# Patient Record
Sex: MALE | Race: WHITE | NOT HISPANIC OR LATINO
[De-identification: names, ages, dates, MRNs, and addresses within clinical notes are randomized per-mention and may not be internally consistent; named-entity substitution may affect disease eponyms.]

---

## 2017-11-09 ENCOUNTER — TRANSCRIPTION ENCOUNTER (OUTPATIENT)
Age: 52
End: 2017-11-09

## 2017-11-13 ENCOUNTER — EMERGENCY (EMERGENCY)
Facility: HOSPITAL | Age: 52
LOS: 1 days | Discharge: LEFT BEFORE DISCHARGE | End: 2017-11-13

## 2017-11-13 DIAGNOSIS — Z98.89 OTHER SPECIFIED POSTPROCEDURAL STATES: Chronic | ICD-10-CM

## 2017-12-12 ENCOUNTER — EMERGENCY (EMERGENCY)
Facility: HOSPITAL | Age: 52
LOS: 1 days | Discharge: HOME | End: 2017-12-12
Attending: EMERGENCY MEDICINE | Admitting: EMERGENCY MEDICINE
Payer: MEDICARE

## 2017-12-12 VITALS
TEMPERATURE: 98 F | OXYGEN SATURATION: 98 % | SYSTOLIC BLOOD PRESSURE: 114 MMHG | HEART RATE: 68 BPM | RESPIRATION RATE: 16 BRPM | DIASTOLIC BLOOD PRESSURE: 62 MMHG

## 2017-12-12 VITALS
HEIGHT: 65 IN | WEIGHT: 130.07 LBS | OXYGEN SATURATION: 95 % | DIASTOLIC BLOOD PRESSURE: 77 MMHG | RESPIRATION RATE: 16 BRPM | HEART RATE: 63 BPM | TEMPERATURE: 98 F | SYSTOLIC BLOOD PRESSURE: 107 MMHG

## 2017-12-12 DIAGNOSIS — Z98.89 OTHER SPECIFIED POSTPROCEDURAL STATES: Chronic | ICD-10-CM

## 2017-12-12 DIAGNOSIS — Z98.1 ARTHRODESIS STATUS: Chronic | ICD-10-CM

## 2017-12-12 PROCEDURE — 72100 X-RAY EXAM L-S SPINE 2/3 VWS: CPT | Mod: 26

## 2017-12-12 PROCEDURE — 72100 X-RAY EXAM L-S SPINE 2/3 VWS: CPT

## 2017-12-12 PROCEDURE — 96372 THER/PROPH/DIAG INJ SC/IM: CPT

## 2017-12-12 PROCEDURE — 99284 EMERGENCY DEPT VISIT MOD MDM: CPT | Mod: 25

## 2017-12-12 PROCEDURE — 99284 EMERGENCY DEPT VISIT MOD MDM: CPT

## 2017-12-12 RX ORDER — KETOROLAC TROMETHAMINE 30 MG/ML
60 SYRINGE (ML) INJECTION ONCE
Qty: 0 | Refills: 0 | Status: DISCONTINUED | OUTPATIENT
Start: 2017-12-12 | End: 2017-12-12

## 2017-12-12 RX ORDER — LIDOCAINE 4 G/100G
1 CREAM TOPICAL ONCE
Qty: 0 | Refills: 0 | Status: COMPLETED | OUTPATIENT
Start: 2017-12-12 | End: 2017-12-12

## 2017-12-12 RX ORDER — SODIUM CHLORIDE 9 MG/ML
3 INJECTION INTRAMUSCULAR; INTRAVENOUS; SUBCUTANEOUS ONCE
Qty: 0 | Refills: 0 | Status: DISCONTINUED | OUTPATIENT
Start: 2017-12-12 | End: 2017-12-12

## 2017-12-12 RX ORDER — IBUPROFEN 200 MG
1 TABLET ORAL
Qty: 20 | Refills: 0
Start: 2017-12-12 | End: 2017-12-16

## 2017-12-12 RX ORDER — CYCLOBENZAPRINE HYDROCHLORIDE 10 MG/1
1 TABLET, FILM COATED ORAL
Qty: 6 | Refills: 0
Start: 2017-12-12 | End: 2017-12-13

## 2017-12-12 RX ADMIN — Medication 60 MILLIGRAM(S): at 14:50

## 2017-12-12 RX ADMIN — LIDOCAINE 1 PATCH: 4 CREAM TOPICAL at 14:50

## 2017-12-12 RX ADMIN — Medication 60 MILLIGRAM(S): at 15:05

## 2017-12-12 NOTE — ED ADULT NURSE NOTE - CHPI ED SYMPTOMS NEG
no motor function loss/no numbness/no anorexia/no tingling/no constipation/no bladder dysfunction/no difficulty bearing weight/no fatigue/no bowel dysfunction/no neck tenderness

## 2017-12-12 NOTE — ED ADULT NURSE NOTE - ATTEMPT TO OOB
09/21/2017                 [facility]  [address]  [facility] [address]  Phone: [phone]   09/21/2017    Patient: Rossy Laguerre   YOB: 2009   Date of Visit: 9/21/2017       To Whom it May Concern:    Rossy Laguerre was seen in my clinic on 9/21/2017. She may return to school on 09/22/2017.    If you have any questions or concerns, please don't hesitate to call.    Sincerely,         Sonia Marroquin MA      no

## 2017-12-12 NOTE — ED PROVIDER NOTE - OBJECTIVE STATEMENT
53 yo male presents with lower back pain after lifting a box at home yesterday, no change in bowel and bladder habits.  states has hx of spinal fusion June 2017  by Dr Peters,  has a pain management Dr, cannot remember name.  took motrin 800 twice yesterday, did not help much. did not take any today.  + smoker, denies etoh.  PMD Dr Burdick

## 2017-12-12 NOTE — ED PROVIDER NOTE - CHPI ED SYMPTOMS NEG
no motor function loss/no bladder dysfunction/no numbness/no constipation/no tingling/no difficulty bearing weight

## 2017-12-12 NOTE — ED ADULT NURSE NOTE - OBJECTIVE STATEMENT
Patient twisted wrong yesterday and now has pain to low back. Patient with history of spinal fusion 6/2017 and has a pain management doctor that he was unable to get an appointment with.

## 2017-12-12 NOTE — ED PROVIDER NOTE - MEDICAL DECISION MAKING DETAILS
Evaluate the etiology of back pain corollate with history and physical imaging study treat accordingly.

## 2017-12-12 NOTE — ED PROVIDER NOTE - ATTENDING CONTRIBUTION TO CARE
I, Dr Cano, have personally performed a face to face diagnostic evaluation on this patient with the PA/NP. I have reviewed the PA/NP's note and agree with the history, Physical exam and plan of care, except for additional note as noted below.    History as above PE ambulatory with lower back discomfort some tenderness on percussion no focal deficit.

## 2017-12-15 ENCOUNTER — EMERGENCY (EMERGENCY)
Facility: HOSPITAL | Age: 52
LOS: 1 days | Discharge: HOME | End: 2017-12-15
Attending: EMERGENCY MEDICINE | Admitting: EMERGENCY MEDICINE
Payer: MEDICARE

## 2017-12-15 VITALS
WEIGHT: 130.07 LBS | OXYGEN SATURATION: 97 % | DIASTOLIC BLOOD PRESSURE: 85 MMHG | SYSTOLIC BLOOD PRESSURE: 139 MMHG | RESPIRATION RATE: 16 BRPM | TEMPERATURE: 98 F | HEART RATE: 69 BPM | HEIGHT: 65 IN

## 2017-12-15 DIAGNOSIS — Z98.89 OTHER SPECIFIED POSTPROCEDURAL STATES: Chronic | ICD-10-CM

## 2017-12-15 DIAGNOSIS — Z98.1 ARTHRODESIS STATUS: Chronic | ICD-10-CM

## 2017-12-15 PROCEDURE — 99283 EMERGENCY DEPT VISIT LOW MDM: CPT

## 2017-12-15 RX ORDER — PENICILLIN V POTASSIUM 250 MG
1 TABLET ORAL
Qty: 28 | Refills: 0
Start: 2017-12-15 | End: 2017-12-21

## 2017-12-15 NOTE — ED PROVIDER NOTE - ENMT, MLM
Airway patent, Mouth with normal mucosa. Right upper gums with minimal erythema and minimal swelling. Non-tender. Overall poor dentition. No other acute findings. Airway patent, Mouth with normal mucosa. Right upper gums with minimal erythema and minimal swelling. Non-tender. Overall poor dentition. No other acute findings. MM Moist. No pharyngeal edema / erythema. Pos dentures

## 2017-12-15 NOTE — ED PROVIDER NOTE - OBJECTIVE STATEMENT
51 y/o M pt w/ PMHx hiatal hernia, HLD, liver failure, Tardive dyskinesia and PSHx lumbar spinal fusion presents to the ED c/o mouth pain/discomfort. Pt states he does not have many teeth left, he has a screw in his gum for his dentures and states he has discomfort in his mouth, believes the screw needs to be taken out. Pt states he is waiting to be able to schedule a dental appointment for insurance reasons, but the pain and discomfort is bothering him and he is looking for relief. Pt denies fever or any other complaints at this time. NKDA. 51 y/o M pt w/ PMHx hiatal hernia, HLD, liver failure, Tardive dyskinesia and PSHx lumbar spinal fusion presents to the ED c/o R upper mouth pain/discomfort. Pt states he does not have many teeth left, he has a screw in his gum for his dentures and states he has discomfort in his mouth, believes the screw needs to be taken out. Pt states he is waiting to be able to schedule a dental appointment for insurance reasons, but the pain and discomfort is bothering him and he is looking for relief. Pt denies fever or any other complaints at this time. No throat pain, no dysphagia. NO agg/allev factors. NO other co.

## 2017-12-15 NOTE — ED ADULT NURSE NOTE - OBJECTIVE STATEMENT
52 yr old male c/o pain in upper right side of mouth. Pt states he has a screw that is sticking out of his gum from a plate that was put in many years ago. Pt states he is awaiting dentures to be made and has an appointment with oral surgeon Monday regarding this issue.

## 2017-12-20 ENCOUNTER — TRANSCRIPTION ENCOUNTER (OUTPATIENT)
Age: 52
End: 2017-12-20

## 2018-09-07 ENCOUNTER — EMERGENCY (EMERGENCY)
Facility: HOSPITAL | Age: 53
LOS: 1 days | Discharge: HOME | End: 2018-09-07
Attending: EMERGENCY MEDICINE
Payer: MEDICARE

## 2018-09-07 VITALS — WEIGHT: 128.09 LBS | HEIGHT: 65 IN

## 2018-09-07 VITALS
SYSTOLIC BLOOD PRESSURE: 137 MMHG | RESPIRATION RATE: 18 BRPM | HEART RATE: 69 BPM | OXYGEN SATURATION: 99 % | TEMPERATURE: 98 F | DIASTOLIC BLOOD PRESSURE: 84 MMHG

## 2018-09-07 DIAGNOSIS — Z98.89 OTHER SPECIFIED POSTPROCEDURAL STATES: Chronic | ICD-10-CM

## 2018-09-07 DIAGNOSIS — Z98.1 ARTHRODESIS STATUS: Chronic | ICD-10-CM

## 2018-09-07 PROCEDURE — 73610 X-RAY EXAM OF ANKLE: CPT

## 2018-09-07 PROCEDURE — 99283 EMERGENCY DEPT VISIT LOW MDM: CPT

## 2018-09-07 PROCEDURE — 73610 X-RAY EXAM OF ANKLE: CPT | Mod: 26,LT

## 2018-09-07 RX ORDER — IBUPROFEN 200 MG
1 TABLET ORAL
Qty: 30 | Refills: 0
Start: 2018-09-07

## 2018-09-07 NOTE — ED STATDOCS - PROGRESS NOTE DETAILS
PT evaluated by intake physician. HPI/PE/ROS as noted above. Will follow up plan per intake physician patient reports that he will follow-up with his therapist next week for refill on cogentin.  states that he has enough seroquel.

## 2018-09-07 NOTE — ED STATDOCS - MUSCULOSKELETAL, MLM
range of motion is not limited. No 5th MT tenderness. Tender to posterior aspect and front of L ankle/foot. No proximal or fibula tenderness. range of motion is not limited. No obvious deformity of foot or ankle. No 5th MT tenderness. Tender to posterior aspect and front of L ankle/foot. No proximal or fibula tenderness.

## 2018-09-07 NOTE — ED STATDOCS - PLAN OF CARE
Apply ice to affected area for 15-20 minutes every few hours for the next few days.  Use as much or as frequently as desired. Tylenol extra strength 2 tablets every 4 hours or Ibuprofen 600mg (3 tablets) every 6 hours as needed for aches, pains. Ace wrap for comfort.

## 2018-09-07 NOTE — ED STATDOCS - NEUROLOGICAL, MLM
sensation is normal and strength is normal. sensation of foot grossly intact.  L4-S1 motor 5/5/ and equal markus

## 2018-09-07 NOTE — ED STATDOCS - CARE PLAN
Principal Discharge DX:	Ankle pain Principal Discharge DX:	Ankle pain  Assessment and plan of treatment:	Apply ice to affected area for 15-20 minutes every few hours for the next few days.  Use as much or as frequently as desired. Tylenol extra strength 2 tablets every 4 hours or Ibuprofen 600mg (3 tablets) every 6 hours as needed for aches, pains. Ace wrap for comfort.

## 2018-09-07 NOTE — ED STATDOCS - OBJECTIVE STATEMENT
53 y/o M pt with medical hx of hiatal hernia, chronic back pain, HLD, and liver failure presents to ED c/o L ankle pain since yesterday and back pain for today. He reports that he was coming downstairs, when he tripped and landed on his ankle. Pt does suffer from chronic back pain so when he fell he did notice his back pain start to come back. Pt applied ice on his ankle, for the swelling to go down. Pt took Motrin yesterday for his pain and hasn't taken anything else since. Denies knee pain. No further complaints at this time. 53 y/o M pt with medical hx of hiatal hernia, chronic back pain, HLD, and liver failure presents to ED c/o L ankle pain since yesterday and back pain for today. He reports that he was coming downstairs, when he tripped and twisted his ankle. Pt has h/o chronic back pain and reports increased back pain since incident yesterday. Pt applied ice on his ankle, for the swelling to go down. Pt took Motrin yesterday for his pain and hasn't taken anything else since. Denies knee pain. No further complaints at this time.

## 2018-09-07 NOTE — ED STATDOCS - NS_ ATTENDINGSCRIBEDETAILS _ED_A_ED_FT
I, Sánchez Blanco, performed the initial face to face bedside interview with this patient regarding history of present illness, review of symptoms and relevant past medical, social and family history.  I completed an independent physical examination.  I was the provider who initially evaluated this patient.  The history, relevant review of systems, past medical and surgical history, medical decision making, and physical examination was documented by the scribe in my presence and I attest to the accuracy of the documentation. Follow-up on ordered tests (ie labs, radiologic studies) and re-evaluation of the patient's status has been communicated to the ACP.  Disposition of the patient will be based on test outcome and response to ED interventions.

## 2018-10-05 ENCOUNTER — EMERGENCY (EMERGENCY)
Facility: HOSPITAL | Age: 53
LOS: 1 days | Discharge: HOME | End: 2018-10-05
Attending: EMERGENCY MEDICINE
Payer: MEDICARE

## 2018-10-05 VITALS
RESPIRATION RATE: 20 BRPM | WEIGHT: 130.07 LBS | SYSTOLIC BLOOD PRESSURE: 135 MMHG | HEIGHT: 65 IN | OXYGEN SATURATION: 93 % | DIASTOLIC BLOOD PRESSURE: 69 MMHG | HEART RATE: 61 BPM | TEMPERATURE: 98 F

## 2018-10-05 DIAGNOSIS — Z98.1 ARTHRODESIS STATUS: Chronic | ICD-10-CM

## 2018-10-05 DIAGNOSIS — Z98.89 OTHER SPECIFIED POSTPROCEDURAL STATES: Chronic | ICD-10-CM

## 2018-10-05 PROCEDURE — 73630 X-RAY EXAM OF FOOT: CPT

## 2018-10-05 PROCEDURE — 99283 EMERGENCY DEPT VISIT LOW MDM: CPT

## 2018-10-05 PROCEDURE — 73630 X-RAY EXAM OF FOOT: CPT | Mod: 26,LT

## 2018-10-05 RX ORDER — ACETAMINOPHEN 500 MG
650 TABLET ORAL ONCE
Qty: 0 | Refills: 0 | Status: COMPLETED | OUTPATIENT
Start: 2018-10-05 | End: 2018-10-05

## 2018-10-05 RX ADMIN — Medication 650 MILLIGRAM(S): at 14:23
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
## 2018-10-05 NOTE — ED PROVIDER NOTE - PHYSICAL EXAMINATION
Left foot: no swelling, no ecchymosis, no deformities, DP/PT pulses intact, no lower extremity edema, no calf tenderness, + minimal tenderness to palpation over mid foot
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
## 2019-02-07 ENCOUNTER — APPOINTMENT (OUTPATIENT)
Dept: FAMILY MEDICINE | Facility: CLINIC | Age: 54
End: 2019-02-07

## 2019-02-08 ENCOUNTER — APPOINTMENT (OUTPATIENT)
Dept: FAMILY MEDICINE | Facility: CLINIC | Age: 54
End: 2019-02-08

## 2019-02-16 ENCOUNTER — EMERGENCY (EMERGENCY)
Facility: HOSPITAL | Age: 54
LOS: 1 days | Discharge: HOME | End: 2019-02-16
Attending: EMERGENCY MEDICINE
Payer: MEDICARE

## 2019-02-16 VITALS
DIASTOLIC BLOOD PRESSURE: 85 MMHG | TEMPERATURE: 98 F | RESPIRATION RATE: 24 BRPM | SYSTOLIC BLOOD PRESSURE: 143 MMHG | OXYGEN SATURATION: 85 % | HEART RATE: 130 BPM | WEIGHT: 160.06 LBS | HEIGHT: 69 IN

## 2019-02-16 VITALS
SYSTOLIC BLOOD PRESSURE: 125 MMHG | OXYGEN SATURATION: 99 % | HEART RATE: 69 BPM | DIASTOLIC BLOOD PRESSURE: 83 MMHG | TEMPERATURE: 98 F | RESPIRATION RATE: 22 BRPM

## 2019-02-16 DIAGNOSIS — Z98.89 OTHER SPECIFIED POSTPROCEDURAL STATES: Chronic | ICD-10-CM

## 2019-02-16 DIAGNOSIS — Z98.1 ARTHRODESIS STATUS: Chronic | ICD-10-CM

## 2019-02-16 LAB
ALBUMIN SERPL ELPH-MCNC: 4.1 G/DL — SIGNIFICANT CHANGE UP (ref 3.3–5.2)
ALP SERPL-CCNC: 79 U/L — SIGNIFICANT CHANGE UP (ref 40–120)
ALT FLD-CCNC: 15 U/L — SIGNIFICANT CHANGE UP
ANION GAP SERPL CALC-SCNC: 12 MMOL/L — SIGNIFICANT CHANGE UP (ref 5–17)
AST SERPL-CCNC: 18 U/L — SIGNIFICANT CHANGE UP
BASOPHILS # BLD AUTO: 0 K/UL — SIGNIFICANT CHANGE UP (ref 0–0.2)
BASOPHILS NFR BLD AUTO: 0.1 % — SIGNIFICANT CHANGE UP (ref 0–2)
BILIRUB SERPL-MCNC: 0.2 MG/DL — LOW (ref 0.4–2)
BUN SERPL-MCNC: 19 MG/DL — SIGNIFICANT CHANGE UP (ref 8–20)
CALCIUM SERPL-MCNC: 8.9 MG/DL — SIGNIFICANT CHANGE UP (ref 8.6–10.2)
CHLORIDE SERPL-SCNC: 100 MMOL/L — SIGNIFICANT CHANGE UP (ref 98–107)
CO2 SERPL-SCNC: 27 MMOL/L — SIGNIFICANT CHANGE UP (ref 22–29)
CREAT SERPL-MCNC: 0.87 MG/DL — SIGNIFICANT CHANGE UP (ref 0.5–1.3)
EOSINOPHIL # BLD AUTO: 0.4 K/UL — SIGNIFICANT CHANGE UP (ref 0–0.5)
EOSINOPHIL NFR BLD AUTO: 4.5 % — SIGNIFICANT CHANGE UP (ref 0–5)
GLUCOSE SERPL-MCNC: 93 MG/DL — SIGNIFICANT CHANGE UP (ref 70–115)
HCT VFR BLD CALC: 47.5 % — SIGNIFICANT CHANGE UP (ref 42–52)
HGB BLD-MCNC: 15.5 G/DL — SIGNIFICANT CHANGE UP (ref 14–18)
LYMPHOCYTES # BLD AUTO: 1.6 K/UL — SIGNIFICANT CHANGE UP (ref 1–4.8)
LYMPHOCYTES # BLD AUTO: 20.9 % — SIGNIFICANT CHANGE UP (ref 20–55)
MCHC RBC-ENTMCNC: 31.3 PG — HIGH (ref 27–31)
MCHC RBC-ENTMCNC: 32.6 G/DL — SIGNIFICANT CHANGE UP (ref 32–36)
MCV RBC AUTO: 96 FL — HIGH (ref 80–94)
MONOCYTES # BLD AUTO: 0.4 K/UL — SIGNIFICANT CHANGE UP (ref 0–0.8)
MONOCYTES NFR BLD AUTO: 5.4 % — SIGNIFICANT CHANGE UP (ref 3–10)
NEUTROPHILS # BLD AUTO: 5.3 K/UL — SIGNIFICANT CHANGE UP (ref 1.8–8)
NEUTROPHILS NFR BLD AUTO: 68.7 % — SIGNIFICANT CHANGE UP (ref 37–73)
PLATELET # BLD AUTO: 254 K/UL — SIGNIFICANT CHANGE UP (ref 150–400)
POTASSIUM SERPL-MCNC: 4.4 MMOL/L — SIGNIFICANT CHANGE UP (ref 3.5–5.3)
POTASSIUM SERPL-SCNC: 4.4 MMOL/L — SIGNIFICANT CHANGE UP (ref 3.5–5.3)
PROT SERPL-MCNC: 7 G/DL — SIGNIFICANT CHANGE UP (ref 6.6–8.7)
RBC # BLD: 4.95 M/UL — SIGNIFICANT CHANGE UP (ref 4.6–6.2)
RBC # FLD: 14.4 % — SIGNIFICANT CHANGE UP (ref 11–15.6)
SODIUM SERPL-SCNC: 139 MMOL/L — SIGNIFICANT CHANGE UP (ref 135–145)
WBC # BLD: 7.7 K/UL — SIGNIFICANT CHANGE UP (ref 4.8–10.8)
WBC # FLD AUTO: 7.7 K/UL — SIGNIFICANT CHANGE UP (ref 4.8–10.8)

## 2019-02-16 PROCEDURE — 99283 EMERGENCY DEPT VISIT LOW MDM: CPT

## 2019-02-16 PROCEDURE — 85027 COMPLETE CBC AUTOMATED: CPT

## 2019-02-16 PROCEDURE — 99284 EMERGENCY DEPT VISIT MOD MDM: CPT

## 2019-02-16 PROCEDURE — 80053 COMPREHEN METABOLIC PANEL: CPT

## 2019-02-16 PROCEDURE — 36415 COLL VENOUS BLD VENIPUNCTURE: CPT

## 2019-02-16 RX ORDER — DIPHENHYDRAMINE HCL 50 MG
1 CAPSULE ORAL
Qty: 18 | Refills: 0
Start: 2019-02-16 | End: 2019-02-21

## 2019-02-16 RX ORDER — SODIUM CHLORIDE 9 MG/ML
1000 INJECTION INTRAMUSCULAR; INTRAVENOUS; SUBCUTANEOUS ONCE
Qty: 0 | Refills: 0 | Status: COMPLETED | OUTPATIENT
Start: 2019-02-16 | End: 2019-02-16

## 2019-02-16 RX ADMIN — SODIUM CHLORIDE 1000 MILLILITER(S): 9 INJECTION INTRAMUSCULAR; INTRAVENOUS; SUBCUTANEOUS at 09:25

## 2019-02-16 NOTE — ED PROVIDER NOTE - CLINICAL SUMMARY MEDICAL DECISION MAKING FREE TEXT BOX
well appearing male here for checkup after bought of nausea/ vomiting/diarrhea now resolved.  + excoriations to skin but pending home fumigation. Pt abdomen nontender; VSS; benadryl sent to pt/s pharmacy; will check basic labs, hydrate and  likely d/c home if reults wnl.

## 2019-02-16 NOTE — ED PROVIDER NOTE - GASTROINTESTINAL, MLM
Abdomen soft, non-tender, no guarding. Abdomen soft, non-tender, no guarding. Normal BS. No distension

## 2019-02-16 NOTE — ED PROVIDER NOTE - SKIN COLOR
excoriations to back of neck b/l forearms; and waistline;  no lesions to intertriginous folds. normal for race/excoriations to back of neck b/l forearms; and waistline;  no lesions to intertriginous folds.

## 2019-02-16 NOTE — ED ADULT NURSE REASSESSMENT NOTE - GENERAL PATIENT STATE
smiling/interactive/comfortable appearance/cooperative
comfortable appearance/cooperative/improvement verbalized/smiling/interactive

## 2019-02-16 NOTE — ED ADULT TRIAGE NOTE - CHIEF COMPLAINT QUOTE
Reports abdominal pain and "explosive diarrhea" for several days and cough. Pt set to critical care room. Current smoker.

## 2019-02-16 NOTE — ED ADULT NURSE REASSESSMENT NOTE - NS ED NURSE REASSESS COMMENT FT1
patient with cold hands, repeated vitals and pulse oximetry with forehead, vitals as noted,  at bedside as his repeat vitals did not warrant a sepsis activation
Patient received awake and alert x4 in cart.  Patient denies complaints at this time.  Patient on cardiac monitor reads 62 NSR, patient denies chest pain, O2 sat reads 98% on RA.  Patient denies SOB at this time.
Patient to go home at this time. Patient is in no acute distress, patient has no labored breathing, patient denies chest pain or SOB.  Patient on cardiac monitor reads 62 NSR, O2 sat reads 99% on RA.

## 2019-02-16 NOTE — ED ADULT NURSE NOTE - OBJECTIVE STATEMENT
Pt reports diarrhea and vomiting x2 days, resolved last night. Ate breakfast this morning with no incident. Pt reports itching and bedbugs at his current residence and wanted to get it checked out.

## 2019-02-16 NOTE — ED PROVIDER NOTE - OBJECTIVE STATEMENT
53yoM with h/o HLD, schizotypal personality, p/w nausea/ vomiting/diarrhea on set 2 days ago and resolving. Pt reports resolution of vomiting and diarrhea and reports only residual abdominal soreness from the vomiting. REports some exacerbation of LL back 2/2 vomiting as well.  Ate last night and this morning without vomiting. Last BM this morning, normal in consistency. Reports resolution of chills.  PT also incidentally notes some itchiness and states that there were bedbugs and cockroaches in his house. Pt reportws smoking and occasional marijuana use.    Pt states he was previously taking seroquel and cogentin but ran out several days ago. PT denies hallucinations/ SI/ HI.     PMD:  Heavenly  Psychiatrist:  César Cervantes; 53yoM with h/o HLD, schizotypal personality, p/w nausea/ vomiting/diarrhea on set 2 days ago and resolving. Pt reports resolution of vomiting and diarrhea and reports only residual abdominal soreness from the vomiting but denies pain. REports some exacerbation of LL back 2/2 vomiting as well.  Ate last night and this morning without vomiting. Last BM this morning, normal in consistency. Reports resolution of chills.  PT also incidentally notes some itchiness and states that there were bedbugs and cockroaches in his house. Pt reportws smoking and occasional marijuana use. Pt states he is feeling much better but wanted to get checked out.    Pt states he was previously taking seroquel and cogentin but ran out several days ago. PT denies hallucinations/ SI/ HI.     PMD:  Heavenly  Psychiatrist:  César Cervantes; 53yoM with h/o HLD, schizotypal personality, p/w nausea/ vomiting/diarrhea on set 2 days ago and resolving. Pt reports resolution of vomiting and diarrhea and reports only residual abdominal soreness from the vomiting but denies pain. REports some exacerbation of LL back 2/2 vomiting as well. NO numbness/tingling/weakness of extremities. No bowel/ bladder incontinence. Ate last night and this morning without vomiting. Last BM this morning, normal in consistency. Reports resolution of chills.  PT also incidentally notes some itchiness and states that there were bedbugs and cockroaches in his house. Pt reports smoking and occasional marijuana use. Pt states he is feeling much better but wanted to get checked out.    Pt states he was previously taking seroquel and cogentin but ran out several days ago. PT denies hallucinations/ SI/ HI.     PMD:  Heavenly  Psychiatrist:  César Cervantes;

## 2019-08-12 PROBLEM — Z00.00 ENCOUNTER FOR PREVENTIVE HEALTH EXAMINATION: Status: ACTIVE | Noted: 2019-08-12

## 2019-10-20 ENCOUNTER — EMERGENCY (EMERGENCY)
Facility: HOSPITAL | Age: 54
LOS: 1 days | Discharge: HOME | End: 2019-10-20
Attending: STUDENT IN AN ORGANIZED HEALTH CARE EDUCATION/TRAINING PROGRAM
Payer: COMMERCIAL

## 2019-10-20 VITALS
OXYGEN SATURATION: 97 % | SYSTOLIC BLOOD PRESSURE: 114 MMHG | WEIGHT: 134.92 LBS | HEIGHT: 66 IN | HEART RATE: 77 BPM | TEMPERATURE: 98 F | RESPIRATION RATE: 22 BRPM | DIASTOLIC BLOOD PRESSURE: 73 MMHG

## 2019-10-20 DIAGNOSIS — Z98.1 ARTHRODESIS STATUS: Chronic | ICD-10-CM

## 2019-10-20 DIAGNOSIS — Z98.89 OTHER SPECIFIED POSTPROCEDURAL STATES: Chronic | ICD-10-CM

## 2019-10-20 PROCEDURE — 99283 EMERGENCY DEPT VISIT LOW MDM: CPT

## 2019-10-20 PROCEDURE — 99283 EMERGENCY DEPT VISIT LOW MDM: CPT | Mod: 25

## 2019-10-20 PROCEDURE — 96372 THER/PROPH/DIAG INJ SC/IM: CPT

## 2019-10-20 RX ORDER — METHOCARBAMOL 500 MG/1
1500 TABLET, FILM COATED ORAL ONCE
Refills: 0 | Status: COMPLETED | OUTPATIENT
Start: 2019-10-20 | End: 2019-10-20

## 2019-10-20 RX ORDER — KETOROLAC TROMETHAMINE 30 MG/ML
60 SYRINGE (ML) INJECTION ONCE
Refills: 0 | Status: DISCONTINUED | OUTPATIENT
Start: 2019-10-20 | End: 2019-10-20

## 2019-10-20 RX ORDER — METHOCARBAMOL 500 MG/1
2 TABLET, FILM COATED ORAL
Qty: 12 | Refills: 0
Start: 2019-10-20 | End: 2019-10-21

## 2019-10-20 RX ADMIN — METHOCARBAMOL 1500 MILLIGRAM(S): 500 TABLET, FILM COATED ORAL at 14:08

## 2019-10-20 RX ADMIN — Medication 60 MILLIGRAM(S): at 14:08

## 2019-10-20 NOTE — ED STATDOCS - PATIENT PORTAL LINK FT
You can access the FollowMyHealth Patient Portal offered by  by registering at the following website: [email or web address]. By joining [employer]’s FollowMyHealth portal, you will also be able to view your health information using other applications (apps) compatible with our system.

## 2019-10-20 NOTE — ED ADULT TRIAGE NOTE - CHIEF COMPLAINT QUOTE
Patient A&Ox4 complaining of severe pain to upper back x "a few days." Denies any recent trauma. Stated has been taking ibuprofen.

## 2019-10-20 NOTE — ED STATDOCS - OBJECTIVE STATEMENT
53 y/o M c/o pain at the bottom of the back of his neck x 5 days.  Patient states that he has history of herniated discs in the neck, which he was diagnosed with years ago.  He states that he's been doing a lot of physical work lately - he "works with horses." Patient denies trauma, fever, headache, chest pain, shortness of breath, numbness/tingling or focal weaknesses.  Patient has been taking ibuprofen - last dose was yesterday.  When offered muscle relaxers, patient states "honestly, that's the only reason I came in".

## 2020-02-23 ENCOUNTER — EMERGENCY (EMERGENCY)
Facility: HOSPITAL | Age: 55
LOS: 1 days | Discharge: HOME | End: 2020-02-23
Attending: EMERGENCY MEDICINE
Payer: COMMERCIAL

## 2020-02-23 VITALS
OXYGEN SATURATION: 95 % | HEART RATE: 72 BPM | SYSTOLIC BLOOD PRESSURE: 103 MMHG | RESPIRATION RATE: 18 BRPM | WEIGHT: 138.01 LBS | TEMPERATURE: 98 F | DIASTOLIC BLOOD PRESSURE: 68 MMHG | HEIGHT: 65 IN

## 2020-02-23 DIAGNOSIS — Z98.89 OTHER SPECIFIED POSTPROCEDURAL STATES: Chronic | ICD-10-CM

## 2020-02-23 DIAGNOSIS — Z98.1 ARTHRODESIS STATUS: Chronic | ICD-10-CM

## 2020-02-23 PROCEDURE — 99283 EMERGENCY DEPT VISIT LOW MDM: CPT | Mod: 25

## 2020-02-23 PROCEDURE — 99284 EMERGENCY DEPT VISIT MOD MDM: CPT

## 2020-02-23 PROCEDURE — 96372 THER/PROPH/DIAG INJ SC/IM: CPT

## 2020-02-23 RX ORDER — LIDOCAINE 4 G/100G
1 CREAM TOPICAL ONCE
Refills: 0 | Status: COMPLETED | OUTPATIENT
Start: 2020-02-23 | End: 2020-02-23

## 2020-02-23 RX ORDER — ACETAMINOPHEN 500 MG
1 TABLET ORAL
Qty: 30 | Refills: 0
Start: 2020-02-23 | End: 2020-03-03

## 2020-02-23 RX ORDER — KETOROLAC TROMETHAMINE 30 MG/ML
30 SYRINGE (ML) INJECTION ONCE
Refills: 0 | Status: DISCONTINUED | OUTPATIENT
Start: 2020-02-23 | End: 2020-02-23

## 2020-02-23 RX ADMIN — LIDOCAINE 1 PATCH: 4 CREAM TOPICAL at 15:51

## 2020-02-23 RX ADMIN — Medication 30 MILLIGRAM(S): at 15:52

## 2020-02-23 NOTE — ED PROVIDER NOTE - PATIENT PORTAL LINK FT
You can access the FollowMyHealth Patient Portal offered by  by registering at the following website: [email or web address]. By joining [email or web address]’s FollowMyHealth portal, you will also be able to view your health information using other applications (apps) compatible with our system.

## 2020-02-23 NOTE — ED PROVIDER NOTE - NSFOLLOWUPINSTRUCTIONS_ED_ALL_ED_FT
Follow up with your doctor. Take tylenol for pain. Follow up with your doctor. Take tylenol for pain.    Back Pain    Back pain is very common in adults. The cause of back pain is rarely dangerous and the pain often gets better over time. The cause of your back pain may not be known and may include strain of muscles or ligaments, degeneration of the spinal disks, or arthritis. Occasionally the pain may radiate down your leg(s). Over-the-counter medicines to reduce pain and inflammation are often the most helpful. Stretching and remaining active frequently helps the healing process.     SEEK IMMEDIATE MEDICAL CARE IF YOU HAVE ANY OF THE FOLLOWING SYMPTOMS: bowel or bladder control problems, unusual weakness or numbness in your arms or legs, nausea or vomiting, abdominal pain, fever, dizziness/lightheadedness.
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
## 2020-03-08 ENCOUNTER — EMERGENCY (EMERGENCY)
Facility: HOSPITAL | Age: 55
LOS: 1 days | Discharge: HOME | End: 2020-03-08
Attending: EMERGENCY MEDICINE
Payer: COMMERCIAL

## 2020-03-08 VITALS
SYSTOLIC BLOOD PRESSURE: 148 MMHG | WEIGHT: 138.01 LBS | OXYGEN SATURATION: 99 % | RESPIRATION RATE: 18 BRPM | DIASTOLIC BLOOD PRESSURE: 79 MMHG | TEMPERATURE: 98 F | HEART RATE: 55 BPM | HEIGHT: 65 IN

## 2020-03-08 DIAGNOSIS — Z98.1 ARTHRODESIS STATUS: Chronic | ICD-10-CM

## 2020-03-08 DIAGNOSIS — Z98.89 OTHER SPECIFIED POSTPROCEDURAL STATES: Chronic | ICD-10-CM

## 2020-03-08 PROCEDURE — 99283 EMERGENCY DEPT VISIT LOW MDM: CPT

## 2020-03-08 RX ORDER — OXYCODONE AND ACETAMINOPHEN 5; 325 MG/1; MG/1
1 TABLET ORAL ONCE
Refills: 0 | Status: DISCONTINUED | OUTPATIENT
Start: 2020-03-08 | End: 2020-03-08

## 2020-03-08 RX ORDER — LIDOCAINE 4 G/100G
5 CREAM TOPICAL
Qty: 40 | Refills: 0
Start: 2020-03-08 | End: 2020-03-09

## 2020-03-08 RX ORDER — LIDOCAINE 4 G/100G
10 CREAM TOPICAL ONCE
Refills: 0 | Status: COMPLETED | OUTPATIENT
Start: 2020-03-08 | End: 2020-03-08

## 2020-03-08 RX ORDER — IBUPROFEN 200 MG
1 TABLET ORAL
Qty: 16 | Refills: 0
Start: 2020-03-08 | End: 2020-03-11

## 2020-03-08 RX ADMIN — OXYCODONE AND ACETAMINOPHEN 1 TABLET(S): 5; 325 TABLET ORAL at 12:00

## 2020-03-08 RX ADMIN — LIDOCAINE 10 MILLILITER(S): 4 CREAM TOPICAL at 12:00

## 2020-03-08 NOTE — ED PROVIDER NOTE - ATTENDING CONTRIBUTION TO CARE
I, Randy Mustafa, performed a face to face bedside interview with this patient regarding history of present illness, review of symptoms and relevant past medical, social and family history.  I completed an independent physical examination. I have communicated the patient’s plan of care and disposition with the ACP.      54 year old male with psych  HLD presents to the ED for mouth sores. Pt reports on 2/15 he had new dentures placed. Since then complains that the dentures have been irritating his gums   pe heent mouth  3 ulcer in gum; no active bleeding; neck supple chest clear  abd soft ; dx oral ulcer;

## 2020-03-08 NOTE — ED PROVIDER NOTE - PATIENT PORTAL LINK FT
You can access the FollowMyHealth Patient Portal offered by [facility] by registering at the following website: [email or web address]. By joining [employer]’s FollowMyHealth portal, you will also be able to view your health information using other applications (apps) compatible with our system.

## 2020-03-08 NOTE — ED PROVIDER NOTE - OBJECTIVE STATEMENT
54 year old male with schizotypical personality disorder, HLD presents to the ED for mouth sores. Pt reports on 2/15 he had new dentures placed. Since then complains that the dentures have been irritating his gums and caused an ulcer. Pt has been taking Motrin and Tylenol with no relief. Also tried mouth wash. Denies fever, chills, neck pain, jaw pain. Pt has an appointment with his Dentist on Thursday for sx.

## 2020-03-08 NOTE — ED PROVIDER NOTE - ENMT, MLM
Airway patent, lower mucosa with 3 small ulceration, no surrounding erythema or lacerations, no erythema/ lacs or ulcerations noted to top mucosa

## 2020-03-13 ENCOUNTER — EMERGENCY (EMERGENCY)
Facility: HOSPITAL | Age: 55
LOS: 1 days | Discharge: HOME | End: 2020-03-13
Attending: EMERGENCY MEDICINE
Payer: COMMERCIAL

## 2020-03-13 VITALS
HEART RATE: 63 BPM | WEIGHT: 138.01 LBS | RESPIRATION RATE: 16 BRPM | HEIGHT: 65 IN | DIASTOLIC BLOOD PRESSURE: 90 MMHG | TEMPERATURE: 98 F | SYSTOLIC BLOOD PRESSURE: 138 MMHG | OXYGEN SATURATION: 96 %

## 2020-03-13 DIAGNOSIS — Z98.89 OTHER SPECIFIED POSTPROCEDURAL STATES: Chronic | ICD-10-CM

## 2020-03-13 DIAGNOSIS — F25.9 SCHIZOAFFECTIVE DISORDER, UNSPECIFIED: ICD-10-CM

## 2020-03-13 DIAGNOSIS — Z98.1 ARTHRODESIS STATUS: Chronic | ICD-10-CM

## 2020-03-13 DIAGNOSIS — R69 ILLNESS, UNSPECIFIED: ICD-10-CM

## 2020-03-13 LAB
ALBUMIN SERPL ELPH-MCNC: 4.4 G/DL — SIGNIFICANT CHANGE UP (ref 3.3–5.2)
ALP SERPL-CCNC: 74 U/L — SIGNIFICANT CHANGE UP (ref 40–120)
ALT FLD-CCNC: 10 U/L — SIGNIFICANT CHANGE UP
AMPHET UR-MCNC: NEGATIVE — SIGNIFICANT CHANGE UP
ANION GAP SERPL CALC-SCNC: 11 MMOL/L — SIGNIFICANT CHANGE UP (ref 5–17)
ANISOCYTOSIS BLD QL: SLIGHT — SIGNIFICANT CHANGE UP
APAP SERPL-MCNC: <7.5 UG/ML — LOW (ref 10–26)
APPEARANCE UR: CLEAR — SIGNIFICANT CHANGE UP
AST SERPL-CCNC: 18 U/L — SIGNIFICANT CHANGE UP
BARBITURATES UR SCN-MCNC: NEGATIVE — SIGNIFICANT CHANGE UP
BASOPHILS # BLD AUTO: 0 K/UL — SIGNIFICANT CHANGE UP (ref 0–0.2)
BASOPHILS NFR BLD AUTO: 0 % — SIGNIFICANT CHANGE UP (ref 0–2)
BENZODIAZ UR-MCNC: POSITIVE
BILIRUB SERPL-MCNC: 0.2 MG/DL — LOW (ref 0.4–2)
BILIRUB UR-MCNC: NEGATIVE — SIGNIFICANT CHANGE UP
BUN SERPL-MCNC: 14 MG/DL — SIGNIFICANT CHANGE UP (ref 8–20)
CALCIUM SERPL-MCNC: 9 MG/DL — SIGNIFICANT CHANGE UP (ref 8.6–10.2)
CHLORIDE SERPL-SCNC: 102 MMOL/L — SIGNIFICANT CHANGE UP (ref 98–107)
CO2 SERPL-SCNC: 27 MMOL/L — SIGNIFICANT CHANGE UP (ref 22–29)
COCAINE METAB.OTHER UR-MCNC: POSITIVE
COLOR SPEC: YELLOW — SIGNIFICANT CHANGE UP
CREAT SERPL-MCNC: 1.27 MG/DL — SIGNIFICANT CHANGE UP (ref 0.5–1.3)
DIFF PNL FLD: NEGATIVE — SIGNIFICANT CHANGE UP
ELLIPTOCYTES BLD QL SMEAR: SLIGHT — SIGNIFICANT CHANGE UP
EOSINOPHIL # BLD AUTO: 1.24 K/UL — HIGH (ref 0–0.5)
EOSINOPHIL NFR BLD AUTO: 11.2 % — HIGH (ref 0–6)
EPI CELLS # UR: SIGNIFICANT CHANGE UP
ETHANOL SERPL-MCNC: <10 MG/DL — SIGNIFICANT CHANGE UP
GIANT PLATELETS BLD QL SMEAR: PRESENT — SIGNIFICANT CHANGE UP
GLUCOSE SERPL-MCNC: 83 MG/DL — SIGNIFICANT CHANGE UP (ref 70–99)
GLUCOSE UR QL: NEGATIVE MG/DL — SIGNIFICANT CHANGE UP
HCT VFR BLD CALC: 46.6 % — SIGNIFICANT CHANGE UP (ref 39–50)
HGB BLD-MCNC: 14.8 G/DL — SIGNIFICANT CHANGE UP (ref 13–17)
KETONES UR-MCNC: NEGATIVE — SIGNIFICANT CHANGE UP
LEUKOCYTE ESTERASE UR-ACNC: ABNORMAL
LYMPHOCYTES # BLD AUTO: 4.76 K/UL — HIGH (ref 1–3.3)
LYMPHOCYTES # BLD AUTO: 43.1 % — SIGNIFICANT CHANGE UP (ref 13–44)
MACROCYTES BLD QL: SLIGHT — SIGNIFICANT CHANGE UP
MANUAL SMEAR VERIFICATION: SIGNIFICANT CHANGE UP
MCHC RBC-ENTMCNC: 30.9 PG — SIGNIFICANT CHANGE UP (ref 27–34)
MCHC RBC-ENTMCNC: 31.8 GM/DL — LOW (ref 32–36)
MCV RBC AUTO: 97.3 FL — SIGNIFICANT CHANGE UP (ref 80–100)
METHADONE UR-MCNC: NEGATIVE — SIGNIFICANT CHANGE UP
MONOCYTES # BLD AUTO: 0.95 K/UL — HIGH (ref 0–0.9)
MONOCYTES NFR BLD AUTO: 8.6 % — SIGNIFICANT CHANGE UP (ref 2–14)
NEUTROPHILS # BLD AUTO: 3.91 K/UL — SIGNIFICANT CHANGE UP (ref 1.8–7.4)
NEUTROPHILS NFR BLD AUTO: 34.5 % — LOW (ref 43–77)
NEUTS BAND # BLD: 0.9 % — SIGNIFICANT CHANGE UP (ref 0–8)
NITRITE UR-MCNC: NEGATIVE — SIGNIFICANT CHANGE UP
OPIATES UR-MCNC: NEGATIVE — SIGNIFICANT CHANGE UP
PCP SPEC-MCNC: SIGNIFICANT CHANGE UP
PCP UR-MCNC: NEGATIVE — SIGNIFICANT CHANGE UP
PH UR: 7 — SIGNIFICANT CHANGE UP (ref 5–8)
PLAT MORPH BLD: NORMAL — SIGNIFICANT CHANGE UP
PLATELET # BLD AUTO: 321 K/UL — SIGNIFICANT CHANGE UP (ref 150–400)
POIKILOCYTOSIS BLD QL AUTO: SLIGHT — SIGNIFICANT CHANGE UP
POTASSIUM SERPL-MCNC: 5.6 MMOL/L — HIGH (ref 3.5–5.3)
POTASSIUM SERPL-SCNC: 5.6 MMOL/L — HIGH (ref 3.5–5.3)
PROT SERPL-MCNC: 6.8 G/DL — SIGNIFICANT CHANGE UP (ref 6.6–8.7)
PROT UR-MCNC: 15 MG/DL
RBC # BLD: 4.79 M/UL — SIGNIFICANT CHANGE UP (ref 4.2–5.8)
RBC # FLD: 14.8 % — HIGH (ref 10.3–14.5)
RBC BLD AUTO: SIGNIFICANT CHANGE UP
RBC CASTS # UR COMP ASSIST: NEGATIVE /HPF — SIGNIFICANT CHANGE UP (ref 0–4)
SALICYLATES SERPL-MCNC: <0.6 MG/DL — LOW (ref 10–20)
SODIUM SERPL-SCNC: 140 MMOL/L — SIGNIFICANT CHANGE UP (ref 135–145)
SP GR SPEC: 1.01 — SIGNIFICANT CHANGE UP (ref 1.01–1.02)
TARGETS BLD QL SMEAR: SLIGHT — SIGNIFICANT CHANGE UP
THC UR QL: POSITIVE
UROBILINOGEN FLD QL: NEGATIVE MG/DL — SIGNIFICANT CHANGE UP
VARIANT LYMPHS # BLD: 1.7 % — SIGNIFICANT CHANGE UP (ref 0–6)
WBC # BLD: 11.04 K/UL — HIGH (ref 3.8–10.5)
WBC # FLD AUTO: 11.04 K/UL — HIGH (ref 3.8–10.5)
WBC UR QL: SIGNIFICANT CHANGE UP

## 2020-03-13 PROCEDURE — 93010 ELECTROCARDIOGRAM REPORT: CPT

## 2020-03-13 PROCEDURE — 99285 EMERGENCY DEPT VISIT HI MDM: CPT

## 2020-03-13 PROCEDURE — 90792 PSYCH DIAG EVAL W/MED SRVCS: CPT

## 2020-03-13 RX ORDER — QUETIAPINE FUMARATE 200 MG/1
50 TABLET, FILM COATED ORAL AT BEDTIME
Refills: 0 | Status: DISCONTINUED | OUTPATIENT
Start: 2020-03-13 | End: 2020-03-18

## 2020-03-13 RX ORDER — SERTRALINE 25 MG/1
100 TABLET, FILM COATED ORAL DAILY
Refills: 0 | Status: DISCONTINUED | OUTPATIENT
Start: 2020-03-13 | End: 2020-03-18

## 2020-03-13 RX ORDER — BENZTROPINE MESYLATE 1 MG
2 TABLET ORAL
Refills: 0 | Status: DISCONTINUED | OUTPATIENT
Start: 2020-03-13 | End: 2020-03-18

## 2020-03-13 RX ORDER — CLONAZEPAM 1 MG
0.5 TABLET ORAL
Refills: 0 | Status: DISCONTINUED | OUTPATIENT
Start: 2020-03-13 | End: 2020-03-13

## 2020-03-13 RX ORDER — QUETIAPINE FUMARATE 200 MG/1
400 TABLET, FILM COATED ORAL AT BEDTIME
Refills: 0 | Status: DISCONTINUED | OUTPATIENT
Start: 2020-03-13 | End: 2020-03-18

## 2020-03-13 RX ADMIN — QUETIAPINE FUMARATE 400 MILLIGRAM(S): 200 TABLET, FILM COATED ORAL at 21:05

## 2020-03-13 RX ADMIN — Medication 2 MILLIGRAM(S): at 20:42

## 2020-03-13 RX ADMIN — SERTRALINE 100 MILLIGRAM(S): 25 TABLET, FILM COATED ORAL at 21:05

## 2020-03-13 RX ADMIN — QUETIAPINE FUMARATE 50 MILLIGRAM(S): 200 TABLET, FILM COATED ORAL at 21:05

## 2020-03-13 RX ADMIN — Medication 2 MILLIGRAM(S): at 21:05

## 2020-03-13 NOTE — ED BEHAVIORAL HEALTH ASSESSMENT NOTE - RISK ASSESSMENT
RF h/o aggression, h/o suicide attempt, substance abuse, maladaptive coping, male, chronic pain, housing issues (27 people sharing a house) Low Acute Suicide Risk

## 2020-03-13 NOTE — ED BEHAVIORAL HEALTH ASSESSMENT NOTE - ADDITIONAL DETAILS ALL
Pt. reports last hospitalization at [facility] was five years ago for suicide attempt by overdosing on his medications.

## 2020-03-13 NOTE — ED STATDOCS - NS ED ROS FT
Const: Denies fever, chills  HEENT: Denies blurry vision, sore throat  Neck: Denies neck pain/stiffness  Resp: Denies coughing, SOB  Cardiovascular: Denies CP, palpitations, LE edema  GI: Denies abdominal pain, diarrhea, constipation, blood in stool  : Denies urinary frequency/urgency/dysuria, hematuria  Neuro: Denies HA, dizziness, numbness, weakness  Skin: Denies rashes.   Psych: +SI, +HI, no hallucinations

## 2020-03-13 NOTE — ED STATDOCS - ATTENDING CONTRIBUTION TO CARE
I, Beulah Harris, performed the initial face to face bedside interview with this patient regarding history of present illness, review of symptoms and relevant past medical, social and family history.  I completed an independent physical examination.  I was the initial provider who evaluated this patient. I have signed out the follow up of any pending tests (i.e. labs, radiological studies) to the ACP.  I have communicated the patient’s plan of care and disposition with the ACP.

## 2020-03-13 NOTE — ED BEHAVIORAL HEALTH NOTE - BEHAVIORAL HEALTH NOTE
SW Note: Per psych team, pt is in need of inpt psych trx on a 9.13 status. SW met with pt at bedside to complete voluntary legals. SW will make referrals for inpatient trx and continue to follow

## 2020-03-13 NOTE — ED BEHAVIORAL HEALTH ASSESSMENT NOTE - DETAILS
Pt. reports last hospitalization at [facility] was five years ago for suicide attempt by overdosing on his medications. Pt reports he "Used to kill " when in [employer] and has ability to be aggressive. Reports he was in the [employer] for two years, honorable discharge but he has no VA benefits. bullied in past chronic back pain to follow na
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
## 2020-03-13 NOTE — ED BEHAVIORAL HEALTH ASSESSMENT NOTE - CURRENT MEDICATION
Seroquel 450mg at  bedtime;  clonazepam 0.5 qid prn, Austedo 6 mg bid,  (or Cogentin 2 mg bid), Hydroxyzine 25 mg bid, Zoloft 100 qd, Ventolin HFA INH w/dos 200 puff 1 puff q4h prn

## 2020-03-13 NOTE — ED BEHAVIORAL HEALTH ASSESSMENT NOTE - DESCRIPTION
labile, hostile irritable, then apologetic kidney failure per old record, back pain spine fusion Resides at [facility] with two roommates

## 2020-03-13 NOTE — ED STATDOCS - CLINICAL SUMMARY MEDICAL DECISION MAKING FREE TEXT BOX
54 year old male pt presenting with suicidal and homicidal thoughts, took 4 Clonazepam in "suicide attempt" earlier today, does not feel safe in living situation and would like to be admitted. Will medically clear and send to .

## 2020-03-13 NOTE — ED BEHAVIORAL HEALTH ASSESSMENT NOTE - DESCRIPTION (FIRST USE, LAST USE, QUANTITY, FREQUENCY, DURATION)
1 PPD Smokes daily, last used yesterday denies current substance abuse.  Tox pos cocaine, benzo and THC

## 2020-03-13 NOTE — ED BEHAVIORAL HEALTH ASSESSMENT NOTE - VIOLENCE RISK FACTORS:
Violent ideation/threat/speech/Irritability/Antisocial behavior/cognition (past or present)/Impulsivity/Affective dysregulation/History of being victimized/traumatized/Feeling of being under threat and being unable to control threat

## 2020-03-13 NOTE — ED STATDOCS - PHYSICAL EXAMINATION
Const: Awake, alert and oriented. In no acute distress. Well appearing  HEENT: NC/AT. Moist mucous membranes.  Eyes: No scleral icterus. EOMI.  Neck:. Soft and supple. Full ROM without pain.  Cardiac: Regular rate and regular rhythm. +S1/S2. No murmurs. Peripheral pulses 2+ and symmetric. No LE edema.  Resp: Speaking in full sentences. (+) diffusely diminished breath sounds. No evidence of respiratory distress. No wheezes, rales or rhonchi.  Abd: Soft, non-tender, non-distended. Normal bowel sounds in all 4 quadrants. No guarding or rebound.  Skin: No rashes, abrasions or lacerations.  Neuro: Awake, alert & oriented x 3. Moves all extremities symmetrically.

## 2020-03-13 NOTE — ED BEHAVIORAL HEALTH ASSESSMENT NOTE - OTHER PAST PSYCHIATRIC HISTORY (INCLUDE DETAILS REGARDING ONSET, COURSE OF ILLNESS, INPATIENT/OUTPATIENT TREATMENT)
Pt. reports long history of Schizoaffective Disorder since age 17.  Reports he was hospitalized in the past at [facility] and was at [facility] for 9 months.  Reports treatment with multiple medications.     Currently in tx with César Coker for manu years

## 2020-03-13 NOTE — ED ADULT NURSE NOTE - NSIMPLEMENTINTERV_GEN_ALL_ED
Implemented All Universal Safety Interventions:  [place] to call system. Call bell, personal items and telephone within reach. Instruct patient to call for assistance. Room bathroom lighting operational. Non-slip footwear when patient is off stretcher. Physically safe environment: no spills, clutter or unnecessary equipment. Stretcher in lowest position, wheels locked, appropriate side rails in place.

## 2020-03-13 NOTE — ED BEHAVIORAL HEALTH ASSESSMENT NOTE - SUICIDE RISK FACTORS
Insomnia/Mood Disorder current/past/Agitation/Severe Anxiety/Panic/Current mood episode/Recent onset of current/past psychiatric diagnosis/Unable to engage in safety planning

## 2020-03-14 VITALS
OXYGEN SATURATION: 98 % | DIASTOLIC BLOOD PRESSURE: 80 MMHG | HEART RATE: 68 BPM | TEMPERATURE: 99 F | SYSTOLIC BLOOD PRESSURE: 120 MMHG | RESPIRATION RATE: 20 BRPM

## 2020-03-14 PROCEDURE — 80307 DRUG TEST PRSMV CHEM ANLYZR: CPT

## 2020-03-14 PROCEDURE — 81001 URINALYSIS AUTO W/SCOPE: CPT

## 2020-03-14 PROCEDURE — 80053 COMPREHEN METABOLIC PANEL: CPT

## 2020-03-14 PROCEDURE — 99284 EMERGENCY DEPT VISIT MOD MDM: CPT | Mod: 25

## 2020-03-14 PROCEDURE — 93005 ELECTROCARDIOGRAM TRACING: CPT

## 2020-03-14 PROCEDURE — 36415 COLL VENOUS BLD VENIPUNCTURE: CPT

## 2020-03-14 PROCEDURE — 85027 COMPLETE CBC AUTOMATED: CPT

## 2020-03-14 RX ADMIN — SERTRALINE 100 MILLIGRAM(S): 25 TABLET, FILM COATED ORAL at 13:00

## 2020-03-14 RX ADMIN — Medication 2 MILLIGRAM(S): at 05:38

## 2020-03-14 NOTE — ED ADULT NURSE REASSESSMENT NOTE - NS ED NURSE REASSESS COMMENT FT1
pt awake and alert. pt offered breakfast and consumed it 100%. pt offered am sundries for ADL's but states "I want to rest a little more" pt returned to room and offers no complaints.
potassium noted dr morales made aware no new  orders received

## 2020-03-14 NOTE — CHART NOTE - NSCHARTNOTEFT_GEN_A_CORE
MARVIN spoke with admissions employee, Nancy, whom reported patient has been accepted to [facility]. Accepting MD is Dr. Granados, sending MD is Dr. Lutz. Patient completed 9.13 legals, Dr. Lutz completed Certificate of Transfer. SW completed Transfer packet with  assessment, 9.13 legals, Certificate of Transfer form, EKG, and facesheet. MARVIN then placed call to [facility] and spoke with employee, Tammi, to arrange patient's ambulance to [facility]. CONY Nathan provided clinical on behalf of patient. Patient's ambulance arranged and patient in agreement to attend [facility] upon discharge. MARVIN completed NEAF and uploaded to [employer].

## 2020-03-14 NOTE — CHART NOTE - NSCHARTNOTEFT_GEN_A_CORE
As per MELISSA Cho, patient is to attend inpatient psychiatry upon discharge. Patient completed 9.13 voluntary legals. SW placed call to [facility] and spoke with employee, Nancy, whom confirmed MD will review patients information and let SW know if accepting. SW continues to follow.

## 2020-03-16 NOTE — CHART NOTE - NSCHARTNOTEFT_GEN_A_CORE
SW Note: Pt was transferred to [facility] on 3/14 for inpt psychiatric care. Called ins listed on face sheet, [phone]. Spoke with ins CM sierra Parra approved for 9 days 3/14-3/23. Auth# [id number]. Info forwarded to [facility] UR dept.

## 2020-06-28 ENCOUNTER — EMERGENCY (EMERGENCY)
Facility: HOSPITAL | Age: 55
LOS: 1 days | Discharge: HOME | End: 2020-06-28
Attending: EMERGENCY MEDICINE
Payer: COMMERCIAL

## 2020-06-28 VITALS
SYSTOLIC BLOOD PRESSURE: 122 MMHG | WEIGHT: 130.07 LBS | TEMPERATURE: 98 F | RESPIRATION RATE: 16 BRPM | HEIGHT: 65 IN | HEART RATE: 87 BPM | OXYGEN SATURATION: 99 % | DIASTOLIC BLOOD PRESSURE: 87 MMHG

## 2020-06-28 DIAGNOSIS — Z98.1 ARTHRODESIS STATUS: Chronic | ICD-10-CM

## 2020-06-28 DIAGNOSIS — Z98.89 OTHER SPECIFIED POSTPROCEDURAL STATES: Chronic | ICD-10-CM

## 2020-06-28 PROCEDURE — 99283 EMERGENCY DEPT VISIT LOW MDM: CPT

## 2020-06-28 PROCEDURE — 73030 X-RAY EXAM OF SHOULDER: CPT

## 2020-06-28 PROCEDURE — 73030 X-RAY EXAM OF SHOULDER: CPT | Mod: 26,RT

## 2020-06-28 PROCEDURE — 99284 EMERGENCY DEPT VISIT MOD MDM: CPT

## 2020-06-28 RX ORDER — ACETAMINOPHEN 500 MG
650 TABLET ORAL ONCE
Refills: 0 | Status: COMPLETED | OUTPATIENT
Start: 2020-06-28 | End: 2020-06-28

## 2020-06-28 RX ADMIN — Medication 650 MILLIGRAM(S): at 10:14

## 2020-06-28 NOTE — ED STATDOCS - PATIENT PORTAL LINK FT
You can access the FollowMyHealth Patient Portal offered by [facility] by registering at the following website: [email or web address]. By joining [employer]’s FollowMyHealth portal, you will also be able to view your health information using other applications (apps) compatible with our system.
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
## 2020-06-28 NOTE — ED ADULT NURSE NOTE - NSIMPLEMENTINTERV_GEN_ALL_ED
Implemented All Universal Safety Interventions:  [place] to call system. Call bell, personal items and telephone within reach. Instruct patient to call for assistance. Room bathroom lighting operational. Non-slip footwear when patient is off stretcher. Physically safe environment: no spills, clutter or unnecessary equipment. Stretcher in lowest position, wheels locked, appropriate side rails in place.
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
## 2020-07-13 ENCOUNTER — INPATIENT (INPATIENT)
Facility: HOSPITAL | Age: 55
LOS: 1 days | Discharge: HOME | DRG: 192 | End: 2020-07-15
Attending: FAMILY MEDICINE | Admitting: EMERGENCY MEDICINE
Payer: COMMERCIAL

## 2020-07-13 VITALS
DIASTOLIC BLOOD PRESSURE: 90 MMHG | OXYGEN SATURATION: 94 % | RESPIRATION RATE: 20 BRPM | HEIGHT: 65 IN | WEIGHT: 125 LBS | SYSTOLIC BLOOD PRESSURE: 133 MMHG | HEART RATE: 73 BPM | TEMPERATURE: 98 F

## 2020-07-13 DIAGNOSIS — Z98.1 ARTHRODESIS STATUS: Chronic | ICD-10-CM

## 2020-07-13 DIAGNOSIS — Z98.89 OTHER SPECIFIED POSTPROCEDURAL STATES: Chronic | ICD-10-CM

## 2020-07-13 DIAGNOSIS — J44.1 CHRONIC OBSTRUCTIVE PULMONARY DISEASE WITH (ACUTE) EXACERBATION: ICD-10-CM

## 2020-07-13 LAB
ANION GAP SERPL CALC-SCNC: 11 MMOL/L — SIGNIFICANT CHANGE UP (ref 5–17)
BASOPHILS # BLD AUTO: 0.08 K/UL — SIGNIFICANT CHANGE UP (ref 0–0.2)
BASOPHILS NFR BLD AUTO: 0.9 % — SIGNIFICANT CHANGE UP (ref 0–2)
BUN SERPL-MCNC: 21 MG/DL — HIGH (ref 8–20)
CALCIUM SERPL-MCNC: 8.9 MG/DL — SIGNIFICANT CHANGE UP (ref 8.6–10.2)
CHLORIDE SERPL-SCNC: 102 MMOL/L — SIGNIFICANT CHANGE UP (ref 98–107)
CO2 SERPL-SCNC: 27 MMOL/L — SIGNIFICANT CHANGE UP (ref 22–29)
CREAT SERPL-MCNC: 1 MG/DL — SIGNIFICANT CHANGE UP (ref 0.5–1.3)
D DIMER BLD IA.RAPID-MCNC: 248 NG/ML DDU — HIGH
EOSINOPHIL # BLD AUTO: 1.04 K/UL — HIGH (ref 0–0.5)
EOSINOPHIL NFR BLD AUTO: 11.9 % — HIGH (ref 0–6)
GLUCOSE SERPL-MCNC: 96 MG/DL — SIGNIFICANT CHANGE UP (ref 70–99)
HCT VFR BLD CALC: 42.2 % — SIGNIFICANT CHANGE UP (ref 39–50)
HGB BLD-MCNC: 13.7 G/DL — SIGNIFICANT CHANGE UP (ref 13–17)
IMM GRANULOCYTES NFR BLD AUTO: 0.3 % — SIGNIFICANT CHANGE UP (ref 0–1.5)
LYMPHOCYTES # BLD AUTO: 3 K/UL — SIGNIFICANT CHANGE UP (ref 1–3.3)
LYMPHOCYTES # BLD AUTO: 34.4 % — SIGNIFICANT CHANGE UP (ref 13–44)
MCHC RBC-ENTMCNC: 31.3 PG — SIGNIFICANT CHANGE UP (ref 27–34)
MCHC RBC-ENTMCNC: 32.5 GM/DL — SIGNIFICANT CHANGE UP (ref 32–36)
MCV RBC AUTO: 96.3 FL — SIGNIFICANT CHANGE UP (ref 80–100)
MONOCYTES # BLD AUTO: 0.9 K/UL — SIGNIFICANT CHANGE UP (ref 0–0.9)
MONOCYTES NFR BLD AUTO: 10.3 % — SIGNIFICANT CHANGE UP (ref 2–14)
NEUTROPHILS # BLD AUTO: 3.66 K/UL — SIGNIFICANT CHANGE UP (ref 1.8–7.4)
NEUTROPHILS NFR BLD AUTO: 42.2 % — LOW (ref 43–77)
NT-PROBNP SERPL-SCNC: 69 PG/ML — SIGNIFICANT CHANGE UP (ref 0–300)
PLATELET # BLD AUTO: 238 K/UL — SIGNIFICANT CHANGE UP (ref 150–400)
POTASSIUM SERPL-MCNC: 4.7 MMOL/L — SIGNIFICANT CHANGE UP (ref 3.5–5.3)
POTASSIUM SERPL-SCNC: 4.7 MMOL/L — SIGNIFICANT CHANGE UP (ref 3.5–5.3)
RBC # BLD: 4.38 M/UL — SIGNIFICANT CHANGE UP (ref 4.2–5.8)
RBC # FLD: 14.7 % — HIGH (ref 10.3–14.5)
SODIUM SERPL-SCNC: 140 MMOL/L — SIGNIFICANT CHANGE UP (ref 135–145)
TROPONIN T SERPL-MCNC: <0.01 NG/ML — SIGNIFICANT CHANGE UP (ref 0–0.06)
WBC # BLD: 8.71 K/UL — SIGNIFICANT CHANGE UP (ref 3.8–10.5)
WBC # FLD AUTO: 8.71 K/UL — SIGNIFICANT CHANGE UP (ref 3.8–10.5)

## 2020-07-13 PROCEDURE — 99285 EMERGENCY DEPT VISIT HI MDM: CPT

## 2020-07-13 PROCEDURE — 71045 X-RAY EXAM CHEST 1 VIEW: CPT | Mod: 26

## 2020-07-13 PROCEDURE — 93010 ELECTROCARDIOGRAM REPORT: CPT

## 2020-07-13 PROCEDURE — 99223 1ST HOSP IP/OBS HIGH 75: CPT

## 2020-07-13 RX ORDER — ALBUTEROL 90 UG/1
2.5 AEROSOL, METERED ORAL ONCE
Refills: 0 | Status: COMPLETED | OUTPATIENT
Start: 2020-07-13 | End: 2020-07-13

## 2020-07-13 RX ORDER — AZITHROMYCIN 500 MG/1
1 TABLET, FILM COATED ORAL
Qty: 1 | Refills: 0
Start: 2020-07-13

## 2020-07-13 RX ORDER — ALBUTEROL 90 UG/1
2 AEROSOL, METERED ORAL ONCE
Refills: 0 | Status: DISCONTINUED | OUTPATIENT
Start: 2020-07-13 | End: 2020-07-13

## 2020-07-13 RX ORDER — IPRATROPIUM BROMIDE 0.2 MG/ML
1 SOLUTION, NON-ORAL INHALATION ONCE
Refills: 0 | Status: DISCONTINUED | OUTPATIENT
Start: 2020-07-13 | End: 2020-07-13

## 2020-07-13 RX ORDER — MAGNESIUM SULFATE 500 MG/ML
2 VIAL (ML) INJECTION ONCE
Refills: 0 | Status: COMPLETED | OUTPATIENT
Start: 2020-07-13 | End: 2020-07-13

## 2020-07-13 RX ORDER — ALBUTEROL 90 UG/1
2 AEROSOL, METERED ORAL
Qty: 1 | Refills: 0
Start: 2020-07-13

## 2020-07-13 RX ORDER — IPRATROPIUM/ALBUTEROL SULFATE 18-103MCG
3 AEROSOL WITH ADAPTER (GRAM) INHALATION ONCE
Refills: 0 | Status: COMPLETED | OUTPATIENT
Start: 2020-07-13 | End: 2020-07-13

## 2020-07-13 RX ORDER — AZITHROMYCIN 500 MG/1
500 TABLET, FILM COATED ORAL ONCE
Refills: 0 | Status: COMPLETED | OUTPATIENT
Start: 2020-07-13 | End: 2020-07-13

## 2020-07-13 RX ADMIN — Medication 3 MILLILITER(S): at 20:35

## 2020-07-13 RX ADMIN — ALBUTEROL 2.5 MILLIGRAM(S): 90 AEROSOL, METERED ORAL at 20:34

## 2020-07-13 RX ADMIN — Medication 50 GRAM(S): at 20:32

## 2020-07-13 RX ADMIN — Medication 125 MILLIGRAM(S): at 20:34

## 2020-07-13 RX ADMIN — AZITHROMYCIN 255 MILLIGRAM(S): 500 TABLET, FILM COATED ORAL at 23:02

## 2020-07-13 RX ADMIN — ALBUTEROL 2.5 MILLIGRAM(S): 90 AEROSOL, METERED ORAL at 20:35

## 2020-07-13 NOTE — ED PROVIDER NOTE - OBJECTIVE STATEMENT
Pt is a 53 y/o M w/PMHx depression presents c/o shortness of breath.  Pt states that for the past few days he has felt increasingly short of breath and has lingering shoulder pain from a previous fall.  He went to his PMD for evaluation and was sent to the ED.  He States that he is a current everyday smoker, but has not had trouble breathing in the past.  He denies chest pain, fever, headache, nausea, vomiting, constipation, diarrhea.

## 2020-07-13 NOTE — ED PROVIDER NOTE - NS ED ROS FT
CONSTITUTIONAL: No fevers, no chills  Eyes: No vision changes  Cardiovascular: No Chest pain  Respiratory: +SOB  Gastrointestinal: No n/v/c/d, no abd pain  Genitourinary: no dysuria, no hematuria  SKIN: no rashes.  MSK: no weakness, no myalgias, +arthralgias  NEURO: no headache, no weakness, no numbness  PSYCHIATRIC: no known mental health issues.
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
## 2020-07-13 NOTE — ED PROVIDER NOTE - ATTENDING CONTRIBUTION TO CARE
I personally saw the patient with the resident, and completed the key components of the history and physical exam. I then discussed the management plan with the resident.   gen in nad resp diffuse wheezing all lobes trachea midline cardiac no murmur abd soft neuro intact   agree with resdient plan of care

## 2020-07-13 NOTE — ED PROVIDER NOTE - CLINICAL SUMMARY MEDICAL DECISION MAKING FREE TEXT BOX
Pt is a 53 y/o M presents c/o shortness of breath, will get labs, cxr, ekg, trop, bnp, covid, duonebs, reassess

## 2020-07-13 NOTE — ED PROVIDER NOTE - PHYSICAL EXAMINATION
General: well appearing, interactive, well nourished, NAD  HEENT: pupils equal and reactive, normal external ears bilaterally   Cardiac: RRR, no MRG appreciated, no LE edema  Resp: tacyhpneic, diffuse wheezing, symmetric chest wall rise  Abd: soft, nontender, nondistended,   : no CVA tenderness  Neuro: Moving all extremities  Skin:  normal color for race General:  interactive, well nourished, NAD  HEENT: pupils equal and reactive, normal external ears bilaterally   Cardiac: RRR, no MRG appreciated, no LE edema  Resp: tacyhpneic, diffuse wheezing, symmetric chest wall rise  Abd: soft, nontender, nondistended,   : no CVA tenderness  Neuro: Moving all extremities  Skin:  normal color for race
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
## 2020-07-13 NOTE — ED PROVIDER NOTE - PROGRESS NOTE DETAILS
Pt with likely with COPD exacerbation age adjusted d-dimer <500, still with exertional hypoxemia on RA to 84%.  Will call for admission for COPD exacerbation.

## 2020-07-14 DIAGNOSIS — J44.1 CHRONIC OBSTRUCTIVE PULMONARY DISEASE WITH (ACUTE) EXACERBATION: ICD-10-CM

## 2020-07-14 DIAGNOSIS — F20.9 SCHIZOPHRENIA, UNSPECIFIED: ICD-10-CM

## 2020-07-14 LAB
ANION GAP SERPL CALC-SCNC: 13 MMOL/L — SIGNIFICANT CHANGE UP (ref 5–17)
BUN SERPL-MCNC: 22 MG/DL — HIGH (ref 8–20)
CALCIUM SERPL-MCNC: 9.8 MG/DL — SIGNIFICANT CHANGE UP (ref 8.6–10.2)
CHLORIDE SERPL-SCNC: 98 MMOL/L — SIGNIFICANT CHANGE UP (ref 98–107)
CO2 SERPL-SCNC: 27 MMOL/L — SIGNIFICANT CHANGE UP (ref 22–29)
CREAT SERPL-MCNC: 0.7 MG/DL — SIGNIFICANT CHANGE UP (ref 0.5–1.3)
GLUCOSE SERPL-MCNC: 130 MG/DL — HIGH (ref 70–99)
MAGNESIUM SERPL-MCNC: 2.3 MG/DL — SIGNIFICANT CHANGE UP (ref 1.6–2.6)
PHOSPHATE SERPL-MCNC: 2.2 MG/DL — LOW (ref 2.4–4.7)
POTASSIUM SERPL-MCNC: 4.8 MMOL/L — SIGNIFICANT CHANGE UP (ref 3.5–5.3)
POTASSIUM SERPL-SCNC: 4.8 MMOL/L — SIGNIFICANT CHANGE UP (ref 3.5–5.3)
SARS-COV-2 RNA SPEC QL NAA+PROBE: SIGNIFICANT CHANGE UP
SODIUM SERPL-SCNC: 137 MMOL/L — SIGNIFICANT CHANGE UP (ref 135–145)

## 2020-07-14 PROCEDURE — 99233 SBSQ HOSP IP/OBS HIGH 50: CPT

## 2020-07-14 RX ORDER — CLONAZEPAM 1 MG
0 TABLET ORAL
Qty: 0 | Refills: 0 | DISCHARGE

## 2020-07-14 RX ORDER — SERTRALINE 25 MG/1
1 TABLET, FILM COATED ORAL
Qty: 0 | Refills: 0 | DISCHARGE

## 2020-07-14 RX ORDER — ESOMEPRAZOLE MAGNESIUM 40 MG/1
1 CAPSULE, DELAYED RELEASE ORAL
Qty: 0 | Refills: 0 | DISCHARGE

## 2020-07-14 NOTE — H&P ADULT - PROBLEM SELECTOR PLAN 1
Admit to  bed, cont. pulse steroids, nebs, supplemental 02, atypical coverage, smoking cessation counseling, incentive spirometer, probiotics, Ambulation, DVT-P.

## 2020-07-14 NOTE — H&P ADULT - PROBLEM SELECTOR PLAN 2
Patient unclear what medications he is on, will need to attempt to locate meds at group home in AM. Will cont. Seroquel and prn ativan based on patient recollection. Denies any SI/HI

## 2020-07-14 NOTE — PROGRESS NOTE ADULT - ASSESSMENT
55 y/o male with likely undiagnosed COPD w/ exacerbation, hypoxia, hx of Schizophrenia     # AE of undiagnosed/ suspected COPD  Not requiring O2  Maintain on IV steroids today  If remains stable then switch to Po steroids in AM and discharge home    # Schizophrenia  Maintain on home meds  verified from his pharmacy    Dispo- home (emergency housing as before)  Plan- possible d/c tomorrow
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
## 2020-07-14 NOTE — PROGRESS NOTE ADULT - SUBJECTIVE AND OBJECTIVE BOX
HEALTH ISSUES - PROBLEM Dx:    smoker, suspected COPD with exacerbation      INTERVAL HPI/ OVERNIGHT EVENTS:    off O2 now  doing well  explained plan for d/c tomorrow    REVIEW OF SYSTEMS:    as above    Vital Signs Last 24 Hrs  T(C): 36.8 (14 Jul 2020 11:29), Max: 36.8 (14 Jul 2020 11:29)  T(F): 98.2 (14 Jul 2020 11:29), Max: 98.2 (14 Jul 2020 11:29)  HR: 70 (14 Jul 2020 11:29) (51 - 98)  BP: 150/91 (14 Jul 2020 11:29) (124/75 - 150/91)  BP(mean): 94 (14 Jul 2020 01:36) (94 - 95)  RR: 19 (14 Jul 2020 11:29) (19 - 22)  SpO2: 97% (14 Jul 2020 11:29) (93% - 100%)    PHYSICAL EXAM-  GENERAL: comfortable on room air  HEAD:  Atraumatic, Normocephalic  EYES: EOMI, conjunctiva and sclera clear  ENT: Moist mucous membranes, No lesions  NECK: Supple, No JVD, Normal thyroid  NERVOUS SYSTEM:  Alert & Oriented X 3, Moving all extremities well  CHEST/LUNG: Clear to percussion bilaterally; No rales, rhonchi, wheezing, or rubs  HEART: Regular rate and rhythm; No murmurs, rubs, or gallops  ABDOMEN: Soft, Nontender, Nondistended; Bowel sounds present  EXTREMITIES:  2+ Peripheral Pulses, No clubbing, cyanosis, or edema      MEDICATIONS  (STANDING):  ALBUTerol    90 MICROgram(s) HFA Inhaler 1 Puff(s) Inhalation every 4 hours  albuterol/ipratropium for Nebulization 3 milliLiter(s) Nebulizer every 6 hours  albuterol/ipratropium for Nebulization. 3 milliLiter(s) Nebulizer once  azithromycin  IVPB 500 milliGRAM(s) IV Intermittent every 24 hours  enoxaparin Injectable 40 milliGRAM(s) SubCutaneous daily  methylPREDNISolone sodium succinate Injectable 40 milliGRAM(s) IV Push every 6 hours  QUEtiapine 50 milliGRAM(s) Oral at bedtime  saccharomyces boulardii 250 milliGRAM(s) Oral two times a day  sodium chloride 0.9% lock flush 3 milliLiter(s) IV Push every 8 hours  tiotropium 18 MICROgram(s) Capsule 1 Capsule(s) Inhalation daily    MEDICATIONS  (PRN):  LORazepam     Tablet 0.5 milliGRAM(s) Oral three times a day PRN Anxiety  ondansetron Injectable 4 milliGRAM(s) IV Push every 6 hours PRN Nausea      LABS:                        13.7   8.71  )-----------( 238      ( 13 Jul 2020 20:26 )             42.2     07-13    140  |  102  |  21.0<H>  ----------------------------<  96  4.7   |  27.0  |  1.00    Ca    8.9      13 Jul 2020 20:26

## 2020-07-14 NOTE — ED ADULT NURSE REASSESSMENT NOTE - NS ED NURSE REASSESS COMMENT FT1
Pt. made aware he is to be admitted. Pt. on O2 now and tolerating well. Pt. mental status unchanged. Pt. respirations even and unlabored.  Pt. resting comfortably in stretcher.

## 2020-07-15 ENCOUNTER — TRANSCRIPTION ENCOUNTER (OUTPATIENT)
Age: 55
End: 2020-07-15

## 2020-07-15 VITALS
TEMPERATURE: 98 F | SYSTOLIC BLOOD PRESSURE: 142 MMHG | HEART RATE: 63 BPM | RESPIRATION RATE: 18 BRPM | OXYGEN SATURATION: 100 % | DIASTOLIC BLOOD PRESSURE: 75 MMHG

## 2020-07-15 LAB
SARS-COV-2 IGG SERPL QL IA: NEGATIVE — SIGNIFICANT CHANGE UP
SARS-COV-2 IGM SERPL IA-ACNC: 6.98 AU/ML — SIGNIFICANT CHANGE UP

## 2020-07-15 PROCEDURE — 94640 AIRWAY INHALATION TREATMENT: CPT

## 2020-07-15 PROCEDURE — 86769 SARS-COV-2 COVID-19 ANTIBODY: CPT

## 2020-07-15 PROCEDURE — 96374 THER/PROPH/DIAG INJ IV PUSH: CPT

## 2020-07-15 PROCEDURE — 83880 ASSAY OF NATRIURETIC PEPTIDE: CPT

## 2020-07-15 PROCEDURE — 80048 BASIC METABOLIC PNL TOTAL CA: CPT

## 2020-07-15 PROCEDURE — U0003: CPT

## 2020-07-15 PROCEDURE — 84100 ASSAY OF PHOSPHORUS: CPT

## 2020-07-15 PROCEDURE — 99239 HOSP IP/OBS DSCHRG MGMT >30: CPT

## 2020-07-15 PROCEDURE — 71045 X-RAY EXAM CHEST 1 VIEW: CPT

## 2020-07-15 PROCEDURE — 85379 FIBRIN DEGRADATION QUANT: CPT

## 2020-07-15 PROCEDURE — 85027 COMPLETE CBC AUTOMATED: CPT

## 2020-07-15 PROCEDURE — 93005 ELECTROCARDIOGRAM TRACING: CPT

## 2020-07-15 PROCEDURE — 84484 ASSAY OF TROPONIN QUANT: CPT

## 2020-07-15 PROCEDURE — 99285 EMERGENCY DEPT VISIT HI MDM: CPT | Mod: 25

## 2020-07-15 PROCEDURE — 83735 ASSAY OF MAGNESIUM: CPT

## 2020-07-15 PROCEDURE — 96375 TX/PRO/DX INJ NEW DRUG ADDON: CPT

## 2020-07-15 PROCEDURE — 36415 COLL VENOUS BLD VENIPUNCTURE: CPT

## 2020-07-15 RX ORDER — ALBUTEROL 90 UG/1
2 AEROSOL, METERED ORAL
Qty: 1 | Refills: 0
Start: 2020-07-15 | End: 2020-07-24

## 2020-07-15 NOTE — DISCHARGE NOTE NURSING/CASE MANAGEMENT/SOCIAL WORK - PATIENT PORTAL LINK FT
You can access the FollowMyHealth Patient Portal offered by [facility] by registering at the following website: [email or web address]. By joining [employer]’s FollowMyHealth portal, you will also be able to view your health information using other applications (apps) compatible with our system.

## 2020-07-15 NOTE — DISCHARGE NOTE PROVIDER - NSDCMRMEDTOKEN_GEN_ALL_CORE_FT
Austedo 6 mg oral tablet: 1 tab(s) orally 2 times a day  cloZAPine 25 mg oral tablet: 1 tab(s) orally 3 times a day    in the Am, evening and 2 tabs at HS  LORazepam 0.5 mg oral tablet: 1 tab(s) orally 3 times a day  mirtazapine 15 mg oral tablet: 1 tab(s) orally once a day (at bedtime)  predniSONE 50 mg oral tablet: 1 tab(s) orally once a day   ProAir HFA 90 mcg/inh inhalation aerosol: 2 puff(s) inhaled every 6 hours   SEROquel 50 mg oral tablet: 1 tab(s) orally once a day (at bedtime)  traZODone 100 mg oral tablet: 1 tab(s) orally once a day (at bedtime)  Zoloft 100 mg oral tablet: 1 tab(s) orally once a day
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
## 2020-08-21 NOTE — ED PROVIDER NOTE - NS ED MD EM SELECTION
FEEDING:  Milk - 1%                       Diet - good variety of foods  SLEEPIN hours at night  URINATION:  no enuresis or daytime incontinence    STOOLS:  normal  SCHOOL HISTORY:       School - [employer]       Grade - 5       Academic performance - normal       Extracurricular Activities - soccer    CONCERNS:  none  Denies known Latex allergy or symptoms of Latex sensitivity.  Currently is not taking any medications.  Health Maintenance Due   Topic Date Due   • Annual Physical (ages 3-18)  2020   • HPV Vaccine (1 - Male 2-dose series) 2021       Patient is up to date, no discussion needed.           [id number] Comprehensive

## 2021-08-31 NOTE — ED PROVIDER NOTE - PRO INTERPRETER NEED 2
Cancer Center Progress Note    Patient Name: Meng Gonsalves   YOB: 1958   Medical Record Number: [id number]   CSN: [id number]   Attending Physician: Kemar Lopez M.D.    Referring Physician: Tim Maza MD      Date of Visit: 8/31/2021 the HPI    Vital Signs:  /77 (BP Location: Left arm, Patient Position: Sitting, Cuff Size: large)   Pulse 72   Temp 98.6 °F (37 °C) (Tympanic)   Resp 18   Wt 117.5 kg (259 lb)   SpO2 99%   BMI 37.16 kg/m²       Physical Examination:  General: Patient this. He already had this previously excided before but came back. RTC 1 year.        Kenia Quintanilla MD  [facility] [place]

## 2022-03-04 RX ORDER — MIRTAZAPINE 45 MG/1
1 TABLET, ORALLY DISINTEGRATING ORAL
Qty: 0 | Refills: 0 | DISCHARGE

## 2022-03-04 RX ORDER — TRAZODONE HCL 50 MG
1 TABLET ORAL
Qty: 0 | Refills: 0 | DISCHARGE

## 2022-03-04 RX ORDER — SERTRALINE 25 MG/1
1 TABLET, FILM COATED ORAL
Qty: 0 | Refills: 0 | DISCHARGE

## 2022-03-04 RX ORDER — QUETIAPINE FUMARATE 200 MG/1
1 TABLET, FILM COATED ORAL
Qty: 0 | Refills: 0 | DISCHARGE

## 2022-03-24 ENCOUNTER — EMERGENCY (EMERGENCY)
Facility: HOSPITAL | Age: 57
LOS: 1 days | Discharge: HOME | End: 2022-03-24
Attending: EMERGENCY MEDICINE
Payer: MEDICARE

## 2022-03-24 VITALS
HEART RATE: 100 BPM | HEIGHT: 65 IN | RESPIRATION RATE: 18 BRPM | DIASTOLIC BLOOD PRESSURE: 75 MMHG | OXYGEN SATURATION: 94 % | WEIGHT: 164.91 LBS | SYSTOLIC BLOOD PRESSURE: 144 MMHG | TEMPERATURE: 103 F

## 2022-03-24 VITALS
RESPIRATION RATE: 16 BRPM | DIASTOLIC BLOOD PRESSURE: 63 MMHG | SYSTOLIC BLOOD PRESSURE: 125 MMHG | OXYGEN SATURATION: 97 % | TEMPERATURE: 99 F | HEART RATE: 67 BPM

## 2022-03-24 DIAGNOSIS — Z98.89 OTHER SPECIFIED POSTPROCEDURAL STATES: Chronic | ICD-10-CM

## 2022-03-24 DIAGNOSIS — Z98.1 ARTHRODESIS STATUS: Chronic | ICD-10-CM

## 2022-03-24 PROCEDURE — 99284 EMERGENCY DEPT VISIT MOD MDM: CPT | Mod: 25

## 2022-03-24 PROCEDURE — 10060 I&D ABSCESS SIMPLE/SINGLE: CPT

## 2022-03-24 RX ORDER — ACETAMINOPHEN 500 MG
975 TABLET ORAL ONCE
Refills: 0 | Status: COMPLETED | OUTPATIENT
Start: 2022-03-24 | End: 2022-03-24

## 2022-03-24 RX ADMIN — Medication 975 MILLIGRAM(S): at 01:09

## 2022-03-24 RX ADMIN — Medication 975 MILLIGRAM(S): at 02:39

## 2022-03-24 RX ADMIN — Medication 100 MILLIGRAM(S): at 01:10

## 2022-03-24 NOTE — ED PROVIDER NOTE - CONSTITUTIONAL, MLM
Intoxicated, awake, alert, oriented to person, place, time/situation and in no apparent distress. normal...

## 2022-03-24 NOTE — ED PROCEDURE NOTE - PROCEDURE ADDITIONAL DETAILS
Plastic left inside the abscess to keep track open. Patient told to come back to the ED in 7 days for removal. I&D performed using Loop technique. Plastic left inside the abscess to keep track open. Patient told to come back to the ED in 7 days for removal.

## 2022-03-24 NOTE — ED PROVIDER NOTE - ATTENDING CONTRIBUTION TO CARE
Edgar: I performed a face to face bedside interview with patient regarding history of present illness, review of symptoms and past medical history. I completed an independent physical exam.  I have discussed patient's plan of care with resident.   I agree with note as stated above including HISTORY OF PRESENT ILLNESS, HIV, PAST MEDICAL/SURGICAL/FAMILY/SOCIAL HISTORY, ALLERGIES AND HOME MEDICATIONS, REVIEW OF SYSTEMS, PHYSICAL EXAM, MEDICAL DECISION MAKING and any PROGRESS NOTES during the time I functioned as the attending physician for this patient unless otherwise noted. My brief assessment is as follows: +fever in ED. Abscess under left armpit. I&D at bed side. Patient feeling well. Given Tylenol and abx here. Abx at pharmacy.    I&D performed with loop technique. Patient encouraged to rotate the loop multiple times a day and return to ED for removal. Return precautions given.  Patient aware he must come back to ED to have it removed.

## 2022-03-24 NOTE — ED PROVIDER NOTE - OBJECTIVE STATEMENT
55 yo male with hx of anxiety presents to ED for bump under left armpit. Patient states he discovered a bump under left armpit yesterday that was causing him pain. Today he squeezed his and popped it, expressing blood and pus. Denies fever, chills, N/V.

## 2022-03-24 NOTE — ED PROVIDER NOTE - NSICDXPASTMEDICALHX_GEN_ALL_CORE_FT
PAST MEDICAL HISTORY:  Hiatal hernia     High cholesterol     Liver failure     Tardive dyskinesia

## 2022-03-24 NOTE — ED ADULT NURSE NOTE - OBJECTIVE STATEMENT
Patient alert, oriented x3 with c/o left upper arm pain since yesterday after popping a cyst on the site. No drainage, no bleeding on the site. Patient able to move left arm with no difficulty. Breathing non labored, denies chest pain.

## 2022-03-24 NOTE — ED PROVIDER NOTE - CLINICAL SUMMARY MEDICAL DECISION MAKING FREE TEXT BOX
+fever in ED. Abscess under left armpit. I&D at bed side. Patient feeling well. Given Tylenol and abx here. Abx at pharmacy.    Plastic left under skin to keep track open. Patient aware he must come back to ED to have it removed. +fever in ED. Abscess under left armpit. I&D at bed side. Patient feeling well. Given Tylenol and abx here. Abx at pharmacy.    I&D performed with loop technique. Patient encouraged to rotate the loop multiple times a day and return to ED for removal. Return precautions given.  Patient aware he must come back to ED to have it removed.

## 2022-03-24 NOTE — ED PROVIDER NOTE - NSFOLLOWUPINSTRUCTIONS_ED_ALL_ED_FT
Abscess    An abscess is an infected area that contains a collection of pus and debris. It can occur in almost any part of the body and occurs when the tissue gets infection. Symptoms include a painful mass that is red, warm, tender that might break open and HAVE drainage. If your health care provider gave you antibiotics make sure to take the full course and do not stop even if feeling better.     SEEK IMMEDIATE MEDICAL CARE IF YOU HAVE ANY OF THE FOLLOWING SYMPTOMS: chills, fever, muscle aches, or red streaking from the area.    Please come back to the emergency room in 7 days to have the plastic removed from under your skin.

## 2022-03-24 NOTE — ED ADULT TRIAGE NOTE - CHIEF COMPLAINT QUOTE
Patient BIBEMS c/o left armpit pain after popping a cyst that he noticed yesterday. Gauze placed on cyst by EMS. Reports vomiting. Febrile on arrival.

## 2022-03-24 NOTE — ED ADULT NURSE REASSESSMENT NOTE - NS ED NURSE REASSESS COMMENT FT1
Patient alert, oriented 4,ambulatory with steady gait discharge home. Discharge instruction given by Dr. Reji Bal.

## 2022-03-24 NOTE — ED PROVIDER NOTE - PATIENT PORTAL LINK FT
You can access the FollowMyHealth Patient Portal offered by [facility] by registering at the following website: [email or web address]. By joining [employer]’s FollowMyHealth portal, you will also be able to view your health information using other applications (apps) compatible with our system.

## 2022-04-07 ENCOUNTER — EMERGENCY (EMERGENCY)
Facility: HOSPITAL | Age: 57
LOS: 1 days | Discharge: HOME | End: 2022-04-07
Attending: EMERGENCY MEDICINE
Payer: MEDICARE

## 2022-04-07 VITALS
DIASTOLIC BLOOD PRESSURE: 94 MMHG | SYSTOLIC BLOOD PRESSURE: 159 MMHG | RESPIRATION RATE: 16 BRPM | HEIGHT: 65 IN | TEMPERATURE: 98 F | HEART RATE: 60 BPM | OXYGEN SATURATION: 96 % | WEIGHT: 139.99 LBS

## 2022-04-07 DIAGNOSIS — Z98.1 ARTHRODESIS STATUS: Chronic | ICD-10-CM

## 2022-04-07 DIAGNOSIS — Z98.89 OTHER SPECIFIED POSTPROCEDURAL STATES: Chronic | ICD-10-CM

## 2022-04-07 PROCEDURE — G0463: CPT

## 2022-04-07 PROCEDURE — 99282 EMERGENCY DEPT VISIT SF MDM: CPT

## 2022-04-07 NOTE — ED PROVIDER NOTE - OBJECTIVE STATEMENT
56M presents to the ED for drain removal s/p left axillary abscess I&D performed in the ED on 3/24/22. Pt states that he is feeling much better, denies fever/chill, drainage. 56 M presents to the ED for drain removal s/p left axillary abscess I&D performed in the ED on 3/24/22. Pt states that he is feeling much better, denies fever/chill, drainage.

## 2022-04-07 NOTE — ED PROVIDER NOTE - PATIENT PORTAL LINK FT
You can access the FollowMyHealth Patient Portal offered by [facility] by registering at the following website: [email or web address]. By joining [employer]’s FollowMyHealth portal, you will also be able to view your health information using other applications (apps) compatible with our system.

## 2022-04-07 NOTE — ED PROVIDER NOTE - NS ED ATTENDING STATEMENT MOD
This was a shared visit with the MYKEL. I reviewed and verified the documentation and independently performed the documented:

## 2022-04-07 NOTE — ED ADULT TRIAGE NOTE - CHIEF COMPLAINT QUOTE
pt states needs drain removed from left armpit placed in abscess 2 weeks ago. denies fever, chills, drainage

## 2022-06-16 ENCOUNTER — EMERGENCY (EMERGENCY)
Facility: HOSPITAL | Age: 57
LOS: 1 days | Discharge: HOME | End: 2022-06-16
Attending: STUDENT IN AN ORGANIZED HEALTH CARE EDUCATION/TRAINING PROGRAM
Payer: MEDICARE

## 2022-06-16 VITALS
TEMPERATURE: 98 F | SYSTOLIC BLOOD PRESSURE: 148 MMHG | OXYGEN SATURATION: 99 % | HEART RATE: 60 BPM | DIASTOLIC BLOOD PRESSURE: 76 MMHG | RESPIRATION RATE: 19 BRPM

## 2022-06-16 VITALS
SYSTOLIC BLOOD PRESSURE: 119 MMHG | HEART RATE: 68 BPM | DIASTOLIC BLOOD PRESSURE: 83 MMHG | OXYGEN SATURATION: 94 % | TEMPERATURE: 100 F | WEIGHT: 119.93 LBS | HEIGHT: 65 IN | RESPIRATION RATE: 18 BRPM

## 2022-06-16 DIAGNOSIS — Z98.1 ARTHRODESIS STATUS: Chronic | ICD-10-CM

## 2022-06-16 DIAGNOSIS — Z98.89 OTHER SPECIFIED POSTPROCEDURAL STATES: Chronic | ICD-10-CM

## 2022-06-16 PROCEDURE — 72125 CT NECK SPINE W/O DYE: CPT | Mod: MA

## 2022-06-16 PROCEDURE — 99285 EMERGENCY DEPT VISIT HI MDM: CPT

## 2022-06-16 PROCEDURE — 99284 EMERGENCY DEPT VISIT MOD MDM: CPT | Mod: 25

## 2022-06-16 PROCEDURE — 90471 IMMUNIZATION ADMIN: CPT

## 2022-06-16 PROCEDURE — 72125 CT NECK SPINE W/O DYE: CPT | Mod: 26,MA

## 2022-06-16 PROCEDURE — 70486 CT MAXILLOFACIAL W/O DYE: CPT | Mod: 26,MA

## 2022-06-16 PROCEDURE — 90715 TDAP VACCINE 7 YRS/> IM: CPT

## 2022-06-16 PROCEDURE — 70450 CT HEAD/BRAIN W/O DYE: CPT | Mod: MA

## 2022-06-16 PROCEDURE — 70486 CT MAXILLOFACIAL W/O DYE: CPT | Mod: MA

## 2022-06-16 PROCEDURE — 70450 CT HEAD/BRAIN W/O DYE: CPT | Mod: 26,MA

## 2022-06-16 RX ORDER — CEPHALEXIN 500 MG
1 CAPSULE ORAL
Qty: 10 | Refills: 0
Start: 2022-06-16 | End: 2022-06-20

## 2022-06-16 RX ORDER — OXYCODONE HYDROCHLORIDE 5 MG/1
1 TABLET ORAL
Qty: 6 | Refills: 0
Start: 2022-06-16 | End: 2022-06-17

## 2022-06-16 RX ORDER — ACETAMINOPHEN 500 MG
975 TABLET ORAL ONCE
Refills: 0 | Status: COMPLETED | OUTPATIENT
Start: 2022-06-16 | End: 2022-06-16

## 2022-06-16 RX ORDER — CEPHALEXIN 500 MG
500 CAPSULE ORAL ONCE
Refills: 0 | Status: COMPLETED | OUTPATIENT
Start: 2022-06-16 | End: 2022-06-16

## 2022-06-16 RX ORDER — TETANUS TOXOID, REDUCED DIPHTHERIA TOXOID AND ACELLULAR PERTUSSIS VACCINE, ADSORBED 5; 2.5; 8; 8; 2.5 [IU]/.5ML; [IU]/.5ML; UG/.5ML; UG/.5ML; UG/.5ML
0.5 SUSPENSION INTRAMUSCULAR ONCE
Refills: 0 | Status: COMPLETED | OUTPATIENT
Start: 2022-06-16 | End: 2022-06-16

## 2022-06-16 RX ORDER — OXYCODONE HYDROCHLORIDE 5 MG/1
5 TABLET ORAL ONCE
Refills: 0 | Status: DISCONTINUED | OUTPATIENT
Start: 2022-06-16 | End: 2022-06-16

## 2022-06-16 RX ADMIN — Medication 500 MILLIGRAM(S): at 20:41

## 2022-06-16 RX ADMIN — TETANUS TOXOID, REDUCED DIPHTHERIA TOXOID AND ACELLULAR PERTUSSIS VACCINE, ADSORBED 0.5 MILLILITER(S): 5; 2.5; 8; 8; 2.5 SUSPENSION INTRAMUSCULAR at 20:42

## 2022-06-16 RX ADMIN — OXYCODONE HYDROCHLORIDE 5 MILLIGRAM(S): 5 TABLET ORAL at 20:41

## 2022-06-16 NOTE — ED PROVIDER NOTE - NS ED ROS FT
General: no fever  Head: no headache  Eyes: no vision change, +left eye pain  ENT: no nasal discharge/congestion  CV: no chest pain  Resp: no SOB  GI: no N/V, no abdominal pain  MSK: no joint pain, no muscle aches, no neck pain or back pain  Skin: no new rash  Neuro: no focal weakness, no change in sensation
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
## 2022-06-16 NOTE — ED PROVIDER NOTE - NSFOLLOWUPINSTRUCTIONS_ED_ALL_ED_FT
You were seen and evaluated in the emergency room for left eye injury.    Please follow up with a plastic surgeon and ophthalmologist in the next few days.  Call the number attached to make an appointment.     Continue all home medications as prescribed.    Take 650mg tylenol every 6 hours as needed for pain.  Take 400mg ibuprofen every 6 hours as needed for pain, make sure to take with food.    If you continue to have pain despite taking the tylenol and ibuprofen, you can take 1 tab of oxycodone up to every 6 hours as needed for severe pain. Do not drive to operate heavy machinery while on the medication.     Seek immediate medical attention for any worsening headache, change in vision, difficulty moving eye or double vision, or any new or concerning symptoms.    Please read all attached.
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
## 2022-06-16 NOTE — ED PROVIDER NOTE - PHYSICAL EXAMINATION
General: well-appearing man in no acute distress  Head: normocephalic, atraumatic  Eyes: PERRL, EOMI, vision intact in left eye, left periorbital swelling and eccymosis with subconjunctival hematoma  Mouth: moist mucous membranes, no chipped teeth or injuries  Neck: supple neck, no midline spinal tenderness to palpation, full ROM  CV: normal rate and rhythm, no LE edema, peripheral pulses 2+ bilateral UE and LE  Respiratory: clear to auscultation bilaterally  Abdomen: soft, nondistended, nontender  : no suprapubic tenderness, no CVAT  MSK: no joint deformities, no pelvis instability, full ROM of all extremity joints  Neuro: alert and oriented x3, speech clear, moving all extremities spontaneously without difficulty  Skin: no lacerations General: well-appearing man in no acute distress  Head: normocephalic, atraumatic  Eyes: PERRL, EOMI, vision intact in left eye, left periorbital swelling and eccymosis with subconjunctival hematoma  Mouth: moist mucous membranes, no chipped teeth or injuries  Neck: supple neck, no midline spinal tenderness to palpation, full ROM  CV: normal rate and rhythm, no LE edema, peripheral pulses 2+ bilateral UE and LE  Respiratory: clear to auscultation bilaterally  Abdomen: soft, nondistended, nontender  : no suprapubic tenderness, no CVAT  MSK: no joint deformities, no pelvis instability, full ROM of all extremity joints  Neuro: alert and oriented x3, speech clear, moving all extremities spontaneously without difficulty  Skin: no lacerations  Psych: denies SI/HI, calm and cooperative on exam
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
## 2022-06-16 NOTE — ED PROVIDER NOTE - CARE PROVIDER_API CALL
Yani Woods)  Plastic Surgery  [address] [place] [address]  Phone: [phone]  Fax: [phone]  Follow Up Time:     Russ Slater)  Ophthalmology  [address], [address]  [place] [address]  Phone: [phone]  Fax: [phone]  Follow Up Time:

## 2022-06-16 NOTE — ED PROVIDER NOTE - PATIENT PORTAL LINK FT
You can access the FollowMyHealth Patient Portal offered by [facility] by registering at the following website: [email or web address]. By joining [employer]’s FollowMyHealth portal, you will also be able to view your health information using other applications (apps) compatible with our system.
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
## 2022-08-15 NOTE — ED ADULT TRIAGE NOTE - ARRIVAL FROM
[FreeTextEntry1] : Patient is doing extremely well. PFTs were reviewed reveal mild obstruction with an FEV1 of 77% of predicted. Patient is advised to continue dupixent  and Advair. He is to follow up with ENT. He is to call me after he sees his ENT surgeon in 3 weeks
Home
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
## 2022-11-04 ENCOUNTER — OFFICE (OUTPATIENT)
Dept: URBAN - METROPOLITAN AREA CLINIC 116 | Facility: CLINIC | Age: 57
Setting detail: OPHTHALMOLOGY
End: 2022-11-04
Payer: MEDICARE

## 2022-11-04 DIAGNOSIS — S05.02XA: ICD-10-CM

## 2022-11-04 PROCEDURE — 92014 COMPRE OPH EXAM EST PT 1/>: CPT | Performed by: OPTOMETRIST

## 2022-11-04 ASSESSMENT — CORNEAL TRAUMA - ABRASION: OS_ABRASION: PRESENT

## 2022-11-04 ASSESSMENT — CONFRONTATIONAL VISUAL FIELD TEST (CVF)
OD_FINDINGS: FULL
OS_FINDINGS: FULL

## 2022-11-04 ASSESSMENT — TONOMETRY: OD_IOP_MMHG: 18

## 2022-11-09 ENCOUNTER — EMERGENCY (EMERGENCY)
Facility: HOSPITAL | Age: 57
LOS: 1 days | Discharge: HOME | End: 2022-11-09
Attending: STUDENT IN AN ORGANIZED HEALTH CARE EDUCATION/TRAINING PROGRAM
Payer: MEDICARE

## 2022-11-09 VITALS
OXYGEN SATURATION: 100 % | WEIGHT: 145.06 LBS | HEIGHT: 64 IN | HEART RATE: 86 BPM | TEMPERATURE: 98 F | SYSTOLIC BLOOD PRESSURE: 134 MMHG | RESPIRATION RATE: 18 BRPM | DIASTOLIC BLOOD PRESSURE: 76 MMHG

## 2022-11-09 VITALS
OXYGEN SATURATION: 97 % | TEMPERATURE: 99 F | SYSTOLIC BLOOD PRESSURE: 164 MMHG | HEART RATE: 80 BPM | DIASTOLIC BLOOD PRESSURE: 93 MMHG | RESPIRATION RATE: 20 BRPM

## 2022-11-09 DIAGNOSIS — Z98.89 OTHER SPECIFIED POSTPROCEDURAL STATES: Chronic | ICD-10-CM

## 2022-11-09 DIAGNOSIS — Z98.1 ARTHRODESIS STATUS: Chronic | ICD-10-CM

## 2022-11-09 PROBLEM — S05.02XA CORNEAL ABRASION; INITIAL ENCOUNTER  LEFT EYE: Status: ACTIVE | Noted: 2022-11-04

## 2022-11-09 LAB
ALBUMIN SERPL ELPH-MCNC: 3.9 G/DL — SIGNIFICANT CHANGE UP (ref 3.3–5.2)
ALP SERPL-CCNC: 65 U/L — SIGNIFICANT CHANGE UP (ref 40–120)
ALT FLD-CCNC: 14 U/L — SIGNIFICANT CHANGE UP
ANION GAP SERPL CALC-SCNC: 14 MMOL/L — SIGNIFICANT CHANGE UP (ref 5–17)
AST SERPL-CCNC: 14 U/L — SIGNIFICANT CHANGE UP
BASOPHILS # BLD AUTO: 0.07 K/UL — SIGNIFICANT CHANGE UP (ref 0–0.2)
BASOPHILS NFR BLD AUTO: 0.7 % — SIGNIFICANT CHANGE UP (ref 0–2)
BILIRUB SERPL-MCNC: <0.2 MG/DL — LOW (ref 0.4–2)
BUN SERPL-MCNC: 17.3 MG/DL — SIGNIFICANT CHANGE UP (ref 8–20)
CALCIUM SERPL-MCNC: 8.2 MG/DL — LOW (ref 8.4–10.5)
CHLORIDE SERPL-SCNC: 104 MMOL/L — SIGNIFICANT CHANGE UP (ref 96–108)
CO2 SERPL-SCNC: 25 MMOL/L — SIGNIFICANT CHANGE UP (ref 22–29)
CREAT SERPL-MCNC: 0.73 MG/DL — SIGNIFICANT CHANGE UP (ref 0.5–1.3)
EGFR: 106 ML/MIN/1.73M2 — SIGNIFICANT CHANGE UP
EOSINOPHIL # BLD AUTO: 0.22 K/UL — SIGNIFICANT CHANGE UP (ref 0–0.5)
EOSINOPHIL NFR BLD AUTO: 2.1 % — SIGNIFICANT CHANGE UP (ref 0–6)
GLUCOSE SERPL-MCNC: 83 MG/DL — SIGNIFICANT CHANGE UP (ref 70–99)
HCT VFR BLD CALC: 43.9 % — SIGNIFICANT CHANGE UP (ref 39–50)
HGB BLD-MCNC: 14.3 G/DL — SIGNIFICANT CHANGE UP (ref 13–17)
IMM GRANULOCYTES NFR BLD AUTO: 0.7 % — SIGNIFICANT CHANGE UP (ref 0–0.9)
LYMPHOCYTES # BLD AUTO: 3.39 K/UL — HIGH (ref 1–3.3)
LYMPHOCYTES # BLD AUTO: 32 % — SIGNIFICANT CHANGE UP (ref 13–44)
MCHC RBC-ENTMCNC: 31.1 PG — SIGNIFICANT CHANGE UP (ref 27–34)
MCHC RBC-ENTMCNC: 32.6 GM/DL — SIGNIFICANT CHANGE UP (ref 32–36)
MCV RBC AUTO: 95.4 FL — SIGNIFICANT CHANGE UP (ref 80–100)
MONOCYTES # BLD AUTO: 0.86 K/UL — SIGNIFICANT CHANGE UP (ref 0–0.9)
MONOCYTES NFR BLD AUTO: 8.1 % — SIGNIFICANT CHANGE UP (ref 2–14)
NEUTROPHILS # BLD AUTO: 6 K/UL — SIGNIFICANT CHANGE UP (ref 1.8–7.4)
NEUTROPHILS NFR BLD AUTO: 56.4 % — SIGNIFICANT CHANGE UP (ref 43–77)
PLATELET # BLD AUTO: 329 K/UL — SIGNIFICANT CHANGE UP (ref 150–400)
POTASSIUM SERPL-MCNC: 4.4 MMOL/L — SIGNIFICANT CHANGE UP (ref 3.5–5.3)
POTASSIUM SERPL-SCNC: 4.4 MMOL/L — SIGNIFICANT CHANGE UP (ref 3.5–5.3)
PROT SERPL-MCNC: 6.1 G/DL — LOW (ref 6.6–8.7)
RAPID RVP RESULT: DETECTED
RBC # BLD: 4.6 M/UL — SIGNIFICANT CHANGE UP (ref 4.2–5.8)
RBC # FLD: 14.4 % — SIGNIFICANT CHANGE UP (ref 10.3–14.5)
RV+EV RNA SPEC QL NAA+PROBE: DETECTED
SARS-COV-2 RNA SPEC QL NAA+PROBE: SIGNIFICANT CHANGE UP
SODIUM SERPL-SCNC: 143 MMOL/L — SIGNIFICANT CHANGE UP (ref 135–145)
WBC # BLD: 10.61 K/UL — HIGH (ref 3.8–10.5)
WBC # FLD AUTO: 10.61 K/UL — HIGH (ref 3.8–10.5)

## 2022-11-09 PROCEDURE — 71045 X-RAY EXAM CHEST 1 VIEW: CPT | Mod: 26

## 2022-11-09 PROCEDURE — 94640 AIRWAY INHALATION TREATMENT: CPT

## 2022-11-09 PROCEDURE — 80053 COMPREHEN METABOLIC PANEL: CPT

## 2022-11-09 PROCEDURE — 71045 X-RAY EXAM CHEST 1 VIEW: CPT

## 2022-11-09 PROCEDURE — 93010 ELECTROCARDIOGRAM REPORT: CPT

## 2022-11-09 PROCEDURE — 99285 EMERGENCY DEPT VISIT HI MDM: CPT

## 2022-11-09 PROCEDURE — 93005 ELECTROCARDIOGRAM TRACING: CPT

## 2022-11-09 PROCEDURE — 0225U NFCT DS DNA&RNA 21 SARSCOV2: CPT

## 2022-11-09 PROCEDURE — 85025 COMPLETE CBC W/AUTO DIFF WBC: CPT

## 2022-11-09 PROCEDURE — 99285 EMERGENCY DEPT VISIT HI MDM: CPT | Mod: 25

## 2022-11-09 RX ORDER — ALBUTEROL 90 UG/1
2.5 AEROSOL, METERED ORAL ONCE
Refills: 0 | Status: COMPLETED | OUTPATIENT
Start: 2022-11-09 | End: 2022-11-09

## 2022-11-09 RX ADMIN — ALBUTEROL 2.5 MILLIGRAM(S): 90 AEROSOL, METERED ORAL at 14:54

## 2022-11-09 NOTE — ED PROVIDER NOTE - OBJECTIVE STATEMENT
BASIL BRAN is a 56yo M with PMH seizures on Depakote, liver disease, hld who presents c/o cough and SOB x 5 days. Pt states cough productive of clear sputum. SOB at rest and w/ exertion. States 2 days of diarrhea. He denies any other associated symptoms specifically denying fevers/chills, cp, abdominal pain, n/v, blood in stool, urinary symptoms, numbness, tingling, weakness.

## 2022-11-09 NOTE — ED PROVIDER NOTE - CLINICAL SUMMARY MEDICAL DECISION MAKING FREE TEXT BOX
ASSESSMENT:   NOHEMY BRAN is a 58yo M who presented with cough, sob x 5 days. Diarrhea x2. Physical w/ trace wheezing. No fevers. EKG w/o acute changes. Pt w/ extensive smoking history.     Concerning for viral uri vs pna vs COPD    PLAN: Check cbc, cmp. CXR. Albuterol.

## 2022-11-09 NOTE — ED ADULT NURSE NOTE - OBJECTIVE STATEMENT
patient states cough, needs to speak with  because he doesn't get along with people he lives with and denies fevers sweats chills dneies N/V/D

## 2022-11-09 NOTE — ED PROVIDER NOTE - NS ED ROS FT
Review of Systems  CONSTITUTIONAL: afebrile w/no diaphoresis or weight changes  SKIN: warm, dry w/ no rash or bleeding  EYES: no changes to vision  ENT: no changes in hearing, no sore throat  RESPIRATORY: +SOB +COUGH  CARDIAC: no chest pain & no palpitations   GI: no abd pain, nausea, vomiting, constipation, or blood in stool/fay blood +DIARRHEA.   GENITO-URINARY: no discharge, dysuria or hematuria,   MUSCULOSKELETAL:  no joint pain, swelling or redness  NEUROLOGIC: no weakness, headache, anesthesia or paresthesias  PSYCH: no anxiety, non suicidal, non homicidal, without hallucinations or depression

## 2022-11-09 NOTE — ED PROVIDER NOTE - PHYSICAL EXAMINATION
VITAL SIGNS: I have reviewed vital signs  CONSTITUTIONAL:  in no acute distress.  SKIN: Warm, dry, no rash.  HEAD: Normocephalic, atraumatic.  EYES: PERRL, conjunctiva and sclera clear.  ENT: pink & moist mucosa, no pharyngeal erythema  NECK: Supple, non tender. No cervical lymphadenopathy.  CARD: Regular rate and rhythm. No murmurs.   RESP: +TRACE WHEEZING, frequent wet cough.   ABD:  soft, non-distended, non-tender.   MSK:  Good ROM in upper/lower extremities w/o pain.   NEURO: Alert, oriented. Grossly unremarkable. No focal deficits.   PSYCH: Cooperative, alert & oriented x3

## 2022-11-09 NOTE — ED PROVIDER NOTE - NSFOLLOWUPINSTRUCTIONS_ED_ALL_ED_FT
Upper Respiratory Infection, Adult    An upper respiratory infection (URI) is a common viral infection of the nose, throat, and upper air passages that lead to the lungs. The most common type of URI is the common cold. URIs usually get better on their own, without medical treatment.    What are the causes?  A URI is caused by a virus. You may catch a virus by:    Breathing in droplets from an infected person's cough or sneeze.  Touching something that has been exposed to the virus (contaminated) and then touching your mouth, nose, or eyes.    What increases the risk?  You are more likely to get a URI if:    You are very young or very old.  It is rachael or winter.  You have close contact with others, such as at a , school, or health care facility.  You smoke.  You have long-term (chronic) heart or lung disease.  You have a weakened disease-fighting (immune) system.  You have nasal allergies or asthma.  You are experiencing a lot of stress.  You work in an area that has poor air circulation.  You have poor nutrition.    What are the signs or symptoms?  A URI usually involves some of the following symptoms:    Runny or stuffy (congested) nose.  Sneezing.  Cough.  Sore throat.  Headache.  Fatigue.  Fever.  Loss of appetite.  Pain in your forehead, behind your eyes, and over your cheekbones (sinus pain).  Muscle aches.  Redness or irritation of the eyes.  Pressure in the ears or face.    How is this diagnosed?  This condition may be diagnosed based on your medical history and symptoms, and a physical exam. Your health care provider may use a cotton swab to take a mucus sample from your nose (nasal swab). This sample can be tested to determine what virus is causing the illness.    How is this treated?  URIs usually get better on their own within 7–10 days. You can take steps at home to relieve your symptoms. Medicines cannot cure URIs, but your health care provider may recommend certain medicines to help relieve symptoms, such as:    Over-the-counter cold medicines.  Cough suppressants. Coughing is a type of defense against infection that helps to clear the respiratory system, so take these medicines only as recommended by your health care provider.  Fever-reducing medicines.    Follow these instructions at home:  Activity    Rest as needed.  If you have a fever, stay home from work or school until your fever is gone or until your health care provider says you are no longer contagious. Your health care provider may have you wear a face mask to prevent your infection from spreading.    Relieving symptoms    Gargle with a salt-water mixture 3–4 times a day or as needed. To make a salt-water mixture, completely dissolve ½–1 tsp of salt in 1 cup of warm water.  Use a cool-mist humidifier to add moisture to the air. This can help you breathe more easily.    Eating and drinking    Drink enough fluid to keep your urine pale yellow.  Eat soups and other clear broths.     General instructions    Take over-the-counter and prescription medicines only as told by your health care provider. These include cold medicines, fever reducers, and cough suppressants.  Do not use any products that contain nicotine or tobacco, such as cigarettes and e-cigarettes. If you need help quitting, ask your health care provider.   Stay away from secondhand smoke.  Stay up to date on all immunizations, including the yearly (annual) flu vaccine.  Keep all follow-up visits as told by your health care provider. This is important.     How to prevent the spread of infection to others    URIs can be passed from person to person (are contagious). To prevent the infection from spreading:   Wash your hands often with soap and water. If soap and water are not available, use hand .  Avoid touching your mouth, face, eyes, or nose.  Cough or sneeze into a tissue or your sleeve or elbow instead of into your hand or into the air.    Contact a health care provider if:  You are getting worse instead of better.  You have a fever or chills.  Your mucus is brown or red.  You have yellow or brown discharge coming from your nose.  You have pain in your face, especially when you bend forward.  You have swollen neck glands.  You have pain while swallowing.  You have white areas in the back of your throat.    Get help right away if:  You have shortness of breath that gets worse.  You have severe or persistent:  Headache.  Ear pain.  Sinus pain.  Chest pain.  You have chronic lung disease along with any of the following:  Wheezing.  Prolonged cough.  Coughing up blood.  A change in your usual mucus.  You have a stiff neck.  You have changes in your:  Vision.  Hearing.  Thinking.  Mood.    Summary  An upper respiratory infection (URI) is a common infection of the nose, throat, and upper air passages that lead to the lungs.  A URI is caused by a virus.  URIs usually get better on their own within 7–10 days.  Medicines cannot cure URIs, but your health care provider may recommend certain medicines to help relieve symptoms.    ADDITIONAL NOTES AND INSTRUCTIONS    Please follow up with your Primary MD in 24-48 hr.  Seek immediate medical care for any new/worsening signs or symptoms.

## 2022-11-09 NOTE — ED PROVIDER NOTE - ATTENDING CONTRIBUTION TO CARE
57-year-old male with a past medical history of seizures on Depakote liver disease hyperlipidemia presents with shortness of breath and cough for 5 days cough is productive with white sputum as well as shortness of breath with exertion and at rest as well as diarrhea for the past 2 days diarrhea nonbloody no vomiting no fevers no chills    Patient well-appearing no acute findings on chest x-ray labs within normal patient likely with viral syndrome supportive care follow-up

## 2022-11-09 NOTE — ED ADULT TRIAGE NOTE - CHIEF COMPLAINT QUOTE
pt BIBA c/o cough with phlegm and occasional SOB. pt states taking robitussin without relief. also would like to speak w/  for safe housing. denies si/hi

## 2022-11-09 NOTE — ED PROVIDER NOTE - PROGRESS NOTE DETAILS
Stevie: Pt w/ improvement in breathing, wheezing s/p albuterol. Work up negative for PNA. Likely w/ viral uri. Pt amenable to discharge. Return precautions provided. Stevie: Pt w/ improvement in breathing, wheezing s/p albuterol. Cxr clear. Work up negative for PNA. Likely w/ viral uri. Pt amenable to discharge. Return precautions provided.

## 2022-11-09 NOTE — ED PROVIDER NOTE - PATIENT PORTAL LINK FT
You can access the FollowMyHealth Patient Portal offered by [facility] by registering at the following website: [email or web address]. By joining [employer]’s FollowMyHealth portal, you will also be able to view your health information using other applications (apps) compatible with our system.

## 2022-11-11 ASSESSMENT — VISUAL ACUITY
OD_BCVA: 20/50
OS_BCVA: 20/20-2

## 2022-11-11 ASSESSMENT — KERATOMETRY
OD_K1POWER_DIOPTERS: 45.00
OD_AXISANGLE_DEGREES: 160
OS_K1POWER_DIOPTERS: UTP
OD_K2POWER_DIOPTERS: 45.50

## 2022-11-11 ASSESSMENT — SPHEQUIV_DERIVED: OD_SPHEQUIV: -0.25

## 2022-11-11 ASSESSMENT — AXIALLENGTH_DERIVED: OD_AL: 23.0588

## 2022-11-11 ASSESSMENT — REFRACTION_AUTOREFRACTION
OD_CYLINDER: -1.50
OD_AXIS: 085
OS_SPHERE: UTP
OD_SPHERE: +0.50

## 2022-12-28 PROBLEM — H52.03 HYPEROPIA; BOTH EYES: Status: ACTIVE | Noted: 2022-12-28

## 2022-12-28 PROBLEM — H52.4 PRESBYOPIA ; BOTH EYES: Status: ACTIVE | Noted: 2022-12-28

## 2023-05-25 ENCOUNTER — FORM ENCOUNTER (OUTPATIENT)
Age: 58
End: 2023-05-25

## 2023-08-08 ENCOUNTER — EMERGENCY (EMERGENCY)
Facility: HOSPITAL | Age: 58
LOS: 1 days | Discharge: HOME | End: 2023-08-08
Attending: STUDENT IN AN ORGANIZED HEALTH CARE EDUCATION/TRAINING PROGRAM
Payer: MEDICARE

## 2023-08-08 VITALS
HEART RATE: 85 BPM | WEIGHT: 149.91 LBS | TEMPERATURE: 98 F | DIASTOLIC BLOOD PRESSURE: 87 MMHG | HEIGHT: 65 IN | OXYGEN SATURATION: 97 % | RESPIRATION RATE: 20 BRPM | SYSTOLIC BLOOD PRESSURE: 148 MMHG

## 2023-08-08 DIAGNOSIS — Z98.1 ARTHRODESIS STATUS: Chronic | ICD-10-CM

## 2023-08-08 DIAGNOSIS — Z98.89 OTHER SPECIFIED POSTPROCEDURAL STATES: Chronic | ICD-10-CM

## 2023-08-08 LAB
BASOPHILS # BLD AUTO: 0.1 K/UL — SIGNIFICANT CHANGE UP (ref 0–0.2)
BASOPHILS NFR BLD AUTO: 0.7 % — SIGNIFICANT CHANGE UP (ref 0–2)
EOSINOPHIL # BLD AUTO: 0.05 K/UL — SIGNIFICANT CHANGE UP (ref 0–0.5)
EOSINOPHIL NFR BLD AUTO: 0.3 % — SIGNIFICANT CHANGE UP (ref 0–6)
HCT VFR BLD CALC: 39.6 % — SIGNIFICANT CHANGE UP (ref 39–50)
HGB BLD-MCNC: 12.9 G/DL — LOW (ref 13–17)
IMM GRANULOCYTES NFR BLD AUTO: 0.5 % — SIGNIFICANT CHANGE UP (ref 0–0.9)
LYMPHOCYTES # BLD AUTO: 21.8 % — SIGNIFICANT CHANGE UP (ref 13–44)
LYMPHOCYTES # BLD AUTO: 3.32 K/UL — HIGH (ref 1–3.3)
MCHC RBC-ENTMCNC: 29.5 PG — SIGNIFICANT CHANGE UP (ref 27–34)
MCHC RBC-ENTMCNC: 32.6 GM/DL — SIGNIFICANT CHANGE UP (ref 32–36)
MCV RBC AUTO: 90.6 FL — SIGNIFICANT CHANGE UP (ref 80–100)
MONOCYTES # BLD AUTO: 1.41 K/UL — HIGH (ref 0–0.9)
MONOCYTES NFR BLD AUTO: 9.3 % — SIGNIFICANT CHANGE UP (ref 2–14)
NEUTROPHILS # BLD AUTO: 10.26 K/UL — HIGH (ref 1.8–7.4)
NEUTROPHILS NFR BLD AUTO: 67.4 % — SIGNIFICANT CHANGE UP (ref 43–77)
PLATELET # BLD AUTO: 301 K/UL — SIGNIFICANT CHANGE UP (ref 150–400)
RBC # BLD: 4.37 M/UL — SIGNIFICANT CHANGE UP (ref 4.2–5.8)
RBC # FLD: 15.3 % — HIGH (ref 10.3–14.5)
WBC # BLD: 15.22 K/UL — HIGH (ref 3.8–10.5)
WBC # FLD AUTO: 15.22 K/UL — HIGH (ref 3.8–10.5)

## 2023-08-08 PROCEDURE — 71045 X-RAY EXAM CHEST 1 VIEW: CPT | Mod: 26

## 2023-08-08 PROCEDURE — G1004: CPT

## 2023-08-08 PROCEDURE — 72125 CT NECK SPINE W/O DYE: CPT | Mod: 26,MG

## 2023-08-08 PROCEDURE — 99285 EMERGENCY DEPT VISIT HI MDM: CPT

## 2023-08-08 PROCEDURE — 70450 CT HEAD/BRAIN W/O DYE: CPT | Mod: 26,MG

## 2023-08-08 PROCEDURE — 73562 X-RAY EXAM OF KNEE 3: CPT | Mod: 26,RT

## 2023-08-08 RX ORDER — IPRATROPIUM/ALBUTEROL SULFATE 18-103MCG
3 AEROSOL WITH ADAPTER (GRAM) INHALATION
Refills: 0 | Status: COMPLETED | OUTPATIENT
Start: 2023-08-08 | End: 2023-08-08

## 2023-08-08 RX ORDER — TETANUS TOXOID, REDUCED DIPHTHERIA TOXOID AND ACELLULAR PERTUSSIS VACCINE, ADSORBED 5; 2.5; 8; 8; 2.5 [IU]/.5ML; [IU]/.5ML; UG/.5ML; UG/.5ML; UG/.5ML
0.5 SUSPENSION INTRAMUSCULAR ONCE
Refills: 0 | Status: COMPLETED | OUTPATIENT
Start: 2023-08-08 | End: 2023-08-08

## 2023-08-08 RX ORDER — IBUPROFEN 200 MG
600 TABLET ORAL ONCE
Refills: 0 | Status: COMPLETED | OUTPATIENT
Start: 2023-08-08 | End: 2023-08-08

## 2023-08-08 RX ORDER — LIDOCAINE 4 G/100G
1 CREAM TOPICAL ONCE
Refills: 0 | Status: COMPLETED | OUTPATIENT
Start: 2023-08-08 | End: 2023-08-08

## 2023-08-08 RX ADMIN — TETANUS TOXOID, REDUCED DIPHTHERIA TOXOID AND ACELLULAR PERTUSSIS VACCINE, ADSORBED 0.5 MILLILITER(S): 5; 2.5; 8; 8; 2.5 SUSPENSION INTRAMUSCULAR at 23:31

## 2023-08-08 RX ADMIN — Medication 3 MILLILITER(S): at 23:10

## 2023-08-08 RX ADMIN — Medication 3 MILLILITER(S): at 23:54

## 2023-08-08 RX ADMIN — Medication 125 MILLIGRAM(S): at 23:31

## 2023-08-08 RX ADMIN — Medication 3 MILLILITER(S): at 22:42

## 2023-08-08 RX ADMIN — LIDOCAINE 1 PATCH: 4 CREAM TOPICAL at 22:42

## 2023-08-08 RX ADMIN — Medication 600 MILLIGRAM(S): at 22:42

## 2023-08-08 NOTE — ED PROVIDER NOTE - NSFOLLOWUPINSTRUCTIONS_ED_ALL_ED_FT
- Follow up with ortho for knee pain.  - Follow up with your primary care doctor to follow up CT findings and repeat imaging.  - Take prednisone 40mg once a day.  - Return to ED with any emergent issues.     - Please follow-up with your primary care doctor.  Please call for an appointment in the next 1-3 days but if you cannot follow-up with your primary care doctor please return to the ED for any urgent issues.  - You were given a copy of the tests performed today.  Please bring the results with you and review them with your primary care doctor.  - If you have any worsening of symptoms or any other concerns please return to the ED immediately.  - Please continue taking your home medications as directed.

## 2023-08-08 NOTE — ED PROVIDER NOTE - OBJECTIVE STATEMENT
Patient is a 57-year-old male with past medical history of seizures, liver disease, chronic back pain, HLD who presents to the ED complaining of falls, right knee pain.  Patient states that his right knee has been hurting more for the past 2 weeks and he has had recurrent falls.  They fell and hit his head, denies LOC Patient is a 57-year-old male with past medical history of seizures, liver disease, chronic back pain, HLD who presents to the ED complaining of falls, right knee pain.  Patient states that his right knee has been hurting more for the past 2 weeks and he has had recurrent falls.  Today fell and hit the back of his head, denies LOC. No blood thinners. Denies headache, dizziness, nausea, vomiting, chest pain, abdominal pain, SOB. Patient found to be 88% on RA, placed on NC, not on home O2.  Endorses drinking 3 or 4 shots of vodka today, last drink about 2 hrs pta.     Home Meds:  Famotidine  Quetiapine  Clonazepam BID  Divalproex BID  Hydroxyzine BID  Gabapentin  Benztropine BID

## 2023-08-08 NOTE — ED PROVIDER NOTE - CLINICAL SUMMARY MEDICAL DECISION MAKING FREE TEXT BOX
Patient is a 57-year-old male with past medical history of seizures, liver disease, chronic back pain, HLD who presents to the ED complaining of falls, right knee pain. Will ct head/neck and xray knee. Give duonebs for wheezing/hypoxia.

## 2023-08-08 NOTE — ED ADULT NURSE NOTE - NSFALLHARMRISKINTERV_ED_ALL_ED

## 2023-08-08 NOTE — ED PROVIDER NOTE - PHYSICAL EXAMINATION
Gen: AAOx3, NAD, well nourished  HEENT: Hematoma to R parito-occipital region. EOMI. No scleral icterus. Moist mucus membranes.  CV: RRR. Audible S1 and S2. No murmurs. 2+ radial and PT pulses   Pulm: Diffuse wheezes. No accessory muscle use or respiratory distress.  Abdomen: soft, normoactive BS, non distended, nontender, no rebound, no guarding  Musculoskeletal:  Moving all extremities equally. No gross deformity. +R knee ttp.  Skin: Abrasions to BL knees.  Neurologic: No focal neurological deficits. CN II-XII grossly intact.  : no CVA tenderness  Psych: Appropriate mood and affect. Cooperative. Gen: AAOx3, NAD, well nourished, clinically intoxicated.   HEENT: Hematoma to R parito-occipital region with overlying abrasion. EOMI. No scleral icterus. Moist mucus membranes.  CV: RRR. Audible S1 and S2. No murmurs. 2+ radial and PT pulses   Pulm: Diffuse wheezes. No accessory muscle use or respiratory distress.  Abdomen: soft, normoactive BS, non distended, nontender, no rebound, no guarding  Musculoskeletal:  Moving all extremities equally. No gross deformity. +R knee ttp, full ROM.  Skin: Abrasions to BL knees.  Neurologic: No focal neurological deficits. CN II-XII grossly intact.  : no CVA tenderness  Psych: Appropriate mood and affect. Cooperative.

## 2023-08-08 NOTE — ED PROVIDER NOTE - ATTENDING CONTRIBUTION TO CARE
57y M w/ hx seizures, HLD, chronic back pain, presents for fall. Admits to drinking alcohol prior to arrival. +Smoker. On exam, pt with superficial abrasion to posterior scalp. +R knee tenderness without obvious deformity. +Diffuse wheezing on lung exam. No focal neuro deficits. No acute traumatic injuries identified on workup. Wheezing improved with nebs and steroids. Tetanus updated. On reassessment, pt satting 97% on RA, ambulatory with stable gait, clinically sober. Medicaid cab arranged. Stable for discharge. 57y M w/ hx seizures, HLD, chronic back pain, presents for fall. Admits to drinking alcohol prior to arrival. +Smoker. On exam, pt with superficial abrasion to posterior scalp. +R knee tenderness without obvious deformity. +Diffuse wheezing on lung exam. No focal neuro deficits. No acute traumatic injuries identified on workup. Wheezing improved with nebs and steroids. Tetanus updated. On reassessment, pt satting 97% on RA, ambulatory with stable gait, clinically sober. Medicaid cab arranged. Rx for prednisone sent. Stable for discharge.

## 2023-08-08 NOTE — ED PROVIDER NOTE - CARE PROVIDER_API CALL
Tamara Donovan  Orthopaedic Surgery  [address] [place] [address]  Phone: [phone]  Fax: [phone]  Follow Up Time: Routine

## 2023-08-08 NOTE — ED ADULT TRIAGE NOTE - CHIEF COMPLAINT QUOTE
" I fell multiple times today" pt co right knee pain, states he was drinking today. pt denies LOC, blood thinners. pt has abrasion to the back of his head and his left knee. pt undressed and placed in yellow gown.

## 2023-08-08 NOTE — ED PROVIDER NOTE - NSICDXPASTMEDICALHX_GEN_ALL_CORE_FT
PAST MEDICAL HISTORY:  Hiatal hernia     High cholesterol     Liver failure     Tardive dyskinesia      PAST MEDICAL HISTORY:  ETOH abuse     Hiatal hernia     High cholesterol     Liver failure     Seizures     Tardive dyskinesia

## 2023-08-08 NOTE — ED PROVIDER NOTE - NS ED ROS FT
General: No fever, chills.  Respiratory: No SOB  Cardiac: No chest pain  GI: No abdominal pain, nausea, vomiting  Neuro: No headache  MSK: see hpi  Psych: hx anxiety  Endocrine: No heat/cold intolerance, no polyuria/polydipsia.  Heme: No easy bruising or bleeding.  Allergic: No pruritis, dermatitis, or environmental allergies.

## 2023-08-08 NOTE — ED PROVIDER NOTE - PATIENT PORTAL LINK FT
You can access the FollowMyHealth Patient Portal offered by [facility] by registering at the following website: [email or web address]. By joining [facility]’s FollowMyHealth portal, you will also be able to view your health information using other applications (apps) compatible with our system.

## 2023-08-08 NOTE — ED PROVIDER NOTE - CARE PLAN
1 Principal Discharge DX:	Knee pain  Secondary Diagnosis:	Fall   Principal Discharge DX:	Closed head injury  Secondary Diagnosis:	Knee pain  Secondary Diagnosis:	Alcohol intoxication  Secondary Diagnosis:	Bronchitis with wheezing

## 2023-08-08 NOTE — ED ADULT NURSE NOTE - OBJECTIVE STATEMENT
Patient presents to ED c/o right knee pain s/p fall after drinking vodka.  Patient states he hit his head.  Patient states knee pain has been bothering him for 2 weeks after multiple other falls s/p drinking.  Denies LOC, dizziness. No further complaints at this time.

## 2023-08-08 NOTE — ED PROVIDER NOTE - PROGRESS NOTE DETAILS
MD Sigifredo: CTs negative, informed patient of lucency progression in calvarium, instructed patient to follow up. Patient walked with steady gait. States knee feels better, will give ortho follow up. Satting 97% on RA and wheezing improved.

## 2023-08-08 NOTE — ED ADULT NURSE NOTE - NSICDXPASTMEDICALHX_GEN_ALL_CORE_FT
PAST MEDICAL HISTORY:  ETOH abuse     Hiatal hernia     High cholesterol     Liver failure     Seizures     Tardive dyskinesia

## 2023-08-09 VITALS — OXYGEN SATURATION: 95 %

## 2023-08-09 LAB
ALBUMIN SERPL ELPH-MCNC: 4.3 G/DL — SIGNIFICANT CHANGE UP (ref 3.3–5.2)
ALP SERPL-CCNC: 64 U/L — SIGNIFICANT CHANGE UP (ref 40–120)
ALT FLD-CCNC: 24 U/L — SIGNIFICANT CHANGE UP
ANION GAP SERPL CALC-SCNC: 14 MMOL/L — SIGNIFICANT CHANGE UP (ref 5–17)
AST SERPL-CCNC: 50 U/L — HIGH
BILIRUB SERPL-MCNC: 0.3 MG/DL — LOW (ref 0.4–2)
BUN SERPL-MCNC: 26.4 MG/DL — HIGH (ref 8–20)
CALCIUM SERPL-MCNC: 8.5 MG/DL — SIGNIFICANT CHANGE UP (ref 8.4–10.5)
CHLORIDE SERPL-SCNC: 108 MMOL/L — SIGNIFICANT CHANGE UP (ref 96–108)
CO2 SERPL-SCNC: 24 MMOL/L — SIGNIFICANT CHANGE UP (ref 22–29)
CREAT SERPL-MCNC: 0.83 MG/DL — SIGNIFICANT CHANGE UP (ref 0.5–1.3)
EGFR: 102 ML/MIN/1.73M2 — SIGNIFICANT CHANGE UP
ETHANOL SERPL-MCNC: 137 MG/DL — HIGH (ref 0–9)
GLUCOSE SERPL-MCNC: 125 MG/DL — HIGH (ref 70–99)
POTASSIUM SERPL-MCNC: 3.8 MMOL/L — SIGNIFICANT CHANGE UP (ref 3.5–5.3)
POTASSIUM SERPL-SCNC: 3.8 MMOL/L — SIGNIFICANT CHANGE UP (ref 3.5–5.3)
PROT SERPL-MCNC: 6.4 G/DL — LOW (ref 6.6–8.7)
SODIUM SERPL-SCNC: 146 MMOL/L — HIGH (ref 135–145)

## 2023-08-09 PROCEDURE — 80307 DRUG TEST PRSMV CHEM ANLYZR: CPT

## 2023-08-09 PROCEDURE — 94640 AIRWAY INHALATION TREATMENT: CPT

## 2023-08-09 PROCEDURE — 99285 EMERGENCY DEPT VISIT HI MDM: CPT | Mod: 25

## 2023-08-09 PROCEDURE — 90715 TDAP VACCINE 7 YRS/> IM: CPT

## 2023-08-09 PROCEDURE — 96374 THER/PROPH/DIAG INJ IV PUSH: CPT

## 2023-08-09 PROCEDURE — 82962 GLUCOSE BLOOD TEST: CPT

## 2023-08-09 PROCEDURE — 90471 IMMUNIZATION ADMIN: CPT

## 2023-08-09 PROCEDURE — 71045 X-RAY EXAM CHEST 1 VIEW: CPT

## 2023-08-09 PROCEDURE — 85025 COMPLETE CBC W/AUTO DIFF WBC: CPT

## 2023-08-09 PROCEDURE — G1004: CPT

## 2023-08-09 PROCEDURE — 80053 COMPREHEN METABOLIC PANEL: CPT

## 2023-08-09 PROCEDURE — 73562 X-RAY EXAM OF KNEE 3: CPT

## 2023-08-09 PROCEDURE — 72125 CT NECK SPINE W/O DYE: CPT | Mod: MG

## 2023-08-09 PROCEDURE — 70450 CT HEAD/BRAIN W/O DYE: CPT | Mod: MG

## 2023-08-09 PROCEDURE — 36415 COLL VENOUS BLD VENIPUNCTURE: CPT

## 2023-08-09 RX ORDER — ALBUTEROL 90 UG/1
1 AEROSOL, METERED ORAL ONCE
Refills: 0 | Status: COMPLETED | OUTPATIENT
Start: 2023-08-09 | End: 2023-08-09

## 2023-08-09 RX ADMIN — ALBUTEROL 1 PUFF(S): 90 AEROSOL, METERED ORAL at 01:33

## 2023-08-10 NOTE — ED POST DISCHARGE NOTE - DETAILS
discussed with pt. no infx symptoms. mentioned he was previously told he had lung nodule but not sure if same area. will f/u with PMD for rpt imaging

## 2023-10-19 NOTE — DISCHARGE NOTE NURSING/CASE MANAGEMENT/SOCIAL WORK - NSPROEXTENSIONSOFSELF_GEN_A_NUR
Detail Level: Detailed
Quality 130: Documentation Of Current Medications In The Medical Record: Current Medications Documented
Quality 431: Preventive Care And Screening: Unhealthy Alcohol Use - Screening: Patient not screened for unhealthy alcohol use using a systematic screening method
Quality 110: Preventive Care And Screening: Influenza Immunization: Influenza immunization was not ordered or administered, reason not given
Quality 226: Preventive Care And Screening: Tobacco Use: Screening And Cessation Intervention: Tobacco Screening not Performed
Quality 47: Advance Care Plan: Advance care planning not documented, reason not otherwise specified.
none

## 2023-11-28 ENCOUNTER — INPATIENT (INPATIENT)
Facility: HOSPITAL | Age: 58
LOS: 21 days | Discharge: NURSING, REHAB OR LONG-TERM CARE | DRG: 981 | End: 2023-12-20
Attending: SURGERY | Admitting: SURGERY
Payer: MEDICARE

## 2023-11-28 VITALS — OXYGEN SATURATION: 82 % | HEART RATE: 93 BPM | WEIGHT: 149.91 LBS

## 2023-11-28 DIAGNOSIS — Z98.1 ARTHRODESIS STATUS: Chronic | ICD-10-CM

## 2023-11-28 DIAGNOSIS — S22.39XA FRACTURE OF ONE RIB, UNSPECIFIED SIDE, INITIAL ENCOUNTER FOR CLOSED FRACTURE: ICD-10-CM

## 2023-11-28 DIAGNOSIS — Z98.89 OTHER SPECIFIED POSTPROCEDURAL STATES: Chronic | ICD-10-CM

## 2023-11-28 LAB
ALBUMIN SERPL ELPH-MCNC: 4 G/DL — SIGNIFICANT CHANGE UP (ref 3.3–5.2)
ALBUMIN SERPL ELPH-MCNC: 4 G/DL — SIGNIFICANT CHANGE UP (ref 3.3–5.2)
ALP SERPL-CCNC: 62 U/L — SIGNIFICANT CHANGE UP (ref 40–120)
ALP SERPL-CCNC: 62 U/L — SIGNIFICANT CHANGE UP (ref 40–120)
ALT FLD-CCNC: 16 U/L — SIGNIFICANT CHANGE UP
ALT FLD-CCNC: 16 U/L — SIGNIFICANT CHANGE UP
AMPHET UR-MCNC: NEGATIVE — SIGNIFICANT CHANGE UP
AMPHET UR-MCNC: NEGATIVE — SIGNIFICANT CHANGE UP
ANION GAP SERPL CALC-SCNC: 18 MMOL/L — HIGH (ref 5–17)
ANION GAP SERPL CALC-SCNC: 18 MMOL/L — HIGH (ref 5–17)
APPEARANCE UR: ABNORMAL
APPEARANCE UR: ABNORMAL
APTT BLD: 25.7 SEC — SIGNIFICANT CHANGE UP (ref 24.5–35.6)
APTT BLD: 25.7 SEC — SIGNIFICANT CHANGE UP (ref 24.5–35.6)
AST SERPL-CCNC: 25 U/L — SIGNIFICANT CHANGE UP
AST SERPL-CCNC: 25 U/L — SIGNIFICANT CHANGE UP
BACTERIA # UR AUTO: NEGATIVE /HPF — SIGNIFICANT CHANGE UP
BACTERIA # UR AUTO: NEGATIVE /HPF — SIGNIFICANT CHANGE UP
BARBITURATES UR SCN-MCNC: NEGATIVE — SIGNIFICANT CHANGE UP
BARBITURATES UR SCN-MCNC: NEGATIVE — SIGNIFICANT CHANGE UP
BASOPHILS # BLD AUTO: 0.1 K/UL — SIGNIFICANT CHANGE UP (ref 0–0.2)
BASOPHILS # BLD AUTO: 0.1 K/UL — SIGNIFICANT CHANGE UP (ref 0–0.2)
BASOPHILS NFR BLD AUTO: 0.9 % — SIGNIFICANT CHANGE UP (ref 0–2)
BASOPHILS NFR BLD AUTO: 0.9 % — SIGNIFICANT CHANGE UP (ref 0–2)
BENZODIAZ UR-MCNC: NEGATIVE — SIGNIFICANT CHANGE UP
BENZODIAZ UR-MCNC: NEGATIVE — SIGNIFICANT CHANGE UP
BILIRUB SERPL-MCNC: <0.2 MG/DL — LOW (ref 0.4–2)
BILIRUB SERPL-MCNC: <0.2 MG/DL — LOW (ref 0.4–2)
BILIRUB UR-MCNC: NEGATIVE — SIGNIFICANT CHANGE UP
BILIRUB UR-MCNC: NEGATIVE — SIGNIFICANT CHANGE UP
BLD GP AB SCN SERPL QL: SIGNIFICANT CHANGE UP
BLD GP AB SCN SERPL QL: SIGNIFICANT CHANGE UP
BUN SERPL-MCNC: 14.9 MG/DL — SIGNIFICANT CHANGE UP (ref 8–20)
BUN SERPL-MCNC: 14.9 MG/DL — SIGNIFICANT CHANGE UP (ref 8–20)
CALCIUM SERPL-MCNC: 9.6 MG/DL — SIGNIFICANT CHANGE UP (ref 8.4–10.5)
CALCIUM SERPL-MCNC: 9.6 MG/DL — SIGNIFICANT CHANGE UP (ref 8.4–10.5)
CAST: 2 /LPF — SIGNIFICANT CHANGE UP (ref 0–4)
CAST: 2 /LPF — SIGNIFICANT CHANGE UP (ref 0–4)
CHLORIDE SERPL-SCNC: 97 MMOL/L — SIGNIFICANT CHANGE UP (ref 96–108)
CHLORIDE SERPL-SCNC: 97 MMOL/L — SIGNIFICANT CHANGE UP (ref 96–108)
CK SERPL-CCNC: 109 U/L — SIGNIFICANT CHANGE UP (ref 30–200)
CK SERPL-CCNC: 109 U/L — SIGNIFICANT CHANGE UP (ref 30–200)
CO2 SERPL-SCNC: 31 MMOL/L — HIGH (ref 22–29)
CO2 SERPL-SCNC: 31 MMOL/L — HIGH (ref 22–29)
COCAINE METAB.OTHER UR-MCNC: POSITIVE
COCAINE METAB.OTHER UR-MCNC: POSITIVE
COLOR SPEC: YELLOW — SIGNIFICANT CHANGE UP
COLOR SPEC: YELLOW — SIGNIFICANT CHANGE UP
CREAT SERPL-MCNC: 1.14 MG/DL — SIGNIFICANT CHANGE UP (ref 0.5–1.3)
CREAT SERPL-MCNC: 1.14 MG/DL — SIGNIFICANT CHANGE UP (ref 0.5–1.3)
DIFF PNL FLD: NEGATIVE — SIGNIFICANT CHANGE UP
DIFF PNL FLD: NEGATIVE — SIGNIFICANT CHANGE UP
EGFR: 75 ML/MIN/1.73M2 — SIGNIFICANT CHANGE UP
EGFR: 75 ML/MIN/1.73M2 — SIGNIFICANT CHANGE UP
EOSINOPHIL # BLD AUTO: 0.39 K/UL — SIGNIFICANT CHANGE UP (ref 0–0.5)
EOSINOPHIL # BLD AUTO: 0.39 K/UL — SIGNIFICANT CHANGE UP (ref 0–0.5)
EOSINOPHIL NFR BLD AUTO: 3.6 % — SIGNIFICANT CHANGE UP (ref 0–6)
EOSINOPHIL NFR BLD AUTO: 3.6 % — SIGNIFICANT CHANGE UP (ref 0–6)
ETHANOL SERPL-MCNC: 315 MG/DL — HIGH (ref 0–9)
ETHANOL SERPL-MCNC: 315 MG/DL — HIGH (ref 0–9)
GIANT PLATELETS BLD QL SMEAR: PRESENT — SIGNIFICANT CHANGE UP
GIANT PLATELETS BLD QL SMEAR: PRESENT — SIGNIFICANT CHANGE UP
GLUCOSE SERPL-MCNC: 125 MG/DL — HIGH (ref 70–99)
GLUCOSE SERPL-MCNC: 125 MG/DL — HIGH (ref 70–99)
GLUCOSE UR QL: NEGATIVE MG/DL — SIGNIFICANT CHANGE UP
GLUCOSE UR QL: NEGATIVE MG/DL — SIGNIFICANT CHANGE UP
HCT VFR BLD CALC: 43 % — SIGNIFICANT CHANGE UP (ref 39–50)
HCT VFR BLD CALC: 43 % — SIGNIFICANT CHANGE UP (ref 39–50)
HGB BLD-MCNC: 13.2 G/DL — SIGNIFICANT CHANGE UP (ref 13–17)
HGB BLD-MCNC: 13.2 G/DL — SIGNIFICANT CHANGE UP (ref 13–17)
INR BLD: 0.94 RATIO — SIGNIFICANT CHANGE UP (ref 0.85–1.18)
INR BLD: 0.94 RATIO — SIGNIFICANT CHANGE UP (ref 0.85–1.18)
KETONES UR-MCNC: ABNORMAL MG/DL
KETONES UR-MCNC: ABNORMAL MG/DL
LACTATE BLDV-MCNC: 5.6 MMOL/L — CRITICAL HIGH (ref 0.5–2)
LACTATE BLDV-MCNC: 5.6 MMOL/L — CRITICAL HIGH (ref 0.5–2)
LEUKOCYTE ESTERASE UR-ACNC: NEGATIVE — SIGNIFICANT CHANGE UP
LEUKOCYTE ESTERASE UR-ACNC: NEGATIVE — SIGNIFICANT CHANGE UP
LYMPHOCYTES # BLD AUTO: 37.5 % — SIGNIFICANT CHANGE UP (ref 13–44)
LYMPHOCYTES # BLD AUTO: 37.5 % — SIGNIFICANT CHANGE UP (ref 13–44)
LYMPHOCYTES # BLD AUTO: 4.03 K/UL — HIGH (ref 1–3.3)
LYMPHOCYTES # BLD AUTO: 4.03 K/UL — HIGH (ref 1–3.3)
MANUAL SMEAR VERIFICATION: SIGNIFICANT CHANGE UP
MANUAL SMEAR VERIFICATION: SIGNIFICANT CHANGE UP
MCHC RBC-ENTMCNC: 27 PG — SIGNIFICANT CHANGE UP (ref 27–34)
MCHC RBC-ENTMCNC: 27 PG — SIGNIFICANT CHANGE UP (ref 27–34)
MCHC RBC-ENTMCNC: 30.7 GM/DL — LOW (ref 32–36)
MCHC RBC-ENTMCNC: 30.7 GM/DL — LOW (ref 32–36)
MCV RBC AUTO: 88.1 FL — SIGNIFICANT CHANGE UP (ref 80–100)
MCV RBC AUTO: 88.1 FL — SIGNIFICANT CHANGE UP (ref 80–100)
METHADONE UR-MCNC: NEGATIVE — SIGNIFICANT CHANGE UP
METHADONE UR-MCNC: NEGATIVE — SIGNIFICANT CHANGE UP
MONOCYTES # BLD AUTO: 1.15 K/UL — HIGH (ref 0–0.9)
MONOCYTES # BLD AUTO: 1.15 K/UL — HIGH (ref 0–0.9)
MONOCYTES NFR BLD AUTO: 10.7 % — SIGNIFICANT CHANGE UP (ref 2–14)
MONOCYTES NFR BLD AUTO: 10.7 % — SIGNIFICANT CHANGE UP (ref 2–14)
MYELOCYTES NFR BLD: 0.9 % — HIGH (ref 0–0)
MYELOCYTES NFR BLD: 0.9 % — HIGH (ref 0–0)
NEUTROPHILS # BLD AUTO: 4.12 K/UL — SIGNIFICANT CHANGE UP (ref 1.8–7.4)
NEUTROPHILS # BLD AUTO: 4.12 K/UL — SIGNIFICANT CHANGE UP (ref 1.8–7.4)
NEUTROPHILS NFR BLD AUTO: 37.5 % — LOW (ref 43–77)
NEUTROPHILS NFR BLD AUTO: 37.5 % — LOW (ref 43–77)
NEUTS BAND # BLD: 0.9 % — SIGNIFICANT CHANGE UP (ref 0–8)
NEUTS BAND # BLD: 0.9 % — SIGNIFICANT CHANGE UP (ref 0–8)
NITRITE UR-MCNC: NEGATIVE — SIGNIFICANT CHANGE UP
NITRITE UR-MCNC: NEGATIVE — SIGNIFICANT CHANGE UP
OPIATES UR-MCNC: NEGATIVE — SIGNIFICANT CHANGE UP
OPIATES UR-MCNC: NEGATIVE — SIGNIFICANT CHANGE UP
PCP SPEC-MCNC: SIGNIFICANT CHANGE UP
PCP SPEC-MCNC: SIGNIFICANT CHANGE UP
PCP UR-MCNC: NEGATIVE — SIGNIFICANT CHANGE UP
PCP UR-MCNC: NEGATIVE — SIGNIFICANT CHANGE UP
PH UR: 8 — SIGNIFICANT CHANGE UP (ref 5–8)
PH UR: 8 — SIGNIFICANT CHANGE UP (ref 5–8)
PLAT MORPH BLD: NORMAL — SIGNIFICANT CHANGE UP
PLAT MORPH BLD: NORMAL — SIGNIFICANT CHANGE UP
PLATELET # BLD AUTO: 445 K/UL — HIGH (ref 150–400)
PLATELET # BLD AUTO: 445 K/UL — HIGH (ref 150–400)
POTASSIUM SERPL-MCNC: 5.1 MMOL/L — SIGNIFICANT CHANGE UP (ref 3.5–5.3)
POTASSIUM SERPL-MCNC: 5.1 MMOL/L — SIGNIFICANT CHANGE UP (ref 3.5–5.3)
POTASSIUM SERPL-SCNC: 5.1 MMOL/L — SIGNIFICANT CHANGE UP (ref 3.5–5.3)
POTASSIUM SERPL-SCNC: 5.1 MMOL/L — SIGNIFICANT CHANGE UP (ref 3.5–5.3)
PROT SERPL-MCNC: 6.6 G/DL — SIGNIFICANT CHANGE UP (ref 6.6–8.7)
PROT SERPL-MCNC: 6.6 G/DL — SIGNIFICANT CHANGE UP (ref 6.6–8.7)
PROT UR-MCNC: 100 MG/DL
PROT UR-MCNC: 100 MG/DL
PROTHROM AB SERPL-ACNC: 10.4 SEC — SIGNIFICANT CHANGE UP (ref 9.5–13)
PROTHROM AB SERPL-ACNC: 10.4 SEC — SIGNIFICANT CHANGE UP (ref 9.5–13)
RBC # BLD: 4.88 M/UL — SIGNIFICANT CHANGE UP (ref 4.2–5.8)
RBC # BLD: 4.88 M/UL — SIGNIFICANT CHANGE UP (ref 4.2–5.8)
RBC # FLD: 16.8 % — HIGH (ref 10.3–14.5)
RBC # FLD: 16.8 % — HIGH (ref 10.3–14.5)
RBC BLD AUTO: NORMAL — SIGNIFICANT CHANGE UP
RBC BLD AUTO: NORMAL — SIGNIFICANT CHANGE UP
RBC CASTS # UR COMP ASSIST: 2 /HPF — SIGNIFICANT CHANGE UP (ref 0–4)
RBC CASTS # UR COMP ASSIST: 2 /HPF — SIGNIFICANT CHANGE UP (ref 0–4)
SODIUM SERPL-SCNC: 146 MMOL/L — HIGH (ref 135–145)
SODIUM SERPL-SCNC: 146 MMOL/L — HIGH (ref 135–145)
SP GR SPEC: 1.02 — SIGNIFICANT CHANGE UP (ref 1–1.03)
SP GR SPEC: 1.02 — SIGNIFICANT CHANGE UP (ref 1–1.03)
SQUAMOUS # UR AUTO: 1 /HPF — SIGNIFICANT CHANGE UP (ref 0–5)
SQUAMOUS # UR AUTO: 1 /HPF — SIGNIFICANT CHANGE UP (ref 0–5)
THC UR QL: POSITIVE
THC UR QL: POSITIVE
UROBILINOGEN FLD QL: 1 MG/DL — SIGNIFICANT CHANGE UP (ref 0.2–1)
UROBILINOGEN FLD QL: 1 MG/DL — SIGNIFICANT CHANGE UP (ref 0.2–1)
VARIANT LYMPHS # BLD: 8 % — HIGH (ref 0–6)
VARIANT LYMPHS # BLD: 8 % — HIGH (ref 0–6)
WBC # BLD: 10.74 K/UL — HIGH (ref 3.8–10.5)
WBC # BLD: 10.74 K/UL — HIGH (ref 3.8–10.5)
WBC # FLD AUTO: 10.74 K/UL — HIGH (ref 3.8–10.5)
WBC # FLD AUTO: 10.74 K/UL — HIGH (ref 3.8–10.5)
WBC UR QL: 5 /HPF — SIGNIFICANT CHANGE UP (ref 0–5)
WBC UR QL: 5 /HPF — SIGNIFICANT CHANGE UP (ref 0–5)

## 2023-11-28 PROCEDURE — 99291 CRITICAL CARE FIRST HOUR: CPT

## 2023-11-28 PROCEDURE — 99283 EMERGENCY DEPT VISIT LOW MDM: CPT

## 2023-11-28 PROCEDURE — 72125 CT NECK SPINE W/O DYE: CPT | Mod: 26,MA

## 2023-11-28 PROCEDURE — 72131 CT LUMBAR SPINE W/O DYE: CPT | Mod: 26,MA

## 2023-11-28 PROCEDURE — 72128 CT CHEST SPINE W/O DYE: CPT | Mod: 26,MA

## 2023-11-28 PROCEDURE — 71045 X-RAY EXAM CHEST 1 VIEW: CPT | Mod: 26,76

## 2023-11-28 PROCEDURE — 72170 X-RAY EXAM OF PELVIS: CPT | Mod: 26

## 2023-11-28 PROCEDURE — 70450 CT HEAD/BRAIN W/O DYE: CPT | Mod: 26,MA

## 2023-11-28 PROCEDURE — 74177 CT ABD & PELVIS W/CONTRAST: CPT | Mod: 26,MA

## 2023-11-28 PROCEDURE — 71260 CT THORAX DX C+: CPT | Mod: 26,MA

## 2023-11-28 RX ORDER — THIAMINE MONONITRATE (VIT B1) 100 MG
500 TABLET ORAL EVERY 8 HOURS
Refills: 0 | Status: DISCONTINUED | OUTPATIENT
Start: 2023-11-28 | End: 2023-12-04

## 2023-11-28 RX ORDER — ONDANSETRON 8 MG/1
4 TABLET, FILM COATED ORAL ONCE
Refills: 0 | Status: COMPLETED | OUTPATIENT
Start: 2023-11-28 | End: 2023-11-28

## 2023-11-28 RX ORDER — DIVALPROEX SODIUM 500 MG/1
500 TABLET, DELAYED RELEASE ORAL EVERY 12 HOURS
Refills: 0 | Status: DISCONTINUED | OUTPATIENT
Start: 2023-11-28 | End: 2023-12-01

## 2023-11-28 RX ORDER — SODIUM CHLORIDE 9 MG/ML
1000 INJECTION INTRAMUSCULAR; INTRAVENOUS; SUBCUTANEOUS
Refills: 0 | Status: DISCONTINUED | OUTPATIENT
Start: 2023-11-28 | End: 2023-11-29

## 2023-11-28 RX ORDER — METOCLOPRAMIDE HCL 10 MG
10 TABLET ORAL ONCE
Refills: 0 | Status: COMPLETED | OUTPATIENT
Start: 2023-11-28 | End: 2023-11-28

## 2023-11-28 RX ORDER — ETOMIDATE 2 MG/ML
20 INJECTION INTRAVENOUS ONCE
Refills: 0 | Status: COMPLETED | OUTPATIENT
Start: 2023-11-28 | End: 2023-11-28

## 2023-11-28 RX ORDER — MIDAZOLAM HYDROCHLORIDE 1 MG/ML
10 INJECTION, SOLUTION INTRAMUSCULAR; INTRAVENOUS ONCE
Refills: 0 | Status: DISCONTINUED | OUTPATIENT
Start: 2023-11-28 | End: 2023-11-28

## 2023-11-28 RX ORDER — CLONAZEPAM 1 MG
1 TABLET ORAL
Qty: 0 | Refills: 0 | DISCHARGE

## 2023-11-28 RX ORDER — SODIUM CHLORIDE 9 MG/ML
2000 INJECTION INTRAMUSCULAR; INTRAVENOUS; SUBCUTANEOUS ONCE
Refills: 0 | Status: COMPLETED | OUTPATIENT
Start: 2023-11-28 | End: 2023-11-28

## 2023-11-28 RX ORDER — ONDANSETRON 8 MG/1
4 TABLET, FILM COATED ORAL EVERY 6 HOURS
Refills: 0 | Status: DISCONTINUED | OUTPATIENT
Start: 2023-11-28 | End: 2023-12-08

## 2023-11-28 RX ORDER — DEUTETRABENAZINE 6 MG/1
1 TABLET, COATED ORAL
Qty: 0 | Refills: 0 | DISCHARGE

## 2023-11-28 RX ORDER — ACETAMINOPHEN 500 MG
1000 TABLET ORAL ONCE
Refills: 0 | Status: COMPLETED | OUTPATIENT
Start: 2023-11-28 | End: 2023-11-29

## 2023-11-28 RX ORDER — SODIUM CHLORIDE 9 MG/ML
1000 INJECTION, SOLUTION INTRAVENOUS
Refills: 0 | Status: DISCONTINUED | OUTPATIENT
Start: 2023-11-28 | End: 2023-11-28

## 2023-11-28 RX ORDER — CHLORHEXIDINE GLUCONATE 213 G/1000ML
15 SOLUTION TOPICAL EVERY 12 HOURS
Refills: 0 | Status: DISCONTINUED | OUTPATIENT
Start: 2023-11-28 | End: 2023-11-28

## 2023-11-28 RX ORDER — ROCURONIUM BROMIDE 10 MG/ML
80 VIAL (ML) INTRAVENOUS ONCE
Refills: 0 | Status: COMPLETED | OUTPATIENT
Start: 2023-11-28 | End: 2023-11-28

## 2023-11-28 RX ORDER — QUETIAPINE FUMARATE 200 MG/1
0 TABLET, FILM COATED ORAL
Qty: 0 | Refills: 0 | DISCHARGE

## 2023-11-28 RX ORDER — PANTOPRAZOLE SODIUM 20 MG/1
40 TABLET, DELAYED RELEASE ORAL DAILY
Refills: 0 | Status: DISCONTINUED | OUTPATIENT
Start: 2023-11-28 | End: 2023-12-04

## 2023-11-28 RX ORDER — FOLIC ACID 0.8 MG
1 TABLET ORAL DAILY
Refills: 0 | Status: DISCONTINUED | OUTPATIENT
Start: 2023-11-28 | End: 2023-12-04

## 2023-11-28 RX ORDER — QUETIAPINE FUMARATE 200 MG/1
100 TABLET, FILM COATED ORAL AT BEDTIME
Refills: 0 | Status: DISCONTINUED | OUTPATIENT
Start: 2023-11-28 | End: 2023-12-01

## 2023-11-28 RX ORDER — KETOROLAC TROMETHAMINE 30 MG/ML
15 SYRINGE (ML) INJECTION ONCE
Refills: 0 | Status: DISCONTINUED | OUTPATIENT
Start: 2023-11-28 | End: 2023-11-28

## 2023-11-28 RX ORDER — SODIUM CHLORIDE 9 MG/ML
1000 INJECTION, SOLUTION INTRAVENOUS ONCE
Refills: 0 | Status: COMPLETED | OUTPATIENT
Start: 2023-11-28 | End: 2023-11-28

## 2023-11-28 RX ORDER — BENZTROPINE MESYLATE 1 MG
2 TABLET ORAL
Refills: 0 | Status: DISCONTINUED | OUTPATIENT
Start: 2023-11-28 | End: 2023-12-01

## 2023-11-28 RX ORDER — SODIUM CHLORIDE 9 MG/ML
1000 INJECTION, SOLUTION INTRAVENOUS
Refills: 0 | Status: DISCONTINUED | OUTPATIENT
Start: 2023-11-28 | End: 2023-11-29

## 2023-11-28 RX ORDER — GABAPENTIN 400 MG/1
100 CAPSULE ORAL AT BEDTIME
Refills: 0 | Status: DISCONTINUED | OUTPATIENT
Start: 2023-11-28 | End: 2023-12-01

## 2023-11-28 RX ORDER — SODIUM CHLORIDE 9 MG/ML
250 INJECTION INTRAMUSCULAR; INTRAVENOUS; SUBCUTANEOUS ONCE
Refills: 0 | Status: COMPLETED | OUTPATIENT
Start: 2023-11-28 | End: 2023-11-28

## 2023-11-28 RX ORDER — CLOZAPINE 150 MG/1
1 TABLET, ORALLY DISINTEGRATING ORAL
Qty: 0 | Refills: 0 | DISCHARGE

## 2023-11-28 RX ORDER — ACETAMINOPHEN 500 MG
1000 TABLET ORAL ONCE
Refills: 0 | Status: COMPLETED | OUTPATIENT
Start: 2023-11-28 | End: 2023-11-28

## 2023-11-28 RX ADMIN — SODIUM CHLORIDE 250 MILLILITER(S): 9 INJECTION INTRAMUSCULAR; INTRAVENOUS; SUBCUTANEOUS at 17:30

## 2023-11-28 RX ADMIN — Medication 15 MILLIGRAM(S): at 21:38

## 2023-11-28 RX ADMIN — ONDANSETRON 4 MILLIGRAM(S): 8 TABLET, FILM COATED ORAL at 21:16

## 2023-11-28 RX ADMIN — Medication 80 MILLIGRAM(S): at 17:15

## 2023-11-28 RX ADMIN — SODIUM CHLORIDE 2000 MILLILITER(S): 9 INJECTION INTRAMUSCULAR; INTRAVENOUS; SUBCUTANEOUS at 18:12

## 2023-11-28 RX ADMIN — Medication 400 MILLIGRAM(S): at 21:39

## 2023-11-28 RX ADMIN — MIDAZOLAM HYDROCHLORIDE 10 MILLIGRAM(S): 1 INJECTION, SOLUTION INTRAMUSCULAR; INTRAVENOUS at 16:45

## 2023-11-28 RX ADMIN — Medication 10 MILLIGRAM(S): at 21:49

## 2023-11-28 RX ADMIN — SODIUM CHLORIDE 1000 MILLILITER(S): 9 INJECTION, SOLUTION INTRAVENOUS at 20:30

## 2023-11-28 RX ADMIN — ETOMIDATE 20 MILLIGRAM(S): 2 INJECTION INTRAVENOUS at 17:15

## 2023-11-28 NOTE — H&P ADULT - HISTORY OF PRESENT ILLNESS
57M with PMH seizures, liver disease, back pain, HLD presents to ED s/p fall. Per ED, patient lives at group home; was drunk today and fell down stairs. In ED was intubated for airway protection after admin of Versed and subsequent emesis. Imaging reveal only L 7th rib fx.    Primary  A: intubated  B: breath sounds b/l  C: palpable pulses  D: GCS 3T (on propofol)  E: no gross deformity/hemorrhage

## 2023-11-28 NOTE — H&P ADULT - ASSESSMENT
58M presents s/p fall down stairs while intoxicated. Intubated in ED for airway protection s/p possible aspiration event. Primary, intubated with GCS 3T while sedated. Secondary without gross positive findings.     Plan:  -admit to trauma - Dr. Ferrell  -stop propofol  -wean to extubate  -allow patient to metabolize   -dc when able

## 2023-11-28 NOTE — ED ADULT NURSE NOTE - BREATHING, MLM
Patient to call our office to schedule one year follow-up.  Due in December.   
Spontaneous, unlabored and symmetrical

## 2023-11-28 NOTE — PHARMACOTHERAPY INTERVENTION NOTE - COMMENTS
Unable to speak with patient at bedside due to current status, so referenced outpatient pharmacy record to obtain medication list.    Outpatient Medication Review updated.    HOME MEDICATIONS:  benztropine 2 mg oral tablet: 1 tab(s) orally 2 times a day (28 Nov 2023 18:24)  clonazePAM 0.5 mg oral tablet: 1 tab(s) orally once a day (28 Nov 2023 18:24)  divalproex sodium 500 mg oral delayed release tablet: 1 tab(s) orally 2 times a day (28 Nov 2023 18:24)  gabapentin 100 mg oral capsule: 1 cap(s) orally once a day (at bedtime) (28 Nov 2023 18:24)  hydrOXYzine hydrochloride 25 mg oral tablet: 2 tab(s) orally 2 times a day (28 Nov 2023 18:24)  QUEtiapine 100 mg oral tablet: 1 tab(s) orally once a day (at bedtime) (28 Nov 2023 18:24)

## 2023-11-28 NOTE — ED ADULT NURSE NOTE - OBJECTIVE STATEMENT
Pt BIBEMS from group home for unwitnessed fall with unknown LOC and unknown anticoagulation medication history. As per EMS the pt has been drinking Everclear and appears intoxicated. Pt is not verbally redirectable at this time, is not answering questions and is not following commands.

## 2023-11-28 NOTE — ED ADULT NURSE NOTE - PAIN RATING/NUMBER SCALE (0-10): ACTIVITY
Keep doing trelegy and nebulizer medicines  Follow up with dr. Herbert  Continue activity as you can  Get vaccines- flu shot in fall   0 (no pain/absence of nonverbal indicators of pain)

## 2023-11-28 NOTE — ED PROVIDER NOTE - OBJECTIVE STATEMENT
BIBA from group home s/p unwitnessed fall down stairs with altered mental status.  unknown LOC.  Roommates state he is intoxicated. As per ems pt is combative. c-collar in place.   Unable to obtain full set of vitals at this time. Critical resident called to bedside. A 57 yo M of ETOH abuse, Liver failure, seizure, hiatal hernia, HLD, BIBEMS from group home s/p unwitnessed fall down stairs with altered mental status. Per EMS, his roommates state he is intoxicated today. As per ems pt is combative, c-collar in place. In the ED ambulance bay, pt was combative, moving all extremities, no deformities or trauma noted. Priority CT head and c-spine was ordered and given Versed 10mg IM. While waiting CT, Pt vomited and desaturated. Back to CC and got intubated for airway protection.

## 2023-11-28 NOTE — ED PROVIDER NOTE - PROGRESS NOTE DETAILS
vikram: Given the significant and immediate threats to this patient based on initial presentation, the benefits of emergency contrast-enhanced CT imaging without obtaining GFR/creatinine serum level results greatly outweigh the potential risk of harm due to contrast-induced nephropathy.

## 2023-11-28 NOTE — ED PROVIDER NOTE - ATTENDING CONTRIBUTION TO CARE
Pt was brought in by EMS from group home after being intoxicated and falling down a flight of stairs.  Pt agitated and uncooperative on arrival.    physical - rrr. ctab. abd - soft, nt. no edema. no rash.    plan - Pt was given 10 mg of IM versed for treatment of agitation to get CT scans.  Pt was found in hallway with vomitus from his mouth and hypoxic. Pt moved to critical and emergently intubated. CTs showed 7th rib fx and small ptx.  trauma consulted and will admit.

## 2023-11-28 NOTE — ED ADULT TRIAGE NOTE - INTERNATIONAL TRAVEL
Requested Prescriptions     Pending Prescriptions Disp Refills    montelukast (SINGULAIR) 10 mg tablet 90 Tablet 1     Sig: Take 1 Tablet by mouth daily.      [facility]/pharmacy #[id number] [facility]  [phone]
No

## 2023-11-28 NOTE — ED ADULT TRIAGE NOTE - CHIEF COMPLAINT QUOTE
BIBA from group home s/p unwitnessed fall down stairs with altered mental status.  unknown LOC.  Roommates state he is intoxicated. As per ems pt is combative. c-collar in place.   Unable to obtain full set of vitals at this time. Critical resident called to bedside.

## 2023-11-28 NOTE — H&P ADULT - NSHPPHYSICALEXAM_GEN_ALL_CORE
Constitutional: intubated, sedated  HEENT: PERRLA, no drainage or redness  Respiratory: intubated, mechanically ventilated  Cardiovascular: RRR  Gastrointestinal: Soft, non-tender, non-distended; incisions C/D/I  Extremities: No peripheral edema, No cyanosis, clubbing   Vascular: Equal and normal pulses: 2+ peripheral pulses throughout  Neurological: A&O x 3; no sensory, motor or coordination deficits, normal reflexes  Psychiatric: Normal mood, normal affect  Musculoskeletal: No joint pain, swelling or deformity; no limitation of movement  Skin: No rashes

## 2023-11-28 NOTE — H&P ADULT - ATTENDING COMMENTS
Patient seen and examined at bedside, imaging reviewed. ? occiptal bony mass- will need incidental finding form for possible met (also has pulm nodules). Small anterior ptx not seen on CXR. Pt extubated in ED, + EtOH, THC and cocaine. Repeat fluid bolus ordered. Cont to monitor until pt crystal and can answer questions. Trend lactate. Small amt crepitus appreciated L chest where rib fracture is present, repeat post extubation CXR with small amount subcu emphysema, no ptx again noted on CXR. Hemodynamically stable.

## 2023-11-28 NOTE — ED ADULT NURSE NOTE - NSFALLRISKINTERV_ED_ALL_ED
Assistance OOB with selected safe patient handling equipment if applicable/Assistance with ambulation/Communicate fall risk and risk factors to all staff, patient, and family/Monitor gait and stability/Monitor for mental status changes and reorient to person, place, and time, as needed/Provide visual cue: yellow wristband, yellow gown, etc/Reinforce activity limits and safety measures with patient and family/Toileting schedule using arm’s reach rule for commode and bathroom/Use of alarms - bed, stretcher, chair and/or video monitoring/Call bell, personal items and telephone in reach/Instruct patient to call for assistance before getting out of bed/chair/stretcher/Non-slip footwear applied when patient is off stretcher/[place] to call system/Physically safe environment - no spills, clutter or unnecessary equipment/Purposeful Proactive Rounding/Room/bathroom lighting operational, light cord in reach

## 2023-11-28 NOTE — H&P ADULT - NSHPLABSRESULTS_GEN_ALL_CORE
13.2   10.74 )-----------( 445      ( 28 Nov 2023 16:55 )             43.0     11-28    146<H>  |  97  |  14.9  ----------------------------<  125<H>  5.1   |  31.0<H>  |  1.14    Ca    9.6      28 Nov 2023 16:55    TPro  6.6  /  Alb  4.0  /  TBili  <0.2<L>  /  DBili  x   /  AST  25  /  ALT  16  /  AlkPhos  62  11-28

## 2023-11-29 PROBLEM — R56.9 UNSPECIFIED CONVULSIONS: Chronic | Status: ACTIVE | Noted: 2023-08-08

## 2023-11-29 PROBLEM — F10.10 ALCOHOL ABUSE, UNCOMPLICATED: Chronic | Status: ACTIVE | Noted: 2023-08-08

## 2023-11-29 LAB
ANION GAP SERPL CALC-SCNC: 9 MMOL/L — SIGNIFICANT CHANGE UP (ref 5–17)
ANION GAP SERPL CALC-SCNC: 9 MMOL/L — SIGNIFICANT CHANGE UP (ref 5–17)
BASE EXCESS BLDV CALC-SCNC: 6.5 MMOL/L — HIGH (ref -2–3)
BASE EXCESS BLDV CALC-SCNC: 6.5 MMOL/L — HIGH (ref -2–3)
BUN SERPL-MCNC: 14 MG/DL — SIGNIFICANT CHANGE UP (ref 8–20)
BUN SERPL-MCNC: 14 MG/DL — SIGNIFICANT CHANGE UP (ref 8–20)
CA-I SERPL-SCNC: 1.01 MMOL/L — LOW (ref 1.15–1.33)
CA-I SERPL-SCNC: 1.01 MMOL/L — LOW (ref 1.15–1.33)
CALCIUM SERPL-MCNC: 8 MG/DL — LOW (ref 8.4–10.5)
CALCIUM SERPL-MCNC: 8 MG/DL — LOW (ref 8.4–10.5)
CHLORIDE BLDV-SCNC: 105 MMOL/L — SIGNIFICANT CHANGE UP (ref 96–108)
CHLORIDE BLDV-SCNC: 105 MMOL/L — SIGNIFICANT CHANGE UP (ref 96–108)
CHLORIDE SERPL-SCNC: 102 MMOL/L — SIGNIFICANT CHANGE UP (ref 96–108)
CHLORIDE SERPL-SCNC: 102 MMOL/L — SIGNIFICANT CHANGE UP (ref 96–108)
CO2 SERPL-SCNC: 32 MMOL/L — HIGH (ref 22–29)
CO2 SERPL-SCNC: 32 MMOL/L — HIGH (ref 22–29)
CREAT SERPL-MCNC: 0.85 MG/DL — SIGNIFICANT CHANGE UP (ref 0.5–1.3)
CREAT SERPL-MCNC: 0.85 MG/DL — SIGNIFICANT CHANGE UP (ref 0.5–1.3)
EGFR: 101 ML/MIN/1.73M2 — SIGNIFICANT CHANGE UP
EGFR: 101 ML/MIN/1.73M2 — SIGNIFICANT CHANGE UP
GAS PNL BLDA: SIGNIFICANT CHANGE UP
GAS PNL BLDA: SIGNIFICANT CHANGE UP
GAS PNL BLDV: 141 MMOL/L — SIGNIFICANT CHANGE UP (ref 136–145)
GAS PNL BLDV: 141 MMOL/L — SIGNIFICANT CHANGE UP (ref 136–145)
GAS PNL BLDV: SIGNIFICANT CHANGE UP
GLUCOSE BLDC GLUCOMTR-MCNC: 118 MG/DL — HIGH (ref 70–99)
GLUCOSE BLDC GLUCOMTR-MCNC: 118 MG/DL — HIGH (ref 70–99)
GLUCOSE BLDC GLUCOMTR-MCNC: 153 MG/DL — HIGH (ref 70–99)
GLUCOSE BLDC GLUCOMTR-MCNC: 153 MG/DL — HIGH (ref 70–99)
GLUCOSE BLDV-MCNC: 103 MG/DL — HIGH (ref 70–99)
GLUCOSE BLDV-MCNC: 103 MG/DL — HIGH (ref 70–99)
GLUCOSE SERPL-MCNC: 128 MG/DL — HIGH (ref 70–99)
GLUCOSE SERPL-MCNC: 128 MG/DL — HIGH (ref 70–99)
HCO3 BLDV-SCNC: 31 MMOL/L — HIGH (ref 22–29)
HCO3 BLDV-SCNC: 31 MMOL/L — HIGH (ref 22–29)
HCT VFR BLD CALC: 37 % — LOW (ref 39–50)
HCT VFR BLD CALC: 37 % — LOW (ref 39–50)
HCT VFR BLDA CALC: 35 % — SIGNIFICANT CHANGE UP
HCT VFR BLDA CALC: 35 % — SIGNIFICANT CHANGE UP
HGB BLD CALC-MCNC: 11.8 G/DL — LOW (ref 12.6–17.4)
HGB BLD CALC-MCNC: 11.8 G/DL — LOW (ref 12.6–17.4)
HGB BLD-MCNC: 11.6 G/DL — LOW (ref 13–17)
HGB BLD-MCNC: 11.6 G/DL — LOW (ref 13–17)
LACTATE BLDV-MCNC: 1.9 MMOL/L — SIGNIFICANT CHANGE UP (ref 0.5–2)
LACTATE BLDV-MCNC: 1.9 MMOL/L — SIGNIFICANT CHANGE UP (ref 0.5–2)
LACTATE SERPL-SCNC: 0.9 MMOL/L — SIGNIFICANT CHANGE UP (ref 0.5–2)
LACTATE SERPL-SCNC: 0.9 MMOL/L — SIGNIFICANT CHANGE UP (ref 0.5–2)
MAGNESIUM SERPL-MCNC: 1.8 MG/DL — SIGNIFICANT CHANGE UP (ref 1.6–2.6)
MAGNESIUM SERPL-MCNC: 1.8 MG/DL — SIGNIFICANT CHANGE UP (ref 1.6–2.6)
MCHC RBC-ENTMCNC: 27.7 PG — SIGNIFICANT CHANGE UP (ref 27–34)
MCHC RBC-ENTMCNC: 27.7 PG — SIGNIFICANT CHANGE UP (ref 27–34)
MCHC RBC-ENTMCNC: 31.4 GM/DL — LOW (ref 32–36)
MCHC RBC-ENTMCNC: 31.4 GM/DL — LOW (ref 32–36)
MCV RBC AUTO: 88.3 FL — SIGNIFICANT CHANGE UP (ref 80–100)
MCV RBC AUTO: 88.3 FL — SIGNIFICANT CHANGE UP (ref 80–100)
MRSA PCR RESULT.: SIGNIFICANT CHANGE UP
MRSA PCR RESULT.: SIGNIFICANT CHANGE UP
PCO2 BLDV: 49 MMHG — SIGNIFICANT CHANGE UP (ref 42–55)
PCO2 BLDV: 49 MMHG — SIGNIFICANT CHANGE UP (ref 42–55)
PH BLDV: 7.41 — SIGNIFICANT CHANGE UP (ref 7.32–7.43)
PH BLDV: 7.41 — SIGNIFICANT CHANGE UP (ref 7.32–7.43)
PHOSPHATE SERPL-MCNC: 3.8 MG/DL — SIGNIFICANT CHANGE UP (ref 2.4–4.7)
PHOSPHATE SERPL-MCNC: 3.8 MG/DL — SIGNIFICANT CHANGE UP (ref 2.4–4.7)
PLATELET # BLD AUTO: 329 K/UL — SIGNIFICANT CHANGE UP (ref 150–400)
PLATELET # BLD AUTO: 329 K/UL — SIGNIFICANT CHANGE UP (ref 150–400)
PO2 BLDV: 99 MMHG — HIGH (ref 25–45)
PO2 BLDV: 99 MMHG — HIGH (ref 25–45)
POTASSIUM BLDV-SCNC: 4.2 MMOL/L — SIGNIFICANT CHANGE UP (ref 3.5–5.1)
POTASSIUM BLDV-SCNC: 4.2 MMOL/L — SIGNIFICANT CHANGE UP (ref 3.5–5.1)
POTASSIUM SERPL-MCNC: 4.7 MMOL/L — SIGNIFICANT CHANGE UP (ref 3.5–5.3)
POTASSIUM SERPL-MCNC: 4.7 MMOL/L — SIGNIFICANT CHANGE UP (ref 3.5–5.3)
POTASSIUM SERPL-SCNC: 4.7 MMOL/L — SIGNIFICANT CHANGE UP (ref 3.5–5.3)
POTASSIUM SERPL-SCNC: 4.7 MMOL/L — SIGNIFICANT CHANGE UP (ref 3.5–5.3)
RBC # BLD: 4.19 M/UL — LOW (ref 4.2–5.8)
RBC # BLD: 4.19 M/UL — LOW (ref 4.2–5.8)
RBC # FLD: 16.8 % — HIGH (ref 10.3–14.5)
RBC # FLD: 16.8 % — HIGH (ref 10.3–14.5)
S AUREUS DNA NOSE QL NAA+PROBE: SIGNIFICANT CHANGE UP
S AUREUS DNA NOSE QL NAA+PROBE: SIGNIFICANT CHANGE UP
SAO2 % BLDV: 99.4 % — SIGNIFICANT CHANGE UP
SAO2 % BLDV: 99.4 % — SIGNIFICANT CHANGE UP
SODIUM SERPL-SCNC: 143 MMOL/L — SIGNIFICANT CHANGE UP (ref 135–145)
SODIUM SERPL-SCNC: 143 MMOL/L — SIGNIFICANT CHANGE UP (ref 135–145)
WBC # BLD: 16.54 K/UL — HIGH (ref 3.8–10.5)
WBC # BLD: 16.54 K/UL — HIGH (ref 3.8–10.5)
WBC # FLD AUTO: 16.54 K/UL — HIGH (ref 3.8–10.5)
WBC # FLD AUTO: 16.54 K/UL — HIGH (ref 3.8–10.5)

## 2023-11-29 PROCEDURE — 32557 INSERT CATH PLEURA W/ IMAGE: CPT

## 2023-11-29 PROCEDURE — 71250 CT THORAX DX C-: CPT | Mod: 26,77

## 2023-11-29 PROCEDURE — 71045 X-RAY EXAM CHEST 1 VIEW: CPT | Mod: 26

## 2023-11-29 PROCEDURE — 99233 SBSQ HOSP IP/OBS HIGH 50: CPT

## 2023-11-29 PROCEDURE — 71045 X-RAY EXAM CHEST 1 VIEW: CPT | Mod: 26,77

## 2023-11-29 PROCEDURE — 70490 CT SOFT TISSUE NECK W/O DYE: CPT | Mod: 26

## 2023-11-29 PROCEDURE — 93010 ELECTROCARDIOGRAM REPORT: CPT

## 2023-11-29 PROCEDURE — 71250 CT THORAX DX C-: CPT | Mod: 26

## 2023-11-29 RX ORDER — METHOCARBAMOL 500 MG/1
750 TABLET, FILM COATED ORAL EVERY 12 HOURS
Refills: 0 | Status: DISCONTINUED | OUTPATIENT
Start: 2023-11-29 | End: 2023-12-06

## 2023-11-29 RX ORDER — CLONAZEPAM 1 MG
0.5 TABLET ORAL AT BEDTIME
Refills: 0 | Status: DISCONTINUED | OUTPATIENT
Start: 2023-11-29 | End: 2023-12-06

## 2023-11-29 RX ORDER — IPRATROPIUM/ALBUTEROL SULFATE 18-103MCG
3 AEROSOL WITH ADAPTER (GRAM) INHALATION EVERY 6 HOURS
Refills: 0 | Status: DISCONTINUED | OUTPATIENT
Start: 2023-11-29 | End: 2023-12-08

## 2023-11-29 RX ORDER — SENNA PLUS 8.6 MG/1
2 TABLET ORAL AT BEDTIME
Refills: 0 | Status: DISCONTINUED | OUTPATIENT
Start: 2023-11-29 | End: 2023-12-01

## 2023-11-29 RX ORDER — LIDOCAINE 4 G/100G
3 CREAM TOPICAL DAILY
Refills: 0 | Status: DISCONTINUED | OUTPATIENT
Start: 2023-11-29 | End: 2023-12-08

## 2023-11-29 RX ORDER — CEFAZOLIN SODIUM 1 G
2000 VIAL (EA) INJECTION ONCE
Refills: 0 | Status: COMPLETED | OUTPATIENT
Start: 2023-11-29 | End: 2023-11-29

## 2023-11-29 RX ORDER — OXYCODONE HYDROCHLORIDE 5 MG/1
5 TABLET ORAL EVERY 4 HOURS
Refills: 0 | Status: DISCONTINUED | OUTPATIENT
Start: 2023-11-29 | End: 2023-12-01

## 2023-11-29 RX ORDER — CEFAZOLIN SODIUM 1 G
2000 VIAL (EA) INJECTION ONCE
Refills: 0 | Status: DISCONTINUED | OUTPATIENT
Start: 2023-11-29 | End: 2023-11-29

## 2023-11-29 RX ORDER — OXYCODONE HYDROCHLORIDE 5 MG/1
10 TABLET ORAL EVERY 4 HOURS
Refills: 0 | Status: DISCONTINUED | OUTPATIENT
Start: 2023-11-29 | End: 2023-12-01

## 2023-11-29 RX ORDER — CHLORHEXIDINE GLUCONATE 213 G/1000ML
1 SOLUTION TOPICAL DAILY
Refills: 0 | Status: DISCONTINUED | OUTPATIENT
Start: 2023-11-29 | End: 2023-12-05

## 2023-11-29 RX ORDER — MAGNESIUM SULFATE 500 MG/ML
2 VIAL (ML) INJECTION ONCE
Refills: 0 | Status: COMPLETED | OUTPATIENT
Start: 2023-11-29 | End: 2023-11-29

## 2023-11-29 RX ORDER — HYDROMORPHONE HYDROCHLORIDE 2 MG/ML
0.5 INJECTION INTRAMUSCULAR; INTRAVENOUS; SUBCUTANEOUS EVERY 4 HOURS
Refills: 0 | Status: DISCONTINUED | OUTPATIENT
Start: 2023-11-29 | End: 2023-12-06

## 2023-11-29 RX ORDER — POLYETHYLENE GLYCOL 3350 17 G/17G
17 POWDER, FOR SOLUTION ORAL AT BEDTIME
Refills: 0 | Status: DISCONTINUED | OUTPATIENT
Start: 2023-11-29 | End: 2023-12-01

## 2023-11-29 RX ORDER — NICOTINE POLACRILEX 2 MG
1 GUM BUCCAL EVERY 24 HOURS
Refills: 0 | Status: DISCONTINUED | OUTPATIENT
Start: 2023-11-29 | End: 2023-12-08

## 2023-11-29 RX ORDER — ENOXAPARIN SODIUM 100 MG/ML
40 INJECTION SUBCUTANEOUS EVERY 12 HOURS
Refills: 0 | Status: DISCONTINUED | OUTPATIENT
Start: 2023-11-29 | End: 2023-12-08

## 2023-11-29 RX ORDER — INFLUENZA VIRUS VACCINE 15; 15; 15; 15 UG/.5ML; UG/.5ML; UG/.5ML; UG/.5ML
0.5 SUSPENSION INTRAMUSCULAR ONCE
Refills: 0 | Status: COMPLETED | OUTPATIENT
Start: 2023-11-29 | End: 2023-11-29

## 2023-11-29 RX ORDER — ALBUTEROL 90 UG/1
2.5 AEROSOL, METERED ORAL ONCE
Refills: 0 | Status: COMPLETED | OUTPATIENT
Start: 2023-11-29 | End: 2023-11-29

## 2023-11-29 RX ORDER — ENOXAPARIN SODIUM 100 MG/ML
30 INJECTION SUBCUTANEOUS EVERY 12 HOURS
Refills: 0 | Status: DISCONTINUED | OUTPATIENT
Start: 2023-11-29 | End: 2023-11-29

## 2023-11-29 RX ADMIN — Medication 1000 MILLIGRAM(S): at 10:02

## 2023-11-29 RX ADMIN — Medication 2000 MILLIGRAM(S): at 20:42

## 2023-11-29 RX ADMIN — Medication 2 MILLIGRAM(S): at 17:40

## 2023-11-29 RX ADMIN — QUETIAPINE FUMARATE 100 MILLIGRAM(S): 200 TABLET, FILM COATED ORAL at 22:44

## 2023-11-29 RX ADMIN — ENOXAPARIN SODIUM 30 MILLIGRAM(S): 100 INJECTION SUBCUTANEOUS at 07:00

## 2023-11-29 RX ADMIN — SODIUM CHLORIDE 110 MILLILITER(S): 9 INJECTION, SOLUTION INTRAVENOUS at 07:01

## 2023-11-29 RX ADMIN — LIDOCAINE 3 PATCH: 4 CREAM TOPICAL at 12:26

## 2023-11-29 RX ADMIN — OXYCODONE HYDROCHLORIDE 10 MILLIGRAM(S): 5 TABLET ORAL at 16:31

## 2023-11-29 RX ADMIN — PANTOPRAZOLE SODIUM 40 MILLIGRAM(S): 20 TABLET, DELAYED RELEASE ORAL at 12:29

## 2023-11-29 RX ADMIN — HYDROMORPHONE HYDROCHLORIDE 0.5 MILLIGRAM(S): 2 INJECTION INTRAMUSCULAR; INTRAVENOUS; SUBCUTANEOUS at 20:45

## 2023-11-29 RX ADMIN — DIVALPROEX SODIUM 500 MILLIGRAM(S): 500 TABLET, DELAYED RELEASE ORAL at 17:41

## 2023-11-29 RX ADMIN — SENNA PLUS 2 TABLET(S): 8.6 TABLET ORAL at 22:43

## 2023-11-29 RX ADMIN — DIVALPROEX SODIUM 500 MILLIGRAM(S): 500 TABLET, DELAYED RELEASE ORAL at 09:32

## 2023-11-29 RX ADMIN — OXYCODONE HYDROCHLORIDE 10 MILLIGRAM(S): 5 TABLET ORAL at 10:00

## 2023-11-29 RX ADMIN — Medication 120 MILLIGRAM(S): at 04:47

## 2023-11-29 RX ADMIN — Medication 1 MILLIGRAM(S): at 12:27

## 2023-11-29 RX ADMIN — HYDROMORPHONE HYDROCHLORIDE 0.5 MILLIGRAM(S): 2 INJECTION INTRAMUSCULAR; INTRAVENOUS; SUBCUTANEOUS at 11:16

## 2023-11-29 RX ADMIN — CHLORHEXIDINE GLUCONATE 1 APPLICATION(S): 213 SOLUTION TOPICAL at 12:27

## 2023-11-29 RX ADMIN — METHOCARBAMOL 750 MILLIGRAM(S): 500 TABLET, FILM COATED ORAL at 17:40

## 2023-11-29 RX ADMIN — Medication 400 MILLIGRAM(S): at 09:32

## 2023-11-29 RX ADMIN — OXYCODONE HYDROCHLORIDE 10 MILLIGRAM(S): 5 TABLET ORAL at 16:01

## 2023-11-29 RX ADMIN — Medication 0.5 MILLIGRAM(S): at 22:44

## 2023-11-29 RX ADMIN — Medication 25 GRAM(S): at 09:34

## 2023-11-29 RX ADMIN — HYDROMORPHONE HYDROCHLORIDE 0.5 MILLIGRAM(S): 2 INJECTION INTRAMUSCULAR; INTRAVENOUS; SUBCUTANEOUS at 10:46

## 2023-11-29 RX ADMIN — HYDROMORPHONE HYDROCHLORIDE 0.5 MILLIGRAM(S): 2 INJECTION INTRAMUSCULAR; INTRAVENOUS; SUBCUTANEOUS at 20:25

## 2023-11-29 RX ADMIN — ENOXAPARIN SODIUM 40 MILLIGRAM(S): 100 INJECTION SUBCUTANEOUS at 18:26

## 2023-11-29 RX ADMIN — METHOCARBAMOL 750 MILLIGRAM(S): 500 TABLET, FILM COATED ORAL at 09:38

## 2023-11-29 RX ADMIN — Medication 105 MILLIGRAM(S): at 07:01

## 2023-11-29 RX ADMIN — OXYCODONE HYDROCHLORIDE 10 MILLIGRAM(S): 5 TABLET ORAL at 05:37

## 2023-11-29 RX ADMIN — Medication 105 MILLIGRAM(S): at 14:13

## 2023-11-29 RX ADMIN — LIDOCAINE 3 PATCH: 4 CREAM TOPICAL at 20:10

## 2023-11-29 RX ADMIN — GABAPENTIN 100 MILLIGRAM(S): 400 CAPSULE ORAL at 22:43

## 2023-11-29 RX ADMIN — OXYCODONE HYDROCHLORIDE 10 MILLIGRAM(S): 5 TABLET ORAL at 04:48

## 2023-11-29 RX ADMIN — ALBUTEROL 2.5 MILLIGRAM(S): 90 AEROSOL, METERED ORAL at 09:59

## 2023-11-29 RX ADMIN — SODIUM CHLORIDE 110 MILLILITER(S): 9 INJECTION, SOLUTION INTRAVENOUS at 04:51

## 2023-11-29 RX ADMIN — OXYCODONE HYDROCHLORIDE 10 MILLIGRAM(S): 5 TABLET ORAL at 09:30

## 2023-11-29 RX ADMIN — Medication 105 MILLIGRAM(S): at 22:43

## 2023-11-29 RX ADMIN — Medication 2 MILLIGRAM(S): at 09:33

## 2023-11-29 NOTE — ED ADULT NURSE REASSESSMENT NOTE - NS ED NURSE REASSESS COMMENT FT1
Pt. noted to have sudden onset swelling to face, chest, back, b/l arms, abdomen and scrotum. Pt. noted to have increased respiratory effort. No swelling noted inside mouth or to tongue. MD Menjivar called ot bedside for immediate evaluation. Stat chest xray ordered and pt taken for priority CT scan.

## 2023-11-29 NOTE — ED ADULT NURSE REASSESSMENT NOTE - NS ED NURSE REASSESS COMMENT FT1
Pt noted to have increased crepitus to face/neck, right chest in addition to the initial crepitus in left chest, and scrotum.  ED attending called to bedside for assessment.  MD Ferrell called to notify.  Pt currently denies difficulty breathing, no tongue swelling noted.  Satting 93-94% on 2L simple facemask. Pt noted to have increased subcutaneous emphysema to face/neck, right chest in addition to the initial subcutaneous emphysema in left chest, and swollen scrotum.  ED attending called to bedside for assessment.  MD Ferrell called to notify.  Pt currently denies difficulty breathing, no tongue swelling noted.  Satting 93-94% on 2L simple facemask. Pt noted to have increased subcutaneous emphysema/crepitus to face/neck, right chest in addition to the initial subcutaneous emphysema in left chest, and swollen scrotum.  ED attending called to bedside for assessment.  MD Ferrell called to notify.  Pt currently denies difficulty breathing, denies pain no tongue swelling noted.  States abdominal selling is his baseline.  Satting 93-94% on 2L simple facemask.

## 2023-11-29 NOTE — ED ADULT NURSE REASSESSMENT NOTE - NS ED NURSE REASSESS COMMENT FT1
Kathy, NP  called to discuss concern with respiratory status and patient being admitted to  bed.  Pt noted with increased work of breathing, RR 34, and speaking short sentences. Patient also noted with increased scrotal edema.  Patient pending re-evaluation by NP.

## 2023-11-29 NOTE — PATIENT PROFILE ADULT - FUNCTIONAL ASSESSMENT - DAILY ACTIVITY 1.
[facility]  Orthopaedic Surgery, Joint Replacement Consultation  by Keanu Bermudez M.D.    Mercedes Barber MRN# [id number]   Age: 66 year old YOB: 1954     Requesting physician: Skyler Booth Dr., Dr.            Assessment and Plan:   Assessment:  Failed fixation R acetabulum x2 with protrusio.     Plan:  Advise revision R acetabulum with GAP Cage and grafting.  Preop CT for planning.  Recheck inflm markers, if not elevated no need to repeat aspiration of hip.    I discussed the risks, benefits, alternatives regarding the proposed surgery with the patient who both demonstrated understanding and indicated a desire to proceed with the surgery as planned.               History of Present Illness:   66 year old female  chief complaint    Painful R SARAHI. Had post SARAHI  pelvic fx 1 week post SARAHI and then subsequent surgeries and prolonged antibx therapy without any cultures being (+) .  Rx'd empirically for cellulitis and open wound with VAC.     6/4/2020, Saw Dr. Antonio who advised revision SARAHI due to displaced cup with loss of fixation and broken cup screws and cup protrusio.      Background history:  DX:  1. DJD R hip  2. Seizure disorder  3. Chronic pain syndrome  4. IBS    TREATMENTS:  1. 2004, R TKA, L TKA  (Jamie), [facility]  2. 7/23/2019, R SARAHI (Tom Wolff) , [facility]. Trident II PSL cluster hole HA acetabular shell, size 54.  Trident X3 0-degree polyethylene insert, size 36 mm.  Two cancellous screws. A 132-degree neck angle hip stem Accolade 2 size 6.  Biolox delta 36 mm, -2.5 mm neck length femoral head.   8/7/2019, Rev R SARAHI , bone graft and rev cup fixation with cerclage femoral stem.  (Tom Antonio), [facility] . , PMNs 28%. Culture x1 negative. IMPLANTS USED:     1.  [place] Trident II multi-hole Tritanium shell, size 56 with four 6.5 mm screws lengths 50, 50, 40, 30.     2.  MDM liner.   3.  Reimplantation of  "size 6, 132-degree Accolade stem.     4.  Dual mobility head +0, 28 mm inner.   5. 46 mm X3 restoration insert.  3. 8/26/2019, I and D R SARAHI (Dr. Chago Prabhakar) . Incisional Wound vac placement x 6 weeks. Cultures x 2 (-).  IV Ceftriaxone x 4-6 weeks, po cephalexin thru 12/2019  4. 5/29/2020, R hip aspiration at [facility]. Alpha defensin (-), WBC 1755, PMNs 14%  5. 6/3/2020, ESR 8, CRP 4.5               Physical Exam:     EXAMINATION pertinent findings:   PSYCH: Pleasant, healthy-appearing, alert, oriented x3, cooperative. Normal mood and affect.  VITAL SIGNS: Height 1.651 m (5' 5\"), weight 86.2 kg (190 lb), last menstrual period 03/11/2010, not currently breastfeeding..  Reviewed nursing intake notes.   Body mass index is 31.62 kg/m .  RESP: non labored breathing   ABD: benign, soft, non-tender, no acute peritoneal findings  SKIN: grossly normal   LYMPHATIC: grossly normal, no adenopathy, no extremity edema  NEURO: grossly normal , no motor deficits  VASCULAR: satisfactory perfusion of all extremities . 2+ DP pulses bilateral  MUSCULOSKELETAL:   Knees: 0-110 deg bilaterally  Hip painful R SARAHI with any motion.  No pain L hip.    Gait antalgic on R    Wound dry , no erythema.  posteralat incisiom             Data:   All laboratory data reviewed  All imaging studies reviewed by me                  MD Terry Tavarez Family Professor  Oncology and Adult Reconstructive Surgery  Dept Orthopaedic Surgery, [facility]  [phone] office, [phone] pager  [email or web address]      DATA for DOCUMENTATION:         Past Medical History:     Patient Active Problem List   Diagnosis     Menopausal syndrome (hot flashes)     Family history of breast cancer     Vulvar pain     Renal anomaly     Overactive bladder     Rectal prolapse     CARDIOVASCULAR SCREENING; LDL GOAL LESS THAN 160     Vaginal pain     Dysphagia     Confusion     Headache     Left shoulder pain     Anemia     Mild major depression (H)     Esophageal " stricture     Vulvar lesion     Temporal sclerosis     Lumbago     Pain in thoracic spine     Sciatica     Pain in joint, lower leg     Plantar fascial fibromatosis     Pain in joint involving ankle and foot     Other specified hypothyroidism     Gastroesophageal reflux disease without esophagitis     Irritable bowel syndrome     Other sleep apnea     Cervical high risk HPV (human papillomavirus) test positive     Restless legs syndrome (RLS)     Status post total hip replacement, right     Hip pain     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      Cervical pain 9/15/2016     Constipation 8/27/2012     Diarrhea 4/30/2009     Esophageal stricture 2/21/2012     Gastro-oesophageal reflux disease      Hypothyroidism 9/18/2012     IBS (irritable bowel syndrome)      Mild major depression (H) 2/21/2012     Neuropathy of lower extremity      Rectal prolapse      Restless leg syndrome      Seizure disorder (H)     25 years ago     Sleep apnea     CPAP, no longer using CPAP     Temporal sclerosis 8/27/2012       Also see scanned health assessment forms.       Past Surgical History:     Past Surgical History:   Procedure Laterality Date     Anterior COLPORRHAPHY, BLADDER/VAGINA      perigee mesh     ARTHROPLASTY HIP Right 7/23/2019    Procedure: Right total hip arthroplasty;  Surgeon: Anant Mejia MD;  Location: [facility] OR     ARTHROPLASTY PATELLO-FEMORAL (KNEE) Bilateral      ARTHROPLASTY REVISION HIP Right 8/7/2019    Procedure: Right hip revision total arthroplasty;  Surgeon: Tom Antonio MD;  Location: [facility] OR     CARPAL TUNNEL RELEASE RT/LT        DENTAL SURGERY      implant     DILATION AND CURETTAGE       DRAIN PILONIDAL CYST SIMPL       ENDOSCOPY DRUG INDUCED SLEEP N/A 11/18/2015    Procedure: ENDOSCOPY DRUG INDUCED SLEEP;  Surgeon: Park Ortiz MD;  Location: [facility] OR     ESOPHAGOSCOPY, GASTROSCOPY, DUODENOSCOPY (EGD), COMBINED  4/5/2013    Procedure: COMBINED ESOPHAGOSCOPY, GASTROSCOPY, DUODENOSCOPY (EGD), BIOPSY SINGLE OR MULTIPLE;;  Surgeon: Mundo Mehta MD;  Location:  GI     EXCISE LESION TRUNK  8/10/2012    Procedure: EXCISE LESION TRUNK;;  Surgeon: Jerald Diggs MD;  Location: [facility] OR     HYSTERECTOMY       IRRIGATION AND DEBRIDEMENT HIP, COMBINED Right 8/26/2019    Procedure: 1.  Right hip wound irrigation and debridement with excisional debridement of nonviable skin, subcutaneous tissues, and fascia. 2. Application of incisional wound VAC, 27cm length incision.;  Surgeon: Tyson Prabhakar MD;  Location: [facility] OR     L shoulder fracture       rectal prolapse repair abdominally       RELEASE PLANTAR FASCIA Right 1/20/2015    Procedure: RELEASE PLANTAR FASCIA;  Surgeon: Maiclo Liu DPM;  Location: [facility]     REMOVE MESH VAGINA  8/10/2012    Procedure: REMOVE MESH VAGINA;  Excision of Vaginal Mesh Exposure, Removal of Skin Tag Left Inner Leg;  Surgeon: Jerald Diggs MD;  Location:  OR     REPAIR HAMMER TOE Right 1/20/2015    Procedure: REPAIR HAMMER TOE;  Surgeon: Maicol Liu DPM;  Location: [facility]     TONSILLECTOMY & ADENOIDECTOMY       TUBAL LIGATION              Social History:     Social History     Socioeconomic History     Marital status:      Spouse name: Not on file     Number of children: Not on file     Years of education: Not on file     Highest education level: Not on file   Occupational History     Not on file   Social Needs     Financial resource strain: Not on file     Food insecurity     Worry: Not on file     Inability: Not on file     Transportation needs     Medical: Not on file      Non-medical: Not on file   Tobacco Use     Smoking status: Never Smoker     Smokeless tobacco: Never Used   Substance and Sexual Activity     Alcohol use: Yes     Frequency: Monthly or less     Comment: 1 glass of wine 2x/yr     Drug use: No     Sexual activity: Not Currently     Comment: vag hyst   Lifestyle     Physical activity     Days per week: Not on file     Minutes per session: Not on file     Stress: Not on file   Relationships     Social connections     Talks on phone: Not on file     Gets together: Not on file     Attends Orthodox service: Not on file     Active member of club or organization: Not on file     Attends meetings of clubs or organizations: Not on file     Relationship status: Not on file     Intimate partner violence     Fear of current or ex partner: Not on file     Emotionally abused: Not on file     Physically abused: Not on file     Forced sexual activity: Not on file   Other Topics Concern     Parent/sibling w/ CABG, MI or angioplasty before 65F 55M? Not Asked   Social History Narrative     Not on file            Family History:       Family History   Problem Relation Age of Onset     Eye Disorder Mother      Lipids Mother         has CHF     Osteoporosis Mother      Diabetes Father      Alzheimer Disease Paternal Grandmother      Hypertension Brother      Alcohol/Drug Brother      Depression Brother      Gastrointestinal Disease Brother         hx of colonic polyps     Lipids Brother      Psychotic Disorder Brother      Breast Cancer Sister      Depression Sister      Lipids Sister      Thyroid Disease Sister      Congenital Anomalies Daughter      Neurologic Disorder Daughter             Medications:     Current Outpatient Medications   Medication Sig     acetaminophen (TYLENOL) 325 MG tablet Take 650 mg by mouth every 4 hours as needed for mild pain     albuterol (ALBUTEROL) 108 (90 BASE) MCG/ACT Inhaler Inhale 2 puffs into the lungs 4 times daily as needed for shortness of breath /  dyspnea or wheezing     budesonide-formoterol (SYMBICORT) 160-4.5 MCG/ACT Inhaler Inhale 2 puffs into the lungs 2 times daily     cycloSPORINE (RESTASIS) 0.05 % ophthalmic emulsion Place 1 drop into both eyes 2 times daily     hydrOXYzine (ATARAX) 25 MG tablet Take 1 tablet by mouth 3 times daily as needed     hypromellose (ARTIFICIAL TEARS) 0.5 % SOLN ophthalmic solution Place 1 drop into both eyes 2 times daily     lacosamide (VIMPAT) 200 MG TABS tablet Take 1 tablet (200 mg) by mouth 2 times daily     levothyroxine (SYNTHROID/LEVOTHROID) 50 MCG tablet Take 50 mcg by mouth daily     linaclotide (LINZESS) 290 MCG capsule Take 1 capsule (290 mcg) by mouth every morning (before breakfast)     liothyronine (CYTOMEL) 5 MCG tablet Take 10 mcg by mouth daily      nefazodone (SERZONE) 200 MG tablet Take 600 mg by mouth At Bedtime      omeprazole (PRILOSEC) 40 MG DR capsule Take 1 capsule (40 mg) by mouth 2 times daily     potassium citrate (UROCIT-K) 10 MEQ (1080 MG) CR tablet Take 10 mEq by mouth daily     pramipexole (MIRAPEX) 1 MG tablet Give 1 tab in AM; and 2 tabs 1 hour prior to hs     senna-docusate (SENOKOT-S/PERICOLACE) 8.6-50 MG tablet Take 1 tablet by mouth 2 times daily as needed for constipation     artificial saliva (BIOTENE MT) AERS spray Take 2 sprays by mouth 3 times daily as needed for dry mouth     ranitidine (ZANTAC) 150 MG tablet TAKE 1 TABLET BY MOUTH TWICE DAILY (Patient not taking: Reported on 6/19/2020)     temazepam (RESTORIL) 15 MG capsule Take 1 capsule (15 mg) by mouth At Bedtime (Patient not taking: Reported on 7/20/2020)     No current facility-administered medications for this visit.               Review of Systems:   A comprehensive 10 point review of systems (constitutional, ENT, cardiac, peripheral vascular, lymphatic, respiratory, GI, , Musculoskeletal, skin, Neurological) was performed and found to be negative except as described in this note.     See intake form completed by  patient       4 = No assist / stand by assistance

## 2023-11-29 NOTE — CHART NOTE - NSCHARTNOTEFT_GEN_A_CORE
Called to evaluate patient in setting of recent increase in subcutaneous emphysema. Patient previous with L chest wall crepitus now presents with substantial subcutaneous emphysema and crepitus spanning bilateral chest walls, b/l UEs to hands, neck, b/l face to eyes, back, and scrotum. Patient denies difficulty with inhale or tolerating secretions. Oropharynx appears mildly swollen - edema vs superficial swelling from subcutaneous emphysema - but patient denies associated symptoms or complaints. At this time, no additional intervention is required. Patient will continue to telemetry and  and will be monitored for any sign of airway compromise.

## 2023-11-29 NOTE — PROGRESS NOTE ADULT - SUBJECTIVE AND OBJECTIVE BOX
INTERVAL HPI/OVERNIGHT EVENTS/SUBJECTIVE: Had Left anterior , superior "Slit" made to release Sub Q air.  This has improved according to team. Pt CO same mod-severe left chest pain worse with breathing and moving. No radiation.  + mild SOB.  Denies HA, Dizziness, NV or other CO.     ICU Vital Signs Last 24 Hrs  T(C): 36.7 (29 Nov 2023 15:33), Max: 36.8 (29 Nov 2023 08:08)  T(F): 98.1 (29 Nov 2023 15:33), Max: 98.2 (29 Nov 2023 08:08)  HR: 66 (29 Nov 2023 16:00) (66 - 112)  BP: 138/96 (29 Nov 2023 16:00) (94/82 - 181/91)  BP(mean): 110 (29 Nov 2023 16:00) (75 - 130)  ABP: --  ABP(mean): --  RR: 17 (29 Nov 2023 16:00) (9 - 34)  SpO2: 96% (29 Nov 2023 16:00) (82% - 100%)    O2 Parameters below as of 29 Nov 2023 16:00  Patient On (Oxygen Delivery Method): nasal cannula  O2 Flow (L/min): 6          I&O's Detail    28 Nov 2023 07:01  -  29 Nov 2023 07:00  --------------------------------------------------------  IN:    Lactated Ringers: 210 mL  Total IN: 210 mL    OUT:    Voided (mL): 300 mL  Total OUT: 300 mL    Total NET: -90 mL      29 Nov 2023 07:01  -  29 Nov 2023 16:08  --------------------------------------------------------  IN:    IV PiggyBack: 100 mL    IV PiggyBack: 50 mL    IV PiggyBack: 100 mL    Lactated Ringers: 990 mL    Oral Fluid: 125 mL  Total IN: 1365 mL    OUT:    Voided (mL): 325 mL  Total OUT: 325 mL    Total NET: 1040 mL          Mode: AC/ CMV (Assist Control/ Continuous Mandatory Ventilation)  RR (machine): 14  TV (machine): 500  FiO2: 70  PEEP: 6  MAP: 12  PIP: 32    ABG - ( 29 Nov 2023 11:32 )  pH, Arterial: 7.330 pH, Blood: x     /  pCO2: 59    /  pO2: 66    / HCO3: 31    / Base Excess: 5.2   /  SaO2: 93.6                MEDICATIONS  (STANDING):  benztropine 2 milliGRAM(s) Oral two times a day  chlorhexidine 2% Cloths 1 Application(s) Topical daily  divalproex  milliGRAM(s) Oral every 12 hours  enoxaparin Injectable 40 milliGRAM(s) SubCutaneous every 12 hours  folic acid Injectable 1 milliGRAM(s) IV Push daily  gabapentin 100 milliGRAM(s) Oral at bedtime  influenza   Vaccine 0.5 milliLiter(s) IntraMuscular once  lactated ringers. 1000 milliLiter(s) (110 mL/Hr) IV Continuous <Continuous>  lidocaine   4% Patch 3 Patch Transdermal daily  methocarbamol 750 milliGRAM(s) Oral every 12 hours  pantoprazole  Injectable 40 milliGRAM(s) IV Push daily  polyethylene glycol 3350 17 Gram(s) Oral at bedtime  QUEtiapine 100 milliGRAM(s) Oral at bedtime  senna 2 Tablet(s) Oral at bedtime  thiamine IVPB 500 milliGRAM(s) IV Intermittent every 8 hours    MEDICATIONS  (PRN):  albuterol/ipratropium for Nebulization 3 milliLiter(s) Nebulizer every 6 hours PRN Shortness of Breath and/or Wheezing  HYDROmorphone  Injectable 0.5 milliGRAM(s) IV Push every 4 hours PRN breakthrough  ondansetron Injectable 4 milliGRAM(s) IV Push every 6 hours PRN Nausea  oxyCODONE    IR 5 milliGRAM(s) Oral every 4 hours PRN Moderate Pain (4 - 6)  oxyCODONE    IR 10 milliGRAM(s) Oral every 4 hours PRN Severe Pain (7 - 10)      PHYSICAL EXAM:     Gen: NAD, Well appearing, No cyanosis, Pallor. Mild facial sub q air.     Eyes: PERRL ~ 3mm, EOMI,     Neurological: A&Ox3, GCS 15, No focal deficit.     Neck: Supple. NT AT, FROM no pain.  No JVD. No meningeal signs    Pulmonary: NAD, CTA, = BL .      Cardiovascular: RRR, S1, S2, No Murmurs, rubs or gallops noted.    Gastrointestinal: ND, Soft, NT.    Extremities: NT, AT, no edema, erythema or palpable cord noted.  FROM, = 2+ pulses throughout.      LABS:  CBC Full  -  ( 29 Nov 2023 08:00 )  WBC Count : 16.54 K/uL  RBC Count : 4.19 M/uL  Hemoglobin : 11.6 g/dL  Hematocrit : 37.0 %  Platelet Count - Automated : 329 K/uL  Mean Cell Volume : 88.3 fl  Mean Cell Hemoglobin : 27.7 pg  Mean Cell Hemoglobin Concentration : 31.4 gm/dL  Auto Neutrophil # : x  Auto Lymphocyte # : x  Auto Monocyte # : x  Auto Eosinophil # : x  Auto Basophil # : x  Auto Neutrophil % : x  Auto Lymphocyte % : x  Auto Monocyte % : x  Auto Eosinophil % : x  Auto Basophil % : x    11-29    143  |  102  |  14.0  ----------------------------<  128<H>  4.7   |  32.0<H>  |  0.85    Ca    8.0<L>      29 Nov 2023 08:00  Phos  3.8     11-29  Mg     1.8     11-29    TPro  6.6  /  Alb  4.0  /  TBili  <0.2<L>  /  DBili  x   /  AST  25  /  ALT  16  /  AlkPhos  62  11-28    PT/INR - ( 28 Nov 2023 16:55 )   PT: 10.4 sec;   INR: 0.94 ratio         PTT - ( 28 Nov 2023 16:55 )  PTT:25.7 sec  Urinalysis Basic - ( 29 Nov 2023 08:00 )    Color: x / Appearance: x / SG: x / pH: x  Gluc: 128 mg/dL / Ketone: x  / Bili: x / Urobili: x   Blood: x / Protein: x / Nitrite: x   Leuk Esterase: x / RBC: x / WBC x   Sq Epi: x / Non Sq Epi: x / Bacteria: x      RECENT CULTURES:      LIVER FUNCTIONS - ( 28 Nov 2023 16:55 )  Alb: 4.0 g/dL / Pro: 6.6 g/dL / ALK PHOS: 62 U/L / ALT: 16 U/L / AST: 25 U/L / GGT: x           CARDIAC MARKERS ( 28 Nov 2023 16:55 )  x     / x     / 109 U/L / x     / x          CAPILLARY BLOOD GLUCOSE      RADIOLOGY & ADDITIONAL STUDIES:    ASSESSMENT/PLAN:  58yMale presenting with: Multiple Left sided closed rib fractures, Left Pneumothorax, extensive sub Q air.     Neurological: I will add multimodal analgesics / Lido patches.  I have contacted Pain mgt for rib block.    Pulmonary: Continue O2 therapy to improve PTX.  I have ordered CXR and reveiewed.  Still no PTX.  No absolute indication for chest tube placement.    Cardiovascular: Tele monitoring.    Gastrointestinal: I will order Esophagram to RO esophageal injury prior to diet. I have made NPO.    Genitourinary: Void as able.    Heme: Lovenox 40mg BID for trauma    ID: None    Lines/ Tubes: No invasive lines or tubes indicated.     Dispo: Continue care as above in ICU.

## 2023-11-29 NOTE — PATIENT PROFILE ADULT - NSPROPTRIGHTREPNAME_GEN_A__NUR
PRE-PROCEDURE H&P    CHIEF COMPLAINT / REASON FOR PROCEDURE:  anemia    PERTINENT HISTORY :    Past Medical History:   Diagnosis Date     Hypertension      Myocardial infarction      Pacemaker       Past Surgical History:   Procedure Laterality Date     C REPLACEMENT OF MITRAL VALVE  1994    Done at [facility]     COLONOSCOPY  06/13/2018    Dr. Kim MORA     ESOPHAGOSCOPY, GASTROSCOPY, DUODENOSCOPY (EGD), COMBINED  06/13/2018    Dr. Kim MORA     HEART CATH DRUG ELUTING STENT PLACEMENT  2004/2007    seeing Cardiologist at [facility]     SURGICAL HISTORY OF -   ~ 1995    mechanical aortic valve     SURGICAL HISTORY OF -       pacemaker     SURGICAL HISTORY OF -       mechanical mitral valve         Bleeding tendencies:  No    Relevant Family History:  NONE     Relevant Social History:  NONE      A relevant review of systems was performed and was negative      ALLERGIES/SENSITIVITIES:   Allergies   Allergen Reactions     No Known Allergies        CURRENT MEDICATIONS:   No current outpatient prescriptions on file.        PRE-SEDATION ASSESSMENT:    Lung Exam:  normal  Heart Exam:  normal  Airway Exam: normal  Previous reaction to anesthesia/sedation:   No  Sedation plan based on assessment: Moderate (conscious) sedation  ASA Classification:  3 - Severe systemic disease, but not incapacitating        IMPRESSION:  anemia    PLAN:  egd/colon    Rosario Alvarez  [facility]  Office: [phone]  
Mireya Abernathy

## 2023-11-29 NOTE — CONSULT NOTE ADULT - SUBJECTIVE AND OBJECTIVE BOX
Patient is a 58y old  Male who presents with a chief complaint of rib fx    HPI:  57M with PMH seizures, liver disease, back pain, HLD presents to ED s/p fall. Per ED, patient lives at group home; was drunk today and fell down stairs. In ED was intubated for airway protection after admin of Versed and subsequent emesis. Imaging reveal only L 7th rib fx.    Primary  A: intubated  B: breath sounds b/l  C: palpable pulses  D: GCS 3T (on propofol)  E: no gross deformity/hemorrhage (28 Nov 2023 23:10)        Interval Hx:  Patient seen during rounds  Patient reports pain to be mod controlled on current medications  Patient denies sedation with medications   Discussed nerve block procedure as an addition to their current analgesic therapy.     Analgesic Dosing for past 24 hours reviewed as below:  acetaminophen   IVPB ..   400 mL/Hr IV Intermittent (11-28-23 @ 21:39)    acetaminophen   IVPB ..   400 mL/Hr IV Intermittent (11-29-23 @ 09:32)    benztropine   2 milliGRAM(s) Oral (11-29-23 @ 09:33)    divalproex DR   500 milliGRAM(s) Oral (11-29-23 @ 09:32)    etomidate Injectable   20 milliGRAM(s) IV Push (11-28-23 @ 17:15)    HYDROmorphone  Injectable   0.5 milliGRAM(s) IV Push (11-29-23 @ 10:46)    ketorolac   Injectable   15 milliGRAM(s) IV Push (11-28-23 @ 21:38)    methocarbamol   750 milliGRAM(s) Oral (11-29-23 @ 09:38)    metoclopramide Injectable   10 milliGRAM(s) IV Push (11-28-23 @ 21:49)    midazolam Injectable   10 milliGRAM(s) IntraMuscular (11-28-23 @ 16:45)    ondansetron Injectable   4 milliGRAM(s) IV Push (11-28-23 @ 21:16)    oxyCODONE    IR   10 milliGRAM(s) Oral (11-29-23 @ 09:30)   10 milliGRAM(s) Oral (11-29-23 @ 04:48)    rocuronium Injectable   80 milliGRAM(s) IV Push (11-28-23 @ 17:15)          T(C): 36.7 (11-29-23 @ 12:15), Max: 36.8 (11-29-23 @ 08:08)  HR: 80 (11-29-23 @ 13:00) (71 - 112)  BP: 133/110 (11-29-23 @ 13:00) (94/82 - 181/91)  RR: 24 (11-29-23 @ 13:00) (14 - 34)  SpO2: 100% (11-29-23 @ 13:00) (82% - 100%)      11-28-23 @ 07:01  -  11-29-23 @ 07:00  --------------------------------------------------------  IN: 210 mL / OUT: 300 mL / NET: -90 mL    11-29-23 @ 07:01  -  11-29-23 @ 14:32  --------------------------------------------------------  IN: 800 mL / OUT: 150 mL / NET: 650 mL        albuterol/ipratropium for Nebulization 3 milliLiter(s) Nebulizer every 6 hours PRN  benztropine 2 milliGRAM(s) Oral two times a day  chlorhexidine 2% Cloths 1 Application(s) Topical daily  divalproex  milliGRAM(s) Oral every 12 hours  enoxaparin Injectable 40 milliGRAM(s) SubCutaneous every 12 hours  folic acid Injectable 1 milliGRAM(s) IV Push daily  gabapentin 100 milliGRAM(s) Oral at bedtime  HYDROmorphone  Injectable 0.5 milliGRAM(s) IV Push every 4 hours PRN  influenza   Vaccine 0.5 milliLiter(s) IntraMuscular once  lactated ringers. 1000 milliLiter(s) IV Continuous <Continuous>  lidocaine   4% Patch 3 Patch Transdermal daily  methocarbamol 750 milliGRAM(s) Oral every 12 hours  ondansetron Injectable 4 milliGRAM(s) IV Push every 6 hours PRN  oxyCODONE    IR 5 milliGRAM(s) Oral every 4 hours PRN  oxyCODONE    IR 10 milliGRAM(s) Oral every 4 hours PRN  pantoprazole  Injectable 40 milliGRAM(s) IV Push daily  polyethylene glycol 3350 17 Gram(s) Oral at bedtime  QUEtiapine 100 milliGRAM(s) Oral at bedtime  senna 2 Tablet(s) Oral at bedtime  thiamine IVPB 500 milliGRAM(s) IV Intermittent every 8 hours                          11.6   16.54 )-----------( 329      ( 29 Nov 2023 08:00 )             37.0     11-29    143  |  102  |  14.0  ----------------------------<  128<H>  4.7   |  32.0<H>  |  0.85    Ca    8.0<L>      29 Nov 2023 08:00  Phos  3.8     11-29  Mg     1.8     11-29    TPro  6.6  /  Alb  4.0  /  TBili  <0.2<L>  /  DBili  x   /  AST  25  /  ALT  16  /  AlkPhos  62  11-28    PT/INR - ( 28 Nov 2023 16:55 )   PT: 10.4 sec;   INR: 0.94 ratio         PTT - ( 28 Nov 2023 16:55 )  PTT:25.7 sec  Urinalysis Basic - ( 29 Nov 2023 08:00 )    Color: x / Appearance: x / SG: x / pH: x  Gluc: 128 mg/dL / Ketone: x  / Bili: x / Urobili: x   Blood: x / Protein: x / Nitrite: x   Leuk Esterase: x / RBC: x / WBC x   Sq Epi: x / Non Sq Epi: x / Bacteria: x        Pain Service   [phone]

## 2023-11-29 NOTE — PROCEDURE NOTE - ADDITIONAL PROCEDURE DETAILS
Using the ultrasound probe to count ribs starting from the 12th rib, the 4th-7th ribs was located posteriorly. After sterile prep and gloves were don, a 25G 1.5in  needle was advanced out of plane and contacted the inferior border of the rib. The needle tip was walked off the inferior border. Aspiration was negative. Injection was performed and repeated for the 5 intercostal nerves. Total of 15cc of exparel was injected in divided doses.  Local anesthetic spread was visualized on ultrasound.  Meaningful patient verbal response maintained throughout procedure.  Block needle tip identified by ultrasound throughout the procedure.

## 2023-11-29 NOTE — CHART NOTE - NSCHARTNOTEFT_GEN_A_CORE
Notified that patient had dramatic increase in subcu emphasemya compared to prior exam (worsening over the past 45 min per bedside nurse). Patient now awake and alert, able to answer questions, says he has no difficulty breathing or swallowing, dramatic facial edema, neck edma, air extending to back, bilateral chest, arms, scrotum. Hemodynamically stable, normal O2 sats, normal RR. Repeat dry CT neck and chest ordered, reviewed with no obvious sign of new pathology, except for new dramatic subcu air, tracking to mediastinum as well. decompressive incsion made over L anterior chest with release of large amount of air from soft tissue. Dose of steroid given for possible airway edema, will observe patient in ICU tonight in case he requires reintubation.

## 2023-11-29 NOTE — PATIENT PROFILE ADULT - FALL HARM RISK - HARM RISK INTERVENTIONS

## 2023-11-30 LAB
ANION GAP SERPL CALC-SCNC: 6 MMOL/L — SIGNIFICANT CHANGE UP (ref 5–17)
ANION GAP SERPL CALC-SCNC: 6 MMOL/L — SIGNIFICANT CHANGE UP (ref 5–17)
BASOPHILS # BLD AUTO: 0.03 K/UL — SIGNIFICANT CHANGE UP (ref 0–0.2)
BASOPHILS # BLD AUTO: 0.03 K/UL — SIGNIFICANT CHANGE UP (ref 0–0.2)
BASOPHILS NFR BLD AUTO: 0.2 % — SIGNIFICANT CHANGE UP (ref 0–2)
BASOPHILS NFR BLD AUTO: 0.2 % — SIGNIFICANT CHANGE UP (ref 0–2)
BUN SERPL-MCNC: 22.5 MG/DL — HIGH (ref 8–20)
BUN SERPL-MCNC: 22.5 MG/DL — HIGH (ref 8–20)
CALCIUM SERPL-MCNC: 8.2 MG/DL — LOW (ref 8.4–10.5)
CALCIUM SERPL-MCNC: 8.2 MG/DL — LOW (ref 8.4–10.5)
CHLORIDE SERPL-SCNC: 101 MMOL/L — SIGNIFICANT CHANGE UP (ref 96–108)
CHLORIDE SERPL-SCNC: 101 MMOL/L — SIGNIFICANT CHANGE UP (ref 96–108)
CO2 SERPL-SCNC: 31 MMOL/L — HIGH (ref 22–29)
CO2 SERPL-SCNC: 31 MMOL/L — HIGH (ref 22–29)
CREAT SERPL-MCNC: 0.8 MG/DL — SIGNIFICANT CHANGE UP (ref 0.5–1.3)
CREAT SERPL-MCNC: 0.8 MG/DL — SIGNIFICANT CHANGE UP (ref 0.5–1.3)
EGFR: 103 ML/MIN/1.73M2 — SIGNIFICANT CHANGE UP
EGFR: 103 ML/MIN/1.73M2 — SIGNIFICANT CHANGE UP
EOSINOPHIL # BLD AUTO: 0.01 K/UL — SIGNIFICANT CHANGE UP (ref 0–0.5)
EOSINOPHIL # BLD AUTO: 0.01 K/UL — SIGNIFICANT CHANGE UP (ref 0–0.5)
EOSINOPHIL NFR BLD AUTO: 0.1 % — SIGNIFICANT CHANGE UP (ref 0–6)
EOSINOPHIL NFR BLD AUTO: 0.1 % — SIGNIFICANT CHANGE UP (ref 0–6)
GLUCOSE SERPL-MCNC: 115 MG/DL — HIGH (ref 70–99)
GLUCOSE SERPL-MCNC: 115 MG/DL — HIGH (ref 70–99)
HCT VFR BLD CALC: 32.5 % — LOW (ref 39–50)
HCT VFR BLD CALC: 32.5 % — LOW (ref 39–50)
HGB BLD-MCNC: 10.2 G/DL — LOW (ref 13–17)
HGB BLD-MCNC: 10.2 G/DL — LOW (ref 13–17)
IMM GRANULOCYTES NFR BLD AUTO: 0.5 % — SIGNIFICANT CHANGE UP (ref 0–0.9)
IMM GRANULOCYTES NFR BLD AUTO: 0.5 % — SIGNIFICANT CHANGE UP (ref 0–0.9)
LYMPHOCYTES # BLD AUTO: 13.9 % — SIGNIFICANT CHANGE UP (ref 13–44)
LYMPHOCYTES # BLD AUTO: 13.9 % — SIGNIFICANT CHANGE UP (ref 13–44)
LYMPHOCYTES # BLD AUTO: 2.04 K/UL — SIGNIFICANT CHANGE UP (ref 1–3.3)
LYMPHOCYTES # BLD AUTO: 2.04 K/UL — SIGNIFICANT CHANGE UP (ref 1–3.3)
MAGNESIUM SERPL-MCNC: 2.2 MG/DL — SIGNIFICANT CHANGE UP (ref 1.6–2.6)
MAGNESIUM SERPL-MCNC: 2.2 MG/DL — SIGNIFICANT CHANGE UP (ref 1.6–2.6)
MCHC RBC-ENTMCNC: 27.8 PG — SIGNIFICANT CHANGE UP (ref 27–34)
MCHC RBC-ENTMCNC: 27.8 PG — SIGNIFICANT CHANGE UP (ref 27–34)
MCHC RBC-ENTMCNC: 31.4 GM/DL — LOW (ref 32–36)
MCHC RBC-ENTMCNC: 31.4 GM/DL — LOW (ref 32–36)
MCV RBC AUTO: 88.6 FL — SIGNIFICANT CHANGE UP (ref 80–100)
MCV RBC AUTO: 88.6 FL — SIGNIFICANT CHANGE UP (ref 80–100)
MONOCYTES # BLD AUTO: 0.92 K/UL — HIGH (ref 0–0.9)
MONOCYTES # BLD AUTO: 0.92 K/UL — HIGH (ref 0–0.9)
MONOCYTES NFR BLD AUTO: 6.3 % — SIGNIFICANT CHANGE UP (ref 2–14)
MONOCYTES NFR BLD AUTO: 6.3 % — SIGNIFICANT CHANGE UP (ref 2–14)
NEUTROPHILS # BLD AUTO: 11.58 K/UL — HIGH (ref 1.8–7.4)
NEUTROPHILS # BLD AUTO: 11.58 K/UL — HIGH (ref 1.8–7.4)
NEUTROPHILS NFR BLD AUTO: 79 % — HIGH (ref 43–77)
NEUTROPHILS NFR BLD AUTO: 79 % — HIGH (ref 43–77)
PHOSPHATE SERPL-MCNC: 3 MG/DL — SIGNIFICANT CHANGE UP (ref 2.4–4.7)
PHOSPHATE SERPL-MCNC: 3 MG/DL — SIGNIFICANT CHANGE UP (ref 2.4–4.7)
PLATELET # BLD AUTO: 269 K/UL — SIGNIFICANT CHANGE UP (ref 150–400)
PLATELET # BLD AUTO: 269 K/UL — SIGNIFICANT CHANGE UP (ref 150–400)
POTASSIUM SERPL-MCNC: 4.5 MMOL/L — SIGNIFICANT CHANGE UP (ref 3.5–5.3)
POTASSIUM SERPL-MCNC: 4.5 MMOL/L — SIGNIFICANT CHANGE UP (ref 3.5–5.3)
POTASSIUM SERPL-SCNC: 4.5 MMOL/L — SIGNIFICANT CHANGE UP (ref 3.5–5.3)
POTASSIUM SERPL-SCNC: 4.5 MMOL/L — SIGNIFICANT CHANGE UP (ref 3.5–5.3)
RBC # BLD: 3.67 M/UL — LOW (ref 4.2–5.8)
RBC # BLD: 3.67 M/UL — LOW (ref 4.2–5.8)
RBC # FLD: 16.7 % — HIGH (ref 10.3–14.5)
RBC # FLD: 16.7 % — HIGH (ref 10.3–14.5)
SODIUM SERPL-SCNC: 138 MMOL/L — SIGNIFICANT CHANGE UP (ref 135–145)
SODIUM SERPL-SCNC: 138 MMOL/L — SIGNIFICANT CHANGE UP (ref 135–145)
WBC # BLD: 14.66 K/UL — HIGH (ref 3.8–10.5)
WBC # BLD: 14.66 K/UL — HIGH (ref 3.8–10.5)
WBC # FLD AUTO: 14.66 K/UL — HIGH (ref 3.8–10.5)
WBC # FLD AUTO: 14.66 K/UL — HIGH (ref 3.8–10.5)

## 2023-11-30 PROCEDURE — 99233 SBSQ HOSP IP/OBS HIGH 50: CPT

## 2023-11-30 PROCEDURE — 71045 X-RAY EXAM CHEST 1 VIEW: CPT | Mod: 26

## 2023-11-30 RX ADMIN — LIDOCAINE 3 PATCH: 4 CREAM TOPICAL at 19:56

## 2023-11-30 RX ADMIN — GABAPENTIN 100 MILLIGRAM(S): 400 CAPSULE ORAL at 21:47

## 2023-11-30 RX ADMIN — Medication 105 MILLIGRAM(S): at 14:06

## 2023-11-30 RX ADMIN — CHLORHEXIDINE GLUCONATE 1 APPLICATION(S): 213 SOLUTION TOPICAL at 11:33

## 2023-11-30 RX ADMIN — PANTOPRAZOLE SODIUM 40 MILLIGRAM(S): 20 TABLET, DELAYED RELEASE ORAL at 11:33

## 2023-11-30 RX ADMIN — OXYCODONE HYDROCHLORIDE 5 MILLIGRAM(S): 5 TABLET ORAL at 12:58

## 2023-11-30 RX ADMIN — METHOCARBAMOL 750 MILLIGRAM(S): 500 TABLET, FILM COATED ORAL at 06:07

## 2023-11-30 RX ADMIN — Medication 1 MILLIGRAM(S): at 23:34

## 2023-11-30 RX ADMIN — SENNA PLUS 2 TABLET(S): 8.6 TABLET ORAL at 21:47

## 2023-11-30 RX ADMIN — METHOCARBAMOL 750 MILLIGRAM(S): 500 TABLET, FILM COATED ORAL at 17:25

## 2023-11-30 RX ADMIN — Medication 0.5 MILLIGRAM(S): at 21:47

## 2023-11-30 RX ADMIN — OXYCODONE HYDROCHLORIDE 10 MILLIGRAM(S): 5 TABLET ORAL at 22:46

## 2023-11-30 RX ADMIN — OXYCODONE HYDROCHLORIDE 5 MILLIGRAM(S): 5 TABLET ORAL at 18:04

## 2023-11-30 RX ADMIN — Medication 105 MILLIGRAM(S): at 21:47

## 2023-11-30 RX ADMIN — OXYCODONE HYDROCHLORIDE 5 MILLIGRAM(S): 5 TABLET ORAL at 11:58

## 2023-11-30 RX ADMIN — Medication 2 MILLIGRAM(S): at 06:07

## 2023-11-30 RX ADMIN — LIDOCAINE 3 PATCH: 4 CREAM TOPICAL at 00:44

## 2023-11-30 RX ADMIN — QUETIAPINE FUMARATE 100 MILLIGRAM(S): 200 TABLET, FILM COATED ORAL at 21:47

## 2023-11-30 RX ADMIN — Medication 2 MILLIGRAM(S): at 17:24

## 2023-11-30 RX ADMIN — ENOXAPARIN SODIUM 40 MILLIGRAM(S): 100 INJECTION SUBCUTANEOUS at 17:24

## 2023-11-30 RX ADMIN — LIDOCAINE 3 PATCH: 4 CREAM TOPICAL at 11:32

## 2023-11-30 RX ADMIN — DIVALPROEX SODIUM 500 MILLIGRAM(S): 500 TABLET, DELAYED RELEASE ORAL at 17:25

## 2023-11-30 RX ADMIN — DIVALPROEX SODIUM 500 MILLIGRAM(S): 500 TABLET, DELAYED RELEASE ORAL at 06:07

## 2023-11-30 RX ADMIN — Medication 1 MILLIGRAM(S): at 11:56

## 2023-11-30 RX ADMIN — POLYETHYLENE GLYCOL 3350 17 GRAM(S): 17 POWDER, FOR SOLUTION ORAL at 21:48

## 2023-11-30 RX ADMIN — ENOXAPARIN SODIUM 40 MILLIGRAM(S): 100 INJECTION SUBCUTANEOUS at 06:07

## 2023-11-30 RX ADMIN — Medication 105 MILLIGRAM(S): at 06:59

## 2023-11-30 NOTE — DIETITIAN INITIAL EVALUATION ADULT - PERTINENT MEDS FT
MEDICATIONS  (STANDING):  benztropine 2 milliGRAM(s) Oral two times a day  chlorhexidine 2% Cloths 1 Application(s) Topical daily  clonazePAM  Tablet 0.5 milliGRAM(s) Oral at bedtime  divalproex  milliGRAM(s) Oral every 12 hours  enoxaparin Injectable 40 milliGRAM(s) SubCutaneous every 12 hours  folic acid Injectable 1 milliGRAM(s) IV Push daily  gabapentin 100 milliGRAM(s) Oral at bedtime  influenza   Vaccine 0.5 milliLiter(s) IntraMuscular once  lidocaine   4% Patch 3 Patch Transdermal daily  methocarbamol 750 milliGRAM(s) Oral every 12 hours  nicotine - 21 mG/24Hr(s) Patch 1 Patch Transdermal every 24 hours  pantoprazole  Injectable 40 milliGRAM(s) IV Push daily  polyethylene glycol 3350 17 Gram(s) Oral at bedtime  QUEtiapine 100 milliGRAM(s) Oral at bedtime  senna 2 Tablet(s) Oral at bedtime  thiamine IVPB 500 milliGRAM(s) IV Intermittent every 8 hours    MEDICATIONS  (PRN):  albuterol/ipratropium for Nebulization 3 milliLiter(s) Nebulizer every 6 hours PRN Shortness of Breath and/or Wheezing  HYDROmorphone  Injectable 0.5 milliGRAM(s) IV Push every 4 hours PRN breakthrough  ondansetron Injectable 4 milliGRAM(s) IV Push every 6 hours PRN Nausea  oxyCODONE    IR 5 milliGRAM(s) Oral every 4 hours PRN Moderate Pain (4 - 6)  oxyCODONE    IR 10 milliGRAM(s) Oral every 4 hours PRN Severe Pain (7 - 10)

## 2023-11-30 NOTE — DIETITIAN INITIAL EVALUATION ADULT - OTHER INFO
56 yo male with pmhx of seizures, liver disease, back pain, HLD who presents to ED s/p fall while drunk, per ED pt lives in a group home. Was intubated for airway protection after admin of versed and subsequent emesis. Imaging revealed L 7th rib fx, no s/p rib block. Repeat CXR with significant increase in PTX, Lt pigtail placed with improvement of PTX.

## 2023-11-30 NOTE — DIETITIAN INITIAL EVALUATION ADULT - PERTINENT LABORATORY DATA
11-30    138  |  101  |  22.5<H>  ----------------------------<  115<H>  4.5   |  31.0<H>  |  0.80    Ca    8.2<L>      30 Nov 2023 05:00  Phos  3.0     11-30  Mg     2.2     11-30    TPro  6.6  /  Alb  4.0  /  TBili  <0.2<L>  /  DBili  x   /  AST  25  /  ALT  16  /  AlkPhos  62  11-28  POCT Blood Glucose.: 153 mg/dL (11-29-23 @ 14:50)

## 2023-11-30 NOTE — CHART NOTE - NSCHARTNOTEFT_GEN_A_CORE
Received call from RN patient just arrived to 2 brkt and was appearing blue and htn to 150s/100s.   Patient seen and examined at bedside.  Patient reports he has felt short of breath since arriving to the hospital, no change today, but does admit some pain around his chest tube site and frustrated he cannot lay on that side. He also states he is spitting up what he coughs, which is clear phlegm like. He denies chest pain or any change in his breathing.    Patient in no acute on distress on 6L NC. His L chest tube is to suction, L blow hole site c/d/i, + subq emphysema L upper ant chest.   At the bedside, patient's vitals 148/85, O2 97% on 6L, HR 85 bpm, temp was wnl.     Will cont  monitoring, pain control, deep breathing exercises, and monitoring chest tube.

## 2023-11-30 NOTE — DIETITIAN INITIAL EVALUATION ADULT - ADD RECOMMEND
Continue current diet as tolerated  Add Ensure Plus once a day per pt request (350 kcals, 20 g pro each)  Rx: add MVI daily, continue thiamine and folic acid supplementation

## 2023-11-30 NOTE — PROGRESS NOTE ADULT - NS ATTEND AMEND GEN_ALL_CORE FT
I have seen and examined this patient with the SICU team.  No acute events overnight.  Patient appears better this morning.  Neuro: Patient awake and alert.  Pain is controlled.  No signs of acute EtOH withdrawal.  On home seroquel, cogentin, and depakote.    Card: HDS.  Pulm: Rib fractures with PTX and subcutaneous emphysema.  Pulling 1500cc on IS.  S/p Pigtail placement for worsening PTX.  Small air leak, will leave on suction.  GI: S/p esophagram without extravasation, now on regular diet.  : UOP adequate.  Heme: Trend Hgb, transfuse PRN.  ID: Trend WBC, culture if febrile.   DVT PPx Lovenox.  Dispo: Stable for downgrade to surgical floor.

## 2023-11-30 NOTE — CHART NOTE - NSCHARTNOTEFT_GEN_A_CORE
SICU TRANSFER NOTE  -----------------------------  ICU Admission Date: 11/29/23  Transfer Date: 11-30-23 @ 11:22    Admission Diagnosis: L 5-8, 10th rib fractures, left pneumothorax, subcutaneous emphysema     Active Problems/injuries: L 5-8, 10th rib fractures, subcutaneous emphysema     Procedures: Left pig tail chest tube 11/29  Left rib block 11/29    Consultants:  [ ] Cardiology  [ ] Endocrine  [ ] Infectious Disease  [ ] Medicine  [ ]Neurosurgery  [ ] Ortho       [ ] Weight Bearing Status:  [ ] Palliative       [ ] Advanced Directives:    [ ] Physical Medicine and Rehab       [ ] Disposition :   [ ] Plastics  [ ] Pulmonary    Medications  albuterol/ipratropium for Nebulization 3 milliLiter(s) Nebulizer every 6 hours PRN  benztropine 2 milliGRAM(s) Oral two times a day  chlorhexidine 2% Cloths 1 Application(s) Topical daily  clonazePAM  Tablet 0.5 milliGRAM(s) Oral at bedtime  divalproex  milliGRAM(s) Oral every 12 hours  enoxaparin Injectable 40 milliGRAM(s) SubCutaneous every 12 hours  folic acid Injectable 1 milliGRAM(s) IV Push daily  gabapentin 100 milliGRAM(s) Oral at bedtime  HYDROmorphone  Injectable 0.5 milliGRAM(s) IV Push every 4 hours PRN  influenza   Vaccine 0.5 milliLiter(s) IntraMuscular once  lidocaine   4% Patch 3 Patch Transdermal daily  methocarbamol 750 milliGRAM(s) Oral every 12 hours  nicotine - 21 mG/24Hr(s) Patch 1 Patch Transdermal every 24 hours  ondansetron Injectable 4 milliGRAM(s) IV Push every 6 hours PRN  oxyCODONE    IR 5 milliGRAM(s) Oral every 4 hours PRN  oxyCODONE    IR 10 milliGRAM(s) Oral every 4 hours PRN  pantoprazole  Injectable 40 milliGRAM(s) IV Push daily  polyethylene glycol 3350 17 Gram(s) Oral at bedtime  QUEtiapine 100 milliGRAM(s) Oral at bedtime  senna 2 Tablet(s) Oral at bedtime  thiamine IVPB 500 milliGRAM(s) IV Intermittent every 8 hours    [x] I attest I have reviewed and reconciled all medications prior to transfer    IV Fluids  sodium chloride 0.9%.: Solution, 1000 milliLiter(s) infuse at 250 mL/Hr  sodium chloride 0.9%: 1000 milliLiter(s)      with thiamine additive 100 milliGRAM(s)      with multivitamin additive 10 milliLiter(s)      with folic acid additive 1 milliGRAM(s), infuse at 250 mL/Hr  lactated ringers.: Solution, 1000 milliLiter(s) infuse at 110 mL/Hr  lactated ringers Bolus:   1000 milliLiter(s), IV Bolus, once, infuse over 60 Minute(s), Stop After 1 Doses  sodium chloride 0.9% Bolus:   2000 milliLiter(s), IV Bolus, once, infuse over 60 Minute(s), Stop After 1 Doses  Provider's Contact #: [phone]  sodium chloride 0.9% Bolus:   250 milliLiter(s), IV Bolus, once, infuse over 60 Minute(s), Stop After 1 Doses      I have discussed this case with the Trauma PA upon transfer and all questions regarding ICU course were answered.  The following items are to be followed up:    Neuro:  - continue home psych meds benzotropine, depakote, seroquel, klonipin   - multimodal pain control with oxy, tylenol, robaxin, gabapentin, lidocaine patches and dilaudid for breakthrough   - optimize sleep/wake cycle  - Nicotine patch     Pulm:   - RA to 2L NC to maintain O2 sats >94%  - keep left pig tail to suction today, place on water seal tomorrow if air leak resolving   - F/U AM chest x-ray   - encourage IS   - PIC protocol, s/p left rib block    Cardiac:  - maintain maps >65    GI:  - Regular diet  - bowel regimen with senna and miralax   - pantoprazole QD  - prn zofran for nausea   - continue folic acid and thiamine     :  - voiding spontaneously   - monitor UO  - monitor and replete electrolytes as needed     Heme:  - SCDs and Lovennox 40 BID  - H/H stable at 10.2/32.5    ID:  - white count trending down 16.54 --> 14.66  - monitor for fever and leukocytosis   - no indication for ABX at this time

## 2023-11-30 NOTE — PROGRESS NOTE ADULT - SUBJECTIVE AND OBJECTIVE BOX
INTERVAL HPI/OVERNIGHT EVENTS/SUBJECTIVE: Pt s/p rib block yesterday for pain with improvement.  Esophogram performed prior to taking PO, NEG.  Pt placed on diet, tolerating well.  Repeat CXR yesterday evening with significant increase in PTX, Lt pigtail placed with improvement of PTX.  Pt with significant subcutaneous edema, now markedly improved, even from the beginning of my shift.  Only complaint from pt is some left chest wall discomfort if he tried to lay on his left side.          ICU Vital Signs Last 24 Hrs  T(C): 36.9 (30 Nov 2023 04:47), Max: 36.9 (29 Nov 2023 19:50)  T(F): 98.5 (30 Nov 2023 04:47), Max: 98.5 (30 Nov 2023 04:47)  HR: 88 (30 Nov 2023 02:00) (66 - 92)  BP: 115/78 (30 Nov 2023 02:00) (94/82 - 157/92)  BP(mean): 91 (30 Nov 2023 02:00) (88 - 130)  ABP: --  ABP(mean): --  RR: 15 (30 Nov 2023 02:00) (9 - 24)  SpO2: 96% (30 Nov 2023 02:00) (91% - 100%)    O2 Parameters below as of 29 Nov 2023 20:00  Patient On (Oxygen Delivery Method): nasal cannula  O2 Flow (L/min): 6        I&O's Detail    28 Nov 2023 07:01  -  29 Nov 2023 07:00  --------------------------------------------------------  IN:    Lactated Ringers: 210 mL  Total IN: 210 mL    OUT:    Voided (mL): 300 mL  Total OUT: 300 mL    Total NET: -90 mL      29 Nov 2023 07:01  -  30 Nov 2023 05:58  --------------------------------------------------------  IN:    IV PiggyBack: 100 mL    IV PiggyBack: 50 mL    IV PiggyBack: 200 mL    Lactated Ringers: 1210 mL    Oral Fluid: 615 mL  Total IN: 2175 mL    OUT:    Voided (mL): 475 mL  Total OUT: 475 mL    Total NET: 1700 mL        MEDICATIONS  (STANDING):  benztropine 2 milliGRAM(s) Oral two times a day  chlorhexidine 2% Cloths 1 Application(s) Topical daily  clonazePAM  Tablet 0.5 milliGRAM(s) Oral at bedtime  divalproex  milliGRAM(s) Oral every 12 hours  enoxaparin Injectable 40 milliGRAM(s) SubCutaneous every 12 hours  folic acid Injectable 1 milliGRAM(s) IV Push daily  gabapentin 100 milliGRAM(s) Oral at bedtime  influenza   Vaccine 0.5 milliLiter(s) IntraMuscular once  lidocaine   4% Patch 3 Patch Transdermal daily  methocarbamol 750 milliGRAM(s) Oral every 12 hours  nicotine - 21 mG/24Hr(s) Patch 1 Patch Transdermal every 24 hours  pantoprazole  Injectable 40 milliGRAM(s) IV Push daily  polyethylene glycol 3350 17 Gram(s) Oral at bedtime  QUEtiapine 100 milliGRAM(s) Oral at bedtime  senna 2 Tablet(s) Oral at bedtime  thiamine IVPB 500 milliGRAM(s) IV Intermittent every 8 hours    MEDICATIONS  (PRN):  albuterol/ipratropium for Nebulization 3 milliLiter(s) Nebulizer every 6 hours PRN Shortness of Breath and/or Wheezing  HYDROmorphone  Injectable 0.5 milliGRAM(s) IV Push every 4 hours PRN breakthrough  ondansetron Injectable 4 milliGRAM(s) IV Push every 6 hours PRN Nausea  oxyCODONE    IR 5 milliGRAM(s) Oral every 4 hours PRN Moderate Pain (4 - 6)  oxyCODONE    IR 10 milliGRAM(s) Oral every 4 hours PRN Severe Pain (7 - 10)            PHYSICAL EXAM:     Gen: NAD, Well appearing, No cyanosis, Pallor. Mild facial sub q air.     Eyes: PERRL ~ 3mm, EOMI,     Neurological: A&Ox3, GCS 15, No focal deficit.     Neck: Supple. NT AT, FROM no pain.  No JVD. No meningeal signs    Pulmonary: NAD, CTA, = BL , Lt pigtail in place to suction, no airleak present    Cardiovascular: RRR    Gastrointestinal: ND, Soft, grossly nontender    Extremities: NT, AT, no edema, erythema or palpable cord noted.  FROM, = 2+ pulses throughout.    Skin:  Significant subcutaneous edema to abd wall, chest wall, BL UE, neck and face, continues to improve      LABS:                          10.2   14.66 )-----------( 269      ( 30 Nov 2023 05:00 )             32.5       11-30    138  |  101  |  22.5<H>  ----------------------------<  115<H>  4.5   |  31.0<H>  |  0.80    Ca    8.2<L>      30 Nov 2023 05:00  Phos  3.0     11-30  Mg     2.2     11-30    TPro  6.6  /  Alb  4.0  /  TBili  <0.2<L>  /  DBili  x   /  AST  25  /  ALT  16  /  AlkPhos  62  11-28          RECENT CULTURES:        CAPILLARY BLOOD GLUCOSE      RADIOLOGY & ADDITIONAL STUDIES:    ASSESSMENT/PLAN:  58yMale presenting with: Multiple Left sided closed rib fractures, Left Pneumothorax, extensive sub Q air.     Neurological:  Multimodal analgesics / Lido patches, s/p rib block 11/29 with improvement, monitor CIWA.  Per pt, he has never withdrawn from ETOH in the past    Pulmonary: Continue O2 therapy to improve subq ar, Lt pigtail to suction, no airleak.  Repeat CXR this am pending.  F/U for possible placement to waterseal today.      Cardiovascular: Hemodynamically normal. No issues    Gastrointestinal: Pt tolerating regular diet, bowel regimen    Genitourinary: Voids    Heme: Lovenox 40mg BID for trauma    ID: None    Lines/ Tubes: PIV, Lt pigtail    Dispo: Pt with improved subq air, PTX almost resolved s/p pigtail placement, pain well controlled  -Would consider floor transfer

## 2023-11-30 NOTE — DIETITIAN INITIAL EVALUATION ADULT - ORAL INTAKE PTA/DIET HISTORY
MST flagged for unsure weight loss. Met with pt at bedside this morning, he reports a few days pta admission he had poor appetite/po intake, now improving. Fair-good appetite presently, tolerating diet well no c/o N/V/C/D. His UBW is 150 lbs, denies any significant weight changes, current weight 149.6 lbs. He has NKFA, follows a regular diet at home and sometimes drinks Ensure once per day when he doesn't feel like eating. He has dentures at home, states he has been ordering softer foods. RD remains available.

## 2023-11-30 NOTE — CHART NOTE - NSCHARTNOTEFT_GEN_A_CORE
Tertiary Trauma Survey (TTS)    Date of TTS: 11-30-23 @ 13:28                             Admit Date: 11-28-23 @ 20:31      Trauma Activation: Consult    List Injuries Identified to Date: Left displaced 7th rib fx, Left posterior 5-8 rib fx, left PTX, extensive subcutaneous emphysema      List Operative and Interventional Radiological Procedures: Bedside left pigtail chest tube placement on 11/29      Physical Exam:    Neuro: Intact, GCS 15    HEENT: subcutaneous emphysema of neck and face improved, right eye swelling improved     Pulm/Chest: left chest tube to suction w/ small air leak, tenderness to palpation of left chest, equal chest rise, saturating well, subQ emphysema improving    Cardiac: NSR, regular rate and rhythm     GI / Abdomen: soft, non-tender, non-distended     Musculoskeletal / Extremities: moving all extremities    Integumentary: decompressive incision to left midclavicular chest      RADIOLOGICAL FINDINGS REVIEW:    CT Head: No evidence of acute intracranial or cervical spine injury.  Left frontal scalp soft tissue swelling without associated calvarial   fracture.    Compared with 6/16/2022, a focal lucency in the midline suboccipital   calvaria with associated soft tissue density has increased in size, now   with a focal defect extending to the outer table. If not contraindicated,   consider contrast enhanced MRI for further assessment.    CT cervical spine: No CT evidence of cervical spine fracture.    CT C/A/P: Mildly displaced acute fracture of the left lateral seventh rib, with   associated small crescentic left anterior pneumothorax.    Repeat CT Chest: New extensive/diffuse pneumomediastinum and subcutaneous emphysema as   described above. Increased size of small left pneumothorax. Similar appearing posterior 5-8 rib fx. Circumferential wall thickening of the mid to upper esophagus may be   secondary to esophagitis. Left posterior nondisplaced 10th rib fracture appears to be new compared to 11/28/2023.    Diffusely thick-walled appearance of the esophagus suggestive of   esophagitis. Additional wall thickening of the stomach is appreciated   which may be related to underdistention versus gastritis. Correlate   clinically.    CT T/L-spine: No acute fracture or traumatic subluxation.  Postsurgical changes.  Degenerative changes.  Evaluation of spinal canal contents is significantly limited.        INCIDENTAL FINDINGS:     [ ] No    [X] Yes, Findings are: Interval increase in size of destructive midline posterior occipital bone   mass, currently 2.5 x 3.9 cm, previously 2.3 x 3.5 cm (maximal AP by TV).   Findings could represent the presence of metastatic disease.    This was discussed with patient who was already aware of findings and being worked up as outpatient.      [X] Tertiary exam complete, there are no new injuries identified    [ ] Tertiary exam done, new injuries identified are:                [ ] Imaging ordered:

## 2023-12-01 LAB
ANION GAP SERPL CALC-SCNC: 11 MMOL/L — SIGNIFICANT CHANGE UP (ref 5–17)
ANION GAP SERPL CALC-SCNC: 11 MMOL/L — SIGNIFICANT CHANGE UP (ref 5–17)
BASE EXCESS BLDV CALC-SCNC: 5.3 MMOL/L — HIGH (ref -2–3)
BASE EXCESS BLDV CALC-SCNC: 5.3 MMOL/L — HIGH (ref -2–3)
BASOPHILS # BLD AUTO: 0.05 K/UL — SIGNIFICANT CHANGE UP (ref 0–0.2)
BASOPHILS # BLD AUTO: 0.05 K/UL — SIGNIFICANT CHANGE UP (ref 0–0.2)
BASOPHILS NFR BLD AUTO: 0.4 % — SIGNIFICANT CHANGE UP (ref 0–2)
BASOPHILS NFR BLD AUTO: 0.4 % — SIGNIFICANT CHANGE UP (ref 0–2)
BUN SERPL-MCNC: 19.4 MG/DL — SIGNIFICANT CHANGE UP (ref 8–20)
BUN SERPL-MCNC: 19.4 MG/DL — SIGNIFICANT CHANGE UP (ref 8–20)
CA-I SERPL-SCNC: 1.14 MMOL/L — LOW (ref 1.15–1.33)
CA-I SERPL-SCNC: 1.14 MMOL/L — LOW (ref 1.15–1.33)
CALCIUM SERPL-MCNC: 8.1 MG/DL — LOW (ref 8.4–10.5)
CALCIUM SERPL-MCNC: 8.1 MG/DL — LOW (ref 8.4–10.5)
CHLORIDE BLDV-SCNC: 102 MMOL/L — SIGNIFICANT CHANGE UP (ref 96–108)
CHLORIDE BLDV-SCNC: 102 MMOL/L — SIGNIFICANT CHANGE UP (ref 96–108)
CHLORIDE SERPL-SCNC: 103 MMOL/L — SIGNIFICANT CHANGE UP (ref 96–108)
CHLORIDE SERPL-SCNC: 103 MMOL/L — SIGNIFICANT CHANGE UP (ref 96–108)
CO2 SERPL-SCNC: 28 MMOL/L — SIGNIFICANT CHANGE UP (ref 22–29)
CO2 SERPL-SCNC: 28 MMOL/L — SIGNIFICANT CHANGE UP (ref 22–29)
CREAT SERPL-MCNC: 0.89 MG/DL — SIGNIFICANT CHANGE UP (ref 0.5–1.3)
CREAT SERPL-MCNC: 0.89 MG/DL — SIGNIFICANT CHANGE UP (ref 0.5–1.3)
EGFR: 99 ML/MIN/1.73M2 — SIGNIFICANT CHANGE UP
EGFR: 99 ML/MIN/1.73M2 — SIGNIFICANT CHANGE UP
EOSINOPHIL # BLD AUTO: 0.21 K/UL — SIGNIFICANT CHANGE UP (ref 0–0.5)
EOSINOPHIL # BLD AUTO: 0.21 K/UL — SIGNIFICANT CHANGE UP (ref 0–0.5)
EOSINOPHIL NFR BLD AUTO: 1.8 % — SIGNIFICANT CHANGE UP (ref 0–6)
EOSINOPHIL NFR BLD AUTO: 1.8 % — SIGNIFICANT CHANGE UP (ref 0–6)
GAS PNL BLDA: SIGNIFICANT CHANGE UP
GAS PNL BLDV: 137 MMOL/L — SIGNIFICANT CHANGE UP (ref 136–145)
GAS PNL BLDV: 137 MMOL/L — SIGNIFICANT CHANGE UP (ref 136–145)
GAS PNL BLDV: SIGNIFICANT CHANGE UP
GAS PNL BLDV: SIGNIFICANT CHANGE UP
GLUCOSE BLDC GLUCOMTR-MCNC: 106 MG/DL — HIGH (ref 70–99)
GLUCOSE BLDC GLUCOMTR-MCNC: 106 MG/DL — HIGH (ref 70–99)
GLUCOSE BLDV-MCNC: 119 MG/DL — HIGH (ref 70–99)
GLUCOSE BLDV-MCNC: 119 MG/DL — HIGH (ref 70–99)
GLUCOSE SERPL-MCNC: 112 MG/DL — HIGH (ref 70–99)
GLUCOSE SERPL-MCNC: 112 MG/DL — HIGH (ref 70–99)
HCO3 BLDV-SCNC: 30 MMOL/L — HIGH (ref 22–29)
HCO3 BLDV-SCNC: 30 MMOL/L — HIGH (ref 22–29)
HCT VFR BLD CALC: 31.5 % — LOW (ref 39–50)
HCT VFR BLD CALC: 31.5 % — LOW (ref 39–50)
HCT VFR BLDA CALC: 32 % — SIGNIFICANT CHANGE UP
HCT VFR BLDA CALC: 32 % — SIGNIFICANT CHANGE UP
HGB BLD CALC-MCNC: 10.6 G/DL — LOW (ref 12.6–17.4)
HGB BLD CALC-MCNC: 10.6 G/DL — LOW (ref 12.6–17.4)
HGB BLD-MCNC: 9.7 G/DL — LOW (ref 13–17)
HGB BLD-MCNC: 9.7 G/DL — LOW (ref 13–17)
IMM GRANULOCYTES NFR BLD AUTO: 1.3 % — HIGH (ref 0–0.9)
IMM GRANULOCYTES NFR BLD AUTO: 1.3 % — HIGH (ref 0–0.9)
LACTATE BLDV-MCNC: 1 MMOL/L — SIGNIFICANT CHANGE UP (ref 0.5–2)
LACTATE BLDV-MCNC: 1 MMOL/L — SIGNIFICANT CHANGE UP (ref 0.5–2)
LYMPHOCYTES # BLD AUTO: 1.66 K/UL — SIGNIFICANT CHANGE UP (ref 1–3.3)
LYMPHOCYTES # BLD AUTO: 1.66 K/UL — SIGNIFICANT CHANGE UP (ref 1–3.3)
LYMPHOCYTES # BLD AUTO: 14 % — SIGNIFICANT CHANGE UP (ref 13–44)
LYMPHOCYTES # BLD AUTO: 14 % — SIGNIFICANT CHANGE UP (ref 13–44)
MAGNESIUM SERPL-MCNC: 1.9 MG/DL — SIGNIFICANT CHANGE UP (ref 1.6–2.6)
MAGNESIUM SERPL-MCNC: 1.9 MG/DL — SIGNIFICANT CHANGE UP (ref 1.6–2.6)
MCHC RBC-ENTMCNC: 27.7 PG — SIGNIFICANT CHANGE UP (ref 27–34)
MCHC RBC-ENTMCNC: 27.7 PG — SIGNIFICANT CHANGE UP (ref 27–34)
MCHC RBC-ENTMCNC: 30.8 GM/DL — LOW (ref 32–36)
MCHC RBC-ENTMCNC: 30.8 GM/DL — LOW (ref 32–36)
MCV RBC AUTO: 90 FL — SIGNIFICANT CHANGE UP (ref 80–100)
MCV RBC AUTO: 90 FL — SIGNIFICANT CHANGE UP (ref 80–100)
MONOCYTES # BLD AUTO: 0.52 K/UL — SIGNIFICANT CHANGE UP (ref 0–0.9)
MONOCYTES # BLD AUTO: 0.52 K/UL — SIGNIFICANT CHANGE UP (ref 0–0.9)
MONOCYTES NFR BLD AUTO: 4.4 % — SIGNIFICANT CHANGE UP (ref 2–14)
MONOCYTES NFR BLD AUTO: 4.4 % — SIGNIFICANT CHANGE UP (ref 2–14)
NEUTROPHILS # BLD AUTO: 9.29 K/UL — HIGH (ref 1.8–7.4)
NEUTROPHILS # BLD AUTO: 9.29 K/UL — HIGH (ref 1.8–7.4)
NEUTROPHILS NFR BLD AUTO: 78.1 % — HIGH (ref 43–77)
NEUTROPHILS NFR BLD AUTO: 78.1 % — HIGH (ref 43–77)
PCO2 BLDV: 50 MMHG — SIGNIFICANT CHANGE UP (ref 42–55)
PCO2 BLDV: 50 MMHG — SIGNIFICANT CHANGE UP (ref 42–55)
PH BLDV: 7.39 — SIGNIFICANT CHANGE UP (ref 7.32–7.43)
PH BLDV: 7.39 — SIGNIFICANT CHANGE UP (ref 7.32–7.43)
PHOSPHATE SERPL-MCNC: 2.3 MG/DL — LOW (ref 2.4–4.7)
PHOSPHATE SERPL-MCNC: 2.3 MG/DL — LOW (ref 2.4–4.7)
PLATELET # BLD AUTO: 252 K/UL — SIGNIFICANT CHANGE UP (ref 150–400)
PLATELET # BLD AUTO: 252 K/UL — SIGNIFICANT CHANGE UP (ref 150–400)
PO2 BLDV: 67 MMHG — HIGH (ref 25–45)
PO2 BLDV: 67 MMHG — HIGH (ref 25–45)
POTASSIUM BLDV-SCNC: 4.8 MMOL/L — SIGNIFICANT CHANGE UP (ref 3.5–5.1)
POTASSIUM BLDV-SCNC: 4.8 MMOL/L — SIGNIFICANT CHANGE UP (ref 3.5–5.1)
POTASSIUM SERPL-MCNC: 4.1 MMOL/L — SIGNIFICANT CHANGE UP (ref 3.5–5.3)
POTASSIUM SERPL-MCNC: 4.1 MMOL/L — SIGNIFICANT CHANGE UP (ref 3.5–5.3)
POTASSIUM SERPL-SCNC: 4.1 MMOL/L — SIGNIFICANT CHANGE UP (ref 3.5–5.3)
POTASSIUM SERPL-SCNC: 4.1 MMOL/L — SIGNIFICANT CHANGE UP (ref 3.5–5.3)
RBC # BLD: 3.5 M/UL — LOW (ref 4.2–5.8)
RBC # BLD: 3.5 M/UL — LOW (ref 4.2–5.8)
RBC # FLD: 16.7 % — HIGH (ref 10.3–14.5)
RBC # FLD: 16.7 % — HIGH (ref 10.3–14.5)
SAO2 % BLDV: 93 % — SIGNIFICANT CHANGE UP
SAO2 % BLDV: 93 % — SIGNIFICANT CHANGE UP
SODIUM SERPL-SCNC: 142 MMOL/L — SIGNIFICANT CHANGE UP (ref 135–145)
SODIUM SERPL-SCNC: 142 MMOL/L — SIGNIFICANT CHANGE UP (ref 135–145)
WBC # BLD: 11.88 K/UL — HIGH (ref 3.8–10.5)
WBC # BLD: 11.88 K/UL — HIGH (ref 3.8–10.5)
WBC # FLD AUTO: 11.88 K/UL — HIGH (ref 3.8–10.5)
WBC # FLD AUTO: 11.88 K/UL — HIGH (ref 3.8–10.5)

## 2023-12-01 PROCEDURE — 31500 INSERT EMERGENCY AIRWAY: CPT

## 2023-12-01 PROCEDURE — 36556 INSERT NON-TUNNEL CV CATH: CPT

## 2023-12-01 PROCEDURE — 93010 ELECTROCARDIOGRAM REPORT: CPT

## 2023-12-01 PROCEDURE — 99291 CRITICAL CARE FIRST HOUR: CPT | Mod: 25

## 2023-12-01 PROCEDURE — 99291 CRITICAL CARE FIRST HOUR: CPT

## 2023-12-01 PROCEDURE — 71045 X-RAY EXAM CHEST 1 VIEW: CPT | Mod: 26,77

## 2023-12-01 PROCEDURE — 71250 CT THORAX DX C-: CPT | Mod: 26

## 2023-12-01 PROCEDURE — 71045 X-RAY EXAM CHEST 1 VIEW: CPT | Mod: 26

## 2023-12-01 PROCEDURE — 70450 CT HEAD/BRAIN W/O DYE: CPT | Mod: 26

## 2023-12-01 RX ORDER — HYDRALAZINE HCL 50 MG
5 TABLET ORAL ONCE
Refills: 0 | Status: COMPLETED | OUTPATIENT
Start: 2023-12-01 | End: 2023-12-01

## 2023-12-01 RX ORDER — FENTANYL CITRATE 50 UG/ML
50 INJECTION INTRAVENOUS ONCE
Refills: 0 | Status: DISCONTINUED | OUTPATIENT
Start: 2023-12-01 | End: 2023-12-01

## 2023-12-01 RX ORDER — HYDROCORTISONE 20 MG
40 TABLET ORAL EVERY 8 HOURS
Refills: 0 | Status: DISCONTINUED | OUTPATIENT
Start: 2023-12-01 | End: 2023-12-01

## 2023-12-01 RX ORDER — FENTANYL CITRATE 50 UG/ML
1 INJECTION INTRAVENOUS
Qty: 2500 | Refills: 0 | Status: DISCONTINUED | OUTPATIENT
Start: 2023-12-01 | End: 2023-12-02

## 2023-12-01 RX ORDER — IPRATROPIUM/ALBUTEROL SULFATE 18-103MCG
3 AEROSOL WITH ADAPTER (GRAM) INHALATION EVERY 6 HOURS
Refills: 0 | Status: DISCONTINUED | OUTPATIENT
Start: 2023-12-01 | End: 2023-12-08

## 2023-12-01 RX ORDER — DEXMEDETOMIDINE HYDROCHLORIDE IN 0.9% SODIUM CHLORIDE 4 UG/ML
0.2 INJECTION INTRAVENOUS
Qty: 200 | Refills: 0 | Status: DISCONTINUED | OUTPATIENT
Start: 2023-12-01 | End: 2023-12-04

## 2023-12-01 RX ORDER — FENTANYL CITRATE 50 UG/ML
100 INJECTION INTRAVENOUS ONCE
Refills: 0 | Status: DISCONTINUED | OUTPATIENT
Start: 2023-12-01 | End: 2023-12-01

## 2023-12-01 RX ORDER — KETAMINE HYDROCHLORIDE 100 MG/ML
70 INJECTION INTRAMUSCULAR; INTRAVENOUS ONCE
Refills: 0 | Status: DISCONTINUED | OUTPATIENT
Start: 2023-12-01 | End: 2023-12-01

## 2023-12-01 RX ORDER — SENNA PLUS 8.6 MG/1
2 TABLET ORAL AT BEDTIME
Refills: 0 | Status: DISCONTINUED | OUTPATIENT
Start: 2023-12-01 | End: 2023-12-06

## 2023-12-01 RX ORDER — KETAMINE HYDROCHLORIDE 100 MG/ML
0.2 INJECTION INTRAMUSCULAR; INTRAVENOUS
Qty: 1000 | Refills: 0 | Status: DISCONTINUED | OUTPATIENT
Start: 2023-12-01 | End: 2023-12-01

## 2023-12-01 RX ORDER — MAGNESIUM SULFATE 500 MG/ML
2 VIAL (ML) INJECTION ONCE
Refills: 0 | Status: COMPLETED | OUTPATIENT
Start: 2023-12-01 | End: 2023-12-01

## 2023-12-01 RX ORDER — DIVALPROEX SODIUM 500 MG/1
500 TABLET, DELAYED RELEASE ORAL EVERY 12 HOURS
Refills: 0 | Status: DISCONTINUED | OUTPATIENT
Start: 2023-12-01 | End: 2023-12-06

## 2023-12-01 RX ORDER — GABAPENTIN 400 MG/1
100 CAPSULE ORAL AT BEDTIME
Refills: 0 | Status: DISCONTINUED | OUTPATIENT
Start: 2023-12-01 | End: 2023-12-06

## 2023-12-01 RX ORDER — SODIUM CHLORIDE 9 MG/ML
1000 INJECTION, SOLUTION INTRAVENOUS
Refills: 0 | Status: DISCONTINUED | OUTPATIENT
Start: 2023-12-01 | End: 2023-12-01

## 2023-12-01 RX ORDER — FENTANYL CITRATE 50 UG/ML
0.5 INJECTION INTRAVENOUS
Qty: 2500 | Refills: 0 | Status: DISCONTINUED | OUTPATIENT
Start: 2023-12-01 | End: 2023-12-01

## 2023-12-01 RX ORDER — BENZTROPINE MESYLATE 1 MG
2 TABLET ORAL
Refills: 0 | Status: DISCONTINUED | OUTPATIENT
Start: 2023-12-01 | End: 2023-12-06

## 2023-12-01 RX ORDER — KETAMINE HYDROCHLORIDE 100 MG/ML
0.25 INJECTION INTRAMUSCULAR; INTRAVENOUS
Qty: 1000 | Refills: 0 | Status: DISCONTINUED | OUTPATIENT
Start: 2023-12-01 | End: 2023-12-01

## 2023-12-01 RX ORDER — SODIUM CHLORIDE 9 MG/ML
1000 INJECTION, SOLUTION INTRAVENOUS ONCE
Refills: 0 | Status: COMPLETED | OUTPATIENT
Start: 2023-12-01 | End: 2023-12-01

## 2023-12-01 RX ORDER — QUETIAPINE FUMARATE 200 MG/1
100 TABLET, FILM COATED ORAL AT BEDTIME
Refills: 0 | Status: DISCONTINUED | OUTPATIENT
Start: 2023-12-01 | End: 2023-12-04

## 2023-12-01 RX ORDER — OLANZAPINE 15 MG/1
5 TABLET, FILM COATED ORAL ONCE
Refills: 0 | Status: COMPLETED | OUTPATIENT
Start: 2023-12-01 | End: 2023-12-01

## 2023-12-01 RX ORDER — CHLORHEXIDINE GLUCONATE 213 G/1000ML
15 SOLUTION TOPICAL EVERY 12 HOURS
Refills: 0 | Status: DISCONTINUED | OUTPATIENT
Start: 2023-12-01 | End: 2023-12-02

## 2023-12-01 RX ORDER — POLYETHYLENE GLYCOL 3350 17 G/17G
17 POWDER, FOR SOLUTION ORAL AT BEDTIME
Refills: 0 | Status: DISCONTINUED | OUTPATIENT
Start: 2023-12-01 | End: 2023-12-06

## 2023-12-01 RX ORDER — PROPOFOL 10 MG/ML
10 INJECTION, EMULSION INTRAVENOUS
Qty: 1000 | Refills: 0 | Status: DISCONTINUED | OUTPATIENT
Start: 2023-12-01 | End: 2023-12-01

## 2023-12-01 RX ADMIN — CHLORHEXIDINE GLUCONATE 15 MILLILITER(S): 213 SOLUTION TOPICAL at 18:13

## 2023-12-01 RX ADMIN — Medication 3 MILLILITER(S): at 09:19

## 2023-12-01 RX ADMIN — Medication 2 MILLIGRAM(S): at 05:43

## 2023-12-01 RX ADMIN — FENTANYL CITRATE 50 MICROGRAM(S): 50 INJECTION INTRAVENOUS at 12:52

## 2023-12-01 RX ADMIN — Medication 2 MILLIGRAM(S): at 02:15

## 2023-12-01 RX ADMIN — FENTANYL CITRATE 6.8 MICROGRAM(S)/KG/HR: 50 INJECTION INTRAVENOUS at 18:12

## 2023-12-01 RX ADMIN — Medication 25 GRAM(S): at 05:45

## 2023-12-01 RX ADMIN — Medication 40 MILLIGRAM(S): at 14:17

## 2023-12-01 RX ADMIN — Medication 25 GRAM(S): at 02:15

## 2023-12-01 RX ADMIN — Medication 1 PATCH: at 05:43

## 2023-12-01 RX ADMIN — Medication 3 MILLILITER(S): at 00:15

## 2023-12-01 RX ADMIN — PANTOPRAZOLE SODIUM 40 MILLIGRAM(S): 20 TABLET, DELAYED RELEASE ORAL at 12:51

## 2023-12-01 RX ADMIN — SODIUM CHLORIDE 100 MILLILITER(S): 9 INJECTION, SOLUTION INTRAVENOUS at 05:13

## 2023-12-01 RX ADMIN — Medication 3 MILLILITER(S): at 21:12

## 2023-12-01 RX ADMIN — METHOCARBAMOL 750 MILLIGRAM(S): 500 TABLET, FILM COATED ORAL at 05:44

## 2023-12-01 RX ADMIN — QUETIAPINE FUMARATE 100 MILLIGRAM(S): 200 TABLET, FILM COATED ORAL at 21:35

## 2023-12-01 RX ADMIN — Medication 40 MILLIGRAM(S): at 05:13

## 2023-12-01 RX ADMIN — Medication 1 MILLIGRAM(S): at 12:51

## 2023-12-01 RX ADMIN — KETAMINE HYDROCHLORIDE 70 MILLIGRAM(S): 100 INJECTION INTRAMUSCULAR; INTRAVENOUS at 15:40

## 2023-12-01 RX ADMIN — KETAMINE HYDROCHLORIDE 1.36 MG/KG/HR: 100 INJECTION INTRAMUSCULAR; INTRAVENOUS at 06:05

## 2023-12-01 RX ADMIN — Medication 105 MILLIGRAM(S): at 16:46

## 2023-12-01 RX ADMIN — DEXMEDETOMIDINE HYDROCHLORIDE IN 0.9% SODIUM CHLORIDE 3.4 MICROGRAM(S)/KG/HR: 4 INJECTION INTRAVENOUS at 19:31

## 2023-12-01 RX ADMIN — DIVALPROEX SODIUM 500 MILLIGRAM(S): 500 TABLET, DELAYED RELEASE ORAL at 19:21

## 2023-12-01 RX ADMIN — SODIUM CHLORIDE 1000 MILLILITER(S): 9 INJECTION, SOLUTION INTRAVENOUS at 02:11

## 2023-12-01 RX ADMIN — CHLORHEXIDINE GLUCONATE 15 MILLILITER(S): 213 SOLUTION TOPICAL at 05:13

## 2023-12-01 RX ADMIN — METHOCARBAMOL 750 MILLIGRAM(S): 500 TABLET, FILM COATED ORAL at 18:13

## 2023-12-01 RX ADMIN — GABAPENTIN 100 MILLIGRAM(S): 400 CAPSULE ORAL at 21:29

## 2023-12-01 RX ADMIN — Medication 125 MILLIGRAM(S): at 02:15

## 2023-12-01 RX ADMIN — Medication 0.5 MILLIGRAM(S): at 21:28

## 2023-12-01 RX ADMIN — Medication 2 MILLIGRAM(S): at 18:13

## 2023-12-01 RX ADMIN — POLYETHYLENE GLYCOL 3350 17 GRAM(S): 17 POWDER, FOR SOLUTION ORAL at 21:28

## 2023-12-01 RX ADMIN — CHLORHEXIDINE GLUCONATE 1 APPLICATION(S): 213 SOLUTION TOPICAL at 12:56

## 2023-12-01 RX ADMIN — DEXMEDETOMIDINE HYDROCHLORIDE IN 0.9% SODIUM CHLORIDE 3.4 MICROGRAM(S)/KG/HR: 4 INJECTION INTRAVENOUS at 14:59

## 2023-12-01 RX ADMIN — SENNA PLUS 2 TABLET(S): 8.6 TABLET ORAL at 21:28

## 2023-12-01 RX ADMIN — FENTANYL CITRATE 100 MICROGRAM(S): 50 INJECTION INTRAVENOUS at 15:45

## 2023-12-01 RX ADMIN — FENTANYL CITRATE 100 MICROGRAM(S): 50 INJECTION INTRAVENOUS at 13:05

## 2023-12-01 RX ADMIN — Medication 105 MILLIGRAM(S): at 05:42

## 2023-12-01 RX ADMIN — Medication 63.75 MILLIMOLE(S): at 05:42

## 2023-12-01 RX ADMIN — ENOXAPARIN SODIUM 40 MILLIGRAM(S): 100 INJECTION SUBCUTANEOUS at 18:15

## 2023-12-01 RX ADMIN — HYDROMORPHONE HYDROCHLORIDE 0.5 MILLIGRAM(S): 2 INJECTION INTRAMUSCULAR; INTRAVENOUS; SUBCUTANEOUS at 14:37

## 2023-12-01 RX ADMIN — FENTANYL CITRATE 3.4 MICROGRAM(S)/KG/HR: 50 INJECTION INTRAVENOUS at 14:59

## 2023-12-01 RX ADMIN — Medication 5 MILLIGRAM(S): at 20:01

## 2023-12-01 RX ADMIN — KETAMINE HYDROCHLORIDE 1.7 MG/KG/HR: 100 INJECTION INTRAMUSCULAR; INTRAVENOUS at 12:53

## 2023-12-01 RX ADMIN — Medication 40 MILLIGRAM(S): at 21:28

## 2023-12-01 RX ADMIN — Medication 2 MILLIGRAM(S): at 06:09

## 2023-12-01 RX ADMIN — LIDOCAINE 3 PATCH: 4 CREAM TOPICAL at 12:51

## 2023-12-01 RX ADMIN — ENOXAPARIN SODIUM 40 MILLIGRAM(S): 100 INJECTION SUBCUTANEOUS at 05:13

## 2023-12-01 RX ADMIN — OLANZAPINE 5 MILLIGRAM(S): 15 TABLET, FILM COATED ORAL at 00:12

## 2023-12-01 NOTE — CHART NOTE - NSCHARTNOTEFT_GEN_A_CORE
Notified by RN pt was starting to try to take out his IVs and get oob.   1mg ativan given.   Patient seen and examined, patient is aaxo x 3, in no acute respiratory distress, breathing comfortably on 6L NC.   Brad cagle was called by RNs on floor d/t patient trying to get oob.   Rapid response was called by RN due to "increased work of breathing". Patient speaking,     . SICU PA and chief resident Meri arrived at bedside.     Chest x ray, abg w lytes, cbc, bmp, ekg     Chief resident discussed case with on call attending, additional 2mg ativan given    sicu re evaluated pt, at bedside.   Solu medrol Notified by RN pt was starting to try to take out his IVs and get oob.   1mg ativan given.   Patient seen and examined, patient is aaxo x 3, in no acute respiratory distress, breathing comfortably on 6L NC.   Brad cagle was called by RNs on floor d/t patient trying to get oob.     Rapid response was called by RN, increased agitation, change of breathing. Chest x ray, abg w lytes, cbc, bmp, ekg ordered.   Patient remained on 6L NC,     SICU PA and chief resident at bedside.   Duoneb, solumedrol, Zyprexa 5mg and additional 2mg ativan given.     About 10 minutes           Chief resident discussed case with on call attending, additional 2mg ativan given    sicu re evaluated pt, at bedside.   Solu medrol    Intubated, transferred to sicu, [facility] protocol. Notified by RN pt was starting to try to take out his IVs and get oob.   1mg ativan given.   Patient seen and examined, patient is aaxo x 3, in no acute respiratory distress, breathing comfortably on 6L NC.     Brad cagle was called by RNs on floor d/t patient trying to get oob.     A few moments later, a rapid response was called by RN d/t increased agitation, change of breathing.   Chest x ray, abg w lytes, cbc, bmp, ekg ordered. Chief resident Dr. Boland and SICU PA called and arrived quickly to bedside.   Patient remained on 6L NC, breath sounds were clear, chest tube site inspected and appeared to be intact and in place, no tremors.   It was concluded the source needed to be identified, and further dispo was pending ABG, chest x ray and labs and at that moment there were no icu needs. The plan was Zyprexa for agitation and a duoneb was ordered. Additional 2mg Ativan given, discussed between Dr. Boland and attending Dr. Mackey.     Another few moments later, patient's respiratory status changed, continued to be agitated, security at bedside continuing to hold patient down. SICU PA at bedside, solu medrol and Mg ordered.     It was decided patient needed to be intubated.   Attending and sicu PA at bedside.           Chief resident discussed case with on call attending, additional 2mg ativan given  sicu re evaluated pt, at bedside   Solu medrol  Intubated, transferred to sicu, [facility] protocol Notified by RN pt was starting to try to take out his IVs and get oob.   1mg ativan given.   Patient seen and examined, patient is aaxo x 3, in no acute respiratory distress, breathing comfortably on 6L NC.     While at the bedside, code grey was called by RNs on floor d/t patient trying to get oob.     A few moments later, a rapid response was called by RN d/t increased agitation, change of breathing.   Chest x ray, ABG w lytes, cbc, bmp, EKG ordered. Chief resident Dr. Boland and SICU PA called and arrived quickly to bedside.   Patient remained on 6L NC, breath sounds were clear, chest tube site inspected and appeared to be intact and in place, no tremors.   It was concluded the source needed to be identified, and further dispo was pending ABG, chest x ray and labs and at that moment there were no ICU needs indicated. The plan was Zyprexa for agitation and a duoneb was ordered. Additional 2mg Ativan given, discussed between Dr. Boland and attending Dr. Mackey. A few moments later, myself and chief resident were made aware of patient's CIWA of 26 for the first time. The attending Dr. Mackey and SICU PA were notified and arrived immediately.     Another few moments later, patient's respiratory status changed, continued to be agitated, security at bedside continuing to hold patient down. SICU PA at bedside, solu-medrol and Mg ordered.   Patient continued to be agitated and O2 saturation was compromised. It was decided patient needed to be intubated. Patient intubated, transferred to sicu, ciwa protocol Notified by RN pt was starting to try to take out his IVs and get oob.   1mg ativan given.   Patient seen and examined, patient is aaxo x 3, in no acute respiratory distress, breathing comfortably on 6L NC.     While at the bedside, code grey was called by RNs on floor d/t patient trying to get oob.     A few moments later, a rapid response was called by RN d/t increased agitation, change of breathing.   Chest x ray, ABG w lytes, cbc, bmp, EKG ordered. Chief resident Dr. Boland and SICU PA called and arrived quickly to bedside.   Patient remained on 6L NC, breath sounds were clear, chest tube site inspected and appeared to be intact and in place, no tremors.   It was concluded the source needed to be identified, and further dispo was pending ABG, chest x ray and labs and at that moment there were no ICU needs indicated. The plan was Zyprexa for agitation and a duoneb was ordered. Additional 2mg Ativan given, discussed between Dr. Boland and attending Dr. Mackey. A few moments later, myself and chief resident were made aware of patient's CIWA of 26. Prior to code, patient was scoring less than 10. The attending Dr. Mackey and SICU PA were notified and arrived immediately.     Another few moments later, patient's respiratory status changed, continued to be agitated, security at bedside continuing to hold patient down. SICU PA at bedside, solu-medrol and Mg ordered.   Patient continued to be agitated and O2 saturation was compromised. It was decided patient needed to be intubated. Patient intubated, transferred to sicu, [facility] protocol

## 2023-12-01 NOTE — PROGRESS NOTE ADULT - NS ATTEND AMEND GEN_ALL_CORE FT
Called bedside for severe agitation. Patient was agitated requiring multiple staff members to hold him down. Audible wheezing. Desaturating on nonrebreather. A decision was made to intubate the patient for hypoxia and move him back to the unit.    Transfer to SICU  mechanical ventilation  CT head for AMS  Solumedrol and duoneb for reactive airway  CIWA protocol

## 2023-12-01 NOTE — PROCEDURE NOTE - ADDITIONAL PROCEDURE DETAILS
Dx:	  F25.9 Schizoaffective disorder, chronic condition with acute exacerbation  R45.1 Agitation  S22.42XA Traumatic fracture of ribs of left side with pneumothorax    Patient acutely agitated, was a risk to himself and staff. Needed chemical sedation with no venous access.     Confirmatory VBG and CXR sent, will follow up results.

## 2023-12-01 NOTE — PROGRESS NOTE ADULT - SUBJECTIVE AND OBJECTIVE BOX
INTERVAL HPI/OVERNIGHT EVENTS/SUBJECTIVE: Pt known to SICU team.  Pt downgraded yesterday.  Was called Code grey and Rapid response earlier.  Floor team managed agitation with Zyprexa and Ativan 1 mg.  Pt became more agitated and SOB.  Ordered for CXR and Duoneb but unable to be given.  I was called as pt had increased CIWA score despite additional Ativan 2mg.    When I arrived, pt was being held by multiple security and medical team members down in bed.  Pt diaphoretic and moaning, resisting being held down.  Audibly wheezing externally.  Unable to give any ROS due to AMS and critical illness.  Drastic change from shortly earlier.    I asked nursing team to establish VS.  O2 saturation was 77% on NRBM. HR 160s and SBP 160s.  I asked the clinical pharmacist to help set up and give Solu-medrol 125, Mag 2 gm and RT to give Duoneb.    Quickly he became more unresponsive and I proceeded to intubate pt.     ICU Vital Signs Last 24 Hrs  T(C): 36.9 (30 Nov 2023 19:02), Max: 36.9 (30 Nov 2023 04:47)  T(F): 98.4 (30 Nov 2023 19:02), Max: 98.5 (30 Nov 2023 04:47)  HR: 105 (01 Dec 2023 01:10) (65 - 105)  BP: 156/98 (30 Nov 2023 22:53) (111/74 - 162/99)  BP(mean): 119 (30 Nov 2023 17:00) (72 - 119)  ABP: --  ABP(mean): --  RR: 19 (30 Nov 2023 22:53) (11 - 20)  SpO2: 100% (01 Dec 2023 01:10) (93% - 100%)    O2 Parameters below as of 30 Nov 2023 22:53  Patient On (Oxygen Delivery Method): nasal cannula  O2 Flow (L/min): 6          I&O's Detail    29 Nov 2023 07:01  -  30 Nov 2023 07:00  --------------------------------------------------------  IN:    IV PiggyBack: 100 mL    IV PiggyBack: 50 mL    IV PiggyBack: 300 mL    Lactated Ringers: 1210 mL    Oral Fluid: 1115 mL  Total IN: 2775 mL    OUT:    Voided (mL): 825 mL  Total OUT: 825 mL    Total NET: 1950 mL      30 Nov 2023 07:01  -  01 Dec 2023 02:56  --------------------------------------------------------  IN:    IV PiggyBack: 100 mL  Total IN: 100 mL    OUT:  Total OUT: 0 mL    Total NET: 100 mL          Mode: AC/ CMV (Assist Control/ Continuous Mandatory Ventilation)  RR (machine): 26  TV (machine): 450  FiO2: 100  PEEP: 6  MAP: 13  PIP: 28    ABG - ( 01 Dec 2023 00:23 )  pH, Arterial: 7.430 pH, Blood: x     /  pCO2: 53    /  pO2: 73    / HCO3: 35    / Base Excess: 10.9  /  SaO2: 97.4                MEDICATIONS  (STANDING):  albuterol/ipratropium for Nebulization 3 milliLiter(s) Nebulizer every 6 hours  benztropine 2 milliGRAM(s) Oral two times a day  chlorhexidine 2% Cloths 1 Application(s) Topical daily  clonazePAM  Tablet 0.5 milliGRAM(s) Oral at bedtime  divalproex  milliGRAM(s) Oral every 12 hours  enoxaparin Injectable 40 milliGRAM(s) SubCutaneous every 12 hours  folic acid Injectable 1 milliGRAM(s) IV Push daily  gabapentin 100 milliGRAM(s) Oral at bedtime  influenza   Vaccine 0.5 milliLiter(s) IntraMuscular once  lidocaine   4% Patch 3 Patch Transdermal daily  methocarbamol 750 milliGRAM(s) Oral every 12 hours  methylPREDNISolone sodium succinate Injectable 40 milliGRAM(s) IV Push every 8 hours  nicotine - 21 mG/24Hr(s) Patch 1 Patch Transdermal every 24 hours  pantoprazole  Injectable 40 milliGRAM(s) IV Push daily  polyethylene glycol 3350 17 Gram(s) Oral at bedtime  propofol Infusion 10 MICROgram(s)/kG/Min (4.08 mL/Hr) IV Continuous <Continuous>  QUEtiapine 100 milliGRAM(s) Oral at bedtime  senna 2 Tablet(s) Oral at bedtime  thiamine IVPB 500 milliGRAM(s) IV Intermittent every 8 hours    MEDICATIONS  (PRN):  albuterol/ipratropium for Nebulization 3 milliLiter(s) Nebulizer every 6 hours PRN Shortness of Breath and/or Wheezing  HYDROmorphone  Injectable 0.5 milliGRAM(s) IV Push every 4 hours PRN breakthrough  ondansetron Injectable 4 milliGRAM(s) IV Push every 6 hours PRN Nausea  oxyCODONE    IR 5 milliGRAM(s) Oral every 4 hours PRN Moderate Pain (4 - 6)  oxyCODONE    IR 10 milliGRAM(s) Oral every 4 hours PRN Severe Pain (7 - 10)      PHYSICAL EXAM:     Gen: Severe Diaphorestic, slowly agitated.  Not yelling. Ill appearing,No pallor.    Neurological:  No focal defficit.     Pulmonary: Marked tachypnea, BS= BL . Chest tube appears in place with mild tidaling of fluid.      Cardiovascular: Marked Tachycardia.  Unable to access other due to degree of tachycardia.    Gastrointestinal:ND, Soft, NT.    Extremities: NT, AT, no edema, erythema or palpable cord noted.  FROM, = 2+ pulses throughout.      LABS:  CBC Full  -  ( 30 Nov 2023 05:00 )  WBC Count : 14.66 K/uL  RBC Count : 3.67 M/uL  Hemoglobin : 10.2 g/dL  Hematocrit : 32.5 %  Platelet Count - Automated : 269 K/uL  Mean Cell Volume : 88.6 fl  Mean Cell Hemoglobin : 27.8 pg  Mean Cell Hemoglobin Concentration : 31.4 gm/dL  Auto Neutrophil # : 11.58 K/uL  Auto Lymphocyte # : 2.04 K/uL  Auto Monocyte # : 0.92 K/uL  Auto Eosinophil # : 0.01 K/uL  Auto Basophil # : 0.03 K/uL  Auto Neutrophil % : 79.0 %  Auto Lymphocyte % : 13.9 %  Auto Monocyte % : 6.3 %  Auto Eosinophil % : 0.1 %  Auto Basophil % : 0.2 %    11-30    138  |  101  |  22.5<H>  ----------------------------<  115<H>  4.5   |  31.0<H>  |  0.80    Ca    8.2<L>      30 Nov 2023 05:00  Phos  3.0     11-30  Mg     2.2     11-30        Urinalysis Basic - ( 30 Nov 2023 05:00 )    Color: x / Appearance: x / SG: x / pH: x  Gluc: 115 mg/dL / Ketone: x  / Bili: x / Urobili: x   Blood: x / Protein: x / Nitrite: x   Leuk Esterase: x / RBC: x / WBC x   Sq Epi: x / Non Sq Epi: x / Bacteria: x      RECENT CULTURES:            CAPILLARY BLOOD GLUCOSE      RADIOLOGY & ADDITIONAL STUDIES:    ASSESSMENT/PLAN:  58yMale presenting with: Left PTX , Multiple left rib fractures, closed, now with AMS.  Probably delirium exacerbated by acute hypoxic respiratory failure. Likely Acute Asthma exacerbation/ reactive airway dz.  Possibly ETOH withdrawal.     Neurological: I have ordered multiple doses of Ativan. Will hold off on further at this time.  Will Sedate with Propofol for possible ETOH withdrawal.  Unlikely delayed ICH but will get head CT once stabilized. If hypotension, may switch to Ketamine for added bronchodilation effect.     Pulmonary: I placed on increased rate of AC.  SBT in Am if calm with SAT.  STAT ABG in SICU.  RT pulled back deep ETT slightly. CW Soulmedrol and Duonebs for reactive airway dz. I will order chest tube to suction now that intubated.  Can repeat CXR later and place on water seal again then.     Cardiovascular: Tachycardia improved after intubation.     Gastrointestinal: OGT To suction    Genitourinary: Will place Deleon for today since critically ill and intubated.     Heme: Hold Lovenox until Head CT done    ID: None    Lines/ Tubes: Pt lost all IV Access in Rapid response so I asked nurse to place IO. Can obtain PIV now and remove IO.    Dispo: Pt critically ill.  Acutely hypoxic to 77% Saturation. I have intubated and I am manipulating and supporting multiple body systems to prevent further decline and death.      CRITICAL CARE TIME SPENT: 69 minutes including rapid response mgt, post care in ICU, Notes, Reviewing images and labs, orders , discussing with Dr Mackey, floor team, SICU team, Multiidisciplinary team and coordinating care.    INTERVAL HPI/OVERNIGHT EVENTS/SUBJECTIVE: Pt known to SICU team.  Pt downgraded yesterday.  Was called Code grey and Rapid response earlier.  Floor team managed agitation with Zyprexa and Ativan 1 mg.  Pt became more agitated and SOB.  Ordered for CXR and Duoneb but unable to be given.  I was called as pt had increased CIWA score despite additional Ativan 2mg.    When I arrived, pt was being held by multiple security and medical team members down in bed.  Pt diaphoretic and moaning, resisting being held down.  Audibly wheezing externally.  Unable to give any ROS due to AMS and critical illness.  Drastic change from shortly earlier.    I asked nursing team to establish VS.  O2 saturation was 77% on NRBM. HR 160s and SBP 160s.  I asked the clinical pharmacist to help set up and give Solu-medrol 125, Mag 2 gm and RT to give Duoneb.    Quickly he became more unresponsive and I proceeded to intubate pt.     ICU Vital Signs Last 24 Hrs  T(C): 36.9 (30 Nov 2023 19:02), Max: 36.9 (30 Nov 2023 04:47)  T(F): 98.4 (30 Nov 2023 19:02), Max: 98.5 (30 Nov 2023 04:47)  HR: 105 (01 Dec 2023 01:10) (65 - 105)  BP: 156/98 (30 Nov 2023 22:53) (111/74 - 162/99)  BP(mean): 119 (30 Nov 2023 17:00) (72 - 119)  ABP: --  ABP(mean): --  RR: 19 (30 Nov 2023 22:53) (11 - 20)  SpO2: 100% (01 Dec 2023 01:10) (93% - 100%)    O2 Parameters below as of 30 Nov 2023 22:53  Patient On (Oxygen Delivery Method): nasal cannula  O2 Flow (L/min): 6          I&O's Detail    29 Nov 2023 07:01  -  30 Nov 2023 07:00  --------------------------------------------------------  IN:    IV PiggyBack: 100 mL    IV PiggyBack: 50 mL    IV PiggyBack: 300 mL    Lactated Ringers: 1210 mL    Oral Fluid: 1115 mL  Total IN: 2775 mL    OUT:    Voided (mL): 825 mL  Total OUT: 825 mL    Total NET: 1950 mL      30 Nov 2023 07:01  -  01 Dec 2023 02:56  --------------------------------------------------------  IN:    IV PiggyBack: 100 mL  Total IN: 100 mL    OUT:  Total OUT: 0 mL    Total NET: 100 mL          Mode: AC/ CMV (Assist Control/ Continuous Mandatory Ventilation)  RR (machine): 26  TV (machine): 450  FiO2: 100  PEEP: 6  MAP: 13  PIP: 28    ABG - ( 01 Dec 2023 00:23 )  pH, Arterial: 7.430 pH, Blood: x     /  pCO2: 53    /  pO2: 73    / HCO3: 35    / Base Excess: 10.9  /  SaO2: 97.4                MEDICATIONS  (STANDING):  albuterol/ipratropium for Nebulization 3 milliLiter(s) Nebulizer every 6 hours  benztropine 2 milliGRAM(s) Oral two times a day  chlorhexidine 2% Cloths 1 Application(s) Topical daily  clonazePAM  Tablet 0.5 milliGRAM(s) Oral at bedtime  divalproex  milliGRAM(s) Oral every 12 hours  enoxaparin Injectable 40 milliGRAM(s) SubCutaneous every 12 hours  folic acid Injectable 1 milliGRAM(s) IV Push daily  gabapentin 100 milliGRAM(s) Oral at bedtime  influenza   Vaccine 0.5 milliLiter(s) IntraMuscular once  lidocaine   4% Patch 3 Patch Transdermal daily  methocarbamol 750 milliGRAM(s) Oral every 12 hours  methylPREDNISolone sodium succinate Injectable 40 milliGRAM(s) IV Push every 8 hours  nicotine - 21 mG/24Hr(s) Patch 1 Patch Transdermal every 24 hours  pantoprazole  Injectable 40 milliGRAM(s) IV Push daily  polyethylene glycol 3350 17 Gram(s) Oral at bedtime  propofol Infusion 10 MICROgram(s)/kG/Min (4.08 mL/Hr) IV Continuous <Continuous>  QUEtiapine 100 milliGRAM(s) Oral at bedtime  senna 2 Tablet(s) Oral at bedtime  thiamine IVPB 500 milliGRAM(s) IV Intermittent every 8 hours    MEDICATIONS  (PRN):  albuterol/ipratropium for Nebulization 3 milliLiter(s) Nebulizer every 6 hours PRN Shortness of Breath and/or Wheezing  HYDROmorphone  Injectable 0.5 milliGRAM(s) IV Push every 4 hours PRN breakthrough  ondansetron Injectable 4 milliGRAM(s) IV Push every 6 hours PRN Nausea  oxyCODONE    IR 5 milliGRAM(s) Oral every 4 hours PRN Moderate Pain (4 - 6)  oxyCODONE    IR 10 milliGRAM(s) Oral every 4 hours PRN Severe Pain (7 - 10)      PHYSICAL EXAM:     Gen: Severe Diaphorestic, slowly agitated.  Not yelling. Ill appearing,No pallor.    Neurological:  No focal defficit.     Pulmonary: Marked tachypnea, BS= BL . Chest tube appears in place with mild tidaling of fluid.      Cardiovascular: Marked Tachycardia.  Unable to access other due to degree of tachycardia.    Gastrointestinal:ND, Soft, NT.    Extremities: NT, AT, no edema, erythema or palpable cord noted.  FROM, = 2+ pulses throughout.      LABS:  CBC Full  -  ( 30 Nov 2023 05:00 )  WBC Count : 14.66 K/uL  RBC Count : 3.67 M/uL  Hemoglobin : 10.2 g/dL  Hematocrit : 32.5 %  Platelet Count - Automated : 269 K/uL  Mean Cell Volume : 88.6 fl  Mean Cell Hemoglobin : 27.8 pg  Mean Cell Hemoglobin Concentration : 31.4 gm/dL  Auto Neutrophil # : 11.58 K/uL  Auto Lymphocyte # : 2.04 K/uL  Auto Monocyte # : 0.92 K/uL  Auto Eosinophil # : 0.01 K/uL  Auto Basophil # : 0.03 K/uL  Auto Neutrophil % : 79.0 %  Auto Lymphocyte % : 13.9 %  Auto Monocyte % : 6.3 %  Auto Eosinophil % : 0.1 %  Auto Basophil % : 0.2 %    11-30    138  |  101  |  22.5<H>  ----------------------------<  115<H>  4.5   |  31.0<H>  |  0.80    Ca    8.2<L>      30 Nov 2023 05:00  Phos  3.0     11-30  Mg     2.2     11-30        Urinalysis Basic - ( 30 Nov 2023 05:00 )    Color: x / Appearance: x / SG: x / pH: x  Gluc: 115 mg/dL / Ketone: x  / Bili: x / Urobili: x   Blood: x / Protein: x / Nitrite: x   Leuk Esterase: x / RBC: x / WBC x   Sq Epi: x / Non Sq Epi: x / Bacteria: x      RECENT CULTURES:            CAPILLARY BLOOD GLUCOSE      RADIOLOGY & ADDITIONAL STUDIES:    ASSESSMENT/PLAN:  58yMale presenting with: Left PTX , Multiple left rib fractures, closed, now with AMS.  Probably delirium exacerbated by acute hypoxic respiratory failure. Likely Acute Asthma exacerbation/ reactive airway dz.  Possibly ETOH withdrawal.     Neurological: I have ordered multiple doses of Ativan. Will hold off on further at this time.  Will Sedate with Propofol for possible ETOH withdrawal.  Unlikely delayed ICH but will get head CT once stabilized. If hypotension, may switch to Ketamine for added bronchodilation effect.     Pulmonary: I placed on increased rate of AC.  SBT in Am if calm with SAT.  STAT ABG in SICU. No obvious PTX on post intubation CXR.  RT pulled back deep ETT slightly. CW Soulmedrol and Duonebs for reactive airway dz. I will order chest tube to suction now that intubated.  Can repeat CXR later and place on water seal again then.     Cardiovascular: Tachycardia improved after intubation.     Gastrointestinal: OGT To suction    Genitourinary: Will place Deleon for today since critically ill and intubated.     Heme: Hold Lovenox until Head CT done    ID: None    Lines/ Tubes: Pt lost all IV Access in Rapid response so I asked nurse to place IO. Can obtain PIV now and remove IO.    Dispo: Pt critically ill.  Acutely hypoxic to 77% Saturation. I have intubated and I am manipulating and supporting multiple body systems to prevent further decline and death.      CRITICAL CARE TIME SPENT: 69 minutes including rapid response mgt, post care in ICU, Notes, Reviewing images and labs, orders , discussing with Dr Mackey, floor team, SICU team, Multiidisciplinary team and coordinating care.

## 2023-12-01 NOTE — CHART NOTE - NSCHARTNOTEFT_GEN_A_CORE
Please see PA's note RE: acute events and upgrade to the SICU as this was described in detail.      SICU PA and Dr. Mackey, attending surgeon was aware of the events in real time and at bedside to assist with resuscitative efforts.      Myself and the Trauma/ACS PA remained at bedside during the entire event. Please see PA's note RE: acute events and upgrade to the SICU as this was described in detail.      SICU PA and Dr. Mackey, attending surgeon was aware of the events in real time and at bedside to assist with resuscitative efforts.      Myself and the Trauma/ACS PA remained at bedside/on the floor, during the entire event.

## 2023-12-02 LAB
ANION GAP SERPL CALC-SCNC: 11 MMOL/L — SIGNIFICANT CHANGE UP (ref 5–17)
ANION GAP SERPL CALC-SCNC: 11 MMOL/L — SIGNIFICANT CHANGE UP (ref 5–17)
BASOPHILS # BLD AUTO: 0.01 K/UL — SIGNIFICANT CHANGE UP (ref 0–0.2)
BASOPHILS # BLD AUTO: 0.01 K/UL — SIGNIFICANT CHANGE UP (ref 0–0.2)
BASOPHILS NFR BLD AUTO: 0.1 % — SIGNIFICANT CHANGE UP (ref 0–2)
BASOPHILS NFR BLD AUTO: 0.1 % — SIGNIFICANT CHANGE UP (ref 0–2)
BUN SERPL-MCNC: 15.2 MG/DL — SIGNIFICANT CHANGE UP (ref 8–20)
BUN SERPL-MCNC: 15.2 MG/DL — SIGNIFICANT CHANGE UP (ref 8–20)
CALCIUM SERPL-MCNC: 8.1 MG/DL — LOW (ref 8.4–10.5)
CALCIUM SERPL-MCNC: 8.1 MG/DL — LOW (ref 8.4–10.5)
CHLORIDE SERPL-SCNC: 103 MMOL/L — SIGNIFICANT CHANGE UP (ref 96–108)
CHLORIDE SERPL-SCNC: 103 MMOL/L — SIGNIFICANT CHANGE UP (ref 96–108)
CO2 SERPL-SCNC: 25 MMOL/L — SIGNIFICANT CHANGE UP (ref 22–29)
CO2 SERPL-SCNC: 25 MMOL/L — SIGNIFICANT CHANGE UP (ref 22–29)
CREAT SERPL-MCNC: 0.66 MG/DL — SIGNIFICANT CHANGE UP (ref 0.5–1.3)
CREAT SERPL-MCNC: 0.66 MG/DL — SIGNIFICANT CHANGE UP (ref 0.5–1.3)
EGFR: 109 ML/MIN/1.73M2 — SIGNIFICANT CHANGE UP
EGFR: 109 ML/MIN/1.73M2 — SIGNIFICANT CHANGE UP
EOSINOPHIL # BLD AUTO: 0 K/UL — SIGNIFICANT CHANGE UP (ref 0–0.5)
EOSINOPHIL # BLD AUTO: 0 K/UL — SIGNIFICANT CHANGE UP (ref 0–0.5)
EOSINOPHIL NFR BLD AUTO: 0 % — SIGNIFICANT CHANGE UP (ref 0–6)
EOSINOPHIL NFR BLD AUTO: 0 % — SIGNIFICANT CHANGE UP (ref 0–6)
GAS PNL BLDA: SIGNIFICANT CHANGE UP
GAS PNL BLDA: SIGNIFICANT CHANGE UP
GLUCOSE SERPL-MCNC: 146 MG/DL — HIGH (ref 70–99)
GLUCOSE SERPL-MCNC: 146 MG/DL — HIGH (ref 70–99)
HCT VFR BLD CALC: 33.3 % — LOW (ref 39–50)
HCT VFR BLD CALC: 33.3 % — LOW (ref 39–50)
HGB BLD-MCNC: 10.8 G/DL — LOW (ref 13–17)
HGB BLD-MCNC: 10.8 G/DL — LOW (ref 13–17)
IMM GRANULOCYTES NFR BLD AUTO: 0.7 % — SIGNIFICANT CHANGE UP (ref 0–0.9)
IMM GRANULOCYTES NFR BLD AUTO: 0.7 % — SIGNIFICANT CHANGE UP (ref 0–0.9)
LYMPHOCYTES # BLD AUTO: 1.1 K/UL — SIGNIFICANT CHANGE UP (ref 1–3.3)
LYMPHOCYTES # BLD AUTO: 1.1 K/UL — SIGNIFICANT CHANGE UP (ref 1–3.3)
LYMPHOCYTES # BLD AUTO: 9.2 % — LOW (ref 13–44)
LYMPHOCYTES # BLD AUTO: 9.2 % — LOW (ref 13–44)
MAGNESIUM SERPL-MCNC: 2.4 MG/DL — SIGNIFICANT CHANGE UP (ref 1.6–2.6)
MAGNESIUM SERPL-MCNC: 2.4 MG/DL — SIGNIFICANT CHANGE UP (ref 1.6–2.6)
MCHC RBC-ENTMCNC: 28.2 PG — SIGNIFICANT CHANGE UP (ref 27–34)
MCHC RBC-ENTMCNC: 28.2 PG — SIGNIFICANT CHANGE UP (ref 27–34)
MCHC RBC-ENTMCNC: 32.4 GM/DL — SIGNIFICANT CHANGE UP (ref 32–36)
MCHC RBC-ENTMCNC: 32.4 GM/DL — SIGNIFICANT CHANGE UP (ref 32–36)
MCV RBC AUTO: 86.9 FL — SIGNIFICANT CHANGE UP (ref 80–100)
MCV RBC AUTO: 86.9 FL — SIGNIFICANT CHANGE UP (ref 80–100)
MONOCYTES # BLD AUTO: 0.41 K/UL — SIGNIFICANT CHANGE UP (ref 0–0.9)
MONOCYTES # BLD AUTO: 0.41 K/UL — SIGNIFICANT CHANGE UP (ref 0–0.9)
MONOCYTES NFR BLD AUTO: 3.4 % — SIGNIFICANT CHANGE UP (ref 2–14)
MONOCYTES NFR BLD AUTO: 3.4 % — SIGNIFICANT CHANGE UP (ref 2–14)
NEUTROPHILS # BLD AUTO: 10.4 K/UL — HIGH (ref 1.8–7.4)
NEUTROPHILS # BLD AUTO: 10.4 K/UL — HIGH (ref 1.8–7.4)
NEUTROPHILS NFR BLD AUTO: 86.6 % — HIGH (ref 43–77)
NEUTROPHILS NFR BLD AUTO: 86.6 % — HIGH (ref 43–77)
PHOSPHATE SERPL-MCNC: 3.1 MG/DL — SIGNIFICANT CHANGE UP (ref 2.4–4.7)
PHOSPHATE SERPL-MCNC: 3.1 MG/DL — SIGNIFICANT CHANGE UP (ref 2.4–4.7)
PLATELET # BLD AUTO: 277 K/UL — SIGNIFICANT CHANGE UP (ref 150–400)
PLATELET # BLD AUTO: 277 K/UL — SIGNIFICANT CHANGE UP (ref 150–400)
POTASSIUM SERPL-MCNC: 4.3 MMOL/L — SIGNIFICANT CHANGE UP (ref 3.5–5.3)
POTASSIUM SERPL-MCNC: 4.3 MMOL/L — SIGNIFICANT CHANGE UP (ref 3.5–5.3)
POTASSIUM SERPL-SCNC: 4.3 MMOL/L — SIGNIFICANT CHANGE UP (ref 3.5–5.3)
POTASSIUM SERPL-SCNC: 4.3 MMOL/L — SIGNIFICANT CHANGE UP (ref 3.5–5.3)
RBC # BLD: 3.83 M/UL — LOW (ref 4.2–5.8)
RBC # BLD: 3.83 M/UL — LOW (ref 4.2–5.8)
RBC # FLD: 16.5 % — HIGH (ref 10.3–14.5)
RBC # FLD: 16.5 % — HIGH (ref 10.3–14.5)
SODIUM SERPL-SCNC: 139 MMOL/L — SIGNIFICANT CHANGE UP (ref 135–145)
SODIUM SERPL-SCNC: 139 MMOL/L — SIGNIFICANT CHANGE UP (ref 135–145)
WBC # BLD: 12 K/UL — HIGH (ref 3.8–10.5)
WBC # BLD: 12 K/UL — HIGH (ref 3.8–10.5)
WBC # FLD AUTO: 12 K/UL — HIGH (ref 3.8–10.5)
WBC # FLD AUTO: 12 K/UL — HIGH (ref 3.8–10.5)

## 2023-12-02 PROCEDURE — 71045 X-RAY EXAM CHEST 1 VIEW: CPT | Mod: 26

## 2023-12-02 PROCEDURE — 99291 CRITICAL CARE FIRST HOUR: CPT

## 2023-12-02 PROCEDURE — 71045 X-RAY EXAM CHEST 1 VIEW: CPT | Mod: 26,77

## 2023-12-02 RX ORDER — HYDROMORPHONE HYDROCHLORIDE 2 MG/ML
1 INJECTION INTRAMUSCULAR; INTRAVENOUS; SUBCUTANEOUS ONCE
Refills: 0 | Status: DISCONTINUED | OUTPATIENT
Start: 2023-12-02 | End: 2023-12-02

## 2023-12-02 RX ORDER — QUETIAPINE FUMARATE 200 MG/1
25 TABLET, FILM COATED ORAL ONCE
Refills: 0 | Status: COMPLETED | OUTPATIENT
Start: 2023-12-02 | End: 2023-12-02

## 2023-12-02 RX ORDER — HYDRALAZINE HCL 50 MG
10 TABLET ORAL ONCE
Refills: 0 | Status: COMPLETED | OUTPATIENT
Start: 2023-12-02 | End: 2023-12-02

## 2023-12-02 RX ORDER — OXYCODONE HYDROCHLORIDE 5 MG/1
10 TABLET ORAL EVERY 6 HOURS
Refills: 0 | Status: DISCONTINUED | OUTPATIENT
Start: 2023-12-02 | End: 2023-12-06

## 2023-12-02 RX ORDER — OXYCODONE HYDROCHLORIDE 5 MG/1
5 TABLET ORAL EVERY 6 HOURS
Refills: 0 | Status: DISCONTINUED | OUTPATIENT
Start: 2023-12-02 | End: 2023-12-06

## 2023-12-02 RX ORDER — HALOPERIDOL DECANOATE 100 MG/ML
2.5 INJECTION INTRAMUSCULAR EVERY 6 HOURS
Refills: 0 | Status: DISCONTINUED | OUTPATIENT
Start: 2023-12-02 | End: 2023-12-04

## 2023-12-02 RX ORDER — SODIUM CHLORIDE 9 MG/ML
1000 INJECTION, SOLUTION INTRAVENOUS
Refills: 0 | Status: DISCONTINUED | OUTPATIENT
Start: 2023-12-02 | End: 2023-12-03

## 2023-12-02 RX ADMIN — HYDROMORPHONE HYDROCHLORIDE 0.5 MILLIGRAM(S): 2 INJECTION INTRAMUSCULAR; INTRAVENOUS; SUBCUTANEOUS at 15:30

## 2023-12-02 RX ADMIN — Medication 1 PATCH: at 05:40

## 2023-12-02 RX ADMIN — HYDROMORPHONE HYDROCHLORIDE 1 MILLIGRAM(S): 2 INJECTION INTRAMUSCULAR; INTRAVENOUS; SUBCUTANEOUS at 18:06

## 2023-12-02 RX ADMIN — Medication 1 MILLIGRAM(S): at 12:08

## 2023-12-02 RX ADMIN — DEXMEDETOMIDINE HYDROCHLORIDE IN 0.9% SODIUM CHLORIDE 3.4 MICROGRAM(S)/KG/HR: 4 INJECTION INTRAVENOUS at 05:37

## 2023-12-02 RX ADMIN — QUETIAPINE FUMARATE 100 MILLIGRAM(S): 200 TABLET, FILM COATED ORAL at 21:17

## 2023-12-02 RX ADMIN — LIDOCAINE 3 PATCH: 4 CREAM TOPICAL at 12:08

## 2023-12-02 RX ADMIN — Medication 1 PATCH: at 05:00

## 2023-12-02 RX ADMIN — OXYCODONE HYDROCHLORIDE 5 MILLIGRAM(S): 5 TABLET ORAL at 21:16

## 2023-12-02 RX ADMIN — Medication 40 MILLIGRAM(S): at 05:36

## 2023-12-02 RX ADMIN — HYDROMORPHONE HYDROCHLORIDE 0.5 MILLIGRAM(S): 2 INJECTION INTRAMUSCULAR; INTRAVENOUS; SUBCUTANEOUS at 15:10

## 2023-12-02 RX ADMIN — Medication 40 MILLIGRAM(S): at 14:00

## 2023-12-02 RX ADMIN — Medication 0.5 MILLIGRAM(S): at 21:16

## 2023-12-02 RX ADMIN — Medication 3 MILLILITER(S): at 08:59

## 2023-12-02 RX ADMIN — GABAPENTIN 100 MILLIGRAM(S): 400 CAPSULE ORAL at 21:16

## 2023-12-02 RX ADMIN — METHOCARBAMOL 750 MILLIGRAM(S): 500 TABLET, FILM COATED ORAL at 05:38

## 2023-12-02 RX ADMIN — CHLORHEXIDINE GLUCONATE 1 APPLICATION(S): 213 SOLUTION TOPICAL at 12:08

## 2023-12-02 RX ADMIN — Medication 105 MILLIGRAM(S): at 00:24

## 2023-12-02 RX ADMIN — Medication 2 MILLIGRAM(S): at 23:02

## 2023-12-02 RX ADMIN — DEXMEDETOMIDINE HYDROCHLORIDE IN 0.9% SODIUM CHLORIDE 3.4 MICROGRAM(S)/KG/HR: 4 INJECTION INTRAVENOUS at 13:47

## 2023-12-02 RX ADMIN — Medication 3 MILLILITER(S): at 15:52

## 2023-12-02 RX ADMIN — HALOPERIDOL DECANOATE 2.5 MILLIGRAM(S): 100 INJECTION INTRAMUSCULAR at 16:30

## 2023-12-02 RX ADMIN — Medication 2 MILLIGRAM(S): at 05:39

## 2023-12-02 RX ADMIN — Medication 105 MILLIGRAM(S): at 13:47

## 2023-12-02 RX ADMIN — LIDOCAINE 3 PATCH: 4 CREAM TOPICAL at 00:42

## 2023-12-02 RX ADMIN — Medication 105 MILLIGRAM(S): at 05:54

## 2023-12-02 RX ADMIN — ENOXAPARIN SODIUM 40 MILLIGRAM(S): 100 INJECTION SUBCUTANEOUS at 05:37

## 2023-12-02 RX ADMIN — Medication 10 MILLIGRAM(S): at 12:30

## 2023-12-02 RX ADMIN — Medication 3 MILLILITER(S): at 20:18

## 2023-12-02 RX ADMIN — Medication 40 MILLIGRAM(S): at 21:16

## 2023-12-02 RX ADMIN — PANTOPRAZOLE SODIUM 40 MILLIGRAM(S): 20 TABLET, DELAYED RELEASE ORAL at 12:08

## 2023-12-02 RX ADMIN — Medication 10 MILLIGRAM(S): at 19:25

## 2023-12-02 RX ADMIN — SENNA PLUS 2 TABLET(S): 8.6 TABLET ORAL at 21:15

## 2023-12-02 RX ADMIN — DEXMEDETOMIDINE HYDROCHLORIDE IN 0.9% SODIUM CHLORIDE 3.4 MICROGRAM(S)/KG/HR: 4 INJECTION INTRAVENOUS at 00:26

## 2023-12-02 RX ADMIN — DEXMEDETOMIDINE HYDROCHLORIDE IN 0.9% SODIUM CHLORIDE 3.4 MICROGRAM(S)/KG/HR: 4 INJECTION INTRAVENOUS at 23:02

## 2023-12-02 RX ADMIN — ENOXAPARIN SODIUM 40 MILLIGRAM(S): 100 INJECTION SUBCUTANEOUS at 18:02

## 2023-12-02 RX ADMIN — Medication 105 MILLIGRAM(S): at 21:18

## 2023-12-02 RX ADMIN — QUETIAPINE FUMARATE 25 MILLIGRAM(S): 200 TABLET, FILM COATED ORAL at 09:01

## 2023-12-02 RX ADMIN — Medication 3 MILLILITER(S): at 03:48

## 2023-12-02 RX ADMIN — HYDROMORPHONE HYDROCHLORIDE 0.5 MILLIGRAM(S): 2 INJECTION INTRAMUSCULAR; INTRAVENOUS; SUBCUTANEOUS at 09:55

## 2023-12-02 RX ADMIN — CHLORHEXIDINE GLUCONATE 15 MILLILITER(S): 213 SOLUTION TOPICAL at 05:38

## 2023-12-02 RX ADMIN — POLYETHYLENE GLYCOL 3350 17 GRAM(S): 17 POWDER, FOR SOLUTION ORAL at 21:15

## 2023-12-02 RX ADMIN — HYDROMORPHONE HYDROCHLORIDE 0.5 MILLIGRAM(S): 2 INJECTION INTRAMUSCULAR; INTRAVENOUS; SUBCUTANEOUS at 10:21

## 2023-12-02 NOTE — PROGRESS NOTE ADULT - SUBJECTIVE AND OBJECTIVE BOX
24h Events:  Patient remained intubated , sedated , switched from ketamine to Precedex and fentanyl , CT head and chest performed , pneumothorax resolved , CT placed on waterseal , L subclavian central line placed , IO line removed ,TF started       ICU Vital Signs Last 24 Hrs  T(C): 36.5 (02 Dec 2023 00:00), Max: 38.4 (01 Dec 2023 16:00)  T(F): 97.7 (02 Dec 2023 00:00), Max: 101.1 (01 Dec 2023 16:00)  HR: 61 (02 Dec 2023 00:41) (57 - 111)  BP: 148/84 (02 Dec 2023 00:30) (83/56 - 198/114)  BP(mean): 101 (02 Dec 2023 00:30) (65 - 137)  ABP: --  ABP(mean): --  RR: 23 (02 Dec 2023 00:30) (19 - 26)  SpO2: 98% (02 Dec 2023 00:41) (92% - 100%)    O2 Parameters below as of 02 Dec 2023 00:30  Patient On (Oxygen Delivery Method): ventilator    O2 Concentration (%): 50        ABG - ( 01 Dec 2023 02:48 )  pH, Arterial: 7.480 pH, Blood: x     /  pCO2: 41    /  pO2: 104   / HCO3: 30    / Base Excess: 7.0   /  SaO2: 99.9                MEDICATIONS  (STANDING):  albuterol/ipratropium for Nebulization 3 milliLiter(s) Nebulizer every 6 hours  benztropine 2 milliGRAM(s) Oral two times a day  chlorhexidine 0.12% Liquid 15 milliLiter(s) Oral Mucosa every 12 hours  chlorhexidine 2% Cloths 1 Application(s) Topical daily  clonazePAM  Tablet 0.5 milliGRAM(s) Oral at bedtime  dexMEDEtomidine Infusion 0.2 MICROgram(s)/kG/Hr (3.4 mL/Hr) IV Continuous <Continuous>  divalproex  milliGRAM(s) Oral every 12 hours  enoxaparin Injectable 40 milliGRAM(s) SubCutaneous every 12 hours  fentaNYL   Infusion 1 MICROgram(s)/kG/Hr (6.8 mL/Hr) IV Continuous <Continuous>  folic acid Injectable 1 milliGRAM(s) IV Push daily  gabapentin 100 milliGRAM(s) Oral at bedtime  influenza   Vaccine 0.5 milliLiter(s) IntraMuscular once  lidocaine   4% Patch 3 Patch Transdermal daily  methocarbamol 750 milliGRAM(s) Oral every 12 hours  methylPREDNISolone sodium succinate Injectable 40 milliGRAM(s) IV Push every 8 hours  nicotine - 21 mG/24Hr(s) Patch 1 Patch Transdermal every 24 hours  pantoprazole  Injectable 40 milliGRAM(s) IV Push daily  polyethylene glycol 3350 17 Gram(s) Oral at bedtime  QUEtiapine 100 milliGRAM(s) Oral at bedtime  senna 2 Tablet(s) Oral at bedtime  thiamine IVPB 500 milliGRAM(s) IV Intermittent every 8 hours    MEDICATIONS  (PRN):  albuterol/ipratropium for Nebulization 3 milliLiter(s) Nebulizer every 6 hours PRN Shortness of Breath and/or Wheezing  HYDROmorphone  Injectable 0.5 milliGRAM(s) IV Push every 4 hours PRN breakthrough  ondansetron Injectable 4 milliGRAM(s) IV Push every 6 hours PRN Nausea          Physical Exam:    Gen: sedated     HEENT: PERRL, EOMI    Neurological: sedated , intubated RASS -3    Neck: Trachea midline, no evidence of JVD, FROM without pain, neck symmetric    Pulmonary: sedated on /26 /6/50%, CT in place     Cardiovascular: S1S2, NSR     Gastrointestinal: Soft, non-tender, non-distended    : Deleon in place draining yellow urine    Extremities: Without c/c/e    Skin: Intact    Musculoskeletal: FROM without pain, no deformity or areas of tenderness    LABS:      LABS                   CAPILLARY BLOOD GLUCOSE        Urinalysis Basic - ( 01 Dec 2023 03:00 )    Color: x / Appearance: x / SG: x / pH: x  Gluc: 112 mg/dL / Ketone: x  / Bili: x / Urobili: x   Blood: x / Protein: x / Nitrite: x   Leuk Esterase: x / RBC: x / WBC x   Sq Epi: x / Non Sq Epi: x / Bacteria: x      ABG - ( 01 Dec 2023 02:48 )  pH: 7.480 /  pCO2: 41    /  pO2: 104   / HCO3: 30    / Base Excess: 7.0   /  SaO2: 99.9              16:55 - VBG - pH: 7.390 | pCO2: 50    | pO2: 67    | Lactate: 1.00     --------------------------------------------------------------------------------------------

## 2023-12-02 NOTE — AIRWAY REMOVAL NOTE  ADULT & PEDS - RESPIRATORY EXPANSION/ACCESSORY MUSCLES/RETRACTIONS
suprasternal retractions/no use of accessory muscles
no use of accessory muscles/no retractions/expansion symmetric

## 2023-12-02 NOTE — PROGRESS NOTE ADULT - CRITICAL CARE ATTENDING COMMENT
I have seen and examined this patient with the SICU team.  No acute events overnight.  Patient appears better this morning.  Neuro: Patient awake and alert.  Pain is controlled.  No signs of acute EtOH withdrawal.  On home seroquel and depakote.    Card: HDS.  Pulm: Rib fractures with PTX and subcutaneous emphysema.  Pulling 1500cc on IS.  GI: NPO.  On IVF.  Will obtain CT esophagram to rule out esophageal injury.  If WNL, will ADAT.  : UOP adequate.  Heme: Trend Hgb, transfuse PRN.  ID: Trend WBC, culture if febrile.   DVT PPx Lovenox.  Dispo: Continued SICU care.
Patient seen and examined on am rounds. Extubated on rounds, angry and says he's "been through a lot". CXR stable, pigtail on waterseal, will plan to d/c today. will d/c cruz, d/c TLC, bedside swallow, if ok will start diet. continue to monitor in ICU today post extubation. Vast improvement in subcu emphaseyma. Cont nebs and home meds.

## 2023-12-02 NOTE — PROGRESS NOTE ADULT - ASSESSMENT
Assessment and Plan:  58yMale presenting with: Multiple Left sided closed rib fractures, Left Pneumothorax, extensive sub Q air. s/p CT , s/p SICU upgrade after downgrade       Neuro:   - on Fent / precedex   - RASS -3   - Multimodal pain control   - delirium precautions  - Optimize sleep / wake cycle  - daily sedation holidays  - depakote / Seroquel/ clonazepam/      CV:   - Continue hemodynamic monitoring  - Maintain MAP > 65  - /89 one dose of IV hydralazine administered     Pulm:   multiple rib fx , pneumothorax   - CT on waterseal plan to remove today if appropriate   - /26 /6/50%  - wean to extubate today     GI/Nutrition:   - started on TF OG   - Monitor bowel function and continue serial abdominal exams  - continue bowel reg    /Renal:   - cruz for strict I&O  - monitor kidney fxn    ID:  - none    Endo:  - on solumedrol for upper airway edema   - monitor blood glucose    Skin:   - repositioning for DTI prevention while in bed    Heme/DVT Prophylaxis:  - SCDs  - lov    Dispo:  - care per SICU

## 2023-12-03 LAB
ANION GAP SERPL CALC-SCNC: 13 MMOL/L — SIGNIFICANT CHANGE UP (ref 5–17)
ANION GAP SERPL CALC-SCNC: 13 MMOL/L — SIGNIFICANT CHANGE UP (ref 5–17)
BUN SERPL-MCNC: 22.4 MG/DL — HIGH (ref 8–20)
BUN SERPL-MCNC: 22.4 MG/DL — HIGH (ref 8–20)
CALCIUM SERPL-MCNC: 8.2 MG/DL — LOW (ref 8.4–10.5)
CALCIUM SERPL-MCNC: 8.2 MG/DL — LOW (ref 8.4–10.5)
CHLORIDE SERPL-SCNC: 103 MMOL/L — SIGNIFICANT CHANGE UP (ref 96–108)
CHLORIDE SERPL-SCNC: 103 MMOL/L — SIGNIFICANT CHANGE UP (ref 96–108)
CO2 SERPL-SCNC: 24 MMOL/L — SIGNIFICANT CHANGE UP (ref 22–29)
CO2 SERPL-SCNC: 24 MMOL/L — SIGNIFICANT CHANGE UP (ref 22–29)
CREAT SERPL-MCNC: 0.54 MG/DL — SIGNIFICANT CHANGE UP (ref 0.5–1.3)
CREAT SERPL-MCNC: 0.54 MG/DL — SIGNIFICANT CHANGE UP (ref 0.5–1.3)
EGFR: 116 ML/MIN/1.73M2 — SIGNIFICANT CHANGE UP
EGFR: 116 ML/MIN/1.73M2 — SIGNIFICANT CHANGE UP
GLUCOSE SERPL-MCNC: 144 MG/DL — HIGH (ref 70–99)
GLUCOSE SERPL-MCNC: 144 MG/DL — HIGH (ref 70–99)
HCT VFR BLD CALC: 33.3 % — LOW (ref 39–50)
HCT VFR BLD CALC: 33.3 % — LOW (ref 39–50)
HGB BLD-MCNC: 10.6 G/DL — LOW (ref 13–17)
HGB BLD-MCNC: 10.6 G/DL — LOW (ref 13–17)
MAGNESIUM SERPL-MCNC: 2.2 MG/DL — SIGNIFICANT CHANGE UP (ref 1.6–2.6)
MAGNESIUM SERPL-MCNC: 2.2 MG/DL — SIGNIFICANT CHANGE UP (ref 1.6–2.6)
MCHC RBC-ENTMCNC: 27.3 PG — SIGNIFICANT CHANGE UP (ref 27–34)
MCHC RBC-ENTMCNC: 27.3 PG — SIGNIFICANT CHANGE UP (ref 27–34)
MCHC RBC-ENTMCNC: 31.8 GM/DL — LOW (ref 32–36)
MCHC RBC-ENTMCNC: 31.8 GM/DL — LOW (ref 32–36)
MCV RBC AUTO: 85.8 FL — SIGNIFICANT CHANGE UP (ref 80–100)
MCV RBC AUTO: 85.8 FL — SIGNIFICANT CHANGE UP (ref 80–100)
PHOSPHATE SERPL-MCNC: 3.5 MG/DL — SIGNIFICANT CHANGE UP (ref 2.4–4.7)
PHOSPHATE SERPL-MCNC: 3.5 MG/DL — SIGNIFICANT CHANGE UP (ref 2.4–4.7)
PLATELET # BLD AUTO: 288 K/UL — SIGNIFICANT CHANGE UP (ref 150–400)
PLATELET # BLD AUTO: 288 K/UL — SIGNIFICANT CHANGE UP (ref 150–400)
POTASSIUM SERPL-MCNC: 4.3 MMOL/L — SIGNIFICANT CHANGE UP (ref 3.5–5.3)
POTASSIUM SERPL-MCNC: 4.3 MMOL/L — SIGNIFICANT CHANGE UP (ref 3.5–5.3)
POTASSIUM SERPL-SCNC: 4.3 MMOL/L — SIGNIFICANT CHANGE UP (ref 3.5–5.3)
POTASSIUM SERPL-SCNC: 4.3 MMOL/L — SIGNIFICANT CHANGE UP (ref 3.5–5.3)
RBC # BLD: 3.88 M/UL — LOW (ref 4.2–5.8)
RBC # BLD: 3.88 M/UL — LOW (ref 4.2–5.8)
RBC # FLD: 16.4 % — HIGH (ref 10.3–14.5)
RBC # FLD: 16.4 % — HIGH (ref 10.3–14.5)
SODIUM SERPL-SCNC: 139 MMOL/L — SIGNIFICANT CHANGE UP (ref 135–145)
SODIUM SERPL-SCNC: 139 MMOL/L — SIGNIFICANT CHANGE UP (ref 135–145)
WBC # BLD: 12.15 K/UL — HIGH (ref 3.8–10.5)
WBC # BLD: 12.15 K/UL — HIGH (ref 3.8–10.5)
WBC # FLD AUTO: 12.15 K/UL — HIGH (ref 3.8–10.5)
WBC # FLD AUTO: 12.15 K/UL — HIGH (ref 3.8–10.5)

## 2023-12-03 PROCEDURE — 99232 SBSQ HOSP IP/OBS MODERATE 35: CPT

## 2023-12-03 PROCEDURE — 93010 ELECTROCARDIOGRAM REPORT: CPT

## 2023-12-03 RX ORDER — LANOLIN ALCOHOL/MO/W.PET/CERES
5 CREAM (GRAM) TOPICAL AT BEDTIME
Refills: 0 | Status: DISCONTINUED | OUTPATIENT
Start: 2023-12-03 | End: 2023-12-08

## 2023-12-03 RX ORDER — SODIUM CHLORIDE 9 MG/ML
1000 INJECTION INTRAMUSCULAR; INTRAVENOUS; SUBCUTANEOUS
Refills: 0 | Status: DISCONTINUED | OUTPATIENT
Start: 2023-12-03 | End: 2023-12-03

## 2023-12-03 RX ADMIN — Medication 2 MILLIGRAM(S): at 17:11

## 2023-12-03 RX ADMIN — Medication 105 MILLIGRAM(S): at 06:04

## 2023-12-03 RX ADMIN — Medication 105 MILLIGRAM(S): at 13:23

## 2023-12-03 RX ADMIN — PANTOPRAZOLE SODIUM 40 MILLIGRAM(S): 20 TABLET, DELAYED RELEASE ORAL at 11:28

## 2023-12-03 RX ADMIN — DEXMEDETOMIDINE HYDROCHLORIDE IN 0.9% SODIUM CHLORIDE 3.4 MICROGRAM(S)/KG/HR: 4 INJECTION INTRAVENOUS at 06:09

## 2023-12-03 RX ADMIN — Medication 3 MILLILITER(S): at 14:37

## 2023-12-03 RX ADMIN — Medication 3 MILLILITER(S): at 08:24

## 2023-12-03 RX ADMIN — SODIUM CHLORIDE 75 MILLILITER(S): 9 INJECTION INTRAMUSCULAR; INTRAVENOUS; SUBCUTANEOUS at 10:17

## 2023-12-03 RX ADMIN — SODIUM CHLORIDE 75 MILLILITER(S): 9 INJECTION INTRAMUSCULAR; INTRAVENOUS; SUBCUTANEOUS at 13:27

## 2023-12-03 RX ADMIN — DEXMEDETOMIDINE HYDROCHLORIDE IN 0.9% SODIUM CHLORIDE 3.4 MICROGRAM(S)/KG/HR: 4 INJECTION INTRAVENOUS at 13:25

## 2023-12-03 RX ADMIN — Medication 40 MILLIGRAM(S): at 13:23

## 2023-12-03 RX ADMIN — SENNA PLUS 2 TABLET(S): 8.6 TABLET ORAL at 21:17

## 2023-12-03 RX ADMIN — Medication 1 PATCH: at 05:15

## 2023-12-03 RX ADMIN — Medication 40 MILLIGRAM(S): at 06:06

## 2023-12-03 RX ADMIN — ENOXAPARIN SODIUM 40 MILLIGRAM(S): 100 INJECTION SUBCUTANEOUS at 17:11

## 2023-12-03 RX ADMIN — LIDOCAINE 3 PATCH: 4 CREAM TOPICAL at 05:58

## 2023-12-03 RX ADMIN — Medication 1 PATCH: at 06:06

## 2023-12-03 RX ADMIN — DEXMEDETOMIDINE HYDROCHLORIDE IN 0.9% SODIUM CHLORIDE 3.4 MICROGRAM(S)/KG/HR: 4 INJECTION INTRAVENOUS at 20:08

## 2023-12-03 RX ADMIN — LIDOCAINE 3 PATCH: 4 CREAM TOPICAL at 11:29

## 2023-12-03 RX ADMIN — Medication 0.5 MILLIGRAM(S): at 21:18

## 2023-12-03 RX ADMIN — ENOXAPARIN SODIUM 40 MILLIGRAM(S): 100 INJECTION SUBCUTANEOUS at 06:05

## 2023-12-03 RX ADMIN — LIDOCAINE 3 PATCH: 4 CREAM TOPICAL at 00:00

## 2023-12-03 RX ADMIN — DIVALPROEX SODIUM 500 MILLIGRAM(S): 500 TABLET, DELAYED RELEASE ORAL at 06:04

## 2023-12-03 RX ADMIN — OXYCODONE HYDROCHLORIDE 10 MILLIGRAM(S): 5 TABLET ORAL at 21:00

## 2023-12-03 RX ADMIN — GABAPENTIN 100 MILLIGRAM(S): 400 CAPSULE ORAL at 21:19

## 2023-12-03 RX ADMIN — Medication 5 MILLIGRAM(S): at 21:19

## 2023-12-03 RX ADMIN — DIVALPROEX SODIUM 500 MILLIGRAM(S): 500 TABLET, DELAYED RELEASE ORAL at 17:11

## 2023-12-03 RX ADMIN — Medication 40 MILLIGRAM(S): at 21:20

## 2023-12-03 RX ADMIN — Medication 1 PATCH: at 13:17

## 2023-12-03 RX ADMIN — METHOCARBAMOL 750 MILLIGRAM(S): 500 TABLET, FILM COATED ORAL at 06:04

## 2023-12-03 RX ADMIN — POLYETHYLENE GLYCOL 3350 17 GRAM(S): 17 POWDER, FOR SOLUTION ORAL at 21:19

## 2023-12-03 RX ADMIN — METHOCARBAMOL 750 MILLIGRAM(S): 500 TABLET, FILM COATED ORAL at 17:11

## 2023-12-03 RX ADMIN — DEXMEDETOMIDINE HYDROCHLORIDE IN 0.9% SODIUM CHLORIDE 3.4 MICROGRAM(S)/KG/HR: 4 INJECTION INTRAVENOUS at 04:27

## 2023-12-03 RX ADMIN — OXYCODONE HYDROCHLORIDE 10 MILLIGRAM(S): 5 TABLET ORAL at 20:08

## 2023-12-03 RX ADMIN — Medication 1 MILLIGRAM(S): at 11:46

## 2023-12-03 RX ADMIN — Medication 3 MILLILITER(S): at 20:17

## 2023-12-03 RX ADMIN — OXYCODONE HYDROCHLORIDE 10 MILLIGRAM(S): 5 TABLET ORAL at 12:16

## 2023-12-03 RX ADMIN — QUETIAPINE FUMARATE 100 MILLIGRAM(S): 200 TABLET, FILM COATED ORAL at 21:20

## 2023-12-03 RX ADMIN — DEXMEDETOMIDINE HYDROCHLORIDE IN 0.9% SODIUM CHLORIDE 3.4 MICROGRAM(S)/KG/HR: 4 INJECTION INTRAVENOUS at 10:18

## 2023-12-03 RX ADMIN — LIDOCAINE 3 PATCH: 4 CREAM TOPICAL at 19:30

## 2023-12-03 RX ADMIN — Medication 2 MILLIGRAM(S): at 06:05

## 2023-12-03 RX ADMIN — Medication 105 MILLIGRAM(S): at 21:20

## 2023-12-03 RX ADMIN — CHLORHEXIDINE GLUCONATE 1 APPLICATION(S): 213 SOLUTION TOPICAL at 11:28

## 2023-12-03 NOTE — PROGRESS NOTE ADULT - SUBJECTIVE AND OBJECTIVE BOX
HPI:  57M with PMH seizures, liver disease, back pain, HLD presents to ED intoxicated s/p fall. Per ED, patient lives at group home; was drunk today and fell down stairs. In ED was intubated for airway protection after admin of Versed and subsequent emesis. Imaging reveal multiple rib fxs and L ptx with associated extensive subcutaneous emphysema requiring L chest tube.       INTERVAL HPI/OVERNIGHT EVENTS: Patient extubated yesterday, doing well. L chest tube was discontinued. Patient had intermittent periods of agitation yesterday requiring precedex and haldol.  Overnight patient attempted to self discontinue L subclavian TLC, rescue medications given and staff had to de-escalate behavior.  Adequate PIV placed and central line discontinued by team in Trendelenburg with adequate hemostasis. Patient slept well throughout the night. No hypoxic issues.       PAST MEDICAL & SURGICAL HISTORY:  High cholesterol      Hiatal hernia      Tardive dyskinesia      Liver failure      Seizures      ETOH abuse      S/P tonsillectomy      History of lumbar spinal fusion  6/2017          MEDICATIONS  (STANDING):  albuterol/ipratropium for Nebulization 3 milliLiter(s) Nebulizer every 6 hours  benztropine 2 milliGRAM(s) Oral two times a day  chlorhexidine 2% Cloths 1 Application(s) Topical daily  clonazePAM  Tablet 0.5 milliGRAM(s) Oral at bedtime  dexMEDEtomidine Infusion 0.2 MICROgram(s)/kG/Hr (3.4 mL/Hr) IV Continuous <Continuous>  divalproex  milliGRAM(s) Oral every 12 hours  enoxaparin Injectable 40 milliGRAM(s) SubCutaneous every 12 hours  folic acid Injectable 1 milliGRAM(s) IV Push daily  gabapentin 100 milliGRAM(s) Oral at bedtime  influenza   Vaccine 0.5 milliLiter(s) IntraMuscular once  lactated ringers. 1000 milliLiter(s) (75 mL/Hr) IV Continuous <Continuous>  lidocaine   4% Patch 3 Patch Transdermal daily  methocarbamol 750 milliGRAM(s) Oral every 12 hours  methylPREDNISolone sodium succinate Injectable 40 milliGRAM(s) IV Push every 8 hours  nicotine - 21 mG/24Hr(s) Patch 1 Patch Transdermal every 24 hours  pantoprazole  Injectable 40 milliGRAM(s) IV Push daily  polyethylene glycol 3350 17 Gram(s) Oral at bedtime  QUEtiapine 100 milliGRAM(s) Oral at bedtime  senna 2 Tablet(s) Oral at bedtime  thiamine IVPB 500 milliGRAM(s) IV Intermittent every 8 hours    MEDICATIONS  (PRN):  albuterol/ipratropium for Nebulization 3 milliLiter(s) Nebulizer every 6 hours PRN Shortness of Breath and/or Wheezing  haloperidol    Injectable 2.5 milliGRAM(s) IV Push every 6 hours PRN agitation  HYDROmorphone  Injectable 0.5 milliGRAM(s) IV Push every 4 hours PRN breakthrough  ondansetron Injectable 4 milliGRAM(s) IV Push every 6 hours PRN Nausea  oxyCODONE    IR 10 milliGRAM(s) Oral every 6 hours PRN Severe Pain (7 - 10)  oxyCODONE    IR 5 milliGRAM(s) Oral every 6 hours PRN Moderate Pain (4 - 6)      ICU Vital Signs Last 24 Hrs  T(C): 36.7 (02 Dec 2023 23:00), Max: 37.1 (02 Dec 2023 09:00)  T(F): 98 (02 Dec 2023 23:00), Max: 98.8 (02 Dec 2023 09:00)  HR: 51 (03 Dec 2023 01:00) (50 - 84)  BP: 142/87 (03 Dec 2023 01:00) (104/63 - 199/112)  BP(mean): 104 (03 Dec 2023 01:00) (78 - 144)  ABP: --  ABP(mean): --  RR: 12 (03 Dec 2023 01:00) (10 - 33)  SpO2: 98% (03 Dec 2023 01:00) (90% - 100%)    O2 Parameters below as of 02 Dec 2023 20:20  Patient On (Oxygen Delivery Method): nasal cannula            Drug Dosing Weight  Height (cm): 157.5 (01 Dec 2023 11:00)  Weight (kg): 68 (28 Nov 2023 16:39)  BMI (kg/m2): 27.4 (01 Dec 2023 11:00)  BSA (m2): 1.69 (01 Dec 2023 11:00)    CENTRAL LINE: [ ] YES [ ] NO  LOCATION:   DATE INSERTED:  REMOVE: [ ] YES [ ] NO  EXPLAIN:    HI: [ ] YES [ ] NO    DATE INSERTED:  REMOVE: [ ] YES [ ] NO  EXPLAIN:    A-LINE: [ ] YES [ ] NO  LOCATION:   DATE INSERTED:  REMOVE: [ ] YES [ ] NO  EXPLAIN:      ICU Vital Signs Last 24 Hrs  T(C): 36.7 (02 Dec 2023 23:00), Max: 37.1 (02 Dec 2023 09:00)  T(F): 98 (02 Dec 2023 23:00), Max: 98.8 (02 Dec 2023 09:00)  HR: 51 (03 Dec 2023 01:00) (50 - 84)  BP: 142/87 (03 Dec 2023 01:00) (104/63 - 199/112)  BP(mean): 104 (03 Dec 2023 01:00) (78 - 144)  ABP: --  ABP(mean): --  RR: 12 (03 Dec 2023 01:00) (10 - 33)  SpO2: 98% (03 Dec 2023 01:00) (90% - 100%)    O2 Parameters below as of 02 Dec 2023 20:20  Patient On (Oxygen Delivery Method): nasal cannula            ABG - ( 02 Dec 2023 05:11 )  pH, Arterial: 7.430 pH, Blood: x     /  pCO2: 44    /  pO2: 82    / HCO3: 29    / Base Excess: 4.9   /  SaO2: 97.5                I&O's Detail    01 Dec 2023 07:01  -  02 Dec 2023 07:00  --------------------------------------------------------  IN:    Dexmedetomidine: 247.4 mL    FentaNYL: 131.6 mL    IV PiggyBack: 100 mL    IV PiggyBack: 300 mL    Ketamine: 5.1 mL    Pivot 1.5: 60 mL  Total IN: 844.1 mL    OUT:    Chest Tube (mL): 0 mL    Ureteral Catheter (mL): 1470 mL  Total OUT: 1470 mL    Total NET: -625.9 mL      02 Dec 2023 07:01  -  03 Dec 2023 01:51  --------------------------------------------------------  IN:    Dexmedetomidine: 173.9 mL    IV PiggyBack: 100 mL  Total IN: 273.9 mL    OUT:    FentaNYL: 0 mL    Pivot 1.5: 0 mL    Ureteral Catheter (mL): 60 mL  Total OUT: 60 mL    Total NET: 213.9 mL          Mode: CPAP with PS  FiO2: 40  PEEP: 6  PS: 8  MAP: 11      Physical Exam:    Cons: Calm, cooperative with examiner, NAD    Neurological:  No sensory/motor deficits    Respiratory: unlabored, no accessory muscle use, ct ab/l, dressing over L pigtail site c/d/i, L 4cm incision to chest wall with signs of granulation     Cardiovascular: RRR    Gastrointestinal: Soft, nttp, nd     Extremities: No peripheral edema    Musculoskeletal: No joint pain, swelling or deformity; no limitation of movement    Skin: No rashes    LABS:  CBC Full  -  ( 02 Dec 2023 02:30 )  WBC Count : 12.00 K/uL  RBC Count : 3.83 M/uL  Hemoglobin : 10.8 g/dL  Hematocrit : 33.3 %  Platelet Count - Automated : 277 K/uL  Mean Cell Volume : 86.9 fl  Mean Cell Hemoglobin : 28.2 pg  Mean Cell Hemoglobin Concentration : 32.4 gm/dL  Auto Neutrophil # : 10.40 K/uL  Auto Lymphocyte # : 1.10 K/uL  Auto Monocyte # : 0.41 K/uL  Auto Eosinophil # : 0.00 K/uL  Auto Basophil # : 0.01 K/uL  Auto Neutrophil % : 86.6 %  Auto Lymphocyte % : 9.2 %  Auto Monocyte % : 3.4 %  Auto Eosinophil % : 0.0 %  Auto Basophil % : 0.1 %    12-02    139  |  103  |  15.2  ----------------------------<  146<H>  4.3   |  25.0  |  0.66    Ca    8.1<L>      02 Dec 2023 02:30  Phos  3.1     12-02  Mg     2.4     12-02        Urinalysis Basic - ( 02 Dec 2023 02:30 )    Color: x / Appearance: x / SG: x / pH: x  Gluc: 146 mg/dL / Ketone: x  / Bili: x / Urobili: x   Blood: x / Protein: x / Nitrite: x   Leuk Esterase: x / RBC: x / WBC x   Sq Epi: x / Non Sq Epi: x / Bacteria: x

## 2023-12-03 NOTE — PROGRESS NOTE ADULT - ASSESSMENT
Assessment and Plan: 57M with PMH seizures, liver disease, back pain, HLD presents to ED intoxicated s/p fall.  On admission, patient intubated for airway protection after admin of Versed and subsequent emesis. Imaging reveal multiple rib fxs and L ptx with associated extensive subcutaneous emphysema requiring L chest tube. Patient's hospital course was complicated by respiratory failure secondary to acute asthma exacerbation/ bronchospasm.  Patient was intubated at that time and promptly extubated.       Neuro:  Hx of Seizures, tardive dyskinesias - c/w home regimen, depakote, seroquel.   Acute agitation does not appear secondary to alcohol withdrawal, precedex for agitation and haldol prn.   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  Patient will follow up with nsurgeon as outpatient.   Continue to optimize pain control- multimodal for pic protocol.   Avoid deliriogenic medications, optimize sleep hyigene.     CV: Bradycardia secondary to precedex gtt - sinus bradycardia on ecg. Hypertensive at times. Continue hemodynamic monitoring.  Check QTc if continues to receive haldol.     Pulm: Multiple rib fx - continue PIC protocol, including aggressive pulmonary toilet.  Continue incentive spirometer.  Chest PT.  Encourage OOB to chair and ambulation   L ptx w/ associated subcutaneous emphysema - s/p L pig tail removal. Post removal cxr without reaccumulation.   Respiratory failure secondary to reactive airway disease requiring intubation now resolved. Continue nebulizers prn. Patient should follow up with his PMD and possibly pulmonologist as outpatient.     GI/Nutrition: Dysphagia pending today. Continue bowel regimen    /Renal: TOV pending. Monitor UOP. Monitor BMP.  Replete Lytes as needed      HEME- Hgb 10.8 stable  DVT: SCDs, Lovenox     ID: No issues     Lines/Tubes: None    Endo: Maintain Euglycemia 120-180     Skin: Frequent turning and positioning, offloading while in bed.     Code Status: FULL    Dispo: Downgrade level of care today if remains off precedex

## 2023-12-03 NOTE — PROGRESS NOTE ADULT - NS ATTEND AMEND GEN_ALL_CORE FT
I have seen and examined the patient during SICU multidisciplinary rounds from 3613-8977 hrs.   Events noted.    Neuro: on Precedex, follows commands, RASS 0  CV: HD normal , bradycardia secondary to precedex, perfusion is adequate  Pulm: Sao2 adequate, CXR no PTX  GI: Soft, non tender  : urine flow adequate , electrolytes ok  ID: no issues  Heme: h/h stable on dvt chemoprophylaxes  Endo: g;ycemia at target    Plan:  Kali fx, resolved PTX resolving subcutaneous emphysemas  wean precedex,  consult psychiatry to evaluate medication   DVt chemoprophylaxes  PT I have seen and examined the patient during SICU multidisciplinary rounds from 1918-8851 hrs.   Events noted.    Neuro: on Precedex, follows commands, RASS 0  CV: HD normal , bradycardia secondary to precedex, perfusion is adequate  Pulm: Sao2 adequate, CXR no PTX  GI: Soft, non tender  : urine flow adequate , electrolytes ok  ID: no issues  Heme: h/h stable on dvt chemoprophylaxes  Endo: g;ycemia at target    Plan:  Kali fx, resolved PTX resolving subcutaneous emphysemas  wean precedex,  consult psychiatry to evaluate medication   DVt chemoprophylaxes  PT I have seen and examined the patient during SICU multidisciplinary rounds from 9834-1606 hrs.   Events noted.    Neuro: on Precedex, follows commands, RASS 0  CV: HD normal , bradycardia secondary to precedex, perfusion is adequate  Pulm: Sao2 adequate, CXR no PTX  GI: Soft, non tender  : urine flow adequate , electrolytes ok  ID: no issues  Heme: h/h stable on dvt chemoprophylaxes  Endo: g;ycemia at target    Plan:  Kali fx, resolved PTX resolving subcutaneous emphysemas  wean precedex,  consult psychiatry to evaluate medication   DVt chemoprophylaxes  PT

## 2023-12-04 LAB
ANION GAP SERPL CALC-SCNC: 11 MMOL/L — SIGNIFICANT CHANGE UP (ref 5–17)
ANION GAP SERPL CALC-SCNC: 11 MMOL/L — SIGNIFICANT CHANGE UP (ref 5–17)
ANISOCYTOSIS BLD QL: SLIGHT — SIGNIFICANT CHANGE UP
ANISOCYTOSIS BLD QL: SLIGHT — SIGNIFICANT CHANGE UP
BASOPHILS # BLD AUTO: 0 K/UL — SIGNIFICANT CHANGE UP (ref 0–0.2)
BASOPHILS # BLD AUTO: 0 K/UL — SIGNIFICANT CHANGE UP (ref 0–0.2)
BASOPHILS NFR BLD AUTO: 0 % — SIGNIFICANT CHANGE UP (ref 0–2)
BASOPHILS NFR BLD AUTO: 0 % — SIGNIFICANT CHANGE UP (ref 0–2)
BUN SERPL-MCNC: 30.8 MG/DL — HIGH (ref 8–20)
BUN SERPL-MCNC: 30.8 MG/DL — HIGH (ref 8–20)
BURR CELLS BLD QL SMEAR: PRESENT — SIGNIFICANT CHANGE UP
BURR CELLS BLD QL SMEAR: PRESENT — SIGNIFICANT CHANGE UP
CALCIUM SERPL-MCNC: 7.8 MG/DL — LOW (ref 8.4–10.5)
CALCIUM SERPL-MCNC: 7.8 MG/DL — LOW (ref 8.4–10.5)
CHLORIDE SERPL-SCNC: 104 MMOL/L — SIGNIFICANT CHANGE UP (ref 96–108)
CHLORIDE SERPL-SCNC: 104 MMOL/L — SIGNIFICANT CHANGE UP (ref 96–108)
CO2 SERPL-SCNC: 23 MMOL/L — SIGNIFICANT CHANGE UP (ref 22–29)
CO2 SERPL-SCNC: 23 MMOL/L — SIGNIFICANT CHANGE UP (ref 22–29)
CREAT SERPL-MCNC: 0.89 MG/DL — SIGNIFICANT CHANGE UP (ref 0.5–1.3)
CREAT SERPL-MCNC: 0.89 MG/DL — SIGNIFICANT CHANGE UP (ref 0.5–1.3)
EGFR: 99 ML/MIN/1.73M2 — SIGNIFICANT CHANGE UP
EGFR: 99 ML/MIN/1.73M2 — SIGNIFICANT CHANGE UP
EOSINOPHIL # BLD AUTO: 0 K/UL — SIGNIFICANT CHANGE UP (ref 0–0.5)
EOSINOPHIL # BLD AUTO: 0 K/UL — SIGNIFICANT CHANGE UP (ref 0–0.5)
EOSINOPHIL NFR BLD AUTO: 0 % — SIGNIFICANT CHANGE UP (ref 0–6)
EOSINOPHIL NFR BLD AUTO: 0 % — SIGNIFICANT CHANGE UP (ref 0–6)
GLUCOSE SERPL-MCNC: 144 MG/DL — HIGH (ref 70–99)
GLUCOSE SERPL-MCNC: 144 MG/DL — HIGH (ref 70–99)
HCT VFR BLD CALC: 31.3 % — LOW (ref 39–50)
HCT VFR BLD CALC: 31.3 % — LOW (ref 39–50)
HGB BLD-MCNC: 10.3 G/DL — LOW (ref 13–17)
HGB BLD-MCNC: 10.3 G/DL — LOW (ref 13–17)
LYMPHOCYTES # BLD AUTO: 0.82 K/UL — LOW (ref 1–3.3)
LYMPHOCYTES # BLD AUTO: 0.82 K/UL — LOW (ref 1–3.3)
LYMPHOCYTES # BLD AUTO: 7 % — LOW (ref 13–44)
LYMPHOCYTES # BLD AUTO: 7 % — LOW (ref 13–44)
MACROCYTES BLD QL: SLIGHT — SIGNIFICANT CHANGE UP
MACROCYTES BLD QL: SLIGHT — SIGNIFICANT CHANGE UP
MAGNESIUM SERPL-MCNC: 2.2 MG/DL — SIGNIFICANT CHANGE UP (ref 1.6–2.6)
MAGNESIUM SERPL-MCNC: 2.2 MG/DL — SIGNIFICANT CHANGE UP (ref 1.6–2.6)
MANUAL SMEAR VERIFICATION: SIGNIFICANT CHANGE UP
MANUAL SMEAR VERIFICATION: SIGNIFICANT CHANGE UP
MCHC RBC-ENTMCNC: 28.2 PG — SIGNIFICANT CHANGE UP (ref 27–34)
MCHC RBC-ENTMCNC: 28.2 PG — SIGNIFICANT CHANGE UP (ref 27–34)
MCHC RBC-ENTMCNC: 32.9 GM/DL — SIGNIFICANT CHANGE UP (ref 32–36)
MCHC RBC-ENTMCNC: 32.9 GM/DL — SIGNIFICANT CHANGE UP (ref 32–36)
MCV RBC AUTO: 85.8 FL — SIGNIFICANT CHANGE UP (ref 80–100)
MCV RBC AUTO: 85.8 FL — SIGNIFICANT CHANGE UP (ref 80–100)
METAMYELOCYTES # FLD: 0.9 % — HIGH (ref 0–0)
METAMYELOCYTES # FLD: 0.9 % — HIGH (ref 0–0)
MONOCYTES # BLD AUTO: 0.2 K/UL — SIGNIFICANT CHANGE UP (ref 0–0.9)
MONOCYTES # BLD AUTO: 0.2 K/UL — SIGNIFICANT CHANGE UP (ref 0–0.9)
MONOCYTES NFR BLD AUTO: 1.7 % — LOW (ref 2–14)
MONOCYTES NFR BLD AUTO: 1.7 % — LOW (ref 2–14)
NEUTROPHILS # BLD AUTO: 10.6 K/UL — HIGH (ref 1.8–7.4)
NEUTROPHILS # BLD AUTO: 10.6 K/UL — HIGH (ref 1.8–7.4)
NEUTROPHILS NFR BLD AUTO: 90.4 % — HIGH (ref 43–77)
NEUTROPHILS NFR BLD AUTO: 90.4 % — HIGH (ref 43–77)
OVALOCYTES BLD QL SMEAR: SLIGHT — SIGNIFICANT CHANGE UP
OVALOCYTES BLD QL SMEAR: SLIGHT — SIGNIFICANT CHANGE UP
PHOSPHATE SERPL-MCNC: 4 MG/DL — SIGNIFICANT CHANGE UP (ref 2.4–4.7)
PHOSPHATE SERPL-MCNC: 4 MG/DL — SIGNIFICANT CHANGE UP (ref 2.4–4.7)
PLAT MORPH BLD: NORMAL — SIGNIFICANT CHANGE UP
PLAT MORPH BLD: NORMAL — SIGNIFICANT CHANGE UP
PLATELET # BLD AUTO: 277 K/UL — SIGNIFICANT CHANGE UP (ref 150–400)
PLATELET # BLD AUTO: 277 K/UL — SIGNIFICANT CHANGE UP (ref 150–400)
POIKILOCYTOSIS BLD QL AUTO: SLIGHT — SIGNIFICANT CHANGE UP
POIKILOCYTOSIS BLD QL AUTO: SLIGHT — SIGNIFICANT CHANGE UP
POTASSIUM SERPL-MCNC: 4.3 MMOL/L — SIGNIFICANT CHANGE UP (ref 3.5–5.3)
POTASSIUM SERPL-MCNC: 4.3 MMOL/L — SIGNIFICANT CHANGE UP (ref 3.5–5.3)
POTASSIUM SERPL-SCNC: 4.3 MMOL/L — SIGNIFICANT CHANGE UP (ref 3.5–5.3)
POTASSIUM SERPL-SCNC: 4.3 MMOL/L — SIGNIFICANT CHANGE UP (ref 3.5–5.3)
RBC # BLD: 3.65 M/UL — LOW (ref 4.2–5.8)
RBC # BLD: 3.65 M/UL — LOW (ref 4.2–5.8)
RBC # FLD: 16.7 % — HIGH (ref 10.3–14.5)
RBC # FLD: 16.7 % — HIGH (ref 10.3–14.5)
RBC BLD AUTO: ABNORMAL
RBC BLD AUTO: ABNORMAL
SODIUM SERPL-SCNC: 138 MMOL/L — SIGNIFICANT CHANGE UP (ref 135–145)
SODIUM SERPL-SCNC: 138 MMOL/L — SIGNIFICANT CHANGE UP (ref 135–145)
VALPROATE SERPL-MCNC: 44.5 UG/ML — LOW (ref 50–100)
VALPROATE SERPL-MCNC: 44.5 UG/ML — LOW (ref 50–100)
WBC # BLD: 11.73 K/UL — HIGH (ref 3.8–10.5)
WBC # BLD: 11.73 K/UL — HIGH (ref 3.8–10.5)
WBC # FLD AUTO: 11.73 K/UL — HIGH (ref 3.8–10.5)
WBC # FLD AUTO: 11.73 K/UL — HIGH (ref 3.8–10.5)

## 2023-12-04 PROCEDURE — 93010 ELECTROCARDIOGRAM REPORT: CPT

## 2023-12-04 PROCEDURE — 99223 1ST HOSP IP/OBS HIGH 75: CPT

## 2023-12-04 PROCEDURE — 99232 SBSQ HOSP IP/OBS MODERATE 35: CPT

## 2023-12-04 RX ORDER — PANTOPRAZOLE SODIUM 20 MG/1
40 TABLET, DELAYED RELEASE ORAL
Refills: 0 | Status: DISCONTINUED | OUTPATIENT
Start: 2023-12-04 | End: 2023-12-08

## 2023-12-04 RX ORDER — HYDRALAZINE HCL 50 MG
10 TABLET ORAL ONCE
Refills: 0 | Status: COMPLETED | OUTPATIENT
Start: 2023-12-04 | End: 2023-12-04

## 2023-12-04 RX ORDER — OLANZAPINE 15 MG/1
5 TABLET, FILM COATED ORAL ONCE
Refills: 0 | Status: COMPLETED | OUTPATIENT
Start: 2023-12-04 | End: 2023-12-04

## 2023-12-04 RX ORDER — QUETIAPINE FUMARATE 200 MG/1
125 TABLET, FILM COATED ORAL AT BEDTIME
Refills: 0 | Status: DISCONTINUED | OUTPATIENT
Start: 2023-12-04 | End: 2023-12-08

## 2023-12-04 RX ORDER — LANOLIN ALCOHOL/MO/W.PET/CERES
5 CREAM (GRAM) TOPICAL ONCE
Refills: 0 | Status: COMPLETED | OUTPATIENT
Start: 2023-12-04 | End: 2023-12-04

## 2023-12-04 RX ORDER — SODIUM CHLORIDE 9 MG/ML
1000 INJECTION INTRAMUSCULAR; INTRAVENOUS; SUBCUTANEOUS ONCE
Refills: 0 | Status: COMPLETED | OUTPATIENT
Start: 2023-12-04 | End: 2023-12-04

## 2023-12-04 RX ORDER — OLANZAPINE 15 MG/1
5 TABLET, FILM COATED ORAL EVERY 12 HOURS
Refills: 0 | Status: DISCONTINUED | OUTPATIENT
Start: 2023-12-04 | End: 2023-12-08

## 2023-12-04 RX ORDER — FOLIC ACID 0.8 MG
1 TABLET ORAL DAILY
Refills: 0 | Status: DISCONTINUED | OUTPATIENT
Start: 2023-12-04 | End: 2023-12-08

## 2023-12-04 RX ORDER — THIAMINE MONONITRATE (VIT B1) 100 MG
100 TABLET ORAL DAILY
Refills: 0 | Status: DISCONTINUED | OUTPATIENT
Start: 2023-12-04 | End: 2023-12-08

## 2023-12-04 RX ORDER — ACETAMINOPHEN 500 MG
975 TABLET ORAL EVERY 6 HOURS
Refills: 0 | Status: DISCONTINUED | OUTPATIENT
Start: 2023-12-04 | End: 2023-12-08

## 2023-12-04 RX ADMIN — HYDROMORPHONE HYDROCHLORIDE 0.5 MILLIGRAM(S): 2 INJECTION INTRAMUSCULAR; INTRAVENOUS; SUBCUTANEOUS at 03:00

## 2023-12-04 RX ADMIN — ENOXAPARIN SODIUM 40 MILLIGRAM(S): 100 INJECTION SUBCUTANEOUS at 05:25

## 2023-12-04 RX ADMIN — LIDOCAINE 3 PATCH: 4 CREAM TOPICAL at 00:00

## 2023-12-04 RX ADMIN — OXYCODONE HYDROCHLORIDE 5 MILLIGRAM(S): 5 TABLET ORAL at 01:00

## 2023-12-04 RX ADMIN — PANTOPRAZOLE SODIUM 40 MILLIGRAM(S): 20 TABLET, DELAYED RELEASE ORAL at 12:26

## 2023-12-04 RX ADMIN — Medication 5 MILLIGRAM(S): at 22:15

## 2023-12-04 RX ADMIN — QUETIAPINE FUMARATE 100 MILLIGRAM(S): 200 TABLET, FILM COATED ORAL at 22:16

## 2023-12-04 RX ADMIN — DEXMEDETOMIDINE HYDROCHLORIDE IN 0.9% SODIUM CHLORIDE 3.4 MICROGRAM(S)/KG/HR: 4 INJECTION INTRAVENOUS at 02:18

## 2023-12-04 RX ADMIN — Medication 975 MILLIGRAM(S): at 18:11

## 2023-12-04 RX ADMIN — Medication 3 MILLILITER(S): at 08:40

## 2023-12-04 RX ADMIN — SODIUM CHLORIDE 1000 MILLILITER(S): 9 INJECTION INTRAMUSCULAR; INTRAVENOUS; SUBCUTANEOUS at 10:04

## 2023-12-04 RX ADMIN — OXYCODONE HYDROCHLORIDE 10 MILLIGRAM(S): 5 TABLET ORAL at 08:54

## 2023-12-04 RX ADMIN — Medication 975 MILLIGRAM(S): at 12:26

## 2023-12-04 RX ADMIN — METHOCARBAMOL 750 MILLIGRAM(S): 500 TABLET, FILM COATED ORAL at 05:25

## 2023-12-04 RX ADMIN — Medication 100 MILLIGRAM(S): at 12:26

## 2023-12-04 RX ADMIN — ENOXAPARIN SODIUM 40 MILLIGRAM(S): 100 INJECTION SUBCUTANEOUS at 18:13

## 2023-12-04 RX ADMIN — Medication 975 MILLIGRAM(S): at 13:00

## 2023-12-04 RX ADMIN — HYDROMORPHONE HYDROCHLORIDE 0.5 MILLIGRAM(S): 2 INJECTION INTRAMUSCULAR; INTRAVENOUS; SUBCUTANEOUS at 23:07

## 2023-12-04 RX ADMIN — Medication 975 MILLIGRAM(S): at 23:06

## 2023-12-04 RX ADMIN — CHLORHEXIDINE GLUCONATE 1 APPLICATION(S): 213 SOLUTION TOPICAL at 18:12

## 2023-12-04 RX ADMIN — Medication 975 MILLIGRAM(S): at 18:39

## 2023-12-04 RX ADMIN — METHOCARBAMOL 750 MILLIGRAM(S): 500 TABLET, FILM COATED ORAL at 18:12

## 2023-12-04 RX ADMIN — DIVALPROEX SODIUM 500 MILLIGRAM(S): 500 TABLET, DELAYED RELEASE ORAL at 05:25

## 2023-12-04 RX ADMIN — Medication 2 MILLIGRAM(S): at 18:12

## 2023-12-04 RX ADMIN — Medication 0.5 MILLIGRAM(S): at 22:14

## 2023-12-04 RX ADMIN — OXYCODONE HYDROCHLORIDE 10 MILLIGRAM(S): 5 TABLET ORAL at 17:00

## 2023-12-04 RX ADMIN — Medication 2 MILLIGRAM(S): at 05:25

## 2023-12-04 RX ADMIN — Medication 1 MILLIGRAM(S): at 12:26

## 2023-12-04 RX ADMIN — DIVALPROEX SODIUM 500 MILLIGRAM(S): 500 TABLET, DELAYED RELEASE ORAL at 18:12

## 2023-12-04 RX ADMIN — Medication 10 MILLIGRAM(S): at 20:12

## 2023-12-04 RX ADMIN — Medication 1 PATCH: at 05:30

## 2023-12-04 RX ADMIN — GABAPENTIN 100 MILLIGRAM(S): 400 CAPSULE ORAL at 22:15

## 2023-12-04 RX ADMIN — OLANZAPINE 5 MILLIGRAM(S): 15 TABLET, FILM COATED ORAL at 22:15

## 2023-12-04 RX ADMIN — OXYCODONE HYDROCHLORIDE 5 MILLIGRAM(S): 5 TABLET ORAL at 00:39

## 2023-12-04 RX ADMIN — Medication 1 PATCH: at 05:32

## 2023-12-04 RX ADMIN — HYDROMORPHONE HYDROCHLORIDE 0.5 MILLIGRAM(S): 2 INJECTION INTRAMUSCULAR; INTRAVENOUS; SUBCUTANEOUS at 02:18

## 2023-12-04 RX ADMIN — Medication 1 PATCH: at 06:24

## 2023-12-04 RX ADMIN — Medication 105 MILLIGRAM(S): at 05:24

## 2023-12-04 RX ADMIN — Medication 3 MILLILITER(S): at 20:10

## 2023-12-04 RX ADMIN — Medication 1 PATCH: at 18:14

## 2023-12-04 RX ADMIN — OXYCODONE HYDROCHLORIDE 10 MILLIGRAM(S): 5 TABLET ORAL at 16:10

## 2023-12-04 RX ADMIN — OXYCODONE HYDROCHLORIDE 10 MILLIGRAM(S): 5 TABLET ORAL at 09:30

## 2023-12-04 RX ADMIN — Medication 3 MILLILITER(S): at 15:56

## 2023-12-04 NOTE — OCCUPATIONAL THERAPY INITIAL EVALUATION ADULT - PERTINENT HX OF CURRENT PROBLEM, REHAB EVAL
As per MD note: 57 year old male with PMH of ETOH abuse, HTN, HLD, seizures, tardive dyskinesia hiatal hernia s/p mechanical fall down stairs while intoxicated on 11/29. Pt intubated in ED for airway protection. Found to have left rib fractures 3, 6, 7, 10 and left pneumothorax with extensive subcutaneous emphysema requiring a left sided pigtail on 11/29. Hospital course complicated by acute agitation and acute asthma exacerbation requiring re-intubation on 12/1. Pt extubated 12/2. Agitation required precedex and PRN haldol 12/1-12/3. Precedex weened and pt appears less agitated.

## 2023-12-04 NOTE — PROGRESS NOTE ADULT - SUBJECTIVE AND OBJECTIVE BOX
INTERVAL HPI/OVERNIGHT EVENTS: 57 year old male with PMH of ETOH abuse, HTN, HLD, seizures, tardive dyskinesia hiatal hernia s/p mechanical fall down stairs while intoxicated on 11/29. Pt intubated in ED for airway protection. Found to have left rib fractures 3, 6, 7, 10 and left pneumothorax with extensive subcutaneous emphysema requiring a left sided pigtail on 11/29. Hospital course complicated by acute agitation and acute asthma exacerbation requiring re-intubation on 12/1. Pt extubated 12/2. Agitation required precedex and PRN haldol 12/1-12/3.   24H events: precedex weened to 0.5. Psych consulted and will see patient in the AM. Behavior more appropriate and pt appears less agitated.       PAST MEDICAL & SURGICAL HISTORY:  High cholesterol  Hiatal hernia  Tardive dyskinesia  Liver failure  Seizures  ETOH abuse  S/P tonsillectomy  History of lumbar spinal fusion - 6/2017    MEDICATIONS  (STANDING):  albuterol/ipratropium for Nebulization 3 milliLiter(s) Nebulizer every 6 hours  benztropine 2 milliGRAM(s) Oral two times a day  chlorhexidine 2% Cloths 1 Application(s) Topical daily  clonazePAM  Tablet 0.5 milliGRAM(s) Oral at bedtime  dexMEDEtomidine Infusion 0.2 MICROgram(s)/kG/Hr (3.4 mL/Hr) IV Continuous <Continuous>  divalproex  milliGRAM(s) Oral every 12 hours  enoxaparin Injectable 40 milliGRAM(s) SubCutaneous every 12 hours  folic acid Injectable 1 milliGRAM(s) IV Push daily  gabapentin 100 milliGRAM(s) Oral at bedtime  influenza   Vaccine 0.5 milliLiter(s) IntraMuscular once  lidocaine   4% Patch 3 Patch Transdermal daily  melatonin 5 milliGRAM(s) Oral at bedtime  methocarbamol 750 milliGRAM(s) Oral every 12 hours  nicotine - 21 mG/24Hr(s) Patch 1 Patch Transdermal every 24 hours  pantoprazole  Injectable 40 milliGRAM(s) IV Push daily  polyethylene glycol 3350 17 Gram(s) Oral at bedtime  QUEtiapine 100 milliGRAM(s) Oral at bedtime  senna 2 Tablet(s) Oral at bedtime  thiamine IVPB 500 milliGRAM(s) IV Intermittent every 8 hours    MEDICATIONS  (PRN):  albuterol/ipratropium for Nebulization 3 milliLiter(s) Nebulizer every 6 hours PRN Shortness of Breath and/or Wheezing  haloperidol    Injectable 2.5 milliGRAM(s) IV Push every 6 hours PRN agitation  HYDROmorphone  Injectable 0.5 milliGRAM(s) IV Push every 4 hours PRN breakthrough  ondansetron Injectable 4 milliGRAM(s) IV Push every 6 hours PRN Nausea  oxyCODONE    IR 10 milliGRAM(s) Oral every 6 hours PRN Severe Pain (7 - 10)  oxyCODONE    IR 5 milliGRAM(s) Oral every 6 hours PRN Moderate Pain (4 - 6)      ICU Vital Signs Last 24 Hrs  T(C): 36.8 (04 Dec 2023 00:30), Max: 36.8 (03 Dec 2023 07:49)  T(F): 98.2 (04 Dec 2023 00:30), Max: 98.2 (03 Dec 2023 07:49)  HR: 65 (04 Dec 2023 00:30) (43 - 94)  BP: 105/72 (04 Dec 2023 00:30) (95/70 - 176/102)  BP(mean): 81 (04 Dec 2023 00:30) (28 - 123)  RR: 18 (04 Dec 2023 00:30) (12 - 21)  SpO2: 98% (04 Dec 2023 00:30) (88% - 100%)    O2 Parameters below as of 04 Dec 2023 00:00  Patient On (Oxygen Delivery Method): nasal cannula  O2 Flow (L/min): 4    Drug Dosing Weight  Height (cm): 157.5 (01 Dec 2023 11:00)  Weight (kg): 68 (28 Nov 2023 16:39)  BMI (kg/m2): 27.4 (01 Dec 2023 11:00)  BSA (m2): 1.69 (01 Dec 2023 11:00)    CENTRAL LINE: [ ] YES [x] NO  LOCATION:   DATE INSERTED:  REMOVE: [ ] YES [ ] NO  EXPLAIN:    HI: [ ] YES [x] NO    DATE INSERTED:  REMOVE: [ ] YES [ ] NO  EXPLAIN:    A-LINE: [ ] YES [x] NO  LOCATION:   DATE INSERTED:  REMOVE: [ ] YES [ ] NO  EXPLAIN:    ABG - ( 02 Dec 2023 05:11 )  pH, Arterial: 7.430 pH, Blood: x     /  pCO2: 44    /  pO2: 82    / HCO3: 29    / Base Excess: 4.9   /  SaO2: 97.5      I&O's Detail  02 Dec 2023 07:01  -  03 Dec 2023 07:00  --------------------------------------------------------  IN:    Dexmedetomidine: 427.4 mL    IV PiggyBack: 100 mL  Total IN: 527.4 mL    OUT:    FentaNYL: 0 mL    Pivot 1.5: 0 mL    Ureteral Catheter (mL): 60 mL    Voided (mL): 400 mL  Total OUT: 460 mL    Total NET: 67.4 mL      03 Dec 2023 07:01  -  04 Dec 2023 01:04  --------------------------------------------------------  IN:    Dexmedetomidine: 166.6 mL    IV PiggyBack: 200 mL    sodium chloride 0.9%: 675 mL  Total IN: 1041.6 mL    OUT:    Voided (mL): 1400 mL  Total OUT: 1400 mL    Total NET: -358.4 mL        Physical Exam:    Neurological:  GCS 15   No focal sensory/motor deficits    HEENT: PERRL, EOMI     Respiratory: Unlabored, no accessory muscle use, saturating well on 2-3L NC    Cardiovascular: Regular rate & rhythm    Gastrointestinal: Soft, nttp, nd    Extremities: No peripheral edema    Vascular: Equal and normal pulses: 2+ peripheral pulses throughout    Musculoskeletal: No joint pain, swelling or deformity; no limitation of movement    Skin: No rashes, left midclavicular decompressive incision dressed and clean     LABS:  CBC Full  -  ( 03 Dec 2023 03:10 )  WBC Count : 12.15 K/uL  RBC Count : 3.88 M/uL  Hemoglobin : 10.6 g/dL  Hematocrit : 33.3 %  Platelet Count - Automated : 288 K/uL  Mean Cell Volume : 85.8 fl  Mean Cell Hemoglobin : 27.3 pg  Mean Cell Hemoglobin Concentration : 31.8 gm/dL    12-03    139  |  103  |  22.4<H>  ----------------------------<  144  4.3   |  24.0  |  0.54    Ca    8.2<L>      03 Dec 2023 03:10  Phos  3.5     12-03  Mg     2.2     12-03      Urinalysis Basic - ( 03 Dec 2023 03:10 )  Gluc: 144 mg/dL

## 2023-12-04 NOTE — BH CONSULTATION LIAISON ASSESSMENT NOTE - HPI (INCLUDE ILLNESS QUALITY, SEVERITY, DURATION, TIMING, CONTEXT, MODIFYING FACTORS, ASSOCIATED SIGNS AND SYMPTOMS)
Patient is a 57 year old male who is on SSD, living in a boarding house, with a past psychiatric history of schizoaffective disorder, following outpatient with a Psych NP César Anderson at [facility] and with a PMH of HTN, HLDK, seiuzres, tardive dyskinesia, hital hernia s/p mechanical fall while intoxicated on 11/29 and found to have multiple rib fracturesa and left pneumothorax with subcutaneous emphysema  Psychiatry consulted for agitation which has since improved.     Patient seen and found to be calm, cooperative and pleasant. Patient stating how he lives in boarding house for 4 years, follows with his psych NP regularly and talks of wanting to transfer treatment to a new clinic. patient talks of last nights events and states he is not sure what happened but does report he became confused, was not "thinking right" became paranoid and was "seeing things" but not feels better.  Patient currently denying any s/h ideation as well as any AVH or symptoms of deshawn  Patient is a 57 year old male who is on SSD, living in a boarding house, with a past psychiatric history of schizoaffective disorder, following outpatient with a Psych NP César Anderson at [facility] and with a PMH of HTN, HLDK, seiuzres, tardive dyskinesia, hital hernia s/p mechanical fall while intoxicated on 11/29 and found to have multiple rib fracturesa and left pneumothorax with subcutaneous emphysema  Psychiatry consulted for agitation which has since improved.     Patient seen and found to be calm, cooperative and pleasant. Patient stating how he lives in boarding house for 4 years, follows with his psych NP regularly and talks of wanting to transfer treatment to a new clinic. patient talks of last nights events and states he is not sure what happened but does report he became confused, was not "thinking right" became paranoid and was "seeing things" but not feels better.  Patient currently denying any s/h ideation as well as any AVH or symptoms of deshawn

## 2023-12-04 NOTE — PHYSICAL THERAPY INITIAL EVALUATION ADULT - ASR WT BEARING STATUS EVAL
[facility] MEDICINE PROGRESS NOTE   Patient: Ilir Blanchard  Today's Date: 8/11/2023    YOB: 1952  Admission Date: 7/18/2023    MRN: [id number]  Inpatient LOS: 24    [facility]  Hospital Day: Hospital Day: 25    Subjective   HISTORY AND SUBJECTIVE COMPLAINTS       Subjective:  Awake, comfortable.  Denies any pain.          Past Medical History:   Diagnosis Date   • BPH (benign prostatic hyperplasia)    • Cataracts, bilateral    • Cerebral infarction (CMD) 2022    x3; left sided weakness   • Chronic kidney disease     Dialysis   • Diabetes mellitus (CMD)    • Essential (primary) hypertension    • High cholesterol    • Lumbar radiculopathy          ROS:  Pertinent systems negative except as above.    Objective   PHYSICAL EXAMINATION     Vital 24 Hour Range Most Recent Value   Temperature Temp  Min: 98.2 °F (36.8 °C)  Max: 98.4 °F (36.9 °C) 98.2 °F (36.8 °C)   Pulse Pulse  Min: 68  Max: 75 70   Respiratory Resp  Min: 16  Max: 18 16   Blood Pressure BP  Min: 100/45  Max: 140/60 125/64   Pulse Oximetry SpO2  Min: 96 %  Max: 100 % 100 %   Arterial BP No data recorded     O2 No data recorded       Recorded Intake and Output:    Intake/Output Summary (Last 24 hours) at 8/11/2023 1946  Last data filed at 8/11/2023 1215  Gross per 24 hour   Intake 741 ml   Output 854 ml   Net -113 ml      Recorded Last Stool Occurrence: 1 (08/11/23 1400)     Vital Most Recent Value First Value   Weight 66.1 kg (145 lb 11.6 oz) Weight: 73.5 kg (162 lb 0.6 oz)   Height 5' 10\" (177.8 cm) Height: 5' 10\" (177.8 cm)   BMI 20.91 N/A       Skin dry   Neck supple  Lung scattered crackles with adequate air flow  CVS S 1 and S 2  Abdomen bowel sound is present  CNS cranial nerves are intact   Motor and sensory left hemiparesis .  PD line present.     TEST RESULTS     Labs: The Laboratory values listed below have been reviewed and pertinent findings discussed in the Assessment and Plan.    Laboratory values:    Recent Labs   Lab 08/09/23  0955 08/06/23  1041 08/05/23  1043   WBC 7.9 8.3 7.3   HGB 8.1* 8.2* 8.6*   HCT 26.2* 27.0* 27.3*    353 378       Recent Labs   Lab 08/09/23  0955 08/06/23  1041 08/05/23  1043   SODIUM 136 133*  --    POTASSIUM 4.2 3.7  --    CHLORIDE 99 98  --    CO2 27 27  --    CALCIUM 9.4 9.4  --    GLUCOSE 167* 189*  --    BUN 78* 77*  --    CREATININE 9.03* 9.20*  --    MG  --   --  3.1*          Radiology: Imaging studies have been reviewed and pertinent findings discussed in the Assessment and Plan.  No results found for any visits on 07/18/23 (from the past 48 hour(s)).     ANCILLARY ORDERS     Diet:  Nursing to Pass Tray - Feed Patient  Renal (2400mg Na+, 60meq K+, 1000mg P), Consistent Carb Moderate (45-75 Gm/Meal); No Beef Diet  Daily W Breakfast; Nepro With Carbsteady/Renal Supplement, Vanilla Oral Nutrition Supplement  Daily W Dinner; Nepro With Carbsteady/Renal Supplement, Berry Oral Nutrition Supplement  Telemetry: Off  Consults:    IP CONSULT TO SOCIAL WORK  IP CONSULT TO REHAB PSYCHOLOGY  IP CONSULT TO DIABETES EDUCATOR  IP CONSULT TO INTERNAL MEDICINE  IP CONSULT TO ENDOCRINOLOGY  IP CONSULT TO NEPHROLOGY  IP CONSULT TO GI  IP CONSULT TO WOUND CARE MEDICAL PROVIDER  IP CONSULT TO NUTRITION SERVICES  IP CONSULT TO GENERAL SURGERY  IP CONSULT TO PODIATRY  IP CONSULT TO RN WOUND CARE  IP CONSULT TO NUTRITION SERVICES  Therapy Orders:   PT and OT Orders Placed this Encounter   Procedures   • Occupational Therapy   • Physical Therapy       ADVANCED DIRECTIVES     Code Status: Full Resuscitation         ASSESSMENT AND PLAN     I67.9 Cerebrovascular disease, unspecified  R54 Age-related physical debility  N18.6 End stage renal disease  D64.9 Anemia, unspecified  E78.5 Hyperlipidemia, unspecified  I10 Essential Hypertension  E11.8 Type 2 Diabetes Mellitus with unspecified complications      Plan  Remains very weak,      Supportive care.  Continue therapies per IPR    Oral diet with  supplements.    Continue current antihypertensive with amlodipine and Coreg.    History of hyperlipidemia and stroke on Lipitor, aspirin and Plavix.    Remains on peritoneal dialysis.    Continue insulin per Ray Ryan MD    8/11/2023  7:46 PM        no weight-bearing restrictions

## 2023-12-04 NOTE — PHYSICAL THERAPY INITIAL EVALUATION ADULT - PERTINENT HX OF CURRENT PROBLEM, REHAB EVAL
57 year old male with PMH of ETOH abuse, HTN, HLD, seizures, tardive dyskinesia hiatal hernia s/p mechanical fall down stairs while intoxicated on 11/29. Pt intubated in ED for airway protection. Found to have left rib fractures 3, 6, 7, 10 and left pneumothorax with extensive subcutaneous emphysema requiring a left sided pigtail on 11/29. Hospital course complicated by acute agitation and acute asthma exacerbation requiring re-intubation on 12/1. Pt extubated 12/2. Agitation required precedex and PRN haldol 12/1-12/3. Precedex weened and pt appears less agitated.

## 2023-12-04 NOTE — PROGRESS NOTE ADULT - NS ATTEND AMEND GEN_ALL_CORE FT
57-year-old male pmhx of seizures, HTN, HLD, and tardive dyskinesia s/p fall with:     NEURO;  #Analgesia: multimodal pain regimen. Pain management for repeat rib block   #Schizoaffective, tardive dyskinesia, dilirium: f/u psych. c/w home meds. C/w seroquel. Zyprexa PRN.     CARDIO:   #sinus tachycardia: pain control. assess volume status     PULM:  #left sided rib fractures, L PTx, Subcutaneous emphysema  - s/p chest tube and chest wall incision. Chest tube now removed. Subq emphysema improved   - PIC shore 8 today.   - wean supplemental O2. Goal Sp02 > 92   #Aspiration precautions: head of bed elevation     GI:   #Feeding: regular   #Bowel regimen: miralax, senna   #Stress ulcer ppx: PPI (home med)     RENAL:   Deleon: none    HEME:   Thromboprophylaxis: SCD + Lovenox   Anemia: stable. Trend     ID:   #leukocytosis: improving. trend.     ENDO:   Glucose <180     LINES/SKIN: PIV     CODE STATUS: Full     DISPOSITION: SICU. Potential downgrade if remains off Precedex with improvement to Delirium.

## 2023-12-04 NOTE — BH CONSULTATION LIAISON ASSESSMENT NOTE - NSBHCHARTREVIEWVS_PSY_A_CORE FT
Vital Signs Last 24 Hrs  T(C): 36.7 (04 Dec 2023 11:06), Max: 36.8 (04 Dec 2023 00:30)  T(F): 98 (04 Dec 2023 11:06), Max: 98.2 (04 Dec 2023 00:30)  HR: 90 (04 Dec 2023 12:00) (10 - 109)  BP: 148/86 (04 Dec 2023 11:00) (99/77 - 148/86)  BP(mean): 102 (04 Dec 2023 11:00) (28 - 103)  RR: 23 (04 Dec 2023 12:00) (11 - 23)  SpO2: 95% (04 Dec 2023 12:00) (85% - 99%)    Parameters below as of 04 Dec 2023 08:42  Patient On (Oxygen Delivery Method): nasal cannula

## 2023-12-04 NOTE — PROGRESS NOTE ADULT - SUBJECTIVE AND OBJECTIVE BOX
Interval Hx:  Patient seen during rounds  Patient reports pain to be mod controlled on current medications  Patient denies sedation with medications   Discussed nerve block procedure as an addition to their current analgesic therapy.     Analgesic Dosing for past 24 hours reviewed as below:  acetaminophen     Tablet ..   975 milliGRAM(s) Oral (12-04-23 @ 12:26)    benztropine   2 milliGRAM(s) Oral (12-04-23 @ 05:25)   2 milliGRAM(s) Oral (12-03-23 @ 17:11)    clonazePAM  Tablet   0.5 milliGRAM(s) Oral (12-03-23 @ 21:18)    divalproex DR   500 milliGRAM(s) Oral (12-04-23 @ 05:25)   500 milliGRAM(s) Oral (12-03-23 @ 17:11)    gabapentin   100 milliGRAM(s) Oral (12-03-23 @ 21:19)    HYDROmorphone  Injectable   0.5 milliGRAM(s) IV Push (12-04-23 @ 02:18)    melatonin   5 milliGRAM(s) Oral (12-03-23 @ 21:19)    methocarbamol   750 milliGRAM(s) Oral (12-04-23 @ 05:25)   750 milliGRAM(s) Oral (12-03-23 @ 17:11)    oxyCODONE    IR   5 milliGRAM(s) Oral (12-04-23 @ 00:39)    oxyCODONE    IR   10 milliGRAM(s) Oral (12-04-23 @ 08:54)   10 milliGRAM(s) Oral (12-03-23 @ 20:08)    QUEtiapine   100 milliGRAM(s) Oral (12-03-23 @ 21:20)          T(C): 36.7 (12-04-23 @ 11:06), Max: 36.8 (12-04-23 @ 00:30)  HR: 90 (12-04-23 @ 12:00) (10 - 109)  BP: 148/86 (12-04-23 @ 11:00) (99/77 - 148/86)  RR: 23 (12-04-23 @ 12:00) (11 - 23)  SpO2: 95% (12-04-23 @ 12:00) (85% - 99%)      12-03-23 @ 07:01  -  12-04-23 @ 07:00  --------------------------------------------------------  IN: 1708.1 mL / OUT: 1800 mL / NET: -91.9 mL    12-04-23 @ 07:01  -  12-04-23 @ 13:23  --------------------------------------------------------  IN: 1000 mL / OUT: 600 mL / NET: 400 mL        acetaminophen     Tablet .. 975 milliGRAM(s) Oral every 6 hours  albuterol/ipratropium for Nebulization 3 milliLiter(s) Nebulizer every 6 hours  albuterol/ipratropium for Nebulization 3 milliLiter(s) Nebulizer every 6 hours PRN  benztropine 2 milliGRAM(s) Oral two times a day  chlorhexidine 2% Cloths 1 Application(s) Topical daily  clonazePAM  Tablet 0.5 milliGRAM(s) Oral at bedtime  divalproex  milliGRAM(s) Oral every 12 hours  enoxaparin Injectable 40 milliGRAM(s) SubCutaneous every 12 hours  folic acid 1 milliGRAM(s) Oral daily  gabapentin 100 milliGRAM(s) Oral at bedtime  haloperidol    Injectable 2.5 milliGRAM(s) IV Push every 6 hours PRN  HYDROmorphone  Injectable 0.5 milliGRAM(s) IV Push every 4 hours PRN  influenza   Vaccine 0.5 milliLiter(s) IntraMuscular once  lidocaine   4% Patch 3 Patch Transdermal daily  melatonin 5 milliGRAM(s) Oral at bedtime  methocarbamol 750 milliGRAM(s) Oral every 12 hours  nicotine - 21 mG/24Hr(s) Patch 1 Patch Transdermal every 24 hours  ondansetron Injectable 4 milliGRAM(s) IV Push every 6 hours PRN  oxyCODONE    IR 5 milliGRAM(s) Oral every 6 hours PRN  oxyCODONE    IR 10 milliGRAM(s) Oral every 6 hours PRN  pantoprazole    Tablet 40 milliGRAM(s) Oral before breakfast  polyethylene glycol 3350 17 Gram(s) Oral at bedtime  QUEtiapine 100 milliGRAM(s) Oral at bedtime  senna 2 Tablet(s) Oral at bedtime  thiamine 100 milliGRAM(s) Oral daily                          10.3   11.73 )-----------( 277      ( 04 Dec 2023 02:15 )             31.3     12-04    138  |  104  |  30.8<H>  ----------------------------<  144<H>  4.3   |  23.0  |  0.89    Ca    7.8<L>      04 Dec 2023 02:15  Phos  4.0     12-04  Mg     2.2     12-04        Urinalysis Basic - ( 04 Dec 2023 02:15 )    Color: x / Appearance: x / SG: x / pH: x  Gluc: 144 mg/dL / Ketone: x  / Bili: x / Urobili: x   Blood: x / Protein: x / Nitrite: x   Leuk Esterase: x / RBC: x / WBC x   Sq Epi: x / Non Sq Epi: x / Bacteria: x        Pain Service   [phone] Interval Hx:  Patient seen during rounds  Patient reports pain to be mod controlled on current medications  Patient denies sedation with medications   Discussed nerve block procedure as an addition to their current analgesic therapy.     Analgesic Dosing for past 24 hours reviewed as below:  acetaminophen     Tablet ..   975 milliGRAM(s) Oral (12-04-23 @ 12:26)    benztropine   2 milliGRAM(s) Oral (12-04-23 @ 05:25)   2 milliGRAM(s) Oral (12-03-23 @ 17:11)    clonazePAM  Tablet   0.5 milliGRAM(s) Oral (12-03-23 @ 21:18)    divalproex DR   500 milliGRAM(s) Oral (12-04-23 @ 05:25)   500 milliGRAM(s) Oral (12-03-23 @ 17:11)    gabapentin   100 milliGRAM(s) Oral (12-03-23 @ 21:19)    HYDROmorphone  Injectable   0.5 milliGRAM(s) IV Push (12-04-23 @ 02:18)    melatonin   5 milliGRAM(s) Oral (12-03-23 @ 21:19)    methocarbamol   750 milliGRAM(s) Oral (12-04-23 @ 05:25)   750 milliGRAM(s) Oral (12-03-23 @ 17:11)    oxyCODONE    IR   5 milliGRAM(s) Oral (12-04-23 @ 00:39)    oxyCODONE    IR   10 milliGRAM(s) Oral (12-04-23 @ 08:54)   10 milliGRAM(s) Oral (12-03-23 @ 20:08)    QUEtiapine   100 milliGRAM(s) Oral (12-03-23 @ 21:20)          T(C): 36.7 (12-04-23 @ 11:06), Max: 36.8 (12-04-23 @ 00:30)  HR: 90 (12-04-23 @ 12:00) (10 - 109)  BP: 148/86 (12-04-23 @ 11:00) (99/77 - 148/86)  RR: 23 (12-04-23 @ 12:00) (11 - 23)  SpO2: 95% (12-04-23 @ 12:00) (85% - 99%)      12-03-23 @ 07:01  -  12-04-23 @ 07:00  --------------------------------------------------------  IN: 1708.1 mL / OUT: 1800 mL / NET: -91.9 mL    12-04-23 @ 07:01  -  12-04-23 @ 13:23  --------------------------------------------------------  IN: 1000 mL / OUT: 600 mL / NET: 400 mL        acetaminophen     Tablet .. 975 milliGRAM(s) Oral every 6 hours  albuterol/ipratropium for Nebulization 3 milliLiter(s) Nebulizer every 6 hours  albuterol/ipratropium for Nebulization 3 milliLiter(s) Nebulizer every 6 hours PRN  benztropine 2 milliGRAM(s) Oral two times a day  chlorhexidine 2% Cloths 1 Application(s) Topical daily  clonazePAM  Tablet 0.5 milliGRAM(s) Oral at bedtime  divalproex  milliGRAM(s) Oral every 12 hours  enoxaparin Injectable 40 milliGRAM(s) SubCutaneous every 12 hours  folic acid 1 milliGRAM(s) Oral daily  gabapentin 100 milliGRAM(s) Oral at bedtime  haloperidol    Injectable 2.5 milliGRAM(s) IV Push every 6 hours PRN  HYDROmorphone  Injectable 0.5 milliGRAM(s) IV Push every 4 hours PRN  influenza   Vaccine 0.5 milliLiter(s) IntraMuscular once  lidocaine   4% Patch 3 Patch Transdermal daily  melatonin 5 milliGRAM(s) Oral at bedtime  methocarbamol 750 milliGRAM(s) Oral every 12 hours  nicotine - 21 mG/24Hr(s) Patch 1 Patch Transdermal every 24 hours  ondansetron Injectable 4 milliGRAM(s) IV Push every 6 hours PRN  oxyCODONE    IR 5 milliGRAM(s) Oral every 6 hours PRN  oxyCODONE    IR 10 milliGRAM(s) Oral every 6 hours PRN  pantoprazole    Tablet 40 milliGRAM(s) Oral before breakfast  polyethylene glycol 3350 17 Gram(s) Oral at bedtime  QUEtiapine 100 milliGRAM(s) Oral at bedtime  senna 2 Tablet(s) Oral at bedtime  thiamine 100 milliGRAM(s) Oral daily                          10.3   11.73 )-----------( 277      ( 04 Dec 2023 02:15 )             31.3     12-04    138  |  104  |  30.8<H>  ----------------------------<  144<H>  4.3   |  23.0  |  0.89    Ca    7.8<L>      04 Dec 2023 02:15  Phos  4.0     12-04  Mg     2.2     12-04        Urinalysis Basic - ( 04 Dec 2023 02:15 )    Color: x / Appearance: x / SG: x / pH: x  Gluc: 144 mg/dL / Ketone: x  / Bili: x / Urobili: x   Blood: x / Protein: x / Nitrite: x   Leuk Esterase: x / RBC: x / WBC x   Sq Epi: x / Non Sq Epi: x / Bacteria: x        Pain Service   [phone]

## 2023-12-04 NOTE — PHYSICAL THERAPY INITIAL EVALUATION ADULT - MD/RN NOTIFIED
No order were given. Nursing will continue to monitor. administered Tylenol as ordered will transfuse as ordered MD Shannon made aware. No interventions needed at this time. MD aware. No further interventions ordered at this time. Will continue to monitor. yes

## 2023-12-04 NOTE — BH CONSULTATION LIAISON ASSESSMENT NOTE - GENERAL APPEARANCE
Impression: Retinal detachment with single break, left eye  H33.012.
-s/p 23G PPV/EL/GAS (11/02/2021)-SI Plan: Excellent post op course. Call if any problems develop. Reviewed post op meds/instruction given to patient. Discontinue wearing eye shield. Taper Prednisolone acetate 1% TID x 3days,  BID x 2days, QD x 1day, then stop. Discontinue Ofloxacin 0.3%. IOP elevated, will Rx Timolol BID OS. 

RTC: 4 weeks POS with OCT OU No deformities present

## 2023-12-04 NOTE — OCCUPATIONAL THERAPY INITIAL EVALUATION ADULT - ADDITIONAL COMMENTS
Pt lives in a group home, on the first level. Reports other individuals are around to assist as needed. There is 1 CRIS and 0 to navigate once inside. Bathroom has a walk-in shower. Pt does not own any DME. Pt is right handed.

## 2023-12-04 NOTE — BH CONSULTATION LIAISON ASSESSMENT NOTE - NSBHCHARTREVIEWLAB_PSY_A_CORE FT
Basic Metabolic Panel in AM (12.04.23 @ 02:15)    Sodium: 138 mmol/L    Potassium: 4.3 mmol/L    Chloride: 104: Chloride reference range changed from ..10/26/2022 mmol/L    Carbon Dioxide: 23.0 mmol/L    Anion Gap: 11 mmol/L    Blood Urea Nitrogen: 30.8 mg/dL    Creatinine: 0.89 mg/dL    Glucose: 144 mg/dL    Calcium: 7.8 mg/dL    eGFR: 99: The estimated glomerular filtration rate (eGFR) is calculated using the  2021 CKD-EPI creatinine equation, which does not have a coefficient for  race and is validated in individuals 18 years of age and older (N Engl J  Med 2021; 385:6654-4710). Creatinine-based eGFR may be inaccurate in  various situations including but not limited to extremes of muscle mass,  altered dietary protein intake, or medications that affect renal tubular  creatinine secretion. mL/min/1.73m2   Basic Metabolic Panel in AM (12.04.23 @ 02:15)    Sodium: 138 mmol/L    Potassium: 4.3 mmol/L    Chloride: 104: Chloride reference range changed from ..10/26/2022 mmol/L    Carbon Dioxide: 23.0 mmol/L    Anion Gap: 11 mmol/L    Blood Urea Nitrogen: 30.8 mg/dL    Creatinine: 0.89 mg/dL    Glucose: 144 mg/dL    Calcium: 7.8 mg/dL    eGFR: 99: The estimated glomerular filtration rate (eGFR) is calculated using the  2021 CKD-EPI creatinine equation, which does not have a coefficient for  race and is validated in individuals 18 years of age and older (N Engl J  Med 2021; 385:0926-1021). Creatinine-based eGFR may be inaccurate in  various situations including but not limited to extremes of muscle mass,  altered dietary protein intake, or medications that affect renal tubular  creatinine secretion. mL/min/1.73m2

## 2023-12-04 NOTE — BH CONSULTATION LIAISON ASSESSMENT NOTE - SUMMARY
Patient is a 57 year old male who is on SSD, living in a boarding house, with a past psychiatric history of schizoaffective disorder, following outpatient with a Psych NP César Anderson at [facility] and with a PMH of HTN, HLDK, seiuzres, tardive dyskinesia, hital hernia s/p mechanical fall while intoxicated on 11/29 and found to have multiple rib fracturesa and left pneumothorax with subcutaneous emphysema  Psychiatry consulted for agitation which has since improved.     Patient seen and currently calm, cooperative and denying any s/h ideation asn with no signs of deshawn or psychosis elicited. patient with episode of agitation yesterday with paranoia/VH. possible delirium vs period of decompensation.     Recs   -can increase seroquel to 125mg PO QHS   -Continue Depakote, Klonopin and Cogentin  -Maintain delirium precautions   -Avoid anticholinergic agents, benzos, opioid as they can further perpetuate confusion   -Frequent re orientation, Hydration, try to avoid restraints and if possible, mobilize patient and PT involvement  -pt to follow with his outpatient provider  Patient is a 57 year old male who is on SSD, living in a boarding house, with a past psychiatric history of schizoaffective disorder, following outpatient with a Psych NP César Anderson at [facility] and with a PMH of HTN, HLDK, seiuzres, tardive dyskinesia, hital hernia s/p mechanical fall while intoxicated on 11/29 and found to have multiple rib fracturesa and left pneumothorax with subcutaneous emphysema  Psychiatry consulted for agitation which has since improved.     Patient seen and currently calm, cooperative and denying any s/h ideation asn with no signs of deshawn or psychosis elicited. patient with episode of agitation yesterday with paranoia/VH. possible delirium vs period of decompensation.     Recs   -can increase seroquel to 125mg PO QHS   -Continue Depakote, Klonopin and Cogentin  -Maintain delirium precautions   -Avoid anticholinergic agents, benzos, opioid as they can further perpetuate confusion   -Frequent re orientation, Hydration, try to avoid restraints and if possible, mobilize patient and PT involvement  -pt to follow with his outpatient provider

## 2023-12-04 NOTE — BH CONSULTATION LIAISON ASSESSMENT NOTE - NSBHCHARTREVIEWINVESTIGATE_PSY_A_CORE FT
< from: 12 Lead ECG (12.03.23 @ 07:07) >      Ventricular Rate 45 BPM    Atrial Rate 45 BPM    P-R Interval 160 ms    QRS Duration 104 ms    Q-T Interval 510 ms    QTC Calculation(Bazett) 441 ms    P Axis 54 degrees    R Axis 51 degrees    T Axis 55 degrees    Diagnosis Line *** Poor data quality, interpretation may be adversely affected  Sinus bradycardia  Increased R/S ratio in V1, consider early transition or posterior infarct  Nonspecific T wave abnormality Lateral leads  Abnormal ECG    Confirmed by AIYANA EDWARDS ([id number]) on 12/3/2023 7:10:10 PM    < end of copied text >

## 2023-12-04 NOTE — PHYSICAL THERAPY INITIAL EVALUATION ADULT - ADDITIONAL COMMENTS
Pt lives in a boarding home, 1 steps to enter with bilateral handrails, no steps inside. Pt was independent PTA without an assist device. Pt owns no DME.

## 2023-12-04 NOTE — PROGRESS NOTE ADULT - ASSESSMENT
Assessment and Plan:    Neuro:    Continue to optimize pain control. multimodal pain control with oxy 5/10 for mod and severe pain, dilaudid for breakthrough pain, gabapentin, robaxin and lidocaine patches   Avoid deliriogenic medications, optimize sleep wake cycle (melatonin QHS)  continue home benztropine, Depakote, clonazepam, and Seroquel   Follow up psych recommendations   CIWA protocol   Haldol 2.5 PRN for acute agitation   Ween precedex as tolerated     CV: Continue non-invasive hemodynamic monitoring, last EKG showed sinus bradycardia (asymptomatic)     Pulm: multiple left sided Rib fractures, resolved left sided pneumothorax    PIC protocol Q4. Continue incentive spirometer. Encourage OOB to chair and ambulation   completed solumed course for acute asthma exacerbation  thomas     GI/Nutrition: Bowel Regimen with senna and miralax, regular diet, IVL    /Renal: Monitor UOP. Monitor BMP.  Replete Lytes as needed      HEME- monitor CBC  DVT: SCDs, Lovenox 40 BID    ID: monitor for leukocytosis/fever, no indication for ABX at this time    Lines/Tubes: PIVs, midline     Endo: Maintain Euglycemia 120-180, monitor on BMPs    Skin: Frequent turning and positioning, offloading while in bed.     Code Status: full code

## 2023-12-04 NOTE — PROGRESS NOTE ADULT - ASSESSMENT
58M  < from: CT Chest No Cont (11.29.23 @ 03:01) >  BONES: Lateral left 7th rib fracture with more displacement.   Similar-appearing left posterior 5th through 8th nondisplaced rib   fractures. Acute appearing left posterior 10th rib fracture appears to be   new compared to 11/28/2023.      Patient interested in a repeat nerve block procedure as an addition to their current analgesic therapy.   The patient has not experienced satisfactory relief from other treatment modalities including analgesic pain medications.  The risks, benefits and alternatives of a nerve block were discussed with the patient.  The benefits include hopeful relief of pain.  The alternatives include avoiding the procedure and continuing treatment with non-medication therapies and medications, including increasing and/or changing current analgesic medications.  The patient understands that this is an invasive procedure and therefore there are inherent risks. The patient was informed of the risks of the procedure including but not limited to infection, bleeding, injury to nearby tissues, permanent nerve injury, stroke, no decrease in pain or worsened pain, and toxicity from local anesthetic medications.   No certain guarantees have been made and patient understands that responses can vary and repeat procedures may be necessary.

## 2023-12-04 NOTE — PHYSICAL THERAPY INITIAL EVALUATION ADULT - GENERAL OBSERVATIONS, REHAB EVAL
Pt received seated in bedside chair + telemetry//BP + IV + O2 nc 3L. Pt c/o "soreness" at rib fx site. Pt agreeable to PT

## 2023-12-04 NOTE — BH CONSULTATION LIAISON ASSESSMENT NOTE - CURRENT MEDICATION
MEDICATIONS  (STANDING):  acetaminophen     Tablet .. 975 milliGRAM(s) Oral every 6 hours  albuterol/ipratropium for Nebulization 3 milliLiter(s) Nebulizer every 6 hours  benztropine 2 milliGRAM(s) Oral two times a day  chlorhexidine 2% Cloths 1 Application(s) Topical daily  clonazePAM  Tablet 0.5 milliGRAM(s) Oral at bedtime  divalproex  milliGRAM(s) Oral every 12 hours  enoxaparin Injectable 40 milliGRAM(s) SubCutaneous every 12 hours  folic acid 1 milliGRAM(s) Oral daily  gabapentin 100 milliGRAM(s) Oral at bedtime  influenza   Vaccine 0.5 milliLiter(s) IntraMuscular once  lidocaine   4% Patch 3 Patch Transdermal daily  melatonin 5 milliGRAM(s) Oral at bedtime  methocarbamol 750 milliGRAM(s) Oral every 12 hours  nicotine - 21 mG/24Hr(s) Patch 1 Patch Transdermal every 24 hours  pantoprazole    Tablet 40 milliGRAM(s) Oral before breakfast  polyethylene glycol 3350 17 Gram(s) Oral at bedtime  QUEtiapine 100 milliGRAM(s) Oral at bedtime  senna 2 Tablet(s) Oral at bedtime  thiamine 100 milliGRAM(s) Oral daily    MEDICATIONS  (PRN):  albuterol/ipratropium for Nebulization 3 milliLiter(s) Nebulizer every 6 hours PRN Shortness of Breath and/or Wheezing  haloperidol    Injectable 2.5 milliGRAM(s) IV Push every 6 hours PRN agitation  HYDROmorphone  Injectable 0.5 milliGRAM(s) IV Push every 4 hours PRN breakthrough  OLANZapine Injectable 5 milliGRAM(s) IntraMuscular every 12 hours PRN agitation  ondansetron Injectable 4 milliGRAM(s) IV Push every 6 hours PRN Nausea  oxyCODONE    IR 5 milliGRAM(s) Oral every 6 hours PRN Moderate Pain (4 - 6)  oxyCODONE    IR 10 milliGRAM(s) Oral every 6 hours PRN Severe Pain (7 - 10)

## 2023-12-04 NOTE — BH CONSULTATION LIAISON ASSESSMENT NOTE - NS ED BHA SUICIDALITY PRESENT CURRENT PASSIVE IDEATION
Hussain Ordaz reports: he saw Dr. Butler [id number];  Therapeutic Exercise:    30     mins  03540;     Neuromuscular Trista:        mins  24700;    Therapeutic Activity:          mins  44304;     Gait Training:           mins  02257;     Ultrasound:          mins  94392;    Electrical Stimulation:    15     mins  40248 ( );  Dry Needling          mins self-pay    Timed Treatment:   42   mins   Total Treatment:     60   mins    Hermelinda Gomez PTA  Physical Therapist Assistant   No

## 2023-12-04 NOTE — BH CONSULTATION LIAISON ASSESSMENT NOTE - NSBHCONSFOLLOWNEEDS_PSY_ALL_CORE
Psychotherapy Group Note    PATIENT'S NAME: Daniel Thomas  MRN:   [id number]  :   1997  ACCT. NUMBER: [id number]  DATE OF SERVICE: 20  START TIME:  2:00 PM  END TIME:  2:50 PM  FACILITATOR: Reba Irby LICSW  TOPIC: MH EBP Group: Coping Skills  Adult Mental Health Day Treatment  TRACK: 3B    NUMBER OF PARTICIPANTS: 4  Telemedicine Visit: The patient's condition can be safely assessed and treated via synchronous audio and visual telemedicine encounter.      Reason for Telemedicine Visit: Services only offered telehealth    Originating Site (Patient Location): Patient's home    Distant Site (Provider Location[place] [facility] [facility] [place]ent:  The patient/guardian has verbally consented to: the potential risks and benefits of telemedicine (video visit) versus in person care; bill my insurance or make self-payment for services provided; and responsibility for payment of non-covered services.     Mode of Communication:  Video Conference via web care [employer]    As the provider I attest to compliance with applicable laws and regulations related to telemedicine.     Summary of Group / Topics Discussed:  Coping Skills: Distraction: Patients learned to mindfully use distraction as a way to decrease heightened stress in the moment.  Patients will identified situations that necessitate healthy distraction strategies.  They explored ways to manage physical symptoms of distress using distraction. The group began to distinguish when this can be useful in their lives or when other strategies would be more relevant or helpful.    Patient Session Goals / Objectives:    Understand the purpose and benefits of using healthy distraction to decrease distress.    Process what happens in the body when using distraction strategies.    Demonstrate understanding of when to use distraction strategies.    Explore patient s current distraction activities, and how to take a more intentional approach to the use  of distraction.    Identify and problem solve barriers to applying distraction strategies.    Choose 1-2 healthy distraction strategies to apply during times of distress.        Patient Participation / Response:  Moderately participated, sharing some personal reflections / insights and adequately adequately received / provided feedback with other participants.    Demonstrated understanding of topics discussed through group discussion and participation    Treatment Plan:  Patient has a current master individualized treatment plan.  See Epic treatment plan for more information.    Reba Moscoso, KEELEYSW    No psychiatric contraindications to discharge

## 2023-12-05 LAB
ANION GAP SERPL CALC-SCNC: 8 MMOL/L — SIGNIFICANT CHANGE UP (ref 5–17)
ANION GAP SERPL CALC-SCNC: 8 MMOL/L — SIGNIFICANT CHANGE UP (ref 5–17)
BASOPHILS # BLD AUTO: 0.06 K/UL — SIGNIFICANT CHANGE UP (ref 0–0.2)
BASOPHILS # BLD AUTO: 0.06 K/UL — SIGNIFICANT CHANGE UP (ref 0–0.2)
BASOPHILS NFR BLD AUTO: 0.4 % — SIGNIFICANT CHANGE UP (ref 0–2)
BASOPHILS NFR BLD AUTO: 0.4 % — SIGNIFICANT CHANGE UP (ref 0–2)
BUN SERPL-MCNC: 25.5 MG/DL — HIGH (ref 8–20)
BUN SERPL-MCNC: 25.5 MG/DL — HIGH (ref 8–20)
CALCIUM SERPL-MCNC: 7.7 MG/DL — LOW (ref 8.4–10.5)
CALCIUM SERPL-MCNC: 7.7 MG/DL — LOW (ref 8.4–10.5)
CHLORIDE SERPL-SCNC: 105 MMOL/L — SIGNIFICANT CHANGE UP (ref 96–108)
CHLORIDE SERPL-SCNC: 105 MMOL/L — SIGNIFICANT CHANGE UP (ref 96–108)
CO2 SERPL-SCNC: 26 MMOL/L — SIGNIFICANT CHANGE UP (ref 22–29)
CO2 SERPL-SCNC: 26 MMOL/L — SIGNIFICANT CHANGE UP (ref 22–29)
CREAT SERPL-MCNC: 0.81 MG/DL — SIGNIFICANT CHANGE UP (ref 0.5–1.3)
CREAT SERPL-MCNC: 0.81 MG/DL — SIGNIFICANT CHANGE UP (ref 0.5–1.3)
EGFR: 102 ML/MIN/1.73M2 — SIGNIFICANT CHANGE UP
EGFR: 102 ML/MIN/1.73M2 — SIGNIFICANT CHANGE UP
EOSINOPHIL # BLD AUTO: 0.18 K/UL — SIGNIFICANT CHANGE UP (ref 0–0.5)
EOSINOPHIL # BLD AUTO: 0.18 K/UL — SIGNIFICANT CHANGE UP (ref 0–0.5)
EOSINOPHIL NFR BLD AUTO: 1.3 % — SIGNIFICANT CHANGE UP (ref 0–6)
EOSINOPHIL NFR BLD AUTO: 1.3 % — SIGNIFICANT CHANGE UP (ref 0–6)
GLUCOSE SERPL-MCNC: 89 MG/DL — SIGNIFICANT CHANGE UP (ref 70–99)
GLUCOSE SERPL-MCNC: 89 MG/DL — SIGNIFICANT CHANGE UP (ref 70–99)
HCT VFR BLD CALC: 31.8 % — LOW (ref 39–50)
HCT VFR BLD CALC: 31.8 % — LOW (ref 39–50)
HGB BLD-MCNC: 10.4 G/DL — LOW (ref 13–17)
HGB BLD-MCNC: 10.4 G/DL — LOW (ref 13–17)
IMM GRANULOCYTES NFR BLD AUTO: 4 % — HIGH (ref 0–0.9)
IMM GRANULOCYTES NFR BLD AUTO: 4 % — HIGH (ref 0–0.9)
LYMPHOCYTES # BLD AUTO: 24.5 % — SIGNIFICANT CHANGE UP (ref 13–44)
LYMPHOCYTES # BLD AUTO: 24.5 % — SIGNIFICANT CHANGE UP (ref 13–44)
LYMPHOCYTES # BLD AUTO: 3.37 K/UL — HIGH (ref 1–3.3)
LYMPHOCYTES # BLD AUTO: 3.37 K/UL — HIGH (ref 1–3.3)
MAGNESIUM SERPL-MCNC: 1.9 MG/DL — SIGNIFICANT CHANGE UP (ref 1.6–2.6)
MAGNESIUM SERPL-MCNC: 1.9 MG/DL — SIGNIFICANT CHANGE UP (ref 1.6–2.6)
MCHC RBC-ENTMCNC: 28.4 PG — SIGNIFICANT CHANGE UP (ref 27–34)
MCHC RBC-ENTMCNC: 28.4 PG — SIGNIFICANT CHANGE UP (ref 27–34)
MCHC RBC-ENTMCNC: 32.7 GM/DL — SIGNIFICANT CHANGE UP (ref 32–36)
MCHC RBC-ENTMCNC: 32.7 GM/DL — SIGNIFICANT CHANGE UP (ref 32–36)
MCV RBC AUTO: 86.9 FL — SIGNIFICANT CHANGE UP (ref 80–100)
MCV RBC AUTO: 86.9 FL — SIGNIFICANT CHANGE UP (ref 80–100)
MONOCYTES # BLD AUTO: 1.43 K/UL — HIGH (ref 0–0.9)
MONOCYTES # BLD AUTO: 1.43 K/UL — HIGH (ref 0–0.9)
MONOCYTES NFR BLD AUTO: 10.4 % — SIGNIFICANT CHANGE UP (ref 2–14)
MONOCYTES NFR BLD AUTO: 10.4 % — SIGNIFICANT CHANGE UP (ref 2–14)
NEUTROPHILS # BLD AUTO: 8.16 K/UL — HIGH (ref 1.8–7.4)
NEUTROPHILS # BLD AUTO: 8.16 K/UL — HIGH (ref 1.8–7.4)
NEUTROPHILS NFR BLD AUTO: 59.4 % — SIGNIFICANT CHANGE UP (ref 43–77)
NEUTROPHILS NFR BLD AUTO: 59.4 % — SIGNIFICANT CHANGE UP (ref 43–77)
PHOSPHATE SERPL-MCNC: 3.3 MG/DL — SIGNIFICANT CHANGE UP (ref 2.4–4.7)
PHOSPHATE SERPL-MCNC: 3.3 MG/DL — SIGNIFICANT CHANGE UP (ref 2.4–4.7)
PLATELET # BLD AUTO: 268 K/UL — SIGNIFICANT CHANGE UP (ref 150–400)
PLATELET # BLD AUTO: 268 K/UL — SIGNIFICANT CHANGE UP (ref 150–400)
POTASSIUM SERPL-MCNC: 4 MMOL/L — SIGNIFICANT CHANGE UP (ref 3.5–5.3)
POTASSIUM SERPL-MCNC: 4 MMOL/L — SIGNIFICANT CHANGE UP (ref 3.5–5.3)
POTASSIUM SERPL-SCNC: 4 MMOL/L — SIGNIFICANT CHANGE UP (ref 3.5–5.3)
POTASSIUM SERPL-SCNC: 4 MMOL/L — SIGNIFICANT CHANGE UP (ref 3.5–5.3)
RBC # BLD: 3.66 M/UL — LOW (ref 4.2–5.8)
RBC # BLD: 3.66 M/UL — LOW (ref 4.2–5.8)
RBC # FLD: 16.7 % — HIGH (ref 10.3–14.5)
RBC # FLD: 16.7 % — HIGH (ref 10.3–14.5)
SODIUM SERPL-SCNC: 139 MMOL/L — SIGNIFICANT CHANGE UP (ref 135–145)
SODIUM SERPL-SCNC: 139 MMOL/L — SIGNIFICANT CHANGE UP (ref 135–145)
WBC # BLD: 13.75 K/UL — HIGH (ref 3.8–10.5)
WBC # BLD: 13.75 K/UL — HIGH (ref 3.8–10.5)
WBC # FLD AUTO: 13.75 K/UL — HIGH (ref 3.8–10.5)
WBC # FLD AUTO: 13.75 K/UL — HIGH (ref 3.8–10.5)

## 2023-12-05 PROCEDURE — 99231 SBSQ HOSP IP/OBS SF/LOW 25: CPT

## 2023-12-05 RX ORDER — MINERAL OIL
133 OIL (ML) MISCELLANEOUS DAILY
Refills: 0 | Status: DISCONTINUED | OUTPATIENT
Start: 2023-12-05 | End: 2023-12-08

## 2023-12-05 RX ORDER — AMLODIPINE BESYLATE 2.5 MG/1
5 TABLET ORAL DAILY
Refills: 0 | Status: DISCONTINUED | OUTPATIENT
Start: 2023-12-05 | End: 2023-12-08

## 2023-12-05 RX ADMIN — ENOXAPARIN SODIUM 40 MILLIGRAM(S): 100 INJECTION SUBCUTANEOUS at 05:32

## 2023-12-05 RX ADMIN — OXYCODONE HYDROCHLORIDE 10 MILLIGRAM(S): 5 TABLET ORAL at 02:05

## 2023-12-05 RX ADMIN — HYDROMORPHONE HYDROCHLORIDE 0.5 MILLIGRAM(S): 2 INJECTION INTRAMUSCULAR; INTRAVENOUS; SUBCUTANEOUS at 12:50

## 2023-12-05 RX ADMIN — Medication 1 PATCH: at 05:34

## 2023-12-05 RX ADMIN — OXYCODONE HYDROCHLORIDE 10 MILLIGRAM(S): 5 TABLET ORAL at 03:08

## 2023-12-05 RX ADMIN — Medication 1 MILLIGRAM(S): at 12:02

## 2023-12-05 RX ADMIN — Medication 975 MILLIGRAM(S): at 18:10

## 2023-12-05 RX ADMIN — GABAPENTIN 100 MILLIGRAM(S): 400 CAPSULE ORAL at 21:48

## 2023-12-05 RX ADMIN — QUETIAPINE FUMARATE 125 MILLIGRAM(S): 200 TABLET, FILM COATED ORAL at 22:27

## 2023-12-05 RX ADMIN — METHOCARBAMOL 750 MILLIGRAM(S): 500 TABLET, FILM COATED ORAL at 17:47

## 2023-12-05 RX ADMIN — Medication 975 MILLIGRAM(S): at 17:47

## 2023-12-05 RX ADMIN — OXYCODONE HYDROCHLORIDE 10 MILLIGRAM(S): 5 TABLET ORAL at 16:30

## 2023-12-05 RX ADMIN — DIVALPROEX SODIUM 500 MILLIGRAM(S): 500 TABLET, DELAYED RELEASE ORAL at 08:31

## 2023-12-05 RX ADMIN — ENOXAPARIN SODIUM 40 MILLIGRAM(S): 100 INJECTION SUBCUTANEOUS at 17:48

## 2023-12-05 RX ADMIN — OXYCODONE HYDROCHLORIDE 10 MILLIGRAM(S): 5 TABLET ORAL at 15:52

## 2023-12-05 RX ADMIN — HYDROMORPHONE HYDROCHLORIDE 0.5 MILLIGRAM(S): 2 INJECTION INTRAMUSCULAR; INTRAVENOUS; SUBCUTANEOUS at 00:00

## 2023-12-05 RX ADMIN — METHOCARBAMOL 750 MILLIGRAM(S): 500 TABLET, FILM COATED ORAL at 05:33

## 2023-12-05 RX ADMIN — OXYCODONE HYDROCHLORIDE 10 MILLIGRAM(S): 5 TABLET ORAL at 08:12

## 2023-12-05 RX ADMIN — Medication 3 MILLILITER(S): at 20:22

## 2023-12-05 RX ADMIN — Medication 975 MILLIGRAM(S): at 13:00

## 2023-12-05 RX ADMIN — Medication 2 MILLIGRAM(S): at 05:33

## 2023-12-05 RX ADMIN — Medication 5 MILLIGRAM(S): at 21:48

## 2023-12-05 RX ADMIN — OXYCODONE HYDROCHLORIDE 10 MILLIGRAM(S): 5 TABLET ORAL at 21:46

## 2023-12-05 RX ADMIN — Medication 1 PATCH: at 18:11

## 2023-12-05 RX ADMIN — Medication 100 MILLIGRAM(S): at 12:03

## 2023-12-05 RX ADMIN — Medication 1 PATCH: at 07:35

## 2023-12-05 RX ADMIN — PANTOPRAZOLE SODIUM 40 MILLIGRAM(S): 20 TABLET, DELAYED RELEASE ORAL at 08:12

## 2023-12-05 RX ADMIN — DIVALPROEX SODIUM 500 MILLIGRAM(S): 500 TABLET, DELAYED RELEASE ORAL at 17:47

## 2023-12-05 RX ADMIN — Medication 0.5 MILLIGRAM(S): at 21:48

## 2023-12-05 RX ADMIN — Medication 975 MILLIGRAM(S): at 05:32

## 2023-12-05 RX ADMIN — LIDOCAINE 3 PATCH: 4 CREAM TOPICAL at 12:02

## 2023-12-05 RX ADMIN — Medication 975 MILLIGRAM(S): at 00:06

## 2023-12-05 RX ADMIN — Medication 2 MILLIGRAM(S): at 17:47

## 2023-12-05 RX ADMIN — OXYCODONE HYDROCHLORIDE 10 MILLIGRAM(S): 5 TABLET ORAL at 09:00

## 2023-12-05 RX ADMIN — SENNA PLUS 2 TABLET(S): 8.6 TABLET ORAL at 21:46

## 2023-12-05 RX ADMIN — Medication 3 MILLILITER(S): at 09:05

## 2023-12-05 RX ADMIN — POLYETHYLENE GLYCOL 3350 17 GRAM(S): 17 POWDER, FOR SOLUTION ORAL at 21:49

## 2023-12-05 RX ADMIN — OXYCODONE HYDROCHLORIDE 10 MILLIGRAM(S): 5 TABLET ORAL at 07:50

## 2023-12-05 RX ADMIN — Medication 3 MILLILITER(S): at 15:34

## 2023-12-05 RX ADMIN — AMLODIPINE BESYLATE 5 MILLIGRAM(S): 2.5 TABLET ORAL at 12:03

## 2023-12-05 RX ADMIN — Medication 975 MILLIGRAM(S): at 12:02

## 2023-12-05 RX ADMIN — HYDROMORPHONE HYDROCHLORIDE 0.5 MILLIGRAM(S): 2 INJECTION INTRAMUSCULAR; INTRAVENOUS; SUBCUTANEOUS at 13:48

## 2023-12-05 RX ADMIN — LIDOCAINE 3 PATCH: 4 CREAM TOPICAL at 18:10

## 2023-12-05 NOTE — PROGRESS NOTE ADULT - SUBJECTIVE AND OBJECTIVE BOX
24h Events:  Precedex discontinued , psychology was consulted , recommended inc Seroquel to 125 and PRN Zyprexa if needed , Patient received another rib block , no complains ON , pain is well controlled , however hypertensive to 170/90 responded to hydralazine x1.       ICU Vital Signs Last 24 Hrs  T(C): 36.6 (04 Dec 2023 23:24), Max: 37.1 (04 Dec 2023 15:49)  T(F): 97.8 (04 Dec 2023 23:24), Max: 98.7 (04 Dec 2023 15:49)  HR: 77 (05 Dec 2023 01:00) (10 - 109)  BP: 134/82 (05 Dec 2023 01:00) (106/72 - 172/116)  BP(mean): 98 (05 Dec 2023 01:00) (84 - 136)  ABP: --  ABP(mean): --  RR: 16 (05 Dec 2023 01:00) (11 - 23)  SpO2: 100% (05 Dec 2023 01:00) (85% - 100%)    O2 Parameters below as of 05 Dec 2023 01:00  Patient On (Oxygen Delivery Method): nasal cannula  O2 Flow (L/min): 2              MEDICATIONS  (STANDING):  acetaminophen     Tablet .. 975 milliGRAM(s) Oral every 6 hours  albuterol/ipratropium for Nebulization 3 milliLiter(s) Nebulizer every 6 hours  benztropine 2 milliGRAM(s) Oral two times a day  chlorhexidine 2% Cloths 1 Application(s) Topical daily  clonazePAM  Tablet 0.5 milliGRAM(s) Oral at bedtime  divalproex  milliGRAM(s) Oral every 12 hours  enoxaparin Injectable 40 milliGRAM(s) SubCutaneous every 12 hours  folic acid 1 milliGRAM(s) Oral daily  gabapentin 100 milliGRAM(s) Oral at bedtime  influenza   Vaccine 0.5 milliLiter(s) IntraMuscular once  lidocaine   4% Patch 3 Patch Transdermal daily  melatonin 5 milliGRAM(s) Oral at bedtime  methocarbamol 750 milliGRAM(s) Oral every 12 hours  nicotine - 21 mG/24Hr(s) Patch 1 Patch Transdermal every 24 hours  pantoprazole    Tablet 40 milliGRAM(s) Oral before breakfast  polyethylene glycol 3350 17 Gram(s) Oral at bedtime  QUEtiapine 125 milliGRAM(s) Oral at bedtime  senna 2 Tablet(s) Oral at bedtime  thiamine 100 milliGRAM(s) Oral daily    MEDICATIONS  (PRN):  albuterol/ipratropium for Nebulization 3 milliLiter(s) Nebulizer every 6 hours PRN Shortness of Breath and/or Wheezing  HYDROmorphone  Injectable 0.5 milliGRAM(s) IV Push every 4 hours PRN breakthrough  OLANZapine Injectable 5 milliGRAM(s) IntraMuscular every 12 hours PRN agitation  ondansetron Injectable 4 milliGRAM(s) IV Push every 6 hours PRN Nausea  oxyCODONE    IR 5 milliGRAM(s) Oral every 6 hours PRN Moderate Pain (4 - 6)  oxyCODONE    IR 10 milliGRAM(s) Oral every 6 hours PRN Severe Pain (7 - 10)          Physical Exam:    Neurological:  GCS 15   No focal sensory/motor deficits    HEENT: PERRL, EOMI     Respiratory: Unlabored, no accessory muscle use, saturating well on 2-3L NC    Cardiovascular: Regular rate & rhythm    Gastrointestinal: Soft, nttp, nd    Extremities: No peripheral edema    Vascular: Equal and normal pulses: 2+ peripheral pulses throughout    Musculoskeletal: No joint pain, swelling or deformity; no limitation of movement    Skin: No rashes, left midclavicular decompressive incision dressed and clean       LABS                   CAPILLARY BLOOD GLUCOSE        Urinalysis Basic - ( 04 Dec 2023 02:15 )    Color: x / Appearance: x / SG: x / pH: x  Gluc: 144 mg/dL / Ketone: x  / Bili: x / Urobili: x   Blood: x / Protein: x / Nitrite: x   Leuk Esterase: x / RBC: x / WBC x   Sq Epi: x / Non Sq Epi: x / Bacteria: x          --------------------------------------------------------------------------------------------

## 2023-12-05 NOTE — PROGRESS NOTE ADULT - ATTENDING COMMENTS
57-year-old male pmhx of seizures, HTN, HLD, and tardive dyskinesia s/p fall with rib fractures, PTX, and subcutaneous emphysema.     NEURO;  #Analgesia: multimodal pain regimen. Pain management for repeat rib block   #Schizoaffective, tardive dyskinesia, delirium: f/u psych. c/w home meds. C/w seroquel. Zyprexa PRN. QTc 450s     CARDIO:   #sinus tachycardia: pain control. improve with volume resuscitation.     PULM:  #left sided rib fractures, L PTx, Subcutaneous emphysema  - s/p chest tube and chest wall incision. Chest tube now removed. Subq emphysema improved   - PIC score 8 today.   - wean supplemental O2. Goal Sp02 > 92   #Aspiration precautions: head of bed elevation     GI:   #Feeding: regular   #Bowel regimen: miralax, senna   #Stress ulcer ppx: PPI (home med)     RENAL:   Deleon: none    HEME:   Thromboprophylaxis: SCD + Lovenox   Anemia: stable. Trend     ID:   #leukocytosis: improving. trend.     ENDO:   Glucose <180     LINES/SKIN: PIV     CODE STATUS: Full     DISPOSITION: Regular surgical floor

## 2023-12-05 NOTE — PROGRESS NOTE ADULT - ASSESSMENT
Assessment and Plan: 57M with PMH seizures, liver disease, back pain, HLD presents to ED intoxicated s/p fall.  On admission, patient intubated for airway protection after admin of Versed and subsequent emesis. Imaging reveal multiple rib fxs and L ptx with associated extensive subcutaneous emphysema requiring L chest tube. Patient's hospital course was complicated by respiratory failure secondary to acute asthma exacerbation/ bronchospasm.  Patient was intubated at that time and promptly extubated.       Neuro:   - increase Seroquel 125 / Zyprexa PRN    - Multimodal pain control   - delirium precautions  - Optimize sleep / wake cycle  - daily sedation holidays    CV:   - HTN will consider cardiac evaluation for HTN management   - Continue hemodynamic monitoring  - Maintain MAP > 65    Pulm:   Multiple rib fx - s/p rib block x2   -continue PIC protocol  -L ptx w/ associated subcutaneous emphysema   - s/p L pig tail removal. Post removal cxr without reaccumulation.   - Incentive spirometry  - Pulmonary toilet  - OOB to chair    GI/Nutrition:   - tolerating regular diet   - Monitor bowel function and continue serial abdominal exams  - continue bowel reg    /Renal:   - voiding   - monitor kidney fxn    ID  none     Endo:  -Maintain Euglycemia   - monitor blood glucose    Skin:   - repositioning for DTI prevention while in bed    Heme/DVT Prophylaxis:  - SCDs  - Lov    Dispo:  - floor

## 2023-12-05 NOTE — CHART NOTE - NSCHARTNOTEFT_GEN_A_CORE
SICU TRANSFER NOTE  -----------------------------  ICU Admission Date: 11/29, 12/1  Transfer Date: 12-05-23 @ 12:00    Admission Diagnosis: L 3,6-7, 10th rib fx, L ptx s/p chest tube removal, subcutaneous emphysema requiring L anterior chest incision  Upgraded to SICU for acute hypoxic resp failure secondary acute asthma exacerbation/ bronchospasm (resolved)    Active Problems/injuries: L 3rd, 6-7th, 10th rib fx     Procedures: 11/29: Rib block  11/30: L chest tube pig tail catheter placement   12/4: rib block     Consultants:  [ ] Cardiology  [ ] Endocrine  [ ] Infectious Disease  [ ] Medicine  [ ]Neurosurgery  [ ] Ortho       [ ] Weight Bearing Status:  [ ] Palliative       [ ] Advanced Directives:    [ ] Physical Medicine and Rehab       [ ] Disposition :   [ ] Plastics  [ ] Pulmonary    Medications  acetaminophen     Tablet .. 975 milliGRAM(s) Oral every 6 hours  albuterol/ipratropium for Nebulization 3 milliLiter(s) Nebulizer every 6 hours  albuterol/ipratropium for Nebulization 3 milliLiter(s) Nebulizer every 6 hours PRN  amLODIPine   Tablet 5 milliGRAM(s) Oral daily  benztropine 2 milliGRAM(s) Oral two times a day  clonazePAM  Tablet 0.5 milliGRAM(s) Oral at bedtime  divalproex  milliGRAM(s) Oral every 12 hours  enoxaparin Injectable 40 milliGRAM(s) SubCutaneous every 12 hours  folic acid 1 milliGRAM(s) Oral daily  gabapentin 100 milliGRAM(s) Oral at bedtime  HYDROmorphone  Injectable 0.5 milliGRAM(s) IV Push every 4 hours PRN  influenza   Vaccine 0.5 milliLiter(s) IntraMuscular once  lidocaine   4% Patch 3 Patch Transdermal daily  melatonin 5 milliGRAM(s) Oral at bedtime  methocarbamol 750 milliGRAM(s) Oral every 12 hours  nicotine - 21 mG/24Hr(s) Patch 1 Patch Transdermal every 24 hours  OLANZapine Injectable 5 milliGRAM(s) IntraMuscular every 12 hours PRN  ondansetron Injectable 4 milliGRAM(s) IV Push every 6 hours PRN  oxyCODONE    IR 10 milliGRAM(s) Oral every 6 hours PRN  oxyCODONE    IR 5 milliGRAM(s) Oral every 6 hours PRN  pantoprazole    Tablet 40 milliGRAM(s) Oral before breakfast  polyethylene glycol 3350 17 Gram(s) Oral at bedtime  QUEtiapine 125 milliGRAM(s) Oral at bedtime  senna 2 Tablet(s) Oral at bedtime  thiamine 100 milliGRAM(s) Oral daily      [ ] I attest I have reviewed and reconciled all medications prior to transfer    IV Fluids  sodium chloride 0.9% Bolus:   1000 milliLiter(s), IV Bolus, once, infuse over 60 Minute(s), Stop After 1 Doses  Provider's Contact #: [phone]  sodium chloride 0.9%.: Solution, 1000 milliLiter(s) infuse at 75 mL/Hr  Provider's Contact #: [phone]  lactated ringers.: Solution, 1000 milliLiter(s) infuse at 75 mL/Hr  Provider's Contact #: [phone]  lactated ringers.: Solution, 1000 milliLiter(s) infuse at 100 mL/Hr  Provider's Contact #: [phone]  lactated ringers Bolus:   1000 milliLiter(s), IV Bolus, once, infuse over 1 Hr, Stop After 1 Doses  Provider's Contact #: [phone]  sodium chloride 0.9%.: Solution, 1000 milliLiter(s) infuse at 250 mL/Hr  sodium chloride 0.9%: 1000 milliLiter(s)      with thiamine additive 100 milliGRAM(s)      with multivitamin additive 10 milliLiter(s)      with folic acid additive 1 milliGRAM(s), infuse at 250 mL/Hr  lactated ringers.: Solution, 1000 milliLiter(s) infuse at 110 mL/Hr  lactated ringers Bolus:   1000 milliLiter(s), IV Bolus, once, infuse over 60 Minute(s), Stop After 1 Doses  sodium chloride 0.9% Bolus:   2000 milliLiter(s), IV Bolus, once, infuse over 60 Minute(s), Stop After 1 Doses  Provider's Contact #: [phone]  sodium chloride 0.9% Bolus:   250 milliLiter(s), IV Bolus, once, infuse over 60 Minute(s), Stop After 1 Doses  Special Instructions: Peripheral Line 1    Indication: XXXXXX    Antibiotics:    Indication: XXXXXXX End Date:XXXXXXX      I have discussed this case with xxxxxENTER [facility]AMExxxxx upon transfer and all questions regarding ICU course were answered.  The following items are to be followed up: SICU TRANSFER NOTE  -----------------------------  ICU Admission Date: 11/29, 12/1  Transfer Date: 12-05-23 @ 12:00    Admission Diagnosis: L 3,6-7, 10th rib fx, L ptx s/p chest tube removal, subcutaneous emphysema requiring L anterior chest incision  Upgraded to SICU for acute hypoxic resp failure secondary acute asthma exacerbation/ bronchospasm (resolved)    Active Problems/injuries: L 3rd, 6-7th, 10th rib fx     Procedures: 11/29: Rib block  11/30: L chest tube pig tail catheter placement   12/4: rib block     Consultants:  [ ] Cardiology  [ ] Endocrine  [ ] Infectious Disease  [ ] Medicine  [ ]Neurosurgery  [ ] Ortho       [ ] Weight Bearing Status:  [ ] Palliative       [ ] Advanced Directives:    [ ] Physical Medicine and Rehab       [ ] Disposition :   [ ] Plastics  [ ] Pulmonary    Medications  acetaminophen     Tablet .. 975 milliGRAM(s) Oral every 6 hours  albuterol/ipratropium for Nebulization 3 milliLiter(s) Nebulizer every 6 hours  albuterol/ipratropium for Nebulization 3 milliLiter(s) Nebulizer every 6 hours PRN  amLODIPine   Tablet 5 milliGRAM(s) Oral daily  benztropine 2 milliGRAM(s) Oral two times a day  clonazePAM  Tablet 0.5 milliGRAM(s) Oral at bedtime  divalproex  milliGRAM(s) Oral every 12 hours  enoxaparin Injectable 40 milliGRAM(s) SubCutaneous every 12 hours  folic acid 1 milliGRAM(s) Oral daily  gabapentin 100 milliGRAM(s) Oral at bedtime  HYDROmorphone  Injectable 0.5 milliGRAM(s) IV Push every 4 hours PRN  influenza   Vaccine 0.5 milliLiter(s) IntraMuscular once  lidocaine   4% Patch 3 Patch Transdermal daily  melatonin 5 milliGRAM(s) Oral at bedtime  methocarbamol 750 milliGRAM(s) Oral every 12 hours  nicotine - 21 mG/24Hr(s) Patch 1 Patch Transdermal every 24 hours  OLANZapine Injectable 5 milliGRAM(s) IntraMuscular every 12 hours PRN  ondansetron Injectable 4 milliGRAM(s) IV Push every 6 hours PRN  oxyCODONE    IR 10 milliGRAM(s) Oral every 6 hours PRN  oxyCODONE    IR 5 milliGRAM(s) Oral every 6 hours PRN  pantoprazole    Tablet 40 milliGRAM(s) Oral before breakfast  polyethylene glycol 3350 17 Gram(s) Oral at bedtime  QUEtiapine 125 milliGRAM(s) Oral at bedtime  senna 2 Tablet(s) Oral at bedtime  thiamine 100 milliGRAM(s) Oral daily      [ ] I attest I have reviewed and reconciled all medications prior to transfer    IV Fluids  sodium chloride 0.9% Bolus:   1000 milliLiter(s), IV Bolus, once, infuse over 60 Minute(s), Stop After 1 Doses  Provider's Contact #: [phone]  sodium chloride 0.9%.: Solution, 1000 milliLiter(s) infuse at 75 mL/Hr  Provider's Contact #: [phone]  lactated ringers.: Solution, 1000 milliLiter(s) infuse at 75 mL/Hr  Provider's Contact #: [phone]  lactated ringers.: Solution, 1000 milliLiter(s) infuse at 100 mL/Hr  Provider's Contact #: [phone]  lactated ringers Bolus:   1000 milliLiter(s), IV Bolus, once, infuse over 1 Hr, Stop After 1 Doses  Provider's Contact #: [phone]  sodium chloride 0.9%.: Solution, 1000 milliLiter(s) infuse at 250 mL/Hr  sodium chloride 0.9%: 1000 milliLiter(s)      with thiamine additive 100 milliGRAM(s)      with multivitamin additive 10 milliLiter(s)      with folic acid additive 1 milliGRAM(s), infuse at 250 mL/Hr  lactated ringers.: Solution, 1000 milliLiter(s) infuse at 110 mL/Hr  lactated ringers Bolus:   1000 milliLiter(s), IV Bolus, once, infuse over 60 Minute(s), Stop After 1 Doses  sodium chloride 0.9% Bolus:   2000 milliLiter(s), IV Bolus, once, infuse over 60 Minute(s), Stop After 1 Doses  Provider's Contact #: [phone]  sodium chloride 0.9% Bolus:   250 milliLiter(s), IV Bolus, once, infuse over 60 Minute(s), Stop After 1 Doses  Special Instructions: Peripheral Line 1    Indication: XXXXXX    Antibiotics:    Indication: XXXXXXX End Date:XXXXXXX      I have discussed this case with [facility]Exxxxx upon transfer and all questions regarding ICU course were answered.  The following items are to be followed up: SICU TRANSFER NOTE  -----------------------------  ICU Admission Date: 11/29, 12/1  Transfer Date: 12-05-23 @ 12:00    Admission Diagnosis: L 3,6-7, 10th rib fx, L ptx s/p chest tube removal, subcutaneous emphysema requiring L anterior chest incision  Upgraded to SICU for acute hypoxic resp failure secondary acute asthma exacerbation/ bronchospasm (resolved)    Active Problems/injuries: L 3rd, 6-7th, 10th rib fx     Procedures: 11/29: Rib block  11/30: L chest tube pig tail catheter placement   12/4: rib block     Consultants:  [ ] Cardiology  [ ] Endocrine  [ ] Infectious Disease  [ ] Medicine  [ ]Neurosurgery  [ ] Ortho       [ ] Weight Bearing Status:  [ ] Palliative       [ ] Advanced Directives:    [ ] Physical Medicine and Rehab       [ ] Disposition :   [ ] Plastics  [ ] Pulmonary  [x] Pain Mg  [x] Psych     Medications  acetaminophen     Tablet .. 975 milliGRAM(s) Oral every 6 hours  albuterol/ipratropium for Nebulization 3 milliLiter(s) Nebulizer every 6 hours  albuterol/ipratropium for Nebulization 3 milliLiter(s) Nebulizer every 6 hours PRN  amLODIPine   Tablet 5 milliGRAM(s) Oral daily  benztropine 2 milliGRAM(s) Oral two times a day  clonazePAM  Tablet 0.5 milliGRAM(s) Oral at bedtime  divalproex  milliGRAM(s) Oral every 12 hours  enoxaparin Injectable 40 milliGRAM(s) SubCutaneous every 12 hours  folic acid 1 milliGRAM(s) Oral daily  gabapentin 100 milliGRAM(s) Oral at bedtime  HYDROmorphone  Injectable 0.5 milliGRAM(s) IV Push every 4 hours PRN  influenza   Vaccine 0.5 milliLiter(s) IntraMuscular once  lidocaine   4% Patch 3 Patch Transdermal daily  melatonin 5 milliGRAM(s) Oral at bedtime  methocarbamol 750 milliGRAM(s) Oral every 12 hours  nicotine - 21 mG/24Hr(s) Patch 1 Patch Transdermal every 24 hours  OLANZapine Injectable 5 milliGRAM(s) IntraMuscular every 12 hours PRN  ondansetron Injectable 4 milliGRAM(s) IV Push every 6 hours PRN  oxyCODONE    IR 10 milliGRAM(s) Oral every 6 hours PRN  oxyCODONE    IR 5 milliGRAM(s) Oral every 6 hours PRN  pantoprazole    Tablet 40 milliGRAM(s) Oral before breakfast  polyethylene glycol 3350 17 Gram(s) Oral at bedtime  QUEtiapine 125 milliGRAM(s) Oral at bedtime  senna 2 Tablet(s) Oral at bedtime  thiamine 100 milliGRAM(s) Oral daily      [ ] I attest I have reviewed and reconciled all medications prior to transfer    IV Fluids  sodium chloride 0.9% Bolus:   1000 milliLiter(s), IV Bolus, once, infuse over 60 Minute(s), Stop After 1 Doses  Provider's Contact #: [phone]  sodium chloride 0.9%.: Solution, 1000 milliLiter(s) infuse at 75 mL/Hr  Provider's Contact #: [phone]  lactated ringers.: Solution, 1000 milliLiter(s) infuse at 75 mL/Hr  Provider's Contact #: [phone]  lactated ringers.: Solution, 1000 milliLiter(s) infuse at 100 mL/Hr  Provider's Contact #: [phone]  lactated ringers Bolus:   1000 milliLiter(s), IV Bolus, once, infuse over 1 Hr, Stop After 1 Doses  Provider's Contact #: [phone]  sodium chloride 0.9%.: Solution, 1000 milliLiter(s) infuse at 250 mL/Hr  sodium chloride 0.9%: 1000 milliLiter(s)      with thiamine additive 100 milliGRAM(s)      with multivitamin additive 10 milliLiter(s)      with folic acid additive 1 milliGRAM(s), infuse at 250 mL/Hr  lactated ringers.: Solution, 1000 milliLiter(s) infuse at 110 mL/Hr  lactated ringers Bolus:   1000 milliLiter(s), IV Bolus, once, infuse over 60 Minute(s), Stop After 1 Doses  sodium chloride 0.9% Bolus:   2000 milliLiter(s), IV Bolus, once, infuse over 60 Minute(s), Stop After 1 Doses  Provider's Contact #: [phone]  sodium chloride 0.9% Bolus:   250 milliLiter(s), IV Bolus, once, infuse over 60 Minute(s), Stop After 1 Doses  Special Instructions: Peripheral Line 1        I have discussed this case with ACS Team upon transfer and all questions regarding ICU course were answered.  The following items are to be followed up:  1. Hx of SICU TRANSFER NOTE  -----------------------------  ICU Admission Date: 11/29, 12/1  Transfer Date: 12-05-23 @ 12:00    Admission Diagnosis: L 3,6-7, 10th rib fx, L ptx s/p chest tube removal, subcutaneous emphysema requiring L anterior chest incision  Upgraded to SICU for acute hypoxic resp failure secondary acute asthma exacerbation/ bronchospasm (resolved)    Active Problems/injuries: L 3rd, 6-7th, 10th rib fx     Procedures: 11/29: Rib block  11/30: L chest tube pig tail catheter placement   12/4: rib block     Consultants:  [ ] Cardiology  [ ] Endocrine  [ ] Infectious Disease  [ ] Medicine  [ ]Neurosurgery  [ ] Ortho       [ ] Weight Bearing Status:  [ ] Palliative       [ ] Advanced Directives:    [ ] Physical Medicine and Rehab       [ ] Disposition :   [ ] Plastics  [ ] Pulmonary  [x] Pain Mg  [x] Psych     Medications  acetaminophen     Tablet .. 975 milliGRAM(s) Oral every 6 hours  albuterol/ipratropium for Nebulization 3 milliLiter(s) Nebulizer every 6 hours  albuterol/ipratropium for Nebulization 3 milliLiter(s) Nebulizer every 6 hours PRN  amLODIPine   Tablet 5 milliGRAM(s) Oral daily  benztropine 2 milliGRAM(s) Oral two times a day  clonazePAM  Tablet 0.5 milliGRAM(s) Oral at bedtime  divalproex  milliGRAM(s) Oral every 12 hours  enoxaparin Injectable 40 milliGRAM(s) SubCutaneous every 12 hours  folic acid 1 milliGRAM(s) Oral daily  gabapentin 100 milliGRAM(s) Oral at bedtime  HYDROmorphone  Injectable 0.5 milliGRAM(s) IV Push every 4 hours PRN  influenza   Vaccine 0.5 milliLiter(s) IntraMuscular once  lidocaine   4% Patch 3 Patch Transdermal daily  melatonin 5 milliGRAM(s) Oral at bedtime  methocarbamol 750 milliGRAM(s) Oral every 12 hours  nicotine - 21 mG/24Hr(s) Patch 1 Patch Transdermal every 24 hours  OLANZapine Injectable 5 milliGRAM(s) IntraMuscular every 12 hours PRN  ondansetron Injectable 4 milliGRAM(s) IV Push every 6 hours PRN  oxyCODONE    IR 10 milliGRAM(s) Oral every 6 hours PRN  oxyCODONE    IR 5 milliGRAM(s) Oral every 6 hours PRN  pantoprazole    Tablet 40 milliGRAM(s) Oral before breakfast  polyethylene glycol 3350 17 Gram(s) Oral at bedtime  QUEtiapine 125 milliGRAM(s) Oral at bedtime  senna 2 Tablet(s) Oral at bedtime  thiamine 100 milliGRAM(s) Oral daily      [ ] I attest I have reviewed and reconciled all medications prior to transfer    IV Fluids  sodium chloride 0.9% Bolus:   1000 milliLiter(s), IV Bolus, once, infuse over 60 Minute(s), Stop After 1 Doses  Provider's Contact #: [phone]  sodium chloride 0.9%.: Solution, 1000 milliLiter(s) infuse at 75 mL/Hr  Provider's Contact #: [phone]  lactated ringers.: Solution, 1000 milliLiter(s) infuse at 75 mL/Hr  Provider's Contact #: [phone]  lactated ringers.: Solution, 1000 milliLiter(s) infuse at 100 mL/Hr  Provider's Contact #: [phone]  lactated ringers Bolus:   1000 milliLiter(s), IV Bolus, once, infuse over 1 Hr, Stop After 1 Doses  Provider's Contact #: [phone]  sodium chloride 0.9%.: Solution, 1000 milliLiter(s) infuse at 250 mL/Hr  sodium chloride 0.9%: 1000 milliLiter(s)      with thiamine additive 100 milliGRAM(s)      with multivitamin additive 10 milliLiter(s)      with folic acid additive 1 milliGRAM(s), infuse at 250 mL/Hr  lactated ringers.: Solution, 1000 milliLiter(s) infuse at 110 mL/Hr  lactated ringers Bolus:   1000 milliLiter(s), IV Bolus, once, infuse over 60 Minute(s), Stop After 1 Doses  sodium chloride 0.9% Bolus:   2000 milliLiter(s), IV Bolus, once, infuse over 60 Minute(s), Stop After 1 Doses  Provider's Contact #: [phone]  sodium chloride 0.9% Bolus:   250 milliLiter(s), IV Bolus, once, infuse over 60 Minute(s), Stop After 1 Doses  Special Instructions: Peripheral Line 1        I have discussed this case with ACS Team upon transfer and all questions regarding ICU course were answered.  The following items are to be followed up:  1. Hx of SICU TRANSFER NOTE  -----------------------------  ICU Admission Date: 11/29, 12/1  Transfer Date: 12-05-23 @ 12:00    Admission Diagnosis: L 3,6-7, 10th rib fx, L ptx s/p chest tube removal, subcutaneous emphysema requiring L anterior chest incision  Upgraded to SICU for acute hypoxic resp failure secondary acute asthma exacerbation/ bronchospasm (resolved)    Active Problems/injuries: L 3rd, 6-7th, 10th rib fx     Procedures: 11/29: Rib block  11/30: L chest tube pig tail catheter placement   12/4: rib block     Consultants:  [ ] Cardiology  [ ] Endocrine  [ ] Infectious Disease  [ ] Medicine  [ ]Neurosurgery  [ ] Ortho       [ ] Weight Bearing Status:  [ ] Palliative       [ ] Advanced Directives:    [ ] Physical Medicine and Rehab       [ ] Disposition :   [ ] Plastics  [ ] Pulmonary  [x] Pain Mg  [x] Psych     Medications  acetaminophen     Tablet .. 975 milliGRAM(s) Oral every 6 hours  albuterol/ipratropium for Nebulization 3 milliLiter(s) Nebulizer every 6 hours  albuterol/ipratropium for Nebulization 3 milliLiter(s) Nebulizer every 6 hours PRN  amLODIPine   Tablet 5 milliGRAM(s) Oral daily  benztropine 2 milliGRAM(s) Oral two times a day  clonazePAM  Tablet 0.5 milliGRAM(s) Oral at bedtime  divalproex  milliGRAM(s) Oral every 12 hours  enoxaparin Injectable 40 milliGRAM(s) SubCutaneous every 12 hours  folic acid 1 milliGRAM(s) Oral daily  gabapentin 100 milliGRAM(s) Oral at bedtime  HYDROmorphone  Injectable 0.5 milliGRAM(s) IV Push every 4 hours PRN  influenza   Vaccine 0.5 milliLiter(s) IntraMuscular once  lidocaine   4% Patch 3 Patch Transdermal daily  melatonin 5 milliGRAM(s) Oral at bedtime  methocarbamol 750 milliGRAM(s) Oral every 12 hours  nicotine - 21 mG/24Hr(s) Patch 1 Patch Transdermal every 24 hours  OLANZapine Injectable 5 milliGRAM(s) IntraMuscular every 12 hours PRN  ondansetron Injectable 4 milliGRAM(s) IV Push every 6 hours PRN  oxyCODONE    IR 10 milliGRAM(s) Oral every 6 hours PRN  oxyCODONE    IR 5 milliGRAM(s) Oral every 6 hours PRN  pantoprazole    Tablet 40 milliGRAM(s) Oral before breakfast  polyethylene glycol 3350 17 Gram(s) Oral at bedtime  QUEtiapine 125 milliGRAM(s) Oral at bedtime  senna 2 Tablet(s) Oral at bedtime  thiamine 100 milliGRAM(s) Oral daily      [ ] I attest I have reviewed and reconciled all medications prior to transfer    IV Fluids  sodium chloride 0.9% Bolus:   1000 milliLiter(s), IV Bolus, once, infuse over 60 Minute(s), Stop After 1 Doses  Provider's Contact #: [phone]  sodium chloride 0.9%.: Solution, 1000 milliLiter(s) infuse at 75 mL/Hr  Provider's Contact #: [phone]  lactated ringers.: Solution, 1000 milliLiter(s) infuse at 75 mL/Hr  Provider's Contact #: [phone]  lactated ringers.: Solution, 1000 milliLiter(s) infuse at 100 mL/Hr  Provider's Contact #: [phone]  lactated ringers Bolus:   1000 milliLiter(s), IV Bolus, once, infuse over 1 Hr, Stop After 1 Doses  Provider's Contact #: [phone]  sodium chloride 0.9%.: Solution, 1000 milliLiter(s) infuse at 250 mL/Hr  sodium chloride 0.9%: 1000 milliLiter(s)      with thiamine additive 100 milliGRAM(s)      with multivitamin additive 10 milliLiter(s)      with folic acid additive 1 milliGRAM(s), infuse at 250 mL/Hr  lactated ringers.: Solution, 1000 milliLiter(s) infuse at 110 mL/Hr  lactated ringers Bolus:   1000 milliLiter(s), IV Bolus, once, infuse over 60 Minute(s), Stop After 1 Doses  sodium chloride 0.9% Bolus:   2000 milliLiter(s), IV Bolus, once, infuse over 60 Minute(s), Stop After 1 Doses  Provider's Contact #: [phone]  sodium chloride 0.9% Bolus:   250 milliLiter(s), IV Bolus, once, infuse over 60 Minute(s), Stop After 1 Doses  Special Instructions: Peripheral Line 1        I have discussed this case with ACS Team upon transfer and all questions regarding ICU course were answered.  The following items are to be followed up:  1. Hx of schizoaffective disorder- continue all home meds. Delirium improving - increased seroquel 125qhs. F/u with psych recommendations.   2. SICU TRANSFER NOTE  -----------------------------  ICU Admission Date: 11/29, 12/1  Transfer Date: 12-05-23 @ 12:00    Admission Diagnosis: L 3,6-7, 10th rib fx, L ptx s/p chest tube removal, subcutaneous emphysema requiring L anterior chest incision  Upgraded to SICU for acute hypoxic resp failure secondary acute asthma exacerbation/ bronchospasm (resolved)    Active Problems/injuries: L 3rd, 6-7th, 10th rib fx     Procedures: 11/29: Rib block  11/30: L chest tube pig tail catheter placement   12/4: rib block     Consultants:  [ ] Cardiology  [ ] Endocrine  [ ] Infectious Disease  [ ] Medicine  [ ]Neurosurgery  [ ] Ortho       [ ] Weight Bearing Status:  [ ] Palliative       [ ] Advanced Directives:    [ ] Physical Medicine and Rehab       [ ] Disposition :   [ ] Plastics  [ ] Pulmonary  [x] Pain Mg  [x] Psych     Medications  acetaminophen     Tablet .. 975 milliGRAM(s) Oral every 6 hours  albuterol/ipratropium for Nebulization 3 milliLiter(s) Nebulizer every 6 hours  albuterol/ipratropium for Nebulization 3 milliLiter(s) Nebulizer every 6 hours PRN  amLODIPine   Tablet 5 milliGRAM(s) Oral daily  benztropine 2 milliGRAM(s) Oral two times a day  clonazePAM  Tablet 0.5 milliGRAM(s) Oral at bedtime  divalproex  milliGRAM(s) Oral every 12 hours  enoxaparin Injectable 40 milliGRAM(s) SubCutaneous every 12 hours  folic acid 1 milliGRAM(s) Oral daily  gabapentin 100 milliGRAM(s) Oral at bedtime  HYDROmorphone  Injectable 0.5 milliGRAM(s) IV Push every 4 hours PRN  influenza   Vaccine 0.5 milliLiter(s) IntraMuscular once  lidocaine   4% Patch 3 Patch Transdermal daily  melatonin 5 milliGRAM(s) Oral at bedtime  methocarbamol 750 milliGRAM(s) Oral every 12 hours  nicotine - 21 mG/24Hr(s) Patch 1 Patch Transdermal every 24 hours  OLANZapine Injectable 5 milliGRAM(s) IntraMuscular every 12 hours PRN  ondansetron Injectable 4 milliGRAM(s) IV Push every 6 hours PRN  oxyCODONE    IR 10 milliGRAM(s) Oral every 6 hours PRN  oxyCODONE    IR 5 milliGRAM(s) Oral every 6 hours PRN  pantoprazole    Tablet 40 milliGRAM(s) Oral before breakfast  polyethylene glycol 3350 17 Gram(s) Oral at bedtime  QUEtiapine 125 milliGRAM(s) Oral at bedtime  senna 2 Tablet(s) Oral at bedtime  thiamine 100 milliGRAM(s) Oral daily      [ ] I attest I have reviewed and reconciled all medications prior to transfer    IV Fluids  sodium chloride 0.9% Bolus:   1000 milliLiter(s), IV Bolus, once, infuse over 60 Minute(s), Stop After 1 Doses  Provider's Contact #: [phone]  sodium chloride 0.9%.: Solution, 1000 milliLiter(s) infuse at 75 mL/Hr  Provider's Contact #: [phone]  lactated ringers.: Solution, 1000 milliLiter(s) infuse at 75 mL/Hr  Provider's Contact #: [phone]  lactated ringers.: Solution, 1000 milliLiter(s) infuse at 100 mL/Hr  Provider's Contact #: [phone]  lactated ringers Bolus:   1000 milliLiter(s), IV Bolus, once, infuse over 1 Hr, Stop After 1 Doses  Provider's Contact #: [phone]  sodium chloride 0.9%.: Solution, 1000 milliLiter(s) infuse at 250 mL/Hr  sodium chloride 0.9%: 1000 milliLiter(s)      with thiamine additive 100 milliGRAM(s)      with multivitamin additive 10 milliLiter(s)      with folic acid additive 1 milliGRAM(s), infuse at 250 mL/Hr  lactated ringers.: Solution, 1000 milliLiter(s) infuse at 110 mL/Hr  lactated ringers Bolus:   1000 milliLiter(s), IV Bolus, once, infuse over 60 Minute(s), Stop After 1 Doses  sodium chloride 0.9% Bolus:   2000 milliLiter(s), IV Bolus, once, infuse over 60 Minute(s), Stop After 1 Doses  Provider's Contact #: [phone]  sodium chloride 0.9% Bolus:   250 milliLiter(s), IV Bolus, once, infuse over 60 Minute(s), Stop After 1 Doses  Special Instructions: Peripheral Line 1        I have discussed this case with ACS Team upon transfer and all questions regarding ICU course were answered.  The following items are to be followed up:  1. Hx of schizoaffective disorder- continue all home meds. Delirium improving - increased seroquel 125qhs. F/u with psych recommendations.   2. SICU TRANSFER NOTE  -----------------------------  ICU Admission Date: 11/29, 12/1  Transfer Date: 12-05-23 @ 12:00    Admission Diagnosis: L 3,6-7, 10th rib fx, L ptx s/p chest tube removal, subcutaneous emphysema requiring L anterior chest incision  Upgraded to SICU for acute hypoxic resp failure secondary acute asthma exacerbation/ bronchospasm (resolved)    Active Problems/injuries: L 3rd, 6-7th, 10th rib fx     Procedures: 11/29: Rib block  11/30: L chest tube pig tail catheter placement   12/4: rib block     Consultants:  [ ] Cardiology  [ ] Endocrine  [ ] Infectious Disease  [ ] Medicine  [ ]Neurosurgery  [ ] Ortho       [ ] Weight Bearing Status:  [ ] Palliative       [ ] Advanced Directives:    [ ] Physical Medicine and Rehab       [ ] Disposition :   [ ] Plastics  [ ] Pulmonary  [x] Pain Mg  [x] Psych     Medications  acetaminophen     Tablet .. 975 milliGRAM(s) Oral every 6 hours  albuterol/ipratropium for Nebulization 3 milliLiter(s) Nebulizer every 6 hours  albuterol/ipratropium for Nebulization 3 milliLiter(s) Nebulizer every 6 hours PRN  amLODIPine   Tablet 5 milliGRAM(s) Oral daily  benztropine 2 milliGRAM(s) Oral two times a day  clonazePAM  Tablet 0.5 milliGRAM(s) Oral at bedtime  divalproex  milliGRAM(s) Oral every 12 hours  enoxaparin Injectable 40 milliGRAM(s) SubCutaneous every 12 hours  folic acid 1 milliGRAM(s) Oral daily  gabapentin 100 milliGRAM(s) Oral at bedtime  HYDROmorphone  Injectable 0.5 milliGRAM(s) IV Push every 4 hours PRN  influenza   Vaccine 0.5 milliLiter(s) IntraMuscular once  lidocaine   4% Patch 3 Patch Transdermal daily  melatonin 5 milliGRAM(s) Oral at bedtime  methocarbamol 750 milliGRAM(s) Oral every 12 hours  nicotine - 21 mG/24Hr(s) Patch 1 Patch Transdermal every 24 hours  OLANZapine Injectable 5 milliGRAM(s) IntraMuscular every 12 hours PRN  ondansetron Injectable 4 milliGRAM(s) IV Push every 6 hours PRN  oxyCODONE    IR 10 milliGRAM(s) Oral every 6 hours PRN  oxyCODONE    IR 5 milliGRAM(s) Oral every 6 hours PRN  pantoprazole    Tablet 40 milliGRAM(s) Oral before breakfast  polyethylene glycol 3350 17 Gram(s) Oral at bedtime  QUEtiapine 125 milliGRAM(s) Oral at bedtime  senna 2 Tablet(s) Oral at bedtime  thiamine 100 milliGRAM(s) Oral daily      [ ] I attest I have reviewed and reconciled all medications prior to transfer    IV Fluids  sodium chloride 0.9% Bolus:   1000 milliLiter(s), IV Bolus, once, infuse over 60 Minute(s), Stop After 1 Doses  Provider's Contact #: [phone]  sodium chloride 0.9%.: Solution, 1000 milliLiter(s) infuse at 75 mL/Hr  Provider's Contact #: [phone]  lactated ringers.: Solution, 1000 milliLiter(s) infuse at 75 mL/Hr  Provider's Contact #: [phone]  lactated ringers.: Solution, 1000 milliLiter(s) infuse at 100 mL/Hr  Provider's Contact #: [phone]  lactated ringers Bolus:   1000 milliLiter(s), IV Bolus, once, infuse over 1 Hr, Stop After 1 Doses  Provider's Contact #: [phone]  sodium chloride 0.9%.: Solution, 1000 milliLiter(s) infuse at 250 mL/Hr  sodium chloride 0.9%: 1000 milliLiter(s)      with thiamine additive 100 milliGRAM(s)      with multivitamin additive 10 milliLiter(s)      with folic acid additive 1 milliGRAM(s), infuse at 250 mL/Hr  lactated ringers.: Solution, 1000 milliLiter(s) infuse at 110 mL/Hr  lactated ringers Bolus:   1000 milliLiter(s), IV Bolus, once, infuse over 60 Minute(s), Stop After 1 Doses  sodium chloride 0.9% Bolus:   2000 milliLiter(s), IV Bolus, once, infuse over 60 Minute(s), Stop After 1 Doses  Provider's Contact #: [phone]  sodium chloride 0.9% Bolus:   250 milliLiter(s), IV Bolus, once, infuse over 60 Minute(s), Stop After 1 Doses  Special Instructions: Peripheral Line 1        I have discussed this case with ACS Team upon transfer and all questions regarding ICU course were answered.  The following items are to be followed up:  1. Hx of schizoaffective disorder- continue all home meds. Delirium improving - increased seroquel 125qhs. F/u with psych recommendations. Zyprexa prn for agitation.   (patient has known posterior occipital bone mass- patient states he has adequate f/u with neurosurgeon.   2. Rib fx - continue PIC protocol, PIC scores 9.  Patient s/p rib block x2 with improved pain control. Patient s/p L pig tail catheter removal. L anterior wall incision well healed.   3. Hx of asthma - continue duonebs.   4. Norvasc started for hypertension.   5. Tolerating regular diet, awaiting bowel movement. C/w bowel regimen, may require additional medications to assist.   6. Voiding without issues.   7. Lovenox for dvt ppx SICU TRANSFER NOTE  -----------------------------  ICU Admission Date: 11/29, 12/1  Transfer Date: 12-05-23 @ 12:00    Admission Diagnosis: L 3,6-7, 10th rib fx, L ptx s/p chest tube removal, subcutaneous emphysema requiring L anterior chest incision  Upgraded to SICU for acute hypoxic resp failure secondary acute asthma exacerbation/ bronchospasm (resolved)    Active Problems/injuries: L 3rd, 6-7th, 10th rib fx     Procedures: 11/29: Rib block  11/30: L chest tube pig tail catheter placement   12/4: rib block     Consultants:  [ ] Cardiology  [ ] Endocrine  [ ] Infectious Disease  [ ] Medicine  [ ]Neurosurgery  [ ] Ortho       [ ] Weight Bearing Status:  [ ] Palliative       [ ] Advanced Directives:    [ ] Physical Medicine and Rehab       [ ] Disposition :   [ ] Plastics  [ ] Pulmonary  [x] Pain Mg  [x] Psych     Medications  acetaminophen     Tablet .. 975 milliGRAM(s) Oral every 6 hours  albuterol/ipratropium for Nebulization 3 milliLiter(s) Nebulizer every 6 hours  albuterol/ipratropium for Nebulization 3 milliLiter(s) Nebulizer every 6 hours PRN  amLODIPine   Tablet 5 milliGRAM(s) Oral daily  benztropine 2 milliGRAM(s) Oral two times a day  clonazePAM  Tablet 0.5 milliGRAM(s) Oral at bedtime  divalproex  milliGRAM(s) Oral every 12 hours  enoxaparin Injectable 40 milliGRAM(s) SubCutaneous every 12 hours  folic acid 1 milliGRAM(s) Oral daily  gabapentin 100 milliGRAM(s) Oral at bedtime  HYDROmorphone  Injectable 0.5 milliGRAM(s) IV Push every 4 hours PRN  influenza   Vaccine 0.5 milliLiter(s) IntraMuscular once  lidocaine   4% Patch 3 Patch Transdermal daily  melatonin 5 milliGRAM(s) Oral at bedtime  methocarbamol 750 milliGRAM(s) Oral every 12 hours  nicotine - 21 mG/24Hr(s) Patch 1 Patch Transdermal every 24 hours  OLANZapine Injectable 5 milliGRAM(s) IntraMuscular every 12 hours PRN  ondansetron Injectable 4 milliGRAM(s) IV Push every 6 hours PRN  oxyCODONE    IR 10 milliGRAM(s) Oral every 6 hours PRN  oxyCODONE    IR 5 milliGRAM(s) Oral every 6 hours PRN  pantoprazole    Tablet 40 milliGRAM(s) Oral before breakfast  polyethylene glycol 3350 17 Gram(s) Oral at bedtime  QUEtiapine 125 milliGRAM(s) Oral at bedtime  senna 2 Tablet(s) Oral at bedtime  thiamine 100 milliGRAM(s) Oral daily      [ ] I attest I have reviewed and reconciled all medications prior to transfer    IV Fluids  sodium chloride 0.9% Bolus:   1000 milliLiter(s), IV Bolus, once, infuse over 60 Minute(s), Stop After 1 Doses  Provider's Contact #: [phone]  sodium chloride 0.9%.: Solution, 1000 milliLiter(s) infuse at 75 mL/Hr  Provider's Contact #: [phone]  lactated ringers.: Solution, 1000 milliLiter(s) infuse at 75 mL/Hr  Provider's Contact #: [phone]  lactated ringers.: Solution, 1000 milliLiter(s) infuse at 100 mL/Hr  Provider's Contact #: [phone]  lactated ringers Bolus:   1000 milliLiter(s), IV Bolus, once, infuse over 1 Hr, Stop After 1 Doses  Provider's Contact #: [phone]  sodium chloride 0.9%.: Solution, 1000 milliLiter(s) infuse at 250 mL/Hr  sodium chloride 0.9%: 1000 milliLiter(s)      with thiamine additive 100 milliGRAM(s)      with multivitamin additive 10 milliLiter(s)      with folic acid additive 1 milliGRAM(s), infuse at 250 mL/Hr  lactated ringers.: Solution, 1000 milliLiter(s) infuse at 110 mL/Hr  lactated ringers Bolus:   1000 milliLiter(s), IV Bolus, once, infuse over 60 Minute(s), Stop After 1 Doses  sodium chloride 0.9% Bolus:   2000 milliLiter(s), IV Bolus, once, infuse over 60 Minute(s), Stop After 1 Doses  Provider's Contact #: [phone]  sodium chloride 0.9% Bolus:   250 milliLiter(s), IV Bolus, once, infuse over 60 Minute(s), Stop After 1 Doses  Special Instructions: Peripheral Line 1        I have discussed this case with ACS Team upon transfer and all questions regarding ICU course were answered.  The following items are to be followed up:  1. Hx of schizoaffective disorder- continue all home meds. Delirium improving - increased seroquel 125qhs. F/u with psych recommendations. Zyprexa prn for agitation.   (patient has known posterior occipital bone mass- patient states he has adequate f/u with neurosurgeon.   2. Rib fx - continue PIC protocol, PIC scores 9.  Patient s/p rib block x2 with improved pain control. Patient s/p L pig tail catheter removal. L anterior wall incision well healed.   3. Hx of asthma - continue duonebs.   4. Norvasc started for hypertension.   5. Tolerating regular diet, awaiting bowel movement. C/w bowel regimen, may require additional medications to assist.   6. Voiding without issues.   7. Lovenox for dvt ppx

## 2023-12-06 LAB
ANION GAP SERPL CALC-SCNC: 11 MMOL/L — SIGNIFICANT CHANGE UP (ref 5–17)
ANION GAP SERPL CALC-SCNC: 11 MMOL/L — SIGNIFICANT CHANGE UP (ref 5–17)
BUN SERPL-MCNC: 13.1 MG/DL — SIGNIFICANT CHANGE UP (ref 8–20)
BUN SERPL-MCNC: 13.1 MG/DL — SIGNIFICANT CHANGE UP (ref 8–20)
CALCIUM SERPL-MCNC: 8 MG/DL — LOW (ref 8.4–10.5)
CALCIUM SERPL-MCNC: 8 MG/DL — LOW (ref 8.4–10.5)
CHLORIDE SERPL-SCNC: 98 MMOL/L — SIGNIFICANT CHANGE UP (ref 96–108)
CHLORIDE SERPL-SCNC: 98 MMOL/L — SIGNIFICANT CHANGE UP (ref 96–108)
CO2 SERPL-SCNC: 28 MMOL/L — SIGNIFICANT CHANGE UP (ref 22–29)
CO2 SERPL-SCNC: 28 MMOL/L — SIGNIFICANT CHANGE UP (ref 22–29)
CREAT SERPL-MCNC: 0.6 MG/DL — SIGNIFICANT CHANGE UP (ref 0.5–1.3)
CREAT SERPL-MCNC: 0.6 MG/DL — SIGNIFICANT CHANGE UP (ref 0.5–1.3)
EGFR: 112 ML/MIN/1.73M2 — SIGNIFICANT CHANGE UP
EGFR: 112 ML/MIN/1.73M2 — SIGNIFICANT CHANGE UP
GLUCOSE SERPL-MCNC: 78 MG/DL — SIGNIFICANT CHANGE UP (ref 70–99)
GLUCOSE SERPL-MCNC: 78 MG/DL — SIGNIFICANT CHANGE UP (ref 70–99)
HCT VFR BLD CALC: 33.5 % — LOW (ref 39–50)
HCT VFR BLD CALC: 33.5 % — LOW (ref 39–50)
HGB BLD-MCNC: 10.8 G/DL — LOW (ref 13–17)
HGB BLD-MCNC: 10.8 G/DL — LOW (ref 13–17)
MAGNESIUM SERPL-MCNC: 1.9 MG/DL — SIGNIFICANT CHANGE UP (ref 1.6–2.6)
MAGNESIUM SERPL-MCNC: 1.9 MG/DL — SIGNIFICANT CHANGE UP (ref 1.6–2.6)
MCHC RBC-ENTMCNC: 27.6 PG — SIGNIFICANT CHANGE UP (ref 27–34)
MCHC RBC-ENTMCNC: 27.6 PG — SIGNIFICANT CHANGE UP (ref 27–34)
MCHC RBC-ENTMCNC: 32.2 GM/DL — SIGNIFICANT CHANGE UP (ref 32–36)
MCHC RBC-ENTMCNC: 32.2 GM/DL — SIGNIFICANT CHANGE UP (ref 32–36)
MCV RBC AUTO: 85.7 FL — SIGNIFICANT CHANGE UP (ref 80–100)
MCV RBC AUTO: 85.7 FL — SIGNIFICANT CHANGE UP (ref 80–100)
PHOSPHATE SERPL-MCNC: 3.2 MG/DL — SIGNIFICANT CHANGE UP (ref 2.4–4.7)
PHOSPHATE SERPL-MCNC: 3.2 MG/DL — SIGNIFICANT CHANGE UP (ref 2.4–4.7)
PLATELET # BLD AUTO: 267 K/UL — SIGNIFICANT CHANGE UP (ref 150–400)
PLATELET # BLD AUTO: 267 K/UL — SIGNIFICANT CHANGE UP (ref 150–400)
POTASSIUM SERPL-MCNC: 3.5 MMOL/L — SIGNIFICANT CHANGE UP (ref 3.5–5.3)
POTASSIUM SERPL-MCNC: 3.5 MMOL/L — SIGNIFICANT CHANGE UP (ref 3.5–5.3)
POTASSIUM SERPL-SCNC: 3.5 MMOL/L — SIGNIFICANT CHANGE UP (ref 3.5–5.3)
POTASSIUM SERPL-SCNC: 3.5 MMOL/L — SIGNIFICANT CHANGE UP (ref 3.5–5.3)
RBC # BLD: 3.91 M/UL — LOW (ref 4.2–5.8)
RBC # BLD: 3.91 M/UL — LOW (ref 4.2–5.8)
RBC # FLD: 16.7 % — HIGH (ref 10.3–14.5)
RBC # FLD: 16.7 % — HIGH (ref 10.3–14.5)
SODIUM SERPL-SCNC: 137 MMOL/L — SIGNIFICANT CHANGE UP (ref 135–145)
SODIUM SERPL-SCNC: 137 MMOL/L — SIGNIFICANT CHANGE UP (ref 135–145)
WBC # BLD: 14 K/UL — HIGH (ref 3.8–10.5)
WBC # BLD: 14 K/UL — HIGH (ref 3.8–10.5)
WBC # FLD AUTO: 14 K/UL — HIGH (ref 3.8–10.5)
WBC # FLD AUTO: 14 K/UL — HIGH (ref 3.8–10.5)

## 2023-12-06 PROCEDURE — 99232 SBSQ HOSP IP/OBS MODERATE 35: CPT

## 2023-12-06 PROCEDURE — 93970 EXTREMITY STUDY: CPT | Mod: 26

## 2023-12-06 PROCEDURE — 71045 X-RAY EXAM CHEST 1 VIEW: CPT | Mod: 26

## 2023-12-06 RX ORDER — METHOCARBAMOL 500 MG/1
750 TABLET, FILM COATED ORAL EVERY 8 HOURS
Refills: 0 | Status: DISCONTINUED | OUTPATIENT
Start: 2023-12-06 | End: 2023-12-08

## 2023-12-06 RX ORDER — ACETAMINOPHEN 500 MG
650 TABLET ORAL EVERY 6 HOURS
Refills: 0 | Status: DISCONTINUED | OUTPATIENT
Start: 2023-12-06 | End: 2023-12-08

## 2023-12-06 RX ORDER — OXYCODONE HYDROCHLORIDE 5 MG/1
10 TABLET ORAL EVERY 4 HOURS
Refills: 0 | Status: DISCONTINUED | OUTPATIENT
Start: 2023-12-06 | End: 2023-12-08

## 2023-12-06 RX ORDER — DIVALPROEX SODIUM 500 MG/1
500 TABLET, DELAYED RELEASE ORAL EVERY 12 HOURS
Refills: 0 | Status: DISCONTINUED | OUTPATIENT
Start: 2023-12-06 | End: 2023-12-08

## 2023-12-06 RX ORDER — POLYETHYLENE GLYCOL 3350 17 G/17G
17 POWDER, FOR SOLUTION ORAL AT BEDTIME
Refills: 0 | Status: DISCONTINUED | OUTPATIENT
Start: 2023-12-06 | End: 2023-12-08

## 2023-12-06 RX ORDER — GABAPENTIN 400 MG/1
100 CAPSULE ORAL AT BEDTIME
Refills: 0 | Status: DISCONTINUED | OUTPATIENT
Start: 2023-12-06 | End: 2023-12-08

## 2023-12-06 RX ORDER — SENNA PLUS 8.6 MG/1
2 TABLET ORAL AT BEDTIME
Refills: 0 | Status: DISCONTINUED | OUTPATIENT
Start: 2023-12-06 | End: 2023-12-08

## 2023-12-06 RX ORDER — OXYCODONE HYDROCHLORIDE 5 MG/1
5 TABLET ORAL EVERY 4 HOURS
Refills: 0 | Status: DISCONTINUED | OUTPATIENT
Start: 2023-12-06 | End: 2023-12-08

## 2023-12-06 RX ORDER — POTASSIUM CHLORIDE 20 MEQ
40 PACKET (EA) ORAL ONCE
Refills: 0 | Status: COMPLETED | OUTPATIENT
Start: 2023-12-06 | End: 2023-12-06

## 2023-12-06 RX ORDER — BENZTROPINE MESYLATE 1 MG
2 TABLET ORAL
Refills: 0 | Status: DISCONTINUED | OUTPATIENT
Start: 2023-12-06 | End: 2023-12-08

## 2023-12-06 RX ADMIN — Medication 975 MILLIGRAM(S): at 11:38

## 2023-12-06 RX ADMIN — Medication 1 MILLIGRAM(S): at 11:36

## 2023-12-06 RX ADMIN — Medication 40 MILLIEQUIVALENT(S): at 11:36

## 2023-12-06 RX ADMIN — Medication 5 MILLIGRAM(S): at 22:14

## 2023-12-06 RX ADMIN — ONDANSETRON 4 MILLIGRAM(S): 8 TABLET, FILM COATED ORAL at 23:00

## 2023-12-06 RX ADMIN — Medication 2 MILLIGRAM(S): at 05:55

## 2023-12-06 RX ADMIN — LIDOCAINE 3 PATCH: 4 CREAM TOPICAL at 11:37

## 2023-12-06 RX ADMIN — QUETIAPINE FUMARATE 125 MILLIGRAM(S): 200 TABLET, FILM COATED ORAL at 22:14

## 2023-12-06 RX ADMIN — Medication 975 MILLIGRAM(S): at 05:52

## 2023-12-06 RX ADMIN — HYDROMORPHONE HYDROCHLORIDE 0.5 MILLIGRAM(S): 2 INJECTION INTRAMUSCULAR; INTRAVENOUS; SUBCUTANEOUS at 01:01

## 2023-12-06 RX ADMIN — AMLODIPINE BESYLATE 5 MILLIGRAM(S): 2.5 TABLET ORAL at 05:52

## 2023-12-06 RX ADMIN — LIDOCAINE 3 PATCH: 4 CREAM TOPICAL at 23:30

## 2023-12-06 RX ADMIN — Medication 3 MILLILITER(S): at 21:35

## 2023-12-06 RX ADMIN — POLYETHYLENE GLYCOL 3350 17 GRAM(S): 17 POWDER, FOR SOLUTION ORAL at 22:15

## 2023-12-06 RX ADMIN — Medication 100 MILLIGRAM(S): at 11:36

## 2023-12-06 RX ADMIN — Medication 3 MILLILITER(S): at 14:33

## 2023-12-06 RX ADMIN — OXYCODONE HYDROCHLORIDE 10 MILLIGRAM(S): 5 TABLET ORAL at 13:53

## 2023-12-06 RX ADMIN — Medication 1 PATCH: at 05:50

## 2023-12-06 RX ADMIN — Medication 0.5 MILLIGRAM(S): at 22:15

## 2023-12-06 RX ADMIN — OXYCODONE HYDROCHLORIDE 10 MILLIGRAM(S): 5 TABLET ORAL at 14:08

## 2023-12-06 RX ADMIN — METHOCARBAMOL 750 MILLIGRAM(S): 500 TABLET, FILM COATED ORAL at 22:15

## 2023-12-06 RX ADMIN — Medication 975 MILLIGRAM(S): at 00:05

## 2023-12-06 RX ADMIN — Medication 3 MILLILITER(S): at 09:02

## 2023-12-06 RX ADMIN — METHOCARBAMOL 750 MILLIGRAM(S): 500 TABLET, FILM COATED ORAL at 05:51

## 2023-12-06 RX ADMIN — GABAPENTIN 100 MILLIGRAM(S): 400 CAPSULE ORAL at 22:14

## 2023-12-06 RX ADMIN — SENNA PLUS 2 TABLET(S): 8.6 TABLET ORAL at 22:15

## 2023-12-06 RX ADMIN — OXYCODONE HYDROCHLORIDE 10 MILLIGRAM(S): 5 TABLET ORAL at 23:15

## 2023-12-06 RX ADMIN — METHOCARBAMOL 750 MILLIGRAM(S): 500 TABLET, FILM COATED ORAL at 13:53

## 2023-12-06 RX ADMIN — Medication 3 MILLILITER(S): at 04:12

## 2023-12-06 RX ADMIN — Medication 975 MILLIGRAM(S): at 11:36

## 2023-12-06 RX ADMIN — OXYCODONE HYDROCHLORIDE 10 MILLIGRAM(S): 5 TABLET ORAL at 22:15

## 2023-12-06 RX ADMIN — OXYCODONE HYDROCHLORIDE 10 MILLIGRAM(S): 5 TABLET ORAL at 10:52

## 2023-12-06 RX ADMIN — OXYCODONE HYDROCHLORIDE 10 MILLIGRAM(S): 5 TABLET ORAL at 09:52

## 2023-12-06 RX ADMIN — PANTOPRAZOLE SODIUM 40 MILLIGRAM(S): 20 TABLET, DELAYED RELEASE ORAL at 05:51

## 2023-12-06 RX ADMIN — DIVALPROEX SODIUM 500 MILLIGRAM(S): 500 TABLET, DELAYED RELEASE ORAL at 05:52

## 2023-12-06 RX ADMIN — OXYCODONE HYDROCHLORIDE 10 MILLIGRAM(S): 5 TABLET ORAL at 03:40

## 2023-12-06 RX ADMIN — ENOXAPARIN SODIUM 40 MILLIGRAM(S): 100 INJECTION SUBCUTANEOUS at 17:43

## 2023-12-06 RX ADMIN — Medication 2 MILLIGRAM(S): at 17:43

## 2023-12-06 RX ADMIN — ENOXAPARIN SODIUM 40 MILLIGRAM(S): 100 INJECTION SUBCUTANEOUS at 05:51

## 2023-12-06 RX ADMIN — DIVALPROEX SODIUM 500 MILLIGRAM(S): 500 TABLET, DELAYED RELEASE ORAL at 17:43

## 2023-12-06 RX ADMIN — Medication 975 MILLIGRAM(S): at 18:00

## 2023-12-06 RX ADMIN — Medication 975 MILLIGRAM(S): at 17:43

## 2023-12-06 NOTE — PROGRESS NOTE ADULT - NS ATTEND AMEND GEN_ALL_CORE FT
Patient seen and examined at bedside. No acute events overnight. Denies any nausea or vomiting. Pain is well controlled. Afebrile. WBC trending up. Patient sats at 87% on room air.    satting well on 2L nasal canula  CXR this am  Darrius  PT/OT eval

## 2023-12-06 NOTE — CHART NOTE - NSCHARTNOTEFT_GEN_A_CORE
Patient examined at bedside and found resting in no distress. Has no complaints and states that he is working to get better.    Physical exam:  Non labored breathing on RA.  Tenderness to palpation on left side of chest    No issues with downgrade.

## 2023-12-06 NOTE — PROGRESS NOTE ADULT - ASSESSMENT
Pt is a 57 y/o male s/p fall sustaining multiple L sided rib fxs & L pneumothorax s/p pigtail placement. Hospital course complicated by acute delirium, asthma exacerbation requiring reintubation. Pt now extubated, downgraded to floor bed. Still w/ poor pain control. S/p rib block on 11/29 and 12/4  - Continue PIC protocol - encourage frequent IS use & cough / deep breathing exercises  - Pain regimen adjusted today - oxy IR frequency changed from q6h to q4h, robaxin frequency changed from q12h to q8h, continue tylenol ATC & lidoderm patches as ordered  - If pain still poorly controlled after adjustment of meds will re-engage pain mgmt to discuss alternate pain regimen vs. additional rib block  - Pt w/ increasing WBC 11>13>14, no fevers, unclear etiology, CXR this AM looks clear, pending final read. Will monitor for now as pt without any other signs or symptoms of systemic infection  - Regular diet as tolerated  - DVT ppx w/ lovenox & SCDs  - Initial PT eval recommended home PT upon discharge - new PT order placed s/p ICU downgrade    - Tertiary [x] - done 11/30  - PTSD  [x] - consult placed 12/6  - SBIRT [x] - done 11/30  - Geriatric consult - N/A

## 2023-12-06 NOTE — PROGRESS NOTE ADULT - SUBJECTIVE AND OBJECTIVE BOX
SUBJECTIVE / 24H EVENTS: Patient seen and examined at bedside. He reports pain to his left chest wall is severe and not well controlled w/ medications. Denies feelings of shortness of breath or difficulty breathing. States he has a poor appetite because of pain. What he does eat he states he has been tolerating well without pain, nausea, or vomiting. Was OOB yesterday, not yet today. Passing gas & voiding. Denies fever or chills, CP or SOB.    MEDICATIONS  (STANDING):  acetaminophen     Tablet .. 975 milliGRAM(s) Oral every 6 hours  albuterol/ipratropium for Nebulization 3 milliLiter(s) Nebulizer every 6 hours  amLODIPine   Tablet 5 milliGRAM(s) Oral daily  benztropine 2 milliGRAM(s) Oral two times a day  clonazePAM  Tablet 0.5 milliGRAM(s) Oral at bedtime  divalproex  milliGRAM(s) Oral every 12 hours  enoxaparin Injectable 40 milliGRAM(s) SubCutaneous every 12 hours  folic acid 1 milliGRAM(s) Oral daily  gabapentin 100 milliGRAM(s) Oral at bedtime  influenza   Vaccine 0.5 milliLiter(s) IntraMuscular once  lidocaine   4% Patch 3 Patch Transdermal daily  melatonin 5 milliGRAM(s) Oral at bedtime  methocarbamol 750 milliGRAM(s) Oral every 12 hours  nicotine - 21 mG/24Hr(s) Patch 1 Patch Transdermal every 24 hours  pantoprazole    Tablet 40 milliGRAM(s) Oral before breakfast  polyethylene glycol 3350 17 Gram(s) Oral at bedtime  potassium chloride    Tablet ER 40 milliEquivalent(s) Oral once  QUEtiapine 125 milliGRAM(s) Oral at bedtime  senna 2 Tablet(s) Oral at bedtime  thiamine 100 milliGRAM(s) Oral daily    MEDICATIONS  (PRN):  albuterol/ipratropium for Nebulization 3 milliLiter(s) Nebulizer every 6 hours PRN Shortness of Breath and/or Wheezing  mineral oil enema 133 milliLiter(s) Rectal daily PRN constipation  OLANZapine Injectable 5 milliGRAM(s) IntraMuscular every 12 hours PRN agitation  ondansetron Injectable 4 milliGRAM(s) IV Push every 6 hours PRN Nausea  oxyCODONE    IR 5 milliGRAM(s) Oral every 6 hours PRN Moderate Pain (4 - 6)  oxyCODONE    IR 10 milliGRAM(s) Oral every 6 hours PRN Severe Pain (7 - 10)      Vital Signs Last 24 Hrs  T(C): 36.9 (06 Dec 2023 07:45), Max: 37.1 (05 Dec 2023 21:15)  T(F): 98.5 (06 Dec 2023 07:45), Max: 98.7 (05 Dec 2023 21:15)  HR: 82 (06 Dec 2023 09:11) (77 - 99)  BP: 116/79 (06 Dec 2023 07:45) (108/71 - 165/113)  BP(mean): 109 (05 Dec 2023 20:00) (97 - 134)  RR: 18 (06 Dec 2023 07:45) (14 - 27)  SpO2: 88% (06 Dec 2023 09:11) (88% - 100%)    Parameters below as of 06 Dec 2023 09:11  Patient On (Oxygen Delivery Method): room air      Constitutional: patient appears mildly uncomfortable lying in bed, in no acute distress  HEENT: head normocephalic and atraumatic  Respiratory: respirations appear unlabored, no accessory muscle use or conversational dyspnea. TTP of L lower chest wall w/ crepitus. PIC 7 ([id number])   Cardiovascular: regular rate & rhythm  Gastrointestinal: abdomen soft, non-tender, non-distended, no rebound tenderness / guarding  Neurological: GCS15, no focal neurologic deficits  Musculoskeletal: BERG x4 spontaneously, extremities are without point tenderness or deformities  Skin: incision to L anterior chest wall w/ dried blood, no active drainage from wound or surrounding erythema. Mucous membranes moist, no diaphoresis, pallor, cyanosis or jaundice      I&O's Detail    05 Dec 2023 07:01  -  06 Dec 2023 07:00  --------------------------------------------------------  IN:    Oral Fluid: 790 mL  Total IN: 790 mL    OUT:    Voided (mL): 2140 mL  Total OUT: 2140 mL    Total NET: -1350 mL          LABS:                        10.8   14.00 )-----------( 267      ( 06 Dec 2023 05:15 )             33.5     12-06    137  |  98  |  13.1  ----------------------------<  78  3.5   |  28.0  |  0.60    Ca    8.0<L>      06 Dec 2023 05:15  Phos  3.2     12-06  Mg     1.9     12-06        Urinalysis Basic - ( 06 Dec 2023 05:15 )    Color: x / Appearance: x / SG: x / pH: x  Gluc: 78 mg/dL / Ketone: x  / Bili: x / Urobili: x   Blood: x / Protein: x / Nitrite: x   Leuk Esterase: x / RBC: x / WBC x   Sq Epi: x / Non Sq Epi: x / Bacteria: x

## 2023-12-07 ENCOUNTER — TRANSCRIPTION ENCOUNTER (OUTPATIENT)
Age: 58
End: 2023-12-07

## 2023-12-07 LAB
ANION GAP SERPL CALC-SCNC: 11 MMOL/L — SIGNIFICANT CHANGE UP (ref 5–17)
ANION GAP SERPL CALC-SCNC: 11 MMOL/L — SIGNIFICANT CHANGE UP (ref 5–17)
ANISOCYTOSIS BLD QL: SLIGHT — SIGNIFICANT CHANGE UP
ANISOCYTOSIS BLD QL: SLIGHT — SIGNIFICANT CHANGE UP
BASOPHILS # BLD AUTO: 0 K/UL — SIGNIFICANT CHANGE UP (ref 0–0.2)
BASOPHILS # BLD AUTO: 0 K/UL — SIGNIFICANT CHANGE UP (ref 0–0.2)
BASOPHILS NFR BLD AUTO: 0 % — SIGNIFICANT CHANGE UP (ref 0–2)
BASOPHILS NFR BLD AUTO: 0 % — SIGNIFICANT CHANGE UP (ref 0–2)
BUN SERPL-MCNC: 18 MG/DL — SIGNIFICANT CHANGE UP (ref 8–20)
BUN SERPL-MCNC: 18 MG/DL — SIGNIFICANT CHANGE UP (ref 8–20)
BURR CELLS BLD QL SMEAR: PRESENT — SIGNIFICANT CHANGE UP
BURR CELLS BLD QL SMEAR: PRESENT — SIGNIFICANT CHANGE UP
CALCIUM SERPL-MCNC: 8.5 MG/DL — SIGNIFICANT CHANGE UP (ref 8.4–10.5)
CALCIUM SERPL-MCNC: 8.5 MG/DL — SIGNIFICANT CHANGE UP (ref 8.4–10.5)
CHLORIDE SERPL-SCNC: 97 MMOL/L — SIGNIFICANT CHANGE UP (ref 96–108)
CHLORIDE SERPL-SCNC: 97 MMOL/L — SIGNIFICANT CHANGE UP (ref 96–108)
CO2 SERPL-SCNC: 28 MMOL/L — SIGNIFICANT CHANGE UP (ref 22–29)
CO2 SERPL-SCNC: 28 MMOL/L — SIGNIFICANT CHANGE UP (ref 22–29)
CREAT SERPL-MCNC: 0.83 MG/DL — SIGNIFICANT CHANGE UP (ref 0.5–1.3)
CREAT SERPL-MCNC: 0.83 MG/DL — SIGNIFICANT CHANGE UP (ref 0.5–1.3)
EGFR: 101 ML/MIN/1.73M2 — SIGNIFICANT CHANGE UP
EGFR: 101 ML/MIN/1.73M2 — SIGNIFICANT CHANGE UP
EOSINOPHIL # BLD AUTO: 0.41 K/UL — SIGNIFICANT CHANGE UP (ref 0–0.5)
EOSINOPHIL # BLD AUTO: 0.41 K/UL — SIGNIFICANT CHANGE UP (ref 0–0.5)
EOSINOPHIL NFR BLD AUTO: 1.8 % — SIGNIFICANT CHANGE UP (ref 0–6)
EOSINOPHIL NFR BLD AUTO: 1.8 % — SIGNIFICANT CHANGE UP (ref 0–6)
GIANT PLATELETS BLD QL SMEAR: PRESENT — SIGNIFICANT CHANGE UP
GIANT PLATELETS BLD QL SMEAR: PRESENT — SIGNIFICANT CHANGE UP
GLUCOSE SERPL-MCNC: 127 MG/DL — HIGH (ref 70–99)
GLUCOSE SERPL-MCNC: 127 MG/DL — HIGH (ref 70–99)
GRAM STN FLD: ABNORMAL
HCT VFR BLD CALC: 36.8 % — LOW (ref 39–50)
HCT VFR BLD CALC: 36.8 % — LOW (ref 39–50)
HGB BLD-MCNC: 11.7 G/DL — LOW (ref 13–17)
HGB BLD-MCNC: 11.7 G/DL — LOW (ref 13–17)
LYMPHOCYTES # BLD AUTO: 0.6 K/UL — LOW (ref 1–3.3)
LYMPHOCYTES # BLD AUTO: 0.6 K/UL — LOW (ref 1–3.3)
LYMPHOCYTES # BLD AUTO: 2.6 % — LOW (ref 13–44)
LYMPHOCYTES # BLD AUTO: 2.6 % — LOW (ref 13–44)
MAGNESIUM SERPL-MCNC: 1.9 MG/DL — SIGNIFICANT CHANGE UP (ref 1.6–2.6)
MAGNESIUM SERPL-MCNC: 1.9 MG/DL — SIGNIFICANT CHANGE UP (ref 1.6–2.6)
MANUAL SMEAR VERIFICATION: SIGNIFICANT CHANGE UP
MANUAL SMEAR VERIFICATION: SIGNIFICANT CHANGE UP
MCHC RBC-ENTMCNC: 27.3 PG — SIGNIFICANT CHANGE UP (ref 27–34)
MCHC RBC-ENTMCNC: 27.3 PG — SIGNIFICANT CHANGE UP (ref 27–34)
MCHC RBC-ENTMCNC: 31.8 GM/DL — LOW (ref 32–36)
MCHC RBC-ENTMCNC: 31.8 GM/DL — LOW (ref 32–36)
MCV RBC AUTO: 85.8 FL — SIGNIFICANT CHANGE UP (ref 80–100)
MCV RBC AUTO: 85.8 FL — SIGNIFICANT CHANGE UP (ref 80–100)
METHOD TYPE: SIGNIFICANT CHANGE UP
METHOD TYPE: SIGNIFICANT CHANGE UP
MONOCYTES # BLD AUTO: 1.4 K/UL — HIGH (ref 0–0.9)
MONOCYTES # BLD AUTO: 1.4 K/UL — HIGH (ref 0–0.9)
MONOCYTES NFR BLD AUTO: 6.1 % — SIGNIFICANT CHANGE UP (ref 2–14)
MONOCYTES NFR BLD AUTO: 6.1 % — SIGNIFICANT CHANGE UP (ref 2–14)
MRSA SPEC QL CULT: SIGNIFICANT CHANGE UP
MRSA SPEC QL CULT: SIGNIFICANT CHANGE UP
NEUTROPHILS # BLD AUTO: 20.1 K/UL — HIGH (ref 1.8–7.4)
NEUTROPHILS # BLD AUTO: 20.1 K/UL — HIGH (ref 1.8–7.4)
NEUTROPHILS NFR BLD AUTO: 87.8 % — HIGH (ref 43–77)
NEUTROPHILS NFR BLD AUTO: 87.8 % — HIGH (ref 43–77)
PHOSPHATE SERPL-MCNC: 3.1 MG/DL — SIGNIFICANT CHANGE UP (ref 2.4–4.7)
PHOSPHATE SERPL-MCNC: 3.1 MG/DL — SIGNIFICANT CHANGE UP (ref 2.4–4.7)
PLAT MORPH BLD: NORMAL — SIGNIFICANT CHANGE UP
PLAT MORPH BLD: NORMAL — SIGNIFICANT CHANGE UP
PLATELET # BLD AUTO: 279 K/UL — SIGNIFICANT CHANGE UP (ref 150–400)
PLATELET # BLD AUTO: 279 K/UL — SIGNIFICANT CHANGE UP (ref 150–400)
POIKILOCYTOSIS BLD QL AUTO: SLIGHT — SIGNIFICANT CHANGE UP
POIKILOCYTOSIS BLD QL AUTO: SLIGHT — SIGNIFICANT CHANGE UP
POLYCHROMASIA BLD QL SMEAR: SLIGHT — SIGNIFICANT CHANGE UP
POLYCHROMASIA BLD QL SMEAR: SLIGHT — SIGNIFICANT CHANGE UP
POTASSIUM SERPL-MCNC: 4 MMOL/L — SIGNIFICANT CHANGE UP (ref 3.5–5.3)
POTASSIUM SERPL-MCNC: 4 MMOL/L — SIGNIFICANT CHANGE UP (ref 3.5–5.3)
POTASSIUM SERPL-SCNC: 4 MMOL/L — SIGNIFICANT CHANGE UP (ref 3.5–5.3)
POTASSIUM SERPL-SCNC: 4 MMOL/L — SIGNIFICANT CHANGE UP (ref 3.5–5.3)
RBC # BLD: 4.29 M/UL — SIGNIFICANT CHANGE UP (ref 4.2–5.8)
RBC # BLD: 4.29 M/UL — SIGNIFICANT CHANGE UP (ref 4.2–5.8)
RBC # FLD: 16.9 % — HIGH (ref 10.3–14.5)
RBC # FLD: 16.9 % — HIGH (ref 10.3–14.5)
RBC BLD AUTO: ABNORMAL
RBC BLD AUTO: ABNORMAL
SODIUM SERPL-SCNC: 136 MMOL/L — SIGNIFICANT CHANGE UP (ref 135–145)
SODIUM SERPL-SCNC: 136 MMOL/L — SIGNIFICANT CHANGE UP (ref 135–145)
SPECIMEN SOURCE: SIGNIFICANT CHANGE UP
SPECIMEN SOURCE: SIGNIFICANT CHANGE UP
VARIANT LYMPHS # BLD: 1.7 % — SIGNIFICANT CHANGE UP (ref 0–6)
VARIANT LYMPHS # BLD: 1.7 % — SIGNIFICANT CHANGE UP (ref 0–6)
WBC # BLD: 22.89 K/UL — HIGH (ref 3.8–10.5)
WBC # BLD: 22.89 K/UL — HIGH (ref 3.8–10.5)
WBC # FLD AUTO: 22.89 K/UL — HIGH (ref 3.8–10.5)
WBC # FLD AUTO: 22.89 K/UL — HIGH (ref 3.8–10.5)

## 2023-12-07 PROCEDURE — 99223 1ST HOSP IP/OBS HIGH 75: CPT

## 2023-12-07 PROCEDURE — 93971 EXTREMITY STUDY: CPT | Mod: 26,RT

## 2023-12-07 PROCEDURE — 99232 SBSQ HOSP IP/OBS MODERATE 35: CPT

## 2023-12-07 RX ORDER — VANCOMYCIN HCL 1 G
1000 VIAL (EA) INTRAVENOUS ONCE
Refills: 0 | Status: COMPLETED | OUTPATIENT
Start: 2023-12-07 | End: 2023-12-07

## 2023-12-07 RX ORDER — VANCOMYCIN HCL 1 G
VIAL (EA) INTRAVENOUS
Refills: 0 | Status: DISCONTINUED | OUTPATIENT
Start: 2023-12-07 | End: 2023-12-08

## 2023-12-07 RX ORDER — VANCOMYCIN HCL 1 G
1000 VIAL (EA) INTRAVENOUS EVERY 12 HOURS
Refills: 0 | Status: DISCONTINUED | OUTPATIENT
Start: 2023-12-07 | End: 2023-12-08

## 2023-12-07 RX ADMIN — Medication 250 MILLIGRAM(S): at 12:49

## 2023-12-07 RX ADMIN — GABAPENTIN 100 MILLIGRAM(S): 400 CAPSULE ORAL at 23:01

## 2023-12-07 RX ADMIN — Medication 2 MILLIGRAM(S): at 17:49

## 2023-12-07 RX ADMIN — Medication 5 MILLIGRAM(S): at 23:01

## 2023-12-07 RX ADMIN — DIVALPROEX SODIUM 500 MILLIGRAM(S): 500 TABLET, DELAYED RELEASE ORAL at 17:49

## 2023-12-07 RX ADMIN — Medication 975 MILLIGRAM(S): at 00:29

## 2023-12-07 RX ADMIN — Medication 975 MILLIGRAM(S): at 17:50

## 2023-12-07 RX ADMIN — Medication 975 MILLIGRAM(S): at 13:19

## 2023-12-07 RX ADMIN — AMLODIPINE BESYLATE 5 MILLIGRAM(S): 2.5 TABLET ORAL at 06:33

## 2023-12-07 RX ADMIN — Medication 3 MILLILITER(S): at 21:38

## 2023-12-07 RX ADMIN — OXYCODONE HYDROCHLORIDE 5 MILLIGRAM(S): 5 TABLET ORAL at 17:43

## 2023-12-07 RX ADMIN — Medication 975 MILLIGRAM(S): at 01:29

## 2023-12-07 RX ADMIN — Medication 975 MILLIGRAM(S): at 18:20

## 2023-12-07 RX ADMIN — Medication 975 MILLIGRAM(S): at 12:49

## 2023-12-07 RX ADMIN — QUETIAPINE FUMARATE 125 MILLIGRAM(S): 200 TABLET, FILM COATED ORAL at 23:01

## 2023-12-07 RX ADMIN — ENOXAPARIN SODIUM 40 MILLIGRAM(S): 100 INJECTION SUBCUTANEOUS at 17:50

## 2023-12-07 RX ADMIN — SENNA PLUS 2 TABLET(S): 8.6 TABLET ORAL at 23:01

## 2023-12-07 RX ADMIN — PANTOPRAZOLE SODIUM 40 MILLIGRAM(S): 20 TABLET, DELAYED RELEASE ORAL at 06:27

## 2023-12-07 RX ADMIN — OXYCODONE HYDROCHLORIDE 5 MILLIGRAM(S): 5 TABLET ORAL at 13:18

## 2023-12-07 RX ADMIN — Medication 2 MILLIGRAM(S): at 06:29

## 2023-12-07 RX ADMIN — Medication 975 MILLIGRAM(S): at 06:27

## 2023-12-07 RX ADMIN — OXYCODONE HYDROCHLORIDE 5 MILLIGRAM(S): 5 TABLET ORAL at 17:13

## 2023-12-07 RX ADMIN — ENOXAPARIN SODIUM 40 MILLIGRAM(S): 100 INJECTION SUBCUTANEOUS at 06:26

## 2023-12-07 RX ADMIN — METHOCARBAMOL 750 MILLIGRAM(S): 500 TABLET, FILM COATED ORAL at 06:27

## 2023-12-07 RX ADMIN — OXYCODONE HYDROCHLORIDE 5 MILLIGRAM(S): 5 TABLET ORAL at 08:08

## 2023-12-07 RX ADMIN — Medication 100 MILLIGRAM(S): at 12:48

## 2023-12-07 RX ADMIN — DIVALPROEX SODIUM 500 MILLIGRAM(S): 500 TABLET, DELAYED RELEASE ORAL at 06:28

## 2023-12-07 RX ADMIN — METHOCARBAMOL 750 MILLIGRAM(S): 500 TABLET, FILM COATED ORAL at 14:27

## 2023-12-07 RX ADMIN — METHOCARBAMOL 750 MILLIGRAM(S): 500 TABLET, FILM COATED ORAL at 23:01

## 2023-12-07 RX ADMIN — OXYCODONE HYDROCHLORIDE 5 MILLIGRAM(S): 5 TABLET ORAL at 12:48

## 2023-12-07 RX ADMIN — ONDANSETRON 4 MILLIGRAM(S): 8 TABLET, FILM COATED ORAL at 10:57

## 2023-12-07 RX ADMIN — Medication 1 PATCH: at 06:28

## 2023-12-07 RX ADMIN — POLYETHYLENE GLYCOL 3350 17 GRAM(S): 17 POWDER, FOR SOLUTION ORAL at 23:02

## 2023-12-07 RX ADMIN — Medication 1 MILLIGRAM(S): at 12:48

## 2023-12-07 RX ADMIN — Medication 3 MILLILITER(S): at 07:53

## 2023-12-07 RX ADMIN — Medication 975 MILLIGRAM(S): at 23:00

## 2023-12-07 RX ADMIN — Medication 250 MILLIGRAM(S): at 17:50

## 2023-12-07 NOTE — CHART NOTE - NSCHARTNOTEFT_GEN_A_CORE
Was informed by nurseNeo, of [address] [place] [address] that patient is refusing RUE Venous Duplex. Ordered a portal duplex to be done at bedside and patient refused as well.     Patient was seen at bedside.     Patient reports that he does not want the RUE duplex because he does not want to be billed for it especially for an infection caused during his hospital stay here. Patient states that he does not have insurance coverage for his hospital stay here. Explained to patient that the hospital has a  team and  who will work with him for coverage. Patient states that he needs to leave the hospital because he will not be able to pay for his rent if he stays in the hospital for longer. Patient wants to leave against medical advice at this time.     Returned with nurse Neo as witness as the risks and benefits of leaving against medical advice was explained to him. Patient states that he will call his personal physician for treatment of his infection and medical care since his insurance will cover it. Patient states that he will call his own cab, but he needed a phone that will work since his hospital phone was not working and he does not have one on him. Patient signed the AMA form at bedside.     Returned to the patient's room several minutes later with Senior resident, Maykel, and explained to patient the risks of leaving against medical advice once more and patient states that he understands. He is agreeable to being prescribed antibiotics to his [facility] in [place] but still wanted to leave AMA at this time.     Attending was informed of patient's AMA decision and spoke to the patient. Patient is now agreeable to Venous Duplex RUE. Was informed by nurseNeo, of [address] [place] [address] that patient is refusing RUE Venous Duplex. Ordered a portal duplex to be done at bedside and patient refused as well.     Patient was seen at bedside.     Patient reports that he does not want the RUE duplex because he does not want to be billed for it especially for an infection caused during his hospital stay here. Patient states that he does not have insurance coverage for his hospital stay here. Explained to patient that the hospital has a  team and  who will work with him for coverage. Patient states that he needs to leave the hospital because he will not be able to pay for his rent if he stays in the hospital for longer. Patient wants to leave against medical advice at this time.     Returned with nurse Neo as witness as the risks and benefits of leaving against medical advice was explained to him. Patient states that he will call his personal physician for treatment of his infection and medical care since his insurance will cover it. Patient states that he will call his own cab, but he needed a phone that will work since his hospital phone was not working and he does not have one on him. Patient signed the AMA form at bedside.     Returned to the patient's room several minutes later with Senior resident, Maykel, and explained to patient the risks of leaving against medical advice once more and patient states that he understands. He is agreeable to being prescribed antibiotics to his [facility] in [place] but still wanted to leave AMA at this time.     Attending was informed of patient's AMA decision and spoke to the patient. Patient is now agreeable to Venous Duplex RUE.

## 2023-12-07 NOTE — CONSULT NOTE ADULT - SUBJECTIVE AND OBJECTIVE BOX
[facility]                                                INFECTIOUS DISEASES  =======================================================                     Matias Moore#   Jordon Ramsey MD#   Viraj Ballard MD*                           Alva Hui MD*   Kelley Gonzales MD*            Diplomates American Board of Internal Medicine & Infectious Diseases                  # [place] Office - Appt - Tel  [phone] Fax [phone]                * [place] Office - Appt - Tel [phone] Fax [phone]                                  Hospital Consult line:  [phone]  =======================================================      N-[id number]  NOHEMY BRAN   HPI:  57M with PMH seizures, liver disease, back pain, HLD presents to ED s/p fall. Per ED, patient lives at group home; was drunk today and fell down stairs. In ED was intubated for airway protection after admin of Versed and subsequent emesis. Imaging reveal only L 7th rib fx.    Primary  A: intubated  B: breath sounds b/l  C: palpable pulses  D: GCS 3T (on propofol)  E: no gross deformity/hemorrhage (28 Nov 2023 23:10)          I have personally reviewed the labs and data; pertinent labs and data are listed in this note; please see below.   =======================================================  Past Medical & Surgical Hx:  =====================  PAST MEDICAL & SURGICAL HISTORY:  High cholesterol      Hiatal hernia      Tardive dyskinesia      Liver failure      Seizures      ETOH abuse      S/P tonsillectomy      History of lumbar spinal fusion  6/2017        Problem List:  ==========  HEALTH ISSUES - PROBLEM Dx:        Social Hx:  =======  no toxic habits currently    FAMILY HISTORY:  no significant family history of immunosuppressive disorders in mother or father   =======================================================    REVIEW OF SYSTEMS:  CONSTITUTIONAL:  No Fever or chills  HEENT:  No diplopia or blurred vision.  No earache, sore throat or runny nose.  CARDIOVASCULAR:  No pressure, squeezing, strangling, tightness, heaviness or aching about the chest, neck, axilla or epigastrium.  RESPIRATORY:  No cough, shortness of breath  GASTROINTESTINAL:  No nausea, vomiting or diarrhea.  GENITOURINARY:  No dysuria, frequency or urgency. No Blood in urine  MUSCULOSKELETAL:  no joint aches, no muscle pain  SKIN:  No change in skin, hair or nails.  NEUROLOGIC:  No Headaches, seizures or weakness.  PSYCHIATRIC:  No disorder of thought or mood.  ENDOCRINE:  No heat or cold intolerance  HEMATOLOGICAL:  No easy bruising or bleeding.    =======================================================  Allergies    No Known Allergies    Intolerances    Antibiotics:  vancomycin  IVPB      vancomycin  IVPB 1000 milliGRAM(s) IV Intermittent every 12 hours    Other medications:  acetaminophen     Tablet .. 975 milliGRAM(s) Oral every 6 hours  albuterol/ipratropium for Nebulization 3 milliLiter(s) Nebulizer every 6 hours  amLODIPine   Tablet 5 milliGRAM(s) Oral daily  benztropine 2 milliGRAM(s) Oral two times a day  divalproex  milliGRAM(s) Oral every 12 hours  enoxaparin Injectable 40 milliGRAM(s) SubCutaneous every 12 hours  folic acid 1 milliGRAM(s) Oral daily  gabapentin 100 milliGRAM(s) Oral at bedtime  influenza   Vaccine 0.5 milliLiter(s) IntraMuscular once  lidocaine   4% Patch 3 Patch Transdermal daily  melatonin 5 milliGRAM(s) Oral at bedtime  methocarbamol 750 milliGRAM(s) Oral every 8 hours  nicotine - 21 mG/24Hr(s) Patch 1 Patch Transdermal every 24 hours  pantoprazole    Tablet 40 milliGRAM(s) Oral before breakfast  polyethylene glycol 3350 17 Gram(s) Oral at bedtime  QUEtiapine 125 milliGRAM(s) Oral at bedtime  senna 2 Tablet(s) Oral at bedtime  thiamine 100 milliGRAM(s) Oral daily     ceFAZolin  Injectable.   2000 milliGRAM(s) IV Push (11-29-23 @ 20:42)    vancomycin  IVPB   250 mL/Hr IV Intermittent (12-07-23 @ 12:49)      ======================================================  Physical Exam:  ============  T(F): 99.6 (07 Dec 2023 10:00), Max: 99.7 (06 Dec 2023 13:52)  HR: 121 (07 Dec 2023 10:00)  BP: 116/68 (07 Dec 2023 10:00)  RR: 18 (07 Dec 2023 10:00)  SpO2: 91% (07 Dec 2023 10:00) (90% - 98%)  temp max in last 48H T(F): , Max: 101.7 (12-06-23 @ 12:41)    General:  No acute distress.  Eye: Pupils are equal, round and reactive to light, Normal conjunctiva.  HENT: Normocephalic, Oral mucosa is moist, No pharyngeal erythema, No sinus tenderness.  Neck: Supple, No lymphadenopathy.  Respiratory: Lungs are clear to auscultation, Respirations are non-labored.  Cardiovascular: Normal rate, Regular rhythm, s1 + s2  Gastrointestinal: Soft, Non-tender, Non-distended, Normal bowel sounds.  Genitourinary: No costovertebral angle tenderness.  Lymphatics: No lymphadenopathy neck,   Musculoskeletal: Normal range of motion, Normal strength.  Integumentary: No rash.  Neurologic: Alert, Oriented, No focal deficits  Psychiatric: Appropriate mood & affect.    =======================================================  Labs:                        11.7   22.89 )-----------( 279      ( 07 Dec 2023 06:05 )             36.8     12-07    136  |  97  |  18.0  ----------------------------<  127<H>  4.0   |  28.0  |  0.83    Ca    8.5      07 Dec 2023 06:05  Phos  3.1     12-07  Mg     1.9     12-07        Culture - Blood (collected 12-06-23 @ 15:15)  Source: .Blood Blood  Gram Stain (12-07-23 @ 12:55):    Growth in aerobic bottle: Gram Positive Cocci in Clusters    Growth in anaerobic bottle: Gram Positive Cocci in Clusters  Preliminary Report (12-07-23 @ 12:55):    Growth in aerobic bottle: Gram Positive Cocci in Clusters    Growth in anaerobic bottle: Gram Positive Cocci in Clusters    Direct identification is available within approximately 3-5    hours either by Blood Panel Multiplexed PCR or Direct    MALDI-TOF. Details: [email or web address]  Organism: Blood Culture PCR (12-07-23 @ 11:54)  Organism: Blood Culture PCR (12-07-23 @ 11:54)    Sensitivities:      Method Type: PCR      -  Methicillin resistant Staphylococcus aureus (MRSA): Detec    Culture - Blood (collected 12-06-23 @ 15:10)  Source: .Blood Blood  Gram Stain (12-07-23 @ 11:56):    Growth in anaerobic bottle: Gram Positive Cocci in Clusters    Growth in aerobic bottle: Gram Positive Cocci in Clusters  Preliminary Report (12-07-23 @ 11:56):    Growth in anaerobic bottle: Gram Positive Cocci in Clusters    Growth in aerobic bottle: Gram Positive Cocci in Clusters                                                        [facility]                                                INFECTIOUS DISEASES  =======================================================                     Matias Moore#   Jordon Ramsey MD#   Viraj Ballard MD*                           Alva Hui MD*   Kelley Gonzales MD*            Diplomates American Board of Internal Medicine & Infectious Diseases                  # [place] Office - Appt - Tel  [phone] Fax [phone]                * [place] Office - Appt - Tel [phone] Fax [phone]                                  Hospital Consult line:  [phone]  =======================================================      N-[id number]  NOHEMY BRAN   HPI:  57M with PMH seizures, liver disease, back pain, HLD presents to ED s/p fall. Per ED, patient lives at group home; was drunk today and fell down stairs. In ED was intubated for airway protection after admin of Versed and subsequent emesis. Imaging reveal only L 7th rib fx.    Primary  A: intubated  B: breath sounds b/l  C: palpable pulses  D: GCS 3T (on propofol)  E: no gross deformity/hemorrhage (28 Nov 2023 23:10)          I have personally reviewed the labs and data; pertinent labs and data are listed in this note; please see below.   =======================================================  Past Medical & Surgical Hx:  =====================  PAST MEDICAL & SURGICAL HISTORY:  High cholesterol      Hiatal hernia      Tardive dyskinesia      Liver failure      Seizures      ETOH abuse      S/P tonsillectomy      History of lumbar spinal fusion  6/2017        Problem List:  ==========  HEALTH ISSUES - PROBLEM Dx:        Social Hx:  =======  no toxic habits currently    FAMILY HISTORY:  no significant family history of immunosuppressive disorders in mother or father   =======================================================    REVIEW OF SYSTEMS:  CONSTITUTIONAL:  No Fever or chills  HEENT:  No diplopia or blurred vision.  No earache, sore throat or runny nose.  CARDIOVASCULAR:  No pressure, squeezing, strangling, tightness, heaviness or aching about the chest, neck, axilla or epigastrium.  RESPIRATORY:  No cough, shortness of breath  GASTROINTESTINAL:  No nausea, vomiting or diarrhea.  GENITOURINARY:  No dysuria, frequency or urgency. No Blood in urine  MUSCULOSKELETAL:  no joint aches, no muscle pain  SKIN:  No change in skin, hair or nails.  NEUROLOGIC:  No Headaches, seizures or weakness.  PSYCHIATRIC:  No disorder of thought or mood.  ENDOCRINE:  No heat or cold intolerance  HEMATOLOGICAL:  No easy bruising or bleeding.    =======================================================  Allergies    No Known Allergies    Intolerances    Antibiotics:  vancomycin  IVPB      vancomycin  IVPB 1000 milliGRAM(s) IV Intermittent every 12 hours    Other medications:  acetaminophen     Tablet .. 975 milliGRAM(s) Oral every 6 hours  albuterol/ipratropium for Nebulization 3 milliLiter(s) Nebulizer every 6 hours  amLODIPine   Tablet 5 milliGRAM(s) Oral daily  benztropine 2 milliGRAM(s) Oral two times a day  divalproex  milliGRAM(s) Oral every 12 hours  enoxaparin Injectable 40 milliGRAM(s) SubCutaneous every 12 hours  folic acid 1 milliGRAM(s) Oral daily  gabapentin 100 milliGRAM(s) Oral at bedtime  influenza   Vaccine 0.5 milliLiter(s) IntraMuscular once  lidocaine   4% Patch 3 Patch Transdermal daily  melatonin 5 milliGRAM(s) Oral at bedtime  methocarbamol 750 milliGRAM(s) Oral every 8 hours  nicotine - 21 mG/24Hr(s) Patch 1 Patch Transdermal every 24 hours  pantoprazole    Tablet 40 milliGRAM(s) Oral before breakfast  polyethylene glycol 3350 17 Gram(s) Oral at bedtime  QUEtiapine 125 milliGRAM(s) Oral at bedtime  senna 2 Tablet(s) Oral at bedtime  thiamine 100 milliGRAM(s) Oral daily     ceFAZolin  Injectable.   2000 milliGRAM(s) IV Push (11-29-23 @ 20:42)    vancomycin  IVPB   250 mL/Hr IV Intermittent (12-07-23 @ 12:49)      ======================================================  Physical Exam:  ============  T(F): 99.6 (07 Dec 2023 10:00), Max: 99.7 (06 Dec 2023 13:52)  HR: 121 (07 Dec 2023 10:00)  BP: 116/68 (07 Dec 2023 10:00)  RR: 18 (07 Dec 2023 10:00)  SpO2: 91% (07 Dec 2023 10:00) (90% - 98%)  temp max in last 48H T(F): , Max: 101.7 (12-06-23 @ 12:41)    General:  No acute distress.  Eye: Pupils are equal, round and reactive to light, Normal conjunctiva.  HENT: Normocephalic, Oral mucosa is moist, No pharyngeal erythema, No sinus tenderness.  Neck: Supple, No lymphadenopathy.  Respiratory: Lungs are clear to auscultation, Respirations are non-labored.  Cardiovascular: Normal rate, Regular rhythm, s1 + s2  Gastrointestinal: Soft, Non-tender, Non-distended, Normal bowel sounds.  Genitourinary: No costovertebral angle tenderness.  Lymphatics: No lymphadenopathy neck,   Musculoskeletal: Normal range of motion, Normal strength.  Integumentary: No rash.  Neurologic: Alert, Oriented, No focal deficits  Psychiatric: Appropriate mood & affect.    =======================================================  Labs:                        11.7   22.89 )-----------( 279      ( 07 Dec 2023 06:05 )             36.8     12-07    136  |  97  |  18.0  ----------------------------<  127<H>  4.0   |  28.0  |  0.83    Ca    8.5      07 Dec 2023 06:05  Phos  3.1     12-07  Mg     1.9     12-07        Culture - Blood (collected 12-06-23 @ 15:15)  Source: .Blood Blood  Gram Stain (12-07-23 @ 12:55):    Growth in aerobic bottle: Gram Positive Cocci in Clusters    Growth in anaerobic bottle: Gram Positive Cocci in Clusters  Preliminary Report (12-07-23 @ 12:55):    Growth in aerobic bottle: Gram Positive Cocci in Clusters    Growth in anaerobic bottle: Gram Positive Cocci in Clusters    Direct identification is available within approximately 3-5    hours either by Blood Panel Multiplexed PCR or Direct    MALDI-TOF. Details: [email or web address]  Organism: Blood Culture PCR (12-07-23 @ 11:54)  Organism: Blood Culture PCR (12-07-23 @ 11:54)    Sensitivities:      Method Type: PCR      -  Methicillin resistant Staphylococcus aureus (MRSA): Detec    Culture - Blood (collected 12-06-23 @ 15:10)  Source: .Blood Blood  Gram Stain (12-07-23 @ 11:56):    Growth in anaerobic bottle: Gram Positive Cocci in Clusters    Growth in aerobic bottle: Gram Positive Cocci in Clusters  Preliminary Report (12-07-23 @ 11:56):    Growth in anaerobic bottle: Gram Positive Cocci in Clusters    Growth in aerobic bottle: Gram Positive Cocci in Clusters                                                        [facility]                                                INFECTIOUS DISEASES  =======================================================                     Matias Moore#   Jordon Ramsey MD#   Viraj Ballard MD*                           Alva Hui MD*   Kelley Gonzales MD*            Diplomates American Board of Internal Medicine & Infectious Diseases                  # [place] Office - Appt - Tel  [phone] Fax [phone]                * [place] Office - Appt - Tel [phone] Fax [phone]                                  Hospital Consult line:  [phone]  =======================================================      [id number]  NOHEMY BRAN   HPI:  57M with PMH seizures, liver disease, back pain, HLD presents to ED s/p fall. Per ED, patient lives at group home; was drunk today and fell down stairs. In ED was intubated for airway protection after admin of Versed and subsequent emesis. Imaging reveal only L 7th rib fx.    Primary  A: intubated  B: breath sounds b/l  C: palpable pulses  D: GCS 3T (on propofol)  E: no gross deformity/hemorrhage (28 Nov 2023 23:10)    ID called for mrsa bactermia      I have personally reviewed the labs and data; pertinent labs and data are listed in this note; please see below.   =======================================================  Past Medical & Surgical Hx:  =====================  PAST MEDICAL & SURGICAL HISTORY:  High cholesterol      Hiatal hernia      Tardive dyskinesia      Liver failure      Seizures      ETOH abuse      S/P tonsillectomy      History of lumbar spinal fusion  6/2017        Problem List:  ==========  HEALTH ISSUES - PROBLEM Dx:        Social Hx:  =======  no toxic habits currently    FAMILY HISTORY:  no significant family history of immunosuppressive disorders in mother or father   =======================================================    REVIEW OF SYSTEMS:  CONSTITUTIONAL: +  HEENT:  No diplopia or blurred vision.  No earache, sore throat or runny nose.  CARDIOVASCULAR:  No pressure, squeezing, strangling, tightness, heaviness or aching about the chest, neck, axilla or epigastrium.  RESPIRATORY:  + cough, shortness of breath  GASTROINTESTINAL:  No nausea, vomiting or diarrhea.  GENITOURINARY:  No dysuria, frequency or urgency. No Blood in urine  MUSCULOSKELETAL:  no joint aches, no muscle pain  SKIN:  No change in skin, hair or nails.  NEUROLOGIC:  No Headaches, seizures or weakness.  PSYCHIATRIC:  No disorder of thought or mood.  ENDOCRINE:  No heat or cold intolerance  HEMATOLOGICAL:  No easy bruising or bleeding.    =======================================================  Allergies    No Known Allergies    Intolerances    Antibiotics:  vancomycin  IVPB      vancomycin  IVPB 1000 milliGRAM(s) IV Intermittent every 12 hours    Other medications:  acetaminophen     Tablet .. 975 milliGRAM(s) Oral every 6 hours  albuterol/ipratropium for Nebulization 3 milliLiter(s) Nebulizer every 6 hours  amLODIPine   Tablet 5 milliGRAM(s) Oral daily  benztropine 2 milliGRAM(s) Oral two times a day  divalproex  milliGRAM(s) Oral every 12 hours  enoxaparin Injectable 40 milliGRAM(s) SubCutaneous every 12 hours  folic acid 1 milliGRAM(s) Oral daily  gabapentin 100 milliGRAM(s) Oral at bedtime  influenza   Vaccine 0.5 milliLiter(s) IntraMuscular once  lidocaine   4% Patch 3 Patch Transdermal daily  melatonin 5 milliGRAM(s) Oral at bedtime  methocarbamol 750 milliGRAM(s) Oral every 8 hours  nicotine - 21 mG/24Hr(s) Patch 1 Patch Transdermal every 24 hours  pantoprazole    Tablet 40 milliGRAM(s) Oral before breakfast  polyethylene glycol 3350 17 Gram(s) Oral at bedtime  QUEtiapine 125 milliGRAM(s) Oral at bedtime  senna 2 Tablet(s) Oral at bedtime  thiamine 100 milliGRAM(s) Oral daily     ceFAZolin  Injectable.   2000 milliGRAM(s) IV Push (11-29-23 @ 20:42)    vancomycin  IVPB   250 mL/Hr IV Intermittent (12-07-23 @ 12:49)      ======================================================  Physical Exam:  ============  T(F): 99.6 (07 Dec 2023 10:00), Max: 99.7 (06 Dec 2023 13:52)  HR: 121 (07 Dec 2023 10:00)  BP: 116/68 (07 Dec 2023 10:00)  RR: 18 (07 Dec 2023 10:00)  SpO2: 91% (07 Dec 2023 10:00) (90% - 98%)  temp max in last 48H T(F): , Max: 101.7 (12-06-23 @ 12:41)    General:  restless, coughing, on o2  Eye: Normal conjunctiva.  Neck: Supple, No lymphadenopathy.  Respiratory: left crackles to auscultation, Respirations are non-labored.  Cardiovascular: Normal rate, Regular rhythm, s1 + s2  Gastrointestinal: Soft, Non-tender, Non-distended, Normal bowel sounds.  Genitourinary: No costovertebral angle tenderness.  Lymphatics: No lymphadenopathy neck,   Musculoskeletal: Normal range of motion, Normal strength.  Integumentary: RUE anterior forearm with swelling, 2 localized areas with erythema with central scab  Neurologic: Alert, Oriented, No focal deficits  Psychiatric: Appropriate mood & affect.    =======================================================  Labs:                        11.7   22.89 )-----------( 279      ( 07 Dec 2023 06:05 )             36.8     12-07    136  |  97  |  18.0  ----------------------------<  127<H>  4.0   |  28.0  |  0.83    Ca    8.5      07 Dec 2023 06:05  Phos  3.1     12-07  Mg     1.9     12-07        Culture - Blood (collected 12-06-23 @ 15:15)  Source: .Blood Blood  Gram Stain (12-07-23 @ 12:55):    Growth in aerobic bottle: Gram Positive Cocci in Clusters    Growth in anaerobic bottle: Gram Positive Cocci in Clusters  Preliminary Report (12-07-23 @ 12:55):    Growth in aerobic bottle: Gram Positive Cocci in Clusters    Growth in anaerobic bottle: Gram Positive Cocci in Clusters    Direct identification is available within approximately 3-5    hours either by Blood Panel Multiplexed PCR or Direct    MALDI-TOF. Details: [email or web address]  Organism: Blood Culture PCR (12-07-23 @ 11:54)  Organism: Blood Culture PCR (12-07-23 @ 11:54)    Sensitivities:      Method Type: PCR      -  Methicillin resistant Staphylococcus aureus (MRSA): Detec    Culture - Blood (collected 12-06-23 @ 15:10)  Source: .Blood Blood  Gram Stain (12-07-23 @ 11:56):    Growth in anaerobic bottle: Gram Positive Cocci in Clusters    Growth in aerobic bottle: Gram Positive Cocci in Clusters  Preliminary Report (12-07-23 @ 11:56):    Growth in anaerobic bottle: Gram Positive Cocci in Clusters    Growth in aerobic bottle: Gram Positive Cocci in Clusters          < from: Xray Chest 1 View- PORTABLE-Urgent (Xray Chest 1 View- PORTABLE-Urgent .) (12.06.23 @ 10:15) >  INTERPRETATION:  Chest one view    HISTORY: Leukocytosis    COMPARISON STUDY: 12/2/2023    Frontal expiratory view of the chest shows the heart to be similar in   size. The lungs show partial clearing of the left base and there is no   evidence of pneumothorax nor pleural effusion.    IMPRESSION:  Left base clearing.        Thank you for the courtesy of this referral.    --- End of Report ---    < end of copied text >                                                [facility]                                                INFECTIOUS DISEASES  =======================================================                     Matias Moore#   Jordon Ramsey MD#   Viraj Ballard MD*                           Alva Hui MD*   Kelley Gonzales MD*            Diplomates American Board of Internal Medicine & Infectious Diseases                  # [place] Office - Appt - Tel  [phone] Fax [phone]                * [place] Office - Appt - Tel [phone] Fax [phone]                                  Hospital Consult line:  [phone]  =======================================================      [id number]  NOHEMY BRAN   HPI:  57M with PMH seizures, liver disease, back pain, HLD presents to ED s/p fall. Per ED, patient lives at group home; was drunk today and fell down stairs. In ED was intubated for airway protection after admin of Versed and subsequent emesis. Imaging reveal only L 7th rib fx.    Primary  A: intubated  B: breath sounds b/l  C: palpable pulses  D: GCS 3T (on propofol)  E: no gross deformity/hemorrhage (28 Nov 2023 23:10)    ID called for mrsa bactermia      I have personally reviewed the labs and data; pertinent labs and data are listed in this note; please see below.   =======================================================  Past Medical & Surgical Hx:  =====================  PAST MEDICAL & SURGICAL HISTORY:  High cholesterol      Hiatal hernia      Tardive dyskinesia      Liver failure      Seizures      ETOH abuse      S/P tonsillectomy      History of lumbar spinal fusion  6/2017        Problem List:  ==========  HEALTH ISSUES - PROBLEM Dx:        Social Hx:  =======  no toxic habits currently    FAMILY HISTORY:  no significant family history of immunosuppressive disorders in mother or father   =======================================================    REVIEW OF SYSTEMS:  CONSTITUTIONAL: +  HEENT:  No diplopia or blurred vision.  No earache, sore throat or runny nose.  CARDIOVASCULAR:  No pressure, squeezing, strangling, tightness, heaviness or aching about the chest, neck, axilla or epigastrium.  RESPIRATORY:  + cough, shortness of breath  GASTROINTESTINAL:  No nausea, vomiting or diarrhea.  GENITOURINARY:  No dysuria, frequency or urgency. No Blood in urine  MUSCULOSKELETAL:  no joint aches, no muscle pain  SKIN:  No change in skin, hair or nails.  NEUROLOGIC:  No Headaches, seizures or weakness.  PSYCHIATRIC:  No disorder of thought or mood.  ENDOCRINE:  No heat or cold intolerance  HEMATOLOGICAL:  No easy bruising or bleeding.    =======================================================  Allergies    No Known Allergies    Intolerances    Antibiotics:  vancomycin  IVPB      vancomycin  IVPB 1000 milliGRAM(s) IV Intermittent every 12 hours    Other medications:  acetaminophen     Tablet .. 975 milliGRAM(s) Oral every 6 hours  albuterol/ipratropium for Nebulization 3 milliLiter(s) Nebulizer every 6 hours  amLODIPine   Tablet 5 milliGRAM(s) Oral daily  benztropine 2 milliGRAM(s) Oral two times a day  divalproex  milliGRAM(s) Oral every 12 hours  enoxaparin Injectable 40 milliGRAM(s) SubCutaneous every 12 hours  folic acid 1 milliGRAM(s) Oral daily  gabapentin 100 milliGRAM(s) Oral at bedtime  influenza   Vaccine 0.5 milliLiter(s) IntraMuscular once  lidocaine   4% Patch 3 Patch Transdermal daily  melatonin 5 milliGRAM(s) Oral at bedtime  methocarbamol 750 milliGRAM(s) Oral every 8 hours  nicotine - 21 mG/24Hr(s) Patch 1 Patch Transdermal every 24 hours  pantoprazole    Tablet 40 milliGRAM(s) Oral before breakfast  polyethylene glycol 3350 17 Gram(s) Oral at bedtime  QUEtiapine 125 milliGRAM(s) Oral at bedtime  senna 2 Tablet(s) Oral at bedtime  thiamine 100 milliGRAM(s) Oral daily     ceFAZolin  Injectable.   2000 milliGRAM(s) IV Push (11-29-23 @ 20:42)    vancomycin  IVPB   250 mL/Hr IV Intermittent (12-07-23 @ 12:49)      ======================================================  Physical Exam:  ============  T(F): 99.6 (07 Dec 2023 10:00), Max: 99.7 (06 Dec 2023 13:52)  HR: 121 (07 Dec 2023 10:00)  BP: 116/68 (07 Dec 2023 10:00)  RR: 18 (07 Dec 2023 10:00)  SpO2: 91% (07 Dec 2023 10:00) (90% - 98%)  temp max in last 48H T(F): , Max: 101.7 (12-06-23 @ 12:41)    General:  restless, coughing, on o2  Eye: Normal conjunctiva.  Neck: Supple, No lymphadenopathy.  Respiratory: left crackles to auscultation, Respirations are non-labored.  Cardiovascular: Normal rate, Regular rhythm, s1 + s2  Gastrointestinal: Soft, Non-tender, Non-distended, Normal bowel sounds.  Genitourinary: No costovertebral angle tenderness.  Lymphatics: No lymphadenopathy neck,   Musculoskeletal: Normal range of motion, Normal strength.  Integumentary: RUE anterior forearm with swelling, 2 localized areas with erythema with central scab  Neurologic: Alert, Oriented, No focal deficits  Psychiatric: Appropriate mood & affect.    =======================================================  Labs:                        11.7   22.89 )-----------( 279      ( 07 Dec 2023 06:05 )             36.8     12-07    136  |  97  |  18.0  ----------------------------<  127<H>  4.0   |  28.0  |  0.83    Ca    8.5      07 Dec 2023 06:05  Phos  3.1     12-07  Mg     1.9     12-07        Culture - Blood (collected 12-06-23 @ 15:15)  Source: .Blood Blood  Gram Stain (12-07-23 @ 12:55):    Growth in aerobic bottle: Gram Positive Cocci in Clusters    Growth in anaerobic bottle: Gram Positive Cocci in Clusters  Preliminary Report (12-07-23 @ 12:55):    Growth in aerobic bottle: Gram Positive Cocci in Clusters    Growth in anaerobic bottle: Gram Positive Cocci in Clusters    Direct identification is available within approximately 3-5    hours either by Blood Panel Multiplexed PCR or Direct    MALDI-TOF. Details: [email or web address]  Organism: Blood Culture PCR (12-07-23 @ 11:54)  Organism: Blood Culture PCR (12-07-23 @ 11:54)    Sensitivities:      Method Type: PCR      -  Methicillin resistant Staphylococcus aureus (MRSA): Detec    Culture - Blood (collected 12-06-23 @ 15:10)  Source: .Blood Blood  Gram Stain (12-07-23 @ 11:56):    Growth in anaerobic bottle: Gram Positive Cocci in Clusters    Growth in aerobic bottle: Gram Positive Cocci in Clusters  Preliminary Report (12-07-23 @ 11:56):    Growth in anaerobic bottle: Gram Positive Cocci in Clusters    Growth in aerobic bottle: Gram Positive Cocci in Clusters          < from: Xray Chest 1 View- PORTABLE-Urgent (Xray Chest 1 View- PORTABLE-Urgent .) (12.06.23 @ 10:15) >  INTERPRETATION:  Chest one view    HISTORY: Leukocytosis    COMPARISON STUDY: 12/2/2023    Frontal expiratory view of the chest shows the heart to be similar in   size. The lungs show partial clearing of the left base and there is no   evidence of pneumothorax nor pleural effusion.    IMPRESSION:  Left base clearing.        Thank you for the courtesy of this referral.    --- End of Report ---    < end of copied text >

## 2023-12-07 NOTE — PROGRESS NOTE ADULT - ATTENDING COMMENTS
Patient seen and examined at bedside. Spiked a fever of 101.5. Denies any nausea or vomting. Pain is well controlled. Afebrile this AM. CXR is clear. Patient was found to be bacteremic.     IV abx as per ID recs  Continue diet  RUE duplex positive for thrombophlebitis, NSAIDs and warm compresses  Encourage ambulation  2L nasal canula, satting 95%  continue duonebs  SCDs and LVNX

## 2023-12-07 NOTE — PROGRESS NOTE ADULT - ASSESSMENT
59 y/o male s/p fall with rib fracture, L pneumothorax, EtOH use, delirium, and asthma exacerbation was afebrile and hemodynamically stable this morning.  57 y/o male s/p fall with rib fracture, L pneumothorax, EtOH use, delirium, and asthma exacerbation was afebrile and hemodynamically stable this morning.  57 y/o male s/p fall with rib fracture, L pneumothorax, EtOH use, delirium, and asthma exacerbation was afebrile and hemodynamically stable this morning. Labs showed increase in leukocytosis of 22.89 from 14. On exam, patient's R proximal forearm was edematous, erythematous, with pus drainage. RUE venous duplex ordered. Patient refused to get the ultrasound twice.    PLAN  -pain control  -strict Is/Os  -continue home meds  -trend labs, replete electrolytes as needed  -encourage OOB  -incentive spirometry  -DVT ppx: SCDs, Lovenox 40 daily  57 y/o male s/p fall with rib fracture, L pneumothorax, EtOH use, delirium, and asthma exacerbation was afebrile and hemodynamically stable this morning. Labs showed increase in leukocytosis of 22.89 from 14. On exam, patient's R proximal forearm was edematous, erythematous, with pus drainage. RUE venous duplex ordered. Will start patient on antibiotics.    PLAN  -pain control  -strict Is/Os  -continue home meds  -trend labs, replete electrolytes as needed  -encourage OOB  -incentive spirometry  - Venous Duplex RUQ ordered.   -DVT ppx: SCDs, Lovenox 40 daily  59 y/o male s/p fall with rib fracture, L pneumothorax, EtOH use, delirium, and asthma exacerbation was afebrile and hemodynamically stable this morning. Labs showed increase in leukocytosis of 22.89 from 14. On exam, patient's R proximal forearm was edematous, erythematous, with pus drainage. RUE venous duplex ordered. Will start patient on antibiotics.    PLAN  -pain control  -strict Is/Os  -continue home meds  -trend labs, replete electrolytes as needed  -encourage OOB  -incentive spirometry  - Venous Duplex RUQ ordered.   -DVT ppx: SCDs, Lovenox 40 daily

## 2023-12-07 NOTE — CHART NOTE - NSCHARTNOTEFT_GEN_A_CORE
Source: Patient [x ]  Family [ ]   other [ ]    Current Diet: Regular     Patient reports [x ] nausea  [x ] vomiting [ ] diarrhea [x ] constipation  [ ]chewing problems [ ] swallowing issues  [ ] other: Pt stated No BM in 7 Days     PO intake:  < 50% [x ]   50-75%  [ ]   %  [ ]  other :    Source for PO intake [ x] Patient [ ] family [ ] chart [ ] staff [ ] other    Enteral /Parenteral Nutrition:     Current Weight:   68kg  % Weight Change     Pertinent Medications: MEDICATIONS  (STANDING):  acetaminophen     Tablet .. 975 milliGRAM(s) Oral every 6 hours  albuterol/ipratropium for Nebulization 3 milliLiter(s) Nebulizer every 6 hours  amLODIPine   Tablet 5 milliGRAM(s) Oral daily  benztropine 2 milliGRAM(s) Oral two times a day  divalproex  milliGRAM(s) Oral every 12 hours  enoxaparin Injectable 40 milliGRAM(s) SubCutaneous every 12 hours  folic acid 1 milliGRAM(s) Oral daily  gabapentin 100 milliGRAM(s) Oral at bedtime  influenza   Vaccine 0.5 milliLiter(s) IntraMuscular once  lidocaine   4% Patch 3 Patch Transdermal daily  melatonin 5 milliGRAM(s) Oral at bedtime  methocarbamol 750 milliGRAM(s) Oral every 8 hours  nicotine - 21 mG/24Hr(s) Patch 1 Patch Transdermal every 24 hours  pantoprazole    Tablet 40 milliGRAM(s) Oral before breakfast  polyethylene glycol 3350 17 Gram(s) Oral at bedtime  QUEtiapine 125 milliGRAM(s) Oral at bedtime  senna 2 Tablet(s) Oral at bedtime  thiamine 100 milliGRAM(s) Oral daily    MEDICATIONS  (PRN):  acetaminophen     Tablet .. 650 milliGRAM(s) Oral every 6 hours PRN Temp greater or equal to 38C (100.4F)  albuterol/ipratropium for Nebulization 3 milliLiter(s) Nebulizer every 6 hours PRN Shortness of Breath and/or Wheezing  mineral oil enema 133 milliLiter(s) Rectal daily PRN constipation  OLANZapine Injectable 5 milliGRAM(s) IntraMuscular every 12 hours PRN agitation  ondansetron Injectable 4 milliGRAM(s) IV Push every 6 hours PRN Nausea  oxyCODONE    IR 10 milliGRAM(s) Oral every 4 hours PRN Severe Pain (7 - 10)  oxyCODONE    IR 5 milliGRAM(s) Oral every 4 hours PRN Moderate Pain (4 - 6)    Pertinent Labs: CBC Full  -  ( 07 Dec 2023 06:05 )  WBC Count : 22.89 K/uL  RBC Count : 4.29 M/uL  Hemoglobin : 11.7 g/dL  Hematocrit : 36.8 %  Platelet Count - Automated : 279 K/uL  Mean Cell Volume : 85.8 fl  Mean Cell Hemoglobin : 27.3 pg  Mean Cell Hemoglobin Concentration : 31.8 gm/dL  Auto Neutrophil # : 20.10 K/uL  Auto Lymphocyte # : 0.60 K/uL  Auto Monocyte # : 1.40 K/uL  Auto Eosinophil # : 0.41 K/uL  Auto Basophil # : 0.00 K/uL  Auto Neutrophil % : 87.8 %  Auto Lymphocyte % : 2.6 %  Auto Monocyte % : 6.1 %  Auto Eosinophil % : 1.8 %  Auto Basophil % : 0.0 %        Skin:   Surgical Incision ( LT Anterior Chest Wall)     Nutrition focused physical exam not conducted  at this time - found signs of malnutrition [ ]absent [ ]present    Subcutaneous fat loss: [ ] Orbital fat pads region, [ ]Buccal fat region, [ ]Triceps region,  [ ]Ribs region    Muscle wasting: [ ]Temples region, [ ]Clavicle region, [ ]Shoulder region, [ ]Scapula region, [ ]Interosseous region,  [ ]thigh region, [ ]Calf region    Estimated Needs:   [x ] no change since previous assessment  [ ] recalculated:     Current Nutrition Diagnosis:  Increased Nutrient Needs(energy, protein) related to increased physiological demand of nutrients as evidenced by s/p fall and 7th rib fx. PO Intake <50% Pt stated lack of appetite complaining  of pain, nausea  and vomiting. No Bm in 7 days. Pt is open to try protein supplements (ensure plus).    Recommendations:   Continue current diet as tolerated  Add Ensure Plus Twice a day per pt request (350 kcals, 20 g pro each)  Rx: add MVI daily, continue thiamine and folic acid  Continue bowl regimen   Monitoring and Evaluation:   [ x] PO intake [x ] Tolerance to diet prescription [X] Weights  [X] Follow up per protocol [X] Labs: Source: Patient [x ]  Family [ ]   other [ ]    Current Diet: Regular     Patient reports [x ] nausea  [x ] vomiting [ ] diarrhea [x ] constipation  [ ]chewing problems [ ] swallowing issues  [ ] other: Pt stated No BM in 7 Days     PO intake:  < 50% [x ]   50-75%  [ ]   %  [ ]  other :    Source for PO intake [ x] Patient [ ] family [ ] chart [ ] staff [ ] other    Current Weight:   (12/4) 169.9 lbs  (11/28) 149.9 lbs  ? accuracy of weights, continue to trend and maintain strict Is&Os     Pertinent Medications: MEDICATIONS  (STANDING):  folic acid 1 milliGRAM(s) Oral daily  pantoprazole    Tablet 40 milliGRAM(s) Oral before breakfast  polyethylene glycol 3350 17 Gram(s) Oral at bedtime  senna 2 Tablet(s) Oral at bedtime  thiamine 100 milliGRAM(s) Oral daily    MEDICATIONS  (PRN):  mineral oil enema 133 milliLiter(s) Rectal daily PRN constipation  ondansetron Injectable 4 milliGRAM(s) IV Push every 6 hours PRN Nausea    Pertinent Labs: CBC Full  -  ( 07 Dec 2023 06:05 )  WBC Count : 22.89 K/uL  RBC Count : 4.29 M/uL  Hemoglobin : 11.7 g/dL  Hematocrit : 36.8 %    12-07 Na136 mmol/L Glu 127 mg/dL<H> K+ 4.0 mmol/L Cr  0.83 mg/dL BUN 18.0 mg/dL Phos 3.1 mg/dL Alb n/a   PAB n/a       Skin: Surgical Incision ( LT Anterior Chest Wall) per documentation    Nutrition focused physical exam not conducted  at this time - found signs of malnutrition [ ]absent [ ]present    Subcutaneous fat loss: [ ] Orbital fat pads region, [ ]Buccal fat region, [ ]Triceps region,  [ ]Ribs region    Muscle wasting: [ ]Temples region, [ ]Clavicle region, [ ]Shoulder region, [ ]Scapula region, [ ]Interosseous region,  [ ]thigh region, [ ]Calf region    Estimated Needs:   [x ] no change since previous assessment  [ ] recalculated:     Current Nutrition Diagnosis: Increased Nutrient Needs (energy, protein) related to increased physiological demand of nutrients as evidenced by s/p fall and 7th rib fx. PO Intake <50% Pt stated lack of appetite complaining  of pain, nausea  and vomiting. No BM in 7 days. Pt is open to try oral nutrition supplements.     Recommendations:   1) Continue current diet as tolerated  2) Add Ensure Plus High Protein BID (350 kcal, 20g protein per serving)  3) Rx: add MVI daily, continue thiamine and folic acid  4) Continue bowel regimen    Monitoring and Evaluation:   [ x] PO intake [x ] Tolerance to diet prescription [X] Weights  [X] Follow up per protocol [X] Labs: chem 8, mg, phos, H/H

## 2023-12-07 NOTE — PROGRESS NOTE ADULT - SUBJECTIVE AND OBJECTIVE BOX
Patient seen and examined at bedside.     No acute events overnight. Patient continues to cough up phlegm. Currently requires 2L NC. Was not able to pull over 500 on IS.     Vitals:  Vital Signs Last 24 Hrs  T(C): 37.2 (07 Dec 2023 06:20), Max: 38.7 (06 Dec 2023 12:41)  T(F): 99 (07 Dec 2023 06:20), Max: 101.7 (06 Dec 2023 12:41)  HR: 107 (07 Dec 2023 06:20) (81 - 110)  BP: 123/74 (07 Dec 2023 06:20) (97/59 - 150/86)  BP(mean): 104 (06 Dec 2023 13:52) (104 - 104)  RR: 18 (07 Dec 2023 06:20) (18 - 18)  SpO2: 93% (07 Dec 2023 06:20) (88% - 98%)    Parameters below as of 07 Dec 2023 06:20  Patient On (Oxygen Delivery Method): nasal cannula  O2 Flow (L/min): 3    Labs:  12-07    136  |  97  |  18.0  ----------------------------<  127<H>  4.0   |  28.0  |  0.83    Ca    8.5      07 Dec 2023 06:05  Phos  3.1     12-07  Mg     1.9     12-07                        11.7   22.89 )-----------( 279      ( 07 Dec 2023 06:05 )             36.8       Exam:  Gen: pt lying in bed, alert, in NAD  Resp: cough, nonlabored breathing on 2L NC.   CVS/chest: RRR, R chest wall blowhole incision healing well.   Abd: soft, NT, ND  RUE: R proximal forearm edematous, erythematous with pus drainage

## 2023-12-08 ENCOUNTER — TRANSCRIPTION ENCOUNTER (OUTPATIENT)
Age: 58
End: 2023-12-08

## 2023-12-08 ENCOUNTER — RESULT REVIEW (OUTPATIENT)
Age: 58
End: 2023-12-08

## 2023-12-08 LAB
ANION GAP SERPL CALC-SCNC: 6 MMOL/L — SIGNIFICANT CHANGE UP (ref 5–17)
ANION GAP SERPL CALC-SCNC: 6 MMOL/L — SIGNIFICANT CHANGE UP (ref 5–17)
BASOPHILS # BLD AUTO: 0.06 K/UL — SIGNIFICANT CHANGE UP (ref 0–0.2)
BASOPHILS # BLD AUTO: 0.06 K/UL — SIGNIFICANT CHANGE UP (ref 0–0.2)
BASOPHILS NFR BLD AUTO: 0.3 % — SIGNIFICANT CHANGE UP (ref 0–2)
BASOPHILS NFR BLD AUTO: 0.3 % — SIGNIFICANT CHANGE UP (ref 0–2)
BLD GP AB SCN SERPL QL: SIGNIFICANT CHANGE UP
BLD GP AB SCN SERPL QL: SIGNIFICANT CHANGE UP
BUN SERPL-MCNC: 27 MG/DL — HIGH (ref 8–20)
BUN SERPL-MCNC: 27 MG/DL — HIGH (ref 8–20)
CALCIUM SERPL-MCNC: 8.2 MG/DL — LOW (ref 8.4–10.5)
CALCIUM SERPL-MCNC: 8.2 MG/DL — LOW (ref 8.4–10.5)
CHLORIDE SERPL-SCNC: 99 MMOL/L — SIGNIFICANT CHANGE UP (ref 96–108)
CHLORIDE SERPL-SCNC: 99 MMOL/L — SIGNIFICANT CHANGE UP (ref 96–108)
CO2 SERPL-SCNC: 28 MMOL/L — SIGNIFICANT CHANGE UP (ref 22–29)
CO2 SERPL-SCNC: 28 MMOL/L — SIGNIFICANT CHANGE UP (ref 22–29)
CREAT SERPL-MCNC: 1.17 MG/DL — SIGNIFICANT CHANGE UP (ref 0.5–1.3)
CREAT SERPL-MCNC: 1.17 MG/DL — SIGNIFICANT CHANGE UP (ref 0.5–1.3)
EGFR: 72 ML/MIN/1.73M2 — SIGNIFICANT CHANGE UP
EGFR: 72 ML/MIN/1.73M2 — SIGNIFICANT CHANGE UP
EOSINOPHIL # BLD AUTO: 0.48 K/UL — SIGNIFICANT CHANGE UP (ref 0–0.5)
EOSINOPHIL # BLD AUTO: 0.48 K/UL — SIGNIFICANT CHANGE UP (ref 0–0.5)
EOSINOPHIL NFR BLD AUTO: 2.3 % — SIGNIFICANT CHANGE UP (ref 0–6)
EOSINOPHIL NFR BLD AUTO: 2.3 % — SIGNIFICANT CHANGE UP (ref 0–6)
GLUCOSE SERPL-MCNC: 94 MG/DL — SIGNIFICANT CHANGE UP (ref 70–99)
GLUCOSE SERPL-MCNC: 94 MG/DL — SIGNIFICANT CHANGE UP (ref 70–99)
GRAM STN FLD: ABNORMAL
HCT VFR BLD CALC: 29.5 % — LOW (ref 39–50)
HCT VFR BLD CALC: 29.5 % — LOW (ref 39–50)
HGB BLD-MCNC: 9.4 G/DL — LOW (ref 13–17)
HGB BLD-MCNC: 9.4 G/DL — LOW (ref 13–17)
IMM GRANULOCYTES NFR BLD AUTO: 2.2 % — HIGH (ref 0–0.9)
IMM GRANULOCYTES NFR BLD AUTO: 2.2 % — HIGH (ref 0–0.9)
LYMPHOCYTES # BLD AUTO: 1.06 K/UL — SIGNIFICANT CHANGE UP (ref 1–3.3)
LYMPHOCYTES # BLD AUTO: 1.06 K/UL — SIGNIFICANT CHANGE UP (ref 1–3.3)
LYMPHOCYTES # BLD AUTO: 5.1 % — LOW (ref 13–44)
LYMPHOCYTES # BLD AUTO: 5.1 % — LOW (ref 13–44)
MAGNESIUM SERPL-MCNC: 1.8 MG/DL — SIGNIFICANT CHANGE UP (ref 1.6–2.6)
MAGNESIUM SERPL-MCNC: 1.8 MG/DL — SIGNIFICANT CHANGE UP (ref 1.6–2.6)
MCHC RBC-ENTMCNC: 27.2 PG — SIGNIFICANT CHANGE UP (ref 27–34)
MCHC RBC-ENTMCNC: 27.2 PG — SIGNIFICANT CHANGE UP (ref 27–34)
MCHC RBC-ENTMCNC: 31.9 GM/DL — LOW (ref 32–36)
MCHC RBC-ENTMCNC: 31.9 GM/DL — LOW (ref 32–36)
MCV RBC AUTO: 85.5 FL — SIGNIFICANT CHANGE UP (ref 80–100)
MCV RBC AUTO: 85.5 FL — SIGNIFICANT CHANGE UP (ref 80–100)
MONOCYTES # BLD AUTO: 1.55 K/UL — HIGH (ref 0–0.9)
MONOCYTES # BLD AUTO: 1.55 K/UL — HIGH (ref 0–0.9)
MONOCYTES NFR BLD AUTO: 7.5 % — SIGNIFICANT CHANGE UP (ref 2–14)
MONOCYTES NFR BLD AUTO: 7.5 % — SIGNIFICANT CHANGE UP (ref 2–14)
NEUTROPHILS # BLD AUTO: 17.06 K/UL — HIGH (ref 1.8–7.4)
NEUTROPHILS # BLD AUTO: 17.06 K/UL — HIGH (ref 1.8–7.4)
NEUTROPHILS NFR BLD AUTO: 82.6 % — HIGH (ref 43–77)
NEUTROPHILS NFR BLD AUTO: 82.6 % — HIGH (ref 43–77)
PHOSPHATE SERPL-MCNC: 3.2 MG/DL — SIGNIFICANT CHANGE UP (ref 2.4–4.7)
PHOSPHATE SERPL-MCNC: 3.2 MG/DL — SIGNIFICANT CHANGE UP (ref 2.4–4.7)
PLATELET # BLD AUTO: 253 K/UL — SIGNIFICANT CHANGE UP (ref 150–400)
PLATELET # BLD AUTO: 253 K/UL — SIGNIFICANT CHANGE UP (ref 150–400)
POTASSIUM SERPL-MCNC: 4.1 MMOL/L — SIGNIFICANT CHANGE UP (ref 3.5–5.3)
POTASSIUM SERPL-MCNC: 4.1 MMOL/L — SIGNIFICANT CHANGE UP (ref 3.5–5.3)
POTASSIUM SERPL-SCNC: 4.1 MMOL/L — SIGNIFICANT CHANGE UP (ref 3.5–5.3)
POTASSIUM SERPL-SCNC: 4.1 MMOL/L — SIGNIFICANT CHANGE UP (ref 3.5–5.3)
RBC # BLD: 3.45 M/UL — LOW (ref 4.2–5.8)
RBC # BLD: 3.45 M/UL — LOW (ref 4.2–5.8)
RBC # FLD: 17.1 % — HIGH (ref 10.3–14.5)
RBC # FLD: 17.1 % — HIGH (ref 10.3–14.5)
SODIUM SERPL-SCNC: 133 MMOL/L — LOW (ref 135–145)
SODIUM SERPL-SCNC: 133 MMOL/L — LOW (ref 135–145)
SPECIMEN SOURCE: SIGNIFICANT CHANGE UP
VANCOMYCIN TROUGH SERPL-MCNC: 11.5 UG/ML — SIGNIFICANT CHANGE UP (ref 10–20)
VANCOMYCIN TROUGH SERPL-MCNC: 11.5 UG/ML — SIGNIFICANT CHANGE UP (ref 10–20)
WBC # BLD: 20.66 K/UL — HIGH (ref 3.8–10.5)
WBC # BLD: 20.66 K/UL — HIGH (ref 3.8–10.5)
WBC # FLD AUTO: 20.66 K/UL — HIGH (ref 3.8–10.5)
WBC # FLD AUTO: 20.66 K/UL — HIGH (ref 3.8–10.5)

## 2023-12-08 PROCEDURE — 99222 1ST HOSP IP/OBS MODERATE 55: CPT | Mod: GC,57

## 2023-12-08 PROCEDURE — 37799 UNLISTED PX VASCULAR SURGERY: CPT

## 2023-12-08 PROCEDURE — 88304 TISSUE EXAM BY PATHOLOGIST: CPT | Mod: 26

## 2023-12-08 PROCEDURE — 99232 SBSQ HOSP IP/OBS MODERATE 35: CPT

## 2023-12-08 DEVICE — LIGATING CLIPS WECK HORIZON MEDIUM (BLUE) 24: Type: IMPLANTABLE DEVICE | Status: FUNCTIONAL

## 2023-12-08 DEVICE — LIGATING CLIPS WECK HORIZON SMALL-WIDE (RED) 24: Type: IMPLANTABLE DEVICE | Status: FUNCTIONAL

## 2023-12-08 RX ORDER — IPRATROPIUM/ALBUTEROL SULFATE 18-103MCG
3 AEROSOL WITH ADAPTER (GRAM) INHALATION ONCE
Refills: 0 | Status: DISCONTINUED | OUTPATIENT
Start: 2023-12-08 | End: 2023-12-08

## 2023-12-08 RX ORDER — SODIUM CHLORIDE 9 MG/ML
1000 INJECTION, SOLUTION INTRAVENOUS
Refills: 0 | Status: DISCONTINUED | OUTPATIENT
Start: 2023-12-08 | End: 2023-12-08

## 2023-12-08 RX ORDER — THIAMINE MONONITRATE (VIT B1) 100 MG
100 TABLET ORAL DAILY
Refills: 0 | Status: DISCONTINUED | OUTPATIENT
Start: 2023-12-08 | End: 2023-12-20

## 2023-12-08 RX ORDER — LANOLIN ALCOHOL/MO/W.PET/CERES
5 CREAM (GRAM) TOPICAL AT BEDTIME
Refills: 0 | Status: DISCONTINUED | OUTPATIENT
Start: 2023-12-08 | End: 2023-12-20

## 2023-12-08 RX ORDER — ONDANSETRON 8 MG/1
4 TABLET, FILM COATED ORAL EVERY 6 HOURS
Refills: 0 | Status: DISCONTINUED | OUTPATIENT
Start: 2023-12-08 | End: 2023-12-20

## 2023-12-08 RX ORDER — HYDROMORPHONE HYDROCHLORIDE 2 MG/ML
0.5 INJECTION INTRAMUSCULAR; INTRAVENOUS; SUBCUTANEOUS
Refills: 0 | Status: DISCONTINUED | OUTPATIENT
Start: 2023-12-08 | End: 2023-12-08

## 2023-12-08 RX ORDER — ONDANSETRON 8 MG/1
4 TABLET, FILM COATED ORAL ONCE
Refills: 0 | Status: DISCONTINUED | OUTPATIENT
Start: 2023-12-08 | End: 2023-12-08

## 2023-12-08 RX ORDER — PANTOPRAZOLE SODIUM 20 MG/1
40 TABLET, DELAYED RELEASE ORAL
Refills: 0 | Status: DISCONTINUED | OUTPATIENT
Start: 2023-12-08 | End: 2023-12-20

## 2023-12-08 RX ORDER — POLYETHYLENE GLYCOL 3350 17 G/17G
17 POWDER, FOR SOLUTION ORAL AT BEDTIME
Refills: 0 | Status: DISCONTINUED | OUTPATIENT
Start: 2023-12-08 | End: 2023-12-20

## 2023-12-08 RX ORDER — OXYCODONE HYDROCHLORIDE 5 MG/1
5 TABLET ORAL EVERY 4 HOURS
Refills: 0 | Status: DISCONTINUED | OUTPATIENT
Start: 2023-12-08 | End: 2023-12-09

## 2023-12-08 RX ORDER — FENTANYL CITRATE 50 UG/ML
25 INJECTION INTRAVENOUS
Refills: 0 | Status: DISCONTINUED | OUTPATIENT
Start: 2023-12-08 | End: 2023-12-08

## 2023-12-08 RX ORDER — METHOCARBAMOL 500 MG/1
750 TABLET, FILM COATED ORAL EVERY 8 HOURS
Refills: 0 | Status: DISCONTINUED | OUTPATIENT
Start: 2023-12-08 | End: 2023-12-11

## 2023-12-08 RX ORDER — ENOXAPARIN SODIUM 100 MG/ML
40 INJECTION SUBCUTANEOUS EVERY 12 HOURS
Refills: 0 | Status: DISCONTINUED | OUTPATIENT
Start: 2023-12-08 | End: 2023-12-14

## 2023-12-08 RX ORDER — HYDROMORPHONE HYDROCHLORIDE 2 MG/ML
0.25 INJECTION INTRAMUSCULAR; INTRAVENOUS; SUBCUTANEOUS
Refills: 0 | Status: DISCONTINUED | OUTPATIENT
Start: 2023-12-08 | End: 2023-12-08

## 2023-12-08 RX ORDER — METHOCARBAMOL 500 MG/1
750 TABLET, FILM COATED ORAL EVERY 12 HOURS
Refills: 0 | Status: DISCONTINUED | OUTPATIENT
Start: 2023-12-08 | End: 2023-12-08

## 2023-12-08 RX ORDER — ACETAMINOPHEN 500 MG
975 TABLET ORAL EVERY 6 HOURS
Refills: 0 | Status: DISCONTINUED | OUTPATIENT
Start: 2023-12-08 | End: 2023-12-20

## 2023-12-08 RX ORDER — LIDOCAINE 4 G/100G
3 CREAM TOPICAL DAILY
Refills: 0 | Status: DISCONTINUED | OUTPATIENT
Start: 2023-12-08 | End: 2023-12-20

## 2023-12-08 RX ORDER — SENNA PLUS 8.6 MG/1
2 TABLET ORAL AT BEDTIME
Refills: 0 | Status: DISCONTINUED | OUTPATIENT
Start: 2023-12-08 | End: 2023-12-08

## 2023-12-08 RX ORDER — SODIUM CHLORIDE 9 MG/ML
1000 INJECTION, SOLUTION INTRAVENOUS ONCE
Refills: 0 | Status: COMPLETED | OUTPATIENT
Start: 2023-12-08 | End: 2023-12-08

## 2023-12-08 RX ORDER — HYDROMORPHONE HYDROCHLORIDE 2 MG/ML
0.5 INJECTION INTRAMUSCULAR; INTRAVENOUS; SUBCUTANEOUS EVERY 4 HOURS
Refills: 0 | Status: DISCONTINUED | OUTPATIENT
Start: 2023-12-08 | End: 2023-12-11

## 2023-12-08 RX ORDER — POLYETHYLENE GLYCOL 3350 17 G/17G
17 POWDER, FOR SOLUTION ORAL AT BEDTIME
Refills: 0 | Status: DISCONTINUED | OUTPATIENT
Start: 2023-12-08 | End: 2023-12-08

## 2023-12-08 RX ORDER — OLANZAPINE 15 MG/1
5 TABLET, FILM COATED ORAL EVERY 12 HOURS
Refills: 0 | Status: DISCONTINUED | OUTPATIENT
Start: 2023-12-08 | End: 2023-12-20

## 2023-12-08 RX ORDER — ACETAMINOPHEN 500 MG
650 TABLET ORAL EVERY 6 HOURS
Refills: 0 | Status: DISCONTINUED | OUTPATIENT
Start: 2023-12-08 | End: 2023-12-08

## 2023-12-08 RX ORDER — MINERAL OIL
133 OIL (ML) MISCELLANEOUS DAILY
Refills: 0 | Status: DISCONTINUED | OUTPATIENT
Start: 2023-12-08 | End: 2023-12-20

## 2023-12-08 RX ORDER — BENZTROPINE MESYLATE 1 MG
2 TABLET ORAL
Refills: 0 | Status: DISCONTINUED | OUTPATIENT
Start: 2023-12-08 | End: 2023-12-11

## 2023-12-08 RX ORDER — QUETIAPINE FUMARATE 200 MG/1
125 TABLET, FILM COATED ORAL AT BEDTIME
Refills: 0 | Status: DISCONTINUED | OUTPATIENT
Start: 2023-12-08 | End: 2023-12-20

## 2023-12-08 RX ORDER — CHLORHEXIDINE GLUCONATE 213 G/1000ML
1 SOLUTION TOPICAL DAILY
Refills: 0 | Status: DISCONTINUED | OUTPATIENT
Start: 2023-12-08 | End: 2023-12-20

## 2023-12-08 RX ORDER — SENNA PLUS 8.6 MG/1
2 TABLET ORAL AT BEDTIME
Refills: 0 | Status: DISCONTINUED | OUTPATIENT
Start: 2023-12-08 | End: 2023-12-20

## 2023-12-08 RX ORDER — BENZTROPINE MESYLATE 1 MG
2 TABLET ORAL
Refills: 0 | Status: DISCONTINUED | OUTPATIENT
Start: 2023-12-08 | End: 2023-12-08

## 2023-12-08 RX ORDER — FOLIC ACID 0.8 MG
1 TABLET ORAL DAILY
Refills: 0 | Status: DISCONTINUED | OUTPATIENT
Start: 2023-12-08 | End: 2023-12-20

## 2023-12-08 RX ORDER — GABAPENTIN 400 MG/1
100 CAPSULE ORAL AT BEDTIME
Refills: 0 | Status: DISCONTINUED | OUTPATIENT
Start: 2023-12-08 | End: 2023-12-08

## 2023-12-08 RX ORDER — OXYCODONE HYDROCHLORIDE 5 MG/1
10 TABLET ORAL EVERY 4 HOURS
Refills: 0 | Status: DISCONTINUED | OUTPATIENT
Start: 2023-12-08 | End: 2023-12-15

## 2023-12-08 RX ORDER — CALCIUM CARBONATE 500(1250)
1 TABLET ORAL EVERY 6 HOURS
Refills: 0 | Status: DISCONTINUED | OUTPATIENT
Start: 2023-12-08 | End: 2023-12-20

## 2023-12-08 RX ORDER — SODIUM CHLORIDE 9 MG/ML
1000 INJECTION INTRAMUSCULAR; INTRAVENOUS; SUBCUTANEOUS ONCE
Refills: 0 | Status: COMPLETED | OUTPATIENT
Start: 2023-12-08 | End: 2023-12-08

## 2023-12-08 RX ORDER — DIVALPROEX SODIUM 500 MG/1
500 TABLET, DELAYED RELEASE ORAL EVERY 12 HOURS
Refills: 0 | Status: DISCONTINUED | OUTPATIENT
Start: 2023-12-08 | End: 2023-12-08

## 2023-12-08 RX ORDER — NICOTINE POLACRILEX 2 MG
1 GUM BUCCAL EVERY 24 HOURS
Refills: 0 | Status: DISCONTINUED | OUTPATIENT
Start: 2023-12-08 | End: 2023-12-20

## 2023-12-08 RX ORDER — IPRATROPIUM/ALBUTEROL SULFATE 18-103MCG
3 AEROSOL WITH ADAPTER (GRAM) INHALATION EVERY 6 HOURS
Refills: 0 | Status: DISCONTINUED | OUTPATIENT
Start: 2023-12-08 | End: 2023-12-20

## 2023-12-08 RX ORDER — KETOROLAC TROMETHAMINE 30 MG/ML
15 SYRINGE (ML) INJECTION ONCE
Refills: 0 | Status: DISCONTINUED | OUTPATIENT
Start: 2023-12-08 | End: 2023-12-08

## 2023-12-08 RX ORDER — VANCOMYCIN HCL 1 G
1000 VIAL (EA) INTRAVENOUS EVERY 12 HOURS
Refills: 0 | Status: DISCONTINUED | OUTPATIENT
Start: 2023-12-08 | End: 2023-12-08

## 2023-12-08 RX ORDER — AMLODIPINE BESYLATE 2.5 MG/1
5 TABLET ORAL DAILY
Refills: 0 | Status: DISCONTINUED | OUTPATIENT
Start: 2023-12-08 | End: 2023-12-20

## 2023-12-08 RX ORDER — GABAPENTIN 400 MG/1
100 CAPSULE ORAL AT BEDTIME
Refills: 0 | Status: DISCONTINUED | OUTPATIENT
Start: 2023-12-08 | End: 2023-12-20

## 2023-12-08 RX ORDER — DIVALPROEX SODIUM 500 MG/1
500 TABLET, DELAYED RELEASE ORAL EVERY 12 HOURS
Refills: 0 | Status: DISCONTINUED | OUTPATIENT
Start: 2023-12-08 | End: 2023-12-20

## 2023-12-08 RX ORDER — OXYCODONE HYDROCHLORIDE 5 MG/1
5 TABLET ORAL ONCE
Refills: 0 | Status: DISCONTINUED | OUTPATIENT
Start: 2023-12-08 | End: 2023-12-08

## 2023-12-08 RX ORDER — VANCOMYCIN HCL 1 G
500 VIAL (EA) INTRAVENOUS EVERY 12 HOURS
Refills: 0 | Status: DISCONTINUED | OUTPATIENT
Start: 2023-12-08 | End: 2023-12-09

## 2023-12-08 RX ADMIN — Medication 3 MILLILITER(S): at 09:38

## 2023-12-08 RX ADMIN — PANTOPRAZOLE SODIUM 40 MILLIGRAM(S): 20 TABLET, DELAYED RELEASE ORAL at 05:55

## 2023-12-08 RX ADMIN — SODIUM CHLORIDE 125 MILLILITER(S): 9 INJECTION, SOLUTION INTRAVENOUS at 05:54

## 2023-12-08 RX ADMIN — Medication 3 MILLILITER(S): at 04:46

## 2023-12-08 RX ADMIN — Medication 5 MILLIGRAM(S): at 22:48

## 2023-12-08 RX ADMIN — METHOCARBAMOL 750 MILLIGRAM(S): 500 TABLET, FILM COATED ORAL at 22:48

## 2023-12-08 RX ADMIN — Medication 1 PATCH: at 22:47

## 2023-12-08 RX ADMIN — Medication 100 MILLIGRAM(S): at 22:48

## 2023-12-08 RX ADMIN — OXYCODONE HYDROCHLORIDE 5 MILLIGRAM(S): 5 TABLET ORAL at 15:40

## 2023-12-08 RX ADMIN — ENOXAPARIN SODIUM 40 MILLIGRAM(S): 100 INJECTION SUBCUTANEOUS at 05:55

## 2023-12-08 RX ADMIN — Medication 2 MILLIGRAM(S): at 19:22

## 2023-12-08 RX ADMIN — Medication 15 MILLIGRAM(S): at 00:19

## 2023-12-08 RX ADMIN — GABAPENTIN 100 MILLIGRAM(S): 400 CAPSULE ORAL at 22:48

## 2023-12-08 RX ADMIN — Medication 975 MILLIGRAM(S): at 05:55

## 2023-12-08 RX ADMIN — SENNA PLUS 2 TABLET(S): 8.6 TABLET ORAL at 22:49

## 2023-12-08 RX ADMIN — Medication 975 MILLIGRAM(S): at 19:21

## 2023-12-08 RX ADMIN — HYDROMORPHONE HYDROCHLORIDE 0.5 MILLIGRAM(S): 2 INJECTION INTRAMUSCULAR; INTRAVENOUS; SUBCUTANEOUS at 14:00

## 2023-12-08 RX ADMIN — POLYETHYLENE GLYCOL 3350 17 GRAM(S): 17 POWDER, FOR SOLUTION ORAL at 22:49

## 2023-12-08 RX ADMIN — Medication 250 MILLIGRAM(S): at 06:39

## 2023-12-08 RX ADMIN — SODIUM CHLORIDE 1000 MILLILITER(S): 9 INJECTION INTRAMUSCULAR; INTRAVENOUS; SUBCUTANEOUS at 00:30

## 2023-12-08 RX ADMIN — Medication 975 MILLIGRAM(S): at 23:48

## 2023-12-08 RX ADMIN — SODIUM CHLORIDE 2000 MILLILITER(S): 9 INJECTION, SOLUTION INTRAVENOUS at 06:20

## 2023-12-08 RX ADMIN — DIVALPROEX SODIUM 500 MILLIGRAM(S): 500 TABLET, DELAYED RELEASE ORAL at 05:55

## 2023-12-08 RX ADMIN — SODIUM CHLORIDE 1000 MILLILITER(S): 9 INJECTION, SOLUTION INTRAVENOUS at 05:54

## 2023-12-08 RX ADMIN — Medication 2 MILLIGRAM(S): at 05:54

## 2023-12-08 RX ADMIN — METHOCARBAMOL 750 MILLIGRAM(S): 500 TABLET, FILM COATED ORAL at 05:54

## 2023-12-08 RX ADMIN — Medication 1 PATCH: at 07:56

## 2023-12-08 RX ADMIN — DIVALPROEX SODIUM 500 MILLIGRAM(S): 500 TABLET, DELAYED RELEASE ORAL at 19:22

## 2023-12-08 RX ADMIN — ENOXAPARIN SODIUM 40 MILLIGRAM(S): 100 INJECTION SUBCUTANEOUS at 19:22

## 2023-12-08 RX ADMIN — QUETIAPINE FUMARATE 125 MILLIGRAM(S): 200 TABLET, FILM COATED ORAL at 22:49

## 2023-12-08 NOTE — CONSULT NOTE ADULT - ATTENDING COMMENTS
59 YO M admitted for rib fracture with persistent low grade fevers, leukocytosis and MRSA bacteremia. Found to have thrombophlebitis in right arm cephalic and basilic vein in mid arm. On IV vancomycin. Will proceed to OR for incision and drainage for right arm AC fossa and excision of cephalic and basilic vein. 57 YO M admitted for rib fracture with persistent low grade fevers, leukocytosis and MRSA bacteremia. Found to have thrombophlebitis in right arm cephalic and basilic vein in mid arm. On IV vancomycin. Will proceed to OR for incision and drainage for right arm AC fossa and excision of cephalic and basilic vein.

## 2023-12-08 NOTE — PROGRESS NOTE ADULT - ASSESSMENT
59 y/o male s/p fall with rib fracture, L pneumothorax, EtOH use, delirium, and asthma exacerbation was afebrile and hemodynamically stable this morning. Labs showed increase in leukocytosis of 22.89 from 14. On exam, patient's R proximal forearm was edematous, erythematous, with pus drainage. RUE venous duplex ordered with thrombophlebitis of the R cephalic vein. Stated on Vanco for MRSA bacteremia.  Given sepsis and septic thrombophlebitis, vascular was consulted for source control including vein resection and patient was added on for OR    PLAN  -pain control  -strict Is/Os  -continue home meds  -trend labs, replete electrolytes as needed  -encourage OOB  -incentive spirometry  - OR with vascular  - NPO  - Vanco, pending TTE and will follow ID recs  -DVT ppx: SCDs, Lovenox 40 daily

## 2023-12-08 NOTE — PROGRESS NOTE ADULT - NS ATTEND AMEND GEN_ALL_CORE FT
I have seen and examined this patient with the surgical team.  Overnight, patient became hypotensive requiring fluid boluses.  Found to have MRSA bacteremia.  Patient appears well this morning.  Found to have thrombophlebitis in RUE AC fossa.  Denies neurological deficits on the arm.  Plan for operative intervention today for RUE AC fossa.  Continue vancomycin for MRSA bacteremia.

## 2023-12-08 NOTE — ASU PREOP CHECKLIST - PATIENT'S PERSONAL PROPERTY GIVEN TO
wallet in security/security/safe wallet in security   [id number]/security/safe wallet in security   [id number]/security/safe

## 2023-12-08 NOTE — PROGRESS NOTE ADULT - SUBJECTIVE AND OBJECTIVE BOX
INTERVAL HPI/OVERNIGHT EVENTS:    Patient evaluated at bedside. No acute distress.   Patient was hypotensive overnight and febrile, responding to IVF after 3L  Remains on oxygen and R arm still with purulence.      MEDICATIONS  (STANDING):  acetaminophen     Tablet .. 975 milliGRAM(s) Oral every 6 hours  albuterol/ipratropium for Nebulization 3 milliLiter(s) Nebulizer every 6 hours  amLODIPine   Tablet 5 milliGRAM(s) Oral daily  benztropine 2 milliGRAM(s) Oral two times a day  divalproex  milliGRAM(s) Oral every 12 hours  enoxaparin Injectable 40 milliGRAM(s) SubCutaneous every 12 hours  folic acid 1 milliGRAM(s) Oral daily  gabapentin 100 milliGRAM(s) Oral at bedtime  influenza   Vaccine 0.5 milliLiter(s) IntraMuscular once  lactated ringers. 1000 milliLiter(s) (125 mL/Hr) IV Continuous <Continuous>  lidocaine   4% Patch 3 Patch Transdermal daily  melatonin 5 milliGRAM(s) Oral at bedtime  methocarbamol 750 milliGRAM(s) Oral every 8 hours  nicotine - 21 mG/24Hr(s) Patch 1 Patch Transdermal every 24 hours  pantoprazole    Tablet 40 milliGRAM(s) Oral before breakfast  polyethylene glycol 3350 17 Gram(s) Oral at bedtime  QUEtiapine 125 milliGRAM(s) Oral at bedtime  senna 2 Tablet(s) Oral at bedtime  thiamine 100 milliGRAM(s) Oral daily  vancomycin  IVPB      vancomycin  IVPB 1000 milliGRAM(s) IV Intermittent every 12 hours    MEDICATIONS  (PRN):  acetaminophen     Tablet .. 650 milliGRAM(s) Oral every 6 hours PRN Temp greater or equal to 38C (100.4F)  albuterol/ipratropium for Nebulization 3 milliLiter(s) Nebulizer every 6 hours PRN Shortness of Breath and/or Wheezing  mineral oil enema 133 milliLiter(s) Rectal daily PRN constipation  OLANZapine Injectable 5 milliGRAM(s) IntraMuscular every 12 hours PRN agitation  ondansetron Injectable 4 milliGRAM(s) IV Push every 6 hours PRN Nausea  oxyCODONE    IR 5 milliGRAM(s) Oral every 4 hours PRN Moderate Pain (4 - 6)  oxyCODONE    IR 10 milliGRAM(s) Oral every 4 hours PRN Severe Pain (7 - 10)      Vital Signs Last 24 Hrs  T(C): 36.5 (08 Dec 2023 04:00), Max: 38.1 (07 Dec 2023 15:48)  T(F): 97.7 (08 Dec 2023 04:00), Max: 100.6 (07 Dec 2023 17:57)  HR: 69 (08 Dec 2023 04:00) (69 - 121)  BP: 106/67 (08 Dec 2023 04:00) (72/38 - 120/72)  BP(mean): --  RR: 18 (08 Dec 2023 04:00) (18 - 18)  SpO2: 95% (08 Dec 2023 04:46) (91% - 95%)    Parameters below as of 08 Dec 2023 04:46  Patient On (Oxygen Delivery Method): mask, aerosol, Treatment        Exam:  Gen: pt lying in bed, alert, in NAD  Resp: cough, nonlabored breathing on 2L NC.   CVS/chest: RRR, R chest wall blowhole incision healing well.   Abd: soft, NT, ND  RUE: R proximal forearm edematous, erythematous with 2 areas over cephalic vein with pus drainage         I&O's Detail    07 Dec 2023 07:01  -  08 Dec 2023 07:00  --------------------------------------------------------  IN:  Total IN: 0 mL    OUT:    Voided (mL): 500 mL  Total OUT: 500 mL    Total NET: -500 mL          LABS:                        11.7   22.89 )-----------( 279      ( 07 Dec 2023 06:05 )             36.8     12-07    136  |  97  |  18.0  ----------------------------<  127<H>  4.0   |  28.0  |  0.83    Ca    8.5      07 Dec 2023 06:05  Phos  3.1     12-07  Mg     1.9     12-07        Urinalysis Basic - ( 07 Dec 2023 06:05 )    Color: x / Appearance: x / SG: x / pH: x  Gluc: 127 mg/dL / Ketone: x  / Bili: x / Urobili: x   Blood: x / Protein: x / Nitrite: x   Leuk Esterase: x / RBC: x / WBC x   Sq Epi: x / Non Sq Epi: x / Bacteria: x        RADIOLOGY & ADDITIONAL STUDIES:

## 2023-12-08 NOTE — PHARMACOTHERAPY INTERVENTION NOTE - COMMENTS
As per policy, ordered a vancomycin trough level for 12/9 AM to assist with further vancomycin dosing optimization in the setting of an JORDI.    Felipe Berrios, PharmD, [facility]  Clinical Pharmacy Specialist, Infectious Diseases  Tele-Antimicrobial Stewardship Program (Tele-ASP)  Tele-ASP Phone: [phone]  As per policy, ordered a vancomycin trough level for 12/9 AM to assist with further vancomycin dosing optimization in the setting of an JORDI.    Felipe Berrios, PharmD, [facility]  Clinical Pharmacy Specialist, Infectious Diseases  Tele-Antimicrobial Stewardship Program (Tele-ASP)  Tele-ASP Phone: [phone]

## 2023-12-08 NOTE — CHART NOTE - NSCHARTNOTEFT_GEN_A_CORE
POST-OP NOTE    NOHEMY FORDE | [id number] | [facility] 3TWR [id number] 01    Procedure: s/p I&D, excision of infected thrombosed Rt cephalic and basilic vein    Subjective:   Mr. Forde is standing in his room walking from his transport stretcher to his bed. He says he is comfortable currently, his only complaint at this time is GERD-like sxs and some mild RUE pain at the incision site.  He notes very mild decreased sensation in all five of his Rt fingers when prompted.  He denies paresthesias and pain in his hands.  He denies nausea, vomiting, fevers, or chills.    Vital Signs Last 24 Hrs  T(C): 36.8 (08 Dec 2023 19:07), Max: 38.1 (07 Dec 2023 21:52)  T(F): 98.3 (08 Dec 2023 19:07), Max: 100.6 (07 Dec 2023 21:52)  HR: 97 (08 Dec 2023 19:07) (69 - 105)  BP: 133/72 (08 Dec 2023 19:07) (72/38 - 165/88)  BP(mean): 106 (08 Dec 2023 18:00) (86 - 115)  RR: 17 (08 Dec 2023 19:07) (7 - 22)  SpO2: 97% (08 Dec 2023 19:07) (91% - 98%)    Parameters below as of 08 Dec 2023 19:07  Patient On (Oxygen Delivery Method): room air      I&O's Summary    07 Dec 2023 07:01  -  08 Dec 2023 07:00  --------------------------------------------------------  IN: 0 mL / OUT: 500 mL / NET: -500 mL    08 Dec 2023 07:01  -  08 Dec 2023 19:24  --------------------------------------------------------  IN: 0 mL / OUT: 300 mL / NET: -300 mL                            9.4    20.66 )-----------( 253      ( 08 Dec 2023 08:06 )             29.5     12-08    133<L>  |  99  |  27.0<H>  ----------------------------<  94  4.1   |  28.0  |  1.17    Ca    8.2<L>      08 Dec 2023 08:06  Phos  3.2     12-08  Mg     1.8     12-08         PHYSICAL EXAM:  Gen: NAD  Resp: breathing easily, no stridor, L blowhole site is healing well with no active bleeding, erythema, or warmth  CV: RRR, Rt radial pulse is palpable. The incision site is c/d/i with no edema noted.  Neuro: slight decreased sensation in Rt fingers, denies pain, motor function is intact and equal to left hand  Skin: Incision c/d/i. Normal color, no rashes or lesions    Assessment  Mr. Forde is a 57 yo M POD 0 s/p I&D, excision of infected thrombosed cephalic and basilic vein.  He is doing well post operatively and his only complaint is mild incision site pain and GERD.    Plan  - Daily repacking with iodoform strips by vascular team  - Regular diet  - Multimodal pain control  - Rest of care per primary team: ACS surgery, appreciate their management POST-OP NOTE    NOHEMY FORDE | [id number] | [facility] 3TWR [id number] 01    Procedure: s/p I&D, excision of infected thrombosed Rt cephalic and basilic vein    Subjective:   Mr. Forde is standing in his room walking from his transport stretcher to his bed. He says he is comfortable currently, his only complaint at this time is GERD-like sxs and some mild RUE pain at the incision site.  He notes very mild decreased sensation in all five of his Rt fingers when prompted.  He denies paresthesias and pain in his hands.  He denies nausea, vomiting, fevers, or chills.    Vital Signs Last 24 Hrs  T(C): 36.8 (08 Dec 2023 19:07), Max: 38.1 (07 Dec 2023 21:52)  T(F): 98.3 (08 Dec 2023 19:07), Max: 100.6 (07 Dec 2023 21:52)  HR: 97 (08 Dec 2023 19:07) (69 - 105)  BP: 133/72 (08 Dec 2023 19:07) (72/38 - 165/88)  BP(mean): 106 (08 Dec 2023 18:00) (86 - 115)  RR: 17 (08 Dec 2023 19:07) (7 - 22)  SpO2: 97% (08 Dec 2023 19:07) (91% - 98%)    Parameters below as of 08 Dec 2023 19:07  Patient On (Oxygen Delivery Method): room air      I&O's Summary    07 Dec 2023 07:01  -  08 Dec 2023 07:00  --------------------------------------------------------  IN: 0 mL / OUT: 500 mL / NET: -500 mL    08 Dec 2023 07:01  -  08 Dec 2023 19:24  --------------------------------------------------------  IN: 0 mL / OUT: 300 mL / NET: -300 mL                            9.4    20.66 )-----------( 253      ( 08 Dec 2023 08:06 )             29.5     12-08    133<L>  |  99  |  27.0<H>  ----------------------------<  94  4.1   |  28.0  |  1.17    Ca    8.2<L>      08 Dec 2023 08:06  Phos  3.2     12-08  Mg     1.8     12-08         PHYSICAL EXAM:  Gen: NAD  Resp: breathing easily, no stridor, L blowhole site is healing well with no active bleeding, erythema, or warmth  CV: RRR, Rt radial pulse is palpable. The incision site is c/d/i with no edema noted.  Neuro: slight decreased sensation in Rt fingers, denies pain, motor function is intact and equal to left hand  Skin: Incision c/d/i. Normal color, no rashes or lesions    Assessment  Mr. Forde is a 59 yo M POD 0 s/p I&D, excision of infected thrombosed cephalic and basilic vein.  He is doing well post operatively and his only complaint is mild incision site pain and GERD.    Plan  - Daily repacking with iodoform strips by vascular team  - Regular diet  - Multimodal pain control  - Rest of care per primary team: ACS surgery, appreciate their management

## 2023-12-08 NOTE — CHART NOTE - NSCHARTNOTEFT_GEN_A_CORE
RN called to inform that patients blood pressure is 72/38, . I told her to hang a liter of normal saline bolus and I will come to bedside. At bedside patient ,sleepy but very arousable and in a jovial mood. Patient A&OX3, bolus running, toradol given. I placed a 20 gauge IV in the left hand with no issues. Chief Resident Marc came to bedside, did a manual blood pressure now systolic 105. Patient has already had a septic work up and has known MRSA bacteremia on abx. We will continue to hydrate patient, a liter bolus of lactated ringers also started, no other intervention needed at this time.

## 2023-12-08 NOTE — BRIEF OPERATIVE NOTE - OPERATION/FINDINGS
Incision and drainage of Infected basilic and cephalic thrombophlebitis at antecubital level purulent discharge seen upon incision  Thrombotic portions on both veins were excised.  wound irrigated  Wound packed with iodoform strips Incision and drainage of Infected basilic and cephalic thrombophlebitis at antecubital level  wound irrigated  Wound packed with iodoform strips

## 2023-12-08 NOTE — CONSULT NOTE ADULT - SUBJECTIVE AND OBJECTIVE BOX
VASCULAR SURGERY CONSULT  HPI reviewed as below.  56 yo M presenting as a trauma with rib fractures, hospital stay required intubation complicated by subQ emphysema and evacuation, then downgrade to the floor where he had alcohol withdrawal and require re-intubation and SICU upgrade, finally now downgraded started having intermittent fever 2 days ago.   Patient was worked up with positive MRSA bacteremia and increased WBC to 22, and on physical exam on the prior IV site on the R cephalic vein, patient was found to have cellulitis and 2 areas of purulent drainage with duplex showing thrombosis of the cephalic vein at the AC fossa. Patient was started on Vancomycin and overnight patient had episode of hypotension to the 70s and was febrile, responding to 3L of IVF, now normotensive, however still septic.   Vascular consulted for source control.         ==============================================================================================================  HPI: 58y Male  HPI:  57M with PMH seizures, liver disease, back pain, HLD presents to ED s/p fall. Per ED, patient lives at group home; was drunk today and fell down stairs. In ED was intubated for airway protection after admin of Versed and subsequent emesis. Imaging reveal only L 7th rib fx.    Primary  A: intubated  B: breath sounds b/l  C: palpable pulses  D: GCS 3T (on propofol)  E: no gross deformity/hemorrhage (28 Nov 2023 23:10)      PAST MEDICAL & SURGICAL HISTORY:  High cholesterol      Hiatal hernia      Tardive dyskinesia      Liver failure      Seizures      ETOH abuse      S/P tonsillectomy      History of lumbar spinal fusion  6/2017        Home Meds: Home Medications:  benztropine 2 mg oral tablet: 1 tab(s) orally 2 times a day (28 Nov 2023 20:34)  clonazePAM 0.5 mg oral tablet: 1 tab(s) orally once a day (28 Nov 2023 20:34)  divalproex sodium 500 mg oral delayed release tablet: 1 tab(s) orally 2 times a day (28 Nov 2023 20:34)  gabapentin 100 mg oral capsule: 1 cap(s) orally once a day (at bedtime) (28 Nov 2023 20:34)  hydrOXYzine hydrochloride 25 mg oral tablet: 2 tab(s) orally 2 times a day (28 Nov 2023 20:34)  QUEtiapine 100 mg oral tablet: 1 tab(s) orally once a day (at bedtime) (28 Nov 2023 20:34)    Allergies: Allergies    No Known Allergies    Intolerances      Soc:   Advanced Directives: Presumed Full Code     CURRENT MEDICATIONS:   --------------------------------------------------------------------------------------  Neurologic Medications  acetaminophen     Tablet .. 650 milliGRAM(s) Oral every 6 hours PRN Temp greater or equal to 38C (100.4F)  acetaminophen     Tablet .. 975 milliGRAM(s) Oral every 6 hours  benztropine 2 milliGRAM(s) Oral two times a day  divalproex  milliGRAM(s) Oral every 12 hours  gabapentin 100 milliGRAM(s) Oral at bedtime  melatonin 5 milliGRAM(s) Oral at bedtime  methocarbamol 750 milliGRAM(s) Oral every 8 hours  OLANZapine Injectable 5 milliGRAM(s) IntraMuscular every 12 hours PRN agitation  ondansetron Injectable 4 milliGRAM(s) IV Push every 6 hours PRN Nausea  oxyCODONE    IR 5 milliGRAM(s) Oral every 4 hours PRN Moderate Pain (4 - 6)  oxyCODONE    IR 10 milliGRAM(s) Oral every 4 hours PRN Severe Pain (7 - 10)  QUEtiapine 125 milliGRAM(s) Oral at bedtime    Respiratory Medications  albuterol/ipratropium for Nebulization 3 milliLiter(s) Nebulizer every 6 hours  albuterol/ipratropium for Nebulization 3 milliLiter(s) Nebulizer every 6 hours PRN Shortness of Breath and/or Wheezing    Cardiovascular Medications  amLODIPine   Tablet 5 milliGRAM(s) Oral daily    Gastrointestinal Medications  folic acid 1 milliGRAM(s) Oral daily  lactated ringers. 1000 milliLiter(s) IV Continuous <Continuous>  mineral oil enema 133 milliLiter(s) Rectal daily PRN constipation  pantoprazole    Tablet 40 milliGRAM(s) Oral before breakfast  polyethylene glycol 3350 17 Gram(s) Oral at bedtime  senna 2 Tablet(s) Oral at bedtime  thiamine 100 milliGRAM(s) Oral daily    Genitourinary Medications    Hematologic/Oncologic Medications  enoxaparin Injectable 40 milliGRAM(s) SubCutaneous every 12 hours  influenza   Vaccine 0.5 milliLiter(s) IntraMuscular once    Antimicrobial/Immunologic Medications  vancomycin  IVPB      vancomycin  IVPB 1000 milliGRAM(s) IV Intermittent every 12 hours    Endocrine/Metabolic Medications    Topical/Other Medications  lidocaine   4% Patch 3 Patch Transdermal daily  nicotine - 21 mG/24Hr(s) Patch 1 Patch Transdermal every 24 hours    --------------------------------------------------------------------------------------    VITAL SIGNS, INS/OUTS (last 24 hours):  --------------------------------------------------------------------------------------  ICU Vital Signs Last 24 Hrs  T(C): 36.5 (08 Dec 2023 04:00), Max: 38.1 (07 Dec 2023 15:48)  T(F): 97.7 (08 Dec 2023 04:00), Max: 100.6 (07 Dec 2023 17:57)  HR: 69 (08 Dec 2023 04:00) (69 - 121)  BP: 106/67 (08 Dec 2023 04:00) (72/38 - 120/72)  BP(mean): --  ABP: --  ABP(mean): --  RR: 18 (08 Dec 2023 04:00) (18 - 18)  SpO2: 95% (08 Dec 2023 04:46) (91% - 95%)    O2 Parameters below as of 08 Dec 2023 04:46  Patient On (Oxygen Delivery Method): mask, aerosol, Treatment          I&O's Summary    07 Dec 2023 07:01  -  08 Dec 2023 07:00  --------------------------------------------------------  IN: 0 mL / OUT: 500 mL / NET: -500 mL      --------------------------------------------------------------------------------------  Exam:  Gen: pt lying in bed, alert, in NAD  Resp: cough, nonlabored breathing on 2L NC.   CVS/chest: RRR, R chest wall blowhole incision healing well.   Abd: soft, NT, ND  RUE: R proximal forearm edematous, erythematous with 2 areas over cephalic vein with pus drainage. Palpable radial pulse.     LABS  --------------------------------------------------------------------------------------  Labs:  CAPILLARY BLOOD GLUCOSE                              9.4    20.66 )-----------( 253      ( 08 Dec 2023 08:06 )             29.5       Auto Immature Granulocyte %: 2.2 % (12-08-23 @ 08:06)  Auto Neutrophil %: 82.6 % (12-08-23 @ 08:06)    12-07    136  |  97  |  18.0  ----------------------------<  127<H>  4.0   |  28.0  |  0.83          LFTs:       ABG - ( 02 Dec 2023 05:11 )  pH: 7.430 /  pCO2: 44    /  pO2: 82    / HCO3: 29    / Base Excess: 4.9   /  SaO2: 97.5              Coags:            Urinalysis Basic - ( 07 Dec 2023 06:05 )    Color: x / Appearance: x / SG: x / pH: x  Gluc: 127 mg/dL / Ketone: x  / Bili: x / Urobili: x   Blood: x / Protein: x / Nitrite: x   Leuk Esterase: x / RBC: x / WBC x   Sq Epi: x / Non Sq Epi: x / Bacteria: x        Culture - Blood (collected 06 Dec 2023 15:15)  Source: .Blood Blood  Gram Stain (07 Dec 2023 12:55):    Growth in aerobic bottle: Gram Positive Cocci in Clusters    Growth in anaerobic bottle: Gram Positive Cocci in Clusters  Preliminary Report (07 Dec 2023 12:55):    Growth in aerobic bottle: Gram Positive Cocci in Clusters    Growth in anaerobic bottle: Gram Positive Cocci in Clusters    Direct identification is available within approximately 3-5    hours either by Blood Panel Multiplexed PCR or Direct    MALDI-TOF. Details: [email or web address]  Organism: Blood Culture PCR (07 Dec 2023 11:54)  Organism: Blood Culture PCR (07 Dec 2023 11:54)    Culture - Blood (collected 06 Dec 2023 15:10)  Source: .Blood Blood  Gram Stain (07 Dec 2023 11:56):    Growth in anaerobic bottle: Gram Positive Cocci in Clusters    Growth in aerobic bottle: Gram Positive Cocci in Clusters  Preliminary Report (07 Dec 2023 11:56):    Growth in anaerobic bottle: Gram Positive Cocci in Clusters    Growth in aerobic bottle: Gram Positive Cocci in Clusters        --------------------------------------------------------------------------------------     VASCULAR SURGERY CONSULT  HPI reviewed as below.  58 yo M presenting as a trauma with rib fractures, hospital stay required intubation complicated by subQ emphysema and evacuation, then downgrade to the floor where he had alcohol withdrawal and require re-intubation and SICU upgrade, finally now downgraded started having intermittent fever 2 days ago.   Patient was worked up with positive MRSA bacteremia and increased WBC to 22, and on physical exam on the prior IV site on the R cephalic vein, patient was found to have cellulitis and 2 areas of purulent drainage with duplex showing thrombosis of the cephalic vein at the AC fossa. Patient was started on Vancomycin and overnight patient had episode of hypotension to the 70s and was febrile, responding to 3L of IVF, now normotensive, however still septic.   Vascular consulted for source control.         ==============================================================================================================  HPI: 58y Male  HPI:  57M with PMH seizures, liver disease, back pain, HLD presents to ED s/p fall. Per ED, patient lives at group home; was drunk today and fell down stairs. In ED was intubated for airway protection after admin of Versed and subsequent emesis. Imaging reveal only L 7th rib fx.    Primary  A: intubated  B: breath sounds b/l  C: palpable pulses  D: GCS 3T (on propofol)  E: no gross deformity/hemorrhage (28 Nov 2023 23:10)      PAST MEDICAL & SURGICAL HISTORY:  High cholesterol      Hiatal hernia      Tardive dyskinesia      Liver failure      Seizures      ETOH abuse      S/P tonsillectomy      History of lumbar spinal fusion  6/2017        Home Meds: Home Medications:  benztropine 2 mg oral tablet: 1 tab(s) orally 2 times a day (28 Nov 2023 20:34)  clonazePAM 0.5 mg oral tablet: 1 tab(s) orally once a day (28 Nov 2023 20:34)  divalproex sodium 500 mg oral delayed release tablet: 1 tab(s) orally 2 times a day (28 Nov 2023 20:34)  gabapentin 100 mg oral capsule: 1 cap(s) orally once a day (at bedtime) (28 Nov 2023 20:34)  hydrOXYzine hydrochloride 25 mg oral tablet: 2 tab(s) orally 2 times a day (28 Nov 2023 20:34)  QUEtiapine 100 mg oral tablet: 1 tab(s) orally once a day (at bedtime) (28 Nov 2023 20:34)    Allergies: Allergies    No Known Allergies    Intolerances      Soc:   Advanced Directives: Presumed Full Code     CURRENT MEDICATIONS:   --------------------------------------------------------------------------------------  Neurologic Medications  acetaminophen     Tablet .. 650 milliGRAM(s) Oral every 6 hours PRN Temp greater or equal to 38C (100.4F)  acetaminophen     Tablet .. 975 milliGRAM(s) Oral every 6 hours  benztropine 2 milliGRAM(s) Oral two times a day  divalproex  milliGRAM(s) Oral every 12 hours  gabapentin 100 milliGRAM(s) Oral at bedtime  melatonin 5 milliGRAM(s) Oral at bedtime  methocarbamol 750 milliGRAM(s) Oral every 8 hours  OLANZapine Injectable 5 milliGRAM(s) IntraMuscular every 12 hours PRN agitation  ondansetron Injectable 4 milliGRAM(s) IV Push every 6 hours PRN Nausea  oxyCODONE    IR 5 milliGRAM(s) Oral every 4 hours PRN Moderate Pain (4 - 6)  oxyCODONE    IR 10 milliGRAM(s) Oral every 4 hours PRN Severe Pain (7 - 10)  QUEtiapine 125 milliGRAM(s) Oral at bedtime    Respiratory Medications  albuterol/ipratropium for Nebulization 3 milliLiter(s) Nebulizer every 6 hours  albuterol/ipratropium for Nebulization 3 milliLiter(s) Nebulizer every 6 hours PRN Shortness of Breath and/or Wheezing    Cardiovascular Medications  amLODIPine   Tablet 5 milliGRAM(s) Oral daily    Gastrointestinal Medications  folic acid 1 milliGRAM(s) Oral daily  lactated ringers. 1000 milliLiter(s) IV Continuous <Continuous>  mineral oil enema 133 milliLiter(s) Rectal daily PRN constipation  pantoprazole    Tablet 40 milliGRAM(s) Oral before breakfast  polyethylene glycol 3350 17 Gram(s) Oral at bedtime  senna 2 Tablet(s) Oral at bedtime  thiamine 100 milliGRAM(s) Oral daily    Genitourinary Medications    Hematologic/Oncologic Medications  enoxaparin Injectable 40 milliGRAM(s) SubCutaneous every 12 hours  influenza   Vaccine 0.5 milliLiter(s) IntraMuscular once    Antimicrobial/Immunologic Medications  vancomycin  IVPB      vancomycin  IVPB 1000 milliGRAM(s) IV Intermittent every 12 hours    Endocrine/Metabolic Medications    Topical/Other Medications  lidocaine   4% Patch 3 Patch Transdermal daily  nicotine - 21 mG/24Hr(s) Patch 1 Patch Transdermal every 24 hours    --------------------------------------------------------------------------------------    VITAL SIGNS, INS/OUTS (last 24 hours):  --------------------------------------------------------------------------------------  ICU Vital Signs Last 24 Hrs  T(C): 36.5 (08 Dec 2023 04:00), Max: 38.1 (07 Dec 2023 15:48)  T(F): 97.7 (08 Dec 2023 04:00), Max: 100.6 (07 Dec 2023 17:57)  HR: 69 (08 Dec 2023 04:00) (69 - 121)  BP: 106/67 (08 Dec 2023 04:00) (72/38 - 120/72)  BP(mean): --  ABP: --  ABP(mean): --  RR: 18 (08 Dec 2023 04:00) (18 - 18)  SpO2: 95% (08 Dec 2023 04:46) (91% - 95%)    O2 Parameters below as of 08 Dec 2023 04:46  Patient On (Oxygen Delivery Method): mask, aerosol, Treatment          I&O's Summary    07 Dec 2023 07:01  -  08 Dec 2023 07:00  --------------------------------------------------------  IN: 0 mL / OUT: 500 mL / NET: -500 mL      --------------------------------------------------------------------------------------  Exam:  Gen: pt lying in bed, alert, in NAD  Resp: cough, nonlabored breathing on 2L NC.   CVS/chest: RRR, R chest wall blowhole incision healing well.   Abd: soft, NT, ND  RUE: R proximal forearm edematous, erythematous with 2 areas over cephalic vein with pus drainage. Palpable radial pulse.     LABS  --------------------------------------------------------------------------------------  Labs:  CAPILLARY BLOOD GLUCOSE                              9.4    20.66 )-----------( 253      ( 08 Dec 2023 08:06 )             29.5       Auto Immature Granulocyte %: 2.2 % (12-08-23 @ 08:06)  Auto Neutrophil %: 82.6 % (12-08-23 @ 08:06)    12-07    136  |  97  |  18.0  ----------------------------<  127<H>  4.0   |  28.0  |  0.83          LFTs:       ABG - ( 02 Dec 2023 05:11 )  pH: 7.430 /  pCO2: 44    /  pO2: 82    / HCO3: 29    / Base Excess: 4.9   /  SaO2: 97.5              Coags:            Urinalysis Basic - ( 07 Dec 2023 06:05 )    Color: x / Appearance: x / SG: x / pH: x  Gluc: 127 mg/dL / Ketone: x  / Bili: x / Urobili: x   Blood: x / Protein: x / Nitrite: x   Leuk Esterase: x / RBC: x / WBC x   Sq Epi: x / Non Sq Epi: x / Bacteria: x        Culture - Blood (collected 06 Dec 2023 15:15)  Source: .Blood Blood  Gram Stain (07 Dec 2023 12:55):    Growth in aerobic bottle: Gram Positive Cocci in Clusters    Growth in anaerobic bottle: Gram Positive Cocci in Clusters  Preliminary Report (07 Dec 2023 12:55):    Growth in aerobic bottle: Gram Positive Cocci in Clusters    Growth in anaerobic bottle: Gram Positive Cocci in Clusters    Direct identification is available within approximately 3-5    hours either by Blood Panel Multiplexed PCR or Direct    MALDI-TOF. Details: [email or web address]  Organism: Blood Culture PCR (07 Dec 2023 11:54)  Organism: Blood Culture PCR (07 Dec 2023 11:54)    Culture - Blood (collected 06 Dec 2023 15:10)  Source: .Blood Blood  Gram Stain (07 Dec 2023 11:56):    Growth in anaerobic bottle: Gram Positive Cocci in Clusters    Growth in aerobic bottle: Gram Positive Cocci in Clusters  Preliminary Report (07 Dec 2023 11:56):    Growth in anaerobic bottle: Gram Positive Cocci in Clusters    Growth in aerobic bottle: Gram Positive Cocci in Clusters        --------------------------------------------------------------------------------------

## 2023-12-09 LAB
-  AMPICILLIN/SULBACTAM: SIGNIFICANT CHANGE UP
-  AMPICILLIN/SULBACTAM: SIGNIFICANT CHANGE UP
-  CEFAZOLIN: SIGNIFICANT CHANGE UP
-  CEFAZOLIN: SIGNIFICANT CHANGE UP
-  CLINDAMYCIN: SIGNIFICANT CHANGE UP
-  CLINDAMYCIN: SIGNIFICANT CHANGE UP
-  DAPTOMYCIN: SIGNIFICANT CHANGE UP
-  DAPTOMYCIN: SIGNIFICANT CHANGE UP
-  ERYTHROMYCIN: SIGNIFICANT CHANGE UP
-  ERYTHROMYCIN: SIGNIFICANT CHANGE UP
-  GENTAMICIN: SIGNIFICANT CHANGE UP
-  GENTAMICIN: SIGNIFICANT CHANGE UP
-  LINEZOLID: SIGNIFICANT CHANGE UP
-  LINEZOLID: SIGNIFICANT CHANGE UP
-  OXACILLIN: SIGNIFICANT CHANGE UP
-  OXACILLIN: SIGNIFICANT CHANGE UP
-  PENICILLIN: SIGNIFICANT CHANGE UP
-  PENICILLIN: SIGNIFICANT CHANGE UP
-  RIFAMPIN: SIGNIFICANT CHANGE UP
-  RIFAMPIN: SIGNIFICANT CHANGE UP
-  TETRACYCLINE: SIGNIFICANT CHANGE UP
-  TETRACYCLINE: SIGNIFICANT CHANGE UP
-  TRIMETHOPRIM/SULFAMETHOXAZOLE: SIGNIFICANT CHANGE UP
-  TRIMETHOPRIM/SULFAMETHOXAZOLE: SIGNIFICANT CHANGE UP
-  VANCOMYCIN: SIGNIFICANT CHANGE UP
-  VANCOMYCIN: SIGNIFICANT CHANGE UP
ALBUMIN SERPL ELPH-MCNC: 2.4 G/DL — LOW (ref 3.3–5.2)
ALBUMIN SERPL ELPH-MCNC: 2.4 G/DL — LOW (ref 3.3–5.2)
ALP SERPL-CCNC: 99 U/L — SIGNIFICANT CHANGE UP (ref 40–120)
ALP SERPL-CCNC: 99 U/L — SIGNIFICANT CHANGE UP (ref 40–120)
ALT FLD-CCNC: 25 U/L — SIGNIFICANT CHANGE UP
ALT FLD-CCNC: 25 U/L — SIGNIFICANT CHANGE UP
ANION GAP SERPL CALC-SCNC: 11 MMOL/L — SIGNIFICANT CHANGE UP (ref 5–17)
ANION GAP SERPL CALC-SCNC: 11 MMOL/L — SIGNIFICANT CHANGE UP (ref 5–17)
AST SERPL-CCNC: 14 U/L — SIGNIFICANT CHANGE UP
AST SERPL-CCNC: 14 U/L — SIGNIFICANT CHANGE UP
BASOPHILS # BLD AUTO: 0.06 K/UL — SIGNIFICANT CHANGE UP (ref 0–0.2)
BASOPHILS # BLD AUTO: 0.06 K/UL — SIGNIFICANT CHANGE UP (ref 0–0.2)
BASOPHILS NFR BLD AUTO: 0.3 % — SIGNIFICANT CHANGE UP (ref 0–2)
BASOPHILS NFR BLD AUTO: 0.3 % — SIGNIFICANT CHANGE UP (ref 0–2)
BILIRUB SERPL-MCNC: 0.3 MG/DL — LOW (ref 0.4–2)
BILIRUB SERPL-MCNC: 0.3 MG/DL — LOW (ref 0.4–2)
BUN SERPL-MCNC: 19.7 MG/DL — SIGNIFICANT CHANGE UP (ref 8–20)
BUN SERPL-MCNC: 19.7 MG/DL — SIGNIFICANT CHANGE UP (ref 8–20)
CALCIUM SERPL-MCNC: 8.4 MG/DL — SIGNIFICANT CHANGE UP (ref 8.4–10.5)
CALCIUM SERPL-MCNC: 8.4 MG/DL — SIGNIFICANT CHANGE UP (ref 8.4–10.5)
CHLORIDE SERPL-SCNC: 101 MMOL/L — SIGNIFICANT CHANGE UP (ref 96–108)
CHLORIDE SERPL-SCNC: 101 MMOL/L — SIGNIFICANT CHANGE UP (ref 96–108)
CO2 SERPL-SCNC: 27 MMOL/L — SIGNIFICANT CHANGE UP (ref 22–29)
CO2 SERPL-SCNC: 27 MMOL/L — SIGNIFICANT CHANGE UP (ref 22–29)
CREAT SERPL-MCNC: 0.63 MG/DL — SIGNIFICANT CHANGE UP (ref 0.5–1.3)
CREAT SERPL-MCNC: 0.63 MG/DL — SIGNIFICANT CHANGE UP (ref 0.5–1.3)
CULTURE RESULTS: ABNORMAL
EGFR: 110 ML/MIN/1.73M2 — SIGNIFICANT CHANGE UP
EGFR: 110 ML/MIN/1.73M2 — SIGNIFICANT CHANGE UP
EOSINOPHIL # BLD AUTO: 0.16 K/UL — SIGNIFICANT CHANGE UP (ref 0–0.5)
EOSINOPHIL # BLD AUTO: 0.16 K/UL — SIGNIFICANT CHANGE UP (ref 0–0.5)
EOSINOPHIL NFR BLD AUTO: 0.8 % — SIGNIFICANT CHANGE UP (ref 0–6)
EOSINOPHIL NFR BLD AUTO: 0.8 % — SIGNIFICANT CHANGE UP (ref 0–6)
GLUCOSE SERPL-MCNC: 114 MG/DL — HIGH (ref 70–99)
GLUCOSE SERPL-MCNC: 114 MG/DL — HIGH (ref 70–99)
HCT VFR BLD CALC: 29.2 % — LOW (ref 39–50)
HCT VFR BLD CALC: 29.2 % — LOW (ref 39–50)
HGB BLD-MCNC: 9.4 G/DL — LOW (ref 13–17)
HGB BLD-MCNC: 9.4 G/DL — LOW (ref 13–17)
IMM GRANULOCYTES NFR BLD AUTO: 1.9 % — HIGH (ref 0–0.9)
IMM GRANULOCYTES NFR BLD AUTO: 1.9 % — HIGH (ref 0–0.9)
LYMPHOCYTES # BLD AUTO: 1.46 K/UL — SIGNIFICANT CHANGE UP (ref 1–3.3)
LYMPHOCYTES # BLD AUTO: 1.46 K/UL — SIGNIFICANT CHANGE UP (ref 1–3.3)
LYMPHOCYTES # BLD AUTO: 7.7 % — LOW (ref 13–44)
LYMPHOCYTES # BLD AUTO: 7.7 % — LOW (ref 13–44)
MAGNESIUM SERPL-MCNC: 1.9 MG/DL — SIGNIFICANT CHANGE UP (ref 1.8–2.6)
MAGNESIUM SERPL-MCNC: 1.9 MG/DL — SIGNIFICANT CHANGE UP (ref 1.8–2.6)
MCHC RBC-ENTMCNC: 27.9 PG — SIGNIFICANT CHANGE UP (ref 27–34)
MCHC RBC-ENTMCNC: 27.9 PG — SIGNIFICANT CHANGE UP (ref 27–34)
MCHC RBC-ENTMCNC: 32.2 GM/DL — SIGNIFICANT CHANGE UP (ref 32–36)
MCHC RBC-ENTMCNC: 32.2 GM/DL — SIGNIFICANT CHANGE UP (ref 32–36)
MCV RBC AUTO: 86.6 FL — SIGNIFICANT CHANGE UP (ref 80–100)
MCV RBC AUTO: 86.6 FL — SIGNIFICANT CHANGE UP (ref 80–100)
METHOD TYPE: SIGNIFICANT CHANGE UP
METHOD TYPE: SIGNIFICANT CHANGE UP
MONOCYTES # BLD AUTO: 1.76 K/UL — HIGH (ref 0–0.9)
MONOCYTES # BLD AUTO: 1.76 K/UL — HIGH (ref 0–0.9)
MONOCYTES NFR BLD AUTO: 9.2 % — SIGNIFICANT CHANGE UP (ref 2–14)
MONOCYTES NFR BLD AUTO: 9.2 % — SIGNIFICANT CHANGE UP (ref 2–14)
NEUTROPHILS # BLD AUTO: 15.23 K/UL — HIGH (ref 1.8–7.4)
NEUTROPHILS # BLD AUTO: 15.23 K/UL — HIGH (ref 1.8–7.4)
NEUTROPHILS NFR BLD AUTO: 80.1 % — HIGH (ref 43–77)
NEUTROPHILS NFR BLD AUTO: 80.1 % — HIGH (ref 43–77)
ORGANISM # SPEC MICROSCOPIC CNT: ABNORMAL
ORGANISM # SPEC MICROSCOPIC CNT: SIGNIFICANT CHANGE UP
ORGANISM # SPEC MICROSCOPIC CNT: SIGNIFICANT CHANGE UP
PHOSPHATE SERPL-MCNC: 2.6 MG/DL — SIGNIFICANT CHANGE UP (ref 2.4–4.7)
PHOSPHATE SERPL-MCNC: 2.6 MG/DL — SIGNIFICANT CHANGE UP (ref 2.4–4.7)
PLATELET # BLD AUTO: 257 K/UL — SIGNIFICANT CHANGE UP (ref 150–400)
PLATELET # BLD AUTO: 257 K/UL — SIGNIFICANT CHANGE UP (ref 150–400)
POTASSIUM SERPL-MCNC: 4 MMOL/L — SIGNIFICANT CHANGE UP (ref 3.5–5.3)
POTASSIUM SERPL-MCNC: 4 MMOL/L — SIGNIFICANT CHANGE UP (ref 3.5–5.3)
POTASSIUM SERPL-SCNC: 4 MMOL/L — SIGNIFICANT CHANGE UP (ref 3.5–5.3)
POTASSIUM SERPL-SCNC: 4 MMOL/L — SIGNIFICANT CHANGE UP (ref 3.5–5.3)
PROT SERPL-MCNC: 5.4 G/DL — LOW (ref 6.6–8.7)
PROT SERPL-MCNC: 5.4 G/DL — LOW (ref 6.6–8.7)
RBC # BLD: 3.37 M/UL — LOW (ref 4.2–5.8)
RBC # BLD: 3.37 M/UL — LOW (ref 4.2–5.8)
RBC # FLD: 17.3 % — HIGH (ref 10.3–14.5)
RBC # FLD: 17.3 % — HIGH (ref 10.3–14.5)
SODIUM SERPL-SCNC: 139 MMOL/L — SIGNIFICANT CHANGE UP (ref 135–145)
SODIUM SERPL-SCNC: 139 MMOL/L — SIGNIFICANT CHANGE UP (ref 135–145)
SPECIMEN SOURCE: SIGNIFICANT CHANGE UP
VANCOMYCIN TROUGH SERPL-MCNC: 8.3 UG/ML — LOW (ref 10–20)
VANCOMYCIN TROUGH SERPL-MCNC: 8.3 UG/ML — LOW (ref 10–20)
WBC # BLD: 19.04 K/UL — HIGH (ref 3.8–10.5)
WBC # BLD: 19.04 K/UL — HIGH (ref 3.8–10.5)
WBC # FLD AUTO: 19.04 K/UL — HIGH (ref 3.8–10.5)
WBC # FLD AUTO: 19.04 K/UL — HIGH (ref 3.8–10.5)

## 2023-12-09 PROCEDURE — 99231 SBSQ HOSP IP/OBS SF/LOW 25: CPT

## 2023-12-09 PROCEDURE — 99233 SBSQ HOSP IP/OBS HIGH 50: CPT

## 2023-12-09 RX ORDER — SODIUM,POTASSIUM PHOSPHATES 278-250MG
1 POWDER IN PACKET (EA) ORAL
Refills: 0 | Status: COMPLETED | OUTPATIENT
Start: 2023-12-09 | End: 2023-12-10

## 2023-12-09 RX ORDER — VANCOMYCIN HCL 1 G
1000 VIAL (EA) INTRAVENOUS EVERY 12 HOURS
Refills: 0 | Status: DISCONTINUED | OUTPATIENT
Start: 2023-12-09 | End: 2023-12-11

## 2023-12-09 RX ADMIN — OXYCODONE HYDROCHLORIDE 5 MILLIGRAM(S): 5 TABLET ORAL at 12:14

## 2023-12-09 RX ADMIN — ENOXAPARIN SODIUM 40 MILLIGRAM(S): 100 INJECTION SUBCUTANEOUS at 05:35

## 2023-12-09 RX ADMIN — HYDROMORPHONE HYDROCHLORIDE 0.5 MILLIGRAM(S): 2 INJECTION INTRAMUSCULAR; INTRAVENOUS; SUBCUTANEOUS at 17:21

## 2023-12-09 RX ADMIN — HYDROMORPHONE HYDROCHLORIDE 0.5 MILLIGRAM(S): 2 INJECTION INTRAMUSCULAR; INTRAVENOUS; SUBCUTANEOUS at 21:32

## 2023-12-09 RX ADMIN — HYDROMORPHONE HYDROCHLORIDE 0.5 MILLIGRAM(S): 2 INJECTION INTRAMUSCULAR; INTRAVENOUS; SUBCUTANEOUS at 05:36

## 2023-12-09 RX ADMIN — AMLODIPINE BESYLATE 5 MILLIGRAM(S): 2.5 TABLET ORAL at 05:35

## 2023-12-09 RX ADMIN — METHOCARBAMOL 750 MILLIGRAM(S): 500 TABLET, FILM COATED ORAL at 21:32

## 2023-12-09 RX ADMIN — GABAPENTIN 100 MILLIGRAM(S): 400 CAPSULE ORAL at 21:32

## 2023-12-09 RX ADMIN — QUETIAPINE FUMARATE 125 MILLIGRAM(S): 200 TABLET, FILM COATED ORAL at 21:32

## 2023-12-09 RX ADMIN — Medication 1 PATCH: at 21:31

## 2023-12-09 RX ADMIN — Medication 250 MILLIGRAM(S): at 12:03

## 2023-12-09 RX ADMIN — OXYCODONE HYDROCHLORIDE 10 MILLIGRAM(S): 5 TABLET ORAL at 09:05

## 2023-12-09 RX ADMIN — ENOXAPARIN SODIUM 40 MILLIGRAM(S): 100 INJECTION SUBCUTANEOUS at 17:21

## 2023-12-09 RX ADMIN — SENNA PLUS 2 TABLET(S): 8.6 TABLET ORAL at 21:32

## 2023-12-09 RX ADMIN — Medication 5 MILLIGRAM(S): at 21:32

## 2023-12-09 RX ADMIN — DIVALPROEX SODIUM 500 MILLIGRAM(S): 500 TABLET, DELAYED RELEASE ORAL at 17:22

## 2023-12-09 RX ADMIN — METHOCARBAMOL 750 MILLIGRAM(S): 500 TABLET, FILM COATED ORAL at 14:39

## 2023-12-09 RX ADMIN — DIVALPROEX SODIUM 500 MILLIGRAM(S): 500 TABLET, DELAYED RELEASE ORAL at 05:35

## 2023-12-09 RX ADMIN — Medication 100 MILLIGRAM(S): at 12:02

## 2023-12-09 RX ADMIN — Medication 1 MILLIGRAM(S): at 12:02

## 2023-12-09 RX ADMIN — PANTOPRAZOLE SODIUM 40 MILLIGRAM(S): 20 TABLET, DELAYED RELEASE ORAL at 05:37

## 2023-12-09 RX ADMIN — POLYETHYLENE GLYCOL 3350 17 GRAM(S): 17 POWDER, FOR SOLUTION ORAL at 21:32

## 2023-12-09 RX ADMIN — Medication 1 PACKET(S): at 17:22

## 2023-12-09 RX ADMIN — Medication 975 MILLIGRAM(S): at 05:35

## 2023-12-09 RX ADMIN — Medication 2 MILLIGRAM(S): at 17:23

## 2023-12-09 RX ADMIN — Medication 2 MILLIGRAM(S): at 05:36

## 2023-12-09 RX ADMIN — METHOCARBAMOL 750 MILLIGRAM(S): 500 TABLET, FILM COATED ORAL at 05:36

## 2023-12-09 NOTE — PROGRESS NOTE ADULT - ASSESSMENT
57 year old male with PMH of ETOH abuse, HTN, HLD, seizures, tardive dyskinesia hiatal hernia s/p mechanical fall down stairs while intoxicated on 11/29. Pt intubated in ED for airway protection. Found to have left rib fractures 3, 6, 7, 10 and left pneumothorax with extensive subcutaneous emphysema requiring a left sided pigtail on 11/29. Hospital course complicated by acute agitation and acute asthma exacerbation requiring re-intubation on 12/1. Pt extubated 12/2. Transferred to floor 12/6 and noted to be febrile with uptrending WBC, rt arm edema erythema and reported purulent drainage. Found to have MRSA bacteremia, concern for cellulitis , septic thrombphlebitis    - 12/6 and 12/7 BCX + MRSA  - s/p I&D of septic thrombophlebitis of right arm >> Incision and drainage of Infected basilic and cephalic thrombophlebitis at antecubital level purulent discharge seen upon incision  - Repeat BCX for AM   - check TTE (ordered, yet to be performed)  - RUE venous doppler with sup thrombus of right cephalic vein   - Continue vancomycin  - monitor vanco trough and adjust per pharmacy protocol - spoke with pharmacist for dose adjustment   - Trend Fever - afebrile   - Trend Leukocytosis    D/w ASP/clinical pharmacist

## 2023-12-09 NOTE — PROGRESS NOTE ADULT - SUBJECTIVE AND OBJECTIVE BOX
[facility]  INFECTIOUS DISEASES at [place] and [place]  ===============================================================                  Jordon Gonzales MD               Diplomates American Board of Internal Medicine & Infectious Diseases                * [place] Office - Appt - Tel  [phone] Fax [phone]                * [place] Office - Appt - Tel [phone] Fax [phone]                                  Hospital Consult line:  [phone]  ==============================================================    NOHEMY BRAN [id number]    Follow up: MRSA bacteremia     Covering for Dr. Hui   S/p I&D of right arm septic thrombophlebitis   No acute events overnight   afebrile and hemodynamically stable     I have personally reviewed the labs and data; pertinent labs and data are listed in this note; please see below.     _______________________________________________________________  REVIEW OF SYSTEMS  Had multiple BMs over night. Was in severe pain, but seems controlled with current pain management. No nausea or vomiting, appetite is preserved   ________________________________________________________________  Allergies:  No Known Allergies    ________________________________________________________________  PHYSICAL EXAM  GEN: in NAD, sitting in chair    HEENT: Anicteric sclerae. Moist mucous membranes. No mucosal lesions.   NECK: Supple.  LUNGS: eupneic. CTA B/L.  HEART: RRR, no m/r/g  ABDOMEN: Soft, NT, ND,.  +BS.    : No CVA tenderness. No Deleon catheter  EXTREMITIES: RUE in clean dressing. Minimal swelling.   NEUROLOGIC: Grossly no motor focal deficits   PSYCHIATRIC: Appropriate affect and mood  SKIN: hole in upper chest.   LINES: PIV   ________________________________________________________________  Vitals:  T(F): 98.3 (09 Dec 2023 09:00), Max: 99.1 (09 Dec 2023 00:00)  HR: 108 (09 Dec 2023 09:00)  BP: 146/88 (09 Dec 2023 09:00)  RR: 17 (09 Dec 2023 09:00)  SpO2: 92% (09 Dec 2023 09:00) (92% - 98%)  temp max in last 48H T(F): , Max: 100.6 (12-07-23 @ 17:57)    Current Antibiotics:  vancomycin  IVPB 500 milliGRAM(s) IV Intermittent every 12 hours    Other medications:  acetaminophen     Tablet .. 975 milliGRAM(s) Oral every 6 hours  amLODIPine   Tablet 5 milliGRAM(s) Oral daily  benztropine 2 milliGRAM(s) Oral two times a day  chlorhexidine 2% Cloths 1 Application(s) Topical daily  divalproex  milliGRAM(s) Oral every 12 hours  enoxaparin Injectable 40 milliGRAM(s) SubCutaneous every 12 hours  folic acid 1 milliGRAM(s) Oral daily  gabapentin 100 milliGRAM(s) Oral at bedtime  influenza   Vaccine 0.5 milliLiter(s) IntraMuscular once  lidocaine   4% Patch 3 Patch Transdermal daily  melatonin 5 milliGRAM(s) Oral at bedtime  methocarbamol 750 milliGRAM(s) Oral every 8 hours  nicotine - 21 mG/24Hr(s) Patch 1 Patch Transdermal every 24 hours  pantoprazole    Tablet 40 milliGRAM(s) Oral before breakfast  polyethylene glycol 3350 17 Gram(s) Oral at bedtime  QUEtiapine 125 milliGRAM(s) Oral at bedtime  senna 2 Tablet(s) Oral at bedtime  thiamine 100 milliGRAM(s) Oral daily                            9.4    19.04 )-----------( 257      ( 09 Dec 2023 07:31 )             29.2     12-09    139  |  101  |  19.7  ----------------------------<  114<H>  4.0   |  27.0  |  0.63    Ca    8.4      09 Dec 2023 07:31  Phos  2.6     12-09  Mg     1.9     12-09    TPro  5.4<L>  /  Alb  2.4<L>  /  TBili  0.3<L>  /  DBili  x   /  AST  14  /  ALT  25  /  AlkPhos  99  12-09    RECENT CULTURES:  12-07 @ 14:57 .Blood Blood-Peripheral     Growth in aerobic bottle: Gram Positive Cocci in Clusters  Growth in anaerobic bottle: Gram Positive Cocci in Clusters    Growth in aerobic bottle: Gram Positive Cocci in Clusters  Growth in anaerobic bottle: Gram Positive Cocci in Clusters      12-07 @ 14:52 .Blood Blood-Venous     Growth in aerobic bottle: Gram Positive Cocci in Clusters  Growth in anaerobic bottle: Gram Positive Cocci in Clusters    Growth in aerobic bottle: Gram Positive Cocci in Clusters  Growth in anaerobic bottle: Gram Positive Cocci in Clusters      12-06 @ 15:15 .Blood Blood Blood Culture PCR  Methicillin resistant Staphylococcus aureus    Growth in aerobic and anaerobic bottles: Methicillin Resistant  Staphylococcus aureus    Growth in aerobic bottle: Gram Positive Cocci in Clusters  Growth in anaerobic bottle: Gram Positive Cocci in Clusters      12-06 @ 15:10 .Blood Blood     Growth in aerobic and anaerobic bottles: Methicillin Resistant  Staphylococcus aureus  See previous culture [id number]    Growth in anaerobic bottle: Gram Positive Cocci in Clusters  Growth in aerobic bottle: Gram Positive Cocci in Clusters        WBC Count: 19.04 K/uL (12-09-23 @ 07:31)  WBC Count: 20.66 K/uL (12-08-23 @ 08:06)  WBC Count: 22.89 K/uL (12-07-23 @ 06:05)  WBC Count: 14.00 K/uL (12-06-23 @ 05:15)  WBC Count: 13.75 K/uL (12-05-23 @ 02:08)    Creatinine: 0.63 mg/dL (12-09-23 @ 07:31)  Creatinine: 1.17 mg/dL (12-08-23 @ 08:06)  Creatinine: 0.83 mg/dL (12-07-23 @ 06:05)  Creatinine: 0.60 mg/dL (12-06-23 @ 05:15)  Creatinine: 0.81 mg/dL (12-05-23 @ 02:08)      Vancomycin Level, Trough: 8.3 ug/mL (12-09-23 @ 07:31)  Vancomycin Level, Trough: 11.5 ug/mL (12-08-23 @ 18:40)    ________________________________________________________________  CARDIOLOGY   no recent studies   ________________________________________________________________  RADIOLOGY  < from: US Duplex Venous Upper Ext Ltd, Right (12.07.23 @ 11:52) >  INTERPRETATION:  CLINICAL INFORMATION: Arm swelling    COMPARISON: None available.    TECHNIQUE: Duplex sonography of the RIGHT UPPER extremity veins with   color and spectral Doppler, with and without compression.    FINDINGS:    The right internal jugular, subclavian, axillary and brachial veins are   patent and compressible where applicable.  The basilic vein (superficial   vein) is patent and without thrombus.  Superficial thrombus in the right   cephalic vein.      IMPRESSION:  No evidence of right upper extremity deep venous thrombosis.    Superficial thrombus in the right cephalic vein.    < end of copied text >  < from: Xray Chest 1 View- PORTABLE-Urgent (Xray Chest 1 View- PORTABLE-Urgent .) (12.06.23 @ 10:15) >  INTERPRETATION:  Chest one view    HISTORY: Leukocytosis    COMPARISON STUDY: 12/2/2023    Frontal expiratory view of the chest shows the heart to be similar in   size. The lungs show partial clearing of the left base and there is no   evidence of pneumothorax nor pleural effusion.    IMPRESSION:  Left base clearing.    < end of copied text >     [facility]  INFECTIOUS DISEASES at [place] and [place]  ===============================================================                  Jordon Gonzales MD               Diplomates American Board of Internal Medicine & Infectious Diseases                * [place] Office - Appt - Tel  [phone] Fax [phone]                * [place] Office - Appt - Tel [phone] Fax [phone]                                  Hospital Consult line:  [phone]  ==============================================================    NOHEMY BRAN [id number]    Follow up: MRSA bacteremia     Covering for Dr. Hui   S/p I&D of right arm septic thrombophlebitis   No acute events overnight   afebrile and hemodynamically stable     I have personally reviewed the labs and data; pertinent labs and data are listed in this note; please see below.     _______________________________________________________________  REVIEW OF SYSTEMS  Had multiple BMs over night. Was in severe pain, but seems controlled with current pain management. No nausea or vomiting, appetite is preserved   ________________________________________________________________  Allergies:  No Known Allergies    ________________________________________________________________  PHYSICAL EXAM  GEN: in NAD, sitting in chair    HEENT: Anicteric sclerae. Moist mucous membranes. No mucosal lesions.   NECK: Supple.  LUNGS: eupneic. CTA B/L.  HEART: RRR, no m/r/g  ABDOMEN: Soft, NT, ND,.  +BS.    : No CVA tenderness. No Deleon catheter  EXTREMITIES: RUE in clean dressing. Minimal swelling.   NEUROLOGIC: Grossly no motor focal deficits   PSYCHIATRIC: Appropriate affect and mood  SKIN: hole in upper chest.   LINES: PIV   ________________________________________________________________  Vitals:  T(F): 98.3 (09 Dec 2023 09:00), Max: 99.1 (09 Dec 2023 00:00)  HR: 108 (09 Dec 2023 09:00)  BP: 146/88 (09 Dec 2023 09:00)  RR: 17 (09 Dec 2023 09:00)  SpO2: 92% (09 Dec 2023 09:00) (92% - 98%)  temp max in last 48H T(F): , Max: 100.6 (12-07-23 @ 17:57)    Current Antibiotics:  vancomycin  IVPB 500 milliGRAM(s) IV Intermittent every 12 hours    Other medications:  acetaminophen     Tablet .. 975 milliGRAM(s) Oral every 6 hours  amLODIPine   Tablet 5 milliGRAM(s) Oral daily  benztropine 2 milliGRAM(s) Oral two times a day  chlorhexidine 2% Cloths 1 Application(s) Topical daily  divalproex  milliGRAM(s) Oral every 12 hours  enoxaparin Injectable 40 milliGRAM(s) SubCutaneous every 12 hours  folic acid 1 milliGRAM(s) Oral daily  gabapentin 100 milliGRAM(s) Oral at bedtime  influenza   Vaccine 0.5 milliLiter(s) IntraMuscular once  lidocaine   4% Patch 3 Patch Transdermal daily  melatonin 5 milliGRAM(s) Oral at bedtime  methocarbamol 750 milliGRAM(s) Oral every 8 hours  nicotine - 21 mG/24Hr(s) Patch 1 Patch Transdermal every 24 hours  pantoprazole    Tablet 40 milliGRAM(s) Oral before breakfast  polyethylene glycol 3350 17 Gram(s) Oral at bedtime  QUEtiapine 125 milliGRAM(s) Oral at bedtime  senna 2 Tablet(s) Oral at bedtime  thiamine 100 milliGRAM(s) Oral daily                            9.4    19.04 )-----------( 257      ( 09 Dec 2023 07:31 )             29.2     12-09    139  |  101  |  19.7  ----------------------------<  114<H>  4.0   |  27.0  |  0.63    Ca    8.4      09 Dec 2023 07:31  Phos  2.6     12-09  Mg     1.9     12-09    TPro  5.4<L>  /  Alb  2.4<L>  /  TBili  0.3<L>  /  DBili  x   /  AST  14  /  ALT  25  /  AlkPhos  99  12-09    RECENT CULTURES:  12-07 @ 14:57 .Blood Blood-Peripheral     Growth in aerobic bottle: Gram Positive Cocci in Clusters  Growth in anaerobic bottle: Gram Positive Cocci in Clusters    Growth in aerobic bottle: Gram Positive Cocci in Clusters  Growth in anaerobic bottle: Gram Positive Cocci in Clusters      12-07 @ 14:52 .Blood Blood-Venous     Growth in aerobic bottle: Gram Positive Cocci in Clusters  Growth in anaerobic bottle: Gram Positive Cocci in Clusters    Growth in aerobic bottle: Gram Positive Cocci in Clusters  Growth in anaerobic bottle: Gram Positive Cocci in Clusters      12-06 @ 15:15 .Blood Blood Blood Culture PCR  Methicillin resistant Staphylococcus aureus    Growth in aerobic and anaerobic bottles: Methicillin Resistant  Staphylococcus aureus    Growth in aerobic bottle: Gram Positive Cocci in Clusters  Growth in anaerobic bottle: Gram Positive Cocci in Clusters      12-06 @ 15:10 .Blood Blood     Growth in aerobic and anaerobic bottles: Methicillin Resistant  Staphylococcus aureus  See previous culture [id number]    Growth in anaerobic bottle: Gram Positive Cocci in Clusters  Growth in aerobic bottle: Gram Positive Cocci in Clusters        WBC Count: 19.04 K/uL (12-09-23 @ 07:31)  WBC Count: 20.66 K/uL (12-08-23 @ 08:06)  WBC Count: 22.89 K/uL (12-07-23 @ 06:05)  WBC Count: 14.00 K/uL (12-06-23 @ 05:15)  WBC Count: 13.75 K/uL (12-05-23 @ 02:08)    Creatinine: 0.63 mg/dL (12-09-23 @ 07:31)  Creatinine: 1.17 mg/dL (12-08-23 @ 08:06)  Creatinine: 0.83 mg/dL (12-07-23 @ 06:05)  Creatinine: 0.60 mg/dL (12-06-23 @ 05:15)  Creatinine: 0.81 mg/dL (12-05-23 @ 02:08)      Vancomycin Level, Trough: 8.3 ug/mL (12-09-23 @ 07:31)  Vancomycin Level, Trough: 11.5 ug/mL (12-08-23 @ 18:40)    ________________________________________________________________  CARDIOLOGY   no recent studies   ________________________________________________________________  RADIOLOGY  < from: US Duplex Venous Upper Ext Ltd, Right (12.07.23 @ 11:52) >  INTERPRETATION:  CLINICAL INFORMATION: Arm swelling    COMPARISON: None available.    TECHNIQUE: Duplex sonography of the RIGHT UPPER extremity veins with   color and spectral Doppler, with and without compression.    FINDINGS:    The right internal jugular, subclavian, axillary and brachial veins are   patent and compressible where applicable.  The basilic vein (superficial   vein) is patent and without thrombus.  Superficial thrombus in the right   cephalic vein.      IMPRESSION:  No evidence of right upper extremity deep venous thrombosis.    Superficial thrombus in the right cephalic vein.    < end of copied text >  < from: Xray Chest 1 View- PORTABLE-Urgent (Xray Chest 1 View- PORTABLE-Urgent .) (12.06.23 @ 10:15) >  INTERPRETATION:  Chest one view    HISTORY: Leukocytosis    COMPARISON STUDY: 12/2/2023    Frontal expiratory view of the chest shows the heart to be similar in   size. The lungs show partial clearing of the left base and there is no   evidence of pneumothorax nor pleural effusion.    IMPRESSION:  Left base clearing.    < end of copied text >

## 2023-12-09 NOTE — PROGRESS NOTE ADULT - SUBJECTIVE AND OBJECTIVE BOX
Subjective: Patient seen and examined at bedside, no acute complaints, reports pain is controlled. He states his abdomen is always distended, but it has improved.     MEDICATIONS  (STANDING):  acetaminophen     Tablet .. 975 milliGRAM(s) Oral every 6 hours  amLODIPine   Tablet 5 milliGRAM(s) Oral daily  benztropine 2 milliGRAM(s) Oral two times a day  chlorhexidine 2% Cloths 1 Application(s) Topical daily  divalproex  milliGRAM(s) Oral every 12 hours  enoxaparin Injectable 40 milliGRAM(s) SubCutaneous every 12 hours  folic acid 1 milliGRAM(s) Oral daily  gabapentin 100 milliGRAM(s) Oral at bedtime  influenza   Vaccine 0.5 milliLiter(s) IntraMuscular once  lidocaine   4% Patch 3 Patch Transdermal daily  melatonin 5 milliGRAM(s) Oral at bedtime  methocarbamol 750 milliGRAM(s) Oral every 8 hours  nicotine - 21 mG/24Hr(s) Patch 1 Patch Transdermal every 24 hours  pantoprazole    Tablet 40 milliGRAM(s) Oral before breakfast  polyethylene glycol 3350 17 Gram(s) Oral at bedtime  QUEtiapine 125 milliGRAM(s) Oral at bedtime  senna 2 Tablet(s) Oral at bedtime  thiamine 100 milliGRAM(s) Oral daily  vancomycin  IVPB 500 milliGRAM(s) IV Intermittent every 12 hours    MEDICATIONS  (PRN):  albuterol/ipratropium for Nebulization 3 milliLiter(s) Nebulizer every 6 hours PRN Shortness of Breath and/or Wheezing  calcium carbonate    500 mG (Tums) Chewable 1 Tablet(s) Chew every 6 hours PRN Heartburn  HYDROmorphone  Injectable 0.5 milliGRAM(s) IV Push every 4 hours PRN breakthrough  mineral oil enema 133 milliLiter(s) Rectal daily PRN constipation  OLANZapine Injectable 5 milliGRAM(s) IntraMuscular every 12 hours PRN agitation  ondansetron Injectable 4 milliGRAM(s) IV Push every 6 hours PRN Nausea  oxyCODONE    IR 5 milliGRAM(s) Oral every 4 hours PRN Moderate Pain (4 - 6)  oxyCODONE    IR 10 milliGRAM(s) Oral every 4 hours PRN Severe Pain (7 - 10)    Vital Signs Last 24 Hrs  T(C): 37.3 (09 Dec 2023 00:00), Max: 37.3 (09 Dec 2023 00:00)  T(F): 99.1 (09 Dec 2023 00:00), Max: 99.1 (09 Dec 2023 00:00)  HR: 89 (09 Dec 2023 00:00) (69 - 103)  BP: 130/84 (09 Dec 2023 00:00) (106/67 - 165/88)  BP(mean): 106 (08 Dec 2023 18:00) (86 - 115)  RR: 18 (09 Dec 2023 00:00) (7 - 22)  SpO2: 93% (09 Dec 2023 00:00) (92% - 98%)  Parameters below as of 09 Dec 2023 00:00  Patient On (Oxygen Delivery Method): room air    Physical Exam:  Constitutional: NAD  HEENT: PERRL, EOMI  Respiratory: Respirations non-labored, no accessory muscle use, blowhole site healing   Gastrointestinal: Soft, non-tender, + distention   Extremities: Moves all 4 extremities spontaneously, RUE sensation, strength and motor intact, dressing c/d/i   Neurological: A&O x 3; without gross deficit    LABS:  pending     A: Patient is a 57 yo M with hx of alcohol use, s/p fall down the stairs sustaining multiple injuries, including L 5-8th rib fx, 10th R rib fx, L ptx, delirium, asthma exacerbation, and most recently MRSA bacteremia. He is s/p L blowhole for release of subQ emphysema 11/29, s/p rib block 11/29, s/p L pigtail 11/30, s/p repeat rib block 12/4, and most recently s/p I&D and excision of infected thrombosed Rt cephalic and basilic vein 12/8.     Plan:   Cont Vanc   F/u TTE   Monitor RUE   DVT ppx   Regular diet   pic protocol   Cont seroquel   Bowel regimen   AM labs   PT/OT--> Home PT

## 2023-12-09 NOTE — PROGRESS NOTE ADULT - SUBJECTIVE AND OBJECTIVE BOX
INTERVAL HPI/OVERNIGHT EVENTS:    Patient evaluated at bedside. No acute distress. No acute events overnight.  Dressing changed at bedside this AM, no purulent drainage.      MEDICATIONS  (STANDING):  acetaminophen     Tablet .. 975 milliGRAM(s) Oral every 6 hours  amLODIPine   Tablet 5 milliGRAM(s) Oral daily  benztropine 2 milliGRAM(s) Oral two times a day  chlorhexidine 2% Cloths 1 Application(s) Topical daily  divalproex  milliGRAM(s) Oral every 12 hours  enoxaparin Injectable 40 milliGRAM(s) SubCutaneous every 12 hours  folic acid 1 milliGRAM(s) Oral daily  gabapentin 100 milliGRAM(s) Oral at bedtime  influenza   Vaccine 0.5 milliLiter(s) IntraMuscular once  lidocaine   4% Patch 3 Patch Transdermal daily  melatonin 5 milliGRAM(s) Oral at bedtime  methocarbamol 750 milliGRAM(s) Oral every 8 hours  nicotine - 21 mG/24Hr(s) Patch 1 Patch Transdermal every 24 hours  pantoprazole    Tablet 40 milliGRAM(s) Oral before breakfast  polyethylene glycol 3350 17 Gram(s) Oral at bedtime  potassium phosphate / sodium phosphate Powder (PHOS-NaK) 1 Packet(s) Oral two times a day with meals  QUEtiapine 125 milliGRAM(s) Oral at bedtime  senna 2 Tablet(s) Oral at bedtime  thiamine 100 milliGRAM(s) Oral daily  vancomycin  IVPB 1000 milliGRAM(s) IV Intermittent every 12 hours    MEDICATIONS  (PRN):  albuterol/ipratropium for Nebulization 3 milliLiter(s) Nebulizer every 6 hours PRN Shortness of Breath and/or Wheezing  calcium carbonate    500 mG (Tums) Chewable 1 Tablet(s) Chew every 6 hours PRN Heartburn  HYDROmorphone  Injectable 0.5 milliGRAM(s) IV Push every 4 hours PRN breakthrough  mineral oil enema 133 milliLiter(s) Rectal daily PRN constipation  OLANZapine Injectable 5 milliGRAM(s) IntraMuscular every 12 hours PRN agitation  ondansetron Injectable 4 milliGRAM(s) IV Push every 6 hours PRN Nausea  oxyCODONE    IR 5 milliGRAM(s) Oral every 4 hours PRN Moderate Pain (4 - 6)  oxyCODONE    IR 10 milliGRAM(s) Oral every 4 hours PRN Severe Pain (7 - 10)      Vital Signs Last 24 Hrs  T(C): 36.8 (09 Dec 2023 09:00), Max: 37.3 (09 Dec 2023 00:00)  T(F): 98.3 (09 Dec 2023 09:00), Max: 99.1 (09 Dec 2023 00:00)  HR: 108 (09 Dec 2023 09:00) (87 - 108)  BP: 146/88 (09 Dec 2023 09:00) (130/84 - 165/88)  BP(mean): 106 (08 Dec 2023 18:00) (86 - 115)  RR: 17 (09 Dec 2023 09:00) (17 - 22)  SpO2: 92% (09 Dec 2023 09:00) (92% - 98%)    Parameters below as of 09 Dec 2023 05:00  Patient On (Oxygen Delivery Method): nasal cannula  O2 Flow (L/min): 2      Constitutional: NAD  Respiratory: Breath Sounds equal & clear to percussion & auscultation, no accessory muscle use  Cardiovascular: Regular rate & rhythm, normal S1, S2; no murmurs, gallops or rubs; no S3, S4  Gastrointestinal: Soft, non-tender, no hepatosplenomegaly, normal bowel sounds  Extremities: R UE x2 incisions, packing removed at bedside, no purulent drainage, wound repacked.          I&O's Detail    08 Dec 2023 07:01  -  09 Dec 2023 07:00  --------------------------------------------------------  IN:    Oral Fluid: 440 mL  Total IN: 440 mL    OUT:    Voided (mL): 920 mL  Total OUT: 920 mL    Total NET: -480 mL          LABS:                        9.4    19.04 )-----------( 257      ( 09 Dec 2023 07:31 )             29.2     12-09    139  |  101  |  19.7  ----------------------------<  114<H>  4.0   |  27.0  |  0.63    Ca    8.4      09 Dec 2023 07:31  Phos  2.6     12-09  Mg     1.9     12-09    TPro  5.4<L>  /  Alb  2.4<L>  /  TBili  0.3<L>  /  DBili  x   /  AST  14  /  ALT  25  /  AlkPhos  99  12-09      Urinalysis Basic - ( 09 Dec 2023 07:31 )    Color: x / Appearance: x / SG: x / pH: x  Gluc: 114 mg/dL / Ketone: x  / Bili: x / Urobili: x   Blood: x / Protein: x / Nitrite: x   Leuk Esterase: x / RBC: x / WBC x   Sq Epi: x / Non Sq Epi: x / Bacteria: x        RADIOLOGY & ADDITIONAL STUDIES:

## 2023-12-09 NOTE — PROGRESS NOTE ADULT - NS ATTEND AMEND GEN_ALL_CORE FT
Above assessment noted.  The patient was seen and examined by myself with the surgical PA.  The patient is without new events or complaints overnight.  The chest wall wound is open with mild hyperemia. He remains on antibiotics for bacteremia.  Echo pending to exclude vegetations. Will need local wound care and vascular surgery follow up for right basilic and cephalic vein excision site.

## 2023-12-09 NOTE — PROGRESS NOTE ADULT - ASSESSMENT
59 y/o male s/p fall with rib fracture, L pneumothorax, EtOH use, delirium, and asthma exacerbation, found to have R UE septic thrombophlebitis, sp excision of R cephalic and basilic vein for suppurative thrombophlebitis, packing daily.      PLAN  -packing change daily  -Continue Abx   -F/U Cultures  57 y/o male s/p fall with rib fracture, L pneumothorax, EtOH use, delirium, and asthma exacerbation, found to have R UE septic thrombophlebitis, sp excision of R cephalic and basilic vein for suppurative thrombophlebitis, packing daily.      PLAN  -packing change daily  -Continue Abx   -F/U Cultures

## 2023-12-10 LAB
ANION GAP SERPL CALC-SCNC: 14 MMOL/L — SIGNIFICANT CHANGE UP (ref 5–17)
ANION GAP SERPL CALC-SCNC: 14 MMOL/L — SIGNIFICANT CHANGE UP (ref 5–17)
ANISOCYTOSIS BLD QL: SLIGHT — SIGNIFICANT CHANGE UP
ANISOCYTOSIS BLD QL: SLIGHT — SIGNIFICANT CHANGE UP
BASOPHILS # BLD AUTO: 0 K/UL — SIGNIFICANT CHANGE UP (ref 0–0.2)
BASOPHILS # BLD AUTO: 0 K/UL — SIGNIFICANT CHANGE UP (ref 0–0.2)
BASOPHILS NFR BLD AUTO: 0 % — SIGNIFICANT CHANGE UP (ref 0–2)
BASOPHILS NFR BLD AUTO: 0 % — SIGNIFICANT CHANGE UP (ref 0–2)
BUN SERPL-MCNC: 12 MG/DL — SIGNIFICANT CHANGE UP (ref 8–20)
BUN SERPL-MCNC: 12 MG/DL — SIGNIFICANT CHANGE UP (ref 8–20)
CALCIUM SERPL-MCNC: 7.9 MG/DL — LOW (ref 8.4–10.5)
CALCIUM SERPL-MCNC: 7.9 MG/DL — LOW (ref 8.4–10.5)
CHLORIDE SERPL-SCNC: 98 MMOL/L — SIGNIFICANT CHANGE UP (ref 96–108)
CHLORIDE SERPL-SCNC: 98 MMOL/L — SIGNIFICANT CHANGE UP (ref 96–108)
CO2 SERPL-SCNC: 27 MMOL/L — SIGNIFICANT CHANGE UP (ref 22–29)
CO2 SERPL-SCNC: 27 MMOL/L — SIGNIFICANT CHANGE UP (ref 22–29)
CREAT SERPL-MCNC: 0.53 MG/DL — SIGNIFICANT CHANGE UP (ref 0.5–1.3)
CREAT SERPL-MCNC: 0.53 MG/DL — SIGNIFICANT CHANGE UP (ref 0.5–1.3)
EGFR: 116 ML/MIN/1.73M2 — SIGNIFICANT CHANGE UP
EGFR: 116 ML/MIN/1.73M2 — SIGNIFICANT CHANGE UP
EOSINOPHIL # BLD AUTO: 0.57 K/UL — HIGH (ref 0–0.5)
EOSINOPHIL # BLD AUTO: 0.57 K/UL — HIGH (ref 0–0.5)
EOSINOPHIL NFR BLD AUTO: 3.5 % — SIGNIFICANT CHANGE UP (ref 0–6)
EOSINOPHIL NFR BLD AUTO: 3.5 % — SIGNIFICANT CHANGE UP (ref 0–6)
GIANT PLATELETS BLD QL SMEAR: PRESENT — SIGNIFICANT CHANGE UP
GIANT PLATELETS BLD QL SMEAR: PRESENT — SIGNIFICANT CHANGE UP
GLUCOSE SERPL-MCNC: 105 MG/DL — HIGH (ref 70–99)
GLUCOSE SERPL-MCNC: 105 MG/DL — HIGH (ref 70–99)
HCT VFR BLD CALC: 26.6 % — LOW (ref 39–50)
HCT VFR BLD CALC: 26.6 % — LOW (ref 39–50)
HGB BLD-MCNC: 8.4 G/DL — LOW (ref 13–17)
HGB BLD-MCNC: 8.4 G/DL — LOW (ref 13–17)
HYPOCHROMIA BLD QL: SLIGHT — SIGNIFICANT CHANGE UP
HYPOCHROMIA BLD QL: SLIGHT — SIGNIFICANT CHANGE UP
LYMPHOCYTES # BLD AUTO: 1.72 K/UL — SIGNIFICANT CHANGE UP (ref 1–3.3)
LYMPHOCYTES # BLD AUTO: 1.72 K/UL — SIGNIFICANT CHANGE UP (ref 1–3.3)
LYMPHOCYTES # BLD AUTO: 10.5 % — LOW (ref 13–44)
LYMPHOCYTES # BLD AUTO: 10.5 % — LOW (ref 13–44)
MACROCYTES BLD QL: SLIGHT — SIGNIFICANT CHANGE UP
MACROCYTES BLD QL: SLIGHT — SIGNIFICANT CHANGE UP
MAGNESIUM SERPL-MCNC: 1.7 MG/DL — SIGNIFICANT CHANGE UP (ref 1.6–2.6)
MAGNESIUM SERPL-MCNC: 1.7 MG/DL — SIGNIFICANT CHANGE UP (ref 1.6–2.6)
MANUAL SMEAR VERIFICATION: SIGNIFICANT CHANGE UP
MANUAL SMEAR VERIFICATION: SIGNIFICANT CHANGE UP
MCHC RBC-ENTMCNC: 27.3 PG — SIGNIFICANT CHANGE UP (ref 27–34)
MCHC RBC-ENTMCNC: 27.3 PG — SIGNIFICANT CHANGE UP (ref 27–34)
MCHC RBC-ENTMCNC: 31.6 GM/DL — LOW (ref 32–36)
MCHC RBC-ENTMCNC: 31.6 GM/DL — LOW (ref 32–36)
MCV RBC AUTO: 86.4 FL — SIGNIFICANT CHANGE UP (ref 80–100)
MCV RBC AUTO: 86.4 FL — SIGNIFICANT CHANGE UP (ref 80–100)
MONOCYTES # BLD AUTO: 1.14 K/UL — HIGH (ref 0–0.9)
MONOCYTES # BLD AUTO: 1.14 K/UL — HIGH (ref 0–0.9)
MONOCYTES NFR BLD AUTO: 7 % — SIGNIFICANT CHANGE UP (ref 2–14)
MONOCYTES NFR BLD AUTO: 7 % — SIGNIFICANT CHANGE UP (ref 2–14)
NEUTROPHILS # BLD AUTO: 12.91 K/UL — HIGH (ref 1.8–7.4)
NEUTROPHILS # BLD AUTO: 12.91 K/UL — HIGH (ref 1.8–7.4)
NEUTROPHILS NFR BLD AUTO: 79 % — HIGH (ref 43–77)
NEUTROPHILS NFR BLD AUTO: 79 % — HIGH (ref 43–77)
NRBC # BLD: 1 /100 — HIGH (ref 0–0)
NRBC # BLD: 1 /100 — HIGH (ref 0–0)
PHOSPHATE SERPL-MCNC: 3.4 MG/DL — SIGNIFICANT CHANGE UP (ref 2.4–4.7)
PHOSPHATE SERPL-MCNC: 3.4 MG/DL — SIGNIFICANT CHANGE UP (ref 2.4–4.7)
PLAT MORPH BLD: NORMAL — SIGNIFICANT CHANGE UP
PLAT MORPH BLD: NORMAL — SIGNIFICANT CHANGE UP
PLATELET # BLD AUTO: 203 K/UL — SIGNIFICANT CHANGE UP (ref 150–400)
PLATELET # BLD AUTO: 203 K/UL — SIGNIFICANT CHANGE UP (ref 150–400)
POLYCHROMASIA BLD QL SMEAR: SLIGHT — SIGNIFICANT CHANGE UP
POLYCHROMASIA BLD QL SMEAR: SLIGHT — SIGNIFICANT CHANGE UP
POTASSIUM SERPL-MCNC: 3.7 MMOL/L — SIGNIFICANT CHANGE UP (ref 3.5–5.3)
POTASSIUM SERPL-MCNC: 3.7 MMOL/L — SIGNIFICANT CHANGE UP (ref 3.5–5.3)
POTASSIUM SERPL-SCNC: 3.7 MMOL/L — SIGNIFICANT CHANGE UP (ref 3.5–5.3)
POTASSIUM SERPL-SCNC: 3.7 MMOL/L — SIGNIFICANT CHANGE UP (ref 3.5–5.3)
RBC # BLD: 3.08 M/UL — LOW (ref 4.2–5.8)
RBC # BLD: 3.08 M/UL — LOW (ref 4.2–5.8)
RBC # FLD: 17.6 % — HIGH (ref 10.3–14.5)
RBC # FLD: 17.6 % — HIGH (ref 10.3–14.5)
RBC BLD AUTO: ABNORMAL
RBC BLD AUTO: ABNORMAL
SODIUM SERPL-SCNC: 138 MMOL/L — SIGNIFICANT CHANGE UP (ref 135–145)
SODIUM SERPL-SCNC: 138 MMOL/L — SIGNIFICANT CHANGE UP (ref 135–145)
TARGETS BLD QL SMEAR: SLIGHT — SIGNIFICANT CHANGE UP
TARGETS BLD QL SMEAR: SLIGHT — SIGNIFICANT CHANGE UP
VANCOMYCIN TROUGH SERPL-MCNC: 11.4 UG/ML — SIGNIFICANT CHANGE UP (ref 10–20)
VANCOMYCIN TROUGH SERPL-MCNC: 11.4 UG/ML — SIGNIFICANT CHANGE UP (ref 10–20)
WBC # BLD: 16.34 K/UL — HIGH (ref 3.8–10.5)
WBC # BLD: 16.34 K/UL — HIGH (ref 3.8–10.5)
WBC # FLD AUTO: 16.34 K/UL — HIGH (ref 3.8–10.5)
WBC # FLD AUTO: 16.34 K/UL — HIGH (ref 3.8–10.5)

## 2023-12-10 PROCEDURE — 99231 SBSQ HOSP IP/OBS SF/LOW 25: CPT

## 2023-12-10 RX ORDER — SODIUM,POTASSIUM PHOSPHATES 278-250MG
1 POWDER IN PACKET (EA) ORAL
Refills: 0 | Status: COMPLETED | OUTPATIENT
Start: 2023-12-10 | End: 2023-12-10

## 2023-12-10 RX ORDER — MAGNESIUM OXIDE 400 MG ORAL TABLET 241.3 MG
400 TABLET ORAL ONCE
Refills: 0 | Status: COMPLETED | OUTPATIENT
Start: 2023-12-10 | End: 2023-12-10

## 2023-12-10 RX ADMIN — Medication 100 MILLIGRAM(S): at 11:41

## 2023-12-10 RX ADMIN — HYDROMORPHONE HYDROCHLORIDE 0.5 MILLIGRAM(S): 2 INJECTION INTRAMUSCULAR; INTRAVENOUS; SUBCUTANEOUS at 16:11

## 2023-12-10 RX ADMIN — CHLORHEXIDINE GLUCONATE 1 APPLICATION(S): 213 SOLUTION TOPICAL at 11:45

## 2023-12-10 RX ADMIN — METHOCARBAMOL 750 MILLIGRAM(S): 500 TABLET, FILM COATED ORAL at 06:37

## 2023-12-10 RX ADMIN — DIVALPROEX SODIUM 500 MILLIGRAM(S): 500 TABLET, DELAYED RELEASE ORAL at 06:36

## 2023-12-10 RX ADMIN — PANTOPRAZOLE SODIUM 40 MILLIGRAM(S): 20 TABLET, DELAYED RELEASE ORAL at 06:40

## 2023-12-10 RX ADMIN — HYDROMORPHONE HYDROCHLORIDE 0.5 MILLIGRAM(S): 2 INJECTION INTRAMUSCULAR; INTRAVENOUS; SUBCUTANEOUS at 07:55

## 2023-12-10 RX ADMIN — Medication 250 MILLIGRAM(S): at 17:21

## 2023-12-10 RX ADMIN — HYDROMORPHONE HYDROCHLORIDE 0.5 MILLIGRAM(S): 2 INJECTION INTRAMUSCULAR; INTRAVENOUS; SUBCUTANEOUS at 12:10

## 2023-12-10 RX ADMIN — HYDROMORPHONE HYDROCHLORIDE 0.5 MILLIGRAM(S): 2 INJECTION INTRAMUSCULAR; INTRAVENOUS; SUBCUTANEOUS at 22:40

## 2023-12-10 RX ADMIN — GABAPENTIN 100 MILLIGRAM(S): 400 CAPSULE ORAL at 21:37

## 2023-12-10 RX ADMIN — HYDROMORPHONE HYDROCHLORIDE 0.5 MILLIGRAM(S): 2 INJECTION INTRAMUSCULAR; INTRAVENOUS; SUBCUTANEOUS at 13:11

## 2023-12-10 RX ADMIN — LIDOCAINE 3 PATCH: 4 CREAM TOPICAL at 23:31

## 2023-12-10 RX ADMIN — Medication 250 MILLIGRAM(S): at 06:37

## 2023-12-10 RX ADMIN — Medication 250 MILLIGRAM(S): at 00:11

## 2023-12-10 RX ADMIN — HYDROMORPHONE HYDROCHLORIDE 0.5 MILLIGRAM(S): 2 INJECTION INTRAMUSCULAR; INTRAVENOUS; SUBCUTANEOUS at 17:10

## 2023-12-10 RX ADMIN — Medication 975 MILLIGRAM(S): at 06:36

## 2023-12-10 RX ADMIN — Medication 1 PATCH: at 21:38

## 2023-12-10 RX ADMIN — Medication 5 MILLIGRAM(S): at 21:37

## 2023-12-10 RX ADMIN — Medication 1 PACKET(S): at 07:54

## 2023-12-10 RX ADMIN — QUETIAPINE FUMARATE 125 MILLIGRAM(S): 200 TABLET, FILM COATED ORAL at 21:37

## 2023-12-10 RX ADMIN — HYDROMORPHONE HYDROCHLORIDE 0.5 MILLIGRAM(S): 2 INJECTION INTRAMUSCULAR; INTRAVENOUS; SUBCUTANEOUS at 21:36

## 2023-12-10 RX ADMIN — METHOCARBAMOL 750 MILLIGRAM(S): 500 TABLET, FILM COATED ORAL at 13:51

## 2023-12-10 RX ADMIN — Medication 975 MILLIGRAM(S): at 13:11

## 2023-12-10 RX ADMIN — Medication 975 MILLIGRAM(S): at 17:15

## 2023-12-10 RX ADMIN — Medication 975 MILLIGRAM(S): at 17:34

## 2023-12-10 RX ADMIN — ENOXAPARIN SODIUM 40 MILLIGRAM(S): 100 INJECTION SUBCUTANEOUS at 06:36

## 2023-12-10 RX ADMIN — AMLODIPINE BESYLATE 5 MILLIGRAM(S): 2.5 TABLET ORAL at 06:37

## 2023-12-10 RX ADMIN — Medication 2 MILLIGRAM(S): at 17:16

## 2023-12-10 RX ADMIN — OXYCODONE HYDROCHLORIDE 10 MILLIGRAM(S): 5 TABLET ORAL at 20:41

## 2023-12-10 RX ADMIN — Medication 1 PATCH: at 19:52

## 2023-12-10 RX ADMIN — DIVALPROEX SODIUM 500 MILLIGRAM(S): 500 TABLET, DELAYED RELEASE ORAL at 17:16

## 2023-12-10 RX ADMIN — ENOXAPARIN SODIUM 40 MILLIGRAM(S): 100 INJECTION SUBCUTANEOUS at 17:15

## 2023-12-10 RX ADMIN — Medication 1 PATCH: at 21:39

## 2023-12-10 RX ADMIN — Medication 2 MILLIGRAM(S): at 06:40

## 2023-12-10 RX ADMIN — MAGNESIUM OXIDE 400 MG ORAL TABLET 400 MILLIGRAM(S): 241.3 TABLET ORAL at 13:51

## 2023-12-10 RX ADMIN — OXYCODONE HYDROCHLORIDE 10 MILLIGRAM(S): 5 TABLET ORAL at 21:40

## 2023-12-10 RX ADMIN — METHOCARBAMOL 750 MILLIGRAM(S): 500 TABLET, FILM COATED ORAL at 21:37

## 2023-12-10 RX ADMIN — LIDOCAINE 3 PATCH: 4 CREAM TOPICAL at 11:41

## 2023-12-10 RX ADMIN — Medication 975 MILLIGRAM(S): at 11:41

## 2023-12-10 RX ADMIN — Medication 975 MILLIGRAM(S): at 00:11

## 2023-12-10 RX ADMIN — HYDROMORPHONE HYDROCHLORIDE 0.5 MILLIGRAM(S): 2 INJECTION INTRAMUSCULAR; INTRAVENOUS; SUBCUTANEOUS at 08:55

## 2023-12-10 RX ADMIN — Medication 1 PACKET(S): at 17:16

## 2023-12-10 RX ADMIN — Medication 1 MILLIGRAM(S): at 11:41

## 2023-12-10 RX ADMIN — LIDOCAINE 3 PATCH: 4 CREAM TOPICAL at 19:52

## 2023-12-10 NOTE — PROGRESS NOTE ADULT - SUBJECTIVE AND OBJECTIVE BOX
Subjective: Patient seen and examined at bedside, no acute complaints.     MEDICATIONS  (STANDING):  acetaminophen     Tablet .. 975 milliGRAM(s) Oral every 6 hours  amLODIPine   Tablet 5 milliGRAM(s) Oral daily  benztropine 2 milliGRAM(s) Oral two times a day  chlorhexidine 2% Cloths 1 Application(s) Topical daily  divalproex  milliGRAM(s) Oral every 12 hours  enoxaparin Injectable 40 milliGRAM(s) SubCutaneous every 12 hours  folic acid 1 milliGRAM(s) Oral daily  gabapentin 100 milliGRAM(s) Oral at bedtime  influenza   Vaccine 0.5 milliLiter(s) IntraMuscular once  lidocaine   4% Patch 3 Patch Transdermal daily  melatonin 5 milliGRAM(s) Oral at bedtime  methocarbamol 750 milliGRAM(s) Oral every 8 hours  nicotine - 21 mG/24Hr(s) Patch 1 Patch Transdermal every 24 hours  pantoprazole    Tablet 40 milliGRAM(s) Oral before breakfast  polyethylene glycol 3350 17 Gram(s) Oral at bedtime  potassium phosphate / sodium phosphate Powder (PHOS-NaK) 1 Packet(s) Oral two times a day with meals  QUEtiapine 125 milliGRAM(s) Oral at bedtime  senna 2 Tablet(s) Oral at bedtime  thiamine 100 milliGRAM(s) Oral daily  vancomycin  IVPB 1000 milliGRAM(s) IV Intermittent every 12 hours    MEDICATIONS  (PRN):  albuterol/ipratropium for Nebulization 3 milliLiter(s) Nebulizer every 6 hours PRN Shortness of Breath and/or Wheezing  calcium carbonate    500 mG (Tums) Chewable 1 Tablet(s) Chew every 6 hours PRN Heartburn  HYDROmorphone  Injectable 0.5 milliGRAM(s) IV Push every 4 hours PRN breakthrough  mineral oil enema 133 milliLiter(s) Rectal daily PRN constipation  OLANZapine Injectable 5 milliGRAM(s) IntraMuscular every 12 hours PRN agitation  ondansetron Injectable 4 milliGRAM(s) IV Push every 6 hours PRN Nausea  oxyCODONE    IR 5 milliGRAM(s) Oral every 4 hours PRN Moderate Pain (4 - 6)  oxyCODONE    IR 10 milliGRAM(s) Oral every 4 hours PRN Severe Pain (7 - 10)    Vital Signs Last 24 Hrs  T(C): 36.9 (10 Dec 2023 00:02), Max: 36.9 (09 Dec 2023 16:30)  T(F): 98.4 (10 Dec 2023 00:02), Max: 98.4 (09 Dec 2023 16:30)  HR: 102 (10 Dec 2023 00:02) (89 - 108)  BP: 105/57 (10 Dec 2023 00:02) (105/57 - 157/98)  BP(mean): --  RR: 18 (10 Dec 2023 00:02) (17 - 19)  SpO2: 93% (10 Dec 2023 00:02) (91% - 93%)  Parameters below as of 10 Dec 2023 00:02  Patient On (Oxygen Delivery Method): nasal cannula  O2 Flow (L/min): 2    Physical Exam:  Constitutional: NAD  HEENT: PERRL, EOMI  Respiratory: Respirations non-labored, no accessory muscle use, blowhole site healing   Gastrointestinal: Soft, non-tender, + distention   Extremities: Moves all 4 extremities spontaneously, RUE sensation, strength and motor intact, dressing c/d/i   Neurological: A&O x 3; without gross deficit    LABS:  pending     A: Patient is a 59 yo M with hx of alcohol use, s/p fall down the stairs sustaining multiple injuries, including L 5-8th rib fx, 10th R rib fx, L ptx, delirium, asthma exacerbation, and most recently MRSA bacteremia, on Vanc and ID following. He is s/p L blowhole for release of subQ emphysema 11/29, s/p rib block 11/29, s/p L pigtail 11/30, s/p repeat rib block 12/4, and most recently s/p I&D and excision of infected thrombosed Rt cephalic and basilic vein 12/8.     Plan:   Cont Vanc, ID following   F/u TTE   Monitor RUE   DVT ppx   Regular diet   Local wound care to RUE   pic protocol   Cont seroquel   Bowel regimen   AM labs   PT/OT--> Home PT

## 2023-12-10 NOTE — PROGRESS NOTE ADULT - NS ATTEND AMEND GEN_ALL_CORE FT
Above assessment noted.  The patient was seen and examined by myself with the surgical PA.  The patient is without new complaints or events overnight.  Echo is still pending. Continue with antibiotics for bacteremia, ID follow up.  Trauma stable.

## 2023-12-10 NOTE — PROGRESS NOTE ADULT - ASSESSMENT
59 y/o male s/p fall with rib fracture, L pneumothorax, EtOH use, delirium, and asthma exacerbation, found to have R UE septic thrombophlebitis, sp excision of R cephalic and basilic vein for suppurative thrombophlebitis, packing daily.   WBC trending down, wound with no purulence output     PLAN  -packing change daily  -Continue Abx   -F/U Cultures

## 2023-12-10 NOTE — PHARMACOTHERAPY INTERVENTION NOTE - COMMENTS
As per policy, ordered a vancomycin trough for 12/10 at 4pm in order to assist with vancomycin pharmacokinetic monitoring.    Vikram Reed, PharmD, [facility]  Clinical Pharmacy Specialist, Infectious Diseases  Tele-Antimicrobial Stewardship Program (Tele-ASP)  Tele-ASP Phone: [phone]   As per policy, ordered a vancomycin trough for 12/10 at 4pm in order to assist with vancomycin pharmacokinetic monitoring.    Vikram Reed, PharmD, [facility]  Clinical Pharmacy Specialist, Infectious Diseases  Tele-Antimicrobial Stewardship Program (Tele-ASP)  Tele-ASP Phone: [phone]

## 2023-12-10 NOTE — PROGRESS NOTE ADULT - SUBJECTIVE AND OBJECTIVE BOX
INTERVAL HPI/OVERNIGHT EVENTS:    Patient evaluated at bedside. No acute distress. No acute events overnight.  Afebrile    MEDICATIONS  (STANDING):  acetaminophen     Tablet .. 975 milliGRAM(s) Oral every 6 hours  amLODIPine   Tablet 5 milliGRAM(s) Oral daily  benztropine 2 milliGRAM(s) Oral two times a day  chlorhexidine 2% Cloths 1 Application(s) Topical daily  divalproex  milliGRAM(s) Oral every 12 hours  enoxaparin Injectable 40 milliGRAM(s) SubCutaneous every 12 hours  folic acid 1 milliGRAM(s) Oral daily  gabapentin 100 milliGRAM(s) Oral at bedtime  influenza   Vaccine 0.5 milliLiter(s) IntraMuscular once  lidocaine   4% Patch 3 Patch Transdermal daily  magnesium oxide 400 milliGRAM(s) Oral once  melatonin 5 milliGRAM(s) Oral at bedtime  methocarbamol 750 milliGRAM(s) Oral every 8 hours  nicotine - 21 mG/24Hr(s) Patch 1 Patch Transdermal every 24 hours  pantoprazole    Tablet 40 milliGRAM(s) Oral before breakfast  polyethylene glycol 3350 17 Gram(s) Oral at bedtime  potassium phosphate / sodium phosphate Powder (PHOS-NaK) 1 Packet(s) Oral two times a day with meals  QUEtiapine 125 milliGRAM(s) Oral at bedtime  senna 2 Tablet(s) Oral at bedtime  thiamine 100 milliGRAM(s) Oral daily  vancomycin  IVPB 1000 milliGRAM(s) IV Intermittent every 12 hours    MEDICATIONS  (PRN):  albuterol/ipratropium for Nebulization 3 milliLiter(s) Nebulizer every 6 hours PRN Shortness of Breath and/or Wheezing  calcium carbonate    500 mG (Tums) Chewable 1 Tablet(s) Chew every 6 hours PRN Heartburn  HYDROmorphone  Injectable 0.5 milliGRAM(s) IV Push every 4 hours PRN breakthrough  mineral oil enema 133 milliLiter(s) Rectal daily PRN constipation  OLANZapine Injectable 5 milliGRAM(s) IntraMuscular every 12 hours PRN agitation  ondansetron Injectable 4 milliGRAM(s) IV Push every 6 hours PRN Nausea  oxyCODONE    IR 5 milliGRAM(s) Oral every 4 hours PRN Moderate Pain (4 - 6)  oxyCODONE    IR 10 milliGRAM(s) Oral every 4 hours PRN Severe Pain (7 - 10)      Vital Signs Last 24 Hrs  T(C): 36.7 (10 Dec 2023 08:56), Max: 36.9 (09 Dec 2023 16:30)  T(F): 98 (10 Dec 2023 08:56), Max: 98.4 (09 Dec 2023 16:30)  HR: 100 (10 Dec 2023 08:56) (88 - 102)  BP: 150/82 (10 Dec 2023 08:56) (105/57 - 150/82)  BP(mean): --  RR: 17 (10 Dec 2023 08:56) (17 - 19)  SpO2: 95% (10 Dec 2023 08:56) (91% - 95%)    Parameters below as of 10 Dec 2023 08:56  Patient On (Oxygen Delivery Method): nasal cannula  O2 Flow (L/min): 2    Physical Exam:  Constitutional: NAD  HEENT: PERRL, EOMI  Respiratory: Respirations non-labored, no accessory muscle use, blowhole site healing   Gastrointestinal: Soft, non-tender, + distention   Extremities: Moves all 4 extremities spontaneously, RUE sensation, strength and motor intact, dressing c/d/i   Neurological: A&O x 3; without gross deficit      I&O's Detail    09 Dec 2023 07:01  -  10 Dec 2023 07:00  --------------------------------------------------------  IN:    Oral Fluid: 340 mL  Total IN: 340 mL    OUT:  Total OUT: 0 mL    Total NET: 340 mL          LABS:                        8.4    16.34 )-----------( 203      ( 10 Dec 2023 07:30 )             26.6     12-10    138  |  98  |  12.0  ----------------------------<  105<H>  3.7   |  27.0  |  0.53    Ca    7.9<L>      10 Dec 2023 07:30  Phos  3.4     12-10  Mg     1.7     12-10    TPro  5.4<L>  /  Alb  2.4<L>  /  TBili  0.3<L>  /  DBili  x   /  AST  14  /  ALT  25  /  AlkPhos  99  12-09      Urinalysis Basic - ( 10 Dec 2023 07:30 )    Color: x / Appearance: x / SG: x / pH: x  Gluc: 105 mg/dL / Ketone: x  / Bili: x / Urobili: x   Blood: x / Protein: x / Nitrite: x   Leuk Esterase: x / RBC: x / WBC x   Sq Epi: x / Non Sq Epi: x / Bacteria: x        RADIOLOGY & ADDITIONAL STUDIES:

## 2023-12-11 LAB
ANION GAP SERPL CALC-SCNC: 9 MMOL/L — SIGNIFICANT CHANGE UP (ref 5–17)
ANION GAP SERPL CALC-SCNC: 9 MMOL/L — SIGNIFICANT CHANGE UP (ref 5–17)
BASOPHILS # BLD AUTO: 0.04 K/UL — SIGNIFICANT CHANGE UP (ref 0–0.2)
BASOPHILS # BLD AUTO: 0.04 K/UL — SIGNIFICANT CHANGE UP (ref 0–0.2)
BASOPHILS NFR BLD AUTO: 0.3 % — SIGNIFICANT CHANGE UP (ref 0–2)
BASOPHILS NFR BLD AUTO: 0.3 % — SIGNIFICANT CHANGE UP (ref 0–2)
BUN SERPL-MCNC: 7.9 MG/DL — LOW (ref 8–20)
BUN SERPL-MCNC: 7.9 MG/DL — LOW (ref 8–20)
CALCIUM SERPL-MCNC: 7.9 MG/DL — LOW (ref 8.4–10.5)
CALCIUM SERPL-MCNC: 7.9 MG/DL — LOW (ref 8.4–10.5)
CHLORIDE SERPL-SCNC: 100 MMOL/L — SIGNIFICANT CHANGE UP (ref 96–108)
CHLORIDE SERPL-SCNC: 100 MMOL/L — SIGNIFICANT CHANGE UP (ref 96–108)
CO2 SERPL-SCNC: 32 MMOL/L — HIGH (ref 22–29)
CO2 SERPL-SCNC: 32 MMOL/L — HIGH (ref 22–29)
CREAT SERPL-MCNC: 0.59 MG/DL — SIGNIFICANT CHANGE UP (ref 0.5–1.3)
CREAT SERPL-MCNC: 0.59 MG/DL — SIGNIFICANT CHANGE UP (ref 0.5–1.3)
EGFR: 112 ML/MIN/1.73M2 — SIGNIFICANT CHANGE UP
EGFR: 112 ML/MIN/1.73M2 — SIGNIFICANT CHANGE UP
EOSINOPHIL # BLD AUTO: 0.44 K/UL — SIGNIFICANT CHANGE UP (ref 0–0.5)
EOSINOPHIL # BLD AUTO: 0.44 K/UL — SIGNIFICANT CHANGE UP (ref 0–0.5)
EOSINOPHIL NFR BLD AUTO: 3.1 % — SIGNIFICANT CHANGE UP (ref 0–6)
EOSINOPHIL NFR BLD AUTO: 3.1 % — SIGNIFICANT CHANGE UP (ref 0–6)
GLUCOSE SERPL-MCNC: 96 MG/DL — SIGNIFICANT CHANGE UP (ref 70–99)
GLUCOSE SERPL-MCNC: 96 MG/DL — SIGNIFICANT CHANGE UP (ref 70–99)
HCT VFR BLD CALC: 26 % — LOW (ref 39–50)
HCT VFR BLD CALC: 26 % — LOW (ref 39–50)
HGB BLD-MCNC: 8.3 G/DL — LOW (ref 13–17)
HGB BLD-MCNC: 8.3 G/DL — LOW (ref 13–17)
IMM GRANULOCYTES NFR BLD AUTO: 5.9 % — HIGH (ref 0–0.9)
IMM GRANULOCYTES NFR BLD AUTO: 5.9 % — HIGH (ref 0–0.9)
LYMPHOCYTES # BLD AUTO: 2.99 K/UL — SIGNIFICANT CHANGE UP (ref 1–3.3)
LYMPHOCYTES # BLD AUTO: 2.99 K/UL — SIGNIFICANT CHANGE UP (ref 1–3.3)
LYMPHOCYTES # BLD AUTO: 20.7 % — SIGNIFICANT CHANGE UP (ref 13–44)
LYMPHOCYTES # BLD AUTO: 20.7 % — SIGNIFICANT CHANGE UP (ref 13–44)
MAGNESIUM SERPL-MCNC: 1.8 MG/DL — SIGNIFICANT CHANGE UP (ref 1.8–2.6)
MAGNESIUM SERPL-MCNC: 1.8 MG/DL — SIGNIFICANT CHANGE UP (ref 1.8–2.6)
MCHC RBC-ENTMCNC: 27.2 PG — SIGNIFICANT CHANGE UP (ref 27–34)
MCHC RBC-ENTMCNC: 27.2 PG — SIGNIFICANT CHANGE UP (ref 27–34)
MCHC RBC-ENTMCNC: 31.9 GM/DL — LOW (ref 32–36)
MCHC RBC-ENTMCNC: 31.9 GM/DL — LOW (ref 32–36)
MCV RBC AUTO: 85.2 FL — SIGNIFICANT CHANGE UP (ref 80–100)
MCV RBC AUTO: 85.2 FL — SIGNIFICANT CHANGE UP (ref 80–100)
MONOCYTES # BLD AUTO: 2.08 K/UL — HIGH (ref 0–0.9)
MONOCYTES # BLD AUTO: 2.08 K/UL — HIGH (ref 0–0.9)
MONOCYTES NFR BLD AUTO: 14.4 % — HIGH (ref 2–14)
MONOCYTES NFR BLD AUTO: 14.4 % — HIGH (ref 2–14)
NEUTROPHILS # BLD AUTO: 8.01 K/UL — HIGH (ref 1.8–7.4)
NEUTROPHILS # BLD AUTO: 8.01 K/UL — HIGH (ref 1.8–7.4)
NEUTROPHILS NFR BLD AUTO: 55.6 % — SIGNIFICANT CHANGE UP (ref 43–77)
NEUTROPHILS NFR BLD AUTO: 55.6 % — SIGNIFICANT CHANGE UP (ref 43–77)
PHOSPHATE SERPL-MCNC: 3.6 MG/DL — SIGNIFICANT CHANGE UP (ref 2.4–4.7)
PHOSPHATE SERPL-MCNC: 3.6 MG/DL — SIGNIFICANT CHANGE UP (ref 2.4–4.7)
PLATELET # BLD AUTO: 392 K/UL — SIGNIFICANT CHANGE UP (ref 150–400)
PLATELET # BLD AUTO: 392 K/UL — SIGNIFICANT CHANGE UP (ref 150–400)
POTASSIUM SERPL-MCNC: 3.5 MMOL/L — SIGNIFICANT CHANGE UP (ref 3.5–5.3)
POTASSIUM SERPL-MCNC: 3.5 MMOL/L — SIGNIFICANT CHANGE UP (ref 3.5–5.3)
POTASSIUM SERPL-SCNC: 3.5 MMOL/L — SIGNIFICANT CHANGE UP (ref 3.5–5.3)
POTASSIUM SERPL-SCNC: 3.5 MMOL/L — SIGNIFICANT CHANGE UP (ref 3.5–5.3)
RBC # BLD: 3.05 M/UL — LOW (ref 4.2–5.8)
RBC # BLD: 3.05 M/UL — LOW (ref 4.2–5.8)
RBC # FLD: 17.2 % — HIGH (ref 10.3–14.5)
RBC # FLD: 17.2 % — HIGH (ref 10.3–14.5)
SODIUM SERPL-SCNC: 140 MMOL/L — SIGNIFICANT CHANGE UP (ref 135–145)
SODIUM SERPL-SCNC: 140 MMOL/L — SIGNIFICANT CHANGE UP (ref 135–145)
WBC # BLD: 14.41 K/UL — HIGH (ref 3.8–10.5)
WBC # BLD: 14.41 K/UL — HIGH (ref 3.8–10.5)
WBC # FLD AUTO: 14.41 K/UL — HIGH (ref 3.8–10.5)
WBC # FLD AUTO: 14.41 K/UL — HIGH (ref 3.8–10.5)

## 2023-12-11 PROCEDURE — 99232 SBSQ HOSP IP/OBS MODERATE 35: CPT

## 2023-12-11 PROCEDURE — 93306 TTE W/DOPPLER COMPLETE: CPT | Mod: 26

## 2023-12-11 RX ORDER — MAGNESIUM SULFATE 500 MG/ML
2 VIAL (ML) INJECTION ONCE
Refills: 0 | Status: COMPLETED | OUTPATIENT
Start: 2023-12-11 | End: 2023-12-11

## 2023-12-11 RX ORDER — HYDROMORPHONE HYDROCHLORIDE 2 MG/ML
0.5 INJECTION INTRAMUSCULAR; INTRAVENOUS; SUBCUTANEOUS EVERY 4 HOURS
Refills: 0 | Status: DISCONTINUED | OUTPATIENT
Start: 2023-12-11 | End: 2023-12-13

## 2023-12-11 RX ORDER — IBUPROFEN 200 MG
400 TABLET ORAL EVERY 6 HOURS
Refills: 0 | Status: DISCONTINUED | OUTPATIENT
Start: 2023-12-11 | End: 2023-12-20

## 2023-12-11 RX ORDER — VANCOMYCIN HCL 1 G
1250 VIAL (EA) INTRAVENOUS EVERY 12 HOURS
Refills: 0 | Status: DISCONTINUED | OUTPATIENT
Start: 2023-12-11 | End: 2023-12-12

## 2023-12-11 RX ORDER — BENZTROPINE MESYLATE 1 MG
2 TABLET ORAL
Refills: 0 | Status: DISCONTINUED | OUTPATIENT
Start: 2023-12-11 | End: 2023-12-20

## 2023-12-11 RX ORDER — METHOCARBAMOL 500 MG/1
1000 TABLET, FILM COATED ORAL EVERY 8 HOURS
Refills: 0 | Status: DISCONTINUED | OUTPATIENT
Start: 2023-12-11 | End: 2023-12-20

## 2023-12-11 RX ORDER — POTASSIUM CHLORIDE 20 MEQ
40 PACKET (EA) ORAL EVERY 4 HOURS
Refills: 0 | Status: COMPLETED | OUTPATIENT
Start: 2023-12-11 | End: 2023-12-11

## 2023-12-11 RX ADMIN — Medication 1 PATCH: at 09:21

## 2023-12-11 RX ADMIN — OXYCODONE HYDROCHLORIDE 10 MILLIGRAM(S): 5 TABLET ORAL at 23:20

## 2023-12-11 RX ADMIN — Medication 975 MILLIGRAM(S): at 00:21

## 2023-12-11 RX ADMIN — HYDROMORPHONE HYDROCHLORIDE 0.5 MILLIGRAM(S): 2 INJECTION INTRAMUSCULAR; INTRAVENOUS; SUBCUTANEOUS at 19:45

## 2023-12-11 RX ADMIN — Medication 40 MILLIEQUIVALENT(S): at 11:13

## 2023-12-11 RX ADMIN — LIDOCAINE 3 PATCH: 4 CREAM TOPICAL at 23:15

## 2023-12-11 RX ADMIN — HYDROMORPHONE HYDROCHLORIDE 0.5 MILLIGRAM(S): 2 INJECTION INTRAMUSCULAR; INTRAVENOUS; SUBCUTANEOUS at 14:52

## 2023-12-11 RX ADMIN — OXYCODONE HYDROCHLORIDE 10 MILLIGRAM(S): 5 TABLET ORAL at 22:20

## 2023-12-11 RX ADMIN — ENOXAPARIN SODIUM 40 MILLIGRAM(S): 100 INJECTION SUBCUTANEOUS at 04:37

## 2023-12-11 RX ADMIN — Medication 975 MILLIGRAM(S): at 11:13

## 2023-12-11 RX ADMIN — GABAPENTIN 100 MILLIGRAM(S): 400 CAPSULE ORAL at 21:45

## 2023-12-11 RX ADMIN — Medication 1 MILLIGRAM(S): at 11:14

## 2023-12-11 RX ADMIN — PANTOPRAZOLE SODIUM 40 MILLIGRAM(S): 20 TABLET, DELAYED RELEASE ORAL at 04:37

## 2023-12-11 RX ADMIN — Medication 2 MILLIGRAM(S): at 04:38

## 2023-12-11 RX ADMIN — OXYCODONE HYDROCHLORIDE 10 MILLIGRAM(S): 5 TABLET ORAL at 11:18

## 2023-12-11 RX ADMIN — ENOXAPARIN SODIUM 40 MILLIGRAM(S): 100 INJECTION SUBCUTANEOUS at 17:48

## 2023-12-11 RX ADMIN — Medication 40 MILLIEQUIVALENT(S): at 15:39

## 2023-12-11 RX ADMIN — QUETIAPINE FUMARATE 125 MILLIGRAM(S): 200 TABLET, FILM COATED ORAL at 21:44

## 2023-12-11 RX ADMIN — DIVALPROEX SODIUM 500 MILLIGRAM(S): 500 TABLET, DELAYED RELEASE ORAL at 17:47

## 2023-12-11 RX ADMIN — OXYCODONE HYDROCHLORIDE 10 MILLIGRAM(S): 5 TABLET ORAL at 18:53

## 2023-12-11 RX ADMIN — Medication 400 MILLIGRAM(S): at 11:14

## 2023-12-11 RX ADMIN — Medication 975 MILLIGRAM(S): at 11:14

## 2023-12-11 RX ADMIN — Medication 100 MILLIGRAM(S): at 11:13

## 2023-12-11 RX ADMIN — HYDROMORPHONE HYDROCHLORIDE 0.5 MILLIGRAM(S): 2 INJECTION INTRAMUSCULAR; INTRAVENOUS; SUBCUTANEOUS at 06:47

## 2023-12-11 RX ADMIN — Medication 1 PATCH: at 21:43

## 2023-12-11 RX ADMIN — OXYCODONE HYDROCHLORIDE 10 MILLIGRAM(S): 5 TABLET ORAL at 04:38

## 2023-12-11 RX ADMIN — Medication 975 MILLIGRAM(S): at 04:38

## 2023-12-11 RX ADMIN — Medication 1 PATCH: at 21:46

## 2023-12-11 RX ADMIN — METHOCARBAMOL 1000 MILLIGRAM(S): 500 TABLET, FILM COATED ORAL at 21:45

## 2023-12-11 RX ADMIN — Medication 5 MILLIGRAM(S): at 21:45

## 2023-12-11 RX ADMIN — OXYCODONE HYDROCHLORIDE 10 MILLIGRAM(S): 5 TABLET ORAL at 12:18

## 2023-12-11 RX ADMIN — METHOCARBAMOL 750 MILLIGRAM(S): 500 TABLET, FILM COATED ORAL at 04:37

## 2023-12-11 RX ADMIN — Medication 1 PATCH: at 20:07

## 2023-12-11 RX ADMIN — Medication 166.67 MILLIGRAM(S): at 17:49

## 2023-12-11 RX ADMIN — METHOCARBAMOL 1000 MILLIGRAM(S): 500 TABLET, FILM COATED ORAL at 15:38

## 2023-12-11 RX ADMIN — LIDOCAINE 3 PATCH: 4 CREAM TOPICAL at 11:14

## 2023-12-11 RX ADMIN — CHLORHEXIDINE GLUCONATE 1 APPLICATION(S): 213 SOLUTION TOPICAL at 11:24

## 2023-12-11 RX ADMIN — Medication 2 MILLIGRAM(S): at 17:48

## 2023-12-11 RX ADMIN — HYDROMORPHONE HYDROCHLORIDE 0.5 MILLIGRAM(S): 2 INJECTION INTRAMUSCULAR; INTRAVENOUS; SUBCUTANEOUS at 20:45

## 2023-12-11 RX ADMIN — Medication 975 MILLIGRAM(S): at 17:53

## 2023-12-11 RX ADMIN — LIDOCAINE 3 PATCH: 4 CREAM TOPICAL at 19:42

## 2023-12-11 RX ADMIN — Medication 400 MILLIGRAM(S): at 12:14

## 2023-12-11 RX ADMIN — Medication 400 MILLIGRAM(S): at 17:47

## 2023-12-11 RX ADMIN — AMLODIPINE BESYLATE 5 MILLIGRAM(S): 2.5 TABLET ORAL at 04:37

## 2023-12-11 RX ADMIN — OXYCODONE HYDROCHLORIDE 10 MILLIGRAM(S): 5 TABLET ORAL at 17:46

## 2023-12-11 RX ADMIN — HYDROMORPHONE HYDROCHLORIDE 0.5 MILLIGRAM(S): 2 INJECTION INTRAMUSCULAR; INTRAVENOUS; SUBCUTANEOUS at 14:37

## 2023-12-11 RX ADMIN — Medication 25 GRAM(S): at 11:12

## 2023-12-11 RX ADMIN — DIVALPROEX SODIUM 500 MILLIGRAM(S): 500 TABLET, DELAYED RELEASE ORAL at 04:37

## 2023-12-11 RX ADMIN — Medication 250 MILLIGRAM(S): at 04:36

## 2023-12-11 RX ADMIN — Medication 975 MILLIGRAM(S): at 18:53

## 2023-12-11 NOTE — PROGRESS NOTE ADULT - SUBJECTIVE AND OBJECTIVE BOX
Patient seen earlier today  staples removed from the right upper ext antecubital wound, packing removed  wound clean  irregular v-shaped, 15x 1cm x 1 cm deep  Black granulofoam wound vac placed    -- pressure settings for wound vac 125mmhg  -- will reassess in 2-3 days

## 2023-12-11 NOTE — PROGRESS NOTE ADULT - SUBJECTIVE AND OBJECTIVE BOX
INTERVAL HPI/OVERNIGHT EVENTS:    Patient evaluated at bedside. No acute distress. No acute events overnight.    MEDICATIONS  (STANDING):  acetaminophen     Tablet .. 975 milliGRAM(s) Oral every 6 hours  amLODIPine   Tablet 5 milliGRAM(s) Oral daily  benztropine 2 milliGRAM(s) Oral two times a day  chlorhexidine 2% Cloths 1 Application(s) Topical daily  divalproex  milliGRAM(s) Oral every 12 hours  enoxaparin Injectable 40 milliGRAM(s) SubCutaneous every 12 hours  folic acid 1 milliGRAM(s) Oral daily  gabapentin 100 milliGRAM(s) Oral at bedtime  ibuprofen  Tablet. 400 milliGRAM(s) Oral every 6 hours  influenza   Vaccine 0.5 milliLiter(s) IntraMuscular once  lidocaine   4% Patch 3 Patch Transdermal daily  melatonin 5 milliGRAM(s) Oral at bedtime  methocarbamol 1000 milliGRAM(s) Oral every 8 hours  nicotine - 21 mG/24Hr(s) Patch 1 Patch Transdermal every 24 hours  pantoprazole    Tablet 40 milliGRAM(s) Oral before breakfast  polyethylene glycol 3350 17 Gram(s) Oral at bedtime  QUEtiapine 125 milliGRAM(s) Oral at bedtime  senna 2 Tablet(s) Oral at bedtime  thiamine 100 milliGRAM(s) Oral daily  vancomycin  IVPB 1000 milliGRAM(s) IV Intermittent every 12 hours    MEDICATIONS  (PRN):  albuterol/ipratropium for Nebulization 3 milliLiter(s) Nebulizer every 6 hours PRN Shortness of Breath and/or Wheezing  calcium carbonate    500 mG (Tums) Chewable 1 Tablet(s) Chew every 6 hours PRN Heartburn  HYDROmorphone  Injectable 0.5 milliGRAM(s) IV Push every 4 hours PRN breakthrough  mineral oil enema 133 milliLiter(s) Rectal daily PRN constipation  OLANZapine Injectable 5 milliGRAM(s) IntraMuscular every 12 hours PRN agitation  ondansetron Injectable 4 milliGRAM(s) IV Push every 6 hours PRN Nausea  oxyCODONE    IR 5 milliGRAM(s) Oral every 4 hours PRN Moderate Pain (4 - 6)  oxyCODONE    IR 10 milliGRAM(s) Oral every 4 hours PRN Severe Pain (7 - 10)      Vital Signs Last 24 Hrs  T(C): 36.9 (11 Dec 2023 04:27), Max: 36.9 (11 Dec 2023 00:00)  T(F): 98.4 (11 Dec 2023 04:27), Max: 98.5 (11 Dec 2023 00:00)  HR: 87 (11 Dec 2023 04:27) (83 - 100)  BP: 138/85 (11 Dec 2023 04:27) (112/76 - 150/82)  BP(mean): --  RR: 18 (11 Dec 2023 04:27) (17 - 18)  SpO2: 95% (11 Dec 2023 04:27) (94% - 95%)    Parameters below as of 10 Dec 2023 20:40  Patient On (Oxygen Delivery Method): nasal cannula  O2 Flow (L/min): 2      Constitutional: NAD  Respiratory: Nonlabored   Cardiovascular: Normotensive, Incision over the L infra-clavicular region  Gastrointestinal: Soft, non-tender, no hepatosplenomegaly, normal bowel sounds  Extremities: R UE incision repacked this morning, no purulent drainage, no overlying erythema, staples in place   Vascular: Equal and normal pulses: 2+ peripheral pulses throughout        I&O's Detail    10 Dec 2023 07:01  -  11 Dec 2023 07:00  --------------------------------------------------------  IN:  Total IN: 0 mL    OUT:    Voided (mL): 650 mL  Total OUT: 650 mL    Total NET: -650 mL          LABS:                        8.4    16.34 )-----------( 203      ( 10 Dec 2023 07:30 )             26.6     12-10    138  |  98  |  12.0  ----------------------------<  105<H>  3.7   |  27.0  |  0.53    Ca    7.9<L>      10 Dec 2023 07:30  Phos  3.4     12-10  Mg     1.7     12-10    TPro  5.4<L>  /  Alb  2.4<L>  /  TBili  0.3<L>  /  DBili  x   /  AST  14  /  ALT  25  /  AlkPhos  99  12-09      Urinalysis Basic - ( 10 Dec 2023 07:30 )    Color: x / Appearance: x / SG: x / pH: x  Gluc: 105 mg/dL / Ketone: x  / Bili: x / Urobili: x   Blood: x / Protein: x / Nitrite: x   Leuk Esterase: x / RBC: x / WBC x   Sq Epi: x / Non Sq Epi: x / Bacteria: x        RADIOLOGY & ADDITIONAL STUDIES:

## 2023-12-11 NOTE — PROGRESS NOTE ADULT - ASSESSMENT
57 year old male with PMH of ETOH abuse, HTN, HLD, seizures, tardive dyskinesia hiatal hernia s/p mechanical fall down stairs while intoxicated on 11/29. Pt intubated in ED for airway protection. Found to have left rib fractures 3, 6, 7, 10 and left pneumothorax with extensive subcutaneous emphysema requiring a left sided pigtail on 11/29. Hospital course complicated by acute agitation and acute asthma exacerbation requiring re-intubation on 12/1. Pt extubated 12/2. Transferred to floor 12/6 and noted to be febrile with uptrending WBC, rt arm edema erythema and reported purulent drainage. Found to have MRSA bacteremia, concern for cellulitis , septic thrombphlebitis    - 12/6 and 12/7 BCX + MRSA  - s/p I&D of septic thrombophlebitis of right arm >> Incision and drainage of Infected basilic and cephalic thrombophlebitis at antecubital level purulent discharge seen upon incision  - Repeat BCX 12/10-ngtd  - check TTE   - RUE venous doppler with sup thrombus of right cephalic vein   - Continue vancomycin  - monitor vanco trough and adjust per pharmacy protocol - last trough 11.4  - anticoagulation as per vascular  - will plan for PICC when ready for DC  - Trend Fever - afebrile   - Trend Leukocytosis-improving  - dc plan for [facility]    will f/u

## 2023-12-11 NOTE — PROGRESS NOTE ADULT - SUBJECTIVE AND OBJECTIVE BOX
[facility]  INFECTIOUS DISEASES at [place] [facility] [place]  ===============================================================                  Jordon Gonzales MD               Diplomates American Board of Internal Medicine & Infectious Diseases                * [place] Office - Appt - Tel  [phone] Fax [phone]                * [place] Office - Appt - Tel [phone] Fax [phone]                                  Hospital Consult line:  [phone]  ==============================================================    NOHEMY BRAN [id number]    Follow up: MRSA bacteremia        S/p I&D of right arm septic thrombophlebitis   No acute events overnight   afebrile and hemodynamically stable     I have personally reviewed the labs and data; pertinent labs and data are listed in this note; please see below.     _______________________________________________________________  REVIEW OF SYSTEMS  c/o pain left chest  has hardware in spine-chronic back pain, stable  ________________________________________________________________  Allergies:  No Known Allergies    ________________________________________________________________  PHYSICAL EXAM  GEN: in NAD, laying in bed  HEENT: Anicteric sclerae. Moist mucous membranes. No mucosal lesions.   NECK: Supple.  LUNGS: eupneic. CTA B/L.  HEART: RRR, no m/r/g  ABDOMEN: Soft, NT, ND,.  +BS.    : No CVA tenderness. No Deleon catheter  EXTREMITIES: RUE vac Minimal swelling.   NEUROLOGIC: Grossly no motor focal deficits   PSYCHIATRIC: Appropriate affect and mood  SKIN: ulcer in upper chest.   LINES: PIV   ________________________________________________________________  Vitals:  Vital Signs Last 24 Hrs  T(C): 36.6 (11 Dec 2023 20:58), Max: 36.9 (11 Dec 2023 00:00)  T(F): 97.9 (11 Dec 2023 20:58), Max: 98.5 (11 Dec 2023 00:00)  HR: 74 (11 Dec 2023 20:58) (70 - 92)  BP: 120/68 (11 Dec 2023 20:58) (112/76 - 146/75)  BP(mean): --  RR: 18 (11 Dec 2023 20:58) (18 - 18)  SpO2: 93% (11 Dec 2023 20:58) (93% - 95%)    Parameters below as of 11 Dec 2023 20:58  Patient On (Oxygen Delivery Method): nasal cannula  O2 Flow (L/min): 2      Current Antibiotics:  vancomycin  IVPB 500 milliGRAM(s) IV Intermittent every 12 hours    Other medications:  acetaminophen     Tablet .. 975 milliGRAM(s) Oral every 6 hours  amLODIPine   Tablet 5 milliGRAM(s) Oral daily  benztropine 2 milliGRAM(s) Oral two times a day  chlorhexidine 2% Cloths 1 Application(s) Topical daily  divalproex  milliGRAM(s) Oral every 12 hours  enoxaparin Injectable 40 milliGRAM(s) SubCutaneous every 12 hours  folic acid 1 milliGRAM(s) Oral daily  gabapentin 100 milliGRAM(s) Oral at bedtime  influenza   Vaccine 0.5 milliLiter(s) IntraMuscular once  lidocaine   4% Patch 3 Patch Transdermal daily  melatonin 5 milliGRAM(s) Oral at bedtime  methocarbamol 750 milliGRAM(s) Oral every 8 hours  nicotine - 21 mG/24Hr(s) Patch 1 Patch Transdermal every 24 hours  pantoprazole    Tablet 40 milliGRAM(s) Oral before breakfast  polyethylene glycol 3350 17 Gram(s) Oral at bedtime  QUEtiapine 125 milliGRAM(s) Oral at bedtime  senna 2 Tablet(s) Oral at bedtime  thiamine 100 milliGRAM(s) Oral daily                                8.3    14.41 )-----------( 392      ( 11 Dec 2023 06:57 )             26.0       12-11    140  |  100  |  7.9<L>  ----------------------------<  96  3.5   |  32.0<H>  |  0.59    Ca    7.9<L>      11 Dec 2023 06:57  Phos  3.6     12-11  Mg     1.8     12-11                Urinalysis Basic - ( 11 Dec 2023 06:57 )    Color: x / Appearance: x / SG: x / pH: x  Gluc: 96 mg/dL / Ketone: x  / Bili: x / Urobili: x   Blood: x / Protein: x / Nitrite: x   Leuk Esterase: x / RBC: x / WBC x   Sq Epi: x / Non Sq Epi: x / Bacteria: x                  CAPILLARY BLOOD GLUCOSE                  RECENT CULTURES:  12-07 @ 14:57 .Blood Blood-Peripheral     Growth in aerobic bottle: Gram Positive Cocci in Clusters  Growth in anaerobic bottle: Gram Positive Cocci in Clusters    Growth in aerobic bottle: Gram Positive Cocci in Clusters  Growth in anaerobic bottle: Gram Positive Cocci in Clusters      12-07 @ 14:52 .Blood Blood-Venous     Growth in aerobic bottle: Gram Positive Cocci in Clusters  Growth in anaerobic bottle: Gram Positive Cocci in Clusters    Growth in aerobic bottle: Gram Positive Cocci in Clusters  Growth in anaerobic bottle: Gram Positive Cocci in Clusters      12-06 @ 15:15 .Blood Blood Blood Culture PCR  Methicillin resistant Staphylococcus aureus    Growth in aerobic and anaerobic bottles: Methicillin Resistant  Staphylococcus aureus    Growth in aerobic bottle: Gram Positive Cocci in Clusters  Growth in anaerobic bottle: Gram Positive Cocci in Clusters      12-06 @ 15:10 .Blood Blood     Growth in aerobic and anaerobic bottles: Methicillin Resistant  Staphylococcus aureus  See previous culture [id number]    Growth in anaerobic bottle: Gram Positive Cocci in Clusters  Growth in aerobic bottle: Gram Positive Cocci in Clusters        WBC Count: 19.04 K/uL (12-09-23 @ 07:31)  WBC Count: 20.66 K/uL (12-08-23 @ 08:06)  WBC Count: 22.89 K/uL (12-07-23 @ 06:05)  WBC Count: 14.00 K/uL (12-06-23 @ 05:15)  WBC Count: 13.75 K/uL (12-05-23 @ 02:08)    Creatinine: 0.63 mg/dL (12-09-23 @ 07:31)  Creatinine: 1.17 mg/dL (12-08-23 @ 08:06)  Creatinine: 0.83 mg/dL (12-07-23 @ 06:05)  Creatinine: 0.60 mg/dL (12-06-23 @ 05:15)  Creatinine: 0.81 mg/dL (12-05-23 @ 02:08)      Vancomycin Level, Trough: 8.3 ug/mL (12-09-23 @ 07:31)  Vancomycin Level, Trough: 11.5 ug/mL (12-08-23 @ 18:40)    ________________________________________________________________  CARDIOLOGY   no recent studies   ________________________________________________________________  RADIOLOGY  < from: US Duplex Venous Upper Ext Ltd, Right (12.07.23 @ 11:52) >  INTERPRETATION:  CLINICAL INFORMATION: Arm swelling    COMPARISON: None available.    TECHNIQUE: Duplex sonography of the RIGHT UPPER extremity veins with   color and spectral Doppler, with and without compression.    FINDINGS:    The right internal jugular, subclavian, axillary and brachial veins are   patent and compressible where applicable.  The basilic vein (superficial   vein) is patent and without thrombus.  Superficial thrombus in the right   cephalic vein.      IMPRESSION:  No evidence of right upper extremity deep venous thrombosis.    Superficial thrombus in the right cephalic vein.    < end of copied text >  < from: Xray Chest 1 View- PORTABLE-Urgent (Xray Chest 1 View- PORTABLE-Urgent .) (12.06.23 @ 10:15) >  INTERPRETATION:  Chest one view    HISTORY: Leukocytosis    COMPARISON STUDY: 12/2/2023    Frontal expiratory view of the chest shows the heart to be similar in   size. The lungs show partial clearing of the left base and there is no   evidence of pneumothorax nor pleural effusion.    IMPRESSION:  Left base clearing.    < end of copied text >     [facility]  INFECTIOUS DISEASES at [place] and [place]  ===============================================================                  Jordon Gonzales MD               Diplomates American Board of Internal Medicine & Infectious Diseases                * [place] Office - Appt - Tel  [phone] Fax [phone]                * [place] Office - Appt - Tel [phone] Fax [phone]                                  Hospital Consult line:  [phone]  ==============================================================    NOHEMY BRAN [id number]    Follow up: MRSA bacteremia        S/p I&D of right arm septic thrombophlebitis   No acute events overnight   afebrile and hemodynamically stable     I have personally reviewed the labs and data; pertinent labs and data are listed in this note; please see below.     _______________________________________________________________  REVIEW OF SYSTEMS  c/o pain left chest  has hardware in spine-chronic back pain, stable  ________________________________________________________________  Allergies:  No Known Allergies    ________________________________________________________________  PHYSICAL EXAM  GEN: in NAD, laying in bed  HEENT: Anicteric sclerae. Moist mucous membranes. No mucosal lesions.   NECK: Supple.  LUNGS: eupneic. CTA B/L.  HEART: RRR, no m/r/g  ABDOMEN: Soft, NT, ND,.  +BS.    : No CVA tenderness. No Deleon catheter  EXTREMITIES: RUE vac Minimal swelling.   NEUROLOGIC: Grossly no motor focal deficits   PSYCHIATRIC: Appropriate affect and mood  SKIN: ulcer in upper chest.   LINES: PIV   ________________________________________________________________  Vitals:  Vital Signs Last 24 Hrs  T(C): 36.6 (11 Dec 2023 20:58), Max: 36.9 (11 Dec 2023 00:00)  T(F): 97.9 (11 Dec 2023 20:58), Max: 98.5 (11 Dec 2023 00:00)  HR: 74 (11 Dec 2023 20:58) (70 - 92)  BP: 120/68 (11 Dec 2023 20:58) (112/76 - 146/75)  BP(mean): --  RR: 18 (11 Dec 2023 20:58) (18 - 18)  SpO2: 93% (11 Dec 2023 20:58) (93% - 95%)    Parameters below as of 11 Dec 2023 20:58  Patient On (Oxygen Delivery Method): nasal cannula  O2 Flow (L/min): 2      Current Antibiotics:  vancomycin  IVPB 500 milliGRAM(s) IV Intermittent every 12 hours    Other medications:  acetaminophen     Tablet .. 975 milliGRAM(s) Oral every 6 hours  amLODIPine   Tablet 5 milliGRAM(s) Oral daily  benztropine 2 milliGRAM(s) Oral two times a day  chlorhexidine 2% Cloths 1 Application(s) Topical daily  divalproex  milliGRAM(s) Oral every 12 hours  enoxaparin Injectable 40 milliGRAM(s) SubCutaneous every 12 hours  folic acid 1 milliGRAM(s) Oral daily  gabapentin 100 milliGRAM(s) Oral at bedtime  influenza   Vaccine 0.5 milliLiter(s) IntraMuscular once  lidocaine   4% Patch 3 Patch Transdermal daily  melatonin 5 milliGRAM(s) Oral at bedtime  methocarbamol 750 milliGRAM(s) Oral every 8 hours  nicotine - 21 mG/24Hr(s) Patch 1 Patch Transdermal every 24 hours  pantoprazole    Tablet 40 milliGRAM(s) Oral before breakfast  polyethylene glycol 3350 17 Gram(s) Oral at bedtime  QUEtiapine 125 milliGRAM(s) Oral at bedtime  senna 2 Tablet(s) Oral at bedtime  thiamine 100 milliGRAM(s) Oral daily                                8.3    14.41 )-----------( 392      ( 11 Dec 2023 06:57 )             26.0       12-11    140  |  100  |  7.9<L>  ----------------------------<  96  3.5   |  32.0<H>  |  0.59    Ca    7.9<L>      11 Dec 2023 06:57  Phos  3.6     12-11  Mg     1.8     12-11                Urinalysis Basic - ( 11 Dec 2023 06:57 )    Color: x / Appearance: x / SG: x / pH: x  Gluc: 96 mg/dL / Ketone: x  / Bili: x / Urobili: x   Blood: x / Protein: x / Nitrite: x   Leuk Esterase: x / RBC: x / WBC x   Sq Epi: x / Non Sq Epi: x / Bacteria: x                  CAPILLARY BLOOD GLUCOSE                  RECENT CULTURES:  12-07 @ 14:57 .Blood Blood-Peripheral     Growth in aerobic bottle: Gram Positive Cocci in Clusters  Growth in anaerobic bottle: Gram Positive Cocci in Clusters    Growth in aerobic bottle: Gram Positive Cocci in Clusters  Growth in anaerobic bottle: Gram Positive Cocci in Clusters      12-07 @ 14:52 .Blood Blood-Venous     Growth in aerobic bottle: Gram Positive Cocci in Clusters  Growth in anaerobic bottle: Gram Positive Cocci in Clusters    Growth in aerobic bottle: Gram Positive Cocci in Clusters  Growth in anaerobic bottle: Gram Positive Cocci in Clusters      12-06 @ 15:15 .Blood Blood Blood Culture PCR  Methicillin resistant Staphylococcus aureus    Growth in aerobic and anaerobic bottles: Methicillin Resistant  Staphylococcus aureus    Growth in aerobic bottle: Gram Positive Cocci in Clusters  Growth in anaerobic bottle: Gram Positive Cocci in Clusters      12-06 @ 15:10 .Blood Blood     Growth in aerobic and anaerobic bottles: Methicillin Resistant  Staphylococcus aureus  See previous culture [id number]    Growth in anaerobic bottle: Gram Positive Cocci in Clusters  Growth in aerobic bottle: Gram Positive Cocci in Clusters        WBC Count: 19.04 K/uL (12-09-23 @ 07:31)  WBC Count: 20.66 K/uL (12-08-23 @ 08:06)  WBC Count: 22.89 K/uL (12-07-23 @ 06:05)  WBC Count: 14.00 K/uL (12-06-23 @ 05:15)  WBC Count: 13.75 K/uL (12-05-23 @ 02:08)    Creatinine: 0.63 mg/dL (12-09-23 @ 07:31)  Creatinine: 1.17 mg/dL (12-08-23 @ 08:06)  Creatinine: 0.83 mg/dL (12-07-23 @ 06:05)  Creatinine: 0.60 mg/dL (12-06-23 @ 05:15)  Creatinine: 0.81 mg/dL (12-05-23 @ 02:08)      Vancomycin Level, Trough: 8.3 ug/mL (12-09-23 @ 07:31)  Vancomycin Level, Trough: 11.5 ug/mL (12-08-23 @ 18:40)    ________________________________________________________________  CARDIOLOGY   no recent studies   ________________________________________________________________  RADIOLOGY  < from: US Duplex Venous Upper Ext Ltd, Right (12.07.23 @ 11:52) >  INTERPRETATION:  CLINICAL INFORMATION: Arm swelling    COMPARISON: None available.    TECHNIQUE: Duplex sonography of the RIGHT UPPER extremity veins with   color and spectral Doppler, with and without compression.    FINDINGS:    The right internal jugular, subclavian, axillary and brachial veins are   patent and compressible where applicable.  The basilic vein (superficial   vein) is patent and without thrombus.  Superficial thrombus in the right   cephalic vein.      IMPRESSION:  No evidence of right upper extremity deep venous thrombosis.    Superficial thrombus in the right cephalic vein.    < end of copied text >  < from: Xray Chest 1 View- PORTABLE-Urgent (Xray Chest 1 View- PORTABLE-Urgent .) (12.06.23 @ 10:15) >  INTERPRETATION:  Chest one view    HISTORY: Leukocytosis    COMPARISON STUDY: 12/2/2023    Frontal expiratory view of the chest shows the heart to be similar in   size. The lungs show partial clearing of the left base and there is no   evidence of pneumothorax nor pleural effusion.    IMPRESSION:  Left base clearing.    < end of copied text >

## 2023-12-11 NOTE — PROGRESS NOTE ADULT - SUBJECTIVE AND OBJECTIVE BOX
Patient seen and examined at bedside.     No acute events overnight. Patient was lying comfortably in bed this morning with no complaints. Patient states that he is breathing well on NC but was only able to pull 1000 on incentive spirometry.     Vitals:  Vital Signs Last 24 Hrs  T(C): 36.9 (11 Dec 2023 04:27), Max: 36.9 (11 Dec 2023 00:00)  T(F): 98.4 (11 Dec 2023 04:27), Max: 98.5 (11 Dec 2023 00:00)  HR: 87 (11 Dec 2023 04:27) (83 - 100)  BP: 138/85 (11 Dec 2023 04:27) (112/76 - 150/82)  BP(mean): --  RR: 18 (11 Dec 2023 04:27) (17 - 18)  SpO2: 95% (11 Dec 2023 04:27) (94% - 95%)    Parameters below as of 10 Dec 2023 20:40  Patient On (Oxygen Delivery Method): nasal cannula  O2 Flow (L/min): 2    Labs:  12-11    140  |  100  |  7.9<L>  ----------------------------<  96  3.5   |  32.0<H>  |  0.59    Ca    7.9<L>      11 Dec 2023 06:57  Phos  3.6     12-11  Mg     1.8     12-11                        8.3    14.41 )-----------( 392      ( 11 Dec 2023 06:57 )             26.0       Exam:  Gen: pt lying in bed, alert, in NAD  Resp: unlabored  CVS: RRR  Abd: soft, NT, ND  RUE: Soft compartments of RUE, sensation intact, dressing was intact but had old serosanguinous strikethrough.    Patient seen and examined at bedside.     No acute events overnight. Patient was lying comfortably in bed this morning with no complaints. Patient states that he is breathing well on NC but was only able to pull 1000 on incentive spirometry.     Vitals:  Vital Signs Last 24 Hrs  T(C): 36.9 (11 Dec 2023 04:27), Max: 36.9 (11 Dec 2023 00:00)  T(F): 98.4 (11 Dec 2023 04:27), Max: 98.5 (11 Dec 2023 00:00)  HR: 87 (11 Dec 2023 04:27) (83 - 100)  BP: 138/85 (11 Dec 2023 04:27) (112/76 - 150/82)  BP(mean): --  RR: 18 (11 Dec 2023 04:27) (17 - 18)  SpO2: 95% (11 Dec 2023 04:27) (94% - 95%)    Parameters below as of 10 Dec 2023 20:40  Patient On (Oxygen Delivery Method): nasal cannula  O2 Flow (L/min): 2    Labs:  12-11    140  |  100  |  7.9<L>  ----------------------------<  96  3.5   |  32.0<H>  |  0.59    Ca    7.9<L>      11 Dec 2023 06:57  Phos  3.6     12-11  Mg     1.8     12-11                        8.3    14.41 )-----------( 392      ( 11 Dec 2023 06:57 )             26.0       Exam:  Gen: pt lying in bed, alert, in NAD  Resp: unlabored on 2L NC  CVS: RRR  Abd: soft, NT, ND  RUE: Soft compartments of RUE, sensation intact, dressing was intact but had old serosanguinous strikethrough.

## 2023-12-11 NOTE — PROGRESS NOTE ADULT - NS ATTEND AMEND GEN_ALL_CORE FT
I have seen and examined this patient with the surgical team.  No acute events overnight.  Patient appears well this morning.  Denies pain.  No numbness/tingling in RUE.  Site appears well.  Will obtain repeat blood cultures.  Planning for DC to [facility].

## 2023-12-11 NOTE — PROGRESS NOTE ADULT - ASSESSMENT
57 y/o male s/p fall with rib fracture, L pneumothorax, EtOH use, delirium, and asthma exacerbation, found to have R UE septic thrombophlebitis, sp excision of R cephalic and basilic vein for suppurative thrombophlebitis, packing daily. Bcx grew MRSA in two bottles.      PLAN  -packing change daily  -Continue Abx   -F/U Cultures

## 2023-12-11 NOTE — PROGRESS NOTE ADULT - ASSESSMENT
57 y/o male s/p fall with rib fractures and s/p vein excision for thrombophlebitis of R basilic and cephalic vein and MRSA bacteremia was afebrile and hemodynamically stable this morning.     PLAN  -Continue Vanc for MRSA bacteremia.  -follow up blood cultures  -pain control  -strict Is/Os  -continue home meds  -trend labs, replete electrolytes as needed  -encourage OOB  -incentive spirometry  -f/u ECHO  -DVT ppx: SCDs, Lovenox 40 daily   57 y/o male s/p fall with rib fractures and s/p vein excision for thrombophlebitis of R basilic and cephalic vein and MRSA bacteremia was afebrile and hemodynamically stable this morning.     PLAN  -Continue Vanc for MRSA bacteremia.  -follow up blood cultures  -pain control  -strict Is/Os  -continue home meds  -trend labs, replete electrolytes as needed  -encourage OOB  -incentive spirometry  -f/u ECHO  - Tertiary [x  ]  Date: 11/30  - PTSD  [  ]  Date:_____  - SBIRT [ x ]  Date: 11/30  -DVT ppx: SCDs, Lovenox 40 daily   59 y/o male s/p fall with rib fractures and s/p vein excision for thrombophlebitis of R basilic and cephalic vein and MRSA bacteremia was afebrile and hemodynamically stable this morning.     PLAN  -Continue Vanc for MRSA bacteremia.  -follow up blood cultures  -pain control  -strict Is/Os  -continue home meds  -trend labs, replete electrolytes as needed  -encourage OOB  -incentive spirometry  -f/u ECHO  - Tertiary [x  ]  Date: 11/30  - PTSD  [  ]  Date:_____  - SBIRT [ x ]  Date: 11/30  -DVT ppx: SCDs, Lovenox 40 daily

## 2023-12-12 LAB
ANION GAP SERPL CALC-SCNC: 8 MMOL/L — SIGNIFICANT CHANGE UP (ref 5–17)
ANION GAP SERPL CALC-SCNC: 8 MMOL/L — SIGNIFICANT CHANGE UP (ref 5–17)
BASOPHILS # BLD AUTO: 0.08 K/UL — SIGNIFICANT CHANGE UP (ref 0–0.2)
BASOPHILS # BLD AUTO: 0.08 K/UL — SIGNIFICANT CHANGE UP (ref 0–0.2)
BASOPHILS NFR BLD AUTO: 0.5 % — SIGNIFICANT CHANGE UP (ref 0–2)
BASOPHILS NFR BLD AUTO: 0.5 % — SIGNIFICANT CHANGE UP (ref 0–2)
BUN SERPL-MCNC: 8.9 MG/DL — SIGNIFICANT CHANGE UP (ref 8–20)
BUN SERPL-MCNC: 8.9 MG/DL — SIGNIFICANT CHANGE UP (ref 8–20)
CALCIUM SERPL-MCNC: 8.4 MG/DL — SIGNIFICANT CHANGE UP (ref 8.4–10.5)
CALCIUM SERPL-MCNC: 8.4 MG/DL — SIGNIFICANT CHANGE UP (ref 8.4–10.5)
CHLORIDE SERPL-SCNC: 98 MMOL/L — SIGNIFICANT CHANGE UP (ref 96–108)
CHLORIDE SERPL-SCNC: 98 MMOL/L — SIGNIFICANT CHANGE UP (ref 96–108)
CO2 SERPL-SCNC: 32 MMOL/L — HIGH (ref 22–29)
CO2 SERPL-SCNC: 32 MMOL/L — HIGH (ref 22–29)
CREAT SERPL-MCNC: 0.59 MG/DL — SIGNIFICANT CHANGE UP (ref 0.5–1.3)
CREAT SERPL-MCNC: 0.59 MG/DL — SIGNIFICANT CHANGE UP (ref 0.5–1.3)
EGFR: 112 ML/MIN/1.73M2 — SIGNIFICANT CHANGE UP
EGFR: 112 ML/MIN/1.73M2 — SIGNIFICANT CHANGE UP
EOSINOPHIL # BLD AUTO: 0.41 K/UL — SIGNIFICANT CHANGE UP (ref 0–0.5)
EOSINOPHIL # BLD AUTO: 0.41 K/UL — SIGNIFICANT CHANGE UP (ref 0–0.5)
EOSINOPHIL NFR BLD AUTO: 2.5 % — SIGNIFICANT CHANGE UP (ref 0–6)
EOSINOPHIL NFR BLD AUTO: 2.5 % — SIGNIFICANT CHANGE UP (ref 0–6)
GLUCOSE SERPL-MCNC: 95 MG/DL — SIGNIFICANT CHANGE UP (ref 70–99)
GLUCOSE SERPL-MCNC: 95 MG/DL — SIGNIFICANT CHANGE UP (ref 70–99)
GRAM STN FLD: ABNORMAL
GRAM STN FLD: ABNORMAL
HCT VFR BLD CALC: 25.1 % — LOW (ref 39–50)
HCT VFR BLD CALC: 25.1 % — LOW (ref 39–50)
HGB BLD-MCNC: 8.4 G/DL — LOW (ref 13–17)
HGB BLD-MCNC: 8.4 G/DL — LOW (ref 13–17)
IMM GRANULOCYTES NFR BLD AUTO: 5.4 % — HIGH (ref 0–0.9)
IMM GRANULOCYTES NFR BLD AUTO: 5.4 % — HIGH (ref 0–0.9)
LYMPHOCYTES # BLD AUTO: 18.1 % — SIGNIFICANT CHANGE UP (ref 13–44)
LYMPHOCYTES # BLD AUTO: 18.1 % — SIGNIFICANT CHANGE UP (ref 13–44)
LYMPHOCYTES # BLD AUTO: 2.98 K/UL — SIGNIFICANT CHANGE UP (ref 1–3.3)
LYMPHOCYTES # BLD AUTO: 2.98 K/UL — SIGNIFICANT CHANGE UP (ref 1–3.3)
MAGNESIUM SERPL-MCNC: 2 MG/DL — SIGNIFICANT CHANGE UP (ref 1.6–2.6)
MAGNESIUM SERPL-MCNC: 2 MG/DL — SIGNIFICANT CHANGE UP (ref 1.6–2.6)
MCHC RBC-ENTMCNC: 27.8 PG — SIGNIFICANT CHANGE UP (ref 27–34)
MCHC RBC-ENTMCNC: 27.8 PG — SIGNIFICANT CHANGE UP (ref 27–34)
MCHC RBC-ENTMCNC: 33.5 GM/DL — SIGNIFICANT CHANGE UP (ref 32–36)
MCHC RBC-ENTMCNC: 33.5 GM/DL — SIGNIFICANT CHANGE UP (ref 32–36)
MCV RBC AUTO: 83.1 FL — SIGNIFICANT CHANGE UP (ref 80–100)
MCV RBC AUTO: 83.1 FL — SIGNIFICANT CHANGE UP (ref 80–100)
MONOCYTES # BLD AUTO: 1.99 K/UL — HIGH (ref 0–0.9)
MONOCYTES # BLD AUTO: 1.99 K/UL — HIGH (ref 0–0.9)
MONOCYTES NFR BLD AUTO: 12.1 % — SIGNIFICANT CHANGE UP (ref 2–14)
MONOCYTES NFR BLD AUTO: 12.1 % — SIGNIFICANT CHANGE UP (ref 2–14)
NEUTROPHILS # BLD AUTO: 10.07 K/UL — HIGH (ref 1.8–7.4)
NEUTROPHILS # BLD AUTO: 10.07 K/UL — HIGH (ref 1.8–7.4)
NEUTROPHILS NFR BLD AUTO: 61.4 % — SIGNIFICANT CHANGE UP (ref 43–77)
NEUTROPHILS NFR BLD AUTO: 61.4 % — SIGNIFICANT CHANGE UP (ref 43–77)
PHOSPHATE SERPL-MCNC: 2.9 MG/DL — SIGNIFICANT CHANGE UP (ref 2.4–4.7)
PHOSPHATE SERPL-MCNC: 2.9 MG/DL — SIGNIFICANT CHANGE UP (ref 2.4–4.7)
PLATELET # BLD AUTO: 500 K/UL — HIGH (ref 150–400)
PLATELET # BLD AUTO: 500 K/UL — HIGH (ref 150–400)
POTASSIUM SERPL-MCNC: 4.3 MMOL/L — SIGNIFICANT CHANGE UP (ref 3.5–5.3)
POTASSIUM SERPL-MCNC: 4.3 MMOL/L — SIGNIFICANT CHANGE UP (ref 3.5–5.3)
POTASSIUM SERPL-SCNC: 4.3 MMOL/L — SIGNIFICANT CHANGE UP (ref 3.5–5.3)
POTASSIUM SERPL-SCNC: 4.3 MMOL/L — SIGNIFICANT CHANGE UP (ref 3.5–5.3)
RBC # BLD: 3.02 M/UL — LOW (ref 4.2–5.8)
RBC # BLD: 3.02 M/UL — LOW (ref 4.2–5.8)
RBC # FLD: 17.5 % — HIGH (ref 10.3–14.5)
RBC # FLD: 17.5 % — HIGH (ref 10.3–14.5)
SODIUM SERPL-SCNC: 138 MMOL/L — SIGNIFICANT CHANGE UP (ref 135–145)
SODIUM SERPL-SCNC: 138 MMOL/L — SIGNIFICANT CHANGE UP (ref 135–145)
WBC # BLD: 16.42 K/UL — HIGH (ref 3.8–10.5)
WBC # BLD: 16.42 K/UL — HIGH (ref 3.8–10.5)
WBC # FLD AUTO: 16.42 K/UL — HIGH (ref 3.8–10.5)
WBC # FLD AUTO: 16.42 K/UL — HIGH (ref 3.8–10.5)

## 2023-12-12 PROCEDURE — 99232 SBSQ HOSP IP/OBS MODERATE 35: CPT

## 2023-12-12 RX ORDER — DAPTOMYCIN 500 MG/10ML
550 INJECTION, POWDER, LYOPHILIZED, FOR SOLUTION INTRAVENOUS EVERY 24 HOURS
Refills: 0 | Status: DISCONTINUED | OUTPATIENT
Start: 2023-12-12 | End: 2023-12-15

## 2023-12-12 RX ADMIN — OXYCODONE HYDROCHLORIDE 10 MILLIGRAM(S): 5 TABLET ORAL at 10:00

## 2023-12-12 RX ADMIN — DIVALPROEX SODIUM 500 MILLIGRAM(S): 500 TABLET, DELAYED RELEASE ORAL at 04:53

## 2023-12-12 RX ADMIN — OXYCODONE HYDROCHLORIDE 10 MILLIGRAM(S): 5 TABLET ORAL at 04:25

## 2023-12-12 RX ADMIN — Medication 975 MILLIGRAM(S): at 01:20

## 2023-12-12 RX ADMIN — LIDOCAINE 3 PATCH: 4 CREAM TOPICAL at 22:16

## 2023-12-12 RX ADMIN — OXYCODONE HYDROCHLORIDE 10 MILLIGRAM(S): 5 TABLET ORAL at 03:25

## 2023-12-12 RX ADMIN — CHLORHEXIDINE GLUCONATE 1 APPLICATION(S): 213 SOLUTION TOPICAL at 11:10

## 2023-12-12 RX ADMIN — ENOXAPARIN SODIUM 40 MILLIGRAM(S): 100 INJECTION SUBCUTANEOUS at 17:09

## 2023-12-12 RX ADMIN — Medication 975 MILLIGRAM(S): at 14:02

## 2023-12-12 RX ADMIN — SENNA PLUS 2 TABLET(S): 8.6 TABLET ORAL at 20:15

## 2023-12-12 RX ADMIN — Medication 400 MILLIGRAM(S): at 00:28

## 2023-12-12 RX ADMIN — ENOXAPARIN SODIUM 40 MILLIGRAM(S): 100 INJECTION SUBCUTANEOUS at 04:53

## 2023-12-12 RX ADMIN — PANTOPRAZOLE SODIUM 40 MILLIGRAM(S): 20 TABLET, DELAYED RELEASE ORAL at 04:54

## 2023-12-12 RX ADMIN — Medication 1 PATCH: at 20:14

## 2023-12-12 RX ADMIN — Medication 5 MILLIGRAM(S): at 21:10

## 2023-12-12 RX ADMIN — Medication 400 MILLIGRAM(S): at 11:08

## 2023-12-12 RX ADMIN — METHOCARBAMOL 1000 MILLIGRAM(S): 500 TABLET, FILM COATED ORAL at 13:21

## 2023-12-12 RX ADMIN — Medication 400 MILLIGRAM(S): at 23:52

## 2023-12-12 RX ADMIN — QUETIAPINE FUMARATE 125 MILLIGRAM(S): 200 TABLET, FILM COATED ORAL at 21:00

## 2023-12-12 RX ADMIN — Medication 400 MILLIGRAM(S): at 04:53

## 2023-12-12 RX ADMIN — Medication 400 MILLIGRAM(S): at 01:20

## 2023-12-12 RX ADMIN — Medication 975 MILLIGRAM(S): at 23:52

## 2023-12-12 RX ADMIN — Medication 975 MILLIGRAM(S): at 04:54

## 2023-12-12 RX ADMIN — Medication 400 MILLIGRAM(S): at 14:02

## 2023-12-12 RX ADMIN — OXYCODONE HYDROCHLORIDE 10 MILLIGRAM(S): 5 TABLET ORAL at 00:00

## 2023-12-12 RX ADMIN — METHOCARBAMOL 1000 MILLIGRAM(S): 500 TABLET, FILM COATED ORAL at 04:53

## 2023-12-12 RX ADMIN — METHOCARBAMOL 1000 MILLIGRAM(S): 500 TABLET, FILM COATED ORAL at 21:10

## 2023-12-12 RX ADMIN — LIDOCAINE 3 PATCH: 4 CREAM TOPICAL at 11:08

## 2023-12-12 RX ADMIN — GABAPENTIN 100 MILLIGRAM(S): 400 CAPSULE ORAL at 20:16

## 2023-12-12 RX ADMIN — Medication 2 MILLIGRAM(S): at 17:09

## 2023-12-12 RX ADMIN — Medication 100 MILLIGRAM(S): at 11:07

## 2023-12-12 RX ADMIN — LIDOCAINE 3 PATCH: 4 CREAM TOPICAL at 20:14

## 2023-12-12 RX ADMIN — OXYCODONE HYDROCHLORIDE 10 MILLIGRAM(S): 5 TABLET ORAL at 15:49

## 2023-12-12 RX ADMIN — Medication 1 MILLIGRAM(S): at 11:07

## 2023-12-12 RX ADMIN — Medication 166.67 MILLIGRAM(S): at 04:52

## 2023-12-12 RX ADMIN — AMLODIPINE BESYLATE 5 MILLIGRAM(S): 2.5 TABLET ORAL at 04:53

## 2023-12-12 RX ADMIN — OXYCODONE HYDROCHLORIDE 10 MILLIGRAM(S): 5 TABLET ORAL at 09:46

## 2023-12-12 RX ADMIN — DAPTOMYCIN 122 MILLIGRAM(S): 500 INJECTION, POWDER, LYOPHILIZED, FOR SOLUTION INTRAVENOUS at 18:19

## 2023-12-12 RX ADMIN — Medication 2 MILLIGRAM(S): at 04:53

## 2023-12-12 RX ADMIN — DIVALPROEX SODIUM 500 MILLIGRAM(S): 500 TABLET, DELAYED RELEASE ORAL at 17:09

## 2023-12-12 RX ADMIN — Medication 975 MILLIGRAM(S): at 17:09

## 2023-12-12 RX ADMIN — Medication 400 MILLIGRAM(S): at 17:09

## 2023-12-12 RX ADMIN — POLYETHYLENE GLYCOL 3350 17 GRAM(S): 17 POWDER, FOR SOLUTION ORAL at 20:15

## 2023-12-12 RX ADMIN — Medication 975 MILLIGRAM(S): at 00:28

## 2023-12-12 RX ADMIN — OXYCODONE HYDROCHLORIDE 10 MILLIGRAM(S): 5 TABLET ORAL at 20:15

## 2023-12-12 RX ADMIN — Medication 975 MILLIGRAM(S): at 11:08

## 2023-12-12 NOTE — PROGRESS NOTE ADULT - ASSESSMENT
57 year old male with PMH of ETOH abuse, HTN, HLD, seizures, tardive dyskinesia hiatal hernia s/p mechanical fall down stairs while intoxicated on 11/29. Pt intubated in ED for airway protection. Found to have left rib fractures 3, 6, 7, 10 and left pneumothorax with extensive subcutaneous emphysema requiring a left sided pigtail on 11/29. Hospital course complicated by acute agitation and acute asthma exacerbation requiring re-intubation on 12/1. Pt extubated 12/2. Transferred to floor 12/6 and noted to be febrile with uptrending WBC, rt arm edema erythema and reported purulent drainage. Found to have MRSA bacteremia, concern for cellulitis , septic thrombphlebitis    - 12/6 and 12/7 BCX + MRSA  - s/p I&D of septic thrombophlebitis of right arm >> Incision and drainage of Infected basilic and cephalic thrombophlebitis at antecubital level purulent discharge seen upon incision  - Repeat BCX 12/10-+  - BCX 12/11 ngtd  - TTE  as above, suggest CAROL  - RUE venous doppler with sup thrombus of right cephalic vein   - change vanco to daptomycin  - trend CK  - anticoagulation as per vascular  - will plan for PICC when ready for DC  - Trend Fever - afebrile   - Trend Leukocytosis-improving      d/w trauma surgery, pharmacy    will f/u

## 2023-12-12 NOTE — PROGRESS NOTE ADULT - SUBJECTIVE AND OBJECTIVE BOX
INTERVAL HPI/OVERNIGHT EVENTS: no new complaints, no overnight events    STATUS POST:  L blowhole 11/29  L rib block 11/29  L pigtail 11/30  l rib block 12/4  Open excision of vein 12/8      MEDICATIONS  (STANDING):  acetaminophen     Tablet .. 975 milliGRAM(s) Oral every 6 hours  amLODIPine   Tablet 5 milliGRAM(s) Oral daily  benztropine 2 milliGRAM(s) Oral two times a day  chlorhexidine 2% Cloths 1 Application(s) Topical daily  divalproex  milliGRAM(s) Oral every 12 hours  enoxaparin Injectable 40 milliGRAM(s) SubCutaneous every 12 hours  folic acid 1 milliGRAM(s) Oral daily  gabapentin 100 milliGRAM(s) Oral at bedtime  ibuprofen  Tablet. 400 milliGRAM(s) Oral every 6 hours  influenza   Vaccine 0.5 milliLiter(s) IntraMuscular once  lidocaine   4% Patch 3 Patch Transdermal daily  melatonin 5 milliGRAM(s) Oral at bedtime  methocarbamol 1000 milliGRAM(s) Oral every 8 hours  nicotine - 21 mG/24Hr(s) Patch 1 Patch Transdermal every 24 hours  pantoprazole    Tablet 40 milliGRAM(s) Oral before breakfast  polyethylene glycol 3350 17 Gram(s) Oral at bedtime  QUEtiapine 125 milliGRAM(s) Oral at bedtime  senna 2 Tablet(s) Oral at bedtime  thiamine 100 milliGRAM(s) Oral daily  vancomycin  IVPB 1250 milliGRAM(s) IV Intermittent every 12 hours    MEDICATIONS  (PRN):  albuterol/ipratropium for Nebulization 3 milliLiter(s) Nebulizer every 6 hours PRN Shortness of Breath and/or Wheezing  calcium carbonate    500 mG (Tums) Chewable 1 Tablet(s) Chew every 6 hours PRN Heartburn  HYDROmorphone  Injectable 0.5 milliGRAM(s) IV Push every 4 hours PRN breakthrough  mineral oil enema 133 milliLiter(s) Rectal daily PRN constipation  OLANZapine Injectable 5 milliGRAM(s) IntraMuscular every 12 hours PRN agitation  ondansetron Injectable 4 milliGRAM(s) IV Push every 6 hours PRN Nausea  oxyCODONE    IR 5 milliGRAM(s) Oral every 4 hours PRN Moderate Pain (4 - 6)  oxyCODONE    IR 10 milliGRAM(s) Oral every 4 hours PRN Severe Pain (7 - 10)      Vital Signs Last 24 Hrs  T(C): 36.9 (12 Dec 2023 04:35), Max: 36.9 (12 Dec 2023 04:35)  T(F): 98.4 (12 Dec 2023 04:35), Max: 98.4 (12 Dec 2023 04:35)  HR: 82 (12 Dec 2023 04:35) (62 - 82)  BP: 121/78 (12 Dec 2023 04:35) (120/68 - 146/75)  BP(mean): --  RR: 18 (12 Dec 2023 04:35) (18 - 18)  SpO2: 99% (12 Dec 2023 04:35) (93% - 99%)    Parameters below as of 12 Dec 2023 04:35  Patient On (Oxygen Delivery Method): room air        PHYSICAL EXAM:      Constitutional: NAD    Respiratory: no accessory muscle use or conversational dyspnea    Cardiovascular: RRR    Gastrointestinal: soft, NT/ND    Extremities: BERG          I&O's Detail    10 Dec 2023 07:01  -  11 Dec 2023 07:00  --------------------------------------------------------  IN:  Total IN: 0 mL    OUT:    Voided (mL): 1100 mL  Total OUT: 1100 mL    Total NET: -1100 mL      11 Dec 2023 07:01  -  12 Dec 2023 06:48  --------------------------------------------------------  IN:    Oral Fluid: 850 mL  Total IN: 850 mL    OUT:    Voided (mL): 720 mL  Total OUT: 720 mL    Total NET: 130 mL          LABS:                        8.3    14.41 )-----------( 392      ( 11 Dec 2023 06:57 )             26.0     12-11    140  |  100  |  7.9<L>  ----------------------------<  96  3.5   |  32.0<H>  |  0.59    Ca    7.9<L>      11 Dec 2023 06:57  Phos  3.6     12-11  Mg     1.8     12-11        Urinalysis Basic - ( 11 Dec 2023 06:57 )    Color: x / Appearance: x / SG: x / pH: x  Gluc: 96 mg/dL / Ketone: x  / Bili: x / Urobili: x   Blood: x / Protein: x / Nitrite: x   Leuk Esterase: x / RBC: x / WBC x   Sq Epi: x / Non Sq Epi: x / Bacteria: x        RADIOLOGY & ADDITIONAL STUDIES:

## 2023-12-12 NOTE — PROGRESS NOTE ADULT - ASSESSMENT
59 y/o male s/p fall with rib fractures and s/p vein excision for thrombophlebitis of R basilic and cephalic vein and MRSA bacteremia    - TTE done, await results  - last blood cultures still positive  - WBC trending down  - ? need for CAROL given persistent  - ID managing abx  - encourage OOB  - incentive spirometry  - DVT ppx 57 y/o male s/p fall with rib fractures and s/p vein excision for thrombophlebitis of R basilic and cephalic vein and MRSA bacteremia    - TTE done, await results  - last blood cultures still positive  - WBC trending down  - ? need for CAROL given persistent  - ID managing abx  - encourage OOB  - incentive spirometry  - DVT ppx

## 2023-12-12 NOTE — PROGRESS NOTE ADULT - SUBJECTIVE AND OBJECTIVE BOX
[facility]  INFECTIOUS DISEASES at [place] and [place]  ===============================================================                  Jordon Gonzales MD               Diplomates American Board of Internal Medicine & Infectious Diseases                * [place] Office - Appt - Tel  [phone] Fax [phone]                * [place] Office - Appt - Tel [phone] Fax [phone]                                  Hospital Consult line:  [phone]  ==============================================================    NOHEMY BRAN [id number]    Follow up: MRSA bacteremia        S/p I&D of right arm septic thrombophlebitis   bcx 12/10 +  pt c.o back pain and asking for pain meds-chronic back pain  afebrile and hemodynamically stable     I have personally reviewed the labs and data; pertinent labs and data are listed in this note; please see below.     _______________________________________________________________  REVIEW OF SYSTEMS  c/o pain left chest  has hardware in spine-chronic back pain, stable  ________________________________________________________________  Allergies:  No Known Allergies    ________________________________________________________________  PHYSICAL EXAM  GEN: in NAD, laying in bed  HEENT: Anicteric sclerae. Moist mucous membranes. No mucosal lesions.   NECK: Supple.  LUNGS: eupneic. decreased Breath sounds at bases B/L.  HEART: RRR, no m/r/g  ABDOMEN: Soft, NT, ND,  +BS.    : No CVA tenderness. No Deleon catheter  EXTREMITIES: RUE vac Minimal swelling.   NEUROLOGIC: Grossly no motor focal deficits   PSYCHIATRIC: Appropriate affect and mood  SKIN: ulcer in upper chest.   LINES: PIV   ________________________________________________________________  Vitals:  Vital Signs Last 24 Hrs  T(C): 36.9 (12 Dec 2023 20:10), Max: 36.9 (12 Dec 2023 04:35)  T(F): 98.4 (12 Dec 2023 20:10), Max: 98.5 (12 Dec 2023 07:40)  HR: 80 (12 Dec 2023 20:10) (62 - 89)  BP: 138/88 (12 Dec 2023 20:10) (120/68 - 138/88)  BP(mean): --  RR: 18 (12 Dec 2023 20:10) (18 - 18)  SpO2: 94% (12 Dec 2023 20:10) (93% - 99%)    Parameters below as of 12 Dec 2023 20:10  Patient On (Oxygen Delivery Method): nasal cannula  O2 Flow (L/min): 2      Current Antibiotics:  vancomycin  IVPB 500 milliGRAM(s) IV Intermittent every 12 hours    Other medications:  acetaminophen     Tablet .. 975 milliGRAM(s) Oral every 6 hours  amLODIPine   Tablet 5 milliGRAM(s) Oral daily  benztropine 2 milliGRAM(s) Oral two times a day  chlorhexidine 2% Cloths 1 Application(s) Topical daily  divalproex  milliGRAM(s) Oral every 12 hours  enoxaparin Injectable 40 milliGRAM(s) SubCutaneous every 12 hours  folic acid 1 milliGRAM(s) Oral daily  gabapentin 100 milliGRAM(s) Oral at bedtime  influenza   Vaccine 0.5 milliLiter(s) IntraMuscular once  lidocaine   4% Patch 3 Patch Transdermal daily  melatonin 5 milliGRAM(s) Oral at bedtime  methocarbamol 750 milliGRAM(s) Oral every 8 hours  nicotine - 21 mG/24Hr(s) Patch 1 Patch Transdermal every 24 hours  pantoprazole    Tablet 40 milliGRAM(s) Oral before breakfast  polyethylene glycol 3350 17 Gram(s) Oral at bedtime  QUEtiapine 125 milliGRAM(s) Oral at bedtime  senna 2 Tablet(s) Oral at bedtime  thiamine 100 milliGRAM(s) Oral daily                          =                      8.4    16.42 )-----------( 500      ( 12 Dec 2023 07:58 )             25.1       12-12    138  |  98  |  8.9  ----------------------------<  95  4.3   |  32.0<H>  |  0.59    Ca    8.4      12 Dec 2023 07:58  Phos  2.9     12-12  Mg     2.0     12-12                Urinalysis Basic - ( 12 Dec 2023 07:58 )    Color: x / Appearance: x / SG: x / pH: x  Gluc: 95 mg/dL / Ketone: x  / Bili: x / Urobili: x   Blood: x / Protein: x / Nitrite: x   Leuk Esterase: x / RBC: x / WBC x   Sq Epi: x / Non Sq Epi: x / Bacteria: x                  CAPILLARY BLOOD GLUCOSE                    Urinalysis Basic - ( 12 Dec 2023 07:58 )    Color: x / Appearance: x / SG: x / pH: x  Gluc: 95 mg/dL / Ketone: x  / Bili: x / Urobili: x   Blood: x / Protein: x / Nitrite: x   Leuk Esterase: x / RBC: x / WBC x   Sq Epi: x / Non Sq Epi: x / Bacteria: x                  CAPILLARY BLOOD GLUCOSE                    Urinalysis Basic - ( 11 Dec 2023 06:57 )    Color: x / Appearance: x / SG: x / pH: x  Gluc: 96 mg/dL / Ketone: x  / Bili: x / Urobili: x   Blood: x / Protein: x / Nitrite: x   Leuk Esterase: x / RBC: x / WBC x   Sq Epi: x / Non Sq Epi: x / Bacteria: x                  CAPILLARY BLOOD GLUCOSE                  RECENT CULTURES:  12-07 @ 14:57 .Blood Blood-Peripheral     Growth in aerobic bottle: Gram Positive Cocci in Clusters  Growth in anaerobic bottle: Gram Positive Cocci in Clusters    Growth in aerobic bottle: Gram Positive Cocci in Clusters  Growth in anaerobic bottle: Gram Positive Cocci in Clusters      12-07 @ 14:52 .Blood Blood-Venous     Growth in aerobic bottle: Gram Positive Cocci in Clusters  Growth in anaerobic bottle: Gram Positive Cocci in Clusters    Growth in aerobic bottle: Gram Positive Cocci in Clusters  Growth in anaerobic bottle: Gram Positive Cocci in Clusters      12-06 @ 15:15 .Blood Blood Blood Culture PCR  Methicillin resistant Staphylococcus aureus    Growth in aerobic and anaerobic bottles: Methicillin Resistant  Staphylococcus aureus    Growth in aerobic bottle: Gram Positive Cocci in Clusters  Growth in anaerobic bottle: Gram Positive Cocci in Clusters      12-06 @ 15:10 .Blood Blood     Growth in aerobic and anaerobic bottles: Methicillin Resistant  Staphylococcus aureus  See previous culture [id number]    Growth in anaerobic bottle: Gram Positive Cocci in Clusters  Growth in aerobic bottle: Gram Positive Cocci in Clusters        WBC Count: 19.04 K/uL (12-09-23 @ 07:31)  WBC Count: 20.66 K/uL (12-08-23 @ 08:06)  WBC Count: 22.89 K/uL (12-07-23 @ 06:05)  WBC Count: 14.00 K/uL (12-06-23 @ 05:15)  WBC Count: 13.75 K/uL (12-05-23 @ 02:08)    Creatinine: 0.63 mg/dL (12-09-23 @ 07:31)  Creatinine: 1.17 mg/dL (12-08-23 @ 08:06)  Creatinine: 0.83 mg/dL (12-07-23 @ 06:05)  Creatinine: 0.60 mg/dL (12-06-23 @ 05:15)  Creatinine: 0.81 mg/dL (12-05-23 @ 02:08)      Vancomycin Level, Trough: 8.3 ug/mL (12-09-23 @ 07:31)  Vancomycin Level, Trough: 11.5 ug/mL (12-08-23 @ 18:40)    ________________________________________________________________  CARDIOLOGY   no recent studies   ________________________________________________________________  RADIOLOGY  < from: US Duplex Venous Upper Ext Ltd, Right (12.07.23 @ 11:52) >  INTERPRETATION:  CLINICAL INFORMATION: Arm swelling    COMPARISON: None available.    TECHNIQUE: Duplex sonography of the RIGHT UPPER extremity veins with   color and spectral Doppler, with and without compression.    FINDINGS:    The right internal jugular, subclavian, axillary and brachial veins are   patent and compressible where applicable.  The basilic vein (superficial   vein) is patent and without thrombus.  Superficial thrombus in the right   cephalic vein.      IMPRESSION:  No evidence of right upper extremity deep venous thrombosis.    Superficial thrombus in the right cephalic vein.    < end of copied text >  < from: Xray Chest 1 View- PORTABLE-Urgent (Xray Chest 1 View- PORTABLE-Urgent .) (12.06.23 @ 10:15) >  INTERPRETATION:  Chest one view    HISTORY: Leukocytosis    COMPARISON STUDY: 12/2/2023    Frontal expiratory view of the chest shows the heart to be similar in   size. The lungs show partial clearing of the left base and there is no   evidence of pneumothorax nor pleural effusion.    IMPRESSION:  Left base clearing.    < end of copied text >    < from: TTE W or WO Ultrasound Enhancing Agent (12.11.23 @ 19:59) >  CONCLUSIONS:      1. Left ventricular cavity is normal.   2. Normal left ventricular diastolic function.   3. Trace mitral regurgitation.   4. Moderate left pleural effusion noted.   5. Moderate pericardial effusion noted adjacent to the posterolateralleft ventricle. There is expansion of the LV posterolateral wall with systole, no color flow was done.LV pseduoaneurysm needs to be ruled out.   6. The inferior vena cava is normal in size measuring 2.10 cm in diameter, (normal <2.1cm) with normal inspiratory collapse (normal >50%) consistent with normal right atrial pressure (~3, range 0-5mmHg).   7. Recommendations: Repeat 2 D echo with definity to evaluate pericardial effusion, to see if there is any pseudo aneurysm of the LV. Color flow and doppler should be done.    < end of copied text >   [facility]  INFECTIOUS DISEASES at [place] and [place]  ===============================================================                  Jordon Gonzales MD               Diplomates American Board of Internal Medicine & Infectious Diseases                * [place] Office - Appt - Tel  [phone] Fax [phone]                * [place] Office - Appt - Tel [phone] Fax [phone]                                  Hospital Consult line:  [phone]  ==============================================================    NOHEMY BRAN [id number]    Follow up: MRSA bacteremia        S/p I&D of right arm septic thrombophlebitis   bcx 12/10 +  pt c.o back pain and asking for pain meds-chronic back pain  afebrile and hemodynamically stable     I have personally reviewed the labs and data; pertinent labs and data are listed in this note; please see below.     _______________________________________________________________  REVIEW OF SYSTEMS  c/o pain left chest  has hardware in spine-chronic back pain, stable  ________________________________________________________________  Allergies:  No Known Allergies    ________________________________________________________________  PHYSICAL EXAM  GEN: in NAD, laying in bed  HEENT: Anicteric sclerae. Moist mucous membranes. No mucosal lesions.   NECK: Supple.  LUNGS: eupneic. decreased Breath sounds at bases B/L.  HEART: RRR, no m/r/g  ABDOMEN: Soft, NT, ND,  +BS.    : No CVA tenderness. No Deleon catheter  EXTREMITIES: RUE vac Minimal swelling.   NEUROLOGIC: Grossly no motor focal deficits   PSYCHIATRIC: Appropriate affect and mood  SKIN: ulcer in upper chest.   LINES: PIV   ________________________________________________________________  Vitals:  Vital Signs Last 24 Hrs  T(C): 36.9 (12 Dec 2023 20:10), Max: 36.9 (12 Dec 2023 04:35)  T(F): 98.4 (12 Dec 2023 20:10), Max: 98.5 (12 Dec 2023 07:40)  HR: 80 (12 Dec 2023 20:10) (62 - 89)  BP: 138/88 (12 Dec 2023 20:10) (120/68 - 138/88)  BP(mean): --  RR: 18 (12 Dec 2023 20:10) (18 - 18)  SpO2: 94% (12 Dec 2023 20:10) (93% - 99%)    Parameters below as of 12 Dec 2023 20:10  Patient On (Oxygen Delivery Method): nasal cannula  O2 Flow (L/min): 2      Current Antibiotics:  vancomycin  IVPB 500 milliGRAM(s) IV Intermittent every 12 hours    Other medications:  acetaminophen     Tablet .. 975 milliGRAM(s) Oral every 6 hours  amLODIPine   Tablet 5 milliGRAM(s) Oral daily  benztropine 2 milliGRAM(s) Oral two times a day  chlorhexidine 2% Cloths 1 Application(s) Topical daily  divalproex  milliGRAM(s) Oral every 12 hours  enoxaparin Injectable 40 milliGRAM(s) SubCutaneous every 12 hours  folic acid 1 milliGRAM(s) Oral daily  gabapentin 100 milliGRAM(s) Oral at bedtime  influenza   Vaccine 0.5 milliLiter(s) IntraMuscular once  lidocaine   4% Patch 3 Patch Transdermal daily  melatonin 5 milliGRAM(s) Oral at bedtime  methocarbamol 750 milliGRAM(s) Oral every 8 hours  nicotine - 21 mG/24Hr(s) Patch 1 Patch Transdermal every 24 hours  pantoprazole    Tablet 40 milliGRAM(s) Oral before breakfast  polyethylene glycol 3350 17 Gram(s) Oral at bedtime  QUEtiapine 125 milliGRAM(s) Oral at bedtime  senna 2 Tablet(s) Oral at bedtime  thiamine 100 milliGRAM(s) Oral daily                          =                      8.4    16.42 )-----------( 500      ( 12 Dec 2023 07:58 )             25.1       12-12    138  |  98  |  8.9  ----------------------------<  95  4.3   |  32.0<H>  |  0.59    Ca    8.4      12 Dec 2023 07:58  Phos  2.9     12-12  Mg     2.0     12-12                Urinalysis Basic - ( 12 Dec 2023 07:58 )    Color: x / Appearance: x / SG: x / pH: x  Gluc: 95 mg/dL / Ketone: x  / Bili: x / Urobili: x   Blood: x / Protein: x / Nitrite: x   Leuk Esterase: x / RBC: x / WBC x   Sq Epi: x / Non Sq Epi: x / Bacteria: x                  CAPILLARY BLOOD GLUCOSE                    Urinalysis Basic - ( 12 Dec 2023 07:58 )    Color: x / Appearance: x / SG: x / pH: x  Gluc: 95 mg/dL / Ketone: x  / Bili: x / Urobili: x   Blood: x / Protein: x / Nitrite: x   Leuk Esterase: x / RBC: x / WBC x   Sq Epi: x / Non Sq Epi: x / Bacteria: x                  CAPILLARY BLOOD GLUCOSE                    Urinalysis Basic - ( 11 Dec 2023 06:57 )    Color: x / Appearance: x / SG: x / pH: x  Gluc: 96 mg/dL / Ketone: x  / Bili: x / Urobili: x   Blood: x / Protein: x / Nitrite: x   Leuk Esterase: x / RBC: x / WBC x   Sq Epi: x / Non Sq Epi: x / Bacteria: x                  CAPILLARY BLOOD GLUCOSE                  RECENT CULTURES:  12-07 @ 14:57 .Blood Blood-Peripheral     Growth in aerobic bottle: Gram Positive Cocci in Clusters  Growth in anaerobic bottle: Gram Positive Cocci in Clusters    Growth in aerobic bottle: Gram Positive Cocci in Clusters  Growth in anaerobic bottle: Gram Positive Cocci in Clusters      12-07 @ 14:52 .Blood Blood-Venous     Growth in aerobic bottle: Gram Positive Cocci in Clusters  Growth in anaerobic bottle: Gram Positive Cocci in Clusters    Growth in aerobic bottle: Gram Positive Cocci in Clusters  Growth in anaerobic bottle: Gram Positive Cocci in Clusters      12-06 @ 15:15 .Blood Blood Blood Culture PCR  Methicillin resistant Staphylococcus aureus    Growth in aerobic and anaerobic bottles: Methicillin Resistant  Staphylococcus aureus    Growth in aerobic bottle: Gram Positive Cocci in Clusters  Growth in anaerobic bottle: Gram Positive Cocci in Clusters      12-06 @ 15:10 .Blood Blood     Growth in aerobic and anaerobic bottles: Methicillin Resistant  Staphylococcus aureus  See previous culture [id number]    Growth in anaerobic bottle: Gram Positive Cocci in Clusters  Growth in aerobic bottle: Gram Positive Cocci in Clusters        WBC Count: 19.04 K/uL (12-09-23 @ 07:31)  WBC Count: 20.66 K/uL (12-08-23 @ 08:06)  WBC Count: 22.89 K/uL (12-07-23 @ 06:05)  WBC Count: 14.00 K/uL (12-06-23 @ 05:15)  WBC Count: 13.75 K/uL (12-05-23 @ 02:08)    Creatinine: 0.63 mg/dL (12-09-23 @ 07:31)  Creatinine: 1.17 mg/dL (12-08-23 @ 08:06)  Creatinine: 0.83 mg/dL (12-07-23 @ 06:05)  Creatinine: 0.60 mg/dL (12-06-23 @ 05:15)  Creatinine: 0.81 mg/dL (12-05-23 @ 02:08)      Vancomycin Level, Trough: 8.3 ug/mL (12-09-23 @ 07:31)  Vancomycin Level, Trough: 11.5 ug/mL (12-08-23 @ 18:40)    ________________________________________________________________  CARDIOLOGY   no recent studies   ________________________________________________________________  RADIOLOGY  < from: US Duplex Venous Upper Ext Ltd, Right (12.07.23 @ 11:52) >  INTERPRETATION:  CLINICAL INFORMATION: Arm swelling    COMPARISON: None available.    TECHNIQUE: Duplex sonography of the RIGHT UPPER extremity veins with   color and spectral Doppler, with and without compression.    FINDINGS:    The right internal jugular, subclavian, axillary and brachial veins are   patent and compressible where applicable.  The basilic vein (superficial   vein) is patent and without thrombus.  Superficial thrombus in the right   cephalic vein.      IMPRESSION:  No evidence of right upper extremity deep venous thrombosis.    Superficial thrombus in the right cephalic vein.    < end of copied text >  < from: Xray Chest 1 View- PORTABLE-Urgent (Xray Chest 1 View- PORTABLE-Urgent .) (12.06.23 @ 10:15) >  INTERPRETATION:  Chest one view    HISTORY: Leukocytosis    COMPARISON STUDY: 12/2/2023    Frontal expiratory view of the chest shows the heart to be similar in   size. The lungs show partial clearing of the left base and there is no   evidence of pneumothorax nor pleural effusion.    IMPRESSION:  Left base clearing.    < end of copied text >    < from: TTE W or WO Ultrasound Enhancing Agent (12.11.23 @ 19:59) >  CONCLUSIONS:      1. Left ventricular cavity is normal.   2. Normal left ventricular diastolic function.   3. Trace mitral regurgitation.   4. Moderate left pleural effusion noted.   5. Moderate pericardial effusion noted adjacent to the posterolateralleft ventricle. There is expansion of the LV posterolateral wall with systole, no color flow was done.LV pseduoaneurysm needs to be ruled out.   6. The inferior vena cava is normal in size measuring 2.10 cm in diameter, (normal <2.1cm) with normal inspiratory collapse (normal >50%) consistent with normal right atrial pressure (~3, range 0-5mmHg).   7. Recommendations: Repeat 2 D echo with definity to evaluate pericardial effusion, to see if there is any pseudo aneurysm of the LV. Color flow and doppler should be done.    < end of copied text >

## 2023-12-12 NOTE — PROGRESS NOTE ADULT - NS ATTEND AMEND GEN_ALL_CORE FT
I have seen and examined this patient with the surgical team.  No acute events overnight.  Patient appears well this morning.  Denies pain.  No numbness/tingling in RUE.  Site appears well.  1 bottle from repeat blood cultures are positive, switched to Daptomycin per ID.  Possible CAROL pending.  Planning for DC to [facility]. I have seen and examined this patient with the surgical team.  No acute events overnight.  Patient appears well this morning.  Denies pain.  No numbness/tingling in RUE.  Site appears well.  1 bottle from repeat blood cultures are positive, switched to Daptomycin per ID.  Possible CAROL pending.  Planning for DC to [facility].

## 2023-12-12 NOTE — PHARMACOTHERAPY INTERVENTION NOTE - COMMENTS
Discussed with ID, patient with persistent MRSA bacteremia, changed from vancomycin to daptomycin. Baseline CPK ordered in AM.

## 2023-12-13 DIAGNOSIS — I31.39 OTHER PERICARDIAL EFFUSION (NONINFLAMMATORY): ICD-10-CM

## 2023-12-13 DIAGNOSIS — R78.81 BACTEREMIA: ICD-10-CM

## 2023-12-13 DIAGNOSIS — F17.200 NICOTINE DEPENDENCE, UNSPECIFIED, UNCOMPLICATED: ICD-10-CM

## 2023-12-13 LAB
-  AMPICILLIN/SULBACTAM: SIGNIFICANT CHANGE UP
-  AMPICILLIN/SULBACTAM: SIGNIFICANT CHANGE UP
-  CEFAZOLIN: SIGNIFICANT CHANGE UP
-  CEFAZOLIN: SIGNIFICANT CHANGE UP
-  CLINDAMYCIN: SIGNIFICANT CHANGE UP
-  CLINDAMYCIN: SIGNIFICANT CHANGE UP
-  DAPTOMYCIN: SIGNIFICANT CHANGE UP
-  DAPTOMYCIN: SIGNIFICANT CHANGE UP
-  ERYTHROMYCIN: SIGNIFICANT CHANGE UP
-  ERYTHROMYCIN: SIGNIFICANT CHANGE UP
-  GENTAMICIN: SIGNIFICANT CHANGE UP
-  GENTAMICIN: SIGNIFICANT CHANGE UP
-  LINEZOLID: SIGNIFICANT CHANGE UP
-  LINEZOLID: SIGNIFICANT CHANGE UP
-  OXACILLIN: SIGNIFICANT CHANGE UP
-  OXACILLIN: SIGNIFICANT CHANGE UP
-  PENICILLIN: SIGNIFICANT CHANGE UP
-  PENICILLIN: SIGNIFICANT CHANGE UP
-  RIFAMPIN: SIGNIFICANT CHANGE UP
-  RIFAMPIN: SIGNIFICANT CHANGE UP
-  TETRACYCLINE: SIGNIFICANT CHANGE UP
-  TETRACYCLINE: SIGNIFICANT CHANGE UP
-  TRIMETHOPRIM/SULFAMETHOXAZOLE: SIGNIFICANT CHANGE UP
-  TRIMETHOPRIM/SULFAMETHOXAZOLE: SIGNIFICANT CHANGE UP
-  VANCOMYCIN: SIGNIFICANT CHANGE UP
-  VANCOMYCIN: SIGNIFICANT CHANGE UP
ANION GAP SERPL CALC-SCNC: 9 MMOL/L — SIGNIFICANT CHANGE UP (ref 5–17)
ANION GAP SERPL CALC-SCNC: 9 MMOL/L — SIGNIFICANT CHANGE UP (ref 5–17)
BASOPHILS # BLD AUTO: 0.1 K/UL — SIGNIFICANT CHANGE UP (ref 0–0.2)
BASOPHILS # BLD AUTO: 0.1 K/UL — SIGNIFICANT CHANGE UP (ref 0–0.2)
BASOPHILS NFR BLD AUTO: 0.5 % — SIGNIFICANT CHANGE UP (ref 0–2)
BASOPHILS NFR BLD AUTO: 0.5 % — SIGNIFICANT CHANGE UP (ref 0–2)
BUN SERPL-MCNC: 9.6 MG/DL — SIGNIFICANT CHANGE UP (ref 8–20)
BUN SERPL-MCNC: 9.6 MG/DL — SIGNIFICANT CHANGE UP (ref 8–20)
CALCIUM SERPL-MCNC: 8.3 MG/DL — LOW (ref 8.4–10.5)
CALCIUM SERPL-MCNC: 8.3 MG/DL — LOW (ref 8.4–10.5)
CHLORIDE SERPL-SCNC: 101 MMOL/L — SIGNIFICANT CHANGE UP (ref 96–108)
CHLORIDE SERPL-SCNC: 101 MMOL/L — SIGNIFICANT CHANGE UP (ref 96–108)
CK SERPL-CCNC: 14 U/L — LOW (ref 30–200)
CK SERPL-CCNC: 14 U/L — LOW (ref 30–200)
CO2 SERPL-SCNC: 33 MMOL/L — HIGH (ref 22–29)
CO2 SERPL-SCNC: 33 MMOL/L — HIGH (ref 22–29)
CREAT SERPL-MCNC: 0.71 MG/DL — SIGNIFICANT CHANGE UP (ref 0.5–1.3)
CREAT SERPL-MCNC: 0.71 MG/DL — SIGNIFICANT CHANGE UP (ref 0.5–1.3)
CULTURE RESULTS: ABNORMAL
CULTURE RESULTS: ABNORMAL
EGFR: 106 ML/MIN/1.73M2 — SIGNIFICANT CHANGE UP
EGFR: 106 ML/MIN/1.73M2 — SIGNIFICANT CHANGE UP
EOSINOPHIL # BLD AUTO: 0.44 K/UL — SIGNIFICANT CHANGE UP (ref 0–0.5)
EOSINOPHIL # BLD AUTO: 0.44 K/UL — SIGNIFICANT CHANGE UP (ref 0–0.5)
EOSINOPHIL NFR BLD AUTO: 2.2 % — SIGNIFICANT CHANGE UP (ref 0–6)
EOSINOPHIL NFR BLD AUTO: 2.2 % — SIGNIFICANT CHANGE UP (ref 0–6)
GLUCOSE SERPL-MCNC: 106 MG/DL — HIGH (ref 70–99)
GLUCOSE SERPL-MCNC: 106 MG/DL — HIGH (ref 70–99)
HCT VFR BLD CALC: 25 % — LOW (ref 39–50)
HCT VFR BLD CALC: 25 % — LOW (ref 39–50)
HGB BLD-MCNC: 8.2 G/DL — LOW (ref 13–17)
HGB BLD-MCNC: 8.2 G/DL — LOW (ref 13–17)
IMM GRANULOCYTES NFR BLD AUTO: 5.8 % — HIGH (ref 0–0.9)
IMM GRANULOCYTES NFR BLD AUTO: 5.8 % — HIGH (ref 0–0.9)
LYMPHOCYTES # BLD AUTO: 11.4 % — LOW (ref 13–44)
LYMPHOCYTES # BLD AUTO: 11.4 % — LOW (ref 13–44)
LYMPHOCYTES # BLD AUTO: 2.34 K/UL — SIGNIFICANT CHANGE UP (ref 1–3.3)
LYMPHOCYTES # BLD AUTO: 2.34 K/UL — SIGNIFICANT CHANGE UP (ref 1–3.3)
MAGNESIUM SERPL-MCNC: 1.8 MG/DL — SIGNIFICANT CHANGE UP (ref 1.6–2.6)
MAGNESIUM SERPL-MCNC: 1.8 MG/DL — SIGNIFICANT CHANGE UP (ref 1.6–2.6)
MCHC RBC-ENTMCNC: 28.1 PG — SIGNIFICANT CHANGE UP (ref 27–34)
MCHC RBC-ENTMCNC: 28.1 PG — SIGNIFICANT CHANGE UP (ref 27–34)
MCHC RBC-ENTMCNC: 32.8 GM/DL — SIGNIFICANT CHANGE UP (ref 32–36)
MCHC RBC-ENTMCNC: 32.8 GM/DL — SIGNIFICANT CHANGE UP (ref 32–36)
MCV RBC AUTO: 85.6 FL — SIGNIFICANT CHANGE UP (ref 80–100)
MCV RBC AUTO: 85.6 FL — SIGNIFICANT CHANGE UP (ref 80–100)
METHOD TYPE: SIGNIFICANT CHANGE UP
METHOD TYPE: SIGNIFICANT CHANGE UP
MONOCYTES # BLD AUTO: 2.16 K/UL — HIGH (ref 0–0.9)
MONOCYTES # BLD AUTO: 2.16 K/UL — HIGH (ref 0–0.9)
MONOCYTES NFR BLD AUTO: 10.6 % — SIGNIFICANT CHANGE UP (ref 2–14)
MONOCYTES NFR BLD AUTO: 10.6 % — SIGNIFICANT CHANGE UP (ref 2–14)
NEUTROPHILS # BLD AUTO: 14.23 K/UL — HIGH (ref 1.8–7.4)
NEUTROPHILS # BLD AUTO: 14.23 K/UL — HIGH (ref 1.8–7.4)
NEUTROPHILS NFR BLD AUTO: 69.5 % — SIGNIFICANT CHANGE UP (ref 43–77)
NEUTROPHILS NFR BLD AUTO: 69.5 % — SIGNIFICANT CHANGE UP (ref 43–77)
ORGANISM # SPEC MICROSCOPIC CNT: ABNORMAL
ORGANISM # SPEC MICROSCOPIC CNT: ABNORMAL
ORGANISM # SPEC MICROSCOPIC CNT: SIGNIFICANT CHANGE UP
ORGANISM # SPEC MICROSCOPIC CNT: SIGNIFICANT CHANGE UP
PHOSPHATE SERPL-MCNC: 4.1 MG/DL — SIGNIFICANT CHANGE UP (ref 2.4–4.7)
PHOSPHATE SERPL-MCNC: 4.1 MG/DL — SIGNIFICANT CHANGE UP (ref 2.4–4.7)
PLATELET # BLD AUTO: 574 K/UL — HIGH (ref 150–400)
PLATELET # BLD AUTO: 574 K/UL — HIGH (ref 150–400)
POTASSIUM SERPL-MCNC: 4.1 MMOL/L — SIGNIFICANT CHANGE UP (ref 3.5–5.3)
POTASSIUM SERPL-MCNC: 4.1 MMOL/L — SIGNIFICANT CHANGE UP (ref 3.5–5.3)
POTASSIUM SERPL-SCNC: 4.1 MMOL/L — SIGNIFICANT CHANGE UP (ref 3.5–5.3)
POTASSIUM SERPL-SCNC: 4.1 MMOL/L — SIGNIFICANT CHANGE UP (ref 3.5–5.3)
RBC # BLD: 2.92 M/UL — LOW (ref 4.2–5.8)
RBC # BLD: 2.92 M/UL — LOW (ref 4.2–5.8)
RBC # FLD: 17.4 % — HIGH (ref 10.3–14.5)
RBC # FLD: 17.4 % — HIGH (ref 10.3–14.5)
SODIUM SERPL-SCNC: 143 MMOL/L — SIGNIFICANT CHANGE UP (ref 135–145)
SODIUM SERPL-SCNC: 143 MMOL/L — SIGNIFICANT CHANGE UP (ref 135–145)
SPECIMEN SOURCE: SIGNIFICANT CHANGE UP
SPECIMEN SOURCE: SIGNIFICANT CHANGE UP
WBC # BLD: 20.46 K/UL — HIGH (ref 3.8–10.5)
WBC # BLD: 20.46 K/UL — HIGH (ref 3.8–10.5)
WBC # FLD AUTO: 20.46 K/UL — HIGH (ref 3.8–10.5)
WBC # FLD AUTO: 20.46 K/UL — HIGH (ref 3.8–10.5)

## 2023-12-13 PROCEDURE — 99232 SBSQ HOSP IP/OBS MODERATE 35: CPT

## 2023-12-13 PROCEDURE — 71250 CT THORAX DX C-: CPT | Mod: 26

## 2023-12-13 PROCEDURE — 93308 TTE F-UP OR LMTD: CPT | Mod: 26

## 2023-12-13 PROCEDURE — 93321 DOPPLER ECHO F-UP/LMTD STD: CPT | Mod: 26

## 2023-12-13 PROCEDURE — 99222 1ST HOSP IP/OBS MODERATE 55: CPT

## 2023-12-13 RX ORDER — MAGNESIUM SULFATE 500 MG/ML
2 VIAL (ML) INJECTION ONCE
Refills: 0 | Status: COMPLETED | OUTPATIENT
Start: 2023-12-13 | End: 2023-12-13

## 2023-12-13 RX ADMIN — ENOXAPARIN SODIUM 40 MILLIGRAM(S): 100 INJECTION SUBCUTANEOUS at 05:00

## 2023-12-13 RX ADMIN — Medication 975 MILLIGRAM(S): at 14:50

## 2023-12-13 RX ADMIN — ENOXAPARIN SODIUM 40 MILLIGRAM(S): 100 INJECTION SUBCUTANEOUS at 18:20

## 2023-12-13 RX ADMIN — OXYCODONE HYDROCHLORIDE 10 MILLIGRAM(S): 5 TABLET ORAL at 21:18

## 2023-12-13 RX ADMIN — Medication 25 GRAM(S): at 09:58

## 2023-12-13 RX ADMIN — OXYCODONE HYDROCHLORIDE 10 MILLIGRAM(S): 5 TABLET ORAL at 05:35

## 2023-12-13 RX ADMIN — METHOCARBAMOL 1000 MILLIGRAM(S): 500 TABLET, FILM COATED ORAL at 22:05

## 2023-12-13 RX ADMIN — Medication 975 MILLIGRAM(S): at 05:35

## 2023-12-13 RX ADMIN — Medication 975 MILLIGRAM(S): at 18:21

## 2023-12-13 RX ADMIN — Medication 400 MILLIGRAM(S): at 05:35

## 2023-12-13 RX ADMIN — QUETIAPINE FUMARATE 125 MILLIGRAM(S): 200 TABLET, FILM COATED ORAL at 22:09

## 2023-12-13 RX ADMIN — Medication 400 MILLIGRAM(S): at 00:00

## 2023-12-13 RX ADMIN — OXYCODONE HYDROCHLORIDE 10 MILLIGRAM(S): 5 TABLET ORAL at 20:18

## 2023-12-13 RX ADMIN — Medication 975 MILLIGRAM(S): at 13:49

## 2023-12-13 RX ADMIN — Medication 2 MILLIGRAM(S): at 18:21

## 2023-12-13 RX ADMIN — OXYCODONE HYDROCHLORIDE 10 MILLIGRAM(S): 5 TABLET ORAL at 15:32

## 2023-12-13 RX ADMIN — GABAPENTIN 100 MILLIGRAM(S): 400 CAPSULE ORAL at 22:06

## 2023-12-13 RX ADMIN — DIVALPROEX SODIUM 500 MILLIGRAM(S): 500 TABLET, DELAYED RELEASE ORAL at 05:01

## 2023-12-13 RX ADMIN — Medication 975 MILLIGRAM(S): at 19:15

## 2023-12-13 RX ADMIN — Medication 400 MILLIGRAM(S): at 19:15

## 2023-12-13 RX ADMIN — Medication 400 MILLIGRAM(S): at 18:21

## 2023-12-13 RX ADMIN — LIDOCAINE 3 PATCH: 4 CREAM TOPICAL at 19:55

## 2023-12-13 RX ADMIN — Medication 975 MILLIGRAM(S): at 00:25

## 2023-12-13 RX ADMIN — DIVALPROEX SODIUM 500 MILLIGRAM(S): 500 TABLET, DELAYED RELEASE ORAL at 18:21

## 2023-12-13 RX ADMIN — Medication 100 MILLIGRAM(S): at 13:49

## 2023-12-13 RX ADMIN — AMLODIPINE BESYLATE 5 MILLIGRAM(S): 2.5 TABLET ORAL at 05:01

## 2023-12-13 RX ADMIN — OXYCODONE HYDROCHLORIDE 10 MILLIGRAM(S): 5 TABLET ORAL at 09:57

## 2023-12-13 RX ADMIN — PANTOPRAZOLE SODIUM 40 MILLIGRAM(S): 20 TABLET, DELAYED RELEASE ORAL at 05:01

## 2023-12-13 RX ADMIN — METHOCARBAMOL 1000 MILLIGRAM(S): 500 TABLET, FILM COATED ORAL at 05:01

## 2023-12-13 RX ADMIN — Medication 5 MILLIGRAM(S): at 22:05

## 2023-12-13 RX ADMIN — Medication 400 MILLIGRAM(S): at 05:01

## 2023-12-13 RX ADMIN — OXYCODONE HYDROCHLORIDE 10 MILLIGRAM(S): 5 TABLET ORAL at 16:30

## 2023-12-13 RX ADMIN — Medication 975 MILLIGRAM(S): at 05:00

## 2023-12-13 RX ADMIN — Medication 1 PATCH: at 22:10

## 2023-12-13 RX ADMIN — Medication 2 MILLIGRAM(S): at 05:01

## 2023-12-13 RX ADMIN — METHOCARBAMOL 1000 MILLIGRAM(S): 500 TABLET, FILM COATED ORAL at 13:54

## 2023-12-13 RX ADMIN — DAPTOMYCIN 122 MILLIGRAM(S): 500 INJECTION, POWDER, LYOPHILIZED, FOR SOLUTION INTRAVENOUS at 18:20

## 2023-12-13 RX ADMIN — OXYCODONE HYDROCHLORIDE 10 MILLIGRAM(S): 5 TABLET ORAL at 11:00

## 2023-12-13 RX ADMIN — Medication 400 MILLIGRAM(S): at 14:50

## 2023-12-13 RX ADMIN — CHLORHEXIDINE GLUCONATE 1 APPLICATION(S): 213 SOLUTION TOPICAL at 16:46

## 2023-12-13 RX ADMIN — Medication 1 MILLIGRAM(S): at 16:46

## 2023-12-13 RX ADMIN — LIDOCAINE 3 PATCH: 4 CREAM TOPICAL at 13:53

## 2023-12-13 RX ADMIN — OXYCODONE HYDROCHLORIDE 10 MILLIGRAM(S): 5 TABLET ORAL at 05:01

## 2023-12-13 RX ADMIN — Medication 400 MILLIGRAM(S): at 13:49

## 2023-12-13 NOTE — PROGRESS NOTE ADULT - SUBJECTIVE AND OBJECTIVE BOX
[facility]  INFECTIOUS DISEASES at [place] and [place]  ===============================================================                  Jordon Gonzales MD               Diplomates American Board of Internal Medicine & Infectious Diseases                * [place] Office - Appt - Tel  [phone] Fax [phone]                * [place] Office - Appt - Tel [phone] Fax [phone]                                  Hospital Consult line:  [phone]  ==============================================================    NOHEMY BRAN [id number]    Follow up: MRSA bacteremia        S/p I&D of right arm septic thrombophlebitis   pt denies complaints  wbc uptrending  midline placed for access 12/13  afebrile and hemodynamically stable     I have personally reviewed the labs and data; pertinent labs and data are listed in this note; please see below.     _______________________________________________________________  REVIEW OF SYSTEMS    has hardware in spine-chronic back pain, stable  ________________________________________________________________  Allergies:  No Known Allergies    ________________________________________________________________  PHYSICAL EXAM  GEN: in NAD, laying in bed  HEENT: Anicteric sclerae. Moist mucous membranes. No mucosal lesions.   NECK: Supple.  LUNGS: eupneic. decreased Breath sounds at bases B/L.  HEART: RRR, no m/r/g  ABDOMEN: Soft, NT, ND,  +BS.    : No CVA tenderness. No Deleon catheter  EXTREMITIES: RUE vac Minimal swelling.   NEUROLOGIC: Grossly no motor focal deficits   PSYCHIATRIC: Appropriate affect and mood  SKIN: ulcer in upper chest.     ________________________________________________________________  Vitals:  Vital Signs Last 24 Hrs  T(C): 36.6 (13 Dec 2023 19:27), Max: 36.9 (13 Dec 2023 00:20)  T(F): 97.9 (13 Dec 2023 19:27), Max: 98.4 (13 Dec 2023 00:20)  HR: 78 (13 Dec 2023 19:27) (73 - 89)  BP: 152/69 (13 Dec 2023 19:27) (119/71 - 152/69)  BP(mean): --  RR: 18 (13 Dec 2023 19:27) (18 - 20)  SpO2: 91% (13 Dec 2023 19:27) (91% - 100%)    Parameters below as of 13 Dec 2023 20:18  Patient On (Oxygen Delivery Method): nasal cannula        Current Antibiotics:  vancomycin  IVPB 500 milliGRAM(s) IV Intermittent every 12 hours    Other medications:  acetaminophen     Tablet .. 975 milliGRAM(s) Oral every 6 hours  amLODIPine   Tablet 5 milliGRAM(s) Oral daily  benztropine 2 milliGRAM(s) Oral two times a day  chlorhexidine 2% Cloths 1 Application(s) Topical daily  divalproex  milliGRAM(s) Oral every 12 hours  enoxaparin Injectable 40 milliGRAM(s) SubCutaneous every 12 hours  folic acid 1 milliGRAM(s) Oral daily  gabapentin 100 milliGRAM(s) Oral at bedtime  influenza   Vaccine 0.5 milliLiter(s) IntraMuscular once  lidocaine   4% Patch 3 Patch Transdermal daily  melatonin 5 milliGRAM(s) Oral at bedtime  methocarbamol 750 milliGRAM(s) Oral every 8 hours  nicotine - 21 mG/24Hr(s) Patch 1 Patch Transdermal every 24 hours  pantoprazole    Tablet 40 milliGRAM(s) Oral before breakfast  polyethylene glycol 3350 17 Gram(s) Oral at bedtime  QUEtiapine 125 milliGRAM(s) Oral at bedtime  senna 2 Tablet(s) Oral at bedtime  thiamine 100 milliGRAM(s) Oral daily                            8.2    20.46 )-----------( 574      ( 13 Dec 2023 06:50 )             25.0       12-13    143  |  101  |  9.6  ----------------------------<  106<H>  4.1   |  33.0<H>  |  0.71    Ca    8.3<L>      13 Dec 2023 06:50  Phos  4.1     12-13  Mg     1.8     12-13                Urinalysis Basic - ( 13 Dec 2023 06:50 )    Color: x / Appearance: x / SG: x / pH: x  Gluc: 106 mg/dL / Ketone: x  / Bili: x / Urobili: x   Blood: x / Protein: x / Nitrite: x   Leuk Esterase: x / RBC: x / WBC x   Sq Epi: x / Non Sq Epi: x / Bacteria: x            CARDIAC MARKERS ( 13 Dec 2023 06:50 )  x     / x     / 14 U/L / x     / x            CAPILLARY BLOOD GLUCOSE                  Urinalysis Basic - ( 12 Dec 2023 07:58 )    Color: x / Appearance: x / SG: x / pH: x  Gluc: 95 mg/dL / Ketone: x  / Bili: x / Urobili: x   Blood: x / Protein: x / Nitrite: x   Leuk Esterase: x / RBC: x / WBC x   Sq Epi: x / Non Sq Epi: x / Bacteria: x                  CAPILLARY BLOOD GLUCOSE                    Urinalysis Basic - ( 12 Dec 2023 07:58 )    Color: x / Appearance: x / SG: x / pH: x  Gluc: 95 mg/dL / Ketone: x  / Bili: x / Urobili: x   Blood: x / Protein: x / Nitrite: x   Leuk Esterase: x / RBC: x / WBC x   Sq Epi: x / Non Sq Epi: x / Bacteria: x                  CAPILLARY BLOOD GLUCOSE                    Urinalysis Basic - ( 11 Dec 2023 06:57 )    Color: x / Appearance: x / SG: x / pH: x  Gluc: 96 mg/dL / Ketone: x  / Bili: x / Urobili: x   Blood: x / Protein: x / Nitrite: x   Leuk Esterase: x / RBC: x / WBC x   Sq Epi: x / Non Sq Epi: x / Bacteria: x                  CAPILLARY BLOOD GLUCOSE                  RECENT CULTURES:  12-07 @ 14:57 .Blood Blood-Peripheral     Growth in aerobic bottle: Gram Positive Cocci in Clusters  Growth in anaerobic bottle: Gram Positive Cocci in Clusters    Growth in aerobic bottle: Gram Positive Cocci in Clusters  Growth in anaerobic bottle: Gram Positive Cocci in Clusters      12-07 @ 14:52 .Blood Blood-Venous     Growth in aerobic bottle: Gram Positive Cocci in Clusters  Growth in anaerobic bottle: Gram Positive Cocci in Clusters    Growth in aerobic bottle: Gram Positive Cocci in Clusters  Growth in anaerobic bottle: Gram Positive Cocci in Clusters      12-06 @ 15:15 .Blood Blood Blood Culture PCR  Methicillin resistant Staphylococcus aureus    Growth in aerobic and anaerobic bottles: Methicillin Resistant  Staphylococcus aureus    Growth in aerobic bottle: Gram Positive Cocci in Clusters  Growth in anaerobic bottle: Gram Positive Cocci in Clusters      12-06 @ 15:10 .Blood Blood     Growth in aerobic and anaerobic bottles: Methicillin Resistant  Staphylococcus aureus  See previous culture [id number]    Growth in anaerobic bottle: Gram Positive Cocci in Clusters  Growth in aerobic bottle: Gram Positive Cocci in Clusters        WBC Count: 19.04 K/uL (12-09-23 @ 07:31)  WBC Count: 20.66 K/uL (12-08-23 @ 08:06)  WBC Count: 22.89 K/uL (12-07-23 @ 06:05)  WBC Count: 14.00 K/uL (12-06-23 @ 05:15)  WBC Count: 13.75 K/uL (12-05-23 @ 02:08)    Creatinine: 0.63 mg/dL (12-09-23 @ 07:31)  Creatinine: 1.17 mg/dL (12-08-23 @ 08:06)  Creatinine: 0.83 mg/dL (12-07-23 @ 06:05)  Creatinine: 0.60 mg/dL (12-06-23 @ 05:15)  Creatinine: 0.81 mg/dL (12-05-23 @ 02:08)      Vancomycin Level, Trough: 8.3 ug/mL (12-09-23 @ 07:31)  Vancomycin Level, Trough: 11.5 ug/mL (12-08-23 @ 18:40)    ________________________________________________________________  CARDIOLOGY   no recent studies   ________________________________________________________________  RADIOLOGY  < from: US Duplex Venous Upper Ext Ltd, Right (12.07.23 @ 11:52) >  INTERPRETATION:  CLINICAL INFORMATION: Arm swelling    COMPARISON: None available.    TECHNIQUE: Duplex sonography of the RIGHT UPPER extremity veins with   color and spectral Doppler, with and without compression.    FINDINGS:    The right internal jugular, subclavian, axillary and brachial veins are   patent and compressible where applicable.  The basilic vein (superficial   vein) is patent and without thrombus.  Superficial thrombus in the right   cephalic vein.      IMPRESSION:  No evidence of right upper extremity deep venous thrombosis.    Superficial thrombus in the right cephalic vein.    < end of copied text >  < from: Xray Chest 1 View- PORTABLE-Urgent (Xray Chest 1 View- PORTABLE-Urgent .) (12.06.23 @ 10:15) >  INTERPRETATION:  Chest one view    HISTORY: Leukocytosis    COMPARISON STUDY: 12/2/2023    Frontal expiratory view of the chest shows the heart to be similar in   size. The lungs show partial clearing of the left base and there is no   evidence of pneumothorax nor pleural effusion.    IMPRESSION:  Left base clearing.    < end of copied text >    < from: TTE W or WO Ultrasound Enhancing Agent (12.11.23 @ 19:59) >  CONCLUSIONS:      1. Left ventricular cavity is normal.   2. Normal left ventricular diastolic function.   3. Trace mitral regurgitation.   4. Moderate left pleural effusion noted.   5. Moderate pericardial effusion noted adjacent to the posterolateralleft ventricle. There is expansion of the LV posterolateral wall with systole, no color flow was done.LV pseduoaneurysm needs to be ruled out.   6. The inferior vena cava is normal in size measuring 2.10 cm in diameter, (normal <2.1cm) with normal inspiratory collapse (normal >50%) consistent with normal right atrial pressure (~3, range 0-5mmHg).   7. Recommendations: Repeat 2 D echo with definity to evaluate pericardial effusion, to see if there is any pseudo aneurysm of the LV. Color flow and doppler should be done.    < end of copied text >   [facility]  INFECTIOUS DISEASES at [place] and [place]  ===============================================================                  Jordon Gonzales MD               Diplomates American Board of Internal Medicine & Infectious Diseases                * [place] Office - Appt - Tel  [phone] Fax [phone]                * [place] Office - Appt - Tel [phone] Fax [phone]                                  Hospital Consult line:  [phone]  ==============================================================    NOHEMY BRAN [id number]    Follow up: MRSA bacteremia        S/p I&D of right arm septic thrombophlebitis   pt denies complaints  wbc uptrending  midline placed for access 12/13  afebrile and hemodynamically stable     I have personally reviewed the labs and data; pertinent labs and data are listed in this note; please see below.     _______________________________________________________________  REVIEW OF SYSTEMS    has hardware in spine-chronic back pain, stable  ________________________________________________________________  Allergies:  No Known Allergies    ________________________________________________________________  PHYSICAL EXAM  GEN: in NAD, laying in bed  HEENT: Anicteric sclerae. Moist mucous membranes. No mucosal lesions.   NECK: Supple.  LUNGS: eupneic. decreased Breath sounds at bases B/L.  HEART: RRR, no m/r/g  ABDOMEN: Soft, NT, ND,  +BS.    : No CVA tenderness. No Deleon catheter  EXTREMITIES: RUE vac Minimal swelling.   NEUROLOGIC: Grossly no motor focal deficits   PSYCHIATRIC: Appropriate affect and mood  SKIN: ulcer in upper chest.     ________________________________________________________________  Vitals:  Vital Signs Last 24 Hrs  T(C): 36.6 (13 Dec 2023 19:27), Max: 36.9 (13 Dec 2023 00:20)  T(F): 97.9 (13 Dec 2023 19:27), Max: 98.4 (13 Dec 2023 00:20)  HR: 78 (13 Dec 2023 19:27) (73 - 89)  BP: 152/69 (13 Dec 2023 19:27) (119/71 - 152/69)  BP(mean): --  RR: 18 (13 Dec 2023 19:27) (18 - 20)  SpO2: 91% (13 Dec 2023 19:27) (91% - 100%)    Parameters below as of 13 Dec 2023 20:18  Patient On (Oxygen Delivery Method): nasal cannula        Current Antibiotics:  vancomycin  IVPB 500 milliGRAM(s) IV Intermittent every 12 hours    Other medications:  acetaminophen     Tablet .. 975 milliGRAM(s) Oral every 6 hours  amLODIPine   Tablet 5 milliGRAM(s) Oral daily  benztropine 2 milliGRAM(s) Oral two times a day  chlorhexidine 2% Cloths 1 Application(s) Topical daily  divalproex  milliGRAM(s) Oral every 12 hours  enoxaparin Injectable 40 milliGRAM(s) SubCutaneous every 12 hours  folic acid 1 milliGRAM(s) Oral daily  gabapentin 100 milliGRAM(s) Oral at bedtime  influenza   Vaccine 0.5 milliLiter(s) IntraMuscular once  lidocaine   4% Patch 3 Patch Transdermal daily  melatonin 5 milliGRAM(s) Oral at bedtime  methocarbamol 750 milliGRAM(s) Oral every 8 hours  nicotine - 21 mG/24Hr(s) Patch 1 Patch Transdermal every 24 hours  pantoprazole    Tablet 40 milliGRAM(s) Oral before breakfast  polyethylene glycol 3350 17 Gram(s) Oral at bedtime  QUEtiapine 125 milliGRAM(s) Oral at bedtime  senna 2 Tablet(s) Oral at bedtime  thiamine 100 milliGRAM(s) Oral daily                            8.2    20.46 )-----------( 574      ( 13 Dec 2023 06:50 )             25.0       12-13    143  |  101  |  9.6  ----------------------------<  106<H>  4.1   |  33.0<H>  |  0.71    Ca    8.3<L>      13 Dec 2023 06:50  Phos  4.1     12-13  Mg     1.8     12-13                Urinalysis Basic - ( 13 Dec 2023 06:50 )    Color: x / Appearance: x / SG: x / pH: x  Gluc: 106 mg/dL / Ketone: x  / Bili: x / Urobili: x   Blood: x / Protein: x / Nitrite: x   Leuk Esterase: x / RBC: x / WBC x   Sq Epi: x / Non Sq Epi: x / Bacteria: x            CARDIAC MARKERS ( 13 Dec 2023 06:50 )  x     / x     / 14 U/L / x     / x            CAPILLARY BLOOD GLUCOSE                  Urinalysis Basic - ( 12 Dec 2023 07:58 )    Color: x / Appearance: x / SG: x / pH: x  Gluc: 95 mg/dL / Ketone: x  / Bili: x / Urobili: x   Blood: x / Protein: x / Nitrite: x   Leuk Esterase: x / RBC: x / WBC x   Sq Epi: x / Non Sq Epi: x / Bacteria: x                  CAPILLARY BLOOD GLUCOSE                    Urinalysis Basic - ( 12 Dec 2023 07:58 )    Color: x / Appearance: x / SG: x / pH: x  Gluc: 95 mg/dL / Ketone: x  / Bili: x / Urobili: x   Blood: x / Protein: x / Nitrite: x   Leuk Esterase: x / RBC: x / WBC x   Sq Epi: x / Non Sq Epi: x / Bacteria: x                  CAPILLARY BLOOD GLUCOSE                    Urinalysis Basic - ( 11 Dec 2023 06:57 )    Color: x / Appearance: x / SG: x / pH: x  Gluc: 96 mg/dL / Ketone: x  / Bili: x / Urobili: x   Blood: x / Protein: x / Nitrite: x   Leuk Esterase: x / RBC: x / WBC x   Sq Epi: x / Non Sq Epi: x / Bacteria: x                  CAPILLARY BLOOD GLUCOSE                  RECENT CULTURES:  12-07 @ 14:57 .Blood Blood-Peripheral     Growth in aerobic bottle: Gram Positive Cocci in Clusters  Growth in anaerobic bottle: Gram Positive Cocci in Clusters    Growth in aerobic bottle: Gram Positive Cocci in Clusters  Growth in anaerobic bottle: Gram Positive Cocci in Clusters      12-07 @ 14:52 .Blood Blood-Venous     Growth in aerobic bottle: Gram Positive Cocci in Clusters  Growth in anaerobic bottle: Gram Positive Cocci in Clusters    Growth in aerobic bottle: Gram Positive Cocci in Clusters  Growth in anaerobic bottle: Gram Positive Cocci in Clusters      12-06 @ 15:15 .Blood Blood Blood Culture PCR  Methicillin resistant Staphylococcus aureus    Growth in aerobic and anaerobic bottles: Methicillin Resistant  Staphylococcus aureus    Growth in aerobic bottle: Gram Positive Cocci in Clusters  Growth in anaerobic bottle: Gram Positive Cocci in Clusters      12-06 @ 15:10 .Blood Blood     Growth in aerobic and anaerobic bottles: Methicillin Resistant  Staphylococcus aureus  See previous culture [id number]    Growth in anaerobic bottle: Gram Positive Cocci in Clusters  Growth in aerobic bottle: Gram Positive Cocci in Clusters        WBC Count: 19.04 K/uL (12-09-23 @ 07:31)  WBC Count: 20.66 K/uL (12-08-23 @ 08:06)  WBC Count: 22.89 K/uL (12-07-23 @ 06:05)  WBC Count: 14.00 K/uL (12-06-23 @ 05:15)  WBC Count: 13.75 K/uL (12-05-23 @ 02:08)    Creatinine: 0.63 mg/dL (12-09-23 @ 07:31)  Creatinine: 1.17 mg/dL (12-08-23 @ 08:06)  Creatinine: 0.83 mg/dL (12-07-23 @ 06:05)  Creatinine: 0.60 mg/dL (12-06-23 @ 05:15)  Creatinine: 0.81 mg/dL (12-05-23 @ 02:08)      Vancomycin Level, Trough: 8.3 ug/mL (12-09-23 @ 07:31)  Vancomycin Level, Trough: 11.5 ug/mL (12-08-23 @ 18:40)    ________________________________________________________________  CARDIOLOGY   no recent studies   ________________________________________________________________  RADIOLOGY  < from: US Duplex Venous Upper Ext Ltd, Right (12.07.23 @ 11:52) >  INTERPRETATION:  CLINICAL INFORMATION: Arm swelling    COMPARISON: None available.    TECHNIQUE: Duplex sonography of the RIGHT UPPER extremity veins with   color and spectral Doppler, with and without compression.    FINDINGS:    The right internal jugular, subclavian, axillary and brachial veins are   patent and compressible where applicable.  The basilic vein (superficial   vein) is patent and without thrombus.  Superficial thrombus in the right   cephalic vein.      IMPRESSION:  No evidence of right upper extremity deep venous thrombosis.    Superficial thrombus in the right cephalic vein.    < end of copied text >  < from: Xray Chest 1 View- PORTABLE-Urgent (Xray Chest 1 View- PORTABLE-Urgent .) (12.06.23 @ 10:15) >  INTERPRETATION:  Chest one view    HISTORY: Leukocytosis    COMPARISON STUDY: 12/2/2023    Frontal expiratory view of the chest shows the heart to be similar in   size. The lungs show partial clearing of the left base and there is no   evidence of pneumothorax nor pleural effusion.    IMPRESSION:  Left base clearing.    < end of copied text >    < from: TTE W or WO Ultrasound Enhancing Agent (12.11.23 @ 19:59) >  CONCLUSIONS:      1. Left ventricular cavity is normal.   2. Normal left ventricular diastolic function.   3. Trace mitral regurgitation.   4. Moderate left pleural effusion noted.   5. Moderate pericardial effusion noted adjacent to the posterolateralleft ventricle. There is expansion of the LV posterolateral wall with systole, no color flow was done.LV pseduoaneurysm needs to be ruled out.   6. The inferior vena cava is normal in size measuring 2.10 cm in diameter, (normal <2.1cm) with normal inspiratory collapse (normal >50%) consistent with normal right atrial pressure (~3, range 0-5mmHg).   7. Recommendations: Repeat 2 D echo with definity to evaluate pericardial effusion, to see if there is any pseudo aneurysm of the LV. Color flow and doppler should be done.    < end of copied text >

## 2023-12-13 NOTE — PROGRESS NOTE ADULT - NS ATTEND AMEND GEN_ALL_CORE FT
I have seen and examined this patient with the surgical team.  No acute events overnight.  Patient appears well this morning.  Denies pain.  No numbness/tingling in RUE.  Site appears well.  On Daptomycin for MRSA bacteremia.  Newest BCx (-).  Will get IV access and DC midline.  Possible CAROL pending.  Planning for DC to [facility]. I have seen and examined this patient with the surgical team.  No acute events overnight.  Patient appears well this morning.  Denies pain.  No numbness/tingling in RUE.  Site appears well.  On Daptomycin for MRSA bacteremia.  Newest BCx (-).  Will get IV access and DC midline.  Possible CAROL pending.  Planning for DC to [facility].

## 2023-12-13 NOTE — CONSULT NOTE ADULT - SUBJECTIVE AND OBJECTIVE BOX
[facility]                                              CARDIOLOGY AT [facility]                                                   [address], [place] [address]                                             Telephone: [phone]. Fax:[phone]                                                         CARDIOLOGY CONSULTATION NOTE                                                                                             Consult requested by:  Dr Shannon  History obtained by: Patient and medical record  Community Cardiologist: None   obtained: Yes [  ] No [ x ]  Reason for Consultation:  MRSA Bacteremia  Avialable out pt records reviewed: Yes [  ] No [  ]    Chief complaint:    Patient is a 58y old  Male who presents with a chief complaint of Fracture of one rib, unspecified side, initial encounter for closed fracture     (30 Nov 2023 10:29)        This is a 57 year old male with PMH of ETOH abuse, HTN, HLD, seizures, tardive dyskinesia hiatal hernia s/p mechanical fall down stairs while intoxicated on 11/29. Pt intubated in ED for airway protection. Found to have left rib fractures 3, 6, 7, 10 and left pneumothorax with extensive subcutaneous emphysema requiring a left sided pigtail on 11/29. Hospital course complicated by acute agitation and acute asthma exacerbation requiring re-intubation on 12/1. Pt extubated 12/2. Transferred to floor 12/6 and noted to be febrile with uptrending WBC, rt arm edema erythema and reported purulent drainage. Found to have MRSA bacteremia, concern for cellulitis , septic thrombphlebitis s/p I&D found to have MRSA bacteremia  Also Echo c/w Moderate pericardial effusion        CARDIAC TESTING   ECHO:  < from: TTE W or WO Ultrasound Enhancing Agent (12.11.23 @ 19:59) >  CONCLUSIONS:      1. Left ventricular cavity is normal.   2. Normal left ventricular diastolic function.   3. Trace mitral regurgitation.   4. Moderate left pleural effusion noted.   5. Moderate pericardial effusion noted adjacent to the posterolateralleft ventricle. There is expansion of the LV posterolateral wall with systole, no color flow was done.LV pseduoaneurysm needs to be ruled out.   6. The inferior vena cava is normal in size measuring 2.10 cm in diameter, (normal <2.1cm) with normal inspiratory collapse (normal >50%) consistent with normal right atrial pressure (~3, range 0-5mmHg).   7. Recommendations: Repeat 2 D echo with definity to evaluate pericardial effusion, to see if there is any pseudo aneurysm of the LV. Color flow and doppler should be done.      PAST MEDICAL HISTORY  High cholesterol  Hiatal hernia  Tardive dyskinesia  Liver failure  Seizures  ETOH abuse    PAST SURGICAL HISTORY  S/P tonsillectomy  History of lumbar spinal fusion    SOCIAL HISTORY:  Denies smoking/alcohol/drugs  CIGARETTES:     ALCOHOL:  DRUGS:    Family History of Cardiovascular Disease:  Yes [  ] No [  ]  Coronary Artery Disease in first degree relative: Yes [  ] No [  ]  Sudden Cardiac Death in First degree relative: Yes [  ] No [  ]      HOME MEDICATIONS:      CURRENT MEDICATIONS:  amLODIPine   Tablet 5 milliGRAM(s) Oral daily     acetaminophen     Tablet ..  benztropine  divalproex DR  gabapentin  ibuprofen  Tablet.  melatonin  methocarbamol  QUEtiapine  pantoprazole    Tablet  polyethylene glycol 3350  senna  chlorhexidine 2% Cloths  DAPTOmycin IVPB  enoxaparin Injectable  folic acid  influenza   Vaccine  lidocaine   4% Patch  nicotine - 21 mG/24Hr(s) Patch  thiamine    ALLERGIES: NKDA              REVIEW OF SYMPTOMS:   CONSTITUTIONAL: No fever, no chills, no weight loss, no weight gain, no fatigue   ENMT:  No vertigo; No sinus or throat pain  NECK: No pain or stiffness  CARDIOVASCULAR: No chest pain, no dyspnea, no syncope/presyncope, no palpitations, no dizziness, no Orthopnea, no Paroxsymal nocturnal dyspnea  RESPIRATORY: no Shortness of breath, no cough, no wheezing  : No dysuria, no hematuria   GI: No dark color stool, no nausea, no diarrhea, no constipation, no abdominal pain   NEURO: No headache, no slurred speech   MUSCULOSKELETAL: No joint pain or swelling; No muscle, back, or extremity pain  PSYCH: No agitation, no anxiety.    ALL OTHER REVIEW OF SYSTEMS ARE NEGATIVE.      Vital Signs Last 24 Hrs  T(C): 36.4 (13 Dec 2023 04:26), Max: 36.9 (12 Dec 2023 20:10)  T(F): 97.5 (13 Dec 2023 04:26), Max: 98.4 (12 Dec 2023 20:10)  HR: 73 (13 Dec 2023 04:26) (73 - 89)  BP: 130/89 (13 Dec 2023 04:26) (123/82 - 138/88)  BP(mean): --  RR: 18 (13 Dec 2023 04:26) (18 - 18)  SpO2: 100% (13 Dec 2023 04:26) (91% - 100%)    Parameters below as of 13 Dec 2023 04:26  Patient On (Oxygen Delivery Method): room air      INTAKE AND OUTPUT:   12-12 @ 07:01  -  12-13 @ 07:00  --------------------------------------------------------  IN: 200 mL / OUT: 1250 mL / NET: -1050 mL        PHYSICAL EXAM:  Constitutional: Comfortable . No acute distress.   HEENT: Atraumatic and normocephalic , neck is supple . no JVD. No carotid bruit.  CNS: A&Ox3. No focal deficits.   Respiratory: CTAB, unlabored   Cardiovascular: RRR normal s1 s2. No murmur. No rubs or gallop.  Gastrointestinal: Soft, non-tender. +Bowel sounds.   MSK: full ROM extremities x 4  Extremities: No edema. No cyanosis   Psychiatric: Calm . no agitation.   Skin: Warm and dry, no ulcers on extremities       LABS:                        8.2    20.46 )-----------( 574      ( 13 Dec 2023 06:50 )             25.0     12-13    143  |  101  |  9.6  ----------------------------<  106<H>  4.1   |  33.0<H>  |  0.71    Ca    8.3<L>      13 Dec 2023 06:50  Phos  4.1     12-13  Mg     1.8     12-13      CARDIAC MARKERS ( 13 Dec 2023 06:50 )  x     / x     / 14 U/L / x     / x        ;p-BNP=    Urinalysis Basic - ( 13 Dec 2023 06:50 )    Color: x / Appearance: x / SG: x / pH: x  Gluc: 106 mg/dL / Ketone: x  / Bili: x / Urobili: x   Blood: x / Protein: x / Nitrite: x   Leuk Esterase: x / RBC: x / WBC x   Sq Epi: x / Non Sq Epi: x / Bacteria: x          INTERPRETATION OF TELEMETRY:     ECG:   Prior ECG: Yes [  ] No [  ]      RADIOLOGY & ADDITIONAL STUDIES:    X-ray:  reviewed by me.   CT scan:   MRI:   US:                                                [facility]                                              CARDIOLOGY AT [facility]                                                   [address], [place] [address]                                             Telephone: [phone]. Fax:[phone]                                                         CARDIOLOGY CONSULTATION NOTE                                                                                             Consult requested by:  Dr Shannon  History obtained by: Patient and medical record  Community Cardiologist: None   obtained: Yes [  ] No [ x ]  Reason for Consultation:  MRSA Bacteremia  Avialable out pt records reviewed: Yes [  ] No [  ]    Chief complaint:    Patient is a 58y old  Male who presents with a chief complaint of Fracture of one rib, unspecified side, initial encounter for closed fracture     (30 Nov 2023 10:29)        This is a 57 year old male with PMH of ETOH abuse, HTN, HLD, seizures, tardive dyskinesia hiatal hernia s/p mechanical fall down stairs while intoxicated on 11/29. Pt intubated in ED for airway protection. Found to have left rib fractures 3, 6, 7, 10 and left pneumothorax with extensive subcutaneous emphysema requiring a left sided pigtail on 11/29. Hospital course complicated by acute agitation and acute asthma exacerbation requiring re-intubation on 12/1. Pt extubated 12/2. Transferred to floor 12/6 and noted to be febrile with uptrending WBC, rt arm edema erythema and reported purulent drainage. Found to have MRSA bacteremia, concern for cellulitis , septic thrombphlebitis s/p I&D found to have MRSA bacteremia  Also Echo c/w Moderate pericardial effusion        CARDIAC TESTING   ECHO:  < from: TTE W or WO Ultrasound Enhancing Agent (12.11.23 @ 19:59) >  CONCLUSIONS:      1. Left ventricular cavity is normal.   2. Normal left ventricular diastolic function.   3. Trace mitral regurgitation.   4. Moderate left pleural effusion noted.   5. Moderate pericardial effusion noted adjacent to the posterolateralleft ventricle. There is expansion of the LV posterolateral wall with systole, no color flow was done.LV pseduoaneurysm needs to be ruled out.   6. The inferior vena cava is normal in size measuring 2.10 cm in diameter, (normal <2.1cm) with normal inspiratory collapse (normal >50%) consistent with normal right atrial pressure (~3, range 0-5mmHg).   7. Recommendations: Repeat 2 D echo with definity to evaluate pericardial effusion, to see if there is any pseudo aneurysm of the LV. Color flow and doppler should be done.      PAST MEDICAL HISTORY  High cholesterol  Hiatal hernia  Tardive dyskinesia  Liver failure  Seizures  ETOH abuse    PAST SURGICAL HISTORY  S/P tonsillectomy  History of lumbar spinal fusion    SOCIAL HISTORY:  Denies smoking/alcohol/drugs  CIGARETTES:     ALCOHOL:  DRUGS:    Family History of Cardiovascular Disease:  Yes [  ] No [  ]  Coronary Artery Disease in first degree relative: Yes [  ] No [  ]  Sudden Cardiac Death in First degree relative: Yes [  ] No [  ]      HOME MEDICATIONS:      CURRENT MEDICATIONS:  amLODIPine   Tablet 5 milliGRAM(s) Oral daily     acetaminophen     Tablet ..  benztropine  divalproex DR  gabapentin  ibuprofen  Tablet.  melatonin  methocarbamol  QUEtiapine  pantoprazole    Tablet  polyethylene glycol 3350  senna  chlorhexidine 2% Cloths  DAPTOmycin IVPB  enoxaparin Injectable  folic acid  influenza   Vaccine  lidocaine   4% Patch  nicotine - 21 mG/24Hr(s) Patch  thiamine    ALLERGIES: NKDA              REVIEW OF SYMPTOMS:   CONSTITUTIONAL: No fever, no chills, no weight loss, no weight gain, no fatigue   ENMT:  No vertigo; No sinus or throat pain  NECK: No pain or stiffness  CARDIOVASCULAR: No chest pain, no dyspnea, no syncope/presyncope, no palpitations, no dizziness, no Orthopnea, no Paroxsymal nocturnal dyspnea  RESPIRATORY: no Shortness of breath, no cough, no wheezing  : No dysuria, no hematuria   GI: No dark color stool, no nausea, no diarrhea, no constipation, no abdominal pain   NEURO: No headache, no slurred speech   MUSCULOSKELETAL: No joint pain or swelling; No muscle, back, or extremity pain  PSYCH: No agitation, no anxiety.    ALL OTHER REVIEW OF SYSTEMS ARE NEGATIVE.      Vital Signs Last 24 Hrs  T(C): 36.4 (13 Dec 2023 04:26), Max: 36.9 (12 Dec 2023 20:10)  T(F): 97.5 (13 Dec 2023 04:26), Max: 98.4 (12 Dec 2023 20:10)  HR: 73 (13 Dec 2023 04:26) (73 - 89)  BP: 130/89 (13 Dec 2023 04:26) (123/82 - 138/88)  BP(mean): --  RR: 18 (13 Dec 2023 04:26) (18 - 18)  SpO2: 100% (13 Dec 2023 04:26) (91% - 100%)    Parameters below as of 13 Dec 2023 04:26  Patient On (Oxygen Delivery Method): room air      INTAKE AND OUTPUT:   12-12 @ 07:01  -  12-13 @ 07:00  --------------------------------------------------------  IN: 200 mL / OUT: 1250 mL / NET: -1050 mL        PHYSICAL EXAM:  Constitutional: Comfortable . No acute distress.   HEENT: Atraumatic and normocephalic , neck is supple . no JVD. No carotid bruit.  CNS: A&Ox3. No focal deficits.   Respiratory: CTAB, unlabored   Cardiovascular: RRR normal s1 s2. No murmur. No rubs or gallop.  Gastrointestinal: Soft, non-tender. +Bowel sounds.   MSK: full ROM extremities x 4  Extremities: No edema. No cyanosis   Psychiatric: Calm . no agitation.   Skin: Warm and dry, no ulcers on extremities       LABS:                        8.2    20.46 )-----------( 574      ( 13 Dec 2023 06:50 )             25.0     12-13    143  |  101  |  9.6  ----------------------------<  106<H>  4.1   |  33.0<H>  |  0.71    Ca    8.3<L>      13 Dec 2023 06:50  Phos  4.1     12-13  Mg     1.8     12-13      CARDIAC MARKERS ( 13 Dec 2023 06:50 )  x     / x     / 14 U/L / x     / x        ;p-BNP=    Urinalysis Basic - ( 13 Dec 2023 06:50 )    Color: x / Appearance: x / SG: x / pH: x  Gluc: 106 mg/dL / Ketone: x  / Bili: x / Urobili: x   Blood: x / Protein: x / Nitrite: x   Leuk Esterase: x / RBC: x / WBC x   Sq Epi: x / Non Sq Epi: x / Bacteria: x          INTERPRETATION OF TELEMETRY:     ECG:   Prior ECG: Yes [  ] No [  ]      RADIOLOGY & ADDITIONAL STUDIES:    X-ray:  reviewed by me.   CT scan:   MRI:   US:                                                [facility]                                              CARDIOLOGY AT [facility]                                                   [address], [place] [address]                                             Telephone: [phone]. Fax:[phone]                                                         CARDIOLOGY CONSULTATION NOTE                                                                                             Consult requested by:  Dr Shannon  History obtained by: Patient and medical record  Community Cardiologist: None   obtained: Yes [  ] No [ x ]  Reason for Consultation:  MRSA Bacteremia  Available out pt records reviewed: Yes [  ] No [  ]      This is a 57 year old male with PMH of Etoh abuse, HTN, HLD, seizures, tardive dyskinesia hiatal hernia s/p mechanical fall down stairs while intoxicated on 11/29. Pt intubated in ED for airway protection. Found to have left rib fractures 3, 6, 7, 10 and left pneumothorax with extensive subcutaneous emphysema requiring a left sided pigtail on 11/29. Hospital course complicated by acute agitation and acute asthma exacerbation requiring re-intubation on 12/1. Pt extubated 12/2. Transferred to floor 12/6 and noted to be febrile with up trending WBC, rt arm edema erythema and reported purulent drainage. Found to have MRSA bacteremia, concern for cellulitis , septic thrombophlebitis s/p I&D found to have MRSA bacteremia  Also Echo  with normal EF  expansion of the LV posterolateral wall with systole, no color flow was done.LV pseduoaneurysm needs to be ruled out. and  Moderate pericardial effusion  Patient denies any chest pain or sob    CARDIAC TESTING   ECHO:  < from: TTE W or WO Ultrasound Enhancing Agent (12.11.23 @ 19:59) >  CONCLUSIONS:      1. Left ventricular cavity is normal.   2. Normal left ventricular diastolic function.   3. Trace mitral regurgitation.   4. Moderate left pleural effusion noted.   5. Moderate pericardial effusion noted adjacent to the posterolateralleft ventricle. There is expansion of the LV posterolateral wall with systole, no color flow was done.LV pseduoaneurysm needs to be ruled out.   6. The inferior vena cava is normal in size measuring 2.10 cm in diameter, (normal <2.1cm) with normal inspiratory collapse (normal >50%) consistent with normal right atrial pressure (~3, range 0-5mmHg).   7. Recommendations: Repeat 2 D echo with definity to evaluate pericardial effusion, to see if there is any pseudo aneurysm of the LV. Color flow and doppler should be done.      PAST MEDICAL HISTORY  High cholesterol  Hiatal hernia  Tardive dyskinesia  Liver failure  Seizures  ETOH abuse    PAST SURGICAL HISTORY  S/P tonsillectomy  History of lumbar spinal fusion    SOCIAL HISTORY:    CIGARETTES:   1ppd  ALCOHOL: + etoh  DRUGS: Denies    Family History of Cardiovascular Disease:  Yes [x  ] No [  ]  Coronary Artery Disease in first degree relative: Yes [x ] No [  ]  Sudden Cardiac Death in First degree relative: Yes [  ] No [x  ]    HOME MEDICATIONS:    · 	QUEtiapine 100 mg oral tablet: Last Dose Taken:  , 1 tab(s) orally once a day (at bedtime)  · 	benztropine 2 mg oral tablet: Last Dose Taken:  , 1 tab(s) orally 2 times a day  · 	clonazePAM 0.5 mg oral tablet: Last Dose Taken:  , 1 tab(s) orally once a day  · 	hydrOXYzine hydrochloride 25 mg oral tablet: Last Dose Taken:  , 2 tab(s) orally 2 times a day  · 	gabapentin 100 mg oral capsule: Last Dose Taken:  , 1 cap(s) orally once a day (at bedtime)  · 	divalproex sodium 500 mg oral delayed release tablet: Last Dose Taken:  , 1 tab(s) orally 2 times a day    CURRENT MEDICATIONS:  AmLODIPine   Tablet 5 milliGRAM(s) Oral daily  acetaminophen     Tablet ..  benztropine  divalproex DR  gabapentin  ibuprofen  Tablet.  melatonin  methocarbamol  QUEtiapine  pantoprazole    Tablet  polyethylene glycol 3350  senna  chlorhexidine 2% Cloths  DAPTOmycin IVPB  enoxaparin Injectable  folic acid  influenza   Vaccine  lidocaine   4% Patch  nicotine - 21 mG/24Hr(s) Patch  thiamine    ALLERGIES: NKDA    REVIEW OF SYMPTOMS:   CONSTITUTIONAL: No fever, no chills, no weight loss, no weight gain, no fatigue   ENMT:  No vertigo; No sinus or throat pain  NECK: No pain or stiffness  CARDIOVASCULAR: Denies any chest pain sob prior to injury  RESPIRATORY: no Shortness of breath, no cough, no wheezing  : No dysuria, no hematuria   GI: No dark color stool, no nausea, no diarrhea, no constipation, no abdominal pain   NEURO: No headache, no slurred speech   MUSCULOSKELETAL: No joint pain or swelling; No muscle, back, or extremity pain  PSYCH: No agitation, no anxiety.    ALL OTHER REVIEW OF SYSTEMS ARE NEGATIVE.      Vital Signs Last 24 Hrs  T(C): 36.4 (13 Dec 2023 04:26), Max: 36.9 (12 Dec 2023 20:10)  T(F): 97.5 (13 Dec 2023 04:26), Max: 98.4 (12 Dec 2023 20:10)  HR: 73 (13 Dec 2023 04:26) (73 - 89)  BP: 130/89 (13 Dec 2023 04:26) (123/82 - 138/88)  BP(mean): --  RR: 18 (13 Dec 2023 04:26) (18 - 18)  SpO2: 100% (13 Dec 2023 04:26) (91% - 100%)    Parameters below as of 13 Dec 2023 04:26  Patient On (Oxygen Delivery Method): room air    INTAKE AND OUTPUT:   12-12 @ 07:01  -  12-13 @ 07:00  --------------------------------------------------------  IN: 200 mL / OUT: 1250 mL / NET: -1050 mL        PHYSICAL EXAM:  Constitutional: Overweight in nad  HEENT: Atraumatic and normocephalic , neck is supple . no JVD. No carotid bruit.  CNS: A&Ox3. No focal deficits.   Chest  scabbed region previous ct site   Respiratory: CTAB, unlabored   Cardiovascular: RRR normal s1 s2. No murmur. No rubs or gallop.  Gastrointestinal: Soft, non-tender. +Bowel sounds. ++ ecchymosis on abdomen  MSK: full ROM extremities x 4  Extremities: No edema. No cyanosis  right arm with good pulse  + erythema of antecubital region mild.  wound vac in place  Psychiatric: Calm . no agitation.   Skin: Warm and dry, no ulcers on extremities       LABS:                        8.2    20.46 )-----------( 574      ( 13 Dec 2023 06:50 )             25.0     12-13    143  |  101  |  9.6  ----------------------------<  106<H>  4.1   |  33.0<H>  |  0.71    Ca    8.3<L>      13 Dec 2023 06:50  Phos  4.1     12-13  Mg     1.8     12-13      CARDIAC MARKERS ( 13 Dec 2023 06:50 )  x     / x     / 14 U/L / x     / x        ;p-BNP=  Urinalysis Basic - ( 13 Dec 2023 06:50 )    Color: x / Appearance: x / SG: x / pH: x  Gluc: 106 mg/dL / Ketone: x  / Bili: x / Urobili: x   Blood: x / Protein: x / Nitrite: x   Leuk Esterase: x / RBC: x / WBC x   Sq Epi: x / Non Sq Epi: x / Bacteria: x      INTERPRETATION OF TELEMETRY: Not on telemetry    ECG: Sinus tach no ischemic changes  Prior ECG: Yes [  x] No [  ]      RADIOLOGY & ADDITIONAL STUDIES:    X-ray:  reviewed by me. [facility]                                                  [facility]                                              CARDIOLOGY AT [facility]                                                   [address], [place] [address]                                             Telephone: [phone]. Fax:[phone]                                                         CARDIOLOGY CONSULTATION NOTE                                                                                             Consult requested by:  Dr Shannon  History obtained by: Patient and medical record  Community Cardiologist: None   obtained: Yes [  ] No [ x ]  Reason for Consultation:  MRSA Bacteremia  Available out pt records reviewed: Yes [  ] No [  ]      This is a 57 year old male with PMH of Etoh abuse, HTN, HLD, seizures, tardive dyskinesia hiatal hernia s/p mechanical fall down stairs while intoxicated on 11/29. Pt intubated in ED for airway protection. Found to have left rib fractures 3, 6, 7, 10 and left pneumothorax with extensive subcutaneous emphysema requiring a left sided pigtail on 11/29. Hospital course complicated by acute agitation and acute asthma exacerbation requiring re-intubation on 12/1. Pt extubated 12/2. Transferred to floor 12/6 and noted to be febrile with up trending WBC, rt arm edema erythema and reported purulent drainage. Found to have MRSA bacteremia, concern for cellulitis , septic thrombophlebitis s/p I&D found to have MRSA bacteremia  Also Echo  with normal EF  expansion of the LV posterolateral wall with systole, no color flow was done.LV pseduoaneurysm needs to be ruled out. and  Moderate pericardial effusion  Patient denies any chest pain or sob    CARDIAC TESTING   ECHO:  < from: TTE W or WO Ultrasound Enhancing Agent (12.11.23 @ 19:59) >  CONCLUSIONS:      1. Left ventricular cavity is normal.   2. Normal left ventricular diastolic function.   3. Trace mitral regurgitation.   4. Moderate left pleural effusion noted.   5. Moderate pericardial effusion noted adjacent to the posterolateralleft ventricle. There is expansion of the LV posterolateral wall with systole, no color flow was done.LV pseduoaneurysm needs to be ruled out.   6. The inferior vena cava is normal in size measuring 2.10 cm in diameter, (normal <2.1cm) with normal inspiratory collapse (normal >50%) consistent with normal right atrial pressure (~3, range 0-5mmHg).   7. Recommendations: Repeat 2 D echo with definity to evaluate pericardial effusion, to see if there is any pseudo aneurysm of the LV. Color flow and doppler should be done.      PAST MEDICAL HISTORY  High cholesterol  Hiatal hernia  Tardive dyskinesia  Liver failure  Seizures  ETOH abuse    PAST SURGICAL HISTORY  S/P tonsillectomy  History of lumbar spinal fusion    SOCIAL HISTORY:    CIGARETTES:   1ppd  ALCOHOL: + etoh  DRUGS: Denies    Family History of Cardiovascular Disease:  Yes [x  ] No [  ]  Coronary Artery Disease in first degree relative: Yes [x ] No [  ]  Sudden Cardiac Death in First degree relative: Yes [  ] No [x  ]    HOME MEDICATIONS:    · 	QUEtiapine 100 mg oral tablet: Last Dose Taken:  , 1 tab(s) orally once a day (at bedtime)  · 	benztropine 2 mg oral tablet: Last Dose Taken:  , 1 tab(s) orally 2 times a day  · 	clonazePAM 0.5 mg oral tablet: Last Dose Taken:  , 1 tab(s) orally once a day  · 	hydrOXYzine hydrochloride 25 mg oral tablet: Last Dose Taken:  , 2 tab(s) orally 2 times a day  · 	gabapentin 100 mg oral capsule: Last Dose Taken:  , 1 cap(s) orally once a day (at bedtime)  · 	divalproex sodium 500 mg oral delayed release tablet: Last Dose Taken:  , 1 tab(s) orally 2 times a day    CURRENT MEDICATIONS:  AmLODIPine   Tablet 5 milliGRAM(s) Oral daily  acetaminophen     Tablet ..  benztropine  divalproex DR  gabapentin  ibuprofen  Tablet.  melatonin  methocarbamol  QUEtiapine  pantoprazole    Tablet  polyethylene glycol 3350  senna  chlorhexidine 2% Cloths  DAPTOmycin IVPB  enoxaparin Injectable  folic acid  influenza   Vaccine  lidocaine   4% Patch  nicotine - 21 mG/24Hr(s) Patch  thiamine    ALLERGIES: NKDA    REVIEW OF SYMPTOMS:   CONSTITUTIONAL: No fever, no chills, no weight loss, no weight gain, no fatigue   ENMT:  No vertigo; No sinus or throat pain  NECK: No pain or stiffness  CARDIOVASCULAR: Denies any chest pain sob prior to injury  RESPIRATORY: no Shortness of breath, no cough, no wheezing  : No dysuria, no hematuria   GI: No dark color stool, no nausea, no diarrhea, no constipation, no abdominal pain   NEURO: No headache, no slurred speech   MUSCULOSKELETAL: No joint pain or swelling; No muscle, back, or extremity pain  PSYCH: No agitation, no anxiety.    ALL OTHER REVIEW OF SYSTEMS ARE NEGATIVE.      Vital Signs Last 24 Hrs  T(C): 36.4 (13 Dec 2023 04:26), Max: 36.9 (12 Dec 2023 20:10)  T(F): 97.5 (13 Dec 2023 04:26), Max: 98.4 (12 Dec 2023 20:10)  HR: 73 (13 Dec 2023 04:26) (73 - 89)  BP: 130/89 (13 Dec 2023 04:26) (123/82 - 138/88)  BP(mean): --  RR: 18 (13 Dec 2023 04:26) (18 - 18)  SpO2: 100% (13 Dec 2023 04:26) (91% - 100%)    Parameters below as of 13 Dec 2023 04:26  Patient On (Oxygen Delivery Method): room air    INTAKE AND OUTPUT:   12-12 @ 07:01  -  12-13 @ 07:00  --------------------------------------------------------  IN: 200 mL / OUT: 1250 mL / NET: -1050 mL        PHYSICAL EXAM:  Constitutional: Overweight in nad  HEENT: Atraumatic and normocephalic , neck is supple . no JVD. No carotid bruit.  CNS: A&Ox3. No focal deficits.   Chest  scabbed region previous ct site   Respiratory: CTAB, unlabored   Cardiovascular: RRR normal s1 s2. No murmur. No rubs or gallop.  Gastrointestinal: Soft, non-tender. +Bowel sounds. ++ ecchymosis on abdomen  MSK: full ROM extremities x 4  Extremities: No edema. No cyanosis  right arm with good pulse  + erythema of antecubital region mild.  wound vac in place  Psychiatric: Calm . no agitation.   Skin: Warm and dry, no ulcers on extremities       LABS:                        8.2    20.46 )-----------( 574      ( 13 Dec 2023 06:50 )             25.0     12-13    143  |  101  |  9.6  ----------------------------<  106<H>  4.1   |  33.0<H>  |  0.71    Ca    8.3<L>      13 Dec 2023 06:50  Phos  4.1     12-13  Mg     1.8     12-13      CARDIAC MARKERS ( 13 Dec 2023 06:50 )  x     / x     / 14 U/L / x     / x        ;p-BNP=  Urinalysis Basic - ( 13 Dec 2023 06:50 )    Color: x / Appearance: x / SG: x / pH: x  Gluc: 106 mg/dL / Ketone: x  / Bili: x / Urobili: x   Blood: x / Protein: x / Nitrite: x   Leuk Esterase: x / RBC: x / WBC x   Sq Epi: x / Non Sq Epi: x / Bacteria: x      INTERPRETATION OF TELEMETRY: Not on telemetry    ECG: Sinus tach no ischemic changes  Prior ECG: Yes [  x] No [  ]      RADIOLOGY & ADDITIONAL STUDIES:    X-ray:  reviewed by me. [facility]                                                  [facility]                                              CARDIOLOGY AT [facility]                                                   [address], [place] [address]                                             Telephone: [phone]. Fax:[phone]                                                         CARDIOLOGY CONSULTATION NOTE                                                                                             Consult requested by:  Dr Shannon  History obtained by: Patient and medical record  Community Cardiologist: None   obtained: Yes [  ] No [ x ]  Reason for Consultation:  MRSA Bacteremia  Available out pt records reviewed: Yes [  ] No [  ]      This is a 57 year old male with PMH of Etoh abuse, HTN, HLD, seizures, tardive dyskinesia hiatal hernia s/p mechanical fall down stairs while intoxicated on 11/29. Pt intubated in ED for airway protection. Found to have left rib fractures 3, 6, 7, 10 and left pneumothorax with extensive subcutaneous emphysema requiring a left sided pigtail on 11/29. Hospital course complicated by acute agitation and acute asthma exacerbation requiring re-intubation on 12/1. Pt extubated 12/2. Transferred to floor 12/6 and noted to be febrile with up trending WBC, rt arm edema erythema and reported purulent drainage. Found to have MRSA bacteremia, concern for cellulitis , septic thrombophlebitis s/p I&D found to have MRSA bacteremia  Also Echo  with normal EF  expansion of the LV posterolateral wall with systole, no color flow was done.LV pseduoaneurysm needs to be ruled out. and  Moderate pericardial effusion  Patient denies any chest pain or sob    CARDIAC TESTING   ECHO:  < from: TTE W or WO Ultrasound Enhancing Agent (12.11.23 @ 19:59) >  CONCLUSIONS:      1. Left ventricular cavity is normal.   2. Normal left ventricular diastolic function.   3. Trace mitral regurgitation.   4. Moderate left pleural effusion noted.   5. Moderate pericardial effusion noted adjacent to the posterolateralleft ventricle. There is expansion of the LV posterolateral wall with systole, no color flow was done.LV pseduoaneurysm needs to be ruled out.   6. The inferior vena cava is normal in size measuring 2.10 cm in diameter, (normal <2.1cm) with normal inspiratory collapse (normal >50%) consistent with normal right atrial pressure (~3, range 0-5mmHg).   7. Recommendations: Repeat 2 D echo with definity to evaluate pericardial effusion, to see if there is any pseudo aneurysm of the LV. Color flow and doppler should be done.      PAST MEDICAL HISTORY  High cholesterol  Hiatal hernia  Tardive dyskinesia  Liver failure  Seizures  ETOH abuse    PAST SURGICAL HISTORY  S/P tonsillectomy  History of lumbar spinal fusion    SOCIAL HISTORY:    CIGARETTES:   1ppd  ALCOHOL: + etoh  DRUGS: Denies    Family History of Cardiovascular Disease:  Yes [x  ] No [  ]  Coronary Artery Disease in first degree relative: Yes [x ] No [  ]  Sudden Cardiac Death in First degree relative: Yes [  ] No [x  ]    HOME MEDICATIONS:    · 	QUEtiapine 100 mg oral tablet: Last Dose Taken:  , 1 tab(s) orally once a day (at bedtime)  · 	benztropine 2 mg oral tablet: Last Dose Taken:  , 1 tab(s) orally 2 times a day  · 	clonazePAM 0.5 mg oral tablet: Last Dose Taken:  , 1 tab(s) orally once a day  · 	hydrOXYzine hydrochloride 25 mg oral tablet: Last Dose Taken:  , 2 tab(s) orally 2 times a day  · 	gabapentin 100 mg oral capsule: Last Dose Taken:  , 1 cap(s) orally once a day (at bedtime)  · 	divalproex sodium 500 mg oral delayed release tablet: Last Dose Taken:  , 1 tab(s) orally 2 times a day    CURRENT MEDICATIONS:  AmLODIPine   Tablet 5 milliGRAM(s) Oral daily  acetaminophen     Tablet ..  benztropine  divalproex DR  gabapentin  ibuprofen  Tablet.  melatonin  methocarbamol  QUEtiapine  pantoprazole    Tablet  polyethylene glycol 3350  senna  chlorhexidine 2% Cloths  DAPTOmycin IVPB  enoxaparin Injectable  folic acid  influenza   Vaccine  lidocaine   4% Patch  nicotine - 21 mG/24Hr(s) Patch  thiamine    ALLERGIES: NKDA    REVIEW OF SYMPTOMS:   CONSTITUTIONAL: No fever, no chills, no weight loss, no weight gain, no fatigue   ENMT:  No vertigo; No sinus or throat pain  NECK: No pain or stiffness  CARDIOVASCULAR: Denies any chest pain sob prior to injury  RESPIRATORY: no Shortness of breath, no cough, no wheezing  : No dysuria, no hematuria   GI: No dark color stool, no nausea, no diarrhea, no constipation, no abdominal pain   NEURO: No headache, no slurred speech   MUSCULOSKELETAL: No joint pain or swelling; No muscle, back, or extremity pain  PSYCH: No agitation, no anxiety.    ALL OTHER REVIEW OF SYSTEMS ARE NEGATIVE.      Vital Signs Last 24 Hrs  T(C): 36.4 (13 Dec 2023 04:26), Max: 36.9 (12 Dec 2023 20:10)  T(F): 97.5 (13 Dec 2023 04:26), Max: 98.4 (12 Dec 2023 20:10)  HR: 73 (13 Dec 2023 04:26) (73 - 89)  BP: 130/89 (13 Dec 2023 04:26) (123/82 - 138/88)  BP(mean): --  RR: 18 (13 Dec 2023 04:26) (18 - 18)  SpO2: 100% (13 Dec 2023 04:26) (91% - 100%)    Parameters below as of 13 Dec 2023 04:26  Patient On (Oxygen Delivery Method): room air    INTAKE AND OUTPUT:   12-12 @ 07:01  -  12-13 @ 07:00  --------------------------------------------------------  IN: 200 mL / OUT: 1250 mL / NET: -1050 mL        PHYSICAL EXAM:  Constitutional: Overweight in nad  HEENT: Atraumatic and normocephalic , neck is supple . no JVD. No carotid bruit.  No teeth in mouth  CNS: A&Ox3. No focal deficits.   Chest  scabbed region previous ct site   Respiratory: CTAB, unlabored   Cardiovascular: RRR normal s1 s2. No murmur. No rubs or gallop.  Gastrointestinal: Soft, non-tender. +Bowel sounds. ++ ecchymosis on abdomen  MSK: full ROM extremities x 4  Extremities: No edema. No cyanosis  right arm with good pulse  + erythema of antecubital region mild.  wound vac in place  Psychiatric: Calm . no agitation.   Skin: Warm and dry, no ulcers on extremities       LABS:                        8.2    20.46 )-----------( 574      ( 13 Dec 2023 06:50 )             25.0     12-13    143  |  101  |  9.6  ----------------------------<  106<H>  4.1   |  33.0<H>  |  0.71    Ca    8.3<L>      13 Dec 2023 06:50  Phos  4.1     12-13  Mg     1.8     12-13      CARDIAC MARKERS ( 13 Dec 2023 06:50 )  x     / x     / 14 U/L / x     / x        ;p-BNP=  Urinalysis Basic - ( 13 Dec 2023 06:50 )    Color: x / Appearance: x / SG: x / pH: x  Gluc: 106 mg/dL / Ketone: x  / Bili: x / Urobili: x   Blood: x / Protein: x / Nitrite: x   Leuk Esterase: x / RBC: x / WBC x   Sq Epi: x / Non Sq Epi: x / Bacteria: x      INTERPRETATION OF TELEMETRY: Not on telemetry    ECG: Sinus tach no ischemic changes  Prior ECG: Yes [  x] No [  ]      RADIOLOGY & ADDITIONAL STUDIES:    X-ray:  reviewed by me. [facility]                                                  [facility]                                              CARDIOLOGY AT [facility]                                                   [address], [place] [address]                                             Telephone: [phone]. Fax:[phone]                                                         CARDIOLOGY CONSULTATION NOTE                                                                                             Consult requested by:  Dr Shannon  History obtained by: Patient and medical record  Community Cardiologist: None   obtained: Yes [  ] No [ x ]  Reason for Consultation:  MRSA Bacteremia  Available out pt records reviewed: Yes [  ] No [  ]      This is a 57 year old male with PMH of Etoh abuse, HTN, HLD, seizures, tardive dyskinesia hiatal hernia s/p mechanical fall down stairs while intoxicated on 11/29. Pt intubated in ED for airway protection. Found to have left rib fractures 3, 6, 7, 10 and left pneumothorax with extensive subcutaneous emphysema requiring a left sided pigtail on 11/29. Hospital course complicated by acute agitation and acute asthma exacerbation requiring re-intubation on 12/1. Pt extubated 12/2. Transferred to floor 12/6 and noted to be febrile with up trending WBC, rt arm edema erythema and reported purulent drainage. Found to have MRSA bacteremia, concern for cellulitis , septic thrombophlebitis s/p I&D found to have MRSA bacteremia  Also Echo  with normal EF  expansion of the LV posterolateral wall with systole, no color flow was done.LV pseduoaneurysm needs to be ruled out. and  Moderate pericardial effusion  Patient denies any chest pain or sob    CARDIAC TESTING   ECHO:  < from: TTE W or WO Ultrasound Enhancing Agent (12.11.23 @ 19:59) >  CONCLUSIONS:      1. Left ventricular cavity is normal.   2. Normal left ventricular diastolic function.   3. Trace mitral regurgitation.   4. Moderate left pleural effusion noted.   5. Moderate pericardial effusion noted adjacent to the posterolateralleft ventricle. There is expansion of the LV posterolateral wall with systole, no color flow was done.LV pseduoaneurysm needs to be ruled out.   6. The inferior vena cava is normal in size measuring 2.10 cm in diameter, (normal <2.1cm) with normal inspiratory collapse (normal >50%) consistent with normal right atrial pressure (~3, range 0-5mmHg).   7. Recommendations: Repeat 2 D echo with definity to evaluate pericardial effusion, to see if there is any pseudo aneurysm of the LV. Color flow and doppler should be done.      PAST MEDICAL HISTORY  High cholesterol  Hiatal hernia  Tardive dyskinesia  Liver failure  Seizures  ETOH abuse    PAST SURGICAL HISTORY  S/P tonsillectomy  History of lumbar spinal fusion    SOCIAL HISTORY:    CIGARETTES:   1ppd  ALCOHOL: + etoh  DRUGS: Denies    Family History of Cardiovascular Disease:  Yes [x  ] No [  ]  Coronary Artery Disease in first degree relative: Yes [x ] No [  ]  Sudden Cardiac Death in First degree relative: Yes [  ] No [x  ]    HOME MEDICATIONS:    · 	QUEtiapine 100 mg oral tablet: Last Dose Taken:  , 1 tab(s) orally once a day (at bedtime)  · 	benztropine 2 mg oral tablet: Last Dose Taken:  , 1 tab(s) orally 2 times a day  · 	clonazePAM 0.5 mg oral tablet: Last Dose Taken:  , 1 tab(s) orally once a day  · 	hydrOXYzine hydrochloride 25 mg oral tablet: Last Dose Taken:  , 2 tab(s) orally 2 times a day  · 	gabapentin 100 mg oral capsule: Last Dose Taken:  , 1 cap(s) orally once a day (at bedtime)  · 	divalproex sodium 500 mg oral delayed release tablet: Last Dose Taken:  , 1 tab(s) orally 2 times a day    CURRENT MEDICATIONS:  AmLODIPine   Tablet 5 milliGRAM(s) Oral daily  acetaminophen     Tablet ..  benztropine  divalproex DR  gabapentin  ibuprofen  Tablet.  melatonin  methocarbamol  QUEtiapine  pantoprazole    Tablet  polyethylene glycol 3350  senna  chlorhexidine 2% Cloths  DAPTOmycin IVPB  enoxaparin Injectable  folic acid  influenza   Vaccine  lidocaine   4% Patch  nicotine - 21 mG/24Hr(s) Patch  thiamine    ALLERGIES: NKDA    REVIEW OF SYMPTOMS:   CONSTITUTIONAL: No fever, no chills, no weight loss, no weight gain, no fatigue   ENMT:  No vertigo; No sinus or throat pain  NECK: No pain or stiffness  CARDIOVASCULAR: Denies any chest pain sob prior to injury  RESPIRATORY: no Shortness of breath, no cough, no wheezing  : No dysuria, no hematuria   GI: No dark color stool, no nausea, no diarrhea, no constipation, no abdominal pain   NEURO: No headache, no slurred speech   MUSCULOSKELETAL: No joint pain or swelling; No muscle, back, or extremity pain  PSYCH: No agitation, no anxiety.    ALL OTHER REVIEW OF SYSTEMS ARE NEGATIVE.      Vital Signs Last 24 Hrs  T(C): 36.4 (13 Dec 2023 04:26), Max: 36.9 (12 Dec 2023 20:10)  T(F): 97.5 (13 Dec 2023 04:26), Max: 98.4 (12 Dec 2023 20:10)  HR: 73 (13 Dec 2023 04:26) (73 - 89)  BP: 130/89 (13 Dec 2023 04:26) (123/82 - 138/88)  BP(mean): --  RR: 18 (13 Dec 2023 04:26) (18 - 18)  SpO2: 100% (13 Dec 2023 04:26) (91% - 100%)    Parameters below as of 13 Dec 2023 04:26  Patient On (Oxygen Delivery Method): room air    INTAKE AND OUTPUT:   12-12 @ 07:01  -  12-13 @ 07:00  --------------------------------------------------------  IN: 200 mL / OUT: 1250 mL / NET: -1050 mL        PHYSICAL EXAM:  Constitutional: Overweight in nad  HEENT: Atraumatic and normocephalic , neck is supple . no JVD. No carotid bruit.  No teeth in mouth  CNS: A&Ox3. No focal deficits.   Chest  scabbed region previous ct site   Respiratory: CTAB, unlabored   Cardiovascular: RRR normal s1 s2. No murmur. No rubs or gallop.  Gastrointestinal: Soft, non-tender. +Bowel sounds. ++ ecchymosis on abdomen  MSK: full ROM extremities x 4  Extremities: No edema. No cyanosis  right arm with good pulse  + erythema of antecubital region mild.  wound vac in place  Psychiatric: Calm . no agitation.   Skin: Warm and dry, no ulcers on extremities       LABS:                        8.2    20.46 )-----------( 574      ( 13 Dec 2023 06:50 )             25.0     12-13    143  |  101  |  9.6  ----------------------------<  106<H>  4.1   |  33.0<H>  |  0.71    Ca    8.3<L>      13 Dec 2023 06:50  Phos  4.1     12-13  Mg     1.8     12-13      CARDIAC MARKERS ( 13 Dec 2023 06:50 )  x     / x     / 14 U/L / x     / x        ;p-BNP=  Urinalysis Basic - ( 13 Dec 2023 06:50 )    Color: x / Appearance: x / SG: x / pH: x  Gluc: 106 mg/dL / Ketone: x  / Bili: x / Urobili: x   Blood: x / Protein: x / Nitrite: x   Leuk Esterase: x / RBC: x / WBC x   Sq Epi: x / Non Sq Epi: x / Bacteria: x      INTERPRETATION OF TELEMETRY: Not on telemetry    ECG: Sinus tach no ischemic changes  Prior ECG: Yes [  x] No [  ]      RADIOLOGY & ADDITIONAL STUDIES:    X-ray:  reviewed by me. [facility]

## 2023-12-13 NOTE — PROCEDURE NOTE - ADDITIONAL PROCEDURE DETAILS
#18G 10CM 30CIRC BARD POWER GLIDE MIDLINE inserted with ultrasound guidance.   Good flash, ns flush right cephalic vein.   Patient tolerated well.

## 2023-12-13 NOTE — CONSULT NOTE ADULT - NS ATTEND AMEND GEN_ALL_CORE FT
seen with above,    57M history significant for alcohol abuse, smoker, HTN, HLD , seizure disorder, hiatal hernia with recent intoxication and mechanical fall intubated for airway protection and found with L-rib fractures with pneumothorax s/p chest tube and acute asthma excerbation required re-intubation 12/1 then extubated 12/2, found with sepsis due to R-arm thrombophlebitis with MRSA bacteremia s/p drainage with wound vac, persistent leukocytosis, serial Echoes no obvious vegetation and need CAROL to exclude endocarditis per ID  -need repeat CT chest noncontrast as noted again echolucent space adjacent to lateral LV wall on repeat Echoes  -if no acute chest findings then plan CAROL tomorrow, NPO after midnight  -ID following on antibiotic      Ji Juan Warren DO, FAC  Faculty Non-Invasive Cardiologist  [phone] seen with above,    57M history significant for alcohol abuse, smoker, HTN, HLD , seizure disorder, hiatal hernia with recent intoxication and mechanical fall intubated for airway protection and found with L-rib fractures with pneumothorax s/p chest tube and acute asthma excerbation required re-intubation 12/1 then extubated 12/2, found with sepsis due to R-arm thrombophlebitis with MRSA bacteremia s/p drainage with wound vac, persistent leukocytosis, serial Echoes no obvious vegetation and need CAROL to exclude endocarditis per ID  -need repeat CT chest noncontrast as noted again echolucent space adjacent to lateral LV wall on repeat Echoes  -if no acute chest findings then plan CAROL tomorrow, NPO after midnight  -ID following on antibiotic      Ji Juan Warren DO, FAC  Faculty Non-Invasive Cardiologist  [phone]

## 2023-12-13 NOTE — CONSULT NOTE ADULT - ASSESSMENT
59 yo M with septic thrombophlebitis, hypotensive and febrile overnight.  Requires OR for source control.    Plan:  Added on for OR as class B for excision of R cephalic vein  Continue IV abx  ID following
58M  < from: CT Chest No Cont (11.29.23 @ 03:01) >  BONES: Lateral left 7th rib fracture with more displacement.   Similar-appearing left posterior 5th through 8th nondisplaced rib   fractures. Acute appearing left posterior 10th rib fracture appears to be   new compared to 11/28/2023.    < end of copied text >    Patient interested in a nerve block procedure as an addition to their current analgesic therapy.   The patient has not experienced satisfactory relief from other treatment modalities including analgesic pain medications.  The risks, benefits and alternatives of a nerve block were discussed with the patient.  The benefits include hopeful relief of pain.  The alternatives include avoiding the procedure and continuing treatment with non-medication therapies and medications, including increasing and/or changing current analgesic medications.  The patient understands that this is an invasive procedure and therefore there are inherent risks. The patient was informed of the risks of the procedure including but not limited to infection, bleeding, injury to nearby tissues, permanent nerve injury, stroke, no decrease in pain or worsened pain, and toxicity from local anesthetic medications.   No certain guarantees have been made and patient understands that responses can vary and repeat procedures may be necessary. 
57 year old male with PMH of ETOH abuse, HTN, HLD, seizures, tardive dyskinesia hiatal hernia s/p mechanical fall down stairs while intoxicated on 11/29. Pt intubated in ED for airway protection. Found to have left rib fractures 3, 6, 7, 10 and left pneumothorax with extensive subcutaneous emphysema requiring a left sided pigtail on 11/29. Hospital course complicated by acute agitation and acute asthma exacerbation requiring re-intubation on 12/1. Pt extubated 12/2. Transferred to floor 12/6 and noted to be febrile with uptrending WBC, rt arm edema erythema and reported purulent drainage. Found to have MRSA bacteremia, concern for cellulitis , septic thrombphlebitis    - BCX + MRSA  - f/u BCX  - check TTE  - check sputum cx  - RUE venous doppler  - consider CT chest  - Continue vancomycin  - monitor vanco trough and adjust per pharmacy protocol  - Trend Fever  - Trend Leukocytosis      Will Follow
57 year old male with PMH of Etoh abuse, HTN, HLD, seizures, tardive dyskinesia hiatal hernia s/p mechanical fall down stairs while intoxicated on 11/29. Pt intubated in ED for airway protection. Found to have left rib fractures 3, 6, 7, 10 and left pneumothorax with extensive subcutaneous emphysema requiring a left sided pigtail on 11/29. Hospital course complicated by acute agitation and acute asthma exacerbation requiring re-intubation on 12/1. Pt extubated 12/2. Transferred to floor 12/6 and noted to be febrile with up trending WBC, rt arm edema erythema and reported purulent drainage. Found to have MRSA bacteremia, concern for cellulitis , septic thrombophlebitis s/p I&D found to have MRSA bacteremia  Also Echo  with normal EF  expansion of the LV posterolateral wall with systole, no color flow was done.LV pseduoaneurysm needs to be ruled out. and  Moderate pericardial effusion.  Patient denies any chest pain or sob

## 2023-12-13 NOTE — CHART NOTE - NSCHARTNOTEFT_GEN_A_CORE
Patients luis armando pad disconnected from wound vac, luis armando pad replaced, wound vac restarted with good seal. Patient tolerated well

## 2023-12-13 NOTE — CONSULT NOTE ADULT - PROBLEM SELECTOR RECOMMENDATION 3
Smoking cessation discussed with patient  Patient has no desire to stop smoking  Nicotine  Nicotene patch for now

## 2023-12-13 NOTE — PROGRESS NOTE ADULT - ASSESSMENT
57 year old male with PMH of ETOH abuse, HTN, HLD, seizures, tardive dyskinesia hiatal hernia s/p mechanical fall down stairs while intoxicated on 11/29. Pt intubated in ED for airway protection. Found to have left rib fractures 3, 6, 7, 10 and left pneumothorax with extensive subcutaneous emphysema requiring a left sided pigtail on 11/29. Hospital course complicated by acute agitation and acute asthma exacerbation requiring re-intubation on 12/1. Pt extubated 12/2. Transferred to floor 12/6 and noted to be febrile with uptrending WBC, rt arm edema erythema and reported purulent drainage. Found to have MRSA bacteremia, concern for cellulitis , septic thrombphlebitis    - 12/6 and 12/7 BCX + MRSA  - s/p I&D of septic thrombophlebitis of right arm >> Incision and drainage of Infected basilic and cephalic thrombophlebitis at antecubital level purulent discharge seen upon incision  - Repeat BCX 12/10-+  - BCX 12/11 and 12/12 ngtd  - TTE  as above, suggest CAROL  - RUE venous doppler with sup thrombus of right cephalic vein   - c/w daptomycin  - trend CK, last level 14  - anticoagulation as per vascular  - will plan for PICC when ready for DC  - Trend Fever - afebrile   - Trend Leukocytosis-improving          will f/u

## 2023-12-13 NOTE — PROGRESS NOTE ADULT - SUBJECTIVE AND OBJECTIVE BOX
SUBJECTIVE: Resting comfortably this morning. No complaints. Wound vac in place and functioning appropriately. Pt has had increase in WBC with no obvious source. CAROL cancelled today as pt was not NPO, will plan for tomorrow. Afebrile. Hemodynamically stable, no acute events overnight.     Vitals:  T(C): 36.7 (12-13-23 @ 07:35), Max: 36.9 (12-12-23 @ 20:10)  T(F): 98.1 (12-13-23 @ 07:35), Max: 98.4 (12-12-23 @ 20:10)  HR: 82 (12-13-23 @ 07:35) (73 - 89)  BP: 119/71 (12-13-23 @ 07:35) (119/71 - 138/88)  ABP: --  ABP(mean): --  RR: 20 (12-13-23 @ 07:35) (18 - 20)  SpO2: 96% (12-13-23 @ 07:35) (91% - 100%)    Labs:    LABS:  12-13 @ 06:50 Creatine 14 U/L<L> [30 - 200]  cret                        8.2    20.46 )-----------( 574      ( 13 Dec 2023 06:50 )             25.0     12-13    143  |  101  |  9.6  ----------------------------<  106<H>  4.1   |  33.0<H>  |  0.71    Ca    8.3<L>      13 Dec 2023 06:50  Phos  4.1     12-13  Mg     1.8     12-13          GENERAL: NAD, lying in bed comfortably  HEAD:  Atraumatic, normocephalic  NECK: Supple, no JVD  HEART: RRR  LUNGS: Unlabored respirations. No conversational dyspnea.   ABDOMEN: Soft, nontender, nondistended  EXTREMITIES: Wound vac in RUE holding suction, functioning appropriately. LUE IV site warm but with no purulence  NERVOUS SYSTEM:  A&Ox3, no focal deficits

## 2023-12-13 NOTE — PROGRESS NOTE ADULT - ASSESSMENT
ASSESSMENT: 59 y/o male s/p fall with rib fractures and s/p vein excision for thrombophlebitis of R basilic and cephalic vein and MRSA bacteremia, now with increasing WBC     PLAN:   ASSESSMENT: 57 y/o male s/p fall with rib fractures and s/p vein excision for thrombophlebitis of R basilic and cephalic vein and MRSA bacteremia, now with increasing WBC     PLAN:   ASSESSMENT: 57 y/o male s/p fall with rib fractures and s/p vein excision for thrombophlebitis of R basilic and cephalic vein and MRSA bacteremia, now with increasing WBC.    PLAN:  - pt to get CAROL tomorrow   - vascular surgery to take down wound vac today to eval wound  - trend WBC, monitor for fevers  - ID managing abx  - encourage OOB  - incentive spirometry  - DVT ppx

## 2023-12-13 NOTE — CONSULT NOTE ADULT - PROBLEM SELECTOR RECOMMENDATION 9
source right septic thrombophlebitis  repeat blood cultures negative  will plan for CAROL to assess valves source right septic thrombophlebitis  repeat blood cultures negative  will plan for CAROL to assess valves  Patient agrees to CAROL in am   Npo after 12 am

## 2023-12-14 DIAGNOSIS — D53.9 NUTRITIONAL ANEMIA, UNSPECIFIED: ICD-10-CM

## 2023-12-14 LAB
ANION GAP SERPL CALC-SCNC: 10 MMOL/L — SIGNIFICANT CHANGE UP (ref 5–17)
ANION GAP SERPL CALC-SCNC: 10 MMOL/L — SIGNIFICANT CHANGE UP (ref 5–17)
ANISOCYTOSIS BLD QL: SLIGHT — SIGNIFICANT CHANGE UP
ANISOCYTOSIS BLD QL: SLIGHT — SIGNIFICANT CHANGE UP
BASOPHILS # BLD AUTO: 0 K/UL — SIGNIFICANT CHANGE UP (ref 0–0.2)
BASOPHILS # BLD AUTO: 0 K/UL — SIGNIFICANT CHANGE UP (ref 0–0.2)
BASOPHILS NFR BLD AUTO: 0 % — SIGNIFICANT CHANGE UP (ref 0–2)
BASOPHILS NFR BLD AUTO: 0 % — SIGNIFICANT CHANGE UP (ref 0–2)
BUN SERPL-MCNC: 11.4 MG/DL — SIGNIFICANT CHANGE UP (ref 8–20)
BUN SERPL-MCNC: 11.4 MG/DL — SIGNIFICANT CHANGE UP (ref 8–20)
CALCIUM SERPL-MCNC: 8.4 MG/DL — SIGNIFICANT CHANGE UP (ref 8.4–10.5)
CALCIUM SERPL-MCNC: 8.4 MG/DL — SIGNIFICANT CHANGE UP (ref 8.4–10.5)
CHLORIDE SERPL-SCNC: 100 MMOL/L — SIGNIFICANT CHANGE UP (ref 96–108)
CHLORIDE SERPL-SCNC: 100 MMOL/L — SIGNIFICANT CHANGE UP (ref 96–108)
CO2 SERPL-SCNC: 32 MMOL/L — HIGH (ref 22–29)
CO2 SERPL-SCNC: 32 MMOL/L — HIGH (ref 22–29)
CREAT SERPL-MCNC: 0.66 MG/DL — SIGNIFICANT CHANGE UP (ref 0.5–1.3)
CREAT SERPL-MCNC: 0.66 MG/DL — SIGNIFICANT CHANGE UP (ref 0.5–1.3)
EGFR: 109 ML/MIN/1.73M2 — SIGNIFICANT CHANGE UP
EGFR: 109 ML/MIN/1.73M2 — SIGNIFICANT CHANGE UP
EOSINOPHIL # BLD AUTO: 0.36 K/UL — SIGNIFICANT CHANGE UP (ref 0–0.5)
EOSINOPHIL # BLD AUTO: 0.36 K/UL — SIGNIFICANT CHANGE UP (ref 0–0.5)
EOSINOPHIL NFR BLD AUTO: 1.7 % — SIGNIFICANT CHANGE UP (ref 0–6)
EOSINOPHIL NFR BLD AUTO: 1.7 % — SIGNIFICANT CHANGE UP (ref 0–6)
GIANT PLATELETS BLD QL SMEAR: PRESENT — SIGNIFICANT CHANGE UP
GIANT PLATELETS BLD QL SMEAR: PRESENT — SIGNIFICANT CHANGE UP
GLUCOSE SERPL-MCNC: 80 MG/DL — SIGNIFICANT CHANGE UP (ref 70–99)
GLUCOSE SERPL-MCNC: 80 MG/DL — SIGNIFICANT CHANGE UP (ref 70–99)
HCT VFR BLD CALC: 25.1 % — LOW (ref 39–50)
HCT VFR BLD CALC: 25.1 % — LOW (ref 39–50)
HGB BLD-MCNC: 7.7 G/DL — LOW (ref 13–17)
HGB BLD-MCNC: 7.7 G/DL — LOW (ref 13–17)
LYMPHOCYTES # BLD AUTO: 1.47 K/UL — SIGNIFICANT CHANGE UP (ref 1–3.3)
LYMPHOCYTES # BLD AUTO: 1.47 K/UL — SIGNIFICANT CHANGE UP (ref 1–3.3)
LYMPHOCYTES # BLD AUTO: 7 % — LOW (ref 13–44)
LYMPHOCYTES # BLD AUTO: 7 % — LOW (ref 13–44)
MAGNESIUM SERPL-MCNC: 2.2 MG/DL — SIGNIFICANT CHANGE UP (ref 1.6–2.6)
MAGNESIUM SERPL-MCNC: 2.2 MG/DL — SIGNIFICANT CHANGE UP (ref 1.6–2.6)
MANUAL SMEAR VERIFICATION: SIGNIFICANT CHANGE UP
MANUAL SMEAR VERIFICATION: SIGNIFICANT CHANGE UP
MCHC RBC-ENTMCNC: 26.7 PG — LOW (ref 27–34)
MCHC RBC-ENTMCNC: 26.7 PG — LOW (ref 27–34)
MCHC RBC-ENTMCNC: 30.7 GM/DL — LOW (ref 32–36)
MCHC RBC-ENTMCNC: 30.7 GM/DL — LOW (ref 32–36)
MCV RBC AUTO: 87.2 FL — SIGNIFICANT CHANGE UP (ref 80–100)
MCV RBC AUTO: 87.2 FL — SIGNIFICANT CHANGE UP (ref 80–100)
METAMYELOCYTES # FLD: 1.8 % — HIGH (ref 0–0)
METAMYELOCYTES # FLD: 1.8 % — HIGH (ref 0–0)
MONOCYTES # BLD AUTO: 1.28 K/UL — HIGH (ref 0–0.9)
MONOCYTES # BLD AUTO: 1.28 K/UL — HIGH (ref 0–0.9)
MONOCYTES NFR BLD AUTO: 6.1 % — SIGNIFICANT CHANGE UP (ref 2–14)
MONOCYTES NFR BLD AUTO: 6.1 % — SIGNIFICANT CHANGE UP (ref 2–14)
NEUTROPHILS # BLD AUTO: 17.1 K/UL — HIGH (ref 1.8–7.4)
NEUTROPHILS # BLD AUTO: 17.1 K/UL — HIGH (ref 1.8–7.4)
NEUTROPHILS NFR BLD AUTO: 81.6 % — HIGH (ref 43–77)
NEUTROPHILS NFR BLD AUTO: 81.6 % — HIGH (ref 43–77)
PHOSPHATE SERPL-MCNC: 4.8 MG/DL — HIGH (ref 2.4–4.7)
PHOSPHATE SERPL-MCNC: 4.8 MG/DL — HIGH (ref 2.4–4.7)
PLAT MORPH BLD: NORMAL — SIGNIFICANT CHANGE UP
PLAT MORPH BLD: NORMAL — SIGNIFICANT CHANGE UP
PLATELET # BLD AUTO: 691 K/UL — HIGH (ref 150–400)
PLATELET # BLD AUTO: 691 K/UL — HIGH (ref 150–400)
POIKILOCYTOSIS BLD QL AUTO: SLIGHT — SIGNIFICANT CHANGE UP
POIKILOCYTOSIS BLD QL AUTO: SLIGHT — SIGNIFICANT CHANGE UP
POLYCHROMASIA BLD QL SMEAR: SLIGHT — SIGNIFICANT CHANGE UP
POLYCHROMASIA BLD QL SMEAR: SLIGHT — SIGNIFICANT CHANGE UP
POTASSIUM SERPL-MCNC: 4.6 MMOL/L — SIGNIFICANT CHANGE UP (ref 3.5–5.3)
POTASSIUM SERPL-MCNC: 4.6 MMOL/L — SIGNIFICANT CHANGE UP (ref 3.5–5.3)
POTASSIUM SERPL-SCNC: 4.6 MMOL/L — SIGNIFICANT CHANGE UP (ref 3.5–5.3)
POTASSIUM SERPL-SCNC: 4.6 MMOL/L — SIGNIFICANT CHANGE UP (ref 3.5–5.3)
RBC # BLD: 2.88 M/UL — LOW (ref 4.2–5.8)
RBC # BLD: 2.88 M/UL — LOW (ref 4.2–5.8)
RBC # FLD: 17.7 % — HIGH (ref 10.3–14.5)
RBC # FLD: 17.7 % — HIGH (ref 10.3–14.5)
RBC BLD AUTO: ABNORMAL
RBC BLD AUTO: ABNORMAL
SODIUM SERPL-SCNC: 142 MMOL/L — SIGNIFICANT CHANGE UP (ref 135–145)
SODIUM SERPL-SCNC: 142 MMOL/L — SIGNIFICANT CHANGE UP (ref 135–145)
VARIANT LYMPHS # BLD: 1.8 % — SIGNIFICANT CHANGE UP (ref 0–6)
VARIANT LYMPHS # BLD: 1.8 % — SIGNIFICANT CHANGE UP (ref 0–6)
WBC # BLD: 20.95 K/UL — HIGH (ref 3.8–10.5)
WBC # BLD: 20.95 K/UL — HIGH (ref 3.8–10.5)
WBC # FLD AUTO: 20.95 K/UL — HIGH (ref 3.8–10.5)
WBC # FLD AUTO: 20.95 K/UL — HIGH (ref 3.8–10.5)

## 2023-12-14 PROCEDURE — 93312 ECHO TRANSESOPHAGEAL: CPT | Mod: 26

## 2023-12-14 PROCEDURE — 99232 SBSQ HOSP IP/OBS MODERATE 35: CPT

## 2023-12-14 RX ORDER — ENOXAPARIN SODIUM 100 MG/ML
40 INJECTION SUBCUTANEOUS ONCE
Refills: 0 | Status: COMPLETED | OUTPATIENT
Start: 2023-12-14 | End: 2023-12-14

## 2023-12-14 RX ADMIN — QUETIAPINE FUMARATE 125 MILLIGRAM(S): 200 TABLET, FILM COATED ORAL at 21:09

## 2023-12-14 RX ADMIN — METHOCARBAMOL 1000 MILLIGRAM(S): 500 TABLET, FILM COATED ORAL at 16:49

## 2023-12-14 RX ADMIN — OXYCODONE HYDROCHLORIDE 10 MILLIGRAM(S): 5 TABLET ORAL at 23:24

## 2023-12-14 RX ADMIN — Medication 2 MILLIGRAM(S): at 06:06

## 2023-12-14 RX ADMIN — OXYCODONE HYDROCHLORIDE 10 MILLIGRAM(S): 5 TABLET ORAL at 22:24

## 2023-12-14 RX ADMIN — OXYCODONE HYDROCHLORIDE 10 MILLIGRAM(S): 5 TABLET ORAL at 05:51

## 2023-12-14 RX ADMIN — DIVALPROEX SODIUM 500 MILLIGRAM(S): 500 TABLET, DELAYED RELEASE ORAL at 17:29

## 2023-12-14 RX ADMIN — OXYCODONE HYDROCHLORIDE 10 MILLIGRAM(S): 5 TABLET ORAL at 13:35

## 2023-12-14 RX ADMIN — Medication 975 MILLIGRAM(S): at 12:39

## 2023-12-14 RX ADMIN — LIDOCAINE 3 PATCH: 4 CREAM TOPICAL at 12:40

## 2023-12-14 RX ADMIN — Medication 975 MILLIGRAM(S): at 13:30

## 2023-12-14 RX ADMIN — Medication 975 MILLIGRAM(S): at 00:20

## 2023-12-14 RX ADMIN — Medication 400 MILLIGRAM(S): at 17:30

## 2023-12-14 RX ADMIN — Medication 2 MILLIGRAM(S): at 17:30

## 2023-12-14 RX ADMIN — Medication 400 MILLIGRAM(S): at 01:20

## 2023-12-14 RX ADMIN — Medication 1 MILLIGRAM(S): at 12:40

## 2023-12-14 RX ADMIN — METHOCARBAMOL 1000 MILLIGRAM(S): 500 TABLET, FILM COATED ORAL at 06:06

## 2023-12-14 RX ADMIN — OXYCODONE HYDROCHLORIDE 10 MILLIGRAM(S): 5 TABLET ORAL at 04:51

## 2023-12-14 RX ADMIN — Medication 400 MILLIGRAM(S): at 13:30

## 2023-12-14 RX ADMIN — Medication 975 MILLIGRAM(S): at 23:43

## 2023-12-14 RX ADMIN — Medication 400 MILLIGRAM(S): at 06:05

## 2023-12-14 RX ADMIN — Medication 400 MILLIGRAM(S): at 00:20

## 2023-12-14 RX ADMIN — Medication 975 MILLIGRAM(S): at 01:20

## 2023-12-14 RX ADMIN — LIDOCAINE 3 PATCH: 4 CREAM TOPICAL at 03:55

## 2023-12-14 RX ADMIN — AMLODIPINE BESYLATE 5 MILLIGRAM(S): 2.5 TABLET ORAL at 06:06

## 2023-12-14 RX ADMIN — ENOXAPARIN SODIUM 40 MILLIGRAM(S): 100 INJECTION SUBCUTANEOUS at 06:04

## 2023-12-14 RX ADMIN — Medication 400 MILLIGRAM(S): at 23:43

## 2023-12-14 RX ADMIN — Medication 5 MILLIGRAM(S): at 21:10

## 2023-12-14 RX ADMIN — Medication 975 MILLIGRAM(S): at 07:05

## 2023-12-14 RX ADMIN — Medication 1 PATCH: at 07:02

## 2023-12-14 RX ADMIN — OXYCODONE HYDROCHLORIDE 10 MILLIGRAM(S): 5 TABLET ORAL at 14:30

## 2023-12-14 RX ADMIN — Medication 400 MILLIGRAM(S): at 12:39

## 2023-12-14 RX ADMIN — Medication 100 MILLIGRAM(S): at 12:40

## 2023-12-14 RX ADMIN — GABAPENTIN 100 MILLIGRAM(S): 400 CAPSULE ORAL at 21:10

## 2023-12-14 RX ADMIN — DIVALPROEX SODIUM 500 MILLIGRAM(S): 500 TABLET, DELAYED RELEASE ORAL at 06:04

## 2023-12-14 RX ADMIN — OXYCODONE HYDROCHLORIDE 10 MILLIGRAM(S): 5 TABLET ORAL at 18:17

## 2023-12-14 RX ADMIN — PANTOPRAZOLE SODIUM 40 MILLIGRAM(S): 20 TABLET, DELAYED RELEASE ORAL at 06:04

## 2023-12-14 RX ADMIN — Medication 400 MILLIGRAM(S): at 07:05

## 2023-12-14 RX ADMIN — Medication 975 MILLIGRAM(S): at 17:31

## 2023-12-14 RX ADMIN — METHOCARBAMOL 1000 MILLIGRAM(S): 500 TABLET, FILM COATED ORAL at 21:09

## 2023-12-14 RX ADMIN — DAPTOMYCIN 122 MILLIGRAM(S): 500 INJECTION, POWDER, LYOPHILIZED, FOR SOLUTION INTRAVENOUS at 17:28

## 2023-12-14 RX ADMIN — Medication 975 MILLIGRAM(S): at 06:05

## 2023-12-14 RX ADMIN — Medication 1 PATCH: at 21:24

## 2023-12-14 RX ADMIN — CHLORHEXIDINE GLUCONATE 1 APPLICATION(S): 213 SOLUTION TOPICAL at 14:41

## 2023-12-14 NOTE — PROGRESS NOTE ADULT - ASSESSMENT
59 y/o male s//p fall with rib fractures was afebrile and hemodynamically stable this morning. Labs showed leukocytosis of 20.95 and patient remains on     PLAN  -pain control  -strict Is/Os  -continue home meds  -trend labs, replete electrolytes as needed  -encourage OOB  -incentive spirometry  -DVT ppx: SCDs, Lovenox 40 daily   57 y/o male s//p fall with rib fractures was afebrile and hemodynamically stable this morning. Labs showed leukocytosis of 20.95 and patient remains on     PLAN  -pain control  -strict Is/Os  -continue home meds  -trend labs, replete electrolytes as needed  -encourage OOB  -incentive spirometry  -DVT ppx: SCDs, Lovenox 40 daily   59 y/o male s//p fall with rib fractures was afebrile and hemodynamically stable this morning. Labs showed leukocytosis of 20.95 and patient remains on daptomycin. CAROL today.     PLAN  -Follow up on CAROL for possible vegetations  -pain control  -strict Is/Os  -Continue Daptomycin.   -continue home meds  -trend labs, replete electrolytes as needed  -encourage OOB to chair, ambulation  -incentive spirometry 10x/hour   -DVT ppx: SCDs, Lovenox 40 daily

## 2023-12-14 NOTE — PROGRESS NOTE ADULT - SUBJECTIVE AND OBJECTIVE BOX
[facility]                                                         CARDIOLOGY AT [facility]                                                                  [address] [facility], [place]-[address] [place] [address]                                                         Telephone: [phone]. Fax:[phone]                                                                             PROGRESS NOTE    Reason for follow up:  MRSA bacteremia  Update: WBC still elevated  NPO for CAROL today  CT of chest done and results are pending    Review of symptoms:   Cardiac:  No chest pain. No dyspnea. No palpitations.  Respiratory: no cough. No dyspnea  Gastrointestinal: No diarrhea. No abdominal pain. No bleeding.   Neuro: No focal neuro complaints.      Vitals:  T(C): 36.6 (12-14-23 @ 04:08), Max: 36.8 (12-13-23 @ 16:12)  HR: 92 (12-14-23 @ 06:00) (74 - 92)  BP: 132/71 (12-14-23 @ 06:00) (90/60 - 152/69)  RR: 19 (12-14-23 @ 04:08) (18 - 19)  SpO2: 97% (12-14-23 @ 04:08) (91% - 97%)  Wt(kg): --  I&O's Summary    13 Dec 2023 07:01  -  14 Dec 2023 07:00  --------------------------------------------------------  IN: 0 mL / OUT: 995 mL / NET: -995 mL      PHYSICAL EXAM:  Appearance: Comfortable. No acute distress  HEENT:  Atraumatic. Normocephalic.  Normal oral mucosa  Neurologic: A & O x 3, no gross focal deficits.  Cardiovascular: RRR S1 S2, No murmur, no rubs/gallops. No JVD  Respiratory: Lungs clear to auscultation, unlabored   Gastrointestinal:  Soft, Non-tender, + BS  Lower Extremities: No edema  Psychiatry: Patient is calm. No agitation.   Skin: warm and dry.      CURRENT MEDICATIONS:  MEDICATIONS  (STANDING):  acetaminophen     Tablet .. 975 milliGRAM(s) Oral every 6 hours  amLODIPine   Tablet 5 milliGRAM(s) Oral daily  benztropine 2 milliGRAM(s) Oral two times a day  chlorhexidine 2% Cloths 1 Application(s) Topical daily  DAPTOmycin IVPB 550 milliGRAM(s) IV Intermittent every 24 hours  divalproex  milliGRAM(s) Oral every 12 hours  folic acid 1 milliGRAM(s) Oral daily  gabapentin 100 milliGRAM(s) Oral at bedtime  ibuprofen  Tablet. 400 milliGRAM(s) Oral every 6 hours  influenza   Vaccine 0.5 milliLiter(s) IntraMuscular once  lidocaine   4% Patch 3 Patch Transdermal daily  melatonin 5 milliGRAM(s) Oral at bedtime  methocarbamol 1000 milliGRAM(s) Oral every 8 hours  nicotine - 21 mG/24Hr(s) Patch 1 Patch Transdermal every 24 hours  pantoprazole    Tablet 40 milliGRAM(s) Oral before breakfast  polyethylene glycol 3350 17 Gram(s) Oral at bedtime  QUEtiapine 125 milliGRAM(s) Oral at bedtime  senna 2 Tablet(s) Oral at bedtime  thiamine 100 milliGRAM(s) Oral daily      LABS:	 	  CARDIAC MARKERS ( 13 Dec 2023 06:50 )  x     / x     / 14 U/L / x     / x      p-BNP 13 Dec 2023 06:50: x                              7.7    20.95 )-----------( 691      ( 14 Dec 2023 04:55 )             25.1     12-14    142  |  100  |  11.4  ----------------------------<  80  4.6   |  32.0<H>  |  0.66    Ca    8.4      14 Dec 2023 04:55  Phos  4.8     12-14  Mg     2.2     12-14      TELEMETRY: Not on monitor  ECG:  	      DIAGNOSTIC TESTING:  [ ] Echocardiogram: < from: TTE Limited W or WO Ultrasound Enhancing Agent (12.13.23 @ 10:54) >      1. Left ventricular cavity is normal. Left ventricular systolic function is normal with an ejection fraction of 68 % by Chavez's method of disks.   2. Normal right ventricular cavity size and systolic function.   3. There is an echogenic mass and pocket of fluid close to the LV posterior wall. There is significant pleural effusion.      Unclear if this is localized collection of fluid with fribrinous exudate or hematoma is actually percardial space. Recommend CT scan with IV contrast to definitely evaluate for pericardial effusion if clinically indicated.      In any case, there is no echo evidence of LV pseudoaneurysm. Ultrasound enhancing images were not clear to see if the above mentioned area was opacified.   4. Small left and small right pleural effusion noted.    	                                                                [facility]                                                         CARDIOLOGY AT [facility]                                                                  [address] [facility], [place]-[address] [place] [address]                                                         Telephone: [phone]. Fax:[phone]                                                                             PROGRESS NOTE    Reason for follow up:  MRSA bacteremia  Update: WBC still elevated  NPO for CAROL today  CT of chest done and results are pending    Review of symptoms:   Cardiac:  No chest pain. No dyspnea. No palpitations.  Respiratory: no cough. No dyspnea  Gastrointestinal: No diarrhea. No abdominal pain. No bleeding.   Neuro: No focal neuro complaints.      Vitals:  T(C): 36.6 (12-14-23 @ 04:08), Max: 36.8 (12-13-23 @ 16:12)  HR: 92 (12-14-23 @ 06:00) (74 - 92)  BP: 132/71 (12-14-23 @ 06:00) (90/60 - 152/69)  RR: 19 (12-14-23 @ 04:08) (18 - 19)  SpO2: 97% (12-14-23 @ 04:08) (91% - 97%)  Wt(kg): --  I&O's Summary    13 Dec 2023 07:01  -  14 Dec 2023 07:00  --------------------------------------------------------  IN: 0 mL / OUT: 995 mL / NET: -995 mL      PHYSICAL EXAM:  Appearance: Comfortable. No acute distress  HEENT:  Atraumatic. Normocephalic.  Normal oral mucosa  Neurologic: A & O x 3, no gross focal deficits.  Cardiovascular: RRR S1 S2, No murmur, no rubs/gallops. No JVD  Respiratory: Lungs clear to auscultation, unlabored   Gastrointestinal:  Soft, Non-tender, + BS  Lower Extremities: No edema  Psychiatry: Patient is calm. No agitation.   Skin: warm and dry.      CURRENT MEDICATIONS:  MEDICATIONS  (STANDING):  acetaminophen     Tablet .. 975 milliGRAM(s) Oral every 6 hours  amLODIPine   Tablet 5 milliGRAM(s) Oral daily  benztropine 2 milliGRAM(s) Oral two times a day  chlorhexidine 2% Cloths 1 Application(s) Topical daily  DAPTOmycin IVPB 550 milliGRAM(s) IV Intermittent every 24 hours  divalproex  milliGRAM(s) Oral every 12 hours  folic acid 1 milliGRAM(s) Oral daily  gabapentin 100 milliGRAM(s) Oral at bedtime  ibuprofen  Tablet. 400 milliGRAM(s) Oral every 6 hours  influenza   Vaccine 0.5 milliLiter(s) IntraMuscular once  lidocaine   4% Patch 3 Patch Transdermal daily  melatonin 5 milliGRAM(s) Oral at bedtime  methocarbamol 1000 milliGRAM(s) Oral every 8 hours  nicotine - 21 mG/24Hr(s) Patch 1 Patch Transdermal every 24 hours  pantoprazole    Tablet 40 milliGRAM(s) Oral before breakfast  polyethylene glycol 3350 17 Gram(s) Oral at bedtime  QUEtiapine 125 milliGRAM(s) Oral at bedtime  senna 2 Tablet(s) Oral at bedtime  thiamine 100 milliGRAM(s) Oral daily      LABS:	 	  CARDIAC MARKERS ( 13 Dec 2023 06:50 )  x     / x     / 14 U/L / x     / x      p-BNP 13 Dec 2023 06:50: x                              7.7    20.95 )-----------( 691      ( 14 Dec 2023 04:55 )             25.1     12-14    142  |  100  |  11.4  ----------------------------<  80  4.6   |  32.0<H>  |  0.66    Ca    8.4      14 Dec 2023 04:55  Phos  4.8     12-14  Mg     2.2     12-14      TELEMETRY: Not on monitor  ECG:  	      DIAGNOSTIC TESTING:  [ ] Echocardiogram: < from: TTE Limited W or WO Ultrasound Enhancing Agent (12.13.23 @ 10:54) >      1. Left ventricular cavity is normal. Left ventricular systolic function is normal with an ejection fraction of 68 % by Chavez's method of disks.   2. Normal right ventricular cavity size and systolic function.   3. There is an echogenic mass and pocket of fluid close to the LV posterior wall. There is significant pleural effusion.      Unclear if this is localized collection of fluid with fribrinous exudate or hematoma is actually percardial space. Recommend CT scan with IV contrast to definitely evaluate for pericardial effusion if clinically indicated.      In any case, there is no echo evidence of LV pseudoaneurysm. Ultrasound enhancing images were not clear to see if the above mentioned area was opacified.   4. Small left and small right pleural effusion noted.

## 2023-12-14 NOTE — PROGRESS NOTE ADULT - NS ATTEND AMEND GEN_ALL_CORE FT
I have seen and examined this patient with the surgical team.  No acute events overnight.  Patient appears well this morning.  Denies pain.  No numbness/tingling in RUE.  Site appears well.  On Daptomycin for MRSA bacteremia.  Newest BCx (-).  S/p new midline placement and old midline removal.  CAROL happening today.  S/p CT of the chest, shows large effusion with possible empyema.  Will consult IR for possible drainage vs fluid sampling and culture.  Planning for DC to [facility]. I have seen and examined this patient with the surgical team.  No acute events overnight.  Patient appears well this morning.  Denies pain.  No numbness/tingling in RUE.  Site appears well.  On Daptomycin for MRSA bacteremia.  Newest BCx (-).  S/p new midline placement and old midline removal.  CAROL happening today.  S/p CT of the chest, shows large effusion with possible empyema.  Will consult IR for possible drainage vs fluid sampling and culture.  Planning for DC to [facility]

## 2023-12-14 NOTE — PROGRESS NOTE ADULT - SUBJECTIVE AND OBJECTIVE BOX
Patient seen and examined at bedside.     Overnight, the lilypad on wound vac fell off and was replaced. Patient reports that he is feeling better this morning. Denies fever, chills, RUE pain, nausea, vomiting, chest pain, or SOB. He is pulling 750 on incentive spirometry this morning.     Vitals:  Vital Signs Last 24 Hrs  T(C): 36.6 (14 Dec 2023 04:08), Max: 36.8 (13 Dec 2023 16:12)  T(F): 97.9 (14 Dec 2023 04:08), Max: 98.2 (13 Dec 2023 16:12)  HR: 92 (14 Dec 2023 06:00) (74 - 92)  BP: 132/71 (14 Dec 2023 06:00) (90/60 - 152/69)  BP(mean): 91 (14 Dec 2023 06:00) (91 - 91)  RR: 19 (14 Dec 2023 04:08) (18 - 19)  SpO2: 97% (14 Dec 2023 04:08) (91% - 97%)    Parameters below as of 14 Dec 2023 04:08  Patient On (Oxygen Delivery Method): room air    Labs:  12-14    142  |  100  |  11.4  ----------------------------<  80  4.6   |  32.0<H>  |  0.66    Ca    8.4      14 Dec 2023 04:55  Phos  4.8     12-14  Mg     2.2     12-14                              7.7    20.95 )-----------( 691      ( 14 Dec 2023 04:55 )             25.1       Exam:  Gen: pt lying in bed, alert, in NAD  Resp: unlabored  CVS: RRR  Abd: soft, NT, ND  Ext: moving all extremities spontaneously, sensation intact, pulses 2+

## 2023-12-14 NOTE — PROGRESS NOTE ADULT - ASSESSMENT
57 year old male with PMH of Etoh abuse, HTN, HLD, seizures, tardive dyskinesia hiatal hernia s/p mechanical fall down stairs while intoxicated on 11/29. Pt intubated in ED for airway protection. Found to have left rib fractures 3, 6, 7, 10 and left pneumothorax with extensive subcutaneous emphysema requiring a left sided pigtail on 11/29. Hospital course complicated by acute agitation and acute asthma exacerbation requiring re-intubation on 12/1. Pt extubated 12/2. Transferred to floor 12/6 and noted to be febrile with up trending WBC, rt arm edema erythema and reported purulent drainage. Found to have MRSA bacteremia, concern for cellulitis , septic thrombophlebitis s/p I&D found to have MRSA bacteremia  Also Echo  with normal EF  expansion of the LV posterolateral wall with systole, no color flow was done.LV pseduoaneurysm needs to be ruled out. and  Moderate pericardial effusion.  Patient denies any chest pain or sob

## 2023-12-14 NOTE — CONSULT NOTE ADULT - SUBJECTIVE AND OBJECTIVE BOX
IR Consult Note  Chief Complaint: 57yo Male who presents with loculated left effusion.  HPI:  57M with PMH seizures, liver disease, back pain, HLD presents to ED s/p fall. Per ED, patient lives at group home; was drunk today and fell down stairs. In ED was intubated for airway protection after admin of Versed and subsequent emesis. Imaging reveal only L 7th rib fx.    Pt currently with leukocytosis, no obvious source of infection and loculated L effusion    ============================================================================  Medications:     Home Medications:  benztropine 2 mg oral tablet: 1 tab(s) orally 2 times a day (28 Nov 2023 20:34)  clonazePAM 0.5 mg oral tablet: 1 tab(s) orally once a day (28 Nov 2023 20:34)  divalproex sodium 500 mg oral delayed release tablet: 1 tab(s) orally 2 times a day (28 Nov 2023 20:34)  gabapentin 100 mg oral capsule: 1 cap(s) orally once a day (at bedtime) (28 Nov 2023 20:34)  hydrOXYzine hydrochloride 25 mg oral tablet: 2 tab(s) orally 2 times a day (28 Nov 2023 20:34)  QUEtiapine 100 mg oral tablet: 1 tab(s) orally once a day (at bedtime) (28 Nov 2023 20:34)       MEDICATIONS  (STANDING):  acetaminophen     Tablet .. 975 milliGRAM(s) Oral every 6 hours  amLODIPine   Tablet 5 milliGRAM(s) Oral daily  benztropine 2 milliGRAM(s) Oral two times a day  chlorhexidine 2% Cloths 1 Application(s) Topical daily  DAPTOmycin IVPB 550 milliGRAM(s) IV Intermittent every 24 hours  divalproex  milliGRAM(s) Oral every 12 hours  enoxaparin Injectable 40 milliGRAM(s) SubCutaneous once  folic acid 1 milliGRAM(s) Oral daily  gabapentin 100 milliGRAM(s) Oral at bedtime  ibuprofen  Tablet. 400 milliGRAM(s) Oral every 6 hours  influenza   Vaccine 0.5 milliLiter(s) IntraMuscular once  lidocaine   4% Patch 3 Patch Transdermal daily  melatonin 5 milliGRAM(s) Oral at bedtime  methocarbamol 1000 milliGRAM(s) Oral every 8 hours  nicotine - 21 mG/24Hr(s) Patch 1 Patch Transdermal every 24 hours  pantoprazole    Tablet 40 milliGRAM(s) Oral before breakfast  polyethylene glycol 3350 17 Gram(s) Oral at bedtime  QUEtiapine 125 milliGRAM(s) Oral at bedtime  senna 2 Tablet(s) Oral at bedtime  thiamine 100 milliGRAM(s) Oral daily    MEDICATIONS  (PRN):  albuterol/ipratropium for Nebulization 3 milliLiter(s) Nebulizer every 6 hours PRN Shortness of Breath and/or Wheezing  calcium carbonate    500 mG (Tums) Chewable 1 Tablet(s) Chew every 6 hours PRN Heartburn  mineral oil enema 133 milliLiter(s) Rectal daily PRN constipation  OLANZapine Injectable 5 milliGRAM(s) IntraMuscular every 12 hours PRN agitation  ondansetron Injectable 4 milliGRAM(s) IV Push every 6 hours PRN Nausea  oxyCODONE    IR 5 milliGRAM(s) Oral every 4 hours PRN Moderate Pain (4 - 6)  oxyCODONE    IR 10 milliGRAM(s) Oral every 4 hours PRN Severe Pain (7 - 10)      Allergies:     Allergies:  No Known Allergies    ============================================================================  PAST MEDICAL & SURGICAL HISTORY:  High cholesterol      Hiatal hernia      Tardive dyskinesia      Liver failure      Seizures      ETOH abuse      S/P tonsillectomy      History of lumbar spinal fusion  6/2017          ============================================================================  Physical Exam:     T(F): 98.2, Max: 98.2 (12-13-23 @ 16:12)  HR: 93 (73 - 93)  BP: 148/85 (90/60 - 169/76)  RR:  (16 - 20)  SpO2:  (91% - 98%)    Labs:     12-14-23  WBC 20.95 // HGB 7.7 // HCT 25.1 //   PT -- // PTT -- // INR --  Na 142 // K 4.6  BUN 11.4 // Cr 0.66 eGFR NonAA -- // eGFR AA --    Imaging: Pertinent Imaging Reviewed.    ============================================================================  Plan: 58yMale presents with with loculated L effusion     - Unable to consent today, plan to redraw coags and do diagnostic thoracentesis friday   IR Consult Note  Chief Complaint: 59yo Male who presents with loculated left effusion.  HPI:  57M with PMH seizures, liver disease, back pain, HLD presents to ED s/p fall. Per ED, patient lives at group home; was drunk today and fell down stairs. In ED was intubated for airway protection after admin of Versed and subsequent emesis. Imaging reveal only L 7th rib fx.    Pt currently with leukocytosis, no obvious source of infection and loculated L effusion    ============================================================================  Medications:     Home Medications:  benztropine 2 mg oral tablet: 1 tab(s) orally 2 times a day (28 Nov 2023 20:34)  clonazePAM 0.5 mg oral tablet: 1 tab(s) orally once a day (28 Nov 2023 20:34)  divalproex sodium 500 mg oral delayed release tablet: 1 tab(s) orally 2 times a day (28 Nov 2023 20:34)  gabapentin 100 mg oral capsule: 1 cap(s) orally once a day (at bedtime) (28 Nov 2023 20:34)  hydrOXYzine hydrochloride 25 mg oral tablet: 2 tab(s) orally 2 times a day (28 Nov 2023 20:34)  QUEtiapine 100 mg oral tablet: 1 tab(s) orally once a day (at bedtime) (28 Nov 2023 20:34)       MEDICATIONS  (STANDING):  acetaminophen     Tablet .. 975 milliGRAM(s) Oral every 6 hours  amLODIPine   Tablet 5 milliGRAM(s) Oral daily  benztropine 2 milliGRAM(s) Oral two times a day  chlorhexidine 2% Cloths 1 Application(s) Topical daily  DAPTOmycin IVPB 550 milliGRAM(s) IV Intermittent every 24 hours  divalproex  milliGRAM(s) Oral every 12 hours  enoxaparin Injectable 40 milliGRAM(s) SubCutaneous once  folic acid 1 milliGRAM(s) Oral daily  gabapentin 100 milliGRAM(s) Oral at bedtime  ibuprofen  Tablet. 400 milliGRAM(s) Oral every 6 hours  influenza   Vaccine 0.5 milliLiter(s) IntraMuscular once  lidocaine   4% Patch 3 Patch Transdermal daily  melatonin 5 milliGRAM(s) Oral at bedtime  methocarbamol 1000 milliGRAM(s) Oral every 8 hours  nicotine - 21 mG/24Hr(s) Patch 1 Patch Transdermal every 24 hours  pantoprazole    Tablet 40 milliGRAM(s) Oral before breakfast  polyethylene glycol 3350 17 Gram(s) Oral at bedtime  QUEtiapine 125 milliGRAM(s) Oral at bedtime  senna 2 Tablet(s) Oral at bedtime  thiamine 100 milliGRAM(s) Oral daily    MEDICATIONS  (PRN):  albuterol/ipratropium for Nebulization 3 milliLiter(s) Nebulizer every 6 hours PRN Shortness of Breath and/or Wheezing  calcium carbonate    500 mG (Tums) Chewable 1 Tablet(s) Chew every 6 hours PRN Heartburn  mineral oil enema 133 milliLiter(s) Rectal daily PRN constipation  OLANZapine Injectable 5 milliGRAM(s) IntraMuscular every 12 hours PRN agitation  ondansetron Injectable 4 milliGRAM(s) IV Push every 6 hours PRN Nausea  oxyCODONE    IR 5 milliGRAM(s) Oral every 4 hours PRN Moderate Pain (4 - 6)  oxyCODONE    IR 10 milliGRAM(s) Oral every 4 hours PRN Severe Pain (7 - 10)      Allergies:     Allergies:  No Known Allergies    ============================================================================  PAST MEDICAL & SURGICAL HISTORY:  High cholesterol      Hiatal hernia      Tardive dyskinesia      Liver failure      Seizures      ETOH abuse      S/P tonsillectomy      History of lumbar spinal fusion  6/2017          ============================================================================  Physical Exam:     T(F): 98.2, Max: 98.2 (12-13-23 @ 16:12)  HR: 93 (73 - 93)  BP: 148/85 (90/60 - 169/76)  RR:  (16 - 20)  SpO2:  (91% - 98%)    Labs:     12-14-23  WBC 20.95 // HGB 7.7 // HCT 25.1 //   PT -- // PTT -- // INR --  Na 142 // K 4.6  BUN 11.4 // Cr 0.66 eGFR NonAA -- // eGFR AA --    Imaging: Pertinent Imaging Reviewed.    ============================================================================  Plan: 58yMale presents with with loculated L effusion     - Unable to consent today, plan to redraw coags and do diagnostic thoracentesis friday

## 2023-12-14 NOTE — PROGRESS NOTE ADULT - SUBJECTIVE AND OBJECTIVE BOX
CCL NP  PRE/POST CAROL Note    Pt presents to cath holding for CAROL to r/o endocarditis    VSS  NPO maintained

## 2023-12-14 NOTE — PROGRESS NOTE ADULT - ASSESSMENT
POST CAROL    S/P CAROL which did not reveal endocarditis  Full report to follow    VSS    Transfer back to bed when criteria met

## 2023-12-14 NOTE — PROGRESS NOTE ADULT - PROBLEM SELECTOR PLAN 2
no s/s of tamponade    with extra cardiac ?mass  awaiting read on CT of chest no s/s of tamponade    awaiting read on CT of chest

## 2023-12-14 NOTE — PROGRESS NOTE ADULT - NS ATTEND AMEND GEN_ALL_CORE FT
58 y/o M with PMH history significant for alcohol abuse, tobacco use, HTN, HLD , seizure disorder, hiatal hernia presented after mechanical fall while intoxicated; found to have multiple L sided rib fractures, L PTX requiring chest tube. Also with acute respiratory failure secondary to asthma exacerbation/reactive airway disease requiring intubation. Found to have sepsis due to R-arm thrombophlebitis with MRSA bacteremia s/p drainage with wound vac. Had TTE negative without vegetations, but showed fluid collection/echolucent space by LV posterior wall.   persistent leukocytosis, ID recommended CAROL to rule out endocarditis.   -CAROL negative for endocarditis   -Antibiotics per ID  -Repeat CT chest with loculated L sided pleural effusion; s/p thoracentesis with 250cc fluid removal    Cardiology signing off at this time   Please reconsult with any further questions 56 y/o M with PMH history significant for alcohol abuse, tobacco use, HTN, HLD , seizure disorder, hiatal hernia presented after mechanical fall while intoxicated; found to have multiple L sided rib fractures, L PTX requiring chest tube. Also with acute respiratory failure secondary to asthma exacerbation/reactive airway disease requiring intubation. Found to have sepsis due to R-arm thrombophlebitis with MRSA bacteremia s/p drainage with wound vac. Had TTE negative without vegetations, but showed fluid collection/echolucent space by LV posterior wall.   persistent leukocytosis, ID recommended CAROL to rule out endocarditis.   -CAROL negative for endocarditis   -Antibiotics per ID  -Repeat CT chest with loculated L sided pleural effusion; s/p thoracentesis with 250cc fluid removal    Cardiology signing off at this time   Please reconsult with any further questions

## 2023-12-15 ENCOUNTER — RESULT REVIEW (OUTPATIENT)
Age: 58
End: 2023-12-15

## 2023-12-15 LAB
ANION GAP SERPL CALC-SCNC: 12 MMOL/L — SIGNIFICANT CHANGE UP (ref 5–17)
ANION GAP SERPL CALC-SCNC: 12 MMOL/L — SIGNIFICANT CHANGE UP (ref 5–17)
APTT BLD: 32.3 SEC — SIGNIFICANT CHANGE UP (ref 24.5–35.6)
APTT BLD: 32.3 SEC — SIGNIFICANT CHANGE UP (ref 24.5–35.6)
B PERT IGG+IGM PNL SER: ABNORMAL
B PERT IGG+IGM PNL SER: ABNORMAL
BASOPHILS # BLD AUTO: 0.14 K/UL — SIGNIFICANT CHANGE UP (ref 0–0.2)
BASOPHILS # BLD AUTO: 0.14 K/UL — SIGNIFICANT CHANGE UP (ref 0–0.2)
BASOPHILS NFR BLD AUTO: 0.8 % — SIGNIFICANT CHANGE UP (ref 0–2)
BASOPHILS NFR BLD AUTO: 0.8 % — SIGNIFICANT CHANGE UP (ref 0–2)
BUN SERPL-MCNC: 10.3 MG/DL — SIGNIFICANT CHANGE UP (ref 8–20)
BUN SERPL-MCNC: 10.3 MG/DL — SIGNIFICANT CHANGE UP (ref 8–20)
CALCIUM SERPL-MCNC: 8.1 MG/DL — LOW (ref 8.4–10.5)
CALCIUM SERPL-MCNC: 8.1 MG/DL — LOW (ref 8.4–10.5)
CHLORIDE SERPL-SCNC: 98 MMOL/L — SIGNIFICANT CHANGE UP (ref 96–108)
CHLORIDE SERPL-SCNC: 98 MMOL/L — SIGNIFICANT CHANGE UP (ref 96–108)
CO2 SERPL-SCNC: 31 MMOL/L — HIGH (ref 22–29)
CO2 SERPL-SCNC: 31 MMOL/L — HIGH (ref 22–29)
COLOR FLD: ABNORMAL
COLOR FLD: ABNORMAL
CREAT SERPL-MCNC: 0.77 MG/DL — SIGNIFICANT CHANGE UP (ref 0.5–1.3)
CREAT SERPL-MCNC: 0.77 MG/DL — SIGNIFICANT CHANGE UP (ref 0.5–1.3)
CULTURE RESULTS: SIGNIFICANT CHANGE UP
CULTURE RESULTS: SIGNIFICANT CHANGE UP
EGFR: 104 ML/MIN/1.73M2 — SIGNIFICANT CHANGE UP
EGFR: 104 ML/MIN/1.73M2 — SIGNIFICANT CHANGE UP
EOSINOPHIL # BLD AUTO: 0.55 K/UL — HIGH (ref 0–0.5)
EOSINOPHIL # BLD AUTO: 0.55 K/UL — HIGH (ref 0–0.5)
EOSINOPHIL NFR BLD AUTO: 3.2 % — SIGNIFICANT CHANGE UP (ref 0–6)
EOSINOPHIL NFR BLD AUTO: 3.2 % — SIGNIFICANT CHANGE UP (ref 0–6)
FLUID INTAKE SUBSTANCE CLASS: SIGNIFICANT CHANGE UP
FLUID INTAKE SUBSTANCE CLASS: SIGNIFICANT CHANGE UP
GLUCOSE SERPL-MCNC: 83 MG/DL — SIGNIFICANT CHANGE UP (ref 70–99)
GLUCOSE SERPL-MCNC: 83 MG/DL — SIGNIFICANT CHANGE UP (ref 70–99)
GRAM STN FLD: SIGNIFICANT CHANGE UP
GRAM STN FLD: SIGNIFICANT CHANGE UP
HCT VFR BLD CALC: 24.7 % — LOW (ref 39–50)
HCT VFR BLD CALC: 24.7 % — LOW (ref 39–50)
HGB BLD-MCNC: 8.1 G/DL — LOW (ref 13–17)
HGB BLD-MCNC: 8.1 G/DL — LOW (ref 13–17)
IMM GRANULOCYTES NFR BLD AUTO: 9.7 % — HIGH (ref 0–0.9)
IMM GRANULOCYTES NFR BLD AUTO: 9.7 % — HIGH (ref 0–0.9)
INR BLD: 1.18 RATIO — SIGNIFICANT CHANGE UP (ref 0.85–1.18)
INR BLD: 1.18 RATIO — SIGNIFICANT CHANGE UP (ref 0.85–1.18)
LDH SERPL L TO P-CCNC: 422 U/L — SIGNIFICANT CHANGE UP
LDH SERPL L TO P-CCNC: 422 U/L — SIGNIFICANT CHANGE UP
LYMPHOCYTES # BLD AUTO: 17.5 % — SIGNIFICANT CHANGE UP (ref 13–44)
LYMPHOCYTES # BLD AUTO: 17.5 % — SIGNIFICANT CHANGE UP (ref 13–44)
LYMPHOCYTES # BLD AUTO: 3 K/UL — SIGNIFICANT CHANGE UP (ref 1–3.3)
LYMPHOCYTES # BLD AUTO: 3 K/UL — SIGNIFICANT CHANGE UP (ref 1–3.3)
LYMPHOCYTES # FLD: 70 % — SIGNIFICANT CHANGE UP
LYMPHOCYTES # FLD: 70 % — SIGNIFICANT CHANGE UP
MAGNESIUM SERPL-MCNC: 2 MG/DL — SIGNIFICANT CHANGE UP (ref 1.6–2.6)
MAGNESIUM SERPL-MCNC: 2 MG/DL — SIGNIFICANT CHANGE UP (ref 1.6–2.6)
MCHC RBC-ENTMCNC: 28.2 PG — SIGNIFICANT CHANGE UP (ref 27–34)
MCHC RBC-ENTMCNC: 28.2 PG — SIGNIFICANT CHANGE UP (ref 27–34)
MCHC RBC-ENTMCNC: 32.8 GM/DL — SIGNIFICANT CHANGE UP (ref 32–36)
MCHC RBC-ENTMCNC: 32.8 GM/DL — SIGNIFICANT CHANGE UP (ref 32–36)
MCV RBC AUTO: 86.1 FL — SIGNIFICANT CHANGE UP (ref 80–100)
MCV RBC AUTO: 86.1 FL — SIGNIFICANT CHANGE UP (ref 80–100)
MONOCYTES # BLD AUTO: 2.56 K/UL — HIGH (ref 0–0.9)
MONOCYTES # BLD AUTO: 2.56 K/UL — HIGH (ref 0–0.9)
MONOCYTES NFR BLD AUTO: 14.9 % — HIGH (ref 2–14)
MONOCYTES NFR BLD AUTO: 14.9 % — HIGH (ref 2–14)
MONOS+MACROS # FLD: 17 % — SIGNIFICANT CHANGE UP
MONOS+MACROS # FLD: 17 % — SIGNIFICANT CHANGE UP
NEUTROPHILS # BLD AUTO: 9.26 K/UL — HIGH (ref 1.8–7.4)
NEUTROPHILS # BLD AUTO: 9.26 K/UL — HIGH (ref 1.8–7.4)
NEUTROPHILS NFR BLD AUTO: 53.9 % — SIGNIFICANT CHANGE UP (ref 43–77)
NEUTROPHILS NFR BLD AUTO: 53.9 % — SIGNIFICANT CHANGE UP (ref 43–77)
NEUTROPHILS-BODY FLUID: 13 % — SIGNIFICANT CHANGE UP
NEUTROPHILS-BODY FLUID: 13 % — SIGNIFICANT CHANGE UP
PH FLD: 7.5 — SIGNIFICANT CHANGE UP
PH FLD: 7.5 — SIGNIFICANT CHANGE UP
PHOSPHATE SERPL-MCNC: 3.9 MG/DL — SIGNIFICANT CHANGE UP (ref 2.4–4.7)
PHOSPHATE SERPL-MCNC: 3.9 MG/DL — SIGNIFICANT CHANGE UP (ref 2.4–4.7)
PLATELET # BLD AUTO: 746 K/UL — HIGH (ref 150–400)
PLATELET # BLD AUTO: 746 K/UL — HIGH (ref 150–400)
POTASSIUM SERPL-MCNC: 4 MMOL/L — SIGNIFICANT CHANGE UP (ref 3.5–5.3)
POTASSIUM SERPL-MCNC: 4 MMOL/L — SIGNIFICANT CHANGE UP (ref 3.5–5.3)
POTASSIUM SERPL-SCNC: 4 MMOL/L — SIGNIFICANT CHANGE UP (ref 3.5–5.3)
POTASSIUM SERPL-SCNC: 4 MMOL/L — SIGNIFICANT CHANGE UP (ref 3.5–5.3)
PROT FLD-MCNC: 3 G/DL — SIGNIFICANT CHANGE UP
PROT FLD-MCNC: 3 G/DL — SIGNIFICANT CHANGE UP
PROTHROM AB SERPL-ACNC: 13 SEC — SIGNIFICANT CHANGE UP (ref 9.5–13)
PROTHROM AB SERPL-ACNC: 13 SEC — SIGNIFICANT CHANGE UP (ref 9.5–13)
RBC # BLD: 2.87 M/UL — LOW (ref 4.2–5.8)
RBC # BLD: 2.87 M/UL — LOW (ref 4.2–5.8)
RBC # FLD: 17.4 % — HIGH (ref 10.3–14.5)
RBC # FLD: 17.4 % — HIGH (ref 10.3–14.5)
RCV VOL RI: HIGH /UL (ref 0–0)
RCV VOL RI: HIGH /UL (ref 0–0)
SODIUM SERPL-SCNC: 140 MMOL/L — SIGNIFICANT CHANGE UP (ref 135–145)
SODIUM SERPL-SCNC: 140 MMOL/L — SIGNIFICANT CHANGE UP (ref 135–145)
SPECIMEN SOURCE: SIGNIFICANT CHANGE UP
TOTAL NUCLEATED CELL COUNT, BODY FLUID: 1640 /UL — SIGNIFICANT CHANGE UP
TOTAL NUCLEATED CELL COUNT, BODY FLUID: 1640 /UL — SIGNIFICANT CHANGE UP
TUBE TYPE: SIGNIFICANT CHANGE UP
TUBE TYPE: SIGNIFICANT CHANGE UP
WBC # BLD: 17.18 K/UL — HIGH (ref 3.8–10.5)
WBC # BLD: 17.18 K/UL — HIGH (ref 3.8–10.5)
WBC # FLD AUTO: 17.18 K/UL — HIGH (ref 3.8–10.5)
WBC # FLD AUTO: 17.18 K/UL — HIGH (ref 3.8–10.5)

## 2023-12-15 PROCEDURE — 32555 ASPIRATE PLEURA W/ IMAGING: CPT | Mod: LT

## 2023-12-15 PROCEDURE — 71045 X-RAY EXAM CHEST 1 VIEW: CPT | Mod: 26,XU

## 2023-12-15 PROCEDURE — 99233 SBSQ HOSP IP/OBS HIGH 50: CPT

## 2023-12-15 PROCEDURE — 99232 SBSQ HOSP IP/OBS MODERATE 35: CPT

## 2023-12-15 PROCEDURE — 88112 CYTOPATH CELL ENHANCE TECH: CPT | Mod: 26

## 2023-12-15 PROCEDURE — 32555 ASPIRATE PLEURA W/ IMAGING: CPT

## 2023-12-15 PROCEDURE — 88305 TISSUE EXAM BY PATHOLOGIST: CPT | Mod: 26

## 2023-12-15 RX ORDER — VANCOMYCIN HCL 1 G
1000 VIAL (EA) INTRAVENOUS EVERY 12 HOURS
Refills: 0 | Status: DISCONTINUED | OUTPATIENT
Start: 2023-12-15 | End: 2023-12-17

## 2023-12-15 RX ORDER — CEFEPIME 1 G/1
2000 INJECTION, POWDER, FOR SOLUTION INTRAMUSCULAR; INTRAVENOUS ONCE
Refills: 0 | Status: COMPLETED | OUTPATIENT
Start: 2023-12-15 | End: 2023-12-15

## 2023-12-15 RX ORDER — CEFEPIME 1 G/1
2000 INJECTION, POWDER, FOR SOLUTION INTRAMUSCULAR; INTRAVENOUS EVERY 8 HOURS
Refills: 0 | Status: DISCONTINUED | OUTPATIENT
Start: 2023-12-15 | End: 2023-12-20

## 2023-12-15 RX ORDER — CEFEPIME 1 G/1
INJECTION, POWDER, FOR SOLUTION INTRAMUSCULAR; INTRAVENOUS
Refills: 0 | Status: DISCONTINUED | OUTPATIENT
Start: 2023-12-15 | End: 2023-12-20

## 2023-12-15 RX ADMIN — Medication 975 MILLIGRAM(S): at 00:43

## 2023-12-15 RX ADMIN — Medication 975 MILLIGRAM(S): at 12:45

## 2023-12-15 RX ADMIN — OXYCODONE HYDROCHLORIDE 10 MILLIGRAM(S): 5 TABLET ORAL at 10:21

## 2023-12-15 RX ADMIN — Medication 1 MILLIGRAM(S): at 10:21

## 2023-12-15 RX ADMIN — Medication 400 MILLIGRAM(S): at 00:43

## 2023-12-15 RX ADMIN — Medication 2 MILLIGRAM(S): at 05:45

## 2023-12-15 RX ADMIN — Medication 975 MILLIGRAM(S): at 05:43

## 2023-12-15 RX ADMIN — Medication 250 MILLIGRAM(S): at 12:45

## 2023-12-15 RX ADMIN — METHOCARBAMOL 1000 MILLIGRAM(S): 500 TABLET, FILM COATED ORAL at 05:43

## 2023-12-15 RX ADMIN — Medication 100 MILLIGRAM(S): at 10:21

## 2023-12-15 RX ADMIN — DIVALPROEX SODIUM 500 MILLIGRAM(S): 500 TABLET, DELAYED RELEASE ORAL at 05:44

## 2023-12-15 RX ADMIN — OXYCODONE HYDROCHLORIDE 10 MILLIGRAM(S): 5 TABLET ORAL at 11:10

## 2023-12-15 RX ADMIN — Medication 2 MILLIGRAM(S): at 17:35

## 2023-12-15 RX ADMIN — OXYCODONE HYDROCHLORIDE 10 MILLIGRAM(S): 5 TABLET ORAL at 14:57

## 2023-12-15 RX ADMIN — OXYCODONE HYDROCHLORIDE 10 MILLIGRAM(S): 5 TABLET ORAL at 23:10

## 2023-12-15 RX ADMIN — Medication 5 MILLIGRAM(S): at 21:57

## 2023-12-15 RX ADMIN — METHOCARBAMOL 1000 MILLIGRAM(S): 500 TABLET, FILM COATED ORAL at 12:45

## 2023-12-15 RX ADMIN — Medication 400 MILLIGRAM(S): at 06:44

## 2023-12-15 RX ADMIN — QUETIAPINE FUMARATE 125 MILLIGRAM(S): 200 TABLET, FILM COATED ORAL at 21:59

## 2023-12-15 RX ADMIN — CHLORHEXIDINE GLUCONATE 1 APPLICATION(S): 213 SOLUTION TOPICAL at 10:20

## 2023-12-15 RX ADMIN — Medication 400 MILLIGRAM(S): at 12:45

## 2023-12-15 RX ADMIN — Medication 400 MILLIGRAM(S): at 12:56

## 2023-12-15 RX ADMIN — CEFEPIME 2000 MILLIGRAM(S): 1 INJECTION, POWDER, FOR SOLUTION INTRAMUSCULAR; INTRAVENOUS at 14:57

## 2023-12-15 RX ADMIN — DIVALPROEX SODIUM 500 MILLIGRAM(S): 500 TABLET, DELAYED RELEASE ORAL at 17:35

## 2023-12-15 RX ADMIN — Medication 400 MILLIGRAM(S): at 17:35

## 2023-12-15 RX ADMIN — PANTOPRAZOLE SODIUM 40 MILLIGRAM(S): 20 TABLET, DELAYED RELEASE ORAL at 05:44

## 2023-12-15 RX ADMIN — LIDOCAINE 3 PATCH: 4 CREAM TOPICAL at 10:21

## 2023-12-15 RX ADMIN — Medication 400 MILLIGRAM(S): at 23:39

## 2023-12-15 RX ADMIN — Medication 250 MILLIGRAM(S): at 23:40

## 2023-12-15 RX ADMIN — Medication 400 MILLIGRAM(S): at 05:44

## 2023-12-15 RX ADMIN — AMLODIPINE BESYLATE 5 MILLIGRAM(S): 2.5 TABLET ORAL at 05:44

## 2023-12-15 RX ADMIN — GABAPENTIN 100 MILLIGRAM(S): 400 CAPSULE ORAL at 21:57

## 2023-12-15 RX ADMIN — METHOCARBAMOL 1000 MILLIGRAM(S): 500 TABLET, FILM COATED ORAL at 21:55

## 2023-12-15 RX ADMIN — CEFEPIME 2000 MILLIGRAM(S): 1 INJECTION, POWDER, FOR SOLUTION INTRAMUSCULAR; INTRAVENOUS at 22:00

## 2023-12-15 RX ADMIN — Medication 975 MILLIGRAM(S): at 17:35

## 2023-12-15 RX ADMIN — OXYCODONE HYDROCHLORIDE 10 MILLIGRAM(S): 5 TABLET ORAL at 22:10

## 2023-12-15 RX ADMIN — Medication 975 MILLIGRAM(S): at 12:55

## 2023-12-15 RX ADMIN — Medication 975 MILLIGRAM(S): at 23:39

## 2023-12-15 NOTE — PROGRESS NOTE ADULT - SUBJECTIVE AND OBJECTIVE BOX
[facility]  INFECTIOUS DISEASES at [place] and [place]  ===============================================================                  Jordon Gonzales MD               Diplomates American Board of Internal Medicine & Infectious Diseases                * [place] Office - Appt - Tel  [phone] Fax [phone]                * [place] Office - Appt - Tel [phone] Fax [phone]                                  Hospital Consult line:  [phone]  ==============================================================    BRANNOHEMY [id number]    Follow up: MRSA bacteremia        S/p I&D of right arm septic thrombophlebitis   pt c/o pain left chest  s/p thoracentesis for left loculated effusion  s/p CAROL no vegetations reported  afebrile and hemodynamically stable     I have personally reviewed the labs and data; pertinent labs and data are listed in this note; please see below.     _______________________________________________________________  REVIEW OF SYSTEMS  as above  ________________________________________________________________  Allergies:  No Known Allergies    ________________________________________________________________  PHYSICAL EXAM  GEN: in NAD, laying in bed  HEENT: Anicteric sclerae. Moist mucous membranes. No mucosal lesions.   NECK: Supple.  LUNGS: eupneic. decreased Breath sounds at bases B/L.  HEART: RRR, no m/r/g  ABDOMEN: Soft, NT, ND,  +BS.    : No CVA tenderness. No Deleon catheter  EXTREMITIES: RUE vac Minimal swelling.   NEUROLOGIC: Grossly no motor focal deficits   PSYCHIATRIC: Appropriate affect and mood  SKIN: ulcer in upper chest.     ________________________________________________________________  Vitals:  Vital Signs Last 24 Hrs  T(C): 36.6 (13 Dec 2023 19:27), Max: 36.9 (13 Dec 2023 00:20)  T(F): 97.9 (13 Dec 2023 19:27), Max: 98.4 (13 Dec 2023 00:20)  HR: 78 (13 Dec 2023 19:27) (73 - 89)  BP: 152/69 (13 Dec 2023 19:27) (119/71 - 152/69)  BP(mean): --  RR: 18 (13 Dec 2023 19:27) (18 - 20)  SpO2: 91% (13 Dec 2023 19:27) (91% - 100%)    Parameters below as of 13 Dec 2023 20:18  Patient On (Oxygen Delivery Method): nasal cannula        Current Antibiotics:  vancomycin  IVPB 500 milliGRAM(s) IV Intermittent every 12 hours    Other medications:  acetaminophen     Tablet .. 975 milliGRAM(s) Oral every 6 hours  amLODIPine   Tablet 5 milliGRAM(s) Oral daily  benztropine 2 milliGRAM(s) Oral two times a day  chlorhexidine 2% Cloths 1 Application(s) Topical daily  divalproex  milliGRAM(s) Oral every 12 hours  enoxaparin Injectable 40 milliGRAM(s) SubCutaneous every 12 hours  folic acid 1 milliGRAM(s) Oral daily  gabapentin 100 milliGRAM(s) Oral at bedtime  influenza   Vaccine 0.5 milliLiter(s) IntraMuscular once  lidocaine   4% Patch 3 Patch Transdermal daily  melatonin 5 milliGRAM(s) Oral at bedtime  methocarbamol 750 milliGRAM(s) Oral every 8 hours  nicotine - 21 mG/24Hr(s) Patch 1 Patch Transdermal every 24 hours  pantoprazole    Tablet 40 milliGRAM(s) Oral before breakfast  polyethylene glycol 3350 17 Gram(s) Oral at bedtime  QUEtiapine 125 milliGRAM(s) Oral at bedtime  senna 2 Tablet(s) Oral at bedtime  thiamine 100 milliGRAM(s) Oral daily                                     8.1    17.18 )-----------( 746      ( 15 Dec 2023 06:01 )             24.7       12-15    140  |  98  |  10.3  ----------------------------<  83  4.0   |  31.0<H>  |  0.77    Ca    8.1<L>      15 Dec 2023 06:01  Phos  3.9     12-15  Mg     2.0     12-15                Urinalysis Basic - ( 15 Dec 2023 06:01 )    Color: x / Appearance: x / SG: x / pH: x  Gluc: 83 mg/dL / Ketone: x  / Bili: x / Urobili: x   Blood: x / Protein: x / Nitrite: x   Leuk Esterase: x / RBC: x / WBC x   Sq Epi: x / Non Sq Epi: x / Bacteria: x        PT/INR - ( 15 Dec 2023 06:01 )   PT: 13.0 sec;   INR: 1.18 ratio         PTT - ( 15 Dec 2023 06:01 )  PTT:32.3 sec          CAPILLARY BLOOD GLUCOSE        < from: TTE Limited W or WO Ultrasound Enhancing Agent (12.13.23 @ 10:54) >  FINDINGS:     Left Ventricle:  The left ventricular cavity is normal. Left ventricular systolic function is normal with a calculated ejection fraction of 68 % by the Chavez's biplane method of disks with an ejection fraction visually estimated at 65 to 70%. There is normal left ventricular diastolic function.     Right Ventricle:  The right ventricular cavity is normal in size and normal systolic function. Tricuspid annular plane systolic excursion (TAPSE) is 2.2 cm (normal >=1.7 cm). Tricuspid annular tissue Doppler S' is 11.7 cm/s (normal >10 cm/s).     Aortic Valve:  The aortic valve appears trileaflet with normal systolic excursion.     Mitral Valve:  There is trace mitral regurgitation.     Tricuspid Valve:  There is trace tricuspid regurgitation. Estimated pulmonary artery systolic pressure is 26 mmHg.     Pericardium:  There is an echogenic mass and pocket of fluid close to the LV posterior wall. There is significant pleural effusion.  Unclear if this is localized collection of fluid with fribrinous exudate or hematoma is actually percardial space. Recommend CT scan with IV contrast to definitely evaluate for pericardial effusion if clinically indicated.  In any case, there is no echo evidence of LV pseudoaneurysm. Ultrasound enhancing images were not clear to see if the above mentioned area was opacified.     Pleura:  Small left and small right pleural effusion noted.     Systemic Veins:  The inferior vena cava isnormal in size measuring 1.86 cm in diameter, (normal <2.1cm) with normal inspiratory collapse (normal >50%) consistent with normal right atrial pressure (~3, range 0-5mmHg).  ____________________________________________________________________  QUANTITATIVE DATA:  Left Ventricle Measurements: (Indexed to BSA)     LV Vol d, MOD A2C: 88.4 ml   53.01 ml/m²  LV Vol d, MOD A4C: 112.1 ml  67.26 ml/m²  LV Vol d, MOD BP:  106.5 ml  63.91 ml/m²  LV Vol s, MOD A2C: 30.3 ml   18.18 ml/m²  LV Vol s, MOD A4C: 36.0 ml   21.60 ml/m²  LV Vol s, MOD BP:  33.9 ml   20.33 ml/m²  LVOT SV MOD BP:    72.6 ml  LV EF% MOD BP:     68 %  Visualized LV EF%: 65 to 70%     MV E Vmax:    1.00 m/s  e' lateral:   14.10 cm/s  e' medial:    9.68 cm/s  E/e' lateral: 7.09  E/e' medial:  10.33  E/e' Average: 8.41       Right Ventricle Measurements:     TAPSE:      2.2 cm  TV Amber. S': 11.70 cm/s    Mitral Valve Measurements:     MV E Vmax: 1.0 m/s       Tricuspid Valve Measurements:     TR Vmax:          2.4 m/s  TR Peak Gradient: 22.8 mmHg  RA Pressure:      3 mmHg  PASP:             26 mmHg    ________________________________________________________________________________________  Electronically signed on 12/13/2023 at 12:01:48 PM by Lisa Reyna MD, RPVI    < end of copied text >    < from: CAROL W or WO Ultrasound Enhancing Agent (12.14.23 @ 10:25) >  _______________________________________________________________________________________     CONCLUSIONS:      1. Left ventricular systolic function is normal with an ejection fraction visually estimated at 60 to 65 %.   2. Normal right ventricular cavity size and systolic function.   3. Normal atria.   4. No evidence of left atrial or left atrial appendage thrombus. The left atrial appendage emptying velocity isnormal. Ultrasound enhancing agent was utilized to visualize the left atrial appendage.   5. No significant valvular disease.   6. No echocardiographic evidence of vegetations.   7. Agitated saline injection was negative for intracardiac shunt.   8. Mild aortic calcification seen in the descending thoracic aorta.   9. Compared to the transthoracic echocardiogram performed on 12/13/2023 there have been no significant interval changes. Findings were discussed with Patient and rounding cardiology team on 12/14/2023 at 1120PM.  10. Unable to appreciate any LV pseudoaneurysm despite use of ulrasonic echocontrast given shadow artifact. Recommend CT chest w/ IV contrast vs. cMRI.    < end of copied text >    < from: CT Chest No Cont (12.13.23 @ 22:29) >  Consolidation/atelectasis in the left lowerlobe. New peribronchovascular   foci of groundglass opacity and small consolidation in the right lower   lobe.  Trace right pleural effusion. Moderate left-sided pleural effusion which   is partially loculated along the left paramediastinal region.  Redemonstration of acute, displaced fracture of left 3rd-7th ribs    --- End of Report ---      < end of copied text >              Urinalysis Basic - ( 13 Dec 2023 06:50 )    Color: x / Appearance: x / SG: x / pH: x  Gluc: 106 mg/dL / Ketone: x  / Bili: x / Urobili: x   Blood: x / Protein: x / Nitrite: x   Leuk Esterase: x / RBC: x / WBC x   Sq Epi: x / Non Sq Epi: x / Bacteria: x            CARDIAC MARKERS ( 13 Dec 2023 06:50 )  x     / x     / 14 U/L / x     / x            CAPILLARY BLOOD GLUCOSE                  Urinalysis Basic - ( 12 Dec 2023 07:58 )    Color: x / Appearance: x / SG: x / pH: x  Gluc: 95 mg/dL / Ketone: x  / Bili: x / Urobili: x   Blood: x / Protein: x / Nitrite: x   Leuk Esterase: x / RBC: x / WBC x   Sq Epi: x / Non Sq Epi: x / Bacteria: x                  CAPILLARY BLOOD GLUCOSE                    Urinalysis Basic - ( 12 Dec 2023 07:58 )    Color: x / Appearance: x / SG: x / pH: x  Gluc: 95 mg/dL / Ketone: x  / Bili: x / Urobili: x   Blood: x / Protein: x / Nitrite: x   Leuk Esterase: x / RBC: x / WBC x   Sq Epi: x / Non Sq Epi: x / Bacteria: x                  CAPILLARY BLOOD GLUCOSE                    Urinalysis Basic - ( 11 Dec 2023 06:57 )    Color: x / Appearance: x / SG: x / pH: x  Gluc: 96 mg/dL / Ketone: x  / Bili: x / Urobili: x   Blood: x / Protein: x / Nitrite: x   Leuk Esterase: x / RBC: x / WBC x   Sq Epi: x / Non Sq Epi: x / Bacteria: x                  CAPILLARY BLOOD GLUCOSE                  RECENT CULTURES:  12-07 @ 14:57 .Blood Blood-Peripheral     Growth in aerobic bottle: Gram Positive Cocci in Clusters  Growth in anaerobic bottle: Gram Positive Cocci in Clusters    Growth in aerobic bottle: Gram Positive Cocci in Clusters  Growth in anaerobic bottle: Gram Positive Cocci in Clusters      12-07 @ 14:52 .Blood Blood-Venous     Growth in aerobic bottle: Gram Positive Cocci in Clusters  Growth in anaerobic bottle: Gram Positive Cocci in Clusters    Growth in aerobic bottle: Gram Positive Cocci in Clusters  Growth in anaerobic bottle: Gram Positive Cocci in Clusters      12-06 @ 15:15 .Blood Blood Blood Culture PCR  Methicillin resistant Staphylococcus aureus    Growth in aerobic and anaerobic bottles: Methicillin Resistant  Staphylococcus aureus    Growth in aerobic bottle: Gram Positive Cocci in Clusters  Growth in anaerobic bottle: Gram Positive Cocci in Clusters      12-06 @ 15:10 .Blood Blood     Growth in aerobic and anaerobic bottles: Methicillin Resistant  Staphylococcus aureus  See previous culture [id number]    Growth in anaerobic bottle: Gram Positive Cocci in Clusters  Growth in aerobic bottle: Gram Positive Cocci in Clusters        WBC Count: 19.04 K/uL (12-09-23 @ 07:31)  WBC Count: 20.66 K/uL (12-08-23 @ 08:06)  WBC Count: 22.89 K/uL (12-07-23 @ 06:05)  WBC Count: 14.00 K/uL (12-06-23 @ 05:15)  WBC Count: 13.75 K/uL (12-05-23 @ 02:08)    Creatinine: 0.63 mg/dL (12-09-23 @ 07:31)  Creatinine: 1.17 mg/dL (12-08-23 @ 08:06)  Creatinine: 0.83 mg/dL (12-07-23 @ 06:05)  Creatinine: 0.60 mg/dL (12-06-23 @ 05:15)  Creatinine: 0.81 mg/dL (12-05-23 @ 02:08)      Vancomycin Level, Trough: 8.3 ug/mL (12-09-23 @ 07:31)  Vancomycin Level, Trough: 11.5 ug/mL (12-08-23 @ 18:40)    ________________________________________________________________  CARDIOLOGY   no recent studies   ________________________________________________________________  RADIOLOGY  < from: US Duplex Venous Upper Ext Ltd, Right (12.07.23 @ 11:52) >  INTERPRETATION:  CLINICAL INFORMATION: Arm swelling    COMPARISON: None available.    TECHNIQUE: Duplex sonography of the RIGHT UPPER extremity veins with   color and spectral Doppler, with and without compression.    FINDINGS:    The right internal jugular, subclavian, axillary and brachial veins are   patent and compressible where applicable.  The basilic vein (superficial   vein) is patent and without thrombus.  Superficial thrombus in the right   cephalic vein.      IMPRESSION:  No evidence of right upper extremity deep venous thrombosis.    Superficial thrombus in the right cephalic vein.    < end of copied text >  < from: Xray Chest 1 View- PORTABLE-Urgent (Xray Chest 1 View- PORTABLE-Urgent .) (12.06.23 @ 10:15) >  INTERPRETATION:  Chest one view    HISTORY: Leukocytosis    COMPARISON STUDY: 12/2/2023    Frontal expiratory view of the chest shows the heart to be similar in   size. The lungs show partial clearing of the left base and there is no   evidence of pneumothorax nor pleural effusion.    IMPRESSION:  Left base clearing.    < end of copied text >    < from: TTE W or WO Ultrasound Enhancing Agent (12.11.23 @ 19:59) >  CONCLUSIONS:      1. Left ventricular cavity is normal.   2. Normal left ventricular diastolic function.   3. Trace mitral regurgitation.   4. Moderate left pleural effusion noted.   5. Moderate pericardial effusion noted adjacent to the posterolateralleft ventricle. There is expansion of the LV posterolateral wall with systole, no color flow was done.LV pseduoaneurysm needs to be ruled out.   6. The inferior vena cava is normal in size measuring 2.10 cm in diameter, (normal <2.1cm) with normal inspiratory collapse (normal >50%) consistent with normal right atrial pressure (~3, range 0-5mmHg).   7. Recommendations: Repeat 2 D echo with definity to evaluate pericardial effusion, to see if there is any pseudo aneurysm of the LV. Color flow and doppler should be done.    < end of copied text >   [facility]  INFECTIOUS DISEASES at [place] and [place]  ===============================================================                  Jordon Gonzales MD               Diplomates American Board of Internal Medicine & Infectious Diseases                * [place] Office - Appt - Tel  [phone] Fax [phone]                * [place] Office - Appt - Tel [phone] Fax [phone]                                  Hospital Consult line:  [phone]  ==============================================================    BRANNOHEMY [id number]    Follow up: MRSA bacteremia        S/p I&D of right arm septic thrombophlebitis   pt c/o pain left chest  s/p thoracentesis for left loculated effusion  s/p CAROL no vegetations reported  afebrile and hemodynamically stable     I have personally reviewed the labs and data; pertinent labs and data are listed in this note; please see below.     _______________________________________________________________  REVIEW OF SYSTEMS  as above  ________________________________________________________________  Allergies:  No Known Allergies    ________________________________________________________________  PHYSICAL EXAM  GEN: in NAD, laying in bed  HEENT: Anicteric sclerae. Moist mucous membranes. No mucosal lesions.   NECK: Supple.  LUNGS: eupneic. decreased Breath sounds at bases B/L.  HEART: RRR, no m/r/g  ABDOMEN: Soft, NT, ND,  +BS.    : No CVA tenderness. No Deleon catheter  EXTREMITIES: RUE vac Minimal swelling.   NEUROLOGIC: Grossly no motor focal deficits   PSYCHIATRIC: Appropriate affect and mood  SKIN: ulcer in upper chest.     ________________________________________________________________  Vitals:  Vital Signs Last 24 Hrs  T(C): 36.6 (13 Dec 2023 19:27), Max: 36.9 (13 Dec 2023 00:20)  T(F): 97.9 (13 Dec 2023 19:27), Max: 98.4 (13 Dec 2023 00:20)  HR: 78 (13 Dec 2023 19:27) (73 - 89)  BP: 152/69 (13 Dec 2023 19:27) (119/71 - 152/69)  BP(mean): --  RR: 18 (13 Dec 2023 19:27) (18 - 20)  SpO2: 91% (13 Dec 2023 19:27) (91% - 100%)    Parameters below as of 13 Dec 2023 20:18  Patient On (Oxygen Delivery Method): nasal cannula        Current Antibiotics:  vancomycin  IVPB 500 milliGRAM(s) IV Intermittent every 12 hours    Other medications:  acetaminophen     Tablet .. 975 milliGRAM(s) Oral every 6 hours  amLODIPine   Tablet 5 milliGRAM(s) Oral daily  benztropine 2 milliGRAM(s) Oral two times a day  chlorhexidine 2% Cloths 1 Application(s) Topical daily  divalproex  milliGRAM(s) Oral every 12 hours  enoxaparin Injectable 40 milliGRAM(s) SubCutaneous every 12 hours  folic acid 1 milliGRAM(s) Oral daily  gabapentin 100 milliGRAM(s) Oral at bedtime  influenza   Vaccine 0.5 milliLiter(s) IntraMuscular once  lidocaine   4% Patch 3 Patch Transdermal daily  melatonin 5 milliGRAM(s) Oral at bedtime  methocarbamol 750 milliGRAM(s) Oral every 8 hours  nicotine - 21 mG/24Hr(s) Patch 1 Patch Transdermal every 24 hours  pantoprazole    Tablet 40 milliGRAM(s) Oral before breakfast  polyethylene glycol 3350 17 Gram(s) Oral at bedtime  QUEtiapine 125 milliGRAM(s) Oral at bedtime  senna 2 Tablet(s) Oral at bedtime  thiamine 100 milliGRAM(s) Oral daily                                     8.1    17.18 )-----------( 746      ( 15 Dec 2023 06:01 )             24.7       12-15    140  |  98  |  10.3  ----------------------------<  83  4.0   |  31.0<H>  |  0.77    Ca    8.1<L>      15 Dec 2023 06:01  Phos  3.9     12-15  Mg     2.0     12-15                Urinalysis Basic - ( 15 Dec 2023 06:01 )    Color: x / Appearance: x / SG: x / pH: x  Gluc: 83 mg/dL / Ketone: x  / Bili: x / Urobili: x   Blood: x / Protein: x / Nitrite: x   Leuk Esterase: x / RBC: x / WBC x   Sq Epi: x / Non Sq Epi: x / Bacteria: x        PT/INR - ( 15 Dec 2023 06:01 )   PT: 13.0 sec;   INR: 1.18 ratio         PTT - ( 15 Dec 2023 06:01 )  PTT:32.3 sec          CAPILLARY BLOOD GLUCOSE        < from: TTE Limited W or WO Ultrasound Enhancing Agent (12.13.23 @ 10:54) >  FINDINGS:     Left Ventricle:  The left ventricular cavity is normal. Left ventricular systolic function is normal with a calculated ejection fraction of 68 % by the Chavez's biplane method of disks with an ejection fraction visually estimated at 65 to 70%. There is normal left ventricular diastolic function.     Right Ventricle:  The right ventricular cavity is normal in size and normal systolic function. Tricuspid annular plane systolic excursion (TAPSE) is 2.2 cm (normal >=1.7 cm). Tricuspid annular tissue Doppler S' is 11.7 cm/s (normal >10 cm/s).     Aortic Valve:  The aortic valve appears trileaflet with normal systolic excursion.     Mitral Valve:  There is trace mitral regurgitation.     Tricuspid Valve:  There is trace tricuspid regurgitation. Estimated pulmonary artery systolic pressure is 26 mmHg.     Pericardium:  There is an echogenic mass and pocket of fluid close to the LV posterior wall. There is significant pleural effusion.  Unclear if this is localized collection of fluid with fribrinous exudate or hematoma is actually percardial space. Recommend CT scan with IV contrast to definitely evaluate for pericardial effusion if clinically indicated.  In any case, there is no echo evidence of LV pseudoaneurysm. Ultrasound enhancing images were not clear to see if the above mentioned area was opacified.     Pleura:  Small left and small right pleural effusion noted.     Systemic Veins:  The inferior vena cava isnormal in size measuring 1.86 cm in diameter, (normal <2.1cm) with normal inspiratory collapse (normal >50%) consistent with normal right atrial pressure (~3, range 0-5mmHg).  ____________________________________________________________________  QUANTITATIVE DATA:  Left Ventricle Measurements: (Indexed to BSA)     LV Vol d, MOD A2C: 88.4 ml   53.01 ml/m²  LV Vol d, MOD A4C: 112.1 ml  67.26 ml/m²  LV Vol d, MOD BP:  106.5 ml  63.91 ml/m²  LV Vol s, MOD A2C: 30.3 ml   18.18 ml/m²  LV Vol s, MOD A4C: 36.0 ml   21.60 ml/m²  LV Vol s, MOD BP:  33.9 ml   20.33 ml/m²  LVOT SV MOD BP:    72.6 ml  LV EF% MOD BP:     68 %  Visualized LV EF%: 65 to 70%     MV E Vmax:    1.00 m/s  e' lateral:   14.10 cm/s  e' medial:    9.68 cm/s  E/e' lateral: 7.09  E/e' medial:  10.33  E/e' Average: 8.41       Right Ventricle Measurements:     TAPSE:      2.2 cm  TV Amber. S': 11.70 cm/s    Mitral Valve Measurements:     MV E Vmax: 1.0 m/s       Tricuspid Valve Measurements:     TR Vmax:          2.4 m/s  TR Peak Gradient: 22.8 mmHg  RA Pressure:      3 mmHg  PASP:             26 mmHg    ________________________________________________________________________________________  Electronically signed on 12/13/2023 at 12:01:48 PM by Lisa Reyna MD, RPVI    < end of copied text >    < from: CAROL W or WO Ultrasound Enhancing Agent (12.14.23 @ 10:25) >  _______________________________________________________________________________________     CONCLUSIONS:      1. Left ventricular systolic function is normal with an ejection fraction visually estimated at 60 to 65 %.   2. Normal right ventricular cavity size and systolic function.   3. Normal atria.   4. No evidence of left atrial or left atrial appendage thrombus. The left atrial appendage emptying velocity isnormal. Ultrasound enhancing agent was utilized to visualize the left atrial appendage.   5. No significant valvular disease.   6. No echocardiographic evidence of vegetations.   7. Agitated saline injection was negative for intracardiac shunt.   8. Mild aortic calcification seen in the descending thoracic aorta.   9. Compared to the transthoracic echocardiogram performed on 12/13/2023 there have been no significant interval changes. Findings were discussed with Patient and rounding cardiology team on 12/14/2023 at 1120PM.  10. Unable to appreciate any LV pseudoaneurysm despite use of ulrasonic echocontrast given shadow artifact. Recommend CT chest w/ IV contrast vs. cMRI.    < end of copied text >    < from: CT Chest No Cont (12.13.23 @ 22:29) >  Consolidation/atelectasis in the left lowerlobe. New peribronchovascular   foci of groundglass opacity and small consolidation in the right lower   lobe.  Trace right pleural effusion. Moderate left-sided pleural effusion which   is partially loculated along the left paramediastinal region.  Redemonstration of acute, displaced fracture of left 3rd-7th ribs    --- End of Report ---      < end of copied text >              Urinalysis Basic - ( 13 Dec 2023 06:50 )    Color: x / Appearance: x / SG: x / pH: x  Gluc: 106 mg/dL / Ketone: x  / Bili: x / Urobili: x   Blood: x / Protein: x / Nitrite: x   Leuk Esterase: x / RBC: x / WBC x   Sq Epi: x / Non Sq Epi: x / Bacteria: x            CARDIAC MARKERS ( 13 Dec 2023 06:50 )  x     / x     / 14 U/L / x     / x            CAPILLARY BLOOD GLUCOSE                  Urinalysis Basic - ( 12 Dec 2023 07:58 )    Color: x / Appearance: x / SG: x / pH: x  Gluc: 95 mg/dL / Ketone: x  / Bili: x / Urobili: x   Blood: x / Protein: x / Nitrite: x   Leuk Esterase: x / RBC: x / WBC x   Sq Epi: x / Non Sq Epi: x / Bacteria: x                  CAPILLARY BLOOD GLUCOSE                    Urinalysis Basic - ( 12 Dec 2023 07:58 )    Color: x / Appearance: x / SG: x / pH: x  Gluc: 95 mg/dL / Ketone: x  / Bili: x / Urobili: x   Blood: x / Protein: x / Nitrite: x   Leuk Esterase: x / RBC: x / WBC x   Sq Epi: x / Non Sq Epi: x / Bacteria: x                  CAPILLARY BLOOD GLUCOSE                    Urinalysis Basic - ( 11 Dec 2023 06:57 )    Color: x / Appearance: x / SG: x / pH: x  Gluc: 96 mg/dL / Ketone: x  / Bili: x / Urobili: x   Blood: x / Protein: x / Nitrite: x   Leuk Esterase: x / RBC: x / WBC x   Sq Epi: x / Non Sq Epi: x / Bacteria: x                  CAPILLARY BLOOD GLUCOSE                  RECENT CULTURES:  12-07 @ 14:57 .Blood Blood-Peripheral     Growth in aerobic bottle: Gram Positive Cocci in Clusters  Growth in anaerobic bottle: Gram Positive Cocci in Clusters    Growth in aerobic bottle: Gram Positive Cocci in Clusters  Growth in anaerobic bottle: Gram Positive Cocci in Clusters      12-07 @ 14:52 .Blood Blood-Venous     Growth in aerobic bottle: Gram Positive Cocci in Clusters  Growth in anaerobic bottle: Gram Positive Cocci in Clusters    Growth in aerobic bottle: Gram Positive Cocci in Clusters  Growth in anaerobic bottle: Gram Positive Cocci in Clusters      12-06 @ 15:15 .Blood Blood Blood Culture PCR  Methicillin resistant Staphylococcus aureus    Growth in aerobic and anaerobic bottles: Methicillin Resistant  Staphylococcus aureus    Growth in aerobic bottle: Gram Positive Cocci in Clusters  Growth in anaerobic bottle: Gram Positive Cocci in Clusters      12-06 @ 15:10 .Blood Blood     Growth in aerobic and anaerobic bottles: Methicillin Resistant  Staphylococcus aureus  See previous culture [id number]    Growth in anaerobic bottle: Gram Positive Cocci in Clusters  Growth in aerobic bottle: Gram Positive Cocci in Clusters        WBC Count: 19.04 K/uL (12-09-23 @ 07:31)  WBC Count: 20.66 K/uL (12-08-23 @ 08:06)  WBC Count: 22.89 K/uL (12-07-23 @ 06:05)  WBC Count: 14.00 K/uL (12-06-23 @ 05:15)  WBC Count: 13.75 K/uL (12-05-23 @ 02:08)    Creatinine: 0.63 mg/dL (12-09-23 @ 07:31)  Creatinine: 1.17 mg/dL (12-08-23 @ 08:06)  Creatinine: 0.83 mg/dL (12-07-23 @ 06:05)  Creatinine: 0.60 mg/dL (12-06-23 @ 05:15)  Creatinine: 0.81 mg/dL (12-05-23 @ 02:08)      Vancomycin Level, Trough: 8.3 ug/mL (12-09-23 @ 07:31)  Vancomycin Level, Trough: 11.5 ug/mL (12-08-23 @ 18:40)    ________________________________________________________________  CARDIOLOGY   no recent studies   ________________________________________________________________  RADIOLOGY  < from: US Duplex Venous Upper Ext Ltd, Right (12.07.23 @ 11:52) >  INTERPRETATION:  CLINICAL INFORMATION: Arm swelling    COMPARISON: None available.    TECHNIQUE: Duplex sonography of the RIGHT UPPER extremity veins with   color and spectral Doppler, with and without compression.    FINDINGS:    The right internal jugular, subclavian, axillary and brachial veins are   patent and compressible where applicable.  The basilic vein (superficial   vein) is patent and without thrombus.  Superficial thrombus in the right   cephalic vein.      IMPRESSION:  No evidence of right upper extremity deep venous thrombosis.    Superficial thrombus in the right cephalic vein.    < end of copied text >  < from: Xray Chest 1 View- PORTABLE-Urgent (Xray Chest 1 View- PORTABLE-Urgent .) (12.06.23 @ 10:15) >  INTERPRETATION:  Chest one view    HISTORY: Leukocytosis    COMPARISON STUDY: 12/2/2023    Frontal expiratory view of the chest shows the heart to be similar in   size. The lungs show partial clearing of the left base and there is no   evidence of pneumothorax nor pleural effusion.    IMPRESSION:  Left base clearing.    < end of copied text >    < from: TTE W or WO Ultrasound Enhancing Agent (12.11.23 @ 19:59) >  CONCLUSIONS:      1. Left ventricular cavity is normal.   2. Normal left ventricular diastolic function.   3. Trace mitral regurgitation.   4. Moderate left pleural effusion noted.   5. Moderate pericardial effusion noted adjacent to the posterolateralleft ventricle. There is expansion of the LV posterolateral wall with systole, no color flow was done.LV pseduoaneurysm needs to be ruled out.   6. The inferior vena cava is normal in size measuring 2.10 cm in diameter, (normal <2.1cm) with normal inspiratory collapse (normal >50%) consistent with normal right atrial pressure (~3, range 0-5mmHg).   7. Recommendations: Repeat 2 D echo with definity to evaluate pericardial effusion, to see if there is any pseudo aneurysm of the LV. Color flow and doppler should be done.    < end of copied text >

## 2023-12-15 NOTE — PROGRESS NOTE ADULT - ASSESSMENT
59 y/o male s//p fall with rib fractures was afebrile and hemodynamically stable this morning. Labs showed leukocytosis of 20.95 and patient remains on daptomycin. CARLO today.     PLAN  -pain adequately controlled  -ACROL NEG for vegetations  -S/p thoracentesis by IR  -strict Is/Os  -Continue Daptomycin per ID  -Leukocytosis downtrending today, afebrile  -continue home meds  -encourage OOB to chair, ambulation  -incentive spirometry 10x/hour   -DVT ppx: SCDs, Lovenox 40 daily   57 y/o male s//p fall with rib fractures was afebrile and hemodynamically stable this morning. Labs showed leukocytosis of 20.95 and patient remains on daptomycin. CAROL today.     PLAN  -pain adequately controlled  -CAROL NEG for vegetations  -S/p thoracentesis by IR  -strict Is/Os  -Continue Daptomycin per ID  -Leukocytosis downtrending today, afebrile  -continue home meds  -encourage OOB to chair, ambulation  -incentive spirometry 10x/hour   -DVT ppx: SCDs, Lovenox 40 daily

## 2023-12-15 NOTE — PRE PROCEDURE NOTE - PRE PROCEDURE EVALUATION
Interventional Radiology    Chief Complaint: 57yo Male who presents with loculated left effusion.      HPI:  57M with PMH seizures, liver disease, back pain, HLD presents to ED s/p fall. Per ED, patient lives at group home; was drunk today and fell down stairs. In ED was intubated for airway protection after admin of Versed and subsequent emesis. Imaging reveal only L 7th rib fx. Pt currently with leukocytosis, no obvious source of infection and loculated L effusion. Presents to IR today for dx  + tx thoracentesis.        NPO:  n/a    Allergies: No Known Allergies    Medications (Abx/Cardiac/Anticoagulation/Blood Products)    amLODIPine   Tablet: 5 milliGRAM(s) Oral (12-15 @ 05:44)  DAPTOmycin IVPB: 122 mL/Hr IV Intermittent (12-14 @ 17:28)  enoxaparin Injectable: 40 milliGRAM(s) SubCutaneous (12-14 @ 06:04)    Data:    T(C): 36.8  HR: 84  BP: 126/80  RR: 17  SpO2: 95%               Exam  General: No acute distress  resp:  2L NC,  no use of accessory muscles, Non labored breathing    -WBC 17.18 / HgB 8.1 / Hct 24.7 / Plt 746  -Na 140 / Cl 98 / BUN 10.3 / Glucose 83  -K 4.0 / CO2 31.0 / Cr 0.77  -ALT -- / Alk Phos -- / T.Bili --  -INR1.18    Imaging:   < from: CT Chest No Cont (12.13.23 @ 22:29) >    IMPRESSION:  Consolidation/atelectasis in the left lowerlobe. New peribronchovascular   foci of groundglass opacity and small consolidation in the right lower   lobe.  Trace right pleural effusion. Moderate left-sided pleural effusion which   is partially loculated along the left paramediastinal region.  Redemonstration of acute, displaced fracture of left 3rd-7th ribs    < end of copied text >      Plan: 58y Male presents for a dx+tx Left sided thoracentesis of a loculated effusion   -Risks/Benefits/alternatives explained with the patient and/or healthcare proxy and witnessed informed consent obtained.    Interventional Radiology    Chief Complaint: 59yo Male who presents with loculated left effusion.      HPI:  57M with PMH seizures, liver disease, back pain, HLD presents to ED s/p fall. Per ED, patient lives at group home; was drunk today and fell down stairs. In ED was intubated for airway protection after admin of Versed and subsequent emesis. Imaging reveal only L 7th rib fx. Pt currently with leukocytosis, no obvious source of infection and loculated L effusion. Presents to IR today for dx  + tx thoracentesis.        NPO:  n/a    Allergies: No Known Allergies    Medications (Abx/Cardiac/Anticoagulation/Blood Products)    amLODIPine   Tablet: 5 milliGRAM(s) Oral (12-15 @ 05:44)  DAPTOmycin IVPB: 122 mL/Hr IV Intermittent (12-14 @ 17:28)  enoxaparin Injectable: 40 milliGRAM(s) SubCutaneous (12-14 @ 06:04)    Data:    T(C): 36.8  HR: 84  BP: 126/80  RR: 17  SpO2: 95%               Exam  General: No acute distress  resp:  2L NC,  no use of accessory muscles, Non labored breathing    -WBC 17.18 / HgB 8.1 / Hct 24.7 / Plt 746  -Na 140 / Cl 98 / BUN 10.3 / Glucose 83  -K 4.0 / CO2 31.0 / Cr 0.77  -ALT -- / Alk Phos -- / T.Bili --  -INR1.18    Imaging:   < from: CT Chest No Cont (12.13.23 @ 22:29) >    IMPRESSION:  Consolidation/atelectasis in the left lowerlobe. New peribronchovascular   foci of groundglass opacity and small consolidation in the right lower   lobe.  Trace right pleural effusion. Moderate left-sided pleural effusion which   is partially loculated along the left paramediastinal region.  Redemonstration of acute, displaced fracture of left 3rd-7th ribs    < end of copied text >      Plan: 58y Male presents for a dx+tx Left sided thoracentesis of a loculated effusion   -Risks/Benefits/alternatives explained with the patient and/or healthcare proxy and witnessed informed consent obtained.

## 2023-12-15 NOTE — PROGRESS NOTE ADULT - NS ATTEND AMEND GEN_ALL_CORE FT
I have seen and examined this patient with the surgical team.  No acute events overnight.  Patient appears well this morning.  Denies pain.  No numbness/tingling in RUE.  Site appears well.  Denies chest pain/SOB.  On Daptomycin for MRSA bacteremia.  S/p IR drainage of pleural effusion.  Planning for DC to [facility]. I have seen and examined this patient with the surgical team.  No acute events overnight.  Patient appears well this morning.  Denies pain.  No numbness/tingling in RUE.  Site appears well.  Denies chest pain/SOB.  On Daptomycin for MRSA bacteremia.  S/p IR drainage of pleural effusion.  Planning for DC to [facility].

## 2023-12-15 NOTE — PROGRESS NOTE ADULT - ASSESSMENT
57 year old male with PMH of ETOH abuse, HTN, HLD, seizures, tardive dyskinesia hiatal hernia s/p mechanical fall down stairs while intoxicated on 11/29. Pt intubated in ED for airway protection. Found to have left rib fractures 3, 6, 7, 10 and left pneumothorax with extensive subcutaneous emphysema requiring a left sided pigtail on 11/29. Hospital course complicated by acute agitation and acute asthma exacerbation requiring re-intubation on 12/1. Pt extubated 12/2. Transferred to floor 12/6 and noted to be febrile with uptrending WBC, rt arm edema erythema and reported purulent drainage. Found to have MRSA bacteremia, concern for cellulitis , septic thrombphlebitis    - 12/6 and 12/7 BCX + MRSA  - s/p I&D of septic thrombophlebitis of right arm >> Incision and drainage of Infected basilic and cephalic thrombophlebitis at antecubital level purulent discharge seen upon incision  - Repeat BCX 12/10-+  - BCX 12/11 and 12/12 ngtd  - CAROL no vegetations  - RUE venous doppler with sup thrombus of right cephalic vein   - Ct chest performed + left loculated effusion  -s/p thoracentesis for left loculated effusion, f/u pleural fluid analysis and cultures  - will broaden abx to cefepime and change daptomycin back to vancomycin for now (daptomycin not used to treat lung infections due to surfactant inhibition)  - trend CK, last level 14  - anticoagulation as per vascular  - will plan for PICC when ready for DC  - Trend Fever - afebrile   - Trend Leukocytosis-improving        d/w pharmacy  will f/u

## 2023-12-15 NOTE — PROCEDURE NOTE - ADDITIONAL PROCEDURE DETAILS
.    loculated L pleural effxn  L thora dx + tx performed   250cc removed  cxr - will follow up      dispo back to floor      Sadaf Jalili, IR PA-C, available on TEAMS

## 2023-12-15 NOTE — PROGRESS NOTE ADULT - SUBJECTIVE AND OBJECTIVE BOX
Patient seen and examined at bedside during rounds.  S/P thoracentesis today by IR.  Gram stain and cx sent.  Pt doing well, resumed diet.  NO acute issues.      ICU Vital Signs Last 24 Hrs  T(C): 36.8 (15 Dec 2023 08:10), Max: 37 (15 Dec 2023 00:44)  T(F): 98.3 (15 Dec 2023 08:10), Max: 98.6 (15 Dec 2023 00:44)  HR: 84 (15 Dec 2023 08:10) (74 - 101)  BP: 126/80 (15 Dec 2023 08:10) (117/75 - 161/84)  BP(mean): --  ABP: --  ABP(mean): --  RR: 17 (15 Dec 2023 08:10) (17 - 20)  SpO2: 95% (15 Dec 2023 08:10) (92% - 96%)    O2 Parameters below as of 15 Dec 2023 00:44  Patient On (Oxygen Delivery Method): nasal cannula          I&O's Detail    14 Dec 2023 07:01  -  15 Dec 2023 07:00  --------------------------------------------------------  IN:  Total IN: 0 mL    OUT:    Voided (mL): 850 mL  Total OUT: 850 mL    Total NET: -850 mL                              8.1    17.18 )-----------( 746      ( 15 Dec 2023 06:01 )             24.7           12-15    140  |  98  |  10.3  ----------------------------<  83  4.0   |  31.0<H>  |  0.77    Ca    8.1<L>      15 Dec 2023 06:01  Phos  3.9     12-15  Mg     2.0     12-15                  Exam:  Gen: pt lying in bed, alert, in NAD  Resp: unlabored  CVS: RRR  Abd: soft, NT, ND  Ext: moving all extremities spontaneously, sensation intact, pulses 2+

## 2023-12-16 LAB
ANION GAP SERPL CALC-SCNC: 10 MMOL/L — SIGNIFICANT CHANGE UP (ref 5–17)
ANION GAP SERPL CALC-SCNC: 10 MMOL/L — SIGNIFICANT CHANGE UP (ref 5–17)
BASOPHILS # BLD AUTO: 0.15 K/UL — SIGNIFICANT CHANGE UP (ref 0–0.2)
BASOPHILS # BLD AUTO: 0.15 K/UL — SIGNIFICANT CHANGE UP (ref 0–0.2)
BASOPHILS NFR BLD AUTO: 1 % — SIGNIFICANT CHANGE UP (ref 0–2)
BASOPHILS NFR BLD AUTO: 1 % — SIGNIFICANT CHANGE UP (ref 0–2)
BUN SERPL-MCNC: 14 MG/DL — SIGNIFICANT CHANGE UP (ref 8–20)
BUN SERPL-MCNC: 14 MG/DL — SIGNIFICANT CHANGE UP (ref 8–20)
CALCIUM SERPL-MCNC: 8.4 MG/DL — SIGNIFICANT CHANGE UP (ref 8.4–10.5)
CALCIUM SERPL-MCNC: 8.4 MG/DL — SIGNIFICANT CHANGE UP (ref 8.4–10.5)
CHLORIDE SERPL-SCNC: 98 MMOL/L — SIGNIFICANT CHANGE UP (ref 96–108)
CHLORIDE SERPL-SCNC: 98 MMOL/L — SIGNIFICANT CHANGE UP (ref 96–108)
CO2 SERPL-SCNC: 30 MMOL/L — HIGH (ref 22–29)
CO2 SERPL-SCNC: 30 MMOL/L — HIGH (ref 22–29)
CREAT SERPL-MCNC: 0.74 MG/DL — SIGNIFICANT CHANGE UP (ref 0.5–1.3)
CREAT SERPL-MCNC: 0.74 MG/DL — SIGNIFICANT CHANGE UP (ref 0.5–1.3)
CULTURE RESULTS: SIGNIFICANT CHANGE UP
EGFR: 105 ML/MIN/1.73M2 — SIGNIFICANT CHANGE UP
EGFR: 105 ML/MIN/1.73M2 — SIGNIFICANT CHANGE UP
EOSINOPHIL # BLD AUTO: 0.5 K/UL — SIGNIFICANT CHANGE UP (ref 0–0.5)
EOSINOPHIL # BLD AUTO: 0.5 K/UL — SIGNIFICANT CHANGE UP (ref 0–0.5)
EOSINOPHIL NFR BLD AUTO: 3.3 % — SIGNIFICANT CHANGE UP (ref 0–6)
EOSINOPHIL NFR BLD AUTO: 3.3 % — SIGNIFICANT CHANGE UP (ref 0–6)
GLUCOSE SERPL-MCNC: 90 MG/DL — SIGNIFICANT CHANGE UP (ref 70–99)
GLUCOSE SERPL-MCNC: 90 MG/DL — SIGNIFICANT CHANGE UP (ref 70–99)
HCT VFR BLD CALC: 25.8 % — LOW (ref 39–50)
HCT VFR BLD CALC: 25.8 % — LOW (ref 39–50)
HGB BLD-MCNC: 8.2 G/DL — LOW (ref 13–17)
HGB BLD-MCNC: 8.2 G/DL — LOW (ref 13–17)
IMM GRANULOCYTES NFR BLD AUTO: 8.3 % — HIGH (ref 0–0.9)
IMM GRANULOCYTES NFR BLD AUTO: 8.3 % — HIGH (ref 0–0.9)
LYMPHOCYTES # BLD AUTO: 19.7 % — SIGNIFICANT CHANGE UP (ref 13–44)
LYMPHOCYTES # BLD AUTO: 19.7 % — SIGNIFICANT CHANGE UP (ref 13–44)
LYMPHOCYTES # BLD AUTO: 2.94 K/UL — SIGNIFICANT CHANGE UP (ref 1–3.3)
LYMPHOCYTES # BLD AUTO: 2.94 K/UL — SIGNIFICANT CHANGE UP (ref 1–3.3)
MAGNESIUM SERPL-MCNC: 1.9 MG/DL — SIGNIFICANT CHANGE UP (ref 1.6–2.6)
MAGNESIUM SERPL-MCNC: 1.9 MG/DL — SIGNIFICANT CHANGE UP (ref 1.6–2.6)
MCHC RBC-ENTMCNC: 27.4 PG — SIGNIFICANT CHANGE UP (ref 27–34)
MCHC RBC-ENTMCNC: 27.4 PG — SIGNIFICANT CHANGE UP (ref 27–34)
MCHC RBC-ENTMCNC: 31.8 GM/DL — LOW (ref 32–36)
MCHC RBC-ENTMCNC: 31.8 GM/DL — LOW (ref 32–36)
MCV RBC AUTO: 86.3 FL — SIGNIFICANT CHANGE UP (ref 80–100)
MCV RBC AUTO: 86.3 FL — SIGNIFICANT CHANGE UP (ref 80–100)
MONOCYTES # BLD AUTO: 2.28 K/UL — HIGH (ref 0–0.9)
MONOCYTES # BLD AUTO: 2.28 K/UL — HIGH (ref 0–0.9)
MONOCYTES NFR BLD AUTO: 15.2 % — HIGH (ref 2–14)
MONOCYTES NFR BLD AUTO: 15.2 % — HIGH (ref 2–14)
NEUTROPHILS # BLD AUTO: 7.85 K/UL — HIGH (ref 1.8–7.4)
NEUTROPHILS # BLD AUTO: 7.85 K/UL — HIGH (ref 1.8–7.4)
NEUTROPHILS NFR BLD AUTO: 52.5 % — SIGNIFICANT CHANGE UP (ref 43–77)
NEUTROPHILS NFR BLD AUTO: 52.5 % — SIGNIFICANT CHANGE UP (ref 43–77)
PHOSPHATE SERPL-MCNC: 4.1 MG/DL — SIGNIFICANT CHANGE UP (ref 2.4–4.7)
PHOSPHATE SERPL-MCNC: 4.1 MG/DL — SIGNIFICANT CHANGE UP (ref 2.4–4.7)
PLATELET # BLD AUTO: 857 K/UL — HIGH (ref 150–400)
PLATELET # BLD AUTO: 857 K/UL — HIGH (ref 150–400)
POTASSIUM SERPL-MCNC: 4.4 MMOL/L — SIGNIFICANT CHANGE UP (ref 3.5–5.3)
POTASSIUM SERPL-MCNC: 4.4 MMOL/L — SIGNIFICANT CHANGE UP (ref 3.5–5.3)
POTASSIUM SERPL-SCNC: 4.4 MMOL/L — SIGNIFICANT CHANGE UP (ref 3.5–5.3)
POTASSIUM SERPL-SCNC: 4.4 MMOL/L — SIGNIFICANT CHANGE UP (ref 3.5–5.3)
RBC # BLD: 2.99 M/UL — LOW (ref 4.2–5.8)
RBC # BLD: 2.99 M/UL — LOW (ref 4.2–5.8)
RBC # FLD: 17.6 % — HIGH (ref 10.3–14.5)
RBC # FLD: 17.6 % — HIGH (ref 10.3–14.5)
SODIUM SERPL-SCNC: 138 MMOL/L — SIGNIFICANT CHANGE UP (ref 135–145)
SODIUM SERPL-SCNC: 138 MMOL/L — SIGNIFICANT CHANGE UP (ref 135–145)
SPECIMEN SOURCE: SIGNIFICANT CHANGE UP
VANCOMYCIN TROUGH SERPL-MCNC: 11.8 UG/ML — SIGNIFICANT CHANGE UP (ref 10–20)
VANCOMYCIN TROUGH SERPL-MCNC: 11.8 UG/ML — SIGNIFICANT CHANGE UP (ref 10–20)
WBC # BLD: 14.96 K/UL — HIGH (ref 3.8–10.5)
WBC # BLD: 14.96 K/UL — HIGH (ref 3.8–10.5)
WBC # FLD AUTO: 14.96 K/UL — HIGH (ref 3.8–10.5)
WBC # FLD AUTO: 14.96 K/UL — HIGH (ref 3.8–10.5)

## 2023-12-16 PROCEDURE — 99231 SBSQ HOSP IP/OBS SF/LOW 25: CPT

## 2023-12-16 RX ORDER — LIDOCAINE 4 G/100G
1 CREAM TOPICAL DAILY
Refills: 0 | Status: DISCONTINUED | OUTPATIENT
Start: 2023-12-16 | End: 2023-12-16

## 2023-12-16 RX ADMIN — METHOCARBAMOL 1000 MILLIGRAM(S): 500 TABLET, FILM COATED ORAL at 05:03

## 2023-12-16 RX ADMIN — QUETIAPINE FUMARATE 125 MILLIGRAM(S): 200 TABLET, FILM COATED ORAL at 21:24

## 2023-12-16 RX ADMIN — PANTOPRAZOLE SODIUM 40 MILLIGRAM(S): 20 TABLET, DELAYED RELEASE ORAL at 05:02

## 2023-12-16 RX ADMIN — Medication 975 MILLIGRAM(S): at 05:03

## 2023-12-16 RX ADMIN — METHOCARBAMOL 1000 MILLIGRAM(S): 500 TABLET, FILM COATED ORAL at 21:23

## 2023-12-16 RX ADMIN — CEFEPIME 2000 MILLIGRAM(S): 1 INJECTION, POWDER, FOR SOLUTION INTRAMUSCULAR; INTRAVENOUS at 05:02

## 2023-12-16 RX ADMIN — AMLODIPINE BESYLATE 5 MILLIGRAM(S): 2.5 TABLET ORAL at 05:03

## 2023-12-16 RX ADMIN — CEFEPIME 2000 MILLIGRAM(S): 1 INJECTION, POWDER, FOR SOLUTION INTRAMUSCULAR; INTRAVENOUS at 21:23

## 2023-12-16 RX ADMIN — Medication 975 MILLIGRAM(S): at 11:27

## 2023-12-16 RX ADMIN — Medication 400 MILLIGRAM(S): at 23:30

## 2023-12-16 RX ADMIN — Medication 975 MILLIGRAM(S): at 18:17

## 2023-12-16 RX ADMIN — Medication 975 MILLIGRAM(S): at 23:30

## 2023-12-16 RX ADMIN — METHOCARBAMOL 1000 MILLIGRAM(S): 500 TABLET, FILM COATED ORAL at 11:27

## 2023-12-16 RX ADMIN — Medication 400 MILLIGRAM(S): at 18:17

## 2023-12-16 RX ADMIN — Medication 2 MILLIGRAM(S): at 17:17

## 2023-12-16 RX ADMIN — Medication 5 MILLIGRAM(S): at 21:24

## 2023-12-16 RX ADMIN — Medication 400 MILLIGRAM(S): at 05:02

## 2023-12-16 RX ADMIN — Medication 975 MILLIGRAM(S): at 12:27

## 2023-12-16 RX ADMIN — Medication 250 MILLIGRAM(S): at 11:26

## 2023-12-16 RX ADMIN — GABAPENTIN 100 MILLIGRAM(S): 400 CAPSULE ORAL at 21:23

## 2023-12-16 RX ADMIN — Medication 400 MILLIGRAM(S): at 17:17

## 2023-12-16 RX ADMIN — Medication 400 MILLIGRAM(S): at 11:27

## 2023-12-16 RX ADMIN — Medication 1 PATCH: at 23:30

## 2023-12-16 RX ADMIN — Medication 1 TABLET(S): at 17:17

## 2023-12-16 RX ADMIN — Medication 1 TABLET(S): at 11:27

## 2023-12-16 RX ADMIN — Medication 2 MILLIGRAM(S): at 05:02

## 2023-12-16 RX ADMIN — Medication 975 MILLIGRAM(S): at 17:17

## 2023-12-16 RX ADMIN — Medication 250 MILLIGRAM(S): at 23:31

## 2023-12-16 RX ADMIN — DIVALPROEX SODIUM 500 MILLIGRAM(S): 500 TABLET, DELAYED RELEASE ORAL at 05:03

## 2023-12-16 RX ADMIN — CHLORHEXIDINE GLUCONATE 1 APPLICATION(S): 213 SOLUTION TOPICAL at 11:38

## 2023-12-16 RX ADMIN — Medication 1 MILLIGRAM(S): at 11:27

## 2023-12-16 RX ADMIN — Medication 400 MILLIGRAM(S): at 12:27

## 2023-12-16 RX ADMIN — OLANZAPINE 5 MILLIGRAM(S): 15 TABLET, FILM COATED ORAL at 11:28

## 2023-12-16 RX ADMIN — LIDOCAINE 3 PATCH: 4 CREAM TOPICAL at 11:28

## 2023-12-16 RX ADMIN — Medication 100 MILLIGRAM(S): at 11:27

## 2023-12-16 RX ADMIN — CEFEPIME 2000 MILLIGRAM(S): 1 INJECTION, POWDER, FOR SOLUTION INTRAMUSCULAR; INTRAVENOUS at 11:28

## 2023-12-16 RX ADMIN — DIVALPROEX SODIUM 500 MILLIGRAM(S): 500 TABLET, DELAYED RELEASE ORAL at 17:17

## 2023-12-16 NOTE — PROGRESS NOTE ADULT - NS ATTEND AMEND GEN_ALL_CORE FT
Above assessment noted.  The patient was seen and examined by myself with the surgical PA.  The patient is without new events overnight. With complaints of mid back pain as before.  The left chest wall wound is clean without infection.  The right arm wound VAC is in place and without leak.  Continue with antibiotics and local wound care.

## 2023-12-16 NOTE — PROGRESS NOTE ADULT - ASSESSMENT
59 y/o male s//p fall with rib fractures was afebrile and hemodynamically stable this morning. Labs slowly downtrending, ID broaded abx to cefepime and changed dapto to vanco 2/2 dapto not being able to penetrate surfactin. CAROL negative for vegetation     PLAN  -pain adequately controlled  -CAROL NEG for vegetations  -S/p thoracentesis by IR  -strict Is/Os  -Daptomycin dc vanco started per ID and broaden to cefepime  -Leukocytosis downtrending today, afebrile  -continue home meds  -encourage OOB to chair, ambulation  -incentive spirometry 10x/hour   -DVT ppx: SCDs, Lovenox 40 daily 57 y/o male s//p fall with rib fractures was afebrile and hemodynamically stable this morning. Labs slowly downtrending, ID broaded abx to cefepime and changed dapto to vanco 2/2 dapto not being able to penetrate surfactin. CAROL negative for vegetation     PLAN  -pain adequately controlled  -CAROL NEG for vegetations  -S/p thoracentesis by IR  -strict Is/Os  -Daptomycin dc vanco started per ID and broaden to cefepime  -Leukocytosis downtrending today, afebrile  -continue home meds  -encourage OOB to chair, ambulation  -incentive spirometry 10x/hour   -DVT ppx: SCDs, Lovenox 40 daily

## 2023-12-16 NOTE — PROGRESS NOTE ADULT - SUBJECTIVE AND OBJECTIVE BOX
SUBJECTIVE/24 hour events:  Patient is a 58yMale s/p fall down stairs sustaining Left rib fxs with pneumo and subsequent significant subcutaneous emphysema with admission to the SICU, course complicated by MRSA bacteremia from septic thrombophelbitis of right basicillic and cephalic vein s/p vein excision on 12/8. Patient had IR thoracentesis  of left lung loculated collection. Patient with no acute events post -procedure. Patient leukocytosis slowly resolving after midline was removed, and all iv's exchanged. Patient remains with wound vac to vein excision per vascular. ID  broaden abx to cefepime and change daptomycin back to vancomycin for now (daptomycin not used to treat lung infections due to surfactant inhibition. Patient CAROL showed no vegetation. Patient will need mid-line prior to dc    Vital Signs Last 24 Hrs  T(C): 37 (16 Dec 2023 00:42), Max: 37 (15 Dec 2023 16:00)  T(F): 98.6 (16 Dec 2023 00:42), Max: 98.6 (15 Dec 2023 16:00)  HR: 62 (16 Dec 2023 00:42) (62 - 86)  BP: 134/74 (16 Dec 2023 00:42) (126/80 - 155/90)  BP(mean): --  RR: 18 (16 Dec 2023 00:42) (17 - 18)  SpO2: 93% (16 Dec 2023 00:42) (93% - 98%)    Parameters below as of 16 Dec 2023 00:42  Patient On (Oxygen Delivery Method): room air      Drug Dosing Weight  Height (cm): 157 (08 Dec 2023 10:55)  Weight (kg): 68 (08 Dec 2023 10:55)  BMI (kg/m2): 27.6 (08 Dec 2023 10:55)  BSA (m2): 1.69 (08 Dec 2023 10:55)  I&O's Detail    14 Dec 2023 07:01  -  15 Dec 2023 07:00  --------------------------------------------------------  IN:  Total IN: 0 mL    OUT:    Voided (mL): 850 mL  Total OUT: 850 mL    Total NET: -850 mL      15 Dec 2023 07:01  -  16 Dec 2023 01:21  --------------------------------------------------------  IN:    Oral Fluid: 360 mL  Total IN: 360 mL    OUT:  Total OUT: 0 mL    Total NET: 360 mL        Allergies    No Known Allergies    Intolerances                              8.1    17.18 )-----------( 746      ( 15 Dec 2023 06:01 )             24.7   12-15    140  |  98  |  10.3  ----------------------------<  83  4.0   |  31.0<H>  |  0.77    Ca    8.1<L>      15 Dec 2023 06:01  Phos  3.9     12-15  Mg     2.0     12-15    PT/INR - ( 15 Dec 2023 06:01 )   PT: 13.0 sec;   INR: 1.18 ratio         PTT - ( 15 Dec 2023 06:01 )  PTT:32.3 sec    ROS:    PHYSICAL EXAM:  Constitutional: in good spirits   Resp: unlabored  CVS: RRR  Abd: soft, NT, ND  Ext: moving all extremities spontaneously, sensation intact, pulses 2+, wound vac to left arm well seated         MEDICATIONS  (STANDING):  acetaminophen     Tablet .. 975 milliGRAM(s) Oral every 6 hours  amLODIPine   Tablet 5 milliGRAM(s) Oral daily  benztropine 2 milliGRAM(s) Oral two times a day  cefepime  Injectable.      cefepime  Injectable. 2000 milliGRAM(s) IV Push every 8 hours  chlorhexidine 2% Cloths 1 Application(s) Topical daily  divalproex  milliGRAM(s) Oral every 12 hours  folic acid 1 milliGRAM(s) Oral daily  gabapentin 100 milliGRAM(s) Oral at bedtime  ibuprofen  Tablet. 400 milliGRAM(s) Oral every 6 hours  influenza   Vaccine 0.5 milliLiter(s) IntraMuscular once  lidocaine   4% Patch 3 Patch Transdermal daily  melatonin 5 milliGRAM(s) Oral at bedtime  methocarbamol 1000 milliGRAM(s) Oral every 8 hours  nicotine - 21 mG/24Hr(s) Patch 1 Patch Transdermal every 24 hours  pantoprazole    Tablet 40 milliGRAM(s) Oral before breakfast  polyethylene glycol 3350 17 Gram(s) Oral at bedtime  QUEtiapine 125 milliGRAM(s) Oral at bedtime  senna 2 Tablet(s) Oral at bedtime  thiamine 100 milliGRAM(s) Oral daily  vancomycin  IVPB 1000 milliGRAM(s) IV Intermittent every 12 hours    MEDICATIONS  (PRN):  albuterol/ipratropium for Nebulization 3 milliLiter(s) Nebulizer every 6 hours PRN Shortness of Breath and/or Wheezing  calcium carbonate    500 mG (Tums) Chewable 1 Tablet(s) Chew every 6 hours PRN Heartburn  mineral oil enema 133 milliLiter(s) Rectal daily PRN constipation  OLANZapine Injectable 5 milliGRAM(s) IntraMuscular every 12 hours PRN agitation  ondansetron Injectable 4 milliGRAM(s) IV Push every 6 hours PRN Nausea  oxyCODONE    IR 5 milliGRAM(s) Oral every 4 hours PRN Moderate Pain (4 - 6)      RADIOLOGY STUDIES:    CULTURES:

## 2023-12-17 LAB
CULTURE RESULTS: SIGNIFICANT CHANGE UP
SPECIMEN SOURCE: SIGNIFICANT CHANGE UP

## 2023-12-17 PROCEDURE — 99231 SBSQ HOSP IP/OBS SF/LOW 25: CPT

## 2023-12-17 RX ORDER — ENOXAPARIN SODIUM 100 MG/ML
40 INJECTION SUBCUTANEOUS EVERY 12 HOURS
Refills: 0 | Status: DISCONTINUED | OUTPATIENT
Start: 2023-12-17 | End: 2023-12-20

## 2023-12-17 RX ORDER — VANCOMYCIN HCL 1 G
1250 VIAL (EA) INTRAVENOUS EVERY 12 HOURS
Refills: 0 | Status: DISCONTINUED | OUTPATIENT
Start: 2023-12-17 | End: 2023-12-20

## 2023-12-17 RX ORDER — GUAIFENESIN/DEXTROMETHORPHAN 600MG-30MG
10 TABLET, EXTENDED RELEASE 12 HR ORAL EVERY 6 HOURS
Refills: 0 | Status: DISCONTINUED | OUTPATIENT
Start: 2023-12-17 | End: 2023-12-20

## 2023-12-17 RX ADMIN — Medication 400 MILLIGRAM(S): at 18:17

## 2023-12-17 RX ADMIN — DIVALPROEX SODIUM 500 MILLIGRAM(S): 500 TABLET, DELAYED RELEASE ORAL at 05:50

## 2023-12-17 RX ADMIN — Medication 975 MILLIGRAM(S): at 05:50

## 2023-12-17 RX ADMIN — Medication 400 MILLIGRAM(S): at 06:49

## 2023-12-17 RX ADMIN — Medication 400 MILLIGRAM(S): at 23:57

## 2023-12-17 RX ADMIN — Medication 400 MILLIGRAM(S): at 11:22

## 2023-12-17 RX ADMIN — Medication 975 MILLIGRAM(S): at 23:57

## 2023-12-17 RX ADMIN — ENOXAPARIN SODIUM 40 MILLIGRAM(S): 100 INJECTION SUBCUTANEOUS at 17:19

## 2023-12-17 RX ADMIN — Medication 166.67 MILLIGRAM(S): at 23:57

## 2023-12-17 RX ADMIN — Medication 1 PATCH: at 23:24

## 2023-12-17 RX ADMIN — Medication 400 MILLIGRAM(S): at 05:49

## 2023-12-17 RX ADMIN — LIDOCAINE 3 PATCH: 4 CREAM TOPICAL at 11:21

## 2023-12-17 RX ADMIN — Medication 2 MILLIGRAM(S): at 05:51

## 2023-12-17 RX ADMIN — Medication 1 PATCH: at 07:00

## 2023-12-17 RX ADMIN — Medication 1 MILLIGRAM(S): at 11:22

## 2023-12-17 RX ADMIN — Medication 400 MILLIGRAM(S): at 00:30

## 2023-12-17 RX ADMIN — Medication 400 MILLIGRAM(S): at 17:19

## 2023-12-17 RX ADMIN — Medication 166.67 MILLIGRAM(S): at 11:21

## 2023-12-17 RX ADMIN — GABAPENTIN 100 MILLIGRAM(S): 400 CAPSULE ORAL at 21:18

## 2023-12-17 RX ADMIN — Medication 975 MILLIGRAM(S): at 12:22

## 2023-12-17 RX ADMIN — Medication 1 PATCH: at 22:22

## 2023-12-17 RX ADMIN — Medication 400 MILLIGRAM(S): at 12:22

## 2023-12-17 RX ADMIN — CHLORHEXIDINE GLUCONATE 1 APPLICATION(S): 213 SOLUTION TOPICAL at 11:30

## 2023-12-17 RX ADMIN — CEFEPIME 2000 MILLIGRAM(S): 1 INJECTION, POWDER, FOR SOLUTION INTRAMUSCULAR; INTRAVENOUS at 05:50

## 2023-12-17 RX ADMIN — CEFEPIME 2000 MILLIGRAM(S): 1 INJECTION, POWDER, FOR SOLUTION INTRAMUSCULAR; INTRAVENOUS at 21:19

## 2023-12-17 RX ADMIN — Medication 975 MILLIGRAM(S): at 17:20

## 2023-12-17 RX ADMIN — METHOCARBAMOL 1000 MILLIGRAM(S): 500 TABLET, FILM COATED ORAL at 05:50

## 2023-12-17 RX ADMIN — Medication 2 MILLIGRAM(S): at 17:19

## 2023-12-17 RX ADMIN — Medication 5 MILLIGRAM(S): at 21:19

## 2023-12-17 RX ADMIN — Medication 975 MILLIGRAM(S): at 00:30

## 2023-12-17 RX ADMIN — Medication 975 MILLIGRAM(S): at 06:50

## 2023-12-17 RX ADMIN — Medication 10 MILLILITER(S): at 03:08

## 2023-12-17 RX ADMIN — Medication 100 MILLIGRAM(S): at 11:22

## 2023-12-17 RX ADMIN — DIVALPROEX SODIUM 500 MILLIGRAM(S): 500 TABLET, DELAYED RELEASE ORAL at 17:20

## 2023-12-17 RX ADMIN — AMLODIPINE BESYLATE 5 MILLIGRAM(S): 2.5 TABLET ORAL at 05:50

## 2023-12-17 RX ADMIN — Medication 975 MILLIGRAM(S): at 18:20

## 2023-12-17 RX ADMIN — CEFEPIME 2000 MILLIGRAM(S): 1 INJECTION, POWDER, FOR SOLUTION INTRAMUSCULAR; INTRAVENOUS at 13:32

## 2023-12-17 RX ADMIN — PANTOPRAZOLE SODIUM 40 MILLIGRAM(S): 20 TABLET, DELAYED RELEASE ORAL at 05:50

## 2023-12-17 RX ADMIN — Medication 975 MILLIGRAM(S): at 11:22

## 2023-12-17 RX ADMIN — LIDOCAINE 3 PATCH: 4 CREAM TOPICAL at 23:24

## 2023-12-17 RX ADMIN — QUETIAPINE FUMARATE 125 MILLIGRAM(S): 200 TABLET, FILM COATED ORAL at 21:19

## 2023-12-17 RX ADMIN — METHOCARBAMOL 1000 MILLIGRAM(S): 500 TABLET, FILM COATED ORAL at 21:19

## 2023-12-17 RX ADMIN — METHOCARBAMOL 1000 MILLIGRAM(S): 500 TABLET, FILM COATED ORAL at 13:32

## 2023-12-17 RX ADMIN — LIDOCAINE 3 PATCH: 4 CREAM TOPICAL at 22:17

## 2023-12-17 NOTE — PROGRESS NOTE ADULT - NS ATTEND AMEND GEN_ALL_CORE FT
Above assessment noted.  The patient was seen and examined by myself with the surgical PA.  The patient is without new events or complaints overnight.  The right arm wound VAC dressing remains intact without leak.  The left anterior chest wall wound is without infection or gross pus.  Continue with local wound care. antibiotic therapy. Discharge planning.

## 2023-12-17 NOTE — PROGRESS NOTE ADULT - SUBJECTIVE AND OBJECTIVE BOX
INTERVAL HPI/OVERNIGHT EVENTS: no new complaints this am or overnight events    STATUS POST:  L blowhole 11/29  L rib block 11/29  L pigtail 11/30  l rib block 12/4  Open excision of vein 12/8  CAROL 12/14  IR Diagnostic thoracentesis 12/15    POST OPERATIVE DAY #:       MEDICATIONS  (STANDING):  acetaminophen     Tablet .. 975 milliGRAM(s) Oral every 6 hours  amLODIPine   Tablet 5 milliGRAM(s) Oral daily  benztropine 2 milliGRAM(s) Oral two times a day  cefepime  Injectable.      cefepime  Injectable. 2000 milliGRAM(s) IV Push every 8 hours  chlorhexidine 2% Cloths 1 Application(s) Topical daily  divalproex  milliGRAM(s) Oral every 12 hours  enoxaparin Injectable 40 milliGRAM(s) SubCutaneous every 12 hours  folic acid 1 milliGRAM(s) Oral daily  gabapentin 100 milliGRAM(s) Oral at bedtime  ibuprofen  Tablet. 400 milliGRAM(s) Oral every 6 hours  lidocaine   4% Patch 3 Patch Transdermal daily  melatonin 5 milliGRAM(s) Oral at bedtime  methocarbamol 1000 milliGRAM(s) Oral every 8 hours  nicotine - 21 mG/24Hr(s) Patch 1 Patch Transdermal every 24 hours  pantoprazole    Tablet 40 milliGRAM(s) Oral before breakfast  polyethylene glycol 3350 17 Gram(s) Oral at bedtime  QUEtiapine 125 milliGRAM(s) Oral at bedtime  senna 2 Tablet(s) Oral at bedtime  thiamine 100 milliGRAM(s) Oral daily  vancomycin  IVPB 1250 milliGRAM(s) IV Intermittent every 12 hours    MEDICATIONS  (PRN):  albuterol/ipratropium for Nebulization 3 milliLiter(s) Nebulizer every 6 hours PRN Shortness of Breath and/or Wheezing  calcium carbonate    500 mG (Tums) Chewable 1 Tablet(s) Chew every 6 hours PRN Heartburn  guaifenesin/dextromethorphan Oral Liquid 10 milliLiter(s) Oral every 6 hours PRN Cough  mineral oil enema 133 milliLiter(s) Rectal daily PRN constipation  OLANZapine Injectable 5 milliGRAM(s) IntraMuscular every 12 hours PRN agitation  ondansetron Injectable 4 milliGRAM(s) IV Push every 6 hours PRN Nausea      Vital Signs Last 24 Hrs  T(C): 36.9 (17 Dec 2023 08:00), Max: 37.3 (16 Dec 2023 20:35)  T(F): 98.4 (17 Dec 2023 08:00), Max: 99.2 (16 Dec 2023 20:35)  HR: 86 (17 Dec 2023 08:00) (74 - 102)  BP: 165/97 (17 Dec 2023 08:00) (148/88 - 166/95)  BP(mean): --  RR: 20 (17 Dec 2023 08:00) (18 - 20)  SpO2: 90% (17 Dec 2023 08:00) (90% - 94%)    Parameters below as of 17 Dec 2023 08:00  Patient On (Oxygen Delivery Method): room air        PHYSICAL EXAM:      Constitutional: NAD    Resp: no accessory muscle use or conversational dyspnea    CVS: RRR    Abd: soft, NT, ND    Ext: BERG, wound vac to left arm with no leaks         I&O's Detail    16 Dec 2023 07:01  -  17 Dec 2023 07:00  --------------------------------------------------------  IN:    IV PiggyBack: 250 mL    Oral Fluid: 320 mL  Total IN: 570 mL    OUT:    Voided (mL): 600 mL  Total OUT: 600 mL    Total NET: -30 mL          LABS:                        8.2    14.96 )-----------( 857      ( 16 Dec 2023 04:50 )             25.8     12-16    138  |  98  |  14.0  ----------------------------<  90  4.4   |  30.0<H>  |  0.74    Ca    8.4      16 Dec 2023 04:50  Phos  4.1     12-16  Mg     1.9     12-16        Urinalysis Basic - ( 16 Dec 2023 04:50 )    Color: x / Appearance: x / SG: x / pH: x  Gluc: 90 mg/dL / Ketone: x  / Bili: x / Urobili: x   Blood: x / Protein: x / Nitrite: x   Leuk Esterase: x / RBC: x / WBC x   Sq Epi: x / Non Sq Epi: x / Bacteria: x        RADIOLOGY & ADDITIONAL STUDIES:

## 2023-12-17 NOTE — PROGRESS NOTE ADULT - ASSESSMENT
59 y/o male s//p fall with multiple rib fractures, admit c/w MRSA bacteremia    - PIC protocol  - Abx as per ID, currently Vanco/Cefepime  - will require PICC prior to discharge for long term IV abx  - dvt ppx  - MMPC  - encourage OOB/ambulation  - most likely [facility] placement for IV abx and rehab    57 y/o male s//p fall with multiple rib fractures, admit c/w MRSA bacteremia    - PIC protocol  - Abx as per ID, currently Vanco/Cefepime  - will require PICC prior to discharge for long term IV abx  - dvt ppx  - MMPC  - encourage OOB/ambulation  - most likely [facility] placement for IV abx and rehab

## 2023-12-18 LAB
ANION GAP SERPL CALC-SCNC: 10 MMOL/L — SIGNIFICANT CHANGE UP (ref 5–17)
ANION GAP SERPL CALC-SCNC: 10 MMOL/L — SIGNIFICANT CHANGE UP (ref 5–17)
ANISOCYTOSIS BLD QL: SLIGHT — SIGNIFICANT CHANGE UP
ANISOCYTOSIS BLD QL: SLIGHT — SIGNIFICANT CHANGE UP
BASOPHILS # BLD AUTO: 0.3 K/UL — HIGH (ref 0–0.2)
BASOPHILS # BLD AUTO: 0.3 K/UL — HIGH (ref 0–0.2)
BASOPHILS NFR BLD AUTO: 1.7 % — SIGNIFICANT CHANGE UP (ref 0–2)
BASOPHILS NFR BLD AUTO: 1.7 % — SIGNIFICANT CHANGE UP (ref 0–2)
BUN SERPL-MCNC: 9.1 MG/DL — SIGNIFICANT CHANGE UP (ref 8–20)
BUN SERPL-MCNC: 9.1 MG/DL — SIGNIFICANT CHANGE UP (ref 8–20)
CALCIUM SERPL-MCNC: 8.4 MG/DL — SIGNIFICANT CHANGE UP (ref 8.4–10.5)
CALCIUM SERPL-MCNC: 8.4 MG/DL — SIGNIFICANT CHANGE UP (ref 8.4–10.5)
CHLORIDE SERPL-SCNC: 99 MMOL/L — SIGNIFICANT CHANGE UP (ref 96–108)
CHLORIDE SERPL-SCNC: 99 MMOL/L — SIGNIFICANT CHANGE UP (ref 96–108)
CO2 SERPL-SCNC: 29 MMOL/L — SIGNIFICANT CHANGE UP (ref 22–29)
CO2 SERPL-SCNC: 29 MMOL/L — SIGNIFICANT CHANGE UP (ref 22–29)
CREAT SERPL-MCNC: 0.59 MG/DL — SIGNIFICANT CHANGE UP (ref 0.5–1.3)
CREAT SERPL-MCNC: 0.59 MG/DL — SIGNIFICANT CHANGE UP (ref 0.5–1.3)
EGFR: 112 ML/MIN/1.73M2 — SIGNIFICANT CHANGE UP
EGFR: 112 ML/MIN/1.73M2 — SIGNIFICANT CHANGE UP
EOSINOPHIL # BLD AUTO: 0.62 K/UL — HIGH (ref 0–0.5)
EOSINOPHIL # BLD AUTO: 0.62 K/UL — HIGH (ref 0–0.5)
EOSINOPHIL NFR BLD AUTO: 3.5 % — SIGNIFICANT CHANGE UP (ref 0–6)
EOSINOPHIL NFR BLD AUTO: 3.5 % — SIGNIFICANT CHANGE UP (ref 0–6)
GIANT PLATELETS BLD QL SMEAR: PRESENT — SIGNIFICANT CHANGE UP
GIANT PLATELETS BLD QL SMEAR: PRESENT — SIGNIFICANT CHANGE UP
GLUCOSE SERPL-MCNC: 96 MG/DL — SIGNIFICANT CHANGE UP (ref 70–99)
GLUCOSE SERPL-MCNC: 96 MG/DL — SIGNIFICANT CHANGE UP (ref 70–99)
HCT VFR BLD CALC: 25.2 % — LOW (ref 39–50)
HCT VFR BLD CALC: 25.2 % — LOW (ref 39–50)
HGB BLD-MCNC: 8.3 G/DL — LOW (ref 13–17)
HGB BLD-MCNC: 8.3 G/DL — LOW (ref 13–17)
LYMPHOCYTES # BLD AUTO: 17.4 % — SIGNIFICANT CHANGE UP (ref 13–44)
LYMPHOCYTES # BLD AUTO: 17.4 % — SIGNIFICANT CHANGE UP (ref 13–44)
LYMPHOCYTES # BLD AUTO: 3.06 K/UL — SIGNIFICANT CHANGE UP (ref 1–3.3)
LYMPHOCYTES # BLD AUTO: 3.06 K/UL — SIGNIFICANT CHANGE UP (ref 1–3.3)
MACROCYTES BLD QL: SLIGHT — SIGNIFICANT CHANGE UP
MACROCYTES BLD QL: SLIGHT — SIGNIFICANT CHANGE UP
MAGNESIUM SERPL-MCNC: 1.8 MG/DL — SIGNIFICANT CHANGE UP (ref 1.6–2.6)
MAGNESIUM SERPL-MCNC: 1.8 MG/DL — SIGNIFICANT CHANGE UP (ref 1.6–2.6)
MANUAL SMEAR VERIFICATION: SIGNIFICANT CHANGE UP
MANUAL SMEAR VERIFICATION: SIGNIFICANT CHANGE UP
MCHC RBC-ENTMCNC: 27.3 PG — SIGNIFICANT CHANGE UP (ref 27–34)
MCHC RBC-ENTMCNC: 27.3 PG — SIGNIFICANT CHANGE UP (ref 27–34)
MCHC RBC-ENTMCNC: 32.9 GM/DL — SIGNIFICANT CHANGE UP (ref 32–36)
MCHC RBC-ENTMCNC: 32.9 GM/DL — SIGNIFICANT CHANGE UP (ref 32–36)
MCV RBC AUTO: 82.9 FL — SIGNIFICANT CHANGE UP (ref 80–100)
MCV RBC AUTO: 82.9 FL — SIGNIFICANT CHANGE UP (ref 80–100)
MONOCYTES # BLD AUTO: 1.69 K/UL — HIGH (ref 0–0.9)
MONOCYTES # BLD AUTO: 1.69 K/UL — HIGH (ref 0–0.9)
MONOCYTES NFR BLD AUTO: 9.6 % — SIGNIFICANT CHANGE UP (ref 2–14)
MONOCYTES NFR BLD AUTO: 9.6 % — SIGNIFICANT CHANGE UP (ref 2–14)
MYELOCYTES NFR BLD: 2.6 % — HIGH (ref 0–0)
MYELOCYTES NFR BLD: 2.6 % — HIGH (ref 0–0)
NEUTROPHILS # BLD AUTO: 11.48 K/UL — HIGH (ref 1.8–7.4)
NEUTROPHILS # BLD AUTO: 11.48 K/UL — HIGH (ref 1.8–7.4)
NEUTROPHILS NFR BLD AUTO: 64.3 % — SIGNIFICANT CHANGE UP (ref 43–77)
NEUTROPHILS NFR BLD AUTO: 64.3 % — SIGNIFICANT CHANGE UP (ref 43–77)
NEUTS BAND # BLD: 0.9 % — SIGNIFICANT CHANGE UP (ref 0–8)
NEUTS BAND # BLD: 0.9 % — SIGNIFICANT CHANGE UP (ref 0–8)
NRBC # BLD: 1 /100 — HIGH (ref 0–0)
NRBC # BLD: 1 /100 — HIGH (ref 0–0)
PHOSPHATE SERPL-MCNC: 4.7 MG/DL — SIGNIFICANT CHANGE UP (ref 2.4–4.7)
PHOSPHATE SERPL-MCNC: 4.7 MG/DL — SIGNIFICANT CHANGE UP (ref 2.4–4.7)
PLAT MORPH BLD: NORMAL — SIGNIFICANT CHANGE UP
PLAT MORPH BLD: NORMAL — SIGNIFICANT CHANGE UP
PLATELET # BLD AUTO: 940 K/UL — HIGH (ref 150–400)
PLATELET # BLD AUTO: 940 K/UL — HIGH (ref 150–400)
POTASSIUM SERPL-MCNC: 4.5 MMOL/L — SIGNIFICANT CHANGE UP (ref 3.5–5.3)
POTASSIUM SERPL-MCNC: 4.5 MMOL/L — SIGNIFICANT CHANGE UP (ref 3.5–5.3)
POTASSIUM SERPL-SCNC: 4.5 MMOL/L — SIGNIFICANT CHANGE UP (ref 3.5–5.3)
POTASSIUM SERPL-SCNC: 4.5 MMOL/L — SIGNIFICANT CHANGE UP (ref 3.5–5.3)
RBC # BLD: 3.04 M/UL — LOW (ref 4.2–5.8)
RBC # BLD: 3.04 M/UL — LOW (ref 4.2–5.8)
RBC # FLD: 17.2 % — HIGH (ref 10.3–14.5)
RBC # FLD: 17.2 % — HIGH (ref 10.3–14.5)
RBC BLD AUTO: NORMAL — SIGNIFICANT CHANGE UP
RBC BLD AUTO: NORMAL — SIGNIFICANT CHANGE UP
SODIUM SERPL-SCNC: 138 MMOL/L — SIGNIFICANT CHANGE UP (ref 135–145)
SODIUM SERPL-SCNC: 138 MMOL/L — SIGNIFICANT CHANGE UP (ref 135–145)
WBC # BLD: 17.61 K/UL — HIGH (ref 3.8–10.5)
WBC # BLD: 17.61 K/UL — HIGH (ref 3.8–10.5)
WBC # FLD AUTO: 17.61 K/UL — HIGH (ref 3.8–10.5)
WBC # FLD AUTO: 17.61 K/UL — HIGH (ref 3.8–10.5)

## 2023-12-18 PROCEDURE — 99233 SBSQ HOSP IP/OBS HIGH 50: CPT

## 2023-12-18 PROCEDURE — 99232 SBSQ HOSP IP/OBS MODERATE 35: CPT

## 2023-12-18 RX ORDER — MAGNESIUM SULFATE 500 MG/ML
2 VIAL (ML) INJECTION ONCE
Refills: 0 | Status: COMPLETED | OUTPATIENT
Start: 2023-12-18 | End: 2023-12-18

## 2023-12-18 RX ADMIN — Medication 10 MILLILITER(S): at 11:31

## 2023-12-18 RX ADMIN — Medication 1 PATCH: at 07:38

## 2023-12-18 RX ADMIN — ONDANSETRON 4 MILLIGRAM(S): 8 TABLET, FILM COATED ORAL at 00:02

## 2023-12-18 RX ADMIN — Medication 975 MILLIGRAM(S): at 06:02

## 2023-12-18 RX ADMIN — METHOCARBAMOL 1000 MILLIGRAM(S): 500 TABLET, FILM COATED ORAL at 14:34

## 2023-12-18 RX ADMIN — Medication 5 MILLIGRAM(S): at 22:25

## 2023-12-18 RX ADMIN — LIDOCAINE 3 PATCH: 4 CREAM TOPICAL at 23:00

## 2023-12-18 RX ADMIN — DIVALPROEX SODIUM 500 MILLIGRAM(S): 500 TABLET, DELAYED RELEASE ORAL at 05:03

## 2023-12-18 RX ADMIN — CEFEPIME 2000 MILLIGRAM(S): 1 INJECTION, POWDER, FOR SOLUTION INTRAMUSCULAR; INTRAVENOUS at 05:02

## 2023-12-18 RX ADMIN — Medication 166.67 MILLIGRAM(S): at 11:29

## 2023-12-18 RX ADMIN — Medication 1 PATCH: at 23:00

## 2023-12-18 RX ADMIN — Medication 2 MILLIGRAM(S): at 17:02

## 2023-12-18 RX ADMIN — CHLORHEXIDINE GLUCONATE 1 APPLICATION(S): 213 SOLUTION TOPICAL at 11:42

## 2023-12-18 RX ADMIN — Medication 400 MILLIGRAM(S): at 00:57

## 2023-12-18 RX ADMIN — DIVALPROEX SODIUM 500 MILLIGRAM(S): 500 TABLET, DELAYED RELEASE ORAL at 17:01

## 2023-12-18 RX ADMIN — Medication 2 MILLIGRAM(S): at 05:02

## 2023-12-18 RX ADMIN — Medication 400 MILLIGRAM(S): at 05:02

## 2023-12-18 RX ADMIN — CEFEPIME 2000 MILLIGRAM(S): 1 INJECTION, POWDER, FOR SOLUTION INTRAMUSCULAR; INTRAVENOUS at 14:35

## 2023-12-18 RX ADMIN — Medication 975 MILLIGRAM(S): at 12:31

## 2023-12-18 RX ADMIN — AMLODIPINE BESYLATE 5 MILLIGRAM(S): 2.5 TABLET ORAL at 05:02

## 2023-12-18 RX ADMIN — Medication 1 PATCH: at 22:26

## 2023-12-18 RX ADMIN — Medication 400 MILLIGRAM(S): at 06:02

## 2023-12-18 RX ADMIN — GABAPENTIN 100 MILLIGRAM(S): 400 CAPSULE ORAL at 22:25

## 2023-12-18 RX ADMIN — SENNA PLUS 2 TABLET(S): 8.6 TABLET ORAL at 22:26

## 2023-12-18 RX ADMIN — Medication 975 MILLIGRAM(S): at 05:02

## 2023-12-18 RX ADMIN — Medication 400 MILLIGRAM(S): at 17:02

## 2023-12-18 RX ADMIN — Medication 100 MILLIGRAM(S): at 11:31

## 2023-12-18 RX ADMIN — CEFEPIME 2000 MILLIGRAM(S): 1 INJECTION, POWDER, FOR SOLUTION INTRAMUSCULAR; INTRAVENOUS at 22:25

## 2023-12-18 RX ADMIN — METHOCARBAMOL 1000 MILLIGRAM(S): 500 TABLET, FILM COATED ORAL at 05:02

## 2023-12-18 RX ADMIN — ENOXAPARIN SODIUM 40 MILLIGRAM(S): 100 INJECTION SUBCUTANEOUS at 05:03

## 2023-12-18 RX ADMIN — Medication 10 MILLILITER(S): at 00:02

## 2023-12-18 RX ADMIN — Medication 400 MILLIGRAM(S): at 11:31

## 2023-12-18 RX ADMIN — LIDOCAINE 3 PATCH: 4 CREAM TOPICAL at 11:29

## 2023-12-18 RX ADMIN — ENOXAPARIN SODIUM 40 MILLIGRAM(S): 100 INJECTION SUBCUTANEOUS at 17:01

## 2023-12-18 RX ADMIN — Medication 25 GRAM(S): at 11:29

## 2023-12-18 RX ADMIN — Medication 1 MILLIGRAM(S): at 11:32

## 2023-12-18 RX ADMIN — QUETIAPINE FUMARATE 125 MILLIGRAM(S): 200 TABLET, FILM COATED ORAL at 22:26

## 2023-12-18 RX ADMIN — Medication 975 MILLIGRAM(S): at 17:01

## 2023-12-18 RX ADMIN — Medication 400 MILLIGRAM(S): at 12:31

## 2023-12-18 RX ADMIN — PANTOPRAZOLE SODIUM 40 MILLIGRAM(S): 20 TABLET, DELAYED RELEASE ORAL at 05:02

## 2023-12-18 RX ADMIN — METHOCARBAMOL 1000 MILLIGRAM(S): 500 TABLET, FILM COATED ORAL at 22:26

## 2023-12-18 RX ADMIN — Medication 975 MILLIGRAM(S): at 11:31

## 2023-12-18 RX ADMIN — Medication 975 MILLIGRAM(S): at 00:57

## 2023-12-18 NOTE — PROGRESS NOTE ADULT - SUBJECTIVE AND OBJECTIVE BOX
Patient seen and examined at bedside.     No acute events overnight. Patient has no complaints this morning. Denies fever, chills, R arm pain, nausea or vomiting. Patient was able to get 1250 on IS today.     Vitals:  Vital Signs Last 24 Hrs  T(C): 36.7 (18 Dec 2023 04:30), Max: 37 (17 Dec 2023 16:02)  T(F): 98.1 (18 Dec 2023 04:30), Max: 98.6 (17 Dec 2023 16:02)  HR: 79 (18 Dec 2023 04:30) (76 - 87)  BP: 138/84 (18 Dec 2023 04:30) (135/78 - 177/98)  BP(mean): --  RR: 18 (18 Dec 2023 04:30) (18 - 20)  SpO2: 96% (18 Dec 2023 04:30) (90% - 96%)    Parameters below as of 18 Dec 2023 04:30  Patient On (Oxygen Delivery Method): nasal cannula    Labs:  12-18    138  |  99  |  9.1  ----------------------------<  96  4.5   |  29.0  |  0.59    Ca    8.4      18 Dec 2023 06:40  Phos  4.7     12-18  Mg     1.8     12-18                              8.3    17.61 )-----------( 940      ( 18 Dec 2023 06:40 )             25.2       Exam:  Gen: pt lying in bed, alert, in NAD  Resp: unlabored  CVS/ Chest: RRR, L chest wall blow hole incision was open with no active drainage.   Abd: soft, NT, ND  RUE: wound vac in AC fossa with good suction

## 2023-12-18 NOTE — PROGRESS NOTE ADULT - ATTENDING COMMENTS
58-year-old male s/p fall with rib fractures, L pneumothorax, subcutaneous emphysema s/p decompression, MRSA bacteremia, septic thrombophlebitis s/p excision, and L pleural effusions s/p thoracentesis.   - c/w wound vac to RUE   - Daily wound care to chest wall incision  - Appreciate infectious disease recommendations   - C/w antibiotics     #Schizoaffective, tardive dyskinesia, delirium: c/w home meds. C/w Depakote, Seroquel, and Zyprexa    #HTN: c/w amlodipine     PPX:  - DVT ppx: SCD + lovenox   - GI ppx: PPI home med   - IS / activity as tolerated   - Bowel regimen  - Delirium precautions

## 2023-12-18 NOTE — PHARMACOTHERAPY INTERVENTION NOTE - NSPHARMCOMMASP
ASP - Dose optimization/Non-Renal dose adjustment
ASP - Lab/ test recommended
ASP - Therapy recommended/ Alternative therapy
ASP - Lab/ test recommended
ASP - Dose optimization/Non-Renal dose adjustment
ASP - Lab/ test recommended

## 2023-12-18 NOTE — PROGRESS NOTE ADULT - ASSESSMENT
57 year old male with PMH of ETOH abuse, HTN, HLD, seizures, tardive dyskinesia hiatal hernia s/p mechanical fall down stairs while intoxicated on 11/29. Pt intubated in ED for airway protection. Found to have left rib fractures 3, 6, 7, 10 and left pneumothorax with extensive subcutaneous emphysema requiring a left sided pigtail on 11/29. Hospital course complicated by acute agitation and acute asthma exacerbation requiring re-intubation on 12/1. Pt extubated 12/2. Transferred to floor 12/6 and noted to be febrile with uptrending WBC, rt arm edema erythema and reported purulent drainage. Found to have MRSA bacteremia, concern for cellulitis , septic thrombphlebitis    - 12/6 and 12/7 BCX + MRSA  - s/p I&D of septic thrombophlebitis of right arm >> Incision and drainage of Infected basilic and cephalic thrombophlebitis at antecubital level purulent discharge seen upon incision  - Repeat BCX 12/10-+  - BCX 12/11 and 12/12 ngtd  - CAROL no vegetations  - RUE venous doppler with sup thrombus of right cephalic vein   - Ct chest performed + left loculated effusion  -s/p thoracentesis for left loculated effusion, culture neg  - c/w cefepime  - c/w vancomycin  - monitor trough and adjust per pharmacy    - anticoagulation as per vascular  - will plan for PICC when ready for DC  - Trend Fever - afebrile   - Trend Leukocytosis-elevated. pt reports chronic leukocytosis due to unclear cause  - suggest repeat CXR given elevated WBC          will f/u

## 2023-12-18 NOTE — PROGRESS NOTE ADULT - SUBJECTIVE AND OBJECTIVE BOX
NOHEMY BRAN    [id number]    History:      Seen and examined this am  . Patient is doing well.   no acute overnight events  right upper ext wound vac, due for change today   no reported extremity deficits     Vital Signs Last 24 Hrs  T(C): 36.7 (18 Dec 2023 04:30), Max: 37 (17 Dec 2023 16:02)  T(F): 98.1 (18 Dec 2023 04:30), Max: 98.6 (17 Dec 2023 16:02)  HR: 79 (18 Dec 2023 04:30) (76 - 87)  BP: 138/84 (18 Dec 2023 04:30) (135/78 - 177/98)  BP(mean): --  RR: 18 (18 Dec 2023 04:30) (18 - 20)  SpO2: 96% (18 Dec 2023 04:30) (90% - 96%)    Parameters below as of 18 Dec 2023 04:30  Patient On (Oxygen Delivery Method): nasal cannula      I&O's Summary    16 Dec 2023 07:01  -  17 Dec 2023 07:00  --------------------------------------------------------  IN: 570 mL / OUT: 600 mL / NET: -30 mL    17 Dec 2023 07:01  -  18 Dec 2023 06:57  --------------------------------------------------------  IN: 0 mL / OUT: 900 mL / NET: -900 mL                              8.3    17.61 )-----------( 940      ( 18 Dec 2023 06:40 )             25.2             MEDICATIONS  (STANDING):  acetaminophen     Tablet .. 975 milliGRAM(s) Oral every 6 hours  amLODIPine   Tablet 5 milliGRAM(s) Oral daily  benztropine 2 milliGRAM(s) Oral two times a day  cefepime  Injectable.      cefepime  Injectable. 2000 milliGRAM(s) IV Push every 8 hours  chlorhexidine 2% Cloths 1 Application(s) Topical daily  divalproex  milliGRAM(s) Oral every 12 hours  enoxaparin Injectable 40 milliGRAM(s) SubCutaneous every 12 hours  folic acid 1 milliGRAM(s) Oral daily  gabapentin 100 milliGRAM(s) Oral at bedtime  ibuprofen  Tablet. 400 milliGRAM(s) Oral every 6 hours  lidocaine   4% Patch 3 Patch Transdermal daily  melatonin 5 milliGRAM(s) Oral at bedtime  methocarbamol 1000 milliGRAM(s) Oral every 8 hours  nicotine - 21 mG/24Hr(s) Patch 1 Patch Transdermal every 24 hours  pantoprazole    Tablet 40 milliGRAM(s) Oral before breakfast  polyethylene glycol 3350 17 Gram(s) Oral at bedtime  QUEtiapine 125 milliGRAM(s) Oral at bedtime  senna 2 Tablet(s) Oral at bedtime  thiamine 100 milliGRAM(s) Oral daily  vancomycin  IVPB 1250 milliGRAM(s) IV Intermittent every 12 hours    MEDICATIONS  (PRN):  albuterol/ipratropium for Nebulization 3 milliLiter(s) Nebulizer every 6 hours PRN Shortness of Breath and/or Wheezing  calcium carbonate    500 mG (Tums) Chewable 1 Tablet(s) Chew every 6 hours PRN Heartburn  guaifenesin/dextromethorphan Oral Liquid 10 milliLiter(s) Oral every 6 hours PRN Cough  mineral oil enema 133 milliLiter(s) Rectal daily PRN constipation  OLANZapine Injectable 5 milliGRAM(s) IntraMuscular every 12 hours PRN agitation  ondansetron Injectable 4 milliGRAM(s) IV Push every 6 hours PRN Nausea      Physical exam:     no distress  alert and oriented  non labored breathing  chest with left wound, purulent drainage   right upper ext with wound vac to the antecubital forearm   dimension to the wound is unchanged, granular and clean wound bed   No distal perfusion or motor/sensory deficits       Primary  Assessment:    History of infection/abscess to the right forearm, s/p debridement and placement of wound vac  clean right forearm wound  leukocytosis, left chest wall wound infection     •   Plan:   • next vac change friday  - will reach out to the general surgery team to evaluate left chest wall wound infection     NOHEMY BRAN    [id number]    History:      Seen and examined this am  . Patient is doing well.   no acute overnight events  right upper ext wound vac, due for change today   no reported extremity deficits     Vital Signs Last 24 Hrs  T(C): 36.7 (18 Dec 2023 04:30), Max: 37 (17 Dec 2023 16:02)  T(F): 98.1 (18 Dec 2023 04:30), Max: 98.6 (17 Dec 2023 16:02)  HR: 79 (18 Dec 2023 04:30) (76 - 87)  BP: 138/84 (18 Dec 2023 04:30) (135/78 - 177/98)  BP(mean): --  RR: 18 (18 Dec 2023 04:30) (18 - 20)  SpO2: 96% (18 Dec 2023 04:30) (90% - 96%)    Parameters below as of 18 Dec 2023 04:30  Patient On (Oxygen Delivery Method): nasal cannula      I&O's Summary    16 Dec 2023 07:01  -  17 Dec 2023 07:00  --------------------------------------------------------  IN: 570 mL / OUT: 600 mL / NET: -30 mL    17 Dec 2023 07:01  -  18 Dec 2023 06:57  --------------------------------------------------------  IN: 0 mL / OUT: 900 mL / NET: -900 mL                              8.3    17.61 )-----------( 940      ( 18 Dec 2023 06:40 )             25.2             MEDICATIONS  (STANDING):  acetaminophen     Tablet .. 975 milliGRAM(s) Oral every 6 hours  amLODIPine   Tablet 5 milliGRAM(s) Oral daily  benztropine 2 milliGRAM(s) Oral two times a day  cefepime  Injectable.      cefepime  Injectable. 2000 milliGRAM(s) IV Push every 8 hours  chlorhexidine 2% Cloths 1 Application(s) Topical daily  divalproex  milliGRAM(s) Oral every 12 hours  enoxaparin Injectable 40 milliGRAM(s) SubCutaneous every 12 hours  folic acid 1 milliGRAM(s) Oral daily  gabapentin 100 milliGRAM(s) Oral at bedtime  ibuprofen  Tablet. 400 milliGRAM(s) Oral every 6 hours  lidocaine   4% Patch 3 Patch Transdermal daily  melatonin 5 milliGRAM(s) Oral at bedtime  methocarbamol 1000 milliGRAM(s) Oral every 8 hours  nicotine - 21 mG/24Hr(s) Patch 1 Patch Transdermal every 24 hours  pantoprazole    Tablet 40 milliGRAM(s) Oral before breakfast  polyethylene glycol 3350 17 Gram(s) Oral at bedtime  QUEtiapine 125 milliGRAM(s) Oral at bedtime  senna 2 Tablet(s) Oral at bedtime  thiamine 100 milliGRAM(s) Oral daily  vancomycin  IVPB 1250 milliGRAM(s) IV Intermittent every 12 hours    MEDICATIONS  (PRN):  albuterol/ipratropium for Nebulization 3 milliLiter(s) Nebulizer every 6 hours PRN Shortness of Breath and/or Wheezing  calcium carbonate    500 mG (Tums) Chewable 1 Tablet(s) Chew every 6 hours PRN Heartburn  guaifenesin/dextromethorphan Oral Liquid 10 milliLiter(s) Oral every 6 hours PRN Cough  mineral oil enema 133 milliLiter(s) Rectal daily PRN constipation  OLANZapine Injectable 5 milliGRAM(s) IntraMuscular every 12 hours PRN agitation  ondansetron Injectable 4 milliGRAM(s) IV Push every 6 hours PRN Nausea      Physical exam:     no distress  alert and oriented  non labored breathing  chest with left wound, purulent drainage   right upper ext with wound vac to the antecubital forearm   dimension to the wound is unchanged, granular and clean wound bed   No distal perfusion or motor/sensory deficits       Primary  Assessment:    History of infection/abscess to the right forearm, s/p debridement and placement of wound vac  clean right forearm wound  leukocytosis, left chest wall wound infection     •   Plan:   • next vac change friday  - will reach out to the general surgery team to evaluate left chest wall wound infection     NOHEMY BRAN    [id number]    History:      Seen and examined this am  . Patient is doing well.   no acute overnight events  right upper ext wound vac, due for change today   no reported extremity deficits     Vital Signs Last 24 Hrs  T(C): 36.7 (18 Dec 2023 04:30), Max: 37 (17 Dec 2023 16:02)  T(F): 98.1 (18 Dec 2023 04:30), Max: 98.6 (17 Dec 2023 16:02)  HR: 79 (18 Dec 2023 04:30) (76 - 87)  BP: 138/84 (18 Dec 2023 04:30) (135/78 - 177/98)  BP(mean): --  RR: 18 (18 Dec 2023 04:30) (18 - 20)  SpO2: 96% (18 Dec 2023 04:30) (90% - 96%)    Parameters below as of 18 Dec 2023 04:30  Patient On (Oxygen Delivery Method): nasal cannula      I&O's Summary    16 Dec 2023 07:01  -  17 Dec 2023 07:00  --------------------------------------------------------  IN: 570 mL / OUT: 600 mL / NET: -30 mL    17 Dec 2023 07:01  -  18 Dec 2023 06:57  --------------------------------------------------------  IN: 0 mL / OUT: 900 mL / NET: -900 mL                              8.3    17.61 )-----------( 940      ( 18 Dec 2023 06:40 )             25.2             MEDICATIONS  (STANDING):  acetaminophen     Tablet .. 975 milliGRAM(s) Oral every 6 hours  amLODIPine   Tablet 5 milliGRAM(s) Oral daily  benztropine 2 milliGRAM(s) Oral two times a day  cefepime  Injectable.      cefepime  Injectable. 2000 milliGRAM(s) IV Push every 8 hours  chlorhexidine 2% Cloths 1 Application(s) Topical daily  divalproex  milliGRAM(s) Oral every 12 hours  enoxaparin Injectable 40 milliGRAM(s) SubCutaneous every 12 hours  folic acid 1 milliGRAM(s) Oral daily  gabapentin 100 milliGRAM(s) Oral at bedtime  ibuprofen  Tablet. 400 milliGRAM(s) Oral every 6 hours  lidocaine   4% Patch 3 Patch Transdermal daily  melatonin 5 milliGRAM(s) Oral at bedtime  methocarbamol 1000 milliGRAM(s) Oral every 8 hours  nicotine - 21 mG/24Hr(s) Patch 1 Patch Transdermal every 24 hours  pantoprazole    Tablet 40 milliGRAM(s) Oral before breakfast  polyethylene glycol 3350 17 Gram(s) Oral at bedtime  QUEtiapine 125 milliGRAM(s) Oral at bedtime  senna 2 Tablet(s) Oral at bedtime  thiamine 100 milliGRAM(s) Oral daily  vancomycin  IVPB 1250 milliGRAM(s) IV Intermittent every 12 hours    MEDICATIONS  (PRN):  albuterol/ipratropium for Nebulization 3 milliLiter(s) Nebulizer every 6 hours PRN Shortness of Breath and/or Wheezing  calcium carbonate    500 mG (Tums) Chewable 1 Tablet(s) Chew every 6 hours PRN Heartburn  guaifenesin/dextromethorphan Oral Liquid 10 milliLiter(s) Oral every 6 hours PRN Cough  mineral oil enema 133 milliLiter(s) Rectal daily PRN constipation  OLANZapine Injectable 5 milliGRAM(s) IntraMuscular every 12 hours PRN agitation  ondansetron Injectable 4 milliGRAM(s) IV Push every 6 hours PRN Nausea      Physical exam:     no distress  alert and oriented  non labored breathing  chest with left wound, purulent drainage   right upper ext with wound vac to the antecubital forearm   dimension to the wound is unchanged, granular and clean wound bed   No distal perfusion or motor/sensory deficits       Primary  Assessment:    History of infection/abscess to the right forearm, s/p debridement and placement of wound vac  clean right forearm wound  leukocytosis, left chest wall wound infection     •   Plan:   • next vac change thursday  - will reach out to the general surgery team to evaluate left chest wall wound infection     NOHEMY BRAN    [id number]    History:      Seen and examined this am  . Patient is doing well.   no acute overnight events  right upper ext wound vac, due for change today   no reported extremity deficits     Vital Signs Last 24 Hrs  T(C): 36.7 (18 Dec 2023 04:30), Max: 37 (17 Dec 2023 16:02)  T(F): 98.1 (18 Dec 2023 04:30), Max: 98.6 (17 Dec 2023 16:02)  HR: 79 (18 Dec 2023 04:30) (76 - 87)  BP: 138/84 (18 Dec 2023 04:30) (135/78 - 177/98)  BP(mean): --  RR: 18 (18 Dec 2023 04:30) (18 - 20)  SpO2: 96% (18 Dec 2023 04:30) (90% - 96%)    Parameters below as of 18 Dec 2023 04:30  Patient On (Oxygen Delivery Method): nasal cannula      I&O's Summary    16 Dec 2023 07:01  -  17 Dec 2023 07:00  --------------------------------------------------------  IN: 570 mL / OUT: 600 mL / NET: -30 mL    17 Dec 2023 07:01  -  18 Dec 2023 06:57  --------------------------------------------------------  IN: 0 mL / OUT: 900 mL / NET: -900 mL                              8.3    17.61 )-----------( 940      ( 18 Dec 2023 06:40 )             25.2             MEDICATIONS  (STANDING):  acetaminophen     Tablet .. 975 milliGRAM(s) Oral every 6 hours  amLODIPine   Tablet 5 milliGRAM(s) Oral daily  benztropine 2 milliGRAM(s) Oral two times a day  cefepime  Injectable.      cefepime  Injectable. 2000 milliGRAM(s) IV Push every 8 hours  chlorhexidine 2% Cloths 1 Application(s) Topical daily  divalproex  milliGRAM(s) Oral every 12 hours  enoxaparin Injectable 40 milliGRAM(s) SubCutaneous every 12 hours  folic acid 1 milliGRAM(s) Oral daily  gabapentin 100 milliGRAM(s) Oral at bedtime  ibuprofen  Tablet. 400 milliGRAM(s) Oral every 6 hours  lidocaine   4% Patch 3 Patch Transdermal daily  melatonin 5 milliGRAM(s) Oral at bedtime  methocarbamol 1000 milliGRAM(s) Oral every 8 hours  nicotine - 21 mG/24Hr(s) Patch 1 Patch Transdermal every 24 hours  pantoprazole    Tablet 40 milliGRAM(s) Oral before breakfast  polyethylene glycol 3350 17 Gram(s) Oral at bedtime  QUEtiapine 125 milliGRAM(s) Oral at bedtime  senna 2 Tablet(s) Oral at bedtime  thiamine 100 milliGRAM(s) Oral daily  vancomycin  IVPB 1250 milliGRAM(s) IV Intermittent every 12 hours    MEDICATIONS  (PRN):  albuterol/ipratropium for Nebulization 3 milliLiter(s) Nebulizer every 6 hours PRN Shortness of Breath and/or Wheezing  calcium carbonate    500 mG (Tums) Chewable 1 Tablet(s) Chew every 6 hours PRN Heartburn  guaifenesin/dextromethorphan Oral Liquid 10 milliLiter(s) Oral every 6 hours PRN Cough  mineral oil enema 133 milliLiter(s) Rectal daily PRN constipation  OLANZapine Injectable 5 milliGRAM(s) IntraMuscular every 12 hours PRN agitation  ondansetron Injectable 4 milliGRAM(s) IV Push every 6 hours PRN Nausea      Physical exam:     no distress  alert and oriented  non labored breathing  chest with left wound, purulent drainage   right upper ext with wound vac to the antecubital forearm   dimension to the wound is unchanged, granular and clean wound bed   No distal perfusion or motor/sensory deficits       Primary  Assessment:    History of infection/abscess to the right forearm, s/p debridement and placement of wound vac  clean right forearm wound  leukocytosis, left chest wall wound infection     •   Plan:   • next vac change thursday  - will reach out to the general surgery team to evaluate left chest wall wound infection

## 2023-12-18 NOTE — PROGRESS NOTE ADULT - ASSESSMENT
57 y/o male s/p fall with rib fractures, L pneumothorax s/p blow hole on 11/29 MRSA bacteremia with negative cultures now, Septic thrombophlebitis moderate L pleural effusion s/p thoracentesis by IR on 12/15 was afebrile and hemodynamically stable this morning. Leukocytosis worsened today to 17.61, currently on Cefepime and Vanc. Will continue wound care on blow hole incision site.     PLAN  Dispo: [facility]  Continue IS 10x/hour  Pain control  Continue wound care on blow hole incision site  Continue home medications  Continue regular diet  Continue ambulation  Continue Cefepime and Vanc, trend leukocytosis  Trend labs and replete PRN  DVT prophylaxis: SCDs and lov

## 2023-12-18 NOTE — PROGRESS NOTE ADULT - SUBJECTIVE AND OBJECTIVE BOX
[facility]  INFECTIOUS DISEASES at [place] and [place]  ===============================================================                  Jordon Gonzales MD               Diplomates American Board of Internal Medicine & Infectious Diseases                * [place] Office - Appt - Tel  [phone] Fax [phone]                * [place] Office - Appt - Tel [phone] Fax [phone]                                  Hospital Consult line:  [phone]  ==============================================================    NOHEMY BRAN [id number]    Follow up: MRSA bacteremia        pt reports feeling well  no fever  no dyspnea  cough stable  rue vac changed today  asking for medication to help him sleep  wbc remains elevated-pt reports he has had chronically elevated WBC due to unclear cause and has seen hematology for workup for this in the past  afebrile and hemodynamically stable     I have personally reviewed the labs and data; pertinent labs and data are listed in this note; please see below.     _______________________________________________________________  REVIEW OF SYSTEMS  as above  ________________________________________________________________  Allergies:  No Known Allergies    ________________________________________________________________  PHYSICAL EXAM  GEN: in NAD, laying in bed  HEENT: Anicteric sclerae. Moist mucous membranes. No mucosal lesions.   NECK: Supple.  LUNGS: eupneic. decreased Breath sounds at bases B/L.  HEART: RRR, no m/r/g  ABDOMEN: Soft, NT, ND,  +BS.    : No CVA tenderness. No Deleon catheter  EXTREMITIES: RUE vac Minimal swelling.   NEUROLOGIC: Grossly no motor focal deficits   PSYCHIATRIC: Appropriate affect and mood  SKIN: ulcer in upper chest. no drainage, mild erythema  laterally-no warmth or swelling    ________________________________________________________________  Vitals:  Vital Signs Last 24 Hrs  T(C): 36.8 (18 Dec 2023 20:30), Max: 37 (18 Dec 2023 00:00)  T(F): 98.3 (18 Dec 2023 20:30), Max: 98.6 (18 Dec 2023 00:00)  HR: 75 (18 Dec 2023 20:30) (71 - 87)  BP: 130/78 (18 Dec 2023 20:30) (130/78 - 157/98)  BP(mean): --  RR: 18 (18 Dec 2023 20:30) (18 - 20)  SpO2: 90% (18 Dec 2023 20:30) (90% - 96%)    Parameters below as of 18 Dec 2023 20:30  Patient On (Oxygen Delivery Method): room air        Current Antibiotics:  vancomycin  IVPB 500 milliGRAM(s) IV Intermittent every 12 hours    Other medications:  acetaminophen     Tablet .. 975 milliGRAM(s) Oral every 6 hours  amLODIPine   Tablet 5 milliGRAM(s) Oral daily  benztropine 2 milliGRAM(s) Oral two times a day  chlorhexidine 2% Cloths 1 Application(s) Topical daily  divalproex  milliGRAM(s) Oral every 12 hours  enoxaparin Injectable 40 milliGRAM(s) SubCutaneous every 12 hours  folic acid 1 milliGRAM(s) Oral daily  gabapentin 100 milliGRAM(s) Oral at bedtime  influenza   Vaccine 0.5 milliLiter(s) IntraMuscular once  lidocaine   4% Patch 3 Patch Transdermal daily  melatonin 5 milliGRAM(s) Oral at bedtime  methocarbamol 750 milliGRAM(s) Oral every 8 hours  nicotine - 21 mG/24Hr(s) Patch 1 Patch Transdermal every 24 hours  pantoprazole    Tablet 40 milliGRAM(s) Oral before breakfast  polyethylene glycol 3350 17 Gram(s) Oral at bedtime  QUEtiapine 125 milliGRAM(s) Oral at bedtime  senna 2 Tablet(s) Oral at bedtime  thiamine 100 milliGRAM(s) Oral daily                            8.3    17.61 )-----------( 940      ( 18 Dec 2023 06:40 )             25.2       12-18    138  |  99  |  9.1  ----------------------------<  96  4.5   |  29.0  |  0.59    Ca    8.4      18 Dec 2023 06:40  Phos  4.7     12-18  Mg     1.8     12-18                Urinalysis Basic - ( 18 Dec 2023 06:40 )    Color: x / Appearance: x / SG: x / pH: x  Gluc: 96 mg/dL / Ketone: x  / Bili: x / Urobili: x   Blood: x / Protein: x / Nitrite: x   Leuk Esterase: x / RBC: x / WBC x   Sq Epi: x / Non Sq Epi: x / Bacteria: x                  CAPILLARY BLOOD GLUCOSE                     Urinalysis Basic - ( 15 Dec 2023 06:01 )    Color: x / Appearance: x / SG: x / pH: x  Gluc: 83 mg/dL / Ketone: x  / Bili: x / Urobili: x   Blood: x / Protein: x / Nitrite: x   Leuk Esterase: x / RBC: x / WBC x   Sq Epi: x / Non Sq Epi: x / Bacteria: x        PT/INR - ( 15 Dec 2023 06:01 )   PT: 13.0 sec;   INR: 1.18 ratio         PTT - ( 15 Dec 2023 06:01 )  PTT:32.3 sec          CAPILLARY BLOOD GLUCOSE        < from: TTE Limited W or WO Ultrasound Enhancing Agent (12.13.23 @ 10:54) >  FINDINGS:     Left Ventricle:  The left ventricular cavity is normal. Left ventricular systolic function is normal with a calculated ejection fraction of 68 % by the Chavez's biplane method of disks with an ejection fraction visually estimated at 65 to 70%. There is normal left ventricular diastolic function.     Right Ventricle:  The right ventricular cavity is normal in size and normal systolic function. Tricuspid annular plane systolic excursion (TAPSE) is 2.2 cm (normal >=1.7 cm). Tricuspid annular tissue Doppler S' is 11.7 cm/s (normal >10 cm/s).     Aortic Valve:  The aortic valve appears trileaflet with normal systolic excursion.     Mitral Valve:  There is trace mitral regurgitation.     Tricuspid Valve:  There is trace tricuspid regurgitation. Estimated pulmonary artery systolic pressure is 26 mmHg.     Pericardium:  There is an echogenic mass and pocket of fluid close to the LV posterior wall. There is significant pleural effusion.  Unclear if this is localized collection of fluid with fribrinous exudate or hematoma is actually percardial space. Recommend CT scan with IV contrast to definitely evaluate for pericardial effusion if clinically indicated.  In any case, there is no echo evidence of LV pseudoaneurysm. Ultrasound enhancing images were not clear to see if the above mentioned area was opacified.     Pleura:  Small left and small right pleural effusion noted.     Systemic Veins:  The inferior vena cava isnormal in size measuring 1.86 cm in diameter, (normal <2.1cm) with normal inspiratory collapse (normal >50%) consistent with normal right atrial pressure (~3, range 0-5mmHg).  ____________________________________________________________________  QUANTITATIVE DATA:  Left Ventricle Measurements: (Indexed to BSA)     LV Vol d, MOD A2C: 88.4 ml   53.01 ml/m²  LV Vol d, MOD A4C: 112.1 ml  67.26 ml/m²  LV Vol d, MOD BP:  106.5 ml  63.91 ml/m²  LV Vol s, MOD A2C: 30.3 ml   18.18 ml/m²  LV Vol s, MOD A4C: 36.0 ml   21.60 ml/m²  LV Vol s, MOD BP:  33.9 ml   20.33 ml/m²  LVOT SV MOD BP:    72.6 ml  LV EF% MOD BP:     68 %  Visualized LV EF%: 65 to 70%     MV E Vmax:    1.00 m/s  e' lateral:   14.10 cm/s  e' medial:    9.68 cm/s  E/e' lateral: 7.09  E/e' medial:  10.33  E/e' Average: 8.41       Right Ventricle Measurements:     TAPSE:      2.2 cm  TV Amber. S': 11.70 cm/s    Mitral Valve Measurements:     MV E Vmax: 1.0 m/s       Tricuspid Valve Measurements:     TR Vmax:          2.4 m/s  TR Peak Gradient: 22.8 mmHg  RA Pressure:      3 mmHg  PASP:             26 mmHg    ________________________________________________________________________________________  Electronically signed on 12/13/2023 at 12:01:48 PM by Lisa Reyna MD, RPVI    < end of copied text >    < from: CAROL W or WO Ultrasound Enhancing Agent (12.14.23 @ 10:25) >  _______________________________________________________________________________________     CONCLUSIONS:      1. Left ventricular systolic function is normal with an ejection fraction visually estimated at 60 to 65 %.   2. Normal right ventricular cavity size and systolic function.   3. Normal atria.   4. No evidence of left atrial or left atrial appendage thrombus. The left atrial appendage emptying velocity isnormal. Ultrasound enhancing agent was utilized to visualize the left atrial appendage.   5. No significant valvular disease.   6. No echocardiographic evidence of vegetations.   7. Agitated saline injection was negative for intracardiac shunt.   8. Mild aortic calcification seen in the descending thoracic aorta.   9. Compared to the transthoracic echocardiogram performed on 12/13/2023 there have been no significant interval changes. Findings were discussed with Patient and rounding cardiology team on 12/14/2023 at 1120PM.  10. Unable to appreciate any LV pseudoaneurysm despite use of ulrasonic echocontrast given shadow artifact. Recommend CT chest w/ IV contrast vs. cMRI.    < end of copied text >    < from: CT Chest No Cont (12.13.23 @ 22:29) >  Consolidation/atelectasis in the left lowerlobe. New peribronchovascular   foci of groundglass opacity and small consolidation in the right lower   lobe.  Trace right pleural effusion. Moderate left-sided pleural effusion which   is partially loculated along the left paramediastinal region.  Redemonstration of acute, displaced fracture of left 3rd-7th ribs    --- End of Report ---      < end of copied text >              Urinalysis Basic - ( 13 Dec 2023 06:50 )    Color: x / Appearance: x / SG: x / pH: x  Gluc: 106 mg/dL / Ketone: x  / Bili: x / Urobili: x   Blood: x / Protein: x / Nitrite: x   Leuk Esterase: x / RBC: x / WBC x   Sq Epi: x / Non Sq Epi: x / Bacteria: x            CARDIAC MARKERS ( 13 Dec 2023 06:50 )  x     / x     / 14 U/L / x     / x            CAPILLARY BLOOD GLUCOSE                  Urinalysis Basic - ( 12 Dec 2023 07:58 )    Color: x / Appearance: x / SG: x / pH: x  Gluc: 95 mg/dL / Ketone: x  / Bili: x / Urobili: x   Blood: x / Protein: x / Nitrite: x   Leuk Esterase: x / RBC: x / WBC x   Sq Epi: x / Non Sq Epi: x / Bacteria: x                  CAPILLARY BLOOD GLUCOSE                    Urinalysis Basic - ( 12 Dec 2023 07:58 )    Color: x / Appearance: x / SG: x / pH: x  Gluc: 95 mg/dL / Ketone: x  / Bili: x / Urobili: x   Blood: x / Protein: x / Nitrite: x   Leuk Esterase: x / RBC: x / WBC x   Sq Epi: x / Non Sq Epi: x / Bacteria: x                  CAPILLARY BLOOD GLUCOSE                    Urinalysis Basic - ( 11 Dec 2023 06:57 )    Color: x / Appearance: x / SG: x / pH: x  Gluc: 96 mg/dL / Ketone: x  / Bili: x / Urobili: x   Blood: x / Protein: x / Nitrite: x   Leuk Esterase: x / RBC: x / WBC x   Sq Epi: x / Non Sq Epi: x / Bacteria: x                  CAPILLARY BLOOD GLUCOSE                  RECENT CULTURES:  12-07 @ 14:57 .Blood Blood-Peripheral     Growth in aerobic bottle: Gram Positive Cocci in Clusters  Growth in anaerobic bottle: Gram Positive Cocci in Clusters    Growth in aerobic bottle: Gram Positive Cocci in Clusters  Growth in anaerobic bottle: Gram Positive Cocci in Clusters      12-07 @ 14:52 .Blood Blood-Venous     Growth in aerobic bottle: Gram Positive Cocci in Clusters  Growth in anaerobic bottle: Gram Positive Cocci in Clusters    Growth in aerobic bottle: Gram Positive Cocci in Clusters  Growth in anaerobic bottle: Gram Positive Cocci in Clusters      12-06 @ 15:15 .Blood Blood Blood Culture PCR  Methicillin resistant Staphylococcus aureus    Growth in aerobic and anaerobic bottles: Methicillin Resistant  Staphylococcus aureus    Growth in aerobic bottle: Gram Positive Cocci in Clusters  Growth in anaerobic bottle: Gram Positive Cocci in Clusters      12-06 @ 15:10 .Blood Blood     Growth in aerobic and anaerobic bottles: Methicillin Resistant  Staphylococcus aureus  See previous culture [id number]    Growth in anaerobic bottle: Gram Positive Cocci in Clusters  Growth in aerobic bottle: Gram Positive Cocci in Clusters        WBC Count: 19.04 K/uL (12-09-23 @ 07:31)  WBC Count: 20.66 K/uL (12-08-23 @ 08:06)  WBC Count: 22.89 K/uL (12-07-23 @ 06:05)  WBC Count: 14.00 K/uL (12-06-23 @ 05:15)  WBC Count: 13.75 K/uL (12-05-23 @ 02:08)    Creatinine: 0.63 mg/dL (12-09-23 @ 07:31)  Creatinine: 1.17 mg/dL (12-08-23 @ 08:06)  Creatinine: 0.83 mg/dL (12-07-23 @ 06:05)  Creatinine: 0.60 mg/dL (12-06-23 @ 05:15)  Creatinine: 0.81 mg/dL (12-05-23 @ 02:08)      Vancomycin Level, Trough: 8.3 ug/mL (12-09-23 @ 07:31)  Vancomycin Level, Trough: 11.5 ug/mL (12-08-23 @ 18:40)    ________________________________________________________________  CARDIOLOGY   no recent studies   ________________________________________________________________  RADIOLOGY  < from: US Duplex Venous Upper Ext Ltd, Right (12.07.23 @ 11:52) >  INTERPRETATION:  CLINICAL INFORMATION: Arm swelling    COMPARISON: None available.    TECHNIQUE: Duplex sonography of the RIGHT UPPER extremity veins with   color and spectral Doppler, with and without compression.    FINDINGS:    The right internal jugular, subclavian, axillary and brachial veins are   patent and compressible where applicable.  The basilic vein (superficial   vein) is patent and without thrombus.  Superficial thrombus in the right   cephalic vein.      IMPRESSION:  No evidence of right upper extremity deep venous thrombosis.    Superficial thrombus in the right cephalic vein.    < end of copied text >  < from: Xray Chest 1 View- PORTABLE-Urgent (Xray Chest 1 View- PORTABLE-Urgent .) (12.06.23 @ 10:15) >  INTERPRETATION:  Chest one view    HISTORY: Leukocytosis    COMPARISON STUDY: 12/2/2023    Frontal expiratory view of the chest shows the heart to be similar in   size. The lungs show partial clearing of the left base and there is no   evidence of pneumothorax nor pleural effusion.    IMPRESSION:  Left base clearing.    < end of copied text >    < from: TTE W or WO Ultrasound Enhancing Agent (12.11.23 @ 19:59) >  CONCLUSIONS:      1. Left ventricular cavity is normal.   2. Normal left ventricular diastolic function.   3. Trace mitral regurgitation.   4. Moderate left pleural effusion noted.   5. Moderate pericardial effusion noted adjacent to the posterolateralleft ventricle. There is expansion of the LV posterolateral wall with systole, no color flow was done.LV pseduoaneurysm needs to be ruled out.   6. The inferior vena cava is normal in size measuring 2.10 cm in diameter, (normal <2.1cm) with normal inspiratory collapse (normal >50%) consistent with normal right atrial pressure (~3, range 0-5mmHg).   7. Recommendations: Repeat 2 D echo with definity to evaluate pericardial effusion, to see if there is any pseudo aneurysm of the LV. Color flow and doppler should be done.    < end of copied text >   [facility]  INFECTIOUS DISEASES at [place] and [place]  ===============================================================                  Jordon Gonzales MD               Diplomates American Board of Internal Medicine & Infectious Diseases                * [place] Office - Appt - Tel  [phone] Fax [phone]                * [place] Office - Appt - Tel [phone] Fax [phone]                                  Hospital Consult line:  [phone]  ==============================================================    NOHEMY BRAN [id number]    Follow up: MRSA bacteremia        pt reports feeling well  no fever  no dyspnea  cough stable  rue vac changed today  asking for medication to help him sleep  wbc remains elevated-pt reports he has had chronically elevated WBC due to unclear cause and has seen hematology for workup for this in the past  afebrile and hemodynamically stable     I have personally reviewed the labs and data; pertinent labs and data are listed in this note; please see below.     _______________________________________________________________  REVIEW OF SYSTEMS  as above  ________________________________________________________________  Allergies:  No Known Allergies    ________________________________________________________________  PHYSICAL EXAM  GEN: in NAD, laying in bed  HEENT: Anicteric sclerae. Moist mucous membranes. No mucosal lesions.   NECK: Supple.  LUNGS: eupneic. decreased Breath sounds at bases B/L.  HEART: RRR, no m/r/g  ABDOMEN: Soft, NT, ND,  +BS.    : No CVA tenderness. No Deleon catheter  EXTREMITIES: RUE vac Minimal swelling.   NEUROLOGIC: Grossly no motor focal deficits   PSYCHIATRIC: Appropriate affect and mood  SKIN: ulcer in upper chest. no drainage, mild erythema  laterally-no warmth or swelling    ________________________________________________________________  Vitals:  Vital Signs Last 24 Hrs  T(C): 36.8 (18 Dec 2023 20:30), Max: 37 (18 Dec 2023 00:00)  T(F): 98.3 (18 Dec 2023 20:30), Max: 98.6 (18 Dec 2023 00:00)  HR: 75 (18 Dec 2023 20:30) (71 - 87)  BP: 130/78 (18 Dec 2023 20:30) (130/78 - 157/98)  BP(mean): --  RR: 18 (18 Dec 2023 20:30) (18 - 20)  SpO2: 90% (18 Dec 2023 20:30) (90% - 96%)    Parameters below as of 18 Dec 2023 20:30  Patient On (Oxygen Delivery Method): room air        Current Antibiotics:  vancomycin  IVPB 500 milliGRAM(s) IV Intermittent every 12 hours    Other medications:  acetaminophen     Tablet .. 975 milliGRAM(s) Oral every 6 hours  amLODIPine   Tablet 5 milliGRAM(s) Oral daily  benztropine 2 milliGRAM(s) Oral two times a day  chlorhexidine 2% Cloths 1 Application(s) Topical daily  divalproex  milliGRAM(s) Oral every 12 hours  enoxaparin Injectable 40 milliGRAM(s) SubCutaneous every 12 hours  folic acid 1 milliGRAM(s) Oral daily  gabapentin 100 milliGRAM(s) Oral at bedtime  influenza   Vaccine 0.5 milliLiter(s) IntraMuscular once  lidocaine   4% Patch 3 Patch Transdermal daily  melatonin 5 milliGRAM(s) Oral at bedtime  methocarbamol 750 milliGRAM(s) Oral every 8 hours  nicotine - 21 mG/24Hr(s) Patch 1 Patch Transdermal every 24 hours  pantoprazole    Tablet 40 milliGRAM(s) Oral before breakfast  polyethylene glycol 3350 17 Gram(s) Oral at bedtime  QUEtiapine 125 milliGRAM(s) Oral at bedtime  senna 2 Tablet(s) Oral at bedtime  thiamine 100 milliGRAM(s) Oral daily                            8.3    17.61 )-----------( 940      ( 18 Dec 2023 06:40 )             25.2       12-18    138  |  99  |  9.1  ----------------------------<  96  4.5   |  29.0  |  0.59    Ca    8.4      18 Dec 2023 06:40  Phos  4.7     12-18  Mg     1.8     12-18                Urinalysis Basic - ( 18 Dec 2023 06:40 )    Color: x / Appearance: x / SG: x / pH: x  Gluc: 96 mg/dL / Ketone: x  / Bili: x / Urobili: x   Blood: x / Protein: x / Nitrite: x   Leuk Esterase: x / RBC: x / WBC x   Sq Epi: x / Non Sq Epi: x / Bacteria: x                  CAPILLARY BLOOD GLUCOSE                     Urinalysis Basic - ( 15 Dec 2023 06:01 )    Color: x / Appearance: x / SG: x / pH: x  Gluc: 83 mg/dL / Ketone: x  / Bili: x / Urobili: x   Blood: x / Protein: x / Nitrite: x   Leuk Esterase: x / RBC: x / WBC x   Sq Epi: x / Non Sq Epi: x / Bacteria: x        PT/INR - ( 15 Dec 2023 06:01 )   PT: 13.0 sec;   INR: 1.18 ratio         PTT - ( 15 Dec 2023 06:01 )  PTT:32.3 sec          CAPILLARY BLOOD GLUCOSE        < from: TTE Limited W or WO Ultrasound Enhancing Agent (12.13.23 @ 10:54) >  FINDINGS:     Left Ventricle:  The left ventricular cavity is normal. Left ventricular systolic function is normal with a calculated ejection fraction of 68 % by the Chavez's biplane method of disks with an ejection fraction visually estimated at 65 to 70%. There is normal left ventricular diastolic function.     Right Ventricle:  The right ventricular cavity is normal in size and normal systolic function. Tricuspid annular plane systolic excursion (TAPSE) is 2.2 cm (normal >=1.7 cm). Tricuspid annular tissue Doppler S' is 11.7 cm/s (normal >10 cm/s).     Aortic Valve:  The aortic valve appears trileaflet with normal systolic excursion.     Mitral Valve:  There is trace mitral regurgitation.     Tricuspid Valve:  There is trace tricuspid regurgitation. Estimated pulmonary artery systolic pressure is 26 mmHg.     Pericardium:  There is an echogenic mass and pocket of fluid close to the LV posterior wall. There is significant pleural effusion.  Unclear if this is localized collection of fluid with fribrinous exudate or hematoma is actually percardial space. Recommend CT scan with IV contrast to definitely evaluate for pericardial effusion if clinically indicated.  In any case, there is no echo evidence of LV pseudoaneurysm. Ultrasound enhancing images were not clear to see if the above mentioned area was opacified.     Pleura:  Small left and small right pleural effusion noted.     Systemic Veins:  The inferior vena cava isnormal in size measuring 1.86 cm in diameter, (normal <2.1cm) with normal inspiratory collapse (normal >50%) consistent with normal right atrial pressure (~3, range 0-5mmHg).  ____________________________________________________________________  QUANTITATIVE DATA:  Left Ventricle Measurements: (Indexed to BSA)     LV Vol d, MOD A2C: 88.4 ml   53.01 ml/m²  LV Vol d, MOD A4C: 112.1 ml  67.26 ml/m²  LV Vol d, MOD BP:  106.5 ml  63.91 ml/m²  LV Vol s, MOD A2C: 30.3 ml   18.18 ml/m²  LV Vol s, MOD A4C: 36.0 ml   21.60 ml/m²  LV Vol s, MOD BP:  33.9 ml   20.33 ml/m²  LVOT SV MOD BP:    72.6 ml  LV EF% MOD BP:     68 %  Visualized LV EF%: 65 to 70%     MV E Vmax:    1.00 m/s  e' lateral:   14.10 cm/s  e' medial:    9.68 cm/s  E/e' lateral: 7.09  E/e' medial:  10.33  E/e' Average: 8.41       Right Ventricle Measurements:     TAPSE:      2.2 cm  TV Amber. S': 11.70 cm/s    Mitral Valve Measurements:     MV E Vmax: 1.0 m/s       Tricuspid Valve Measurements:     TR Vmax:          2.4 m/s  TR Peak Gradient: 22.8 mmHg  RA Pressure:      3 mmHg  PASP:             26 mmHg    ________________________________________________________________________________________  Electronically signed on 12/13/2023 at 12:01:48 PM by Lisa Reyna MD, RPVI    < end of copied text >    < from: CAROL W or WO Ultrasound Enhancing Agent (12.14.23 @ 10:25) >  _______________________________________________________________________________________     CONCLUSIONS:      1. Left ventricular systolic function is normal with an ejection fraction visually estimated at 60 to 65 %.   2. Normal right ventricular cavity size and systolic function.   3. Normal atria.   4. No evidence of left atrial or left atrial appendage thrombus. The left atrial appendage emptying velocity isnormal. Ultrasound enhancing agent was utilized to visualize the left atrial appendage.   5. No significant valvular disease.   6. No echocardiographic evidence of vegetations.   7. Agitated saline injection was negative for intracardiac shunt.   8. Mild aortic calcification seen in the descending thoracic aorta.   9. Compared to the transthoracic echocardiogram performed on 12/13/2023 there have been no significant interval changes. Findings were discussed with Patient and rounding cardiology team on 12/14/2023 at 1120PM.  10. Unable to appreciate any LV pseudoaneurysm despite use of ulrasonic echocontrast given shadow artifact. Recommend CT chest w/ IV contrast vs. cMRI.    < end of copied text >    < from: CT Chest No Cont (12.13.23 @ 22:29) >  Consolidation/atelectasis in the left lowerlobe. New peribronchovascular   foci of groundglass opacity and small consolidation in the right lower   lobe.  Trace right pleural effusion. Moderate left-sided pleural effusion which   is partially loculated along the left paramediastinal region.  Redemonstration of acute, displaced fracture of left 3rd-7th ribs    --- End of Report ---      < end of copied text >              Urinalysis Basic - ( 13 Dec 2023 06:50 )    Color: x / Appearance: x / SG: x / pH: x  Gluc: 106 mg/dL / Ketone: x  / Bili: x / Urobili: x   Blood: x / Protein: x / Nitrite: x   Leuk Esterase: x / RBC: x / WBC x   Sq Epi: x / Non Sq Epi: x / Bacteria: x            CARDIAC MARKERS ( 13 Dec 2023 06:50 )  x     / x     / 14 U/L / x     / x            CAPILLARY BLOOD GLUCOSE                  Urinalysis Basic - ( 12 Dec 2023 07:58 )    Color: x / Appearance: x / SG: x / pH: x  Gluc: 95 mg/dL / Ketone: x  / Bili: x / Urobili: x   Blood: x / Protein: x / Nitrite: x   Leuk Esterase: x / RBC: x / WBC x   Sq Epi: x / Non Sq Epi: x / Bacteria: x                  CAPILLARY BLOOD GLUCOSE                    Urinalysis Basic - ( 12 Dec 2023 07:58 )    Color: x / Appearance: x / SG: x / pH: x  Gluc: 95 mg/dL / Ketone: x  / Bili: x / Urobili: x   Blood: x / Protein: x / Nitrite: x   Leuk Esterase: x / RBC: x / WBC x   Sq Epi: x / Non Sq Epi: x / Bacteria: x                  CAPILLARY BLOOD GLUCOSE                    Urinalysis Basic - ( 11 Dec 2023 06:57 )    Color: x / Appearance: x / SG: x / pH: x  Gluc: 96 mg/dL / Ketone: x  / Bili: x / Urobili: x   Blood: x / Protein: x / Nitrite: x   Leuk Esterase: x / RBC: x / WBC x   Sq Epi: x / Non Sq Epi: x / Bacteria: x                  CAPILLARY BLOOD GLUCOSE                  RECENT CULTURES:  12-07 @ 14:57 .Blood Blood-Peripheral     Growth in aerobic bottle: Gram Positive Cocci in Clusters  Growth in anaerobic bottle: Gram Positive Cocci in Clusters    Growth in aerobic bottle: Gram Positive Cocci in Clusters  Growth in anaerobic bottle: Gram Positive Cocci in Clusters      12-07 @ 14:52 .Blood Blood-Venous     Growth in aerobic bottle: Gram Positive Cocci in Clusters  Growth in anaerobic bottle: Gram Positive Cocci in Clusters    Growth in aerobic bottle: Gram Positive Cocci in Clusters  Growth in anaerobic bottle: Gram Positive Cocci in Clusters      12-06 @ 15:15 .Blood Blood Blood Culture PCR  Methicillin resistant Staphylococcus aureus    Growth in aerobic and anaerobic bottles: Methicillin Resistant  Staphylococcus aureus    Growth in aerobic bottle: Gram Positive Cocci in Clusters  Growth in anaerobic bottle: Gram Positive Cocci in Clusters      12-06 @ 15:10 .Blood Blood     Growth in aerobic and anaerobic bottles: Methicillin Resistant  Staphylococcus aureus  See previous culture [id number]    Growth in anaerobic bottle: Gram Positive Cocci in Clusters  Growth in aerobic bottle: Gram Positive Cocci in Clusters        WBC Count: 19.04 K/uL (12-09-23 @ 07:31)  WBC Count: 20.66 K/uL (12-08-23 @ 08:06)  WBC Count: 22.89 K/uL (12-07-23 @ 06:05)  WBC Count: 14.00 K/uL (12-06-23 @ 05:15)  WBC Count: 13.75 K/uL (12-05-23 @ 02:08)    Creatinine: 0.63 mg/dL (12-09-23 @ 07:31)  Creatinine: 1.17 mg/dL (12-08-23 @ 08:06)  Creatinine: 0.83 mg/dL (12-07-23 @ 06:05)  Creatinine: 0.60 mg/dL (12-06-23 @ 05:15)  Creatinine: 0.81 mg/dL (12-05-23 @ 02:08)      Vancomycin Level, Trough: 8.3 ug/mL (12-09-23 @ 07:31)  Vancomycin Level, Trough: 11.5 ug/mL (12-08-23 @ 18:40)    ________________________________________________________________  CARDIOLOGY   no recent studies   ________________________________________________________________  RADIOLOGY  < from: US Duplex Venous Upper Ext Ltd, Right (12.07.23 @ 11:52) >  INTERPRETATION:  CLINICAL INFORMATION: Arm swelling    COMPARISON: None available.    TECHNIQUE: Duplex sonography of the RIGHT UPPER extremity veins with   color and spectral Doppler, with and without compression.    FINDINGS:    The right internal jugular, subclavian, axillary and brachial veins are   patent and compressible where applicable.  The basilic vein (superficial   vein) is patent and without thrombus.  Superficial thrombus in the right   cephalic vein.      IMPRESSION:  No evidence of right upper extremity deep venous thrombosis.    Superficial thrombus in the right cephalic vein.    < end of copied text >  < from: Xray Chest 1 View- PORTABLE-Urgent (Xray Chest 1 View- PORTABLE-Urgent .) (12.06.23 @ 10:15) >  INTERPRETATION:  Chest one view    HISTORY: Leukocytosis    COMPARISON STUDY: 12/2/2023    Frontal expiratory view of the chest shows the heart to be similar in   size. The lungs show partial clearing of the left base and there is no   evidence of pneumothorax nor pleural effusion.    IMPRESSION:  Left base clearing.    < end of copied text >    < from: TTE W or WO Ultrasound Enhancing Agent (12.11.23 @ 19:59) >  CONCLUSIONS:      1. Left ventricular cavity is normal.   2. Normal left ventricular diastolic function.   3. Trace mitral regurgitation.   4. Moderate left pleural effusion noted.   5. Moderate pericardial effusion noted adjacent to the posterolateralleft ventricle. There is expansion of the LV posterolateral wall with systole, no color flow was done.LV pseduoaneurysm needs to be ruled out.   6. The inferior vena cava is normal in size measuring 2.10 cm in diameter, (normal <2.1cm) with normal inspiratory collapse (normal >50%) consistent with normal right atrial pressure (~3, range 0-5mmHg).   7. Recommendations: Repeat 2 D echo with definity to evaluate pericardial effusion, to see if there is any pseudo aneurysm of the LV. Color flow and doppler should be done.    < end of copied text >

## 2023-12-19 LAB
BASOPHILS # BLD AUTO: 0.15 K/UL — SIGNIFICANT CHANGE UP (ref 0–0.2)
BASOPHILS # BLD AUTO: 0.15 K/UL — SIGNIFICANT CHANGE UP (ref 0–0.2)
BASOPHILS NFR BLD AUTO: 0.8 % — SIGNIFICANT CHANGE UP (ref 0–2)
BASOPHILS NFR BLD AUTO: 0.8 % — SIGNIFICANT CHANGE UP (ref 0–2)
EOSINOPHIL # BLD AUTO: 0.56 K/UL — HIGH (ref 0–0.5)
EOSINOPHIL # BLD AUTO: 0.56 K/UL — HIGH (ref 0–0.5)
EOSINOPHIL NFR BLD AUTO: 2.9 % — SIGNIFICANT CHANGE UP (ref 0–6)
EOSINOPHIL NFR BLD AUTO: 2.9 % — SIGNIFICANT CHANGE UP (ref 0–6)
HCT VFR BLD CALC: 24.7 % — LOW (ref 39–50)
HCT VFR BLD CALC: 24.7 % — LOW (ref 39–50)
HGB BLD-MCNC: 8.4 G/DL — LOW (ref 13–17)
HGB BLD-MCNC: 8.4 G/DL — LOW (ref 13–17)
IMM GRANULOCYTES NFR BLD AUTO: 5.2 % — HIGH (ref 0–0.9)
IMM GRANULOCYTES NFR BLD AUTO: 5.2 % — HIGH (ref 0–0.9)
LYMPHOCYTES # BLD AUTO: 17.6 % — SIGNIFICANT CHANGE UP (ref 13–44)
LYMPHOCYTES # BLD AUTO: 17.6 % — SIGNIFICANT CHANGE UP (ref 13–44)
LYMPHOCYTES # BLD AUTO: 3.43 K/UL — HIGH (ref 1–3.3)
LYMPHOCYTES # BLD AUTO: 3.43 K/UL — HIGH (ref 1–3.3)
MCHC RBC-ENTMCNC: 28.1 PG — SIGNIFICANT CHANGE UP (ref 27–34)
MCHC RBC-ENTMCNC: 28.1 PG — SIGNIFICANT CHANGE UP (ref 27–34)
MCHC RBC-ENTMCNC: 34 GM/DL — SIGNIFICANT CHANGE UP (ref 32–36)
MCHC RBC-ENTMCNC: 34 GM/DL — SIGNIFICANT CHANGE UP (ref 32–36)
MCV RBC AUTO: 82.6 FL — SIGNIFICANT CHANGE UP (ref 80–100)
MCV RBC AUTO: 82.6 FL — SIGNIFICANT CHANGE UP (ref 80–100)
MONOCYTES # BLD AUTO: 2.38 K/UL — HIGH (ref 0–0.9)
MONOCYTES # BLD AUTO: 2.38 K/UL — HIGH (ref 0–0.9)
MONOCYTES NFR BLD AUTO: 12.2 % — SIGNIFICANT CHANGE UP (ref 2–14)
MONOCYTES NFR BLD AUTO: 12.2 % — SIGNIFICANT CHANGE UP (ref 2–14)
NEUTROPHILS # BLD AUTO: 11.97 K/UL — HIGH (ref 1.8–7.4)
NEUTROPHILS # BLD AUTO: 11.97 K/UL — HIGH (ref 1.8–7.4)
NEUTROPHILS NFR BLD AUTO: 61.3 % — SIGNIFICANT CHANGE UP (ref 43–77)
NEUTROPHILS NFR BLD AUTO: 61.3 % — SIGNIFICANT CHANGE UP (ref 43–77)
NON-GYNECOLOGICAL CYTOLOGY STUDY: SIGNIFICANT CHANGE UP
NON-GYNECOLOGICAL CYTOLOGY STUDY: SIGNIFICANT CHANGE UP
PLATELET # BLD AUTO: 939 K/UL — HIGH (ref 150–400)
PLATELET # BLD AUTO: 939 K/UL — HIGH (ref 150–400)
PROCALCITONIN SERPL-MCNC: 0.17 NG/ML — HIGH (ref 0.02–0.1)
PROCALCITONIN SERPL-MCNC: 0.17 NG/ML — HIGH (ref 0.02–0.1)
RBC # BLD: 2.99 M/UL — LOW (ref 4.2–5.8)
RBC # BLD: 2.99 M/UL — LOW (ref 4.2–5.8)
RBC # FLD: 17.2 % — HIGH (ref 10.3–14.5)
RBC # FLD: 17.2 % — HIGH (ref 10.3–14.5)
VANCOMYCIN TROUGH SERPL-MCNC: 13.5 UG/ML — SIGNIFICANT CHANGE UP (ref 10–20)
VANCOMYCIN TROUGH SERPL-MCNC: 13.5 UG/ML — SIGNIFICANT CHANGE UP (ref 10–20)
WBC # BLD: 19.5 K/UL — HIGH (ref 3.8–10.5)
WBC # BLD: 19.5 K/UL — HIGH (ref 3.8–10.5)
WBC # FLD AUTO: 19.5 K/UL — HIGH (ref 3.8–10.5)
WBC # FLD AUTO: 19.5 K/UL — HIGH (ref 3.8–10.5)

## 2023-12-19 PROCEDURE — 71045 X-RAY EXAM CHEST 1 VIEW: CPT | Mod: 26

## 2023-12-19 PROCEDURE — 99233 SBSQ HOSP IP/OBS HIGH 50: CPT

## 2023-12-19 PROCEDURE — 76937 US GUIDE VASCULAR ACCESS: CPT | Mod: 26,59

## 2023-12-19 PROCEDURE — 36573 INSJ PICC RS&I 5 YR+: CPT

## 2023-12-19 PROCEDURE — 76942 ECHO GUIDE FOR BIOPSY: CPT | Mod: 26,59

## 2023-12-19 PROCEDURE — 99232 SBSQ HOSP IP/OBS MODERATE 35: CPT

## 2023-12-19 RX ORDER — CHLORHEXIDINE GLUCONATE 213 G/1000ML
1 SOLUTION TOPICAL
Refills: 0 | Status: DISCONTINUED | OUTPATIENT
Start: 2023-12-19 | End: 2023-12-20

## 2023-12-19 RX ORDER — SODIUM CHLORIDE 9 MG/ML
10 INJECTION INTRAMUSCULAR; INTRAVENOUS; SUBCUTANEOUS
Refills: 0 | Status: DISCONTINUED | OUTPATIENT
Start: 2023-12-19 | End: 2023-12-20

## 2023-12-19 RX ADMIN — ENOXAPARIN SODIUM 40 MILLIGRAM(S): 100 INJECTION SUBCUTANEOUS at 17:53

## 2023-12-19 RX ADMIN — Medication 2 MILLIGRAM(S): at 17:53

## 2023-12-19 RX ADMIN — Medication 1 PATCH: at 23:00

## 2023-12-19 RX ADMIN — ENOXAPARIN SODIUM 40 MILLIGRAM(S): 100 INJECTION SUBCUTANEOUS at 06:07

## 2023-12-19 RX ADMIN — Medication 400 MILLIGRAM(S): at 17:53

## 2023-12-19 RX ADMIN — CEFEPIME 2000 MILLIGRAM(S): 1 INJECTION, POWDER, FOR SOLUTION INTRAMUSCULAR; INTRAVENOUS at 23:11

## 2023-12-19 RX ADMIN — Medication 1 MILLIGRAM(S): at 12:10

## 2023-12-19 RX ADMIN — AMLODIPINE BESYLATE 5 MILLIGRAM(S): 2.5 TABLET ORAL at 06:07

## 2023-12-19 RX ADMIN — DIVALPROEX SODIUM 500 MILLIGRAM(S): 500 TABLET, DELAYED RELEASE ORAL at 17:52

## 2023-12-19 RX ADMIN — Medication 975 MILLIGRAM(S): at 13:00

## 2023-12-19 RX ADMIN — METHOCARBAMOL 1000 MILLIGRAM(S): 500 TABLET, FILM COATED ORAL at 16:34

## 2023-12-19 RX ADMIN — CHLORHEXIDINE GLUCONATE 1 APPLICATION(S): 213 SOLUTION TOPICAL at 12:12

## 2023-12-19 RX ADMIN — Medication 975 MILLIGRAM(S): at 01:02

## 2023-12-19 RX ADMIN — POLYETHYLENE GLYCOL 3350 17 GRAM(S): 17 POWDER, FOR SOLUTION ORAL at 23:14

## 2023-12-19 RX ADMIN — Medication 975 MILLIGRAM(S): at 23:35

## 2023-12-19 RX ADMIN — QUETIAPINE FUMARATE 125 MILLIGRAM(S): 200 TABLET, FILM COATED ORAL at 23:12

## 2023-12-19 RX ADMIN — Medication 166.67 MILLIGRAM(S): at 23:13

## 2023-12-19 RX ADMIN — Medication 400 MILLIGRAM(S): at 01:45

## 2023-12-19 RX ADMIN — Medication 1 PATCH: at 19:00

## 2023-12-19 RX ADMIN — Medication 975 MILLIGRAM(S): at 19:00

## 2023-12-19 RX ADMIN — Medication 975 MILLIGRAM(S): at 17:52

## 2023-12-19 RX ADMIN — SENNA PLUS 2 TABLET(S): 8.6 TABLET ORAL at 23:13

## 2023-12-19 RX ADMIN — GABAPENTIN 100 MILLIGRAM(S): 400 CAPSULE ORAL at 23:12

## 2023-12-19 RX ADMIN — LIDOCAINE 3 PATCH: 4 CREAM TOPICAL at 12:10

## 2023-12-19 RX ADMIN — Medication 2 MILLIGRAM(S): at 06:07

## 2023-12-19 RX ADMIN — Medication 5 MILLIGRAM(S): at 23:11

## 2023-12-19 RX ADMIN — PANTOPRAZOLE SODIUM 40 MILLIGRAM(S): 20 TABLET, DELAYED RELEASE ORAL at 06:07

## 2023-12-19 RX ADMIN — Medication 975 MILLIGRAM(S): at 06:06

## 2023-12-19 RX ADMIN — Medication 975 MILLIGRAM(S): at 12:09

## 2023-12-19 RX ADMIN — CHLORHEXIDINE GLUCONATE 1 APPLICATION(S): 213 SOLUTION TOPICAL at 06:58

## 2023-12-19 RX ADMIN — Medication 400 MILLIGRAM(S): at 06:40

## 2023-12-19 RX ADMIN — Medication 1 PATCH: at 07:33

## 2023-12-19 RX ADMIN — Medication 400 MILLIGRAM(S): at 13:00

## 2023-12-19 RX ADMIN — CEFEPIME 2000 MILLIGRAM(S): 1 INJECTION, POWDER, FOR SOLUTION INTRAMUSCULAR; INTRAVENOUS at 06:05

## 2023-12-19 RX ADMIN — METHOCARBAMOL 1000 MILLIGRAM(S): 500 TABLET, FILM COATED ORAL at 23:12

## 2023-12-19 RX ADMIN — Medication 400 MILLIGRAM(S): at 19:00

## 2023-12-19 RX ADMIN — CEFEPIME 2000 MILLIGRAM(S): 1 INJECTION, POWDER, FOR SOLUTION INTRAMUSCULAR; INTRAVENOUS at 16:33

## 2023-12-19 RX ADMIN — Medication 400 MILLIGRAM(S): at 23:45

## 2023-12-19 RX ADMIN — Medication 400 MILLIGRAM(S): at 23:12

## 2023-12-19 RX ADMIN — Medication 400 MILLIGRAM(S): at 12:10

## 2023-12-19 RX ADMIN — Medication 400 MILLIGRAM(S): at 01:02

## 2023-12-19 RX ADMIN — Medication 166.67 MILLIGRAM(S): at 01:04

## 2023-12-19 RX ADMIN — DIVALPROEX SODIUM 500 MILLIGRAM(S): 500 TABLET, DELAYED RELEASE ORAL at 06:06

## 2023-12-19 RX ADMIN — Medication 100 MILLIGRAM(S): at 12:09

## 2023-12-19 RX ADMIN — Medication 975 MILLIGRAM(S): at 23:11

## 2023-12-19 RX ADMIN — Medication 975 MILLIGRAM(S): at 02:00

## 2023-12-19 RX ADMIN — METHOCARBAMOL 1000 MILLIGRAM(S): 500 TABLET, FILM COATED ORAL at 06:05

## 2023-12-19 RX ADMIN — Medication 400 MILLIGRAM(S): at 06:06

## 2023-12-19 RX ADMIN — Medication 166.67 MILLIGRAM(S): at 12:09

## 2023-12-19 RX ADMIN — Medication 975 MILLIGRAM(S): at 00:00

## 2023-12-19 NOTE — CHART NOTE - NSCHARTNOTEFT_GEN_A_CORE
RN called to inform patients johana pad detached from the wound vac. I went to bedside, patient sleeping and easily arousable, in no clinical distress.  I replaced the johana pad and able to restart the wound vac with good seal with no issues. Patient asked " can you cover this thing on my chest" ( referring to the naik cut) I proceeded to cover with a 2x2 and tegaderm. No further requests or complaints at this time

## 2023-12-19 NOTE — PROCEDURE NOTE - NSINDICATIONS_GEN_A_CORE
venous access
pneumothorax
critical illness/emergency venous access
respiratory distress/respiratory failure
Left side thora/pleural effusion
antibiotic therapy

## 2023-12-19 NOTE — PROCEDURE NOTE - PROCEDURE DATE TIME, MLM
29-Nov-2023
01-Dec-2023
13-Dec-2023
19-Dec-2023
15-Dec-2023 09:36
01-Dec-2023
29-Nov-2023 14:30
04-Dec-2023 12:24

## 2023-12-19 NOTE — PROGRESS NOTE ADULT - ASSESSMENT
57 year old male with PMH of ETOH abuse, HTN, HLD, seizures, tardive dyskinesia hiatal hernia s/p mechanical fall down stairs while intoxicated on 11/29. Pt intubated in ED for airway protection. Found to have left rib fractures 3, 6, 7, 10 and left pneumothorax with extensive subcutaneous emphysema requiring a left sided pigtail on 11/29. Hospital course complicated by acute agitation and acute asthma exacerbation requiring re-intubation on 12/1. Pt extubated 12/2. Transferred to floor 12/6 and noted to be febrile with uptrending WBC, rt arm edema erythema and reported purulent drainage. Found to have MRSA bacteremia, concern for cellulitis , septic thrombophlebitis    - 12/6 and 12/7 BCX + MRSA  - s/p I&D of septic thrombophlebitis of right arm >> Incision and drainage of Infected basilic and cephalic thrombophlebitis at antecubital level purulent discharge seen upon incision  - Repeat BCX 12/10-+  - BCX 12/11 and 12/12 ngtd  - CAROL no vegetations  - RUE venous doppler with sup thrombus of right cephalic vein   - Ct chest performed + left loculated effusion  - s/p thoracentesis 12/15 for left loculated effusion, culture neg  - c/w cefepime 2 g iv q8h  - c/w vancomycin 1250mg iv q12h  - monitor trough and adjust per pharmacy- last vanco trough 13.5  - will plan 6 weeks abx  end 1/25/24 with weekly CBc CMP vanco trough to be adjusted by MD at facility   - anticoagulation as per vascular   - Trend Fever - afebrile   - Trend Leukocytosis am  - procal 0.17  - pt overall afebrile, nontoxic has  elevated WBC on broad spectrum abx ?reactive. pt reports chronic leukocytosis due to unclear cause  - CXR  12/19/23 reports improved left pleural effusion. would check Ct c/ap if WBC continues to uptrend.   - repeat BCX if febrile  - Will need hemonc f/u outpatient  - ID f/u 1 month        d/w CM, team

## 2023-12-19 NOTE — PROCEDURE NOTE - NSPOSTCAREGUIDE_GEN_A_CORE
Verbal/written post procedure instructions were given to patient/caregiver/Instructed patient/caregiver to follow-up with primary care physician/Instructed patient/caregiver regarding signs and symptoms of infection/Keep the cast/splint/dressing clean and dry/Care for catheter as per unit/ICU protocols
Instructed patient/caregiver to follow-up with primary care physician
Verbal/written post procedure instructions were given to patient/caregiver/Instructed patient/caregiver to follow-up with primary care physician/Instructed patient/caregiver regarding signs and symptoms of infection/Keep the cast/splint/dressing clean and dry/Care for catheter as per unit/ICU protocols

## 2023-12-19 NOTE — PROCEDURE NOTE - NSPROCNAME_GEN_A_CORE
Interventional Radiology
Central Line Insertion
General
General
Thoracentesis
Chest Tube
Tracheal Intubation
PICC Line Insertion
Midline Insertion

## 2023-12-19 NOTE — PROGRESS NOTE ADULT - NS ATTEND OPT1 GEN_ALL_CORE
I attest my time as attending is greater than 50% of the total combined time spent on qualifying patient care activities by the PA/NP and attending.
knee pain/injury
I attest my time as attending is greater than 50% of the total combined time spent on qualifying patient care activities by the PA/NP and attending.

## 2023-12-19 NOTE — PROGRESS NOTE ADULT - NS ATTEND AMEND GEN_ALL_CORE FT
58-year-old male s/p fall with rib fractures, L pneumothorax, subcutaneous emphysema s/p decompression, MRSA bacteremia, septic thrombophlebitis s/p excision, and L pleural effusions s/p thoracentesis.   - c/w wound vac to RUE   - Daily wound care to chest wall incision  - Appreciate infectious disease recommendations   - C/w antibiotics   - Leukocytosis uptrending, however, procal 0.17. Patient asymptomatic. Reports hx of chronic leukocytosis   - Trend leukocytosis  - Discharge planning     #Schizoaffective, tardive dyskinesia, delirium: c/w home meds. C/w Depakote, Seroquel, and Zyprexa    #HTN: c/w amlodipine     PPX:  - DVT ppx: SCD + lovenox   - GI ppx: PPI home med   - IS / activity as tolerated   - Bowel regimen  - Delirium precautions .

## 2023-12-19 NOTE — PROCEDURE NOTE - NSPROCDETAILS_GEN_ALL_CORE
location identified, draped/prepped, sterile technique used, needle inserted/introduced/Seldinger technique/catheter inserted over needle/ultrasound assessment of effusion (localization)
location identified, draped/prepped, sterile technique used/sterile dressing applied/sterile technique, catheter placed/supine position/ultrasound guidance
patient pre-oxygenated, tube inserted, placement confirmed
guidewire recovered/lumen(s) aspirated and flushed/sterile dressing applied/sterile technique, catheter placed
location identified, draped/prepped, sterile technique used/sterile dressing applied/sterile technique, catheter placed/supine position/ultrasound guidance
Seldinger technique/dressing applied/secured in place/thoracostomy tube placed percutaneously

## 2023-12-19 NOTE — PROGRESS NOTE ADULT - SUBJECTIVE AND OBJECTIVE BOX
[facility]  INFECTIOUS DISEASES at [place] and [place]  ===============================================================                  Jordon Gonzales MD               Diplomates American Board of Internal Medicine & Infectious Diseases                * [place] Office - Appt - Tel  [phone] Fax [phone]                * [place] Office - Appt - Tel [phone] Fax [phone]                                  Hospital Consult line:  [phone]  ==============================================================    NOHEMY BRAN [id number]    Follow up: MRSA bacteremia        pt reports feeling well apart from left chest wall pain, not controlled by pain meds  no diarrhea  no fever  no dyspnea  cough stable  chronic back pain-stable  RUE midline removed and lue picc placed  asking for medication to help him sleep reports taking clonazepam prn  wbc remains elevated-pt reports he has had chronically elevated WBC due to unclear cause and has seen hematology for workup for this in the past  afebrile and hemodynamically stable     I have personally reviewed the labs and data; pertinent labs and data are listed in this note; please see below.     _______________________________________________________________  REVIEW OF SYSTEMS  as above  ________________________________________________________________  Allergies:  No Known Allergies    ________________________________________________________________  PHYSICAL EXAM  GEN: in NAD, laying in bed  HEENT: Anicteric sclerae. Moist mucous membranes. No mucosal lesions.   NECK: Supple.  LUNGS: eupneic. decreased Breath sounds at bases B/L. left base crackles. tenderness over posterior left chest wall  HEART: RRR, no m/r/g  ABDOMEN: Soft, NT, ND,  +BS.    : No CVA tenderness. No Deleon catheter  EXTREMITIES: RUE vac Minimal swelling. lue picc intact  NEUROLOGIC: Grossly no motor focal deficits   PSYCHIATRIC: Appropriate affect and mood  SKIN: ulcer in upper chest. no drainage, mild erythema  laterally-no warmth or swelling    ________________________________________________________________  Vitals:  Vital Signs Last 24 Hrs  T(C): 37 (19 Dec 2023 07:35), Max: 37.1 (19 Dec 2023 00:02)  T(F): 98.6 (19 Dec 2023 07:35), Max: 98.8 (19 Dec 2023 00:02)  HR: 75 (19 Dec 2023 07:35) (71 - 89)  BP: 147/83 (19 Dec 2023 07:35) (123/65 - 152/89)  BP(mean): --  RR: 18 (19 Dec 2023 07:35) (18 - 18)  SpO2: 92% (19 Dec 2023 07:35) (90% - 94%)    Parameters below as of 19 Dec 2023 07:35  Patient On (Oxygen Delivery Method): room air        Current Antibiotics:  vancomycin  IVPB 500 milliGRAM(s) IV Intermittent every 12 hours    Other medications:  acetaminophen     Tablet .. 975 milliGRAM(s) Oral every 6 hours  amLODIPine   Tablet 5 milliGRAM(s) Oral daily  benztropine 2 milliGRAM(s) Oral two times a day  chlorhexidine 2% Cloths 1 Application(s) Topical daily  divalproex  milliGRAM(s) Oral every 12 hours  enoxaparin Injectable 40 milliGRAM(s) SubCutaneous every 12 hours  folic acid 1 milliGRAM(s) Oral daily  gabapentin 100 milliGRAM(s) Oral at bedtime  influenza   Vaccine 0.5 milliLiter(s) IntraMuscular once  lidocaine   4% Patch 3 Patch Transdermal daily  melatonin 5 milliGRAM(s) Oral at bedtime  methocarbamol 750 milliGRAM(s) Oral every 8 hours  nicotine - 21 mG/24Hr(s) Patch 1 Patch Transdermal every 24 hours  pantoprazole    Tablet 40 milliGRAM(s) Oral before breakfast  polyethylene glycol 3350 17 Gram(s) Oral at bedtime  QUEtiapine 125 milliGRAM(s) Oral at bedtime  senna 2 Tablet(s) Oral at bedtime  thiamine 100 milliGRAM(s) Oral daily                  Urinalysis Basic - ( 18 Dec 2023 06:40 )    Color: x / Appearance: x / SG: x / pH: x  Gluc: 96 mg/dL / Ketone: x  / Bili: x / Urobili: x   Blood: x / Protein: x / Nitrite: x   Leuk Esterase: x / RBC: x / WBC x   Sq Epi: x / Non Sq Epi: x / Bacteria: x                  CAPILLARY BLOOD GLUCOSE                     Urinalysis Basic - ( 15 Dec 2023 06:01 )    Color: x / Appearance: x / SG: x / pH: x  Gluc: 83 mg/dL / Ketone: x  / Bili: x / Urobili: x   Blood: x / Protein: x / Nitrite: x   Leuk Esterase: x / RBC: x / WBC x   Sq Epi: x / Non Sq Epi: x / Bacteria: x        PT/INR - ( 15 Dec 2023 06:01 )   PT: 13.0 sec;   INR: 1.18 ratio         PTT - ( 15 Dec 2023 06:01 )  PTT:32.3 sec          CAPILLARY BLOOD GLUCOSE        < from: TTE Limited W or WO Ultrasound Enhancing Agent (12.13.23 @ 10:54) >  FINDINGS:     Left Ventricle:  The left ventricular cavity is normal. Left ventricular systolic function is normal with a calculated ejection fraction of 68 % by the Chavez's biplane method of disks with an ejection fraction visually estimated at 65 to 70%. There is normal left ventricular diastolic function.     Right Ventricle:  The right ventricular cavity is normal in size and normal systolic function. Tricuspid annular plane systolic excursion (TAPSE) is 2.2 cm (normal >=1.7 cm). Tricuspid annular tissue Doppler S' is 11.7 cm/s (normal >10 cm/s).     Aortic Valve:  The aortic valve appears trileaflet with normal systolic excursion.     Mitral Valve:  There is trace mitral regurgitation.     Tricuspid Valve:  There is trace tricuspid regurgitation. Estimated pulmonary artery systolic pressure is 26 mmHg.     Pericardium:  There is an echogenic mass and pocket of fluid close to the LV posterior wall. There is significant pleural effusion.  Unclear if this is localized collection of fluid with fribrinous exudate or hematoma is actually percardial space. Recommend CT scan with IV contrast to definitely evaluate for pericardial effusion if clinically indicated.  In any case, there is no echo evidence of LV pseudoaneurysm. Ultrasound enhancing images were not clear to see if the above mentioned area was opacified.     Pleura:  Small left and small right pleural effusion noted.     Systemic Veins:  The inferior vena cava isnormal in size measuring 1.86 cm in diameter, (normal <2.1cm) with normal inspiratory collapse (normal >50%) consistent with normal right atrial pressure (~3, range 0-5mmHg).  ____________________________________________________________________  QUANTITATIVE DATA:  Left Ventricle Measurements: (Indexed to BSA)     LV Vol d, MOD A2C: 88.4 ml   53.01 ml/m²  LV Vol d, MOD A4C: 112.1 ml  67.26 ml/m²  LV Vol d, MOD BP:  106.5 ml  63.91 ml/m²  LV Vol s, MOD A2C: 30.3 ml   18.18 ml/m²  LV Vol s, MOD A4C: 36.0 ml   21.60 ml/m²  LV Vol s, MOD BP:  33.9 ml   20.33 ml/m²  LVOT SV MOD BP:    72.6 ml  LV EF% MOD BP:     68 %  Visualized LV EF%: 65 to 70%     MV E Vmax:    1.00 m/s  e' lateral:   14.10 cm/s  e' medial:    9.68 cm/s  E/e' lateral: 7.09  E/e' medial:  10.33  E/e' Average: 8.41       Right Ventricle Measurements:     TAPSE:      2.2 cm  TV Amber. S': 11.70 cm/s    Mitral Valve Measurements:     MV E Vmax: 1.0 m/s       Tricuspid Valve Measurements:     TR Vmax:          2.4 m/s  TR Peak Gradient: 22.8 mmHg  RA Pressure:      3 mmHg  PASP:             26 mmHg    ________________________________________________________________________________________  Electronically signed on 12/13/2023 at 12:01:48 PM by Lisa Reyna MD, RPVI    < end of copied text >    < from: CAROL W or WO Ultrasound Enhancing Agent (12.14.23 @ 10:25) >  _______________________________________________________________________________________     CONCLUSIONS:      1. Left ventricular systolic function is normal with an ejection fraction visually estimated at 60 to 65 %.   2. Normal right ventricular cavity size and systolic function.   3. Normal atria.   4. No evidence of left atrial or left atrial appendage thrombus. The left atrial appendage emptying velocity isnormal. Ultrasound enhancing agent was utilized to visualize the left atrial appendage.   5. No significant valvular disease.   6. No echocardiographic evidence of vegetations.   7. Agitated saline injection was negative for intracardiac shunt.   8. Mild aortic calcification seen in the descending thoracic aorta.   9. Compared to the transthoracic echocardiogram performed on 12/13/2023 there have been no significant interval changes. Findings were discussed with Patient and rounding cardiology team on 12/14/2023 at 1120PM.  10. Unable to appreciate any LV pseudoaneurysm despite use of ulrasonic echocontrast given shadow artifact. Recommend CT chest w/ IV contrast vs. cMRI.    < end of copied text >    < from: CT Chest No Cont (12.13.23 @ 22:29) >  Consolidation/atelectasis in the left lowerlobe. New peribronchovascular   foci of groundglass opacity and small consolidation in the right lower   lobe.  Trace right pleural effusion. Moderate left-sided pleural effusion which   is partially loculated along the left paramediastinal region.  Redemonstration of acute, displaced fracture of left 3rd-7th ribs    --- End of Report ---      < end of copied text >              Urinalysis Basic - ( 13 Dec 2023 06:50 )    Color: x / Appearance: x / SG: x / pH: x  Gluc: 106 mg/dL / Ketone: x  / Bili: x / Urobili: x   Blood: x / Protein: x / Nitrite: x   Leuk Esterase: x / RBC: x / WBC x   Sq Epi: x / Non Sq Epi: x / Bacteria: x            CARDIAC MARKERS ( 13 Dec 2023 06:50 )  x     / x     / 14 U/L / x     / x            CAPILLARY BLOOD GLUCOSE                  Urinalysis Basic - ( 12 Dec 2023 07:58 )    Color: x / Appearance: x / SG: x / pH: x  Gluc: 95 mg/dL / Ketone: x  / Bili: x / Urobili: x   Blood: x / Protein: x / Nitrite: x   Leuk Esterase: x / RBC: x / WBC x   Sq Epi: x / Non Sq Epi: x / Bacteria: x                  CAPILLARY BLOOD GLUCOSE                    Urinalysis Basic - ( 12 Dec 2023 07:58 )    Color: x / Appearance: x / SG: x / pH: x  Gluc: 95 mg/dL / Ketone: x  / Bili: x / Urobili: x   Blood: x / Protein: x / Nitrite: x   Leuk Esterase: x / RBC: x / WBC x   Sq Epi: x / Non Sq Epi: x / Bacteria: x                  CAPILLARY BLOOD GLUCOSE                    Urinalysis Basic - ( 11 Dec 2023 06:57 )    Color: x / Appearance: x / SG: x / pH: x  Gluc: 96 mg/dL / Ketone: x  / Bili: x / Urobili: x   Blood: x / Protein: x / Nitrite: x   Leuk Esterase: x / RBC: x / WBC x   Sq Epi: x / Non Sq Epi: x / Bacteria: x                  CAPILLARY BLOOD GLUCOSE                  RECENT CULTURES:  12-07 @ 14:57 .Blood Blood-Peripheral     Growth in aerobic bottle: Gram Positive Cocci in Clusters  Growth in anaerobic bottle: Gram Positive Cocci in Clusters    Growth in aerobic bottle: Gram Positive Cocci in Clusters  Growth in anaerobic bottle: Gram Positive Cocci in Clusters      12-07 @ 14:52 .Blood Blood-Venous     Growth in aerobic bottle: Gram Positive Cocci in Clusters  Growth in anaerobic bottle: Gram Positive Cocci in Clusters    Growth in aerobic bottle: Gram Positive Cocci in Clusters  Growth in anaerobic bottle: Gram Positive Cocci in Clusters      12-06 @ 15:15 .Blood Blood Blood Culture PCR  Methicillin resistant Staphylococcus aureus    Growth in aerobic and anaerobic bottles: Methicillin Resistant  Staphylococcus aureus    Growth in aerobic bottle: Gram Positive Cocci in Clusters  Growth in anaerobic bottle: Gram Positive Cocci in Clusters      12-06 @ 15:10 .Blood Blood     Growth in aerobic and anaerobic bottles: Methicillin Resistant  Staphylococcus aureus  See previous culture [id number]    Growth in anaerobic bottle: Gram Positive Cocci in Clusters  Growth in aerobic bottle: Gram Positive Cocci in Clusters        WBC Count: 19.04 K/uL (12-09-23 @ 07:31)  WBC Count: 20.66 K/uL (12-08-23 @ 08:06)  WBC Count: 22.89 K/uL (12-07-23 @ 06:05)  WBC Count: 14.00 K/uL (12-06-23 @ 05:15)  WBC Count: 13.75 K/uL (12-05-23 @ 02:08)    Creatinine: 0.63 mg/dL (12-09-23 @ 07:31)  Creatinine: 1.17 mg/dL (12-08-23 @ 08:06)  Creatinine: 0.83 mg/dL (12-07-23 @ 06:05)  Creatinine: 0.60 mg/dL (12-06-23 @ 05:15)  Creatinine: 0.81 mg/dL (12-05-23 @ 02:08)      Vancomycin Level, Trough: 8.3 ug/mL (12-09-23 @ 07:31)  Vancomycin Level, Trough: 11.5 ug/mL (12-08-23 @ 18:40)    ________________________________________________________________  CARDIOLOGY   no recent studies   ________________________________________________________________  RADIOLOGY  < from: US Duplex Venous Upper Ext Ltd, Right (12.07.23 @ 11:52) >  INTERPRETATION:  CLINICAL INFORMATION: Arm swelling    COMPARISON: None available.    TECHNIQUE: Duplex sonography of the RIGHT UPPER extremity veins with   color and spectral Doppler, with and without compression.    FINDINGS:    The right internal jugular, subclavian, axillary and brachial veins are   patent and compressible where applicable.  The basilic vein (superficial   vein) is patent and without thrombus.  Superficial thrombus in the right   cephalic vein.      IMPRESSION:  No evidence of right upper extremity deep venous thrombosis.    Superficial thrombus in the right cephalic vein.    < end of copied text >  < from: Xray Chest 1 View- PORTABLE-Urgent (Xray Chest 1 View- PORTABLE-Urgent .) (12.06.23 @ 10:15) >  INTERPRETATION:  Chest one view    HISTORY: Leukocytosis    COMPARISON STUDY: 12/2/2023    Frontal expiratory view of the chest shows the heart to be similar in   size. The lungs show partial clearing of the left base and there is no   evidence of pneumothorax nor pleural effusion.    IMPRESSION:  Left base clearing.    < end of copied text >    < from: TTE W or WO Ultrasound Enhancing Agent (12.11.23 @ 19:59) >  CONCLUSIONS:      1. Left ventricular cavity is normal.   2. Normal left ventricular diastolic function.   3. Trace mitral regurgitation.   4. Moderate left pleural effusion noted.   5. Moderate pericardial effusion noted adjacent to the posterolateralleft ventricle. There is expansion of the LV posterolateral wall with systole, no color flow was done.LV pseduoaneurysm needs to be ruled out.   6. The inferior vena cava is normal in size measuring 2.10 cm in diameter, (normal <2.1cm) with normal inspiratory collapse (normal >50%) consistent with normal right atrial pressure (~3, range 0-5mmHg).   7. Recommendations: Repeat 2 D echo with definity to evaluate pericardial effusion, to see if there is any pseudo aneurysm of the LV. Color flow and doppler should be done.    < end of copied text >   [facility]  INFECTIOUS DISEASES at [place] and [place]  ===============================================================                  Jordon Gonzales MD               Diplomates American Board of Internal Medicine & Infectious Diseases                * [place] Office - Appt - Tel  [phone] Fax [phone]                * [place] Office - Appt - Tel [phone] Fax [phone]                                  Hospital Consult line:  [phone]  ==============================================================    NOHEMY BRAN [id number]    Follow up: MRSA bacteremia        pt reports feeling well apart from left chest wall pain, not controlled by pain meds  no diarrhea  no fever  no dyspnea  cough stable  chronic back pain-stable  RUE midline removed and lue picc placed  asking for medication to help him sleep reports taking clonazepam prn  wbc remains elevated-pt reports he has had chronically elevated WBC due to unclear cause and has seen hematology for workup for this in the past  afebrile and hemodynamically stable     I have personally reviewed the labs and data; pertinent labs and data are listed in this note; please see below.     _______________________________________________________________  REVIEW OF SYSTEMS  as above  ________________________________________________________________  Allergies:  No Known Allergies    ________________________________________________________________  PHYSICAL EXAM  GEN: in NAD, laying in bed  HEENT: Anicteric sclerae. Moist mucous membranes. No mucosal lesions.   NECK: Supple.  LUNGS: eupneic. decreased Breath sounds at bases B/L. left base crackles. tenderness over posterior left chest wall  HEART: RRR, no m/r/g  ABDOMEN: Soft, NT, ND,  +BS.    : No CVA tenderness. No Deleon catheter  EXTREMITIES: RUE vac Minimal swelling. lue picc intact  NEUROLOGIC: Grossly no motor focal deficits   PSYCHIATRIC: Appropriate affect and mood  SKIN: ulcer in upper chest. no drainage, mild erythema  laterally-no warmth or swelling    ________________________________________________________________  Vitals:  Vital Signs Last 24 Hrs  T(C): 37 (19 Dec 2023 07:35), Max: 37.1 (19 Dec 2023 00:02)  T(F): 98.6 (19 Dec 2023 07:35), Max: 98.8 (19 Dec 2023 00:02)  HR: 75 (19 Dec 2023 07:35) (71 - 89)  BP: 147/83 (19 Dec 2023 07:35) (123/65 - 152/89)  BP(mean): --  RR: 18 (19 Dec 2023 07:35) (18 - 18)  SpO2: 92% (19 Dec 2023 07:35) (90% - 94%)    Parameters below as of 19 Dec 2023 07:35  Patient On (Oxygen Delivery Method): room air        Current Antibiotics:  vancomycin  IVPB 500 milliGRAM(s) IV Intermittent every 12 hours    Other medications:  acetaminophen     Tablet .. 975 milliGRAM(s) Oral every 6 hours  amLODIPine   Tablet 5 milliGRAM(s) Oral daily  benztropine 2 milliGRAM(s) Oral two times a day  chlorhexidine 2% Cloths 1 Application(s) Topical daily  divalproex  milliGRAM(s) Oral every 12 hours  enoxaparin Injectable 40 milliGRAM(s) SubCutaneous every 12 hours  folic acid 1 milliGRAM(s) Oral daily  gabapentin 100 milliGRAM(s) Oral at bedtime  influenza   Vaccine 0.5 milliLiter(s) IntraMuscular once  lidocaine   4% Patch 3 Patch Transdermal daily  melatonin 5 milliGRAM(s) Oral at bedtime  methocarbamol 750 milliGRAM(s) Oral every 8 hours  nicotine - 21 mG/24Hr(s) Patch 1 Patch Transdermal every 24 hours  pantoprazole    Tablet 40 milliGRAM(s) Oral before breakfast  polyethylene glycol 3350 17 Gram(s) Oral at bedtime  QUEtiapine 125 milliGRAM(s) Oral at bedtime  senna 2 Tablet(s) Oral at bedtime  thiamine 100 milliGRAM(s) Oral daily                  Urinalysis Basic - ( 18 Dec 2023 06:40 )    Color: x / Appearance: x / SG: x / pH: x  Gluc: 96 mg/dL / Ketone: x  / Bili: x / Urobili: x   Blood: x / Protein: x / Nitrite: x   Leuk Esterase: x / RBC: x / WBC x   Sq Epi: x / Non Sq Epi: x / Bacteria: x                  CAPILLARY BLOOD GLUCOSE                     Urinalysis Basic - ( 15 Dec 2023 06:01 )    Color: x / Appearance: x / SG: x / pH: x  Gluc: 83 mg/dL / Ketone: x  / Bili: x / Urobili: x   Blood: x / Protein: x / Nitrite: x   Leuk Esterase: x / RBC: x / WBC x   Sq Epi: x / Non Sq Epi: x / Bacteria: x        PT/INR - ( 15 Dec 2023 06:01 )   PT: 13.0 sec;   INR: 1.18 ratio         PTT - ( 15 Dec 2023 06:01 )  PTT:32.3 sec          CAPILLARY BLOOD GLUCOSE        < from: TTE Limited W or WO Ultrasound Enhancing Agent (12.13.23 @ 10:54) >  FINDINGS:     Left Ventricle:  The left ventricular cavity is normal. Left ventricular systolic function is normal with a calculated ejection fraction of 68 % by the Chavez's biplane method of disks with an ejection fraction visually estimated at 65 to 70%. There is normal left ventricular diastolic function.     Right Ventricle:  The right ventricular cavity is normal in size and normal systolic function. Tricuspid annular plane systolic excursion (TAPSE) is 2.2 cm (normal >=1.7 cm). Tricuspid annular tissue Doppler S' is 11.7 cm/s (normal >10 cm/s).     Aortic Valve:  The aortic valve appears trileaflet with normal systolic excursion.     Mitral Valve:  There is trace mitral regurgitation.     Tricuspid Valve:  There is trace tricuspid regurgitation. Estimated pulmonary artery systolic pressure is 26 mmHg.     Pericardium:  There is an echogenic mass and pocket of fluid close to the LV posterior wall. There is significant pleural effusion.  Unclear if this is localized collection of fluid with fribrinous exudate or hematoma is actually percardial space. Recommend CT scan with IV contrast to definitely evaluate for pericardial effusion if clinically indicated.  In any case, there is no echo evidence of LV pseudoaneurysm. Ultrasound enhancing images were not clear to see if the above mentioned area was opacified.     Pleura:  Small left and small right pleural effusion noted.     Systemic Veins:  The inferior vena cava isnormal in size measuring 1.86 cm in diameter, (normal <2.1cm) with normal inspiratory collapse (normal >50%) consistent with normal right atrial pressure (~3, range 0-5mmHg).  ____________________________________________________________________  QUANTITATIVE DATA:  Left Ventricle Measurements: (Indexed to BSA)     LV Vol d, MOD A2C: 88.4 ml   53.01 ml/m²  LV Vol d, MOD A4C: 112.1 ml  67.26 ml/m²  LV Vol d, MOD BP:  106.5 ml  63.91 ml/m²  LV Vol s, MOD A2C: 30.3 ml   18.18 ml/m²  LV Vol s, MOD A4C: 36.0 ml   21.60 ml/m²  LV Vol s, MOD BP:  33.9 ml   20.33 ml/m²  LVOT SV MOD BP:    72.6 ml  LV EF% MOD BP:     68 %  Visualized LV EF%: 65 to 70%     MV E Vmax:    1.00 m/s  e' lateral:   14.10 cm/s  e' medial:    9.68 cm/s  E/e' lateral: 7.09  E/e' medial:  10.33  E/e' Average: 8.41       Right Ventricle Measurements:     TAPSE:      2.2 cm  TV Amber. S': 11.70 cm/s    Mitral Valve Measurements:     MV E Vmax: 1.0 m/s       Tricuspid Valve Measurements:     TR Vmax:          2.4 m/s  TR Peak Gradient: 22.8 mmHg  RA Pressure:      3 mmHg  PASP:             26 mmHg    ________________________________________________________________________________________  Electronically signed on 12/13/2023 at 12:01:48 PM by Lisa Reyna MD, RPVI    < end of copied text >    < from: CAROL W or WO Ultrasound Enhancing Agent (12.14.23 @ 10:25) >  _______________________________________________________________________________________     CONCLUSIONS:      1. Left ventricular systolic function is normal with an ejection fraction visually estimated at 60 to 65 %.   2. Normal right ventricular cavity size and systolic function.   3. Normal atria.   4. No evidence of left atrial or left atrial appendage thrombus. The left atrial appendage emptying velocity isnormal. Ultrasound enhancing agent was utilized to visualize the left atrial appendage.   5. No significant valvular disease.   6. No echocardiographic evidence of vegetations.   7. Agitated saline injection was negative for intracardiac shunt.   8. Mild aortic calcification seen in the descending thoracic aorta.   9. Compared to the transthoracic echocardiogram performed on 12/13/2023 there have been no significant interval changes. Findings were discussed with Patient and rounding cardiology team on 12/14/2023 at 1120PM.  10. Unable to appreciate any LV pseudoaneurysm despite use of ulrasonic echocontrast given shadow artifact. Recommend CT chest w/ IV contrast vs. cMRI.    < end of copied text >    < from: CT Chest No Cont (12.13.23 @ 22:29) >  Consolidation/atelectasis in the left lowerlobe. New peribronchovascular   foci of groundglass opacity and small consolidation in the right lower   lobe.  Trace right pleural effusion. Moderate left-sided pleural effusion which   is partially loculated along the left paramediastinal region.  Redemonstration of acute, displaced fracture of left 3rd-7th ribs    --- End of Report ---      < end of copied text >              Urinalysis Basic - ( 13 Dec 2023 06:50 )    Color: x / Appearance: x / SG: x / pH: x  Gluc: 106 mg/dL / Ketone: x  / Bili: x / Urobili: x   Blood: x / Protein: x / Nitrite: x   Leuk Esterase: x / RBC: x / WBC x   Sq Epi: x / Non Sq Epi: x / Bacteria: x            CARDIAC MARKERS ( 13 Dec 2023 06:50 )  x     / x     / 14 U/L / x     / x            CAPILLARY BLOOD GLUCOSE                  Urinalysis Basic - ( 12 Dec 2023 07:58 )    Color: x / Appearance: x / SG: x / pH: x  Gluc: 95 mg/dL / Ketone: x  / Bili: x / Urobili: x   Blood: x / Protein: x / Nitrite: x   Leuk Esterase: x / RBC: x / WBC x   Sq Epi: x / Non Sq Epi: x / Bacteria: x                  CAPILLARY BLOOD GLUCOSE                    Urinalysis Basic - ( 12 Dec 2023 07:58 )    Color: x / Appearance: x / SG: x / pH: x  Gluc: 95 mg/dL / Ketone: x  / Bili: x / Urobili: x   Blood: x / Protein: x / Nitrite: x   Leuk Esterase: x / RBC: x / WBC x   Sq Epi: x / Non Sq Epi: x / Bacteria: x                  CAPILLARY BLOOD GLUCOSE                    Urinalysis Basic - ( 11 Dec 2023 06:57 )    Color: x / Appearance: x / SG: x / pH: x  Gluc: 96 mg/dL / Ketone: x  / Bili: x / Urobili: x   Blood: x / Protein: x / Nitrite: x   Leuk Esterase: x / RBC: x / WBC x   Sq Epi: x / Non Sq Epi: x / Bacteria: x                  CAPILLARY BLOOD GLUCOSE                  RECENT CULTURES:  12-07 @ 14:57 .Blood Blood-Peripheral     Growth in aerobic bottle: Gram Positive Cocci in Clusters  Growth in anaerobic bottle: Gram Positive Cocci in Clusters    Growth in aerobic bottle: Gram Positive Cocci in Clusters  Growth in anaerobic bottle: Gram Positive Cocci in Clusters      12-07 @ 14:52 .Blood Blood-Venous     Growth in aerobic bottle: Gram Positive Cocci in Clusters  Growth in anaerobic bottle: Gram Positive Cocci in Clusters    Growth in aerobic bottle: Gram Positive Cocci in Clusters  Growth in anaerobic bottle: Gram Positive Cocci in Clusters      12-06 @ 15:15 .Blood Blood Blood Culture PCR  Methicillin resistant Staphylococcus aureus    Growth in aerobic and anaerobic bottles: Methicillin Resistant  Staphylococcus aureus    Growth in aerobic bottle: Gram Positive Cocci in Clusters  Growth in anaerobic bottle: Gram Positive Cocci in Clusters      12-06 @ 15:10 .Blood Blood     Growth in aerobic and anaerobic bottles: Methicillin Resistant  Staphylococcus aureus  See previous culture [id number]    Growth in anaerobic bottle: Gram Positive Cocci in Clusters  Growth in aerobic bottle: Gram Positive Cocci in Clusters        WBC Count: 19.04 K/uL (12-09-23 @ 07:31)  WBC Count: 20.66 K/uL (12-08-23 @ 08:06)  WBC Count: 22.89 K/uL (12-07-23 @ 06:05)  WBC Count: 14.00 K/uL (12-06-23 @ 05:15)  WBC Count: 13.75 K/uL (12-05-23 @ 02:08)    Creatinine: 0.63 mg/dL (12-09-23 @ 07:31)  Creatinine: 1.17 mg/dL (12-08-23 @ 08:06)  Creatinine: 0.83 mg/dL (12-07-23 @ 06:05)  Creatinine: 0.60 mg/dL (12-06-23 @ 05:15)  Creatinine: 0.81 mg/dL (12-05-23 @ 02:08)      Vancomycin Level, Trough: 8.3 ug/mL (12-09-23 @ 07:31)  Vancomycin Level, Trough: 11.5 ug/mL (12-08-23 @ 18:40)    ________________________________________________________________  CARDIOLOGY   no recent studies   ________________________________________________________________  RADIOLOGY  < from: US Duplex Venous Upper Ext Ltd, Right (12.07.23 @ 11:52) >  INTERPRETATION:  CLINICAL INFORMATION: Arm swelling    COMPARISON: None available.    TECHNIQUE: Duplex sonography of the RIGHT UPPER extremity veins with   color and spectral Doppler, with and without compression.    FINDINGS:    The right internal jugular, subclavian, axillary and brachial veins are   patent and compressible where applicable.  The basilic vein (superficial   vein) is patent and without thrombus.  Superficial thrombus in the right   cephalic vein.      IMPRESSION:  No evidence of right upper extremity deep venous thrombosis.    Superficial thrombus in the right cephalic vein.    < end of copied text >  < from: Xray Chest 1 View- PORTABLE-Urgent (Xray Chest 1 View- PORTABLE-Urgent .) (12.06.23 @ 10:15) >  INTERPRETATION:  Chest one view    HISTORY: Leukocytosis    COMPARISON STUDY: 12/2/2023    Frontal expiratory view of the chest shows the heart to be similar in   size. The lungs show partial clearing of the left base and there is no   evidence of pneumothorax nor pleural effusion.    IMPRESSION:  Left base clearing.    < end of copied text >    < from: TTE W or WO Ultrasound Enhancing Agent (12.11.23 @ 19:59) >  CONCLUSIONS:      1. Left ventricular cavity is normal.   2. Normal left ventricular diastolic function.   3. Trace mitral regurgitation.   4. Moderate left pleural effusion noted.   5. Moderate pericardial effusion noted adjacent to the posterolateralleft ventricle. There is expansion of the LV posterolateral wall with systole, no color flow was done.LV pseduoaneurysm needs to be ruled out.   6. The inferior vena cava is normal in size measuring 2.10 cm in diameter, (normal <2.1cm) with normal inspiratory collapse (normal >50%) consistent with normal right atrial pressure (~3, range 0-5mmHg).   7. Recommendations: Repeat 2 D echo with definity to evaluate pericardial effusion, to see if there is any pseudo aneurysm of the LV. Color flow and doppler should be done.    < end of copied text >

## 2023-12-19 NOTE — PROCEDURE NOTE - NSPOSTPRCRAD_GEN_A_CORE
chest radiograph/line adjusted to depth of insertion/line in appropriate postion/postion of catheter/ultrasound
post-procedure radiography performed
no pneumothorax

## 2023-12-19 NOTE — PROCEDURE NOTE - NSSITEPREP_SKIN_A_CORE
chlorhexidine
chlorhexidine/Adherence to aseptic technique: hand hygiene prior to donning barriers (gown, gloves), don cap and mask, sterile drape over patient
chlorhexidine
chlorhexidine/Adherence to aseptic technique: hand hygiene prior to donning barriers (gown, gloves), don cap and mask, sterile drape over patient
chlorhexidine/Adherence to aseptic technique: hand hygiene prior to donning barriers (gown, gloves), don cap and mask, sterile drape over patient
chlorhexidine
chlorhexidine/Adherence to aseptic technique: hand hygiene prior to donning barriers (gown, gloves), don cap and mask, sterile drape over patient

## 2023-12-19 NOTE — PROGRESS NOTE ADULT - ASSESSMENT
59 y/o male s/p fall down stairs sustaining L sided rib fxs and L PTX. Hospital course complicated by development of septic thrombophlebitis of R basilic & cephalic vein and MRSA bacteremia, as well as L pleural effusion. ID following. Repeat bcx negative. S/p thoracentesis by IR w/ no growth from pleural fluid cultures. Remains on cefepime & vancomycin. WBC 17>19 today, afebrile w/ no new complaints.  - Will f/u ID recommendations for duration of antibiotics & final abx choice  - PICC placed today  - Vascular following for VAC changed to RUE  - Continue PIC protocol  - Pain control PRN  - Regular diet  - Encourage ambulation  - DVT ppx w/ lovenox & SCDs  - Dispo planning to [facility] - pending auth  59 y/o male s/p fall down stairs sustaining L sided rib fxs and L PTX. Hospital course complicated by development of septic thrombophlebitis of R basilic & cephalic vein and MRSA bacteremia, as well as L pleural effusion. ID following. Repeat bcx negative. S/p thoracentesis by IR w/ no growth from pleural fluid cultures. Remains on cefepime & vancomycin. WBC 17>19 today, afebrile w/ no new complaints.  - Will f/u ID recommendations for duration of antibiotics & final abx choice  - PICC placed today  - Vascular following for VAC changed to RUE  - Continue PIC protocol  - Pain control PRN  - Regular diet  - Encourage ambulation  - DVT ppx w/ lovenox & SCDs  - Dispo planning to [facility] - pending auth     - Tertiary [x] - done 11/30  - PTSD  [x] - done 12/11  - SBIRT [x] - done 11/30  - Geriatric consult - N/A  - GOC - full code 59 y/o male s/p fall down stairs sustaining L sided rib fxs and L PTX. Hospital course complicated by development of septic thrombophlebitis of R basilic & cephalic vein and MRSA bacteremia, as well as L pleural effusion. ID following. Repeat bcx negative. S/p thoracentesis by IR w/ no growth from pleural fluid cultures. Remains on cefepime & vancomycin. WBC 17>19 today, afebrile w/ no new complaints.  - Will f/u ID recommendations for duration of antibiotics & final abx choice  - PICC placed today  - Vascular following for VAC changed to RUE  - Continue PIC protocol  - Pain control PRN  - Regular diet  - Encourage ambulation  - DVT ppx w/ lovenox & SCDs  - Dispo planning to [facility] - pending auth     - Tertiary [x] - done 11/30  - PTSD  [x] - done 12/11  - SBIRT [x] - done 11/30  - Geriatric consult - N/A  - GOC - full code

## 2023-12-19 NOTE — PROGRESS NOTE ADULT - SUBJECTIVE AND OBJECTIVE BOX
SUBJECTIVE / 24H EVENTS: Pt seen and examined at bedside. He reports feeling well today. PICC placed earlier this AM. Pt no acute complaints at time of our exam. Reports he is tolerating diet. States pain to his left chest is minimal & well controlled on medications. Reports he has been OOB ambulating, voiding and having bowel fxn. He denies fever or chills, CP or SOB.    MEDICATIONS  (STANDING):  acetaminophen     Tablet .. 975 milliGRAM(s) Oral every 6 hours  amLODIPine   Tablet 5 milliGRAM(s) Oral daily  benztropine 2 milliGRAM(s) Oral two times a day  cefepime  Injectable.      cefepime  Injectable. 2000 milliGRAM(s) IV Push every 8 hours  chlorhexidine 2% Cloths 1 Application(s) Topical daily  chlorhexidine 2% Cloths 1 Application(s) Topical <User Schedule>  divalproex  milliGRAM(s) Oral every 12 hours  enoxaparin Injectable 40 milliGRAM(s) SubCutaneous every 12 hours  folic acid 1 milliGRAM(s) Oral daily  gabapentin 100 milliGRAM(s) Oral at bedtime  ibuprofen  Tablet. 400 milliGRAM(s) Oral every 6 hours  lidocaine   4% Patch 3 Patch Transdermal daily  melatonin 5 milliGRAM(s) Oral at bedtime  methocarbamol 1000 milliGRAM(s) Oral every 8 hours  nicotine - 21 mG/24Hr(s) Patch 1 Patch Transdermal every 24 hours  pantoprazole    Tablet 40 milliGRAM(s) Oral before breakfast  polyethylene glycol 3350 17 Gram(s) Oral at bedtime  QUEtiapine 125 milliGRAM(s) Oral at bedtime  senna 2 Tablet(s) Oral at bedtime  thiamine 100 milliGRAM(s) Oral daily  vancomycin  IVPB 1250 milliGRAM(s) IV Intermittent every 12 hours    MEDICATIONS  (PRN):  albuterol/ipratropium for Nebulization 3 milliLiter(s) Nebulizer every 6 hours PRN Shortness of Breath and/or Wheezing  calcium carbonate    500 mG (Tums) Chewable 1 Tablet(s) Chew every 6 hours PRN Heartburn  guaifenesin/dextromethorphan Oral Liquid 10 milliLiter(s) Oral every 6 hours PRN Cough  mineral oil enema 133 milliLiter(s) Rectal daily PRN constipation  OLANZapine Injectable 5 milliGRAM(s) IntraMuscular every 12 hours PRN agitation  ondansetron Injectable 4 milliGRAM(s) IV Push every 6 hours PRN Nausea  sodium chloride 0.9% lock flush 10 milliLiter(s) IV Push every 1 hour PRN Pre/post blood products, medications, blood draw, and to maintain line patency      Vital Signs Last 24 Hrs  T(C): 37 (19 Dec 2023 07:35), Max: 37.1 (19 Dec 2023 00:02)  T(F): 98.6 (19 Dec 2023 07:35), Max: 98.8 (19 Dec 2023 00:02)  HR: 75 (19 Dec 2023 07:35) (71 - 89)  BP: 147/83 (19 Dec 2023 07:35) (123/65 - 152/89)  BP(mean): --  RR: 18 (19 Dec 2023 07:35) (18 - 18)  SpO2: 92% (19 Dec 2023 07:35) (90% - 94%)    Parameters below as of 19 Dec 2023 07:35  Patient On (Oxygen Delivery Method): room air        Constitutional: patient resting comfortably in bed, in no acute distress  HEENT: head normocephalic and atraumatic  Respiratory: respirations are unlabored, no accessory muscle use, no conversational dyspnea, + L chest wall TTP, horizontal wound to L chest wall from blow hole covered w/ gauze & tegaderm, area is clean & dry without drainage or erythena, PIC 8  Cardiovascular: regular rate & rhythm  Gastrointestinal: abdomen soft & non-tender, non-distended, no rebound tenderness / guarding  Neurological: GCS15, A&O x 3  MSK: RUE w/ VAC in place to forearm, good suction, no erythema. BERG x 4 spontaneously without pain or difficulty. LUE PICC in place  Skin: mucous membranes moist, no diaphoresis, pallor, cyanosis or jaundice      I&O's Detail    19 Dec 2023 07:01  -  19 Dec 2023 12:12  --------------------------------------------------------  IN:  Total IN: 0 mL    OUT:    Voided (mL): 500 mL  Total OUT: 500 mL    Total NET: -500 mL          LABS:                        8.4    19.50 )-----------( 939      ( 19 Dec 2023 04:50 )             24.7     12-18    138  |  99  |  9.1  ----------------------------<  96  4.5   |  29.0  |  0.59    Ca    8.4      18 Dec 2023 06:40  Phos  4.7     12-18  Mg     1.8     12-18        Urinalysis Basic - ( 18 Dec 2023 06:40 )    Color: x / Appearance: x / SG: x / pH: x  Gluc: 96 mg/dL / Ketone: x  / Bili: x / Urobili: x   Blood: x / Protein: x / Nitrite: x   Leuk Esterase: x / RBC: x / WBC x   Sq Epi: x / Non Sq Epi: x / Bacteria: x

## 2023-12-19 NOTE — PROCEDURE NOTE - ADDITIONAL PROCEDURE DETAILS
4FR 44CM 28CIRC [employer] POWER PICC LINE good flash ns flush left brachial vein 4FR 44CM 28CIRC [employer] POWER PICC LINE good flash ns flush left brachial vein 4FR 44CM 28CIRC [employer] POWER PICC LINE good flash ns flush left brachial vein      S/P: Chest X-Ray PICC LINE ADVANCED 2CM     May use PICC for intravenous therapy 4FR 44CM 28CIRC [employer] POWER PICC LINE good flash ns flush left brachial vein      S/P: Chest X-Ray PICC LINE ADVANCED 2CM     May use PICC for intravenous therapy

## 2023-12-20 ENCOUNTER — TRANSCRIPTION ENCOUNTER (OUTPATIENT)
Age: 58
End: 2023-12-20

## 2023-12-20 VITALS
SYSTOLIC BLOOD PRESSURE: 164 MMHG | RESPIRATION RATE: 18 BRPM | HEART RATE: 81 BPM | TEMPERATURE: 98 F | DIASTOLIC BLOOD PRESSURE: 78 MMHG | OXYGEN SATURATION: 93 %

## 2023-12-20 LAB
ACANTHOCYTES BLD QL SMEAR: SLIGHT — SIGNIFICANT CHANGE UP
ACANTHOCYTES BLD QL SMEAR: SLIGHT — SIGNIFICANT CHANGE UP
BASOPHILS # BLD AUTO: 0 K/UL — SIGNIFICANT CHANGE UP (ref 0–0.2)
BASOPHILS # BLD AUTO: 0 K/UL — SIGNIFICANT CHANGE UP (ref 0–0.2)
BASOPHILS NFR BLD AUTO: 0 % — SIGNIFICANT CHANGE UP (ref 0–2)
BASOPHILS NFR BLD AUTO: 0 % — SIGNIFICANT CHANGE UP (ref 0–2)
BURR CELLS BLD QL SMEAR: PRESENT — SIGNIFICANT CHANGE UP
BURR CELLS BLD QL SMEAR: PRESENT — SIGNIFICANT CHANGE UP
CULTURE RESULTS: SIGNIFICANT CHANGE UP
CULTURE RESULTS: SIGNIFICANT CHANGE UP
ELLIPTOCYTES BLD QL SMEAR: SLIGHT — SIGNIFICANT CHANGE UP
ELLIPTOCYTES BLD QL SMEAR: SLIGHT — SIGNIFICANT CHANGE UP
EOSINOPHIL # BLD AUTO: 0.81 K/UL — HIGH (ref 0–0.5)
EOSINOPHIL # BLD AUTO: 0.81 K/UL — HIGH (ref 0–0.5)
EOSINOPHIL NFR BLD AUTO: 4.3 % — SIGNIFICANT CHANGE UP (ref 0–6)
EOSINOPHIL NFR BLD AUTO: 4.3 % — SIGNIFICANT CHANGE UP (ref 0–6)
GIANT PLATELETS BLD QL SMEAR: PRESENT — SIGNIFICANT CHANGE UP
GIANT PLATELETS BLD QL SMEAR: PRESENT — SIGNIFICANT CHANGE UP
HCT VFR BLD CALC: 26.7 % — LOW (ref 39–50)
HCT VFR BLD CALC: 26.7 % — LOW (ref 39–50)
HGB BLD-MCNC: 8.3 G/DL — LOW (ref 13–17)
HGB BLD-MCNC: 8.3 G/DL — LOW (ref 13–17)
LYMPHOCYTES # BLD AUTO: 12.2 % — LOW (ref 13–44)
LYMPHOCYTES # BLD AUTO: 12.2 % — LOW (ref 13–44)
LYMPHOCYTES # BLD AUTO: 2.29 K/UL — SIGNIFICANT CHANGE UP (ref 1–3.3)
LYMPHOCYTES # BLD AUTO: 2.29 K/UL — SIGNIFICANT CHANGE UP (ref 1–3.3)
MANUAL SMEAR VERIFICATION: SIGNIFICANT CHANGE UP
MANUAL SMEAR VERIFICATION: SIGNIFICANT CHANGE UP
MCHC RBC-ENTMCNC: 26 PG — LOW (ref 27–34)
MCHC RBC-ENTMCNC: 26 PG — LOW (ref 27–34)
MCHC RBC-ENTMCNC: 31.1 GM/DL — LOW (ref 32–36)
MCHC RBC-ENTMCNC: 31.1 GM/DL — LOW (ref 32–36)
MCV RBC AUTO: 83.7 FL — SIGNIFICANT CHANGE UP (ref 80–100)
MCV RBC AUTO: 83.7 FL — SIGNIFICANT CHANGE UP (ref 80–100)
MONOCYTES # BLD AUTO: 1.8 K/UL — HIGH (ref 0–0.9)
MONOCYTES # BLD AUTO: 1.8 K/UL — HIGH (ref 0–0.9)
MONOCYTES NFR BLD AUTO: 9.6 % — SIGNIFICANT CHANGE UP (ref 2–14)
MONOCYTES NFR BLD AUTO: 9.6 % — SIGNIFICANT CHANGE UP (ref 2–14)
NEUTROPHILS # BLD AUTO: 13.86 K/UL — HIGH (ref 1.8–7.4)
NEUTROPHILS # BLD AUTO: 13.86 K/UL — HIGH (ref 1.8–7.4)
NEUTROPHILS NFR BLD AUTO: 73.9 % — SIGNIFICANT CHANGE UP (ref 43–77)
NEUTROPHILS NFR BLD AUTO: 73.9 % — SIGNIFICANT CHANGE UP (ref 43–77)
OVALOCYTES BLD QL SMEAR: SLIGHT — SIGNIFICANT CHANGE UP
OVALOCYTES BLD QL SMEAR: SLIGHT — SIGNIFICANT CHANGE UP
PLAT MORPH BLD: NORMAL — SIGNIFICANT CHANGE UP
PLAT MORPH BLD: NORMAL — SIGNIFICANT CHANGE UP
PLATELET # BLD AUTO: 1059 K/UL — CRITICAL HIGH (ref 150–400)
PLATELET # BLD AUTO: 1059 K/UL — CRITICAL HIGH (ref 150–400)
RBC # BLD: 3.19 M/UL — LOW (ref 4.2–5.8)
RBC # BLD: 3.19 M/UL — LOW (ref 4.2–5.8)
RBC # FLD: 17.4 % — HIGH (ref 10.3–14.5)
RBC # FLD: 17.4 % — HIGH (ref 10.3–14.5)
RBC BLD AUTO: ABNORMAL
RBC BLD AUTO: ABNORMAL
SPECIMEN SOURCE: SIGNIFICANT CHANGE UP
SPECIMEN SOURCE: SIGNIFICANT CHANGE UP
SURGICAL PATHOLOGY STUDY: SIGNIFICANT CHANGE UP
SURGICAL PATHOLOGY STUDY: SIGNIFICANT CHANGE UP
TARGETS BLD QL SMEAR: SLIGHT — SIGNIFICANT CHANGE UP
TARGETS BLD QL SMEAR: SLIGHT — SIGNIFICANT CHANGE UP
WBC # BLD: 18.75 K/UL — HIGH (ref 3.8–10.5)
WBC # BLD: 18.75 K/UL — HIGH (ref 3.8–10.5)
WBC # FLD AUTO: 18.75 K/UL — HIGH (ref 3.8–10.5)
WBC # FLD AUTO: 18.75 K/UL — HIGH (ref 3.8–10.5)

## 2023-12-20 PROCEDURE — 93970 EXTREMITY STUDY: CPT

## 2023-12-20 PROCEDURE — 82962 GLUCOSE BLOOD TEST: CPT

## 2023-12-20 PROCEDURE — 82947 ASSAY GLUCOSE BLOOD QUANT: CPT

## 2023-12-20 PROCEDURE — 83615 LACTATE (LD) (LDH) ENZYME: CPT

## 2023-12-20 PROCEDURE — 93971 EXTREMITY STUDY: CPT

## 2023-12-20 PROCEDURE — 97163 PT EVAL HIGH COMPLEX 45 MIN: CPT

## 2023-12-20 PROCEDURE — 94760 N-INVAS EAR/PLS OXIMETRY 1: CPT

## 2023-12-20 PROCEDURE — 85018 HEMOGLOBIN: CPT

## 2023-12-20 PROCEDURE — 71045 X-RAY EXAM CHEST 1 VIEW: CPT

## 2023-12-20 PROCEDURE — 82435 ASSAY OF BLOOD CHLORIDE: CPT

## 2023-12-20 PROCEDURE — 93306 TTE W/DOPPLER COMPLETE: CPT

## 2023-12-20 PROCEDURE — 87640 STAPH A DNA AMP PROBE: CPT

## 2023-12-20 PROCEDURE — 97167 OT EVAL HIGH COMPLEX 60 MIN: CPT

## 2023-12-20 PROCEDURE — C8924: CPT

## 2023-12-20 PROCEDURE — 86900 BLOOD TYPING SEROLOGIC ABO: CPT

## 2023-12-20 PROCEDURE — 84157 ASSAY OF PROTEIN OTHER: CPT

## 2023-12-20 PROCEDURE — 36415 COLL VENOUS BLD VENIPUNCTURE: CPT

## 2023-12-20 PROCEDURE — 71250 CT THORAX DX C-: CPT

## 2023-12-20 PROCEDURE — 81001 URINALYSIS AUTO W/SCOPE: CPT

## 2023-12-20 PROCEDURE — 87075 CULTR BACTERIA EXCEPT BLOOD: CPT

## 2023-12-20 PROCEDURE — 85730 THROMBOPLASTIN TIME PARTIAL: CPT

## 2023-12-20 PROCEDURE — 93312 ECHO TRANSESOPHAGEAL: CPT

## 2023-12-20 PROCEDURE — 96374 THER/PROPH/DIAG INJ IV PUSH: CPT

## 2023-12-20 PROCEDURE — 96375 TX/PRO/DX INJ NEW DRUG ADDON: CPT

## 2023-12-20 PROCEDURE — 94002 VENT MGMT INPAT INIT DAY: CPT

## 2023-12-20 PROCEDURE — 70490 CT SOFT TISSUE NECK W/O DYE: CPT

## 2023-12-20 PROCEDURE — 76942 ECHO GUIDE FOR BIOPSY: CPT

## 2023-12-20 PROCEDURE — 36600 WITHDRAWAL OF ARTERIAL BLOOD: CPT

## 2023-12-20 PROCEDURE — 80164 ASSAY DIPROPYLACETIC ACD TOT: CPT

## 2023-12-20 PROCEDURE — 87040 BLOOD CULTURE FOR BACTERIA: CPT

## 2023-12-20 PROCEDURE — 85610 PROTHROMBIN TIME: CPT

## 2023-12-20 PROCEDURE — 87641 MR-STAPH DNA AMP PROBE: CPT

## 2023-12-20 PROCEDURE — 83735 ASSAY OF MAGNESIUM: CPT

## 2023-12-20 PROCEDURE — 82330 ASSAY OF CALCIUM: CPT

## 2023-12-20 PROCEDURE — 88305 TISSUE EXAM BY PATHOLOGIST: CPT

## 2023-12-20 PROCEDURE — 74177 CT ABD & PELVIS W/CONTRAST: CPT | Mod: MA

## 2023-12-20 PROCEDURE — 99232 SBSQ HOSP IP/OBS MODERATE 35: CPT

## 2023-12-20 PROCEDURE — 84295 ASSAY OF SERUM SODIUM: CPT

## 2023-12-20 PROCEDURE — 88304 TISSUE EXAM BY PATHOLOGIST: CPT

## 2023-12-20 PROCEDURE — 84100 ASSAY OF PHOSPHORUS: CPT

## 2023-12-20 PROCEDURE — 86901 BLOOD TYPING SEROLOGIC RH(D): CPT

## 2023-12-20 PROCEDURE — 96372 THER/PROPH/DIAG INJ SC/IM: CPT

## 2023-12-20 PROCEDURE — C1729: CPT

## 2023-12-20 PROCEDURE — 80053 COMPREHEN METABOLIC PANEL: CPT

## 2023-12-20 PROCEDURE — 93321 DOPPLER ECHO F-UP/LMTD STD: CPT

## 2023-12-20 PROCEDURE — 89051 BODY FLUID CELL COUNT: CPT

## 2023-12-20 PROCEDURE — 85027 COMPLETE CBC AUTOMATED: CPT

## 2023-12-20 PROCEDURE — 85014 HEMATOCRIT: CPT

## 2023-12-20 PROCEDURE — 70450 CT HEAD/BRAIN W/O DYE: CPT

## 2023-12-20 PROCEDURE — C1889: CPT

## 2023-12-20 PROCEDURE — 80307 DRUG TEST PRSMV CHEM ANLYZR: CPT

## 2023-12-20 PROCEDURE — 87186 SC STD MICRODIL/AGAR DIL: CPT

## 2023-12-20 PROCEDURE — 93005 ELECTROCARDIOGRAM TRACING: CPT

## 2023-12-20 PROCEDURE — 94640 AIRWAY INHALATION TREATMENT: CPT

## 2023-12-20 PROCEDURE — 87070 CULTURE OTHR SPECIMN AEROBIC: CPT

## 2023-12-20 PROCEDURE — 83605 ASSAY OF LACTIC ACID: CPT

## 2023-12-20 PROCEDURE — 80202 ASSAY OF VANCOMYCIN: CPT

## 2023-12-20 PROCEDURE — 86850 RBC ANTIBODY SCREEN: CPT

## 2023-12-20 PROCEDURE — 72125 CT NECK SPINE W/O DYE: CPT | Mod: MA

## 2023-12-20 PROCEDURE — 88112 CYTOPATH CELL ENHANCE TECH: CPT

## 2023-12-20 PROCEDURE — 72170 X-RAY EXAM OF PELVIS: CPT

## 2023-12-20 PROCEDURE — 82803 BLOOD GASES ANY COMBINATION: CPT

## 2023-12-20 PROCEDURE — 87150 DNA/RNA AMPLIFIED PROBE: CPT

## 2023-12-20 PROCEDURE — 84132 ASSAY OF SERUM POTASSIUM: CPT

## 2023-12-20 PROCEDURE — 84145 PROCALCITONIN (PCT): CPT

## 2023-12-20 PROCEDURE — 87205 SMEAR GRAM STAIN: CPT

## 2023-12-20 PROCEDURE — 83986 ASSAY PH BODY FLUID NOS: CPT

## 2023-12-20 PROCEDURE — 99285 EMERGENCY DEPT VISIT HI MDM: CPT | Mod: 25

## 2023-12-20 PROCEDURE — 82550 ASSAY OF CK (CPK): CPT

## 2023-12-20 PROCEDURE — 71260 CT THORAX DX C+: CPT | Mod: MA

## 2023-12-20 PROCEDURE — 94003 VENT MGMT INPAT SUBQ DAY: CPT

## 2023-12-20 PROCEDURE — C9399: CPT

## 2023-12-20 PROCEDURE — 85025 COMPLETE CBC W/AUTO DIFF WBC: CPT

## 2023-12-20 PROCEDURE — 80048 BASIC METABOLIC PNL TOTAL CA: CPT

## 2023-12-20 RX ORDER — QUETIAPINE FUMARATE 200 MG/1
5 TABLET, FILM COATED ORAL
Qty: 0 | Refills: 0 | DISCHARGE
Start: 2023-12-20

## 2023-12-20 RX ORDER — POLYETHYLENE GLYCOL 3350 17 G/17G
17 POWDER, FOR SOLUTION ORAL
Qty: 0 | Refills: 0 | DISCHARGE
Start: 2023-12-20

## 2023-12-20 RX ORDER — CEFEPIME 1 G/1
2000 INJECTION, POWDER, FOR SOLUTION INTRAMUSCULAR; INTRAVENOUS
Qty: 0 | Refills: 0 | DISCHARGE
Start: 2023-12-20

## 2023-12-20 RX ORDER — THIAMINE MONONITRATE (VIT B1) 100 MG
1 TABLET ORAL
Qty: 0 | Refills: 0 | DISCHARGE
Start: 2023-12-20

## 2023-12-20 RX ORDER — AMLODIPINE BESYLATE 2.5 MG/1
1 TABLET ORAL
Qty: 0 | Refills: 0 | DISCHARGE
Start: 2023-12-20

## 2023-12-20 RX ORDER — LIDOCAINE 4 G/100G
3 CREAM TOPICAL
Qty: 0 | Refills: 0 | DISCHARGE
Start: 2023-12-20

## 2023-12-20 RX ORDER — METHOCARBAMOL 500 MG/1
2 TABLET, FILM COATED ORAL
Qty: 0 | Refills: 0 | DISCHARGE
Start: 2023-12-20

## 2023-12-20 RX ORDER — CALCIUM CARBONATE 500(1250)
1 TABLET ORAL
Qty: 0 | Refills: 0 | DISCHARGE
Start: 2023-12-20

## 2023-12-20 RX ORDER — IBUPROFEN 200 MG
1 TABLET ORAL
Qty: 0 | Refills: 0 | DISCHARGE
Start: 2023-12-20

## 2023-12-20 RX ORDER — NICOTINE POLACRILEX 2 MG
1 GUM BUCCAL
Qty: 0 | Refills: 0 | DISCHARGE
Start: 2023-12-20

## 2023-12-20 RX ORDER — ACETAMINOPHEN 500 MG
3 TABLET ORAL
Qty: 0 | Refills: 0 | DISCHARGE
Start: 2023-12-20

## 2023-12-20 RX ORDER — PANTOPRAZOLE SODIUM 20 MG/1
1 TABLET, DELAYED RELEASE ORAL
Qty: 0 | Refills: 0 | DISCHARGE
Start: 2023-12-20

## 2023-12-20 RX ORDER — IPRATROPIUM/ALBUTEROL SULFATE 18-103MCG
3 AEROSOL WITH ADAPTER (GRAM) INHALATION
Qty: 0 | Refills: 0 | DISCHARGE
Start: 2023-12-20

## 2023-12-20 RX ORDER — ASPIRIN/CALCIUM CARB/MAGNESIUM 324 MG
81 TABLET ORAL DAILY
Refills: 0 | Status: DISCONTINUED | OUTPATIENT
Start: 2023-12-20 | End: 2023-12-20

## 2023-12-20 RX ORDER — QUETIAPINE FUMARATE 200 MG/1
1 TABLET, FILM COATED ORAL
Refills: 0 | DISCHARGE

## 2023-12-20 RX ORDER — ASPIRIN/CALCIUM CARB/MAGNESIUM 324 MG
1 TABLET ORAL
Qty: 0 | Refills: 0 | DISCHARGE
Start: 2023-12-20

## 2023-12-20 RX ORDER — SENNA PLUS 8.6 MG/1
2 TABLET ORAL
Qty: 0 | Refills: 0 | DISCHARGE
Start: 2023-12-20

## 2023-12-20 RX ORDER — VANCOMYCIN HCL 1 G
1.25 VIAL (EA) INTRAVENOUS
Qty: 0 | Refills: 0 | DISCHARGE
Start: 2023-12-20

## 2023-12-20 RX ORDER — FOLIC ACID 0.8 MG
1 TABLET ORAL
Qty: 0 | Refills: 0 | DISCHARGE
Start: 2023-12-20

## 2023-12-20 RX ADMIN — AMLODIPINE BESYLATE 5 MILLIGRAM(S): 2.5 TABLET ORAL at 06:28

## 2023-12-20 RX ADMIN — CHLORHEXIDINE GLUCONATE 1 APPLICATION(S): 213 SOLUTION TOPICAL at 12:26

## 2023-12-20 RX ADMIN — METHOCARBAMOL 1000 MILLIGRAM(S): 500 TABLET, FILM COATED ORAL at 13:12

## 2023-12-20 RX ADMIN — Medication 1 MILLIGRAM(S): at 12:26

## 2023-12-20 RX ADMIN — Medication 400 MILLIGRAM(S): at 06:29

## 2023-12-20 RX ADMIN — LIDOCAINE 3 PATCH: 4 CREAM TOPICAL at 12:35

## 2023-12-20 RX ADMIN — DIVALPROEX SODIUM 500 MILLIGRAM(S): 500 TABLET, DELAYED RELEASE ORAL at 06:29

## 2023-12-20 RX ADMIN — Medication 100 MILLIGRAM(S): at 12:25

## 2023-12-20 RX ADMIN — Medication 400 MILLIGRAM(S): at 17:16

## 2023-12-20 RX ADMIN — Medication 975 MILLIGRAM(S): at 17:16

## 2023-12-20 RX ADMIN — METHOCARBAMOL 1000 MILLIGRAM(S): 500 TABLET, FILM COATED ORAL at 06:29

## 2023-12-20 RX ADMIN — CEFEPIME 2000 MILLIGRAM(S): 1 INJECTION, POWDER, FOR SOLUTION INTRAMUSCULAR; INTRAVENOUS at 13:13

## 2023-12-20 RX ADMIN — Medication 400 MILLIGRAM(S): at 12:26

## 2023-12-20 RX ADMIN — DIVALPROEX SODIUM 500 MILLIGRAM(S): 500 TABLET, DELAYED RELEASE ORAL at 17:17

## 2023-12-20 RX ADMIN — Medication 2 MILLIGRAM(S): at 17:16

## 2023-12-20 RX ADMIN — Medication 975 MILLIGRAM(S): at 12:25

## 2023-12-20 RX ADMIN — CHLORHEXIDINE GLUCONATE 1 APPLICATION(S): 213 SOLUTION TOPICAL at 06:30

## 2023-12-20 RX ADMIN — Medication 81 MILLIGRAM(S): at 12:31

## 2023-12-20 RX ADMIN — PANTOPRAZOLE SODIUM 40 MILLIGRAM(S): 20 TABLET, DELAYED RELEASE ORAL at 06:29

## 2023-12-20 RX ADMIN — Medication 2 MILLIGRAM(S): at 06:30

## 2023-12-20 RX ADMIN — Medication 975 MILLIGRAM(S): at 06:29

## 2023-12-20 RX ADMIN — Medication 166.67 MILLIGRAM(S): at 12:25

## 2023-12-20 RX ADMIN — CEFEPIME 2000 MILLIGRAM(S): 1 INJECTION, POWDER, FOR SOLUTION INTRAMUSCULAR; INTRAVENOUS at 06:28

## 2023-12-20 RX ADMIN — ENOXAPARIN SODIUM 40 MILLIGRAM(S): 100 INJECTION SUBCUTANEOUS at 06:28

## 2023-12-20 RX ADMIN — ENOXAPARIN SODIUM 40 MILLIGRAM(S): 100 INJECTION SUBCUTANEOUS at 17:17

## 2023-12-20 NOTE — PROGRESS NOTE ADULT - PROVIDER SPECIALTY LIST ADULT
Cardiology
Infectious Disease
SICU
SICU
Surgery
Surgery
Trauma Surgery
Trauma Surgery
Vascular Surgery
Vascular Surgery
Infectious Disease
SICU
SICU
Trauma Surgery
Infectious Disease
Intervent Cardiology
Pain Medicine
SICU
Trauma Surgery
Vascular Surgery
Vascular Surgery
Infectious Disease
Infectious Disease
SICU
Surgery
Surgery
Trauma Surgery
Vascular Surgery
SICU

## 2023-12-20 NOTE — DISCHARGE NOTE PROVIDER - NSFOLLOWUPCLINICS_GEN_ALL_ED_FT
[facility] Acute Care Surgery  Acute Care Surgery  [address] [place] [address]  Phone: [phone]  Fax:      [facility] Acute Care Surgery  Acute Care Surgery  [address] [place] [address]  Phone: [phone]  Fax:

## 2023-12-20 NOTE — PROVIDER CONTACT NOTE (CRITICAL VALUE NOTIFICATION) - TEST AND RESULT REPORTED:
growth in aerobic and anerobic bottle gram + cocci in clusters
platelet is 1059
growth in anaerobic bottle: gram positive cocci in clusters
anaerobic & areobic came back both postivie for cocc clusters
Positive blood culture. Growth in the anaerobic and aerobic mrsa.

## 2023-12-20 NOTE — PROGRESS NOTE ADULT - ATTENDING COMMENTS
58-year-old male s/p fall with rib fractures, L pneumothorax, subcutaneous emphysema s/p decompression, MRSA bacteremia, septic thrombophlebitis s/p excision, and L pleural effusions s/p thoracentesis.   - c/w wound vac to RUE   - Daily wound care to chest wall incision  - Appreciate infectious disease recommendations   - C/w antibiotics   - Leukocytosis stable. procal 0.17. Patient asymptomatic. Reports hx of chronic leukocytosis   - Discharge planning to [facility]     #Schizoaffective, tardive dyskinesia, delirium: c/w home meds. C/w Depakote, Seroquel, and Zyprexa    #HTN: c/w amlodipine     PPX:  - DVT ppx: SCD + lovenox   - GI ppx: PPI home med   - IS / activity as tolerated   - Bowel regimen  - Delirium precaution 58-year-old male s/p fall with rib fractures, L pneumothorax, subcutaneous emphysema s/p decompression, MRSA bacteremia, septic thrombophlebitis s/p excision, and L pleural effusions s/p thoracentesis.   - c/w wound vac to RUE   - Daily wound care to chest wall incision  - Appreciate infectious disease recommendations   - C/w antibiotics   - Leukocytosis stable. procal 0.17. Patient asymptomatic. Reports hx of chronic leukocytosis   - Discharge planning to [facility]     #Schizoaffective, tardive dyskinesia, delirium: c/w home meds. C/w Depakote, Seroquel, and Zyprexa    #HTN: c/w amlodipine     PPX:  - DVT ppx: SCD + lovenox   - GI ppx: PPI home med   - IS / activity as tolerated   - Bowel regimen  - Delirium precaution

## 2023-12-20 NOTE — DISCHARGE NOTE PROVIDER - NSDCCPCAREPLAN_GEN_ALL_CORE_FT
PRINCIPAL DISCHARGE DIAGNOSIS  Diagnosis: Pneumothorax, left  Assessment and Plan of Treatment: - FOLLOW UP: Please call and make an appointment with the Acute Care Surgery Clinic 10-14 days after discharge. Also, please call and make an appointment with your primary care physician as per your usual schedule.   - ACTIVITY: It is recommended that you DO NOT go on an airplane or do anything that involves flying at high altitudes. DO NOT smoke cigarettes, participate in high impact activities, or go scuba diving at this time - doing so would increase your risk of getting another pneumothrorax   - DIET: May continue regular diet.  - MEDICATIONS: Please take all medications listed on your discharge paperwork as prescribed. You are encouraged to take over-the-counter tylenol and/or ibuprofen for pain relief. A muscle relaxant (robaxin) has also been prescribed for you. Please take Robaxin only as it has been prescribed. Do not drive, operate heavy machinery, or make important decisions while taking Robaxin.  Patient is advised to RETURN TO THE EMERGENCY DEPARTMENT for any of the following - worsening pain, fever/chills, nausea/vomiting, altered mental status, chest pain, shortness of breath, or any other new / worsening symptom.      SECONDARY DISCHARGE DIAGNOSES  Diagnosis: Fracture of single rib  Assessment and Plan of Treatment: See above instructions for "pneumothorax"    Diagnosis: MRSA bacteremia  Assessment and Plan of Treatment: Continue antibiotics (Cefepime and Vancomycin) via PICC line until 1/25/2024. Please call and make an appointment to be seen by infectious disease specialist Dr. Hui 1 WEEK after discharge.    Diagnosis: Septic thrombophlebitis of upper extremity  Assessment and Plan of Treatment: VAC should be placed to right upper extremity wound. Use BLACK SPONGE and set to 125mmHg continuous suction. VAC should be changed TWICE A WEEK. Please call and make a follow-up appointment with vascular surgeon Dr. Ham 1-2 weeks after discharge.   **If VAC fail, removed entirety of dressing and place wet-to-dry dressing. Wet-to-dry dressing should then be changed daily until VAC can be reapplied**.    Diagnosis: Thrombocytosis  Assessment and Plan of Treatment: Due to your elevated platelet count, we have started you on a baby Aspirin, 81mg daily. We recommend you call and make a follow-up appointment with a hematologist after discharge for further work-up of this. Contact information for a [facility] hematologist. Dr. Arvind, has been provided below. We encourage you to call and make an appointment to be seen in the next few weeks.    Diagnosis: Leukocytosis  Assessment and Plan of Treatment: See above instructions for "thrombocytosis"     PRINCIPAL DISCHARGE DIAGNOSIS  Diagnosis: Pneumothorax, left  Assessment and Plan of Treatment: - FOLLOW UP: Please call and make an appointment with the Acute Care Surgery Clinic 10-14 days after discharge. Also, please call and make an appointment with your primary care physician as per your usual schedule.   - ACTIVITY: It is recommended that you DO NOT go on an airplane or do anything that involves flying at high altitudes. DO NOT smoke cigarettes, participate in high impact activities, or go scuba diving at this time - doing so would increase your risk of getting another pneumothrorax   - DIET: May continue regular diet.  - MEDICATIONS: Please take all medications listed on your discharge paperwork as prescribed. You are encouraged to take over-the-counter tylenol and/or ibuprofen for pain relief. A muscle relaxant (robaxin) has also been prescribed for you. Please take Robaxin only as it has been prescribed. Do not drive, operate heavy machinery, or make important decisions while taking Robaxin.  Patient is advised to RETURN TO THE EMERGENCY DEPARTMENT for any of the following - worsening pain, fever/chills, nausea/vomiting, altered mental status, chest pain, shortness of breath, or any other new / worsening symptom.      SECONDARY DISCHARGE DIAGNOSES  Diagnosis: Fracture of single rib  Assessment and Plan of Treatment: See above instructions for "pneumothorax"    Diagnosis: MRSA bacteremia  Assessment and Plan of Treatment: Continue antibiotics (Cefepime and Vancomycin) via PICC line until 1/25/2024. Please call and make an appointment to be seen by infectious disease specialist Dr. Hui 1 WEEK after discharge.    Diagnosis: Septic thrombophlebitis of upper extremity  Assessment and Plan of Treatment: VAC should be placed to right upper extremity wound. Use BLACK SPONGE and set to 125mmHg continuous suction. VAC should be changed TWICE A WEEK. Please call and make a follow-up appointment with vascular surgeon Dr. Ham 1-2 weeks after discharge.   **If VAC fail, removed entirety of dressing and place wet-to-dry dressing. Wet-to-dry dressing should then be changed daily until VAC can be reapplied**.    Diagnosis: Thrombocytosis  Assessment and Plan of Treatment: Due to your elevated platelet count, we have started you on a baby Aspirin, 81mg daily. We recommend you call and make a follow-up appointment with a hematologist after discharge for further work-up of this. Contact information for a [facility] hematologist. Dr. Arvind, has been provided below. We encourage you to call and make an appointment to be seen in the next few weeks.    Diagnosis: Leukocytosis  Assessment and Plan of Treatment: See above instructions for "thrombocytosis"

## 2023-12-20 NOTE — DISCHARGE NOTE PROVIDER - NSDCMRMEDTOKEN_GEN_ALL_CORE_FT
benztropine 2 mg oral tablet: 1 tab(s) orally 2 times a day  clonazePAM 0.5 mg oral tablet: 1 tab(s) orally once a day  divalproex sodium 500 mg oral delayed release tablet: 1 tab(s) orally 2 times a day  gabapentin 100 mg oral capsule: 1 cap(s) orally once a day (at bedtime)  hydrOXYzine hydrochloride 25 mg oral tablet: 2 tab(s) orally 2 times a day  QUEtiapine 100 mg oral tablet: 1 tab(s) orally once a day (at bedtime)   acetaminophen 325 mg oral tablet: 3 tab(s) orally every 6 hours  amLODIPine 5 mg oral tablet: 1 tab(s) orally once a day  aspirin 81 mg oral delayed release tablet: 1 tab(s) orally once a day  benztropine 2 mg oral tablet: 1 tab(s) orally 2 times a day  calcium carbonate 500 mg (200 mg elemental calcium) oral tablet, chewable: 1 tab(s) orally every 6 hours As needed Heartburn  cefepime: 2,000 milligram(s) intravenous every 8 hours end date 1/25/2024  clonazePAM 0.5 mg oral tablet: 1 tab(s) orally once a day  divalproex sodium 500 mg oral delayed release tablet: 1 tab(s) orally 2 times a day  folic acid 1 mg oral tablet: 1 tab(s) orally once a day  gabapentin 100 mg oral capsule: 1 cap(s) orally once a day (at bedtime)  hydrOXYzine hydrochloride 25 mg oral tablet: 2 tab(s) orally 2 times a day  ibuprofen 400 mg oral tablet: 1 tab(s) orally every 6 hours  ipratropium-albuterol 0.5 mg-2.5 mg/3 mL inhalation solution: 3 milliliter(s) inhaled every 6 hours As needed Shortness of Breath and/or Wheezing  lidocaine 4% topical film: Apply topically to affected area once a day  methocarbamol 500 mg oral tablet: 2 tab(s) orally every 8 hours  nicotine 21 mg/24 hr transdermal film, extended release: 1 patch transdermal once a day  pantoprazole 40 mg oral delayed release tablet: 1 tab(s) orally once a day (before a meal)  polyethylene glycol 3350 oral powder for reconstitution: 17 gram(s) orally once a day (at bedtime)  QUEtiapine 25 mg oral tablet: 5 tab(s) orally once a day (at bedtime)  senna leaf extract oral tablet: 2 tab(s) orally once a day (at bedtime)  thiamine 100 mg oral tablet: 1 tab(s) orally once a day  vancomycin 1.25 g intravenous injection: 1.25 gram(s) intravenous every 12 hours end date 1/25/2024 **dose should be adjusted base on trough**

## 2023-12-20 NOTE — DISCHARGE NOTE PROVIDER - NSDCFUADDAPPT_GEN_ALL_CORE_FT
Patient will be discharged to [facility] for Sub-Acute Rehab.    [place] [id number] [place] [address]  p# [phone] Patient will be discharged to [facility] for Sub-Acute Rehab.    [place] [id number] [place] [address]  p# [phone] Patient will be discharged to [facility].    [facility]  [facility] [place] [address]  p# [phone] [facility].    [facility]  [facility] [place] [address]  p# [phone]

## 2023-12-20 NOTE — PROGRESS NOTE ADULT - SUBJECTIVE AND OBJECTIVE BOX
[facility]  INFECTIOUS DISEASES at [place] and [place]  ===============================================================                  Jordon Gonzales MD               Diplomates American Board of Internal Medicine & Infectious Diseases                * [place] Office - Appt - Tel  [phone] Fax [phone]                * [place] Office - Appt - Tel [phone] Fax [phone]                                  Hospital Consult line:  [phone]  ==============================================================    NOHEMY BRAN [id number]    Follow up: MRSA bacteremia        pt reports feeling well   pain from left ribs controlled- believes it was due his bed  no diarrhea  no fever  no dyspnea  cough stable  chronic back pain-no pain right now  rue vac removed  RUE midline removed and LUE picc placed    WBC remains elevated-pt reports he has had chronically elevated WBC due to unclear cause and has seen hematology for workup for this in the past  afebrile and hemodynamically stable     I have personally reviewed the labs and data; pertinent labs and data are listed in this note; please see below.     _______________________________________________________________  REVIEW OF SYSTEMS  as above  ________________________________________________________________  Allergies:  No Known Allergies    ________________________________________________________________  PHYSICAL EXAM  GEN: in NAD, laying in bed  HEENT: Anicteric sclerae. Moist mucous membranes. No mucosal lesions.   NECK: Supple.  LUNGS: eupneic. decreased Breath sounds at bases B/L. left base crackles. tenderness over posterior left chest wall  HEART: RRR, no m/r/g  ABDOMEN: Soft, NT, ND,  +BS.    : No CVA tenderness. No Deleon catheter  EXTREMITIES: RUE dressing intact lue picc intact  NEUROLOGIC: Grossly no motor focal deficits   PSYCHIATRIC: Appropriate affect and mood  SKIN: ulcer in upper chest. no drainage, mild erythema  laterally-no warmth or swelling    ________________________________________________________________  Vitals:  Vital Signs Last 24 Hrs  T(C): 36.7 (20 Dec 2023 12:00), Max: 36.9 (19 Dec 2023 20:30)  T(F): 98.1 (20 Dec 2023 12:00), Max: 98.5 (19 Dec 2023 20:30)  HR: 86 (20 Dec 2023 12:00) (76 - 86)  BP: 158/76 (20 Dec 2023 12:00) (117/71 - 167/82)  BP(mean): --  RR: 20 (20 Dec 2023 12:00) (18 - 20)  SpO2: 96% (20 Dec 2023 12:00) (91% - 96%)    Parameters below as of 20 Dec 2023 12:00  Patient On (Oxygen Delivery Method): room air      Current Antibiotics:  vancomycin  IVPB 500 milliGRAM(s) IV Intermittent every 12 hours    Other medications:  acetaminophen     Tablet .. 975 milliGRAM(s) Oral every 6 hours  amLODIPine   Tablet 5 milliGRAM(s) Oral daily  benztropine 2 milliGRAM(s) Oral two times a day  chlorhexidine 2% Cloths 1 Application(s) Topical daily  divalproex  milliGRAM(s) Oral every 12 hours  enoxaparin Injectable 40 milliGRAM(s) SubCutaneous every 12 hours  folic acid 1 milliGRAM(s) Oral daily  gabapentin 100 milliGRAM(s) Oral at bedtime  influenza   Vaccine 0.5 milliLiter(s) IntraMuscular once  lidocaine   4% Patch 3 Patch Transdermal daily  melatonin 5 milliGRAM(s) Oral at bedtime  methocarbamol 750 milliGRAM(s) Oral every 8 hours  nicotine - 21 mG/24Hr(s) Patch 1 Patch Transdermal every 24 hours  pantoprazole    Tablet 40 milliGRAM(s) Oral before breakfast  polyethylene glycol 3350 17 Gram(s) Oral at bedtime  QUEtiapine 125 milliGRAM(s) Oral at bedtime  senna 2 Tablet(s) Oral at bedtime  thiamine 100 milliGRAM(s) Oral daily                          8.3    18.75 )-----------( 1059     ( 20 Dec 2023 06:48 )             26.7                                     CAPILLARY BLOOD GLUCOSE                    < from: TTE Limited W or WO Ultrasound Enhancing Agent (12.13.23 @ 10:54) >  FINDINGS:     Left Ventricle:  The left ventricular cavity is normal. Left ventricular systolic function is normal with a calculated ejection fraction of 68 % by the Chavez's biplane method of disks with an ejection fraction visually estimated at 65 to 70%. There is normal left ventricular diastolic function.     Right Ventricle:  The right ventricular cavity is normal in size and normal systolic function. Tricuspid annular plane systolic excursion (TAPSE) is 2.2 cm (normal >=1.7 cm). Tricuspid annular tissue Doppler S' is 11.7 cm/s (normal >10 cm/s).     Aortic Valve:  The aortic valve appears trileaflet with normal systolic excursion.     Mitral Valve:  There is trace mitral regurgitation.     Tricuspid Valve:  There is trace tricuspid regurgitation. Estimated pulmonary artery systolic pressure is 26 mmHg.     Pericardium:  There is an echogenic mass and pocket of fluid close to the LV posterior wall. There is significant pleural effusion.  Unclear if this is localized collection of fluid with fribrinous exudate or hematoma is actually percardial space. Recommend CT scan with IV contrast to definitely evaluate for pericardial effusion if clinically indicated.  In any case, there is no echo evidence of LV pseudoaneurysm. Ultrasound enhancing images were not clear to see if the above mentioned area was opacified.     Pleura:  Small left and small right pleural effusion noted.     Systemic Veins:  The inferior vena cava isnormal in size measuring 1.86 cm in diameter, (normal <2.1cm) with normal inspiratory collapse (normal >50%) consistent with normal right atrial pressure (~3, range 0-5mmHg).  ____________________________________________________________________  QUANTITATIVE DATA:  Left Ventricle Measurements: (Indexed to BSA)     LV Vol d, MOD A2C: 88.4 ml   53.01 ml/m²  LV Vol d, MOD A4C: 112.1 ml  67.26 ml/m²  LV Vol d, MOD BP:  106.5 ml  63.91 ml/m²  LV Vol s, MOD A2C: 30.3 ml   18.18 ml/m²  LV Vol s, MOD A4C: 36.0 ml   21.60 ml/m²  LV Vol s, MOD BP:  33.9 ml   20.33 ml/m²  LVOT SV MOD BP:    72.6 ml  LV EF% MOD BP:     68 %  Visualized LV EF%: 65 to 70%     MV E Vmax:    1.00 m/s  e' lateral:   14.10 cm/s  e' medial:    9.68 cm/s  E/e' lateral: 7.09  E/e' medial:  10.33  E/e' Average: 8.41       Right Ventricle Measurements:     TAPSE:      2.2 cm  TV Amber. S': 11.70 cm/s    Mitral Valve Measurements:     MV E Vmax: 1.0 m/s       Tricuspid Valve Measurements:     TR Vmax:          2.4 m/s  TR Peak Gradient: 22.8 mmHg  RA Pressure:      3 mmHg  PASP:             26 mmHg    ________________________________________________________________________________________  Electronically signed on 12/13/2023 at 12:01:48 PM by Lisa Reyna MD, [facility]    < end of copied text >    < from: CAROL W or WO Ultrasound Enhancing Agent (12.14.23 @ 10:25) >  _______________________________________________________________________________________     CONCLUSIONS:      1. Left ventricular systolic function is normal with an ejection fraction visually estimated at 60 to 65 %.   2. Normal right ventricular cavity size and systolic function.   3. Normal atria.   4. No evidence of left atrial or left atrial appendage thrombus. The left atrial appendage emptying velocity isnormal. Ultrasound enhancing agent was utilized to visualize the left atrial appendage.   5. No significant valvular disease.   6. No echocardiographic evidence of vegetations.   7. Agitated saline injection was negative for intracardiac shunt.   8. Mild aortic calcification seen in the descending thoracic aorta.   9. Compared to the transthoracic echocardiogram performed on 12/13/2023 there have been no significant interval changes. Findings were discussed with Patient and rounding cardiology team on 12/14/2023 at 1120PM.  10. Unable to appreciate any LV pseudoaneurysm despite use of ulrasonic echocontrast given shadow artifact. Recommend CT chest w/ IV contrast vs. cMRI.    < end of copied text >    < from: CT Chest No Cont (12.13.23 @ 22:29) >  Consolidation/atelectasis in the left lowerlobe. New peribronchovascular   foci of groundglass opacity and small consolidation in the right lower   lobe.  Trace right pleural effusion. Moderate left-sided pleural effusion which   is partially loculated along the left paramediastinal region.  Redemonstration of acute, displaced fracture of left 3rd-7th ribs    --- End of Report ---      < end of copied text >                Urinalysis Basic - ( 12 Dec 2023 07:58 )    Color: x / Appearance: x / SG: x / pH: x  Gluc: 95 mg/dL / Ketone: x  / Bili: x / Urobili: x   Blood: x / Protein: x / Nitrite: x   Leuk Esterase: x / RBC: x / WBC x   Sq Epi: x / Non Sq Epi: x / Bacteria: x                  CAPILLARY BLOOD GLUCOSE                    Urinalysis Basic - ( 12 Dec 2023 07:58 )    Color: x / Appearance: x / SG: x / pH: x  Gluc: 95 mg/dL / Ketone: x  / Bili: x / Urobili: x   Blood: x / Protein: x / Nitrite: x   Leuk Esterase: x / RBC: x / WBC x   Sq Epi: x / Non Sq Epi: x / Bacteria: x                  CAPILLARY BLOOD GLUCOSE                    Urinalysis Basic - ( 11 Dec 2023 06:57 )    Color: x / Appearance: x / SG: x / pH: x  Gluc: 96 mg/dL / Ketone: x  / Bili: x / Urobili: x   Blood: x / Protein: x / Nitrite: x   Leuk Esterase: x / RBC: x / WBC x   Sq Epi: x / Non Sq Epi: x / Bacteria: x                  CAPILLARY BLOOD GLUCOSE                  RECENT CULTURES:  12-07 @ 14:57 .Blood Blood-Peripheral     Growth in aerobic bottle: Gram Positive Cocci in Clusters  Growth in anaerobic bottle: Gram Positive Cocci in Clusters    Growth in aerobic bottle: Gram Positive Cocci in Clusters  Growth in anaerobic bottle: Gram Positive Cocci in Clusters      12-07 @ 14:52 .Blood Blood-Venous     Growth in aerobic bottle: Gram Positive Cocci in Clusters  Growth in anaerobic bottle: Gram Positive Cocci in Clusters    Growth in aerobic bottle: Gram Positive Cocci in Clusters  Growth in anaerobic bottle: Gram Positive Cocci in Clusters      12-06 @ 15:15 .Blood Blood Blood Culture PCR  Methicillin resistant Staphylococcus aureus    Growth in aerobic and anaerobic bottles: Methicillin Resistant  Staphylococcus aureus    Growth in aerobic bottle: Gram Positive Cocci in Clusters  Growth in anaerobic bottle: Gram Positive Cocci in Clusters      12-06 @ 15:10 .Blood Blood     Growth in aerobic and anaerobic bottles: Methicillin Resistant  Staphylococcus aureus  See previous culture [id number]    Growth in anaerobic bottle: Gram Positive Cocci in Clusters  Growth in aerobic bottle: Gram Positive Cocci in Clusters        WBC Count: 19.04 K/uL (12-09-23 @ 07:31)  WBC Count: 20.66 K/uL (12-08-23 @ 08:06)  WBC Count: 22.89 K/uL (12-07-23 @ 06:05)  WBC Count: 14.00 K/uL (12-06-23 @ 05:15)  WBC Count: 13.75 K/uL (12-05-23 @ 02:08)    Creatinine: 0.63 mg/dL (12-09-23 @ 07:31)  Creatinine: 1.17 mg/dL (12-08-23 @ 08:06)  Creatinine: 0.83 mg/dL (12-07-23 @ 06:05)  Creatinine: 0.60 mg/dL (12-06-23 @ 05:15)  Creatinine: 0.81 mg/dL (12-05-23 @ 02:08)      Vancomycin Level, Trough: 8.3 ug/mL (12-09-23 @ 07:31)  Vancomycin Level, Trough: 11.5 ug/mL (12-08-23 @ 18:40)    ________________________________________________________________  CARDIOLOGY   no recent studies   ________________________________________________________________  RADIOLOGY  < from: US Duplex Venous Upper Ext Ltd, Right (12.07.23 @ 11:52) >  INTERPRETATION:  CLINICAL INFORMATION: Arm swelling    COMPARISON: None available.    TECHNIQUE: Duplex sonography of the RIGHT UPPER extremity veins with   color and spectral Doppler, with and without compression.    FINDINGS:    The right internal jugular, subclavian, axillary and brachial veins are   patent and compressible where applicable.  The basilic vein (superficial   vein) is patent and without thrombus.  Superficial thrombus in the right   cephalic vein.      IMPRESSION:  No evidence of right upper extremity deep venous thrombosis.    Superficial thrombus in the right cephalic vein.    < end of copied text >  < from: Xray Chest 1 View- PORTABLE-Urgent (Xray Chest 1 View- PORTABLE-Urgent .) (12.06.23 @ 10:15) >  INTERPRETATION:  Chest one view    HISTORY: Leukocytosis    COMPARISON STUDY: 12/2/2023    Frontal expiratory view of the chest shows the heart to be similar in   size. The lungs show partial clearing of the left base and there is no   evidence of pneumothorax nor pleural effusion.    IMPRESSION:  Left base clearing.    < end of copied text >    < from: TTE W or WO Ultrasound Enhancing Agent (12.11.23 @ 19:59) >  CONCLUSIONS:      1. Left ventricular cavity is normal.   2. Normal left ventricular diastolic function.   3. Trace mitral regurgitation.   4. Moderate left pleural effusion noted.   5. Moderate pericardial effusion noted adjacent to the posterolateralleft ventricle. There is expansion of the LV posterolateral wall with systole, no color flow was done.LV pseduoaneurysm needs to be ruled out.   6. The inferior vena cava is normal in size measuring 2.10 cm in diameter, (normal <2.1cm) with normal inspiratory collapse (normal >50%) consistent with normal right atrial pressure (~3, range 0-5mmHg).   7. Recommendations: Repeat 2 D echo with definity to evaluate pericardial effusion, to see if there is any pseudo aneurysm of the LV. Color flow and doppler should be done.    < end of copied text >   [facility]  INFECTIOUS DISEASES at [place] and [place]  ===============================================================                  Jordon Gonzales MD               Diplomates American Board of Internal Medicine & Infectious Diseases                * [place] Office - Appt - Tel  [phone] Fax [phone]                * [employer] Office - Appt - Tel [phone] Fax [phone]                                  Hospital Consult line:  [phone]  ==============================================================    NOHEMY BRAN [id number]    Follow up: MRSA bacteremia        pt reports feeling well   pain from left ribs controlled- believes it was due his bed  no diarrhea  no fever  no dyspnea  cough stable  chronic back pain-no pain right now  rue vac removed  RUE midline removed and LUE picc placed    WBC remains elevated-pt reports he has had chronically elevated WBC due to unclear cause and has seen hematology for workup for this in the past  afebrile and hemodynamically stable     I have personally reviewed the labs and data; pertinent labs and data are listed in this note; please see below.     _______________________________________________________________  REVIEW OF SYSTEMS  as above  ________________________________________________________________  Allergies:  No Known Allergies    ________________________________________________________________  PHYSICAL EXAM  GEN: in NAD, laying in bed  HEENT: Anicteric sclerae. Moist mucous membranes. No mucosal lesions.   NECK: Supple.  LUNGS: eupneic. decreased Breath sounds at bases B/L. left base crackles. tenderness over posterior left chest wall  HEART: RRR, no m/r/g  ABDOMEN: Soft, NT, ND,  +BS.    : No CVA tenderness. No Deleon catheter  EXTREMITIES: RUE dressing intact lue picc intact  NEUROLOGIC: Grossly no motor focal deficits   PSYCHIATRIC: Appropriate affect and mood  SKIN: ulcer in upper chest. no drainage, mild erythema  laterally-no warmth or swelling    ________________________________________________________________  Vitals:  Vital Signs Last 24 Hrs  T(C): 36.7 (20 Dec 2023 12:00), Max: 36.9 (19 Dec 2023 20:30)  T(F): 98.1 (20 Dec 2023 12:00), Max: 98.5 (19 Dec 2023 20:30)  HR: 86 (20 Dec 2023 12:00) (76 - 86)  BP: 158/76 (20 Dec 2023 12:00) (117/71 - 167/82)  BP(mean): --  RR: 20 (20 Dec 2023 12:00) (18 - 20)  SpO2: 96% (20 Dec 2023 12:00) (91% - 96%)    Parameters below as of 20 Dec 2023 12:00  Patient On (Oxygen Delivery Method): room air      Current Antibiotics:  vancomycin  IVPB 500 milliGRAM(s) IV Intermittent every 12 hours    Other medications:  acetaminophen     Tablet .. 975 milliGRAM(s) Oral every 6 hours  amLODIPine   Tablet 5 milliGRAM(s) Oral daily  benztropine 2 milliGRAM(s) Oral two times a day  chlorhexidine 2% Cloths 1 Application(s) Topical daily  divalproex  milliGRAM(s) Oral every 12 hours  enoxaparin Injectable 40 milliGRAM(s) SubCutaneous every 12 hours  folic acid 1 milliGRAM(s) Oral daily  gabapentin 100 milliGRAM(s) Oral at bedtime  influenza   Vaccine 0.5 milliLiter(s) IntraMuscular once  lidocaine   4% Patch 3 Patch Transdermal daily  melatonin 5 milliGRAM(s) Oral at bedtime  methocarbamol 750 milliGRAM(s) Oral every 8 hours  nicotine - 21 mG/24Hr(s) Patch 1 Patch Transdermal every 24 hours  pantoprazole    Tablet 40 milliGRAM(s) Oral before breakfast  polyethylene glycol 3350 17 Gram(s) Oral at bedtime  QUEtiapine 125 milliGRAM(s) Oral at bedtime  senna 2 Tablet(s) Oral at bedtime  thiamine 100 milliGRAM(s) Oral daily                          8.3    18.75 )-----------( 1059     ( 20 Dec 2023 06:48 )             26.7                                     CAPILLARY BLOOD GLUCOSE                    < from: TTE Limited W or WO Ultrasound Enhancing Agent (12.13.23 @ 10:54) >  FINDINGS:     Left Ventricle:  The left ventricular cavity is normal. Left ventricular systolic function is normal with a calculated ejection fraction of 68 % by the Chavez's biplane method of disks with an ejection fraction visually estimated at 65 to 70%. There is normal left ventricular diastolic function.     Right Ventricle:  The right ventricular cavity is normal in size and normal systolic function. Tricuspid annular plane systolic excursion (TAPSE) is 2.2 cm (normal >=1.7 cm). Tricuspid annular tissue Doppler S' is 11.7 cm/s (normal >10 cm/s).     Aortic Valve:  The aortic valve appears trileaflet with normal systolic excursion.     Mitral Valve:  There is trace mitral regurgitation.     Tricuspid Valve:  There is trace tricuspid regurgitation. Estimated pulmonary artery systolic pressure is 26 mmHg.     Pericardium:  There is an echogenic mass and pocket of fluid close to the LV posterior wall. There is significant pleural effusion.  Unclear if this is localized collection of fluid with fribrinous exudate or hematoma is actually percardial space. Recommend CT scan with IV contrast to definitely evaluate for pericardial effusion if clinically indicated.  In any case, there is no echo evidence of LV pseudoaneurysm. Ultrasound enhancing images were not clear to see if the above mentioned area was opacified.     Pleura:  Small left and small right pleural effusion noted.     Systemic Veins:  The inferior vena cava isnormal in size measuring 1.86 cm in diameter, (normal <2.1cm) with normal inspiratory collapse (normal >50%) consistent with normal right atrial pressure (~3, range 0-5mmHg).  ____________________________________________________________________  QUANTITATIVE DATA:  Left Ventricle Measurements: (Indexed to BSA)     LV Vol d, MOD A2C: 88.4 ml   53.01 ml/m²  LV Vol d, MOD A4C: 112.1 ml  67.26 ml/m²  LV Vol d, MOD BP:  106.5 ml  63.91 ml/m²  LV Vol s, MOD A2C: 30.3 ml   18.18 ml/m²  LV Vol s, MOD A4C: 36.0 ml   21.60 ml/m²  LV Vol s, MOD BP:  33.9 ml   20.33 ml/m²  LVOT SV MOD BP:    72.6 ml  LV EF% MOD BP:     68 %  Visualized LV EF%: 65 to 70%     MV E Vmax:    1.00 m/s  e' lateral:   14.10 cm/s  e' medial:    9.68 cm/s  E/e' lateral: 7.09  E/e' medial:  10.33  E/e' Average: 8.41       Right Ventricle Measurements:     TAPSE:      2.2 cm  TV Amber. S': 11.70 cm/s    Mitral Valve Measurements:     MV E Vmax: 1.0 m/s       Tricuspid Valve Measurements:     TR Vmax:          2.4 m/s  TR Peak Gradient: 22.8 mmHg  RA Pressure:      3 mmHg  PASP:             26 mmHg    ________________________________________________________________________________________  Electronically signed on 12/13/2023 at 12:01:48 PM by Lisa Reyna MD, [facility]    < end of copied text >    < from: CAROL W or WO Ultrasound Enhancing Agent (12.14.23 @ 10:25) >  _______________________________________________________________________________________     CONCLUSIONS:      1. Left ventricular systolic function is normal with an ejection fraction visually estimated at 60 to 65 %.   2. Normal right ventricular cavity size and systolic function.   3. Normal atria.   4. No evidence of left atrial or left atrial appendage thrombus. The left atrial appendage emptying velocity isnormal. Ultrasound enhancing agent was utilized to visualize the left atrial appendage.   5. No significant valvular disease.   6. No echocardiographic evidence of vegetations.   7. Agitated saline injection was negative for intracardiac shunt.   8. Mild aortic calcification seen in the descending thoracic aorta.   9. Compared to the transthoracic echocardiogram performed on 12/13/2023 there have been no significant interval changes. Findings were discussed with Patient and rounding cardiology team on 12/14/2023 at 1120PM.  10. Unable to appreciate any LV pseudoaneurysm despite use of ulrasonic echocontrast given shadow artifact. Recommend CT chest w/ IV contrast vs. cMRI.    < end of copied text >    < from: CT Chest No Cont (12.13.23 @ 22:29) >  Consolidation/atelectasis in the left lowerlobe. New peribronchovascular   foci of groundglass opacity and small consolidation in the right lower   lobe.  Trace right pleural effusion. Moderate left-sided pleural effusion which   is partially loculated along the left paramediastinal region.  Redemonstration of acute, displaced fracture of left 3rd-7th ribs    --- End of Report ---      < end of copied text >                Urinalysis Basic - ( 12 Dec 2023 07:58 )    Color: x / Appearance: x / SG: x / pH: x  Gluc: 95 mg/dL / Ketone: x  / Bili: x / Urobili: x   Blood: x / Protein: x / Nitrite: x   Leuk Esterase: x / RBC: x / WBC x   Sq Epi: x / Non Sq Epi: x / Bacteria: x                  CAPILLARY BLOOD GLUCOSE                    Urinalysis Basic - ( 12 Dec 2023 07:58 )    Color: x / Appearance: x / SG: x / pH: x  Gluc: 95 mg/dL / Ketone: x  / Bili: x / Urobili: x   Blood: x / Protein: x / Nitrite: x   Leuk Esterase: x / RBC: x / WBC x   Sq Epi: x / Non Sq Epi: x / Bacteria: x                  CAPILLARY BLOOD GLUCOSE                    Urinalysis Basic - ( 11 Dec 2023 06:57 )    Color: x / Appearance: x / SG: x / pH: x  Gluc: 96 mg/dL / Ketone: x  / Bili: x / Urobili: x   Blood: x / Protein: x / Nitrite: x   Leuk Esterase: x / RBC: x / WBC x   Sq Epi: x / Non Sq Epi: x / Bacteria: x                  CAPILLARY BLOOD GLUCOSE                  RECENT CULTURES:  12-07 @ 14:57 .Blood Blood-Peripheral     Growth in aerobic bottle: Gram Positive Cocci in Clusters  Growth in anaerobic bottle: Gram Positive Cocci in Clusters    Growth in aerobic bottle: Gram Positive Cocci in Clusters  Growth in anaerobic bottle: Gram Positive Cocci in Clusters      12-07 @ 14:52 .Blood Blood-Venous     Growth in aerobic bottle: Gram Positive Cocci in Clusters  Growth in anaerobic bottle: Gram Positive Cocci in Clusters    Growth in aerobic bottle: Gram Positive Cocci in Clusters  Growth in anaerobic bottle: Gram Positive Cocci in Clusters      12-06 @ 15:15 .Blood Blood Blood Culture PCR  Methicillin resistant Staphylococcus aureus    Growth in aerobic and anaerobic bottles: Methicillin Resistant  Staphylococcus aureus    Growth in aerobic bottle: Gram Positive Cocci in Clusters  Growth in anaerobic bottle: Gram Positive Cocci in Clusters      12-06 @ 15:10 .Blood Blood     Growth in aerobic and anaerobic bottles: Methicillin Resistant  Staphylococcus aureus  See previous culture [id number]    Growth in anaerobic bottle: Gram Positive Cocci in Clusters  Growth in aerobic bottle: Gram Positive Cocci in Clusters        WBC Count: 19.04 K/uL (12-09-23 @ 07:31)  WBC Count: 20.66 K/uL (12-08-23 @ 08:06)  WBC Count: 22.89 K/uL (12-07-23 @ 06:05)  WBC Count: 14.00 K/uL (12-06-23 @ 05:15)  WBC Count: 13.75 K/uL (12-05-23 @ 02:08)    Creatinine: 0.63 mg/dL (12-09-23 @ 07:31)  Creatinine: 1.17 mg/dL (12-08-23 @ 08:06)  Creatinine: 0.83 mg/dL (12-07-23 @ 06:05)  Creatinine: 0.60 mg/dL (12-06-23 @ 05:15)  Creatinine: 0.81 mg/dL (12-05-23 @ 02:08)      Vancomycin Level, Trough: 8.3 ug/mL (12-09-23 @ 07:31)  Vancomycin Level, Trough: 11.5 ug/mL (12-08-23 @ 18:40)    ________________________________________________________________  CARDIOLOGY   no recent studies   ________________________________________________________________  RADIOLOGY  < from: US Duplex Venous Upper Ext Ltd, Right (12.07.23 @ 11:52) >  INTERPRETATION:  CLINICAL INFORMATION: Arm swelling    COMPARISON: None available.    TECHNIQUE: Duplex sonography of the RIGHT UPPER extremity veins with   color and spectral Doppler, with and without compression.    FINDINGS:    The right internal jugular, subclavian, axillary and brachial veins are   patent and compressible where applicable.  The basilic vein (superficial   vein) is patent and without thrombus.  Superficial thrombus in the right   cephalic vein.      IMPRESSION:  No evidence of right upper extremity deep venous thrombosis.    Superficial thrombus in the right cephalic vein.    < end of copied text >  < from: Xray Chest 1 View- PORTABLE-Urgent (Xray Chest 1 View- PORTABLE-Urgent .) (12.06.23 @ 10:15) >  INTERPRETATION:  Chest one view    HISTORY: Leukocytosis    COMPARISON STUDY: 12/2/2023    Frontal expiratory view of the chest shows the heart to be similar in   size. The lungs show partial clearing of the left base and there is no   evidence of pneumothorax nor pleural effusion.    IMPRESSION:  Left base clearing.    < end of copied text >    < from: TTE W or WO Ultrasound Enhancing Agent (12.11.23 @ 19:59) >  CONCLUSIONS:      1. Left ventricular cavity is normal.   2. Normal left ventricular diastolic function.   3. Trace mitral regurgitation.   4. Moderate left pleural effusion noted.   5. Moderate pericardial effusion noted adjacent to the posterolateralleft ventricle. There is expansion of the LV posterolateral wall with systole, no color flow was done.LV pseduoaneurysm needs to be ruled out.   6. The inferior vena cava is normal in size measuring 2.10 cm in diameter, (normal <2.1cm) with normal inspiratory collapse (normal >50%) consistent with normal right atrial pressure (~3, range 0-5mmHg).   7. Recommendations: Repeat 2 D echo with definity to evaluate pericardial effusion, to see if there is any pseudo aneurysm of the LV. Color flow and doppler should be done.    < end of copied text >

## 2023-12-20 NOTE — DISCHARGE NOTE PROVIDER - CARE PROVIDER_API CALL
Lenora Ham  Surgery  [address], [address]  [place] [address]  Phone: [phone]  Fax: [phone]  Follow Up Time:     Alva Hui  Infectious Disease  [address] [place] [address]  Phone: [phone]  Fax: [phone]  Follow Up Time:     Nikki Samuels  Hematology  [address] [place] [address]  Phone: [phone]  Fax: [phone]  Follow Up Time:    Lenora Ham  Surgery  [address], [address]  [place] [address]  Phone: [phone]  Fax: [phone]  Follow Up Time:     Alva Hui  Infectious Disease  [address] [place] [address]  Phone: [phone]  Fax: [phone]  Follow Up Time:     Nikki Samuels  Hematology  [address] [place] [address]  Phone: [phone]  Fax: [phone]  Follow Up Time:

## 2023-12-20 NOTE — ED ADULT NURSE NOTE - CAS TRG GENERAL AIRWAY, MLM
Writer calling patient with elevated PSA results. Dr Madera would like patient to get prostate MRI completed. Orders placed- central scheduling number given to patient. No other questions or concerns.    Patent

## 2023-12-20 NOTE — DISCHARGE NOTE NURSING/CASE MANAGEMENT/SOCIAL WORK - NSDCFUADDAPPT_GEN_ALL_CORE_FT
Patient will be discharged to [facility] for Sub-Acute Rehab.    [id number] [place] [address]  p# [phone] Patient will be discharged to [facility] for Sub-Acute Rehab.    [place] [id number] [place] [address]  p# [phone]

## 2023-12-20 NOTE — DISCHARGE NOTE NURSING/CASE MANAGEMENT/SOCIAL WORK - NSDCVIVACCINE_GEN_ALL_CORE_FT
Tdap; 16-Jun-2022 20:42; Rashmi Duarte (RN); [employer]; [id number] (Exp. Date: 09-Sep-2023); IntraMuscular; Deltoid Left.; 0.5 milliLiter(s); VIS (VIS Published: 09-May-2013, VIS Presented: 16-Jun-2022);   Tdap; 08-Aug-2023 23:31; Rajani Drake (CONY); [employer]; [id number] (Exp. Date: 03-Oct-2025); IntraMuscular; Deltoid Left.; 0.5 milliLiter(s); VIS (VIS Published: 09-May-2013, VIS Presented: 08-Aug-2023);    Tdap; 16-Jun-2022 20:42; Rashmi Duarte (RN); [employer]; [id number] (Exp. Date: 09-Sep-2023); IntraMuscular; Deltoid Left.; 0.5 milliLiter(s); VIS (VIS Published: 09-May-2013, VIS Presented: 16-Jun-2022);   Tdap; 08-Aug-2023 23:31; Rajani Drake (CONY); [employer]; [id number] (Exp. Date: 03-Oct-2025); IntraMuscular; Deltoid Left.; 0.5 milliLiter(s); VIS (VIS Published: 09-May-2013, VIS Presented: 08-Aug-2023);

## 2023-12-20 NOTE — CHART NOTE - NSCHARTNOTESELECT_GEN_ALL_CORE
Brief note/Event Note
Downgrade Note/Event Note
Event Note
Post op check/Event Note
Transfer Note
Trauma tertiary Exam/Event Note
event note- attending
Event Note
Transfer Note
Nutrition Services

## 2023-12-20 NOTE — DISCHARGE NOTE PROVIDER - CARE PROVIDERS DIRECT ADDRESSES
,DirectAddress_Unknown,DirectAddress_Unknown,[email or web address] ,DirectAddress_Unknown,DirectAddress_Unknown,[email or web address]

## 2023-12-20 NOTE — DISCHARGE NOTE NURSING/CASE MANAGEMENT/SOCIAL WORK - NSDCPEFALRISK_GEN_ALL_CORE
For information on Fall & Injury Prevention, visit: [email or web address] OR  [email or web address] [email or web address] OR  https://www.cdc.gov/steadi/patient.html For information on Fall & Injury Prevention, visit: [email or web address]://www.[email or web address]/[email or web address]-[email or web address]-mobility OR  https://www.[facility]/news/[email or web address]-tips-to-avoid-injury OR  https://www.cdc.gov/steadi/patient.html

## 2023-12-20 NOTE — CHART NOTE - NSCHARTNOTEFT_GEN_A_CORE
Uncomplicated wound vac removal. Wound is clean and dry, dressing applied with sterile gauze and ace bandage. Continue wound vac care upon discharge.

## 2023-12-20 NOTE — DISCHARGE NOTE PROVIDER - PROVIDER TOKENS
PROVIDER:[TOKEN:[[id number]]],PROVIDER:[TOKEN:[[id number]]],PROVIDER:[TOKEN:[[id number]]] PROVIDER:[TOKEN:[[id number]]],PROVIDER:[TOKEN:[[id number]]],PROVIDER:[TOKEN:[[id number]]]

## 2023-12-20 NOTE — DISCHARGE NOTE PROVIDER - HOSPITAL COURSE
Patient is a 57 y/o male who presented s/p a fall down stairs. He was found to have a L 7th rib fx with associated small crescentic left anterior pneumothorax. Pt was admitted tohe SICU. Placed on PIC protocol for rib fx. On 11/29 he was noted to have dramatic increase in subcutaneous emphysema. CT chest at that time showed new extensive/diffuse pneumomediastinum and subcutaneous emphysema; increased size of small left pneumothorax. Incision made over L anterior chest with release of large amount of air from soft tissue. CT esophogram was also done showing no evidence of extraluminal contrast leakage from the esophagus; noted enlarging moderate left pneumothorax, stable pneumomediastinum and slight decrease in subcutaneous air. Patient had pigtail catheter placed to left chest on 11/29. Rib block also performed on 11/29. He was downgraded from SICU to floor on 11/30 AM. On 11/30 PM into early 12/1 AM, pt became more agitating requiring sedation and code gray was called. Pt became more agitated and short of breath. O2 sats dropped and pt became less responsive. Pt was intubated & returned to SICU. Treated w/ solumedrol and duonebs for reactive airway disease. Was able to be extubated the next day, 12/2. Chest tube removed 12/3. Pt had repeat rib block performed on 12/4. Downgraded back to the floor on 12/5. Labs the next few days notable for increasing WBC count. Blood cx drawn at that time and resulted positive for MRSA. Pt started on vancomycin. Pt noted to have cellulitis & purulence from IV site on RUE. IV removed .Duplex performed showing superficial thrombus in the right cephalic vein. Vascular surgery consulted. Performed vein excision on 12/8. Pt tolerated procedure well. Post-op vascular team placed VAC to RUE. Pt had persistently elevated WBC. TTE showed no vegetation but did expansion of LV wall with systole for which LV pseudoaneurysm needed to be ruled out. CAROL done on 12/14 also showing no vegetation, no LV PSA. CT chest moderate L sided pleural effusion. Given persistently elevated WBC, decision made to have IR perform thoracentesis on 12/15 to eval pleural effusion further. Cultures from thoracentesis with no growth. ID continued to follow & adjusted medication regimen - added cefepime. Pt's WBC began to downtrend from 20 to 19 and then 18. Pt reports hx of chronic leukocytosis. He has remained afebrile and clinically well without evidence for systemic infection. Patient is a 59 y/o male who presented s/p a fall down stairs. He was found to have a L 7th rib fx with associated small crescentic left anterior pneumothorax. Pt was admitted tohe SICU. Placed on PIC protocol for rib fx. On 11/29 he was noted to have dramatic increase in subcutaneous emphysema. CT chest at that time showed new extensive/diffuse pneumomediastinum and subcutaneous emphysema; increased size of small left pneumothorax. Incision made over L anterior chest with release of large amount of air from soft tissue. CT esophogram was also done showing no evidence of extraluminal contrast leakage from the esophagus; noted enlarging moderate left pneumothorax, stable pneumomediastinum and slight decrease in subcutaneous air. Patient had pigtail catheter placed to left chest on 11/29. Rib block also performed on 11/29. He was downgraded from SICU to floor on 11/30 AM. On 11/30 PM into early 12/1 AM, pt became more agitating requiring sedation and code gray was called. Pt became more agitated and short of breath. O2 sats dropped and pt became less responsive. Pt was intubated & returned to SICU. Treated w/ solumedrol and duonebs for reactive airway disease. Was able to be extubated the next day, 12/2. Chest tube removed 12/3. Pt had repeat rib block performed on 12/4. Downgraded back to the floor on 12/5. Labs the next few days notable for increasing WBC count. Blood cx drawn at that time and resulted positive for MRSA. Pt started on vancomycin. Pt noted to have cellulitis & purulence from IV site on RUE. IV removed .Duplex performed showing superficial thrombus in the right cephalic vein. Vascular surgery consulted. Performed vein excision on 12/8. Pt tolerated procedure well. Post-op vascular team placed VAC to RUE. Pt had persistently elevated WBC. TTE showed no vegetation but did expansion of LV wall with systole for which LV pseudoaneurysm needed to be ruled out. CAROL done on 12/14 also showing no vegetation, no LV PSA. CT chest moderate L sided pleural effusion. Given persistently elevated WBC, decision made to have IR perform thoracentesis on 12/15 to eval pleural effusion further. Cultures from thoracentesis with no growth. ID continued to follow & adjusted medication regimen - added cefepime. Pt's WBC began to downtrend from 20 to 19 and then 18. Pt reports hx of chronic leukocytosis. He has remained afebrile and clinically well without evidence for systemic infection. Patient is a 59 y/o male who presented s/p a fall down stairs. He was found to have a L 7th rib fx with associated small crescentic left anterior pneumothorax. Pt was admitted tohe SICU. Placed on PIC protocol for rib fx. On 11/29 he was noted to have dramatic increase in subcutaneous emphysema. CT chest at that time showed new extensive/diffuse pneumomediastinum and subcutaneous emphysema; increased size of small left pneumothorax. Incision made over L anterior chest with release of large amount of air from soft tissue. CT esophogram was also done showing no evidence of extraluminal contrast leakage from the esophagus; also noted enlarging moderate left pneumothorax, stable pneumomediastinum and slight decrease in subcutaneous air. Patient had pigtail catheter placed to left chest on 11/29. Rib block also performed on 11/29. He was downgraded from SICU to floor on 11/30 AM. On 11/30 PM into early 12/1 AM, pt became agitated requiring sedation and code gray was called. Pt proceeded to become more agitated and short of breath. O2 sats dropped and pt became less responsive. Pt was intubated & returned to SICU. Treated w/ solumedrol and duonebs for reactive airway disease. Was able to be extubated the next day, 12/2. Chest tube removed 12/3. Pt had repeat rib block performed on 12/4. Downgraded back to the floor on 12/5. Labs the next few days notable for increasing WBC count. Blood cx drawn at that time and resulted positive for MRSA. Pt started on vancomycin. Pt noted to have cellulitis & purulence from IV site on RUE. IV removed. RUE duplex performed showing superficial thrombus in the right cephalic vein. Vascular surgery consulted. Performed vein excision on 12/8. Pt tolerated procedure well. Post-op vascular team placed VAC to RUE. Pt still had persistently elevated WBC. TTE showed no vegetation but did note expansion of LV wall with systole for which LV pseudoaneurysm needed to be ruled out. CAROL done on 12/14 also showing no vegetation, no LV PSA visualized. CT chest performed showing moderate L sided pleural effusion. Given persistently elevated WBC, decision made to have IR perform thoracentesis on 12/15 to eval pleural effusion further. Cultures from thoracentesis with no growth. ID continued to follow & adjusted medication regimen - added cefepime. Pt still with elevated WBC but beginning to downtrend. He reports hx of chronic leukocytosis for which he has seen a hematologist before. Started on ASA for thrombocytosis. PICC placed for long term abx as per ID. Pt has remained afebrile and clinically well without evidence for systemic infection. He is tolerating diet, pain controlled on PO medications, OOB ambulating, voiding and having bowel function. Pt is stable for discharge to [facility] with outpatient follow-up as outlined below.    Patient is advised to RETURN TO THE EMERGENCY DEPARTMENT for any of the following - worsening pain, fever/chills, nausea/vomiting, altered mental status, chest pain, shortness of breath, or any other new / worsening symptom. Patient is a 57 y/o male who presented s/p a fall down stairs. He was found to have a L 7th rib fx with associated small crescentic left anterior pneumothorax. Pt was admitted tohe SICU. Placed on PIC protocol for rib fx. On 11/29 he was noted to have dramatic increase in subcutaneous emphysema. CT chest at that time showed new extensive/diffuse pneumomediastinum and subcutaneous emphysema; increased size of small left pneumothorax. Incision made over L anterior chest with release of large amount of air from soft tissue. CT esophogram was also done showing no evidence of extraluminal contrast leakage from the esophagus; also noted enlarging moderate left pneumothorax, stable pneumomediastinum and slight decrease in subcutaneous air. Patient had pigtail catheter placed to left chest on 11/29. Rib block also performed on 11/29. He was downgraded from SICU to floor on 11/30 AM. On 11/30 PM into early 12/1 AM, pt became agitated requiring sedation and code gray was called. Pt proceeded to become more agitated and short of breath. O2 sats dropped and pt became less responsive. Pt was intubated & returned to SICU. Treated w/ solumedrol and duonebs for reactive airway disease. Was able to be extubated the next day, 12/2. Chest tube removed 12/3. Pt had repeat rib block performed on 12/4. Downgraded back to the floor on 12/5. Labs the next few days notable for increasing WBC count. Blood cx drawn at that time and resulted positive for MRSA. Pt started on vancomycin. Pt noted to have cellulitis & purulence from IV site on RUE. IV removed. RUE duplex performed showing superficial thrombus in the right cephalic vein. Vascular surgery consulted. Performed vein excision on 12/8. Pt tolerated procedure well. Post-op vascular team placed VAC to RUE. Pt still had persistently elevated WBC. TTE showed no vegetation but did note expansion of LV wall with systole for which LV pseudoaneurysm needed to be ruled out. CAROL done on 12/14 also showing no vegetation, no LV PSA visualized. CT chest performed showing moderate L sided pleural effusion. Given persistently elevated WBC, decision made to have IR perform thoracentesis on 12/15 to eval pleural effusion further. Cultures from thoracentesis with no growth. ID continued to follow & adjusted medication regimen - added cefepime. Pt still with elevated WBC but beginning to downtrend. He reports hx of chronic leukocytosis for which he has seen a hematologist before. Started on ASA for thrombocytosis. PICC placed for long term abx as per ID. Pt has remained afebrile and clinically well without evidence for systemic infection. He is tolerating diet, pain controlled on PO medications, OOB ambulating, voiding and having bowel function. Pt is stable for discharge to [facility] with outpatient follow-up as outlined below.    Patient is advised to RETURN TO THE EMERGENCY DEPARTMENT for any of the following - worsening pain, fever/chills, nausea/vomiting, altered mental status, chest pain, shortness of breath, or any other new / worsening symptom.

## 2023-12-20 NOTE — DISCHARGE NOTE NURSING/CASE MANAGEMENT/SOCIAL WORK - PATIENT PORTAL LINK FT
You can access the FollowMyHealth Patient Portal offered by [facility] by registering at the following website: [email or web address]. By joining [employer]’s FollowMyHealth portal, you will also be able to view your health information using other applications (apps) compatible with our system. You can access the FollowMyHealth Patient Portal offered by [facility] by registering at the following website: [email or web address]. By joining [employer]’s FollowMyHealth portal, you will also be able to view your health information using other applications (apps) compatible with our system.

## 2023-12-20 NOTE — PROGRESS NOTE ADULT - ASSESSMENT
57 year old male with PMH of ETOH abuse, HTN, HLD, seizures, tardive dyskinesia hiatal hernia s/p mechanical fall down stairs while intoxicated on 11/29. Pt intubated in ED for airway protection. Found to have left rib fractures 3, 6, 7, 10 and left pneumothorax with extensive subcutaneous emphysema requiring a left sided pigtail on 11/29. Hospital course complicated by acute agitation and acute asthma exacerbation requiring re-intubation on 12/1. Pt extubated 12/2. Transferred to floor 12/6 and noted to be febrile with uptrending WBC, rt arm edema erythema and reported purulent drainage. Found to have MRSA bacteremia, concern for cellulitis , septic thrombophlebitis    - 12/6 and 12/7 BCX + MRSA  - s/p I&D of septic thrombophlebitis of right arm >> Incision and drainage of Infected basilic and cephalic thrombophlebitis at antecubital level purulent discharge seen upon incision  - Repeat BCX 12/10-+  - BCX 12/11 and 12/12 ngtd  - CAROL no vegetations  - RUE venous doppler with sup thrombus of right cephalic vein   - Ct chest performed + left loculated effusion  - s/p thoracentesis 12/15 for left loculated effusion, culture neg  - c/w cefepime 2 g iv q8h  - c/w vancomycin 1250mg iv q12h  - monitor trough and adjust per pharmacy- last vanco trough 13.5  - will plan 6 weeks abx  end 1/25/24 with weekly CBc CMP vanco trough to be adjusted by MD at facility   - anticoagulation as per vascular   - Trend Fever - afebrile   - Trend Leukocytosis -better today  - procal 0.17  - pt overall afebrile, nontoxic has  elevated WBC on broad spectrum abx ?reactive. pt reports chronic leukocytosis due to unclear cause. has seen hematology for this in the past as well  - CXR  12/19/23 reports improved left pleural effusion. would check Ct c/ap if WBC continues to uptrend.   - repeat BCX if febrile  - Will need hemonc f/u outpatient  - ID f/u 1 month         57 year old male with PMH of ETOH abuse, HTN, HLD, seizures, tardive dyskinesia hiatal hernia s/p mechanical fall down stairs while intoxicated on 11/29. Pt intubated in ED for airway protection. Found to have left rib fractures 3, 6, 7, 10 and left pneumothorax with extensive subcutaneous emphysema requiring a left sided pigtail on 11/29. Hospital course complicated by acute agitation and acute asthma exacerbation requiring re-intubation on 12/1. Pt extubated 12/2. Transferred to floor 12/6 and noted to be febrile with uptrending WBC, rt arm edema erythema and reported purulent drainage. Found to have MRSA bacteremia, concern for cellulitis , septic thrombophlebitis    - 12/6 and 12/7 BCX + MRSA  - s/p I&D of septic thrombophlebitis of right arm >> Incision and drainage of Infected basilic and cephalic thrombophlebitis at antecubital level purulent discharge seen upon incision  - Repeat BCX 12/10-+  - BCX 12/11 and 12/12 ngtd  - CAROL no vegetations  - RUE venous doppler with sup thrombus of right cephalic vein   - Ct chest performed + left loculated effusion  - s/p thoracentesis 12/15 for left loculated effusion, culture neg  - c/w cefepime 2 g iv q8h  - c/w vancomycin 1250mg iv q12h  - monitor trough and adjust per pharmacy- last vanco trough 13.5  - will plan 6 weeks abx  end 1/25/24 with weekly CBc CMP vanco trough to be adjusted by MD at facility   - anticoagulation as per vascular   - Trend Fever - afebrile   - Trend Leukocytosis -better today  - PLt elevated, likely reactive  - procal 0.17  - pt overall afebrile, nontoxic has  elevated WBC on broad spectrum abx ?reactive. pt reports chronic leukocytosis due to unclear cause. has seen hematology for this in the past as well  - CXR  12/19/23 reports improved left pleural effusion. would check Ct c/ap if WBC continues to uptrend.   - repeat BCX if febrile  - Will need hemonc f/u outpatient  - ID f/u 1 month

## 2023-12-20 NOTE — PROGRESS NOTE ADULT - ASSESSMENT
57 y/o male s/p fall down stairs sustaining L sided rib fxs and L PTX. Hospital course complicated by development of septic thrombophlebitis of R basilic & cephalic vein and MRSA bacteremia, as well as L pleural effusion. ID following. Repeat bcx negative. S/p thoracentesis by IR w/ no growth from pleural fluid cultures. Remains on cefepime & vancomycin. WBC downtrending.   - Will f/u ID recommendations for duration of antibiotics & final abx choice  - Vascular following for VAC changed to RUE  - Continue PIC protocol  - Pain control PRN  - Regular diet  - Encourage ambulation  - DVT ppx w/ lovenox & SCDs  - Dispo planning to [facility] - pending auth

## 2023-12-20 NOTE — PROGRESS NOTE ADULT - SUBJECTIVE AND OBJECTIVE BOX
HPI/OVERNIGHT EVENTS: Patient seen and examined this AM and found to be in the chair. No overnight events. No complaints. Denies fever, chills, nausea, vomiting, chest pain, SOB, dizziness, abd pain or any other concerning symptoms.    Vital Signs Last 24 Hrs  T(C): 36.8 (20 Dec 2023 04:00), Max: 36.9 (19 Dec 2023 20:30)  T(F): 98.3 (20 Dec 2023 04:00), Max: 98.5 (19 Dec 2023 20:30)  HR: 82 (20 Dec 2023 04:00) (76 - 82)  BP: 150/87 (20 Dec 2023 04:00) (117/71 - 167/82)  BP(mean): --  RR: 18 (20 Dec 2023 04:00) (18 - 19)  SpO2: 91% (20 Dec 2023 04:00) (91% - 93%)    Parameters below as of 20 Dec 2023 04:00  Patient On (Oxygen Delivery Method): room air        I&O's Detail    19 Dec 2023 07:01  -  20 Dec 2023 07:00  --------------------------------------------------------  IN:  Total IN: 0 mL    OUT:    Voided (mL): 1250 mL  Total OUT: 1250 mL    Total NET: -1250 mL          Constitutional: patient resting comfortably in bed, in no acute distress  HEENT: EOMI, PERRLA, MMM.  Respiratory: Non labored breathing on RA. IS: 2500. Let chest wound c/d/i w/o surrounding erythema.   Cardiovascular: RRR  Gastrointestinal: Abdomen soft, non-tender, non-distended  Musculoskeletal: No joint pain, swelling or deformity; no limitation of movement. RUE PICC line. Right AC fossa wound vac in place.   Vascular: Extremities warm and well perfused.     LABS:                        8.3    18.75 )-----------( 1059     ( 20 Dec 2023 06:48 )             26.7                 MEDICATIONS  (STANDING):  acetaminophen     Tablet .. 975 milliGRAM(s) Oral every 6 hours  amLODIPine   Tablet 5 milliGRAM(s) Oral daily  benztropine 2 milliGRAM(s) Oral two times a day  cefepime  Injectable.      cefepime  Injectable. 2000 milliGRAM(s) IV Push every 8 hours  chlorhexidine 2% Cloths 1 Application(s) Topical daily  chlorhexidine 2% Cloths 1 Application(s) Topical <User Schedule>  divalproex  milliGRAM(s) Oral every 12 hours  enoxaparin Injectable 40 milliGRAM(s) SubCutaneous every 12 hours  folic acid 1 milliGRAM(s) Oral daily  gabapentin 100 milliGRAM(s) Oral at bedtime  ibuprofen  Tablet. 400 milliGRAM(s) Oral every 6 hours  lidocaine   4% Patch 3 Patch Transdermal daily  melatonin 5 milliGRAM(s) Oral at bedtime  methocarbamol 1000 milliGRAM(s) Oral every 8 hours  nicotine - 21 mG/24Hr(s) Patch 1 Patch Transdermal every 24 hours  pantoprazole    Tablet 40 milliGRAM(s) Oral before breakfast  polyethylene glycol 3350 17 Gram(s) Oral at bedtime  QUEtiapine 125 milliGRAM(s) Oral at bedtime  senna 2 Tablet(s) Oral at bedtime  thiamine 100 milliGRAM(s) Oral daily  vancomycin  IVPB 1250 milliGRAM(s) IV Intermittent every 12 hours    MEDICATIONS  (PRN):  albuterol/ipratropium for Nebulization 3 milliLiter(s) Nebulizer every 6 hours PRN Shortness of Breath and/or Wheezing  calcium carbonate    500 mG (Tums) Chewable 1 Tablet(s) Chew every 6 hours PRN Heartburn  guaifenesin/dextromethorphan Oral Liquid 10 milliLiter(s) Oral every 6 hours PRN Cough  mineral oil enema 133 milliLiter(s) Rectal daily PRN constipation  OLANZapine Injectable 5 milliGRAM(s) IntraMuscular every 12 hours PRN agitation  ondansetron Injectable 4 milliGRAM(s) IV Push every 6 hours PRN Nausea  sodium chloride 0.9% lock flush 10 milliLiter(s) IV Push every 1 hour PRN Pre/post blood products, medications, blood draw, and to maintain line patency

## 2023-12-24 ENCOUNTER — INPATIENT (INPATIENT)
Facility: HOSPITAL | Age: 58
LOS: 5 days | Discharge: NURSING, REHAB OR LONG-TERM CARE | DRG: 193 | End: 2023-12-30
Attending: HOSPITALIST | Admitting: EMERGENCY MEDICINE
Payer: MEDICARE

## 2023-12-24 VITALS
WEIGHT: 149.91 LBS | RESPIRATION RATE: 26 BRPM | DIASTOLIC BLOOD PRESSURE: 84 MMHG | HEART RATE: 117 BPM | HEIGHT: 65 IN | SYSTOLIC BLOOD PRESSURE: 126 MMHG | OXYGEN SATURATION: 100 %

## 2023-12-24 DIAGNOSIS — Z98.89 OTHER SPECIFIED POSTPROCEDURAL STATES: Chronic | ICD-10-CM

## 2023-12-24 DIAGNOSIS — Z98.1 ARTHRODESIS STATUS: Chronic | ICD-10-CM

## 2023-12-24 LAB
ALBUMIN SERPL ELPH-MCNC: 3.3 G/DL — SIGNIFICANT CHANGE UP (ref 3.3–5.2)
ALBUMIN SERPL ELPH-MCNC: 3.3 G/DL — SIGNIFICANT CHANGE UP (ref 3.3–5.2)
ALP SERPL-CCNC: 127 U/L — HIGH (ref 40–120)
ALP SERPL-CCNC: 127 U/L — HIGH (ref 40–120)
ALT FLD-CCNC: 18 U/L — SIGNIFICANT CHANGE UP
ALT FLD-CCNC: 18 U/L — SIGNIFICANT CHANGE UP
ANION GAP SERPL CALC-SCNC: 9 MMOL/L — SIGNIFICANT CHANGE UP (ref 5–17)
ANION GAP SERPL CALC-SCNC: 9 MMOL/L — SIGNIFICANT CHANGE UP (ref 5–17)
AST SERPL-CCNC: 18 U/L — SIGNIFICANT CHANGE UP
AST SERPL-CCNC: 18 U/L — SIGNIFICANT CHANGE UP
BASE EXCESS BLDV CALC-SCNC: 3.9 MMOL/L — HIGH (ref -2–3)
BASE EXCESS BLDV CALC-SCNC: 3.9 MMOL/L — HIGH (ref -2–3)
BASOPHILS # BLD AUTO: 0.45 K/UL — HIGH (ref 0–0.2)
BASOPHILS # BLD AUTO: 0.45 K/UL — HIGH (ref 0–0.2)
BASOPHILS NFR BLD AUTO: 2.7 % — HIGH (ref 0–2)
BASOPHILS NFR BLD AUTO: 2.7 % — HIGH (ref 0–2)
BILIRUB SERPL-MCNC: <0.2 MG/DL — LOW (ref 0.4–2)
BILIRUB SERPL-MCNC: <0.2 MG/DL — LOW (ref 0.4–2)
BUN SERPL-MCNC: 26.5 MG/DL — HIGH (ref 8–20)
BUN SERPL-MCNC: 26.5 MG/DL — HIGH (ref 8–20)
CA-I SERPL-SCNC: 1.32 MMOL/L — SIGNIFICANT CHANGE UP (ref 1.15–1.33)
CA-I SERPL-SCNC: 1.32 MMOL/L — SIGNIFICANT CHANGE UP (ref 1.15–1.33)
CALCIUM SERPL-MCNC: 9.8 MG/DL — SIGNIFICANT CHANGE UP (ref 8.4–10.5)
CALCIUM SERPL-MCNC: 9.8 MG/DL — SIGNIFICANT CHANGE UP (ref 8.4–10.5)
CHLORIDE BLDV-SCNC: 105 MMOL/L — SIGNIFICANT CHANGE UP (ref 96–108)
CHLORIDE BLDV-SCNC: 105 MMOL/L — SIGNIFICANT CHANGE UP (ref 96–108)
CHLORIDE SERPL-SCNC: 102 MMOL/L — SIGNIFICANT CHANGE UP (ref 96–108)
CHLORIDE SERPL-SCNC: 102 MMOL/L — SIGNIFICANT CHANGE UP (ref 96–108)
CO2 SERPL-SCNC: 32 MMOL/L — HIGH (ref 22–29)
CO2 SERPL-SCNC: 32 MMOL/L — HIGH (ref 22–29)
CREAT SERPL-MCNC: 0.71 MG/DL — SIGNIFICANT CHANGE UP (ref 0.5–1.3)
CREAT SERPL-MCNC: 0.71 MG/DL — SIGNIFICANT CHANGE UP (ref 0.5–1.3)
D DIMER BLD IA.RAPID-MCNC: 3137 NG/ML DDU — HIGH
D DIMER BLD IA.RAPID-MCNC: 3137 NG/ML DDU — HIGH
EGFR: 106 ML/MIN/1.73M2 — SIGNIFICANT CHANGE UP
EGFR: 106 ML/MIN/1.73M2 — SIGNIFICANT CHANGE UP
EOSINOPHIL # BLD AUTO: 0.15 K/UL — SIGNIFICANT CHANGE UP (ref 0–0.5)
EOSINOPHIL # BLD AUTO: 0.15 K/UL — SIGNIFICANT CHANGE UP (ref 0–0.5)
EOSINOPHIL NFR BLD AUTO: 0.9 % — SIGNIFICANT CHANGE UP (ref 0–6)
EOSINOPHIL NFR BLD AUTO: 0.9 % — SIGNIFICANT CHANGE UP (ref 0–6)
GAS PNL BLDA: SIGNIFICANT CHANGE UP
GAS PNL BLDA: SIGNIFICANT CHANGE UP
GAS PNL BLDV: 143 MMOL/L — SIGNIFICANT CHANGE UP (ref 136–145)
GAS PNL BLDV: 143 MMOL/L — SIGNIFICANT CHANGE UP (ref 136–145)
GAS PNL BLDV: SIGNIFICANT CHANGE UP
GAS PNL BLDV: SIGNIFICANT CHANGE UP
GLUCOSE BLDV-MCNC: 146 MG/DL — HIGH (ref 70–99)
GLUCOSE BLDV-MCNC: 146 MG/DL — HIGH (ref 70–99)
GLUCOSE SERPL-MCNC: 139 MG/DL — HIGH (ref 70–99)
GLUCOSE SERPL-MCNC: 139 MG/DL — HIGH (ref 70–99)
HCO3 BLDV-SCNC: 32 MMOL/L — HIGH (ref 22–29)
HCO3 BLDV-SCNC: 32 MMOL/L — HIGH (ref 22–29)
HCT VFR BLD CALC: 25.6 % — LOW (ref 39–50)
HCT VFR BLD CALC: 25.6 % — LOW (ref 39–50)
HCT VFR BLDA CALC: 24 % — SIGNIFICANT CHANGE UP
HCT VFR BLDA CALC: 24 % — SIGNIFICANT CHANGE UP
HGB BLD CALC-MCNC: 8.1 G/DL — LOW (ref 12.6–17.4)
HGB BLD CALC-MCNC: 8.1 G/DL — LOW (ref 12.6–17.4)
HGB BLD-MCNC: 8 G/DL — LOW (ref 13–17)
HGB BLD-MCNC: 8 G/DL — LOW (ref 13–17)
LACTATE BLDV-MCNC: 1.9 MMOL/L — SIGNIFICANT CHANGE UP (ref 0.5–2)
LACTATE BLDV-MCNC: 1.9 MMOL/L — SIGNIFICANT CHANGE UP (ref 0.5–2)
LYMPHOCYTES # BLD AUTO: 0.77 K/UL — LOW (ref 1–3.3)
LYMPHOCYTES # BLD AUTO: 0.77 K/UL — LOW (ref 1–3.3)
LYMPHOCYTES # BLD AUTO: 4.6 % — LOW (ref 13–44)
LYMPHOCYTES # BLD AUTO: 4.6 % — LOW (ref 13–44)
MCHC RBC-ENTMCNC: 27.5 PG — SIGNIFICANT CHANGE UP (ref 27–34)
MCHC RBC-ENTMCNC: 27.5 PG — SIGNIFICANT CHANGE UP (ref 27–34)
MCHC RBC-ENTMCNC: 31.3 GM/DL — LOW (ref 32–36)
MCHC RBC-ENTMCNC: 31.3 GM/DL — LOW (ref 32–36)
MCV RBC AUTO: 88 FL — SIGNIFICANT CHANGE UP (ref 80–100)
MCV RBC AUTO: 88 FL — SIGNIFICANT CHANGE UP (ref 80–100)
MONOCYTES # BLD AUTO: 1.22 K/UL — HIGH (ref 0–0.9)
MONOCYTES # BLD AUTO: 1.22 K/UL — HIGH (ref 0–0.9)
MONOCYTES NFR BLD AUTO: 7.3 % — SIGNIFICANT CHANGE UP (ref 2–14)
MONOCYTES NFR BLD AUTO: 7.3 % — SIGNIFICANT CHANGE UP (ref 2–14)
NEUTROPHILS # BLD AUTO: 13.99 K/UL — HIGH (ref 1.8–7.4)
NEUTROPHILS # BLD AUTO: 13.99 K/UL — HIGH (ref 1.8–7.4)
NEUTROPHILS NFR BLD AUTO: 83.6 % — HIGH (ref 43–77)
NEUTROPHILS NFR BLD AUTO: 83.6 % — HIGH (ref 43–77)
NT-PROBNP SERPL-SCNC: 603 PG/ML — HIGH (ref 0–300)
NT-PROBNP SERPL-SCNC: 603 PG/ML — HIGH (ref 0–300)
PCO2 BLDV: 84 MMHG — HIGH (ref 42–55)
PCO2 BLDV: 84 MMHG — HIGH (ref 42–55)
PH BLDV: 7.19 — CRITICAL LOW (ref 7.32–7.43)
PH BLDV: 7.19 — CRITICAL LOW (ref 7.32–7.43)
PLATELET # BLD AUTO: 1179 K/UL — CRITICAL HIGH (ref 150–400)
PLATELET # BLD AUTO: 1179 K/UL — CRITICAL HIGH (ref 150–400)
PO2 BLDV: 48 MMHG — HIGH (ref 25–45)
PO2 BLDV: 48 MMHG — HIGH (ref 25–45)
POTASSIUM BLDV-SCNC: 5.4 MMOL/L — HIGH (ref 3.5–5.1)
POTASSIUM BLDV-SCNC: 5.4 MMOL/L — HIGH (ref 3.5–5.1)
POTASSIUM SERPL-MCNC: 5.1 MMOL/L — SIGNIFICANT CHANGE UP (ref 3.5–5.3)
POTASSIUM SERPL-MCNC: 5.1 MMOL/L — SIGNIFICANT CHANGE UP (ref 3.5–5.3)
POTASSIUM SERPL-SCNC: 5.1 MMOL/L — SIGNIFICANT CHANGE UP (ref 3.5–5.3)
POTASSIUM SERPL-SCNC: 5.1 MMOL/L — SIGNIFICANT CHANGE UP (ref 3.5–5.3)
PROT SERPL-MCNC: 8 G/DL — SIGNIFICANT CHANGE UP (ref 6.6–8.7)
PROT SERPL-MCNC: 8 G/DL — SIGNIFICANT CHANGE UP (ref 6.6–8.7)
RBC # BLD: 2.91 M/UL — LOW (ref 4.2–5.8)
RBC # BLD: 2.91 M/UL — LOW (ref 4.2–5.8)
RBC # FLD: 17.8 % — HIGH (ref 10.3–14.5)
RBC # FLD: 17.8 % — HIGH (ref 10.3–14.5)
SAO2 % BLDV: 68.4 % — SIGNIFICANT CHANGE UP
SAO2 % BLDV: 68.4 % — SIGNIFICANT CHANGE UP
SODIUM SERPL-SCNC: 143 MMOL/L — SIGNIFICANT CHANGE UP (ref 135–145)
SODIUM SERPL-SCNC: 143 MMOL/L — SIGNIFICANT CHANGE UP (ref 135–145)
TROPONIN T, HIGH SENSITIVITY RESULT: 21 NG/L — SIGNIFICANT CHANGE UP (ref 0–51)
TROPONIN T, HIGH SENSITIVITY RESULT: 21 NG/L — SIGNIFICANT CHANGE UP (ref 0–51)
WBC # BLD: 16.73 K/UL — HIGH (ref 3.8–10.5)
WBC # BLD: 16.73 K/UL — HIGH (ref 3.8–10.5)
WBC # FLD AUTO: 16.73 K/UL — HIGH (ref 3.8–10.5)
WBC # FLD AUTO: 16.73 K/UL — HIGH (ref 3.8–10.5)

## 2023-12-24 PROCEDURE — 71045 X-RAY EXAM CHEST 1 VIEW: CPT | Mod: 26

## 2023-12-24 PROCEDURE — 99291 CRITICAL CARE FIRST HOUR: CPT

## 2023-12-24 PROCEDURE — 93010 ELECTROCARDIOGRAM REPORT: CPT

## 2023-12-24 RX ORDER — KETOROLAC TROMETHAMINE 30 MG/ML
30 SYRINGE (ML) INJECTION ONCE
Refills: 0 | Status: DISCONTINUED | OUTPATIENT
Start: 2023-12-24 | End: 2023-12-24

## 2023-12-24 RX ORDER — MAGNESIUM SULFATE 500 MG/ML
2 VIAL (ML) INJECTION ONCE
Refills: 0 | Status: COMPLETED | OUTPATIENT
Start: 2023-12-24 | End: 2023-12-24

## 2023-12-24 RX ORDER — LIDOCAINE 4 G/100G
1 CREAM TOPICAL ONCE
Refills: 0 | Status: COMPLETED | OUTPATIENT
Start: 2023-12-24 | End: 2023-12-24

## 2023-12-24 RX ORDER — ONDANSETRON 8 MG/1
4 TABLET, FILM COATED ORAL ONCE
Refills: 0 | Status: COMPLETED | OUTPATIENT
Start: 2023-12-24 | End: 2023-12-24

## 2023-12-24 RX ORDER — MORPHINE SULFATE 50 MG/1
4 CAPSULE, EXTENDED RELEASE ORAL ONCE
Refills: 0 | Status: DISCONTINUED | OUTPATIENT
Start: 2023-12-24 | End: 2023-12-24

## 2023-12-24 RX ORDER — IPRATROPIUM/ALBUTEROL SULFATE 18-103MCG
3 AEROSOL WITH ADAPTER (GRAM) INHALATION
Refills: 0 | Status: COMPLETED | OUTPATIENT
Start: 2023-12-24 | End: 2023-12-24

## 2023-12-24 RX ORDER — IPRATROPIUM/ALBUTEROL SULFATE 18-103MCG
3 AEROSOL WITH ADAPTER (GRAM) INHALATION ONCE
Refills: 0 | Status: COMPLETED | OUTPATIENT
Start: 2023-12-24 | End: 2023-12-24

## 2023-12-24 RX ADMIN — ONDANSETRON 4 MILLIGRAM(S): 8 TABLET, FILM COATED ORAL at 22:27

## 2023-12-24 RX ADMIN — Medication 125 MILLIGRAM(S): at 17:17

## 2023-12-24 RX ADMIN — Medication 3 MILLILITER(S): at 22:27

## 2023-12-24 RX ADMIN — MORPHINE SULFATE 4 MILLIGRAM(S): 50 CAPSULE, EXTENDED RELEASE ORAL at 23:20

## 2023-12-24 RX ADMIN — Medication 3 MILLILITER(S): at 23:20

## 2023-12-24 RX ADMIN — Medication 30 MILLIGRAM(S): at 22:27

## 2023-12-24 RX ADMIN — MORPHINE SULFATE 4 MILLIGRAM(S): 50 CAPSULE, EXTENDED RELEASE ORAL at 17:05

## 2023-12-24 RX ADMIN — Medication 3 MILLILITER(S): at 20:29

## 2023-12-24 RX ADMIN — Medication 2 GRAM(S): at 17:31

## 2023-12-24 RX ADMIN — ONDANSETRON 4 MILLIGRAM(S): 8 TABLET, FILM COATED ORAL at 17:17

## 2023-12-24 RX ADMIN — Medication 150 GRAM(S): at 17:17

## 2023-12-24 RX ADMIN — Medication 3 MILLILITER(S): at 17:18

## 2023-12-24 NOTE — ED PROVIDER NOTE - ATTENDING CONTRIBUTION TO CARE
Chadwick KHANSB-[id number]-[id number]-old male with recent and left rib fracture with  Complicated intrahospital course sent to rehab with wound VAC on the right presents with worsening shortness of breath and hypoxia.  Patient has chest pain and  shortness of breath.  Patient states that he has pain in the left chest.  Takes a deep breath but no persistent substernal chest pain.    Patient is alert sick appearing male with sweating on the head face and tachycardia S1-S2 normal regular, bilateral decreased breath sounds with poor aeration more on the left than the right but trachea is midline no evidence of pneumothorax or tension pneumothorax at this time JVD is normal.  Question with noted wound VAC on the right antecubital area, new neuro exam is alert oriented x 3 no focal deficits, skin  warm sweaty moderate turgor    Will do a stat EKG x-ray to rule out ACS STEMI and pneumothorax.  Patient had no evidence of pneumothorax clinically or on chest x-ray we will treat him symptomatically with DuoNebs Solu-Medrol magnesium control his pain and reassess.  Will check labs given his extensive course with subcutaneous emphysema is a pneumomediastinum we will do a CT chest to rule out recurrent numerals and evidence of pneumonia and also rule out PE. Chadwick KHANUI-[id number]-[id number]-old male with recent and left rib fracture with  Complicated intrahospital course sent to rehab with wound VAC on the right presents with worsening shortness of breath and hypoxia.  Patient has chest pain and  shortness of breath.  Patient states that he has pain in the left chest.  Takes a deep breath but no persistent substernal chest pain.    Patient is alert sick appearing male with sweating on the head face and tachycardia S1-S2 normal regular, bilateral decreased breath sounds with poor aeration more on the left than the right but trachea is midline no evidence of pneumothorax or tension pneumothorax at this time JVD is normal.  Question with noted wound VAC on the right antecubital area, new neuro exam is alert oriented x 3 no focal deficits, skin  warm sweaty moderate turgor    Will do a stat EKG x-ray to rule out ACS STEMI and pneumothorax.  Patient had no evidence of pneumothorax clinically or on chest x-ray we will treat him symptomatically with DuoNebs Solu-Medrol magnesium control his pain and reassess.  Will check labs given his extensive course with subcutaneous emphysema is a pneumomediastinum we will do a CT chest to rule out recurrent numerals and evidence of pneumonia and also rule out PE.

## 2023-12-24 NOTE — ED ADULT TRIAGE NOTE - NURSING HOMES
[facility]

## 2023-12-24 NOTE — ED ADULT TRIAGE NOTE - CHIEF COMPLAINT QUOTE
Pt BIBA from [facility], sent to facility 3 days ago for rehab s/p left side pneumothorax, pt tachypneic, hypoxic and diaphoretic upon ED arrival

## 2023-12-24 NOTE — ED PROVIDER NOTE - PHYSICAL EXAMINATION
Gen: AAOx3, in acute respiratory distress, diaphoretic  HEENT: Normocephalic atraumatic. EOMI. No scleral icterus. Moist mucus membranes.  CV: tachycardic. Audible S1 and S2. No murmurs. 2+ radial and PT pulses. L anterior chest healing chest tube site.  Pulm: R sided rhonchi. Diminished breath sounds throughout, L>R. +respiratory distress  Abdomen: soft, normoactive BS, non distended, nontender, no rebound, no guarding  Musculoskeletal:  Moving all extremities equally. No gross deformity. No tenderness to palpation.  Skin: No rashes or lesions. Warm.  Psych: Anxious mood and affect. Cooperative.

## 2023-12-24 NOTE — ED PROVIDER NOTE - OBJECTIVE STATEMENT
Patient is a 57yo M with PMHx of GERD, seizures, liver disease 2/2 chronic alcoholism, recent trauma admission 11/28-12/20 for L 7th rib fracture complicated by pneumothorax and significant pneumomediastinum s/p pigtail chest tube, R UE MRSA cellulitis s/p vein excision, intubation for reactive airway disease, thrombocytosis, discharged to [facility] with continued antibiotics through L UE PICC who presents to the ED from [place] complaining of shortness of breath, hypoxic to the 80s on 2L, chest pain. Got multiple duonebs at the nursing home with no improvement. Patient is a 59yo M with PMHx of GERD, seizures, liver disease 2/2 chronic alcoholism, recent trauma admission 11/28-12/20 for L 7th rib fracture complicated by pneumothorax and significant pneumomediastinum s/p pigtail chest tube, R UE MRSA cellulitis s/p vein excision, intubation for reactive airway disease, thrombocytosis, discharged to [facility] with continued antibiotics through L UE PICC who presents to the ED from [place] complaining of shortness of breath, hypoxic to the 80s on 2L, chest pain. Got multiple duonebs at the nursing home with no improvement.

## 2023-12-24 NOTE — ED PROVIDER NOTE - NS ED ROS FT
General: No fever. +chills.  Respiratory: + SOB  Cardiac: + chest pain  GI: No abdominal pain. + nausea, vomiting  Neuro: No headache  MSK: No muscle pain, joint pain. + back pain (rib injury).  Psych: depresison  Endocrine: No heat/cold intolerance, no polyuria/polydipsia.  Heme: No easy bruising or bleeding.  Allergic: No pruritis, dermatitis, or environmental allergies.

## 2023-12-24 NOTE — ED PROVIDER NOTE - ED STEMI HIDDEN
Pt's mother given urine culture results with Kenzie's telephone consent. States pt is feeling better but mom wants to have her follow up with PCP.  Questions answered.   hide

## 2023-12-24 NOTE — ED ADULT NURSE REASSESSMENT NOTE - NS ED NURSE REASSESS COMMENT FT1
Assumed care of patient at 1930.  Report received from Livier DONNELLY.  Patient resting comfortably in bed on 5 LPM nasal cannula, awaiting CT scan.  Will continue with plan of care.

## 2023-12-24 NOTE — ED PROVIDER NOTE - CLINICAL SUMMARY MEDICAL DECISION MAKING FREE TEXT BOX
Patient is a 59yo M with PMHx of GERD, seizures, liver disease 2/2 chronic alcoholism, recent trauma admission 11/28-12/20 for L 7th rib fracture complicated by pneumothorax and significant pneumomediastinum s/p pigtail chest tube, R UE MRSA cellulitis s/p vein excision, intubation for reactive airway disease, thrombocytosis, discharged to [facility] with continued antibiotics through L UE PICC who presents to the ED from [place] complaining of shortness of breath, hypoxic to the 80s on 2L, chest pain. Will treat for asthma exacerbation and get CTA chest to r/o PE due to recent hospitalization. Will redraw blood cultures. Patient is a 59yo M with PMHx of GERD, seizures, liver disease 2/2 chronic alcoholism, recent trauma admission 11/28-12/20 for L 7th rib fracture complicated by pneumothorax and significant pneumomediastinum s/p pigtail chest tube, R UE MRSA cellulitis s/p vein excision, intubation for reactive airway disease, thrombocytosis, discharged to [facility] with continued antibiotics through L UE PICC who presents to the ED from [place] complaining of shortness of breath, hypoxic to the 80s on 2L, chest pain. Will treat for asthma exacerbation and get CTA chest to r/o PE due to recent hospitalization. Will redraw blood cultures.

## 2023-12-24 NOTE — ED PROVIDER NOTE - PROGRESS NOTE DETAILS
Edgar KHAN: On reassessment, patient states he is feeling better however still tachypneic.  Is not on oxygen at rehab center, when oxygen is turned off in the ED patient is desaturating to 87% on room air.  Patient placed back on oxygen and admitted to the hospital on .

## 2023-12-24 NOTE — ED ADULT NURSE REASSESSMENT NOTE - NS ED NURSE REASSESS COMMENT FT1
Assumed care of pt from previous RN. Pt came in with reports of hypoxia and increased work of breathing brought in from rehab. Pt A&Ox4 denies SOB or difficulty breathing currently. Cardiac and  monitoring in place. VSS, pt resting in bed denies any pain. Reports hx of use of blood thinner. Able to make needs known. Bed in locked position.

## 2023-12-25 DIAGNOSIS — J44.1 CHRONIC OBSTRUCTIVE PULMONARY DISEASE WITH (ACUTE) EXACERBATION: ICD-10-CM

## 2023-12-25 LAB
ANISOCYTOSIS BLD QL: SLIGHT — SIGNIFICANT CHANGE UP
ANISOCYTOSIS BLD QL: SLIGHT — SIGNIFICANT CHANGE UP
BASO STIPL BLD QL SMEAR: PRESENT — SIGNIFICANT CHANGE UP
BASO STIPL BLD QL SMEAR: PRESENT — SIGNIFICANT CHANGE UP
BASOPHILS # BLD AUTO: 0.06 K/UL — SIGNIFICANT CHANGE UP (ref 0–0.2)
BASOPHILS # BLD AUTO: 0.06 K/UL — SIGNIFICANT CHANGE UP (ref 0–0.2)
BASOPHILS NFR BLD AUTO: 0.5 % — SIGNIFICANT CHANGE UP (ref 0–2)
BASOPHILS NFR BLD AUTO: 0.5 % — SIGNIFICANT CHANGE UP (ref 0–2)
BLD GP AB SCN SERPL QL: SIGNIFICANT CHANGE UP
BLD GP AB SCN SERPL QL: SIGNIFICANT CHANGE UP
EOSINOPHIL # BLD AUTO: 0 K/UL — SIGNIFICANT CHANGE UP (ref 0–0.5)
EOSINOPHIL # BLD AUTO: 0 K/UL — SIGNIFICANT CHANGE UP (ref 0–0.5)
EOSINOPHIL NFR BLD AUTO: 0 % — SIGNIFICANT CHANGE UP (ref 0–6)
EOSINOPHIL NFR BLD AUTO: 0 % — SIGNIFICANT CHANGE UP (ref 0–6)
FLUAV H3 RNA SPEC QL NAA+PROBE: DETECTED
FLUAV H3 RNA SPEC QL NAA+PROBE: DETECTED
HCT VFR BLD CALC: 22.6 % — LOW (ref 39–50)
HCT VFR BLD CALC: 22.6 % — LOW (ref 39–50)
HGB BLD-MCNC: 6.9 G/DL — CRITICAL LOW (ref 13–17)
HGB BLD-MCNC: 6.9 G/DL — CRITICAL LOW (ref 13–17)
HYPOCHROMIA BLD QL: SLIGHT — SIGNIFICANT CHANGE UP
HYPOCHROMIA BLD QL: SLIGHT — SIGNIFICANT CHANGE UP
IMM GRANULOCYTES NFR BLD AUTO: 1.3 % — HIGH (ref 0–0.9)
IMM GRANULOCYTES NFR BLD AUTO: 1.3 % — HIGH (ref 0–0.9)
LYMPHOCYTES # BLD AUTO: 0.69 K/UL — LOW (ref 1–3.3)
LYMPHOCYTES # BLD AUTO: 0.69 K/UL — LOW (ref 1–3.3)
LYMPHOCYTES # BLD AUTO: 5.6 % — LOW (ref 13–44)
LYMPHOCYTES # BLD AUTO: 5.6 % — LOW (ref 13–44)
MACROCYTES BLD QL: SLIGHT — SIGNIFICANT CHANGE UP
MACROCYTES BLD QL: SLIGHT — SIGNIFICANT CHANGE UP
MANUAL SMEAR VERIFICATION: SIGNIFICANT CHANGE UP
MANUAL SMEAR VERIFICATION: SIGNIFICANT CHANGE UP
MCHC RBC-ENTMCNC: 26.8 PG — LOW (ref 27–34)
MCHC RBC-ENTMCNC: 26.8 PG — LOW (ref 27–34)
MCHC RBC-ENTMCNC: 30.5 GM/DL — LOW (ref 32–36)
MCHC RBC-ENTMCNC: 30.5 GM/DL — LOW (ref 32–36)
MCV RBC AUTO: 87.9 FL — SIGNIFICANT CHANGE UP (ref 80–100)
MCV RBC AUTO: 87.9 FL — SIGNIFICANT CHANGE UP (ref 80–100)
MICROCYTES BLD QL: SLIGHT — SIGNIFICANT CHANGE UP
MICROCYTES BLD QL: SLIGHT — SIGNIFICANT CHANGE UP
MONOCYTES # BLD AUTO: 0.79 K/UL — SIGNIFICANT CHANGE UP (ref 0–0.9)
MONOCYTES # BLD AUTO: 0.79 K/UL — SIGNIFICANT CHANGE UP (ref 0–0.9)
MONOCYTES NFR BLD AUTO: 6.4 % — SIGNIFICANT CHANGE UP (ref 2–14)
MONOCYTES NFR BLD AUTO: 6.4 % — SIGNIFICANT CHANGE UP (ref 2–14)
NEUTROPHILS # BLD AUTO: 10.67 K/UL — HIGH (ref 1.8–7.4)
NEUTROPHILS # BLD AUTO: 10.67 K/UL — HIGH (ref 1.8–7.4)
NEUTROPHILS NFR BLD AUTO: 86.2 % — HIGH (ref 43–77)
NEUTROPHILS NFR BLD AUTO: 86.2 % — HIGH (ref 43–77)
OVALOCYTES BLD QL SMEAR: SLIGHT — SIGNIFICANT CHANGE UP
OVALOCYTES BLD QL SMEAR: SLIGHT — SIGNIFICANT CHANGE UP
PLAT MORPH BLD: ABNORMAL
PLAT MORPH BLD: ABNORMAL
PLATELET # BLD AUTO: 1009 K/UL — CRITICAL HIGH (ref 150–400)
PLATELET # BLD AUTO: 1009 K/UL — CRITICAL HIGH (ref 150–400)
POIKILOCYTOSIS BLD QL AUTO: SLIGHT — SIGNIFICANT CHANGE UP
POIKILOCYTOSIS BLD QL AUTO: SLIGHT — SIGNIFICANT CHANGE UP
POLYCHROMASIA BLD QL SMEAR: SIGNIFICANT CHANGE UP
POLYCHROMASIA BLD QL SMEAR: SIGNIFICANT CHANGE UP
RAPID RVP RESULT: DETECTED
RAPID RVP RESULT: DETECTED
RBC # BLD: 2.57 M/UL — LOW (ref 4.2–5.8)
RBC # BLD: 2.57 M/UL — LOW (ref 4.2–5.8)
RBC # FLD: 17.9 % — HIGH (ref 10.3–14.5)
RBC # FLD: 17.9 % — HIGH (ref 10.3–14.5)
RBC BLD AUTO: ABNORMAL
RBC BLD AUTO: ABNORMAL
SARS-COV-2 RNA SPEC QL NAA+PROBE: SIGNIFICANT CHANGE UP
SARS-COV-2 RNA SPEC QL NAA+PROBE: SIGNIFICANT CHANGE UP
STOMATOCYTES BLD QL SMEAR: SLIGHT — SIGNIFICANT CHANGE UP
STOMATOCYTES BLD QL SMEAR: SLIGHT — SIGNIFICANT CHANGE UP
TARGETS BLD QL SMEAR: SLIGHT — SIGNIFICANT CHANGE UP
TARGETS BLD QL SMEAR: SLIGHT — SIGNIFICANT CHANGE UP
TROPONIN T, HIGH SENSITIVITY RESULT: 18 NG/L — SIGNIFICANT CHANGE UP (ref 0–51)
TROPONIN T, HIGH SENSITIVITY RESULT: 18 NG/L — SIGNIFICANT CHANGE UP (ref 0–51)
WBC # BLD: 12.37 K/UL — HIGH (ref 3.8–10.5)
WBC # BLD: 12.37 K/UL — HIGH (ref 3.8–10.5)
WBC # FLD AUTO: 12.37 K/UL — HIGH (ref 3.8–10.5)
WBC # FLD AUTO: 12.37 K/UL — HIGH (ref 3.8–10.5)

## 2023-12-25 PROCEDURE — 99222 1ST HOSP IP/OBS MODERATE 55: CPT

## 2023-12-25 PROCEDURE — 99223 1ST HOSP IP/OBS HIGH 75: CPT

## 2023-12-25 PROCEDURE — G1004: CPT

## 2023-12-25 PROCEDURE — 71275 CT ANGIOGRAPHY CHEST: CPT | Mod: 26,ME

## 2023-12-25 RX ORDER — THIAMINE MONONITRATE (VIT B1) 100 MG
100 TABLET ORAL DAILY
Refills: 0 | Status: DISCONTINUED | OUTPATIENT
Start: 2023-12-25 | End: 2023-12-30

## 2023-12-25 RX ORDER — HYDROXYZINE HCL 10 MG
2 TABLET ORAL
Refills: 0 | DISCHARGE

## 2023-12-25 RX ORDER — ACETAMINOPHEN 500 MG
650 TABLET ORAL EVERY 6 HOURS
Refills: 0 | Status: DISCONTINUED | OUTPATIENT
Start: 2023-12-25 | End: 2023-12-30

## 2023-12-25 RX ORDER — CEFEPIME 1 G/1
2000 INJECTION, POWDER, FOR SOLUTION INTRAMUSCULAR; INTRAVENOUS ONCE
Refills: 0 | Status: COMPLETED | OUTPATIENT
Start: 2023-12-25 | End: 2023-12-25

## 2023-12-25 RX ORDER — SENNA PLUS 8.6 MG/1
2 TABLET ORAL AT BEDTIME
Refills: 0 | Status: DISCONTINUED | OUTPATIENT
Start: 2023-12-25 | End: 2023-12-30

## 2023-12-25 RX ORDER — CLONAZEPAM 1 MG
0.5 TABLET ORAL DAILY
Refills: 0 | Status: DISCONTINUED | OUTPATIENT
Start: 2023-12-25 | End: 2023-12-30

## 2023-12-25 RX ORDER — IPRATROPIUM/ALBUTEROL SULFATE 18-103MCG
3 AEROSOL WITH ADAPTER (GRAM) INHALATION EVERY 6 HOURS
Refills: 0 | Status: DISCONTINUED | OUTPATIENT
Start: 2023-12-25 | End: 2023-12-30

## 2023-12-25 RX ORDER — TRAMADOL HYDROCHLORIDE 50 MG/1
25 TABLET ORAL ONCE
Refills: 0 | Status: DISCONTINUED | OUTPATIENT
Start: 2023-12-25 | End: 2023-12-25

## 2023-12-25 RX ORDER — FOLIC ACID 0.8 MG
1 TABLET ORAL DAILY
Refills: 0 | Status: DISCONTINUED | OUTPATIENT
Start: 2023-12-25 | End: 2023-12-30

## 2023-12-25 RX ORDER — METHOCARBAMOL 500 MG/1
1000 TABLET, FILM COATED ORAL EVERY 8 HOURS
Refills: 0 | Status: DISCONTINUED | OUTPATIENT
Start: 2023-12-25 | End: 2023-12-30

## 2023-12-25 RX ORDER — CALCIUM CARBONATE 500(1250)
1 TABLET ORAL EVERY 6 HOURS
Refills: 0 | Status: DISCONTINUED | OUTPATIENT
Start: 2023-12-25 | End: 2023-12-30

## 2023-12-25 RX ORDER — ENOXAPARIN SODIUM 100 MG/ML
40 INJECTION SUBCUTANEOUS EVERY 24 HOURS
Refills: 0 | Status: DISCONTINUED | OUTPATIENT
Start: 2023-12-25 | End: 2023-12-30

## 2023-12-25 RX ORDER — CLONAZEPAM 1 MG
1 TABLET ORAL
Refills: 0 | DISCHARGE

## 2023-12-25 RX ORDER — CEFEPIME 1 G/1
INJECTION, POWDER, FOR SOLUTION INTRAMUSCULAR; INTRAVENOUS
Refills: 0 | Status: DISCONTINUED | OUTPATIENT
Start: 2023-12-25 | End: 2023-12-30

## 2023-12-25 RX ORDER — PANTOPRAZOLE SODIUM 20 MG/1
40 TABLET, DELAYED RELEASE ORAL
Refills: 0 | Status: DISCONTINUED | OUTPATIENT
Start: 2023-12-25 | End: 2023-12-30

## 2023-12-25 RX ORDER — CEFEPIME 1 G/1
INJECTION, POWDER, FOR SOLUTION INTRAMUSCULAR; INTRAVENOUS
Refills: 0 | Status: DISCONTINUED | OUTPATIENT
Start: 2023-12-25 | End: 2023-12-25

## 2023-12-25 RX ORDER — AMLODIPINE BESYLATE 2.5 MG/1
5 TABLET ORAL DAILY
Refills: 0 | Status: DISCONTINUED | OUTPATIENT
Start: 2023-12-25 | End: 2023-12-28

## 2023-12-25 RX ORDER — BENZTROPINE MESYLATE 1 MG
2 TABLET ORAL
Refills: 0 | Status: DISCONTINUED | OUTPATIENT
Start: 2023-12-25 | End: 2023-12-30

## 2023-12-25 RX ORDER — GABAPENTIN 400 MG/1
1 CAPSULE ORAL
Refills: 0 | DISCHARGE

## 2023-12-25 RX ORDER — MORPHINE SULFATE 50 MG/1
4 CAPSULE, EXTENDED RELEASE ORAL ONCE
Refills: 0 | Status: DISCONTINUED | OUTPATIENT
Start: 2023-12-25 | End: 2023-12-25

## 2023-12-25 RX ORDER — NICOTINE POLACRILEX 2 MG
1 GUM BUCCAL DAILY
Refills: 0 | Status: DISCONTINUED | OUTPATIENT
Start: 2023-12-25 | End: 2023-12-30

## 2023-12-25 RX ORDER — QUETIAPINE FUMARATE 200 MG/1
125 TABLET, FILM COATED ORAL AT BEDTIME
Refills: 0 | Status: DISCONTINUED | OUTPATIENT
Start: 2023-12-25 | End: 2023-12-30

## 2023-12-25 RX ORDER — ASPIRIN/CALCIUM CARB/MAGNESIUM 324 MG
81 TABLET ORAL DAILY
Refills: 0 | Status: DISCONTINUED | OUTPATIENT
Start: 2023-12-25 | End: 2023-12-30

## 2023-12-25 RX ORDER — QUETIAPINE FUMARATE 200 MG/1
125 TABLET, FILM COATED ORAL ONCE
Refills: 0 | Status: COMPLETED | OUTPATIENT
Start: 2023-12-25 | End: 2023-12-25

## 2023-12-25 RX ORDER — DIVALPROEX SODIUM 500 MG/1
1 TABLET, DELAYED RELEASE ORAL
Refills: 0 | DISCHARGE

## 2023-12-25 RX ORDER — DIVALPROEX SODIUM 500 MG/1
500 TABLET, DELAYED RELEASE ORAL
Refills: 0 | Status: DISCONTINUED | OUTPATIENT
Start: 2023-12-25 | End: 2023-12-30

## 2023-12-25 RX ORDER — HYDROXYZINE HCL 10 MG
25 TABLET ORAL
Refills: 0 | Status: DISCONTINUED | OUTPATIENT
Start: 2023-12-25 | End: 2023-12-30

## 2023-12-25 RX ORDER — VANCOMYCIN HCL 1 G
1250 VIAL (EA) INTRAVENOUS EVERY 12 HOURS
Refills: 0 | Status: DISCONTINUED | OUTPATIENT
Start: 2023-12-25 | End: 2023-12-30

## 2023-12-25 RX ORDER — POLYETHYLENE GLYCOL 3350 17 G/17G
17 POWDER, FOR SOLUTION ORAL AT BEDTIME
Refills: 0 | Status: DISCONTINUED | OUTPATIENT
Start: 2023-12-25 | End: 2023-12-30

## 2023-12-25 RX ORDER — GABAPENTIN 400 MG/1
100 CAPSULE ORAL AT BEDTIME
Refills: 0 | Status: DISCONTINUED | OUTPATIENT
Start: 2023-12-25 | End: 2023-12-30

## 2023-12-25 RX ORDER — BENZTROPINE MESYLATE 1 MG
1 TABLET ORAL
Refills: 0 | DISCHARGE

## 2023-12-25 RX ORDER — CEFEPIME 1 G/1
2000 INJECTION, POWDER, FOR SOLUTION INTRAMUSCULAR; INTRAVENOUS EVERY 8 HOURS
Refills: 0 | Status: DISCONTINUED | OUTPATIENT
Start: 2023-12-25 | End: 2023-12-30

## 2023-12-25 RX ADMIN — Medication 75 MILLIGRAM(S): at 17:33

## 2023-12-25 RX ADMIN — Medication 250 MILLIGRAM(S): at 03:25

## 2023-12-25 RX ADMIN — Medication 25 MILLIGRAM(S): at 17:33

## 2023-12-25 RX ADMIN — PANTOPRAZOLE SODIUM 40 MILLIGRAM(S): 20 TABLET, DELAYED RELEASE ORAL at 09:16

## 2023-12-25 RX ADMIN — Medication 3 MILLILITER(S): at 14:57

## 2023-12-25 RX ADMIN — TRAMADOL HYDROCHLORIDE 25 MILLIGRAM(S): 50 TABLET ORAL at 21:49

## 2023-12-25 RX ADMIN — CEFEPIME 2000 MILLIGRAM(S): 1 INJECTION, POWDER, FOR SOLUTION INTRAMUSCULAR; INTRAVENOUS at 13:09

## 2023-12-25 RX ADMIN — CEFEPIME 2000 MILLIGRAM(S): 1 INJECTION, POWDER, FOR SOLUTION INTRAMUSCULAR; INTRAVENOUS at 22:11

## 2023-12-25 RX ADMIN — Medication 1 MILLIGRAM(S): at 12:09

## 2023-12-25 RX ADMIN — Medication 3 MILLILITER(S): at 09:16

## 2023-12-25 RX ADMIN — QUETIAPINE FUMARATE 125 MILLIGRAM(S): 200 TABLET, FILM COATED ORAL at 04:14

## 2023-12-25 RX ADMIN — Medication 25 MILLIGRAM(S): at 06:31

## 2023-12-25 RX ADMIN — Medication 3 MILLILITER(S): at 20:58

## 2023-12-25 RX ADMIN — AMLODIPINE BESYLATE 5 MILLIGRAM(S): 2.5 TABLET ORAL at 06:31

## 2023-12-25 RX ADMIN — Medication 81 MILLIGRAM(S): at 12:09

## 2023-12-25 RX ADMIN — Medication 60 MILLIGRAM(S): at 13:09

## 2023-12-25 RX ADMIN — METHOCARBAMOL 1000 MILLIGRAM(S): 500 TABLET, FILM COATED ORAL at 21:50

## 2023-12-25 RX ADMIN — Medication 1 PATCH: at 12:10

## 2023-12-25 RX ADMIN — Medication 100 MILLIGRAM(S): at 12:10

## 2023-12-25 RX ADMIN — METHOCARBAMOL 1000 MILLIGRAM(S): 500 TABLET, FILM COATED ORAL at 13:10

## 2023-12-25 RX ADMIN — Medication 0.5 MILLIGRAM(S): at 12:09

## 2023-12-25 RX ADMIN — QUETIAPINE FUMARATE 125 MILLIGRAM(S): 200 TABLET, FILM COATED ORAL at 21:50

## 2023-12-25 RX ADMIN — Medication 650 MILLIGRAM(S): at 12:02

## 2023-12-25 RX ADMIN — Medication 650 MILLIGRAM(S): at 15:05

## 2023-12-25 RX ADMIN — Medication 2 MILLIGRAM(S): at 17:33

## 2023-12-25 RX ADMIN — GABAPENTIN 100 MILLIGRAM(S): 400 CAPSULE ORAL at 21:50

## 2023-12-25 RX ADMIN — MORPHINE SULFATE 4 MILLIGRAM(S): 50 CAPSULE, EXTENDED RELEASE ORAL at 04:15

## 2023-12-25 RX ADMIN — DIVALPROEX SODIUM 500 MILLIGRAM(S): 500 TABLET, DELAYED RELEASE ORAL at 06:31

## 2023-12-25 RX ADMIN — DIVALPROEX SODIUM 500 MILLIGRAM(S): 500 TABLET, DELAYED RELEASE ORAL at 17:33

## 2023-12-25 RX ADMIN — Medication 60 MILLIGRAM(S): at 06:29

## 2023-12-25 RX ADMIN — Medication 250 MILLIGRAM(S): at 17:36

## 2023-12-25 RX ADMIN — CEFEPIME 2000 MILLIGRAM(S): 1 INJECTION, POWDER, FOR SOLUTION INTRAMUSCULAR; INTRAVENOUS at 02:58

## 2023-12-25 RX ADMIN — ENOXAPARIN SODIUM 40 MILLIGRAM(S): 100 INJECTION SUBCUTANEOUS at 06:28

## 2023-12-25 RX ADMIN — METHOCARBAMOL 1000 MILLIGRAM(S): 500 TABLET, FILM COATED ORAL at 06:31

## 2023-12-25 RX ADMIN — Medication 60 MILLIGRAM(S): at 22:11

## 2023-12-25 RX ADMIN — Medication 2 MILLIGRAM(S): at 06:31

## 2023-12-25 NOTE — CHART NOTE - NSCHARTNOTEFT_GEN_A_CORE
Patient admitted earlier today; Chart reviewed; patient interviewed and examined.    58 year old male with multiple comorbidities and recently discharged to SNF for MRSA bacteremia presented with fever, dyspnea and myalgias and admitted with COPD Influenza A and COPD exacerbations.    S:  Slightly better in terms of breathing  Headache, body aches and fevers still  Back pain, chronic    O:  Tm 100.4  Down to 5LNC  Comfortable with minimal accessory muscle usage  Coarse breath sounds bilaterally, no wheezing at time of examination    WBC 12.3 improving  Hgb 6.9 interval drop  Platelets 1009; peaked now improving    RVP (+) for Influenza AH1    CTA chest without any PE; improving lung process.    A:   # Acute Respiratory Failure, mixed. Respiratory acidosis resolved, Currently on 5LNC  # COPD exacerbation  # Influenza AH1 infection  # MRSA Bacteremia   # Thrombocytosis, reactive, finally peaked, now downtrending. Normal Platelets 2 weeks ago  # Anemia, normocytic with interval worsening    P:  - , Supplemental O2 target 88-94%, Nebulizer, Methylprednisolone IV  - Oseltamivir (renal dosed) x 5 days  - Cefepime and Vancomycin continued  - Monitor CBC  - PRBC x 1, anemia work up, Hematology consulted.    Remainder active medical issues as mentioned earlier in H&P.    Discussed CBC findings with Hematology Dr Leyva

## 2023-12-25 NOTE — CONSULT NOTE ADULT - SUBJECTIVE AND OBJECTIVE BOX
Hematology/Oncology Consultation Note    Consulting physician: Elizabeth Reed MD   Attending physician Hematology & Medical Oncology     Patient name:  NOHEMY BRAN  MRN-[id number]    CC:   Patient is a 58y old  Male who presents with a chief complaint of copd exacerbation/flu positive (25 Dec 2023 05:16)    HPI:  57 yo male, extensive comorbid conditions recently discharged from [facility] after prolonged hospital course in setting of MRSA Bacteremia and septic thrombophlebitis who was at [facility] who returns back to [facility] due to worsening symptoms of cough, fever and increased difficulty breathing. Patient says that for last few days in facility he was having episodes of fevers, diaphoresis. Says that he had not headaches, blurry vision, chest pain, palpitations, abdominal pain, n/v/d, dysuria, hematuria. Says he has not really been ambulatory since discharge to [facility]. Says he does not use O2 at home at baseline for copd.     In ED: CTA Chest w/o PE and noted left pleural effusion clearing with improvement in RLL consolidation compared to prior studies. Patient given solumedrol load 125, multiple duonebs, abx via picc line for prior infection Vanco/Cefepime. Admit to medicine called when O2 dropped to 87% on room air. Flu positive.  (25 Dec 2023 05:16)    Hematology consulted for thrombocytosis.     Patient seen at bedside.   Labs today notable for: WBC 12.37, Hb 6.9, plt 1009 (decreased from 1179 yesterday)   D-dimer 3137  Influenza A+    CTA chest 12/25/23:  IMPRESSION:  Limited evaluation of predominantly bibasilar subsegmental pulmonary   arteries. Otherwise, negative for pulmonary embolus within the remainder   the pulmonary arteries.  Partial interval clearing of a left pleural effusion.  Almost complete resolution of a probable infectious/inflammatory process   in the right lower lobe with small residual still seen.    Allergies:   No Known Allergies      Medications:   acetaminophen     Tablet .. 650 milliGRAM(s) Oral every 6 hours PRN  albuterol/ipratropium for Nebulization 3 milliLiter(s) Nebulizer every 6 hours  amLODIPine   Tablet 5 milliGRAM(s) Oral daily  aspirin enteric coated 81 milliGRAM(s) Oral daily  benztropine 2 milliGRAM(s) Oral two times a day  calcium carbonate    500 mG (Tums) Chewable 1 Tablet(s) Chew every 6 hours PRN  cefepime  Injectable. 2000 milliGRAM(s) IV Push every 8 hours  cefepime  Injectable.      clonazePAM  Tablet 0.5 milliGRAM(s) Oral daily  divalproex  milliGRAM(s) Oral two times a day  enoxaparin Injectable 40 milliGRAM(s) SubCutaneous every 24 hours  folic acid 1 milliGRAM(s) Oral daily  gabapentin 100 milliGRAM(s) Oral at bedtime  hydrOXYzine hydrochloride 25 milliGRAM(s) Oral two times a day  methocarbamol 1000 milliGRAM(s) Oral every 8 hours  methylPREDNISolone sodium succinate Injectable 60 milliGRAM(s) IV Push every 8 hours  nicotine - 21 mG/24Hr(s) Patch 1 Patch Transdermal daily  oseltamivir 75 milliGRAM(s) Oral two times a day  pantoprazole    Tablet 40 milliGRAM(s) Oral before breakfast  polyethylene glycol 3350 17 Gram(s) Oral at bedtime  QUEtiapine 125 milliGRAM(s) Oral at bedtime  senna 2 Tablet(s) Oral at bedtime  thiamine 100 milliGRAM(s) Oral daily  vancomycin  IVPB. 1250 milliGRAM(s) IV Intermittent every 12 hours      PAST MEDICAL & SURGICAL HISTORY:  High cholesterol  Hiatal hernia  Tardive dyskinesia  Liver failure  Seizures  ETOH abuse  S/P tonsillectomy  History of lumbar spinal fusion 6/2017    FAMILY HISTORY:  No pertinent family history in first degree relatives    Social History:  Hx ETOH abuse    Review of Systems:   12 point review of systems is negative unless indicated above per HPI.     Vital Signs:  Vital Signs Last 24 Hrs  T(C): 38 (25 Dec 2023 11:21), Max: 38 (25 Dec 2023 11:21)  T(F): 100.4 (25 Dec 2023 11:21), Max: 100.4 (25 Dec 2023 11:21)  HR: 100 (25 Dec 2023 11:21) (97 - 117)  BP: 129/76 (25 Dec 2023 11:21) (115/61 - 146/76)  BP(mean): --  RR: 22 (25 Dec 2023 11:21) (18 - 26)  SpO2: 96% (25 Dec 2023 11:21) (91% - 100%)    Parameters below as of 25 Dec 2023 11:21  Patient On (Oxygen Delivery Method): nasal cannula  O2 Flow (L/min): 5      Daily Height in cm: 165.1 (24 Dec 2023 16:15)    Daily     Physical Exam:   GENERAL: NAD, well-developed  HEAD:  Atraumatic, Normocephalic  EYES: EOMI, PERRLA, conjunctiva and sclera clear  NECK: Supple, No JVD  CHEST/LUNG: Clear to auscultation bilaterally; No wheeze  HEART: Regular rate and rhythm; No murmurs, rubs, or gallops  ABDOMEN: Soft, Nontender, Nondistended; Bowel sounds present  EXTREMITIES:  2+ Peripheral Pulses, No clubbing, cyanosis, or edema  NEUROLOGY: non-focal  SKIN: No rashes or lesions    Labs:                         6.9    12.37 )-----------( 1009     ( 25 Dec 2023 10:55 )             22.6       CBC Full  -  ( 25 Dec 2023 10:55 )  WBC Count : 12.37 K/uL  RBC Count : 2.57 M/uL  Hemoglobin : 6.9 g/dL  Hematocrit : 22.6 %  Platelet Count - Automated : 1009 K/uL  Mean Cell Volume : 87.9 fl  Mean Cell Hemoglobin : 26.8 pg  Mean Cell Hemoglobin Concentration : 30.5 gm/dL  Auto Neutrophil # : 10.67 K/uL  Auto Lymphocyte # : 0.69 K/uL  Auto Monocyte # : 0.79 K/uL  Auto Eosinophil # : 0.00 K/uL  Auto Basophil # : 0.06 K/uL  Auto Neutrophil % : 86.2 %  Auto Lymphocyte % : 5.6 %  Auto Monocyte % : 6.4 %  Auto Eosinophil % : 0.0 %  Auto Basophil % : 0.5 %      12-24    143  |  102  |  26.5<H>  ----------------------------<  139<H>  5.1   |  32.0<H>  |  0.71    Ca    9.8      24 Dec 2023 17:16    TPro  8.0  /  Alb  3.3  /  TBili  <0.2<L>  /  DBili  x   /  AST  18  /  ALT  18  /  AlkPhos  127<H>  12-24      LIVER FUNCTIONS - ( 24 Dec 2023 17:16 )  Alb: 3.3 g/dL / Pro: 8.0 g/dL / ALK PHOS: 127 U/L / ALT: 18 U/L / AST: 18 U/L / GGT: x           Micro:        Hematology/Oncology Consultation Note    Consulting physician: Elizabeth Reed MD   Attending physician Hematology & Medical Oncology     Patient name:  NOHEMY BRAN  MRN-[id number]    CC:   Patient is a 58y old  Male who presents with a chief complaint of copd exacerbation/flu positive (25 Dec 2023 05:16)    HPI:  57 yo male, extensive comorbid conditions recently discharged from [facility] after prolonged hospital course in setting of MRSA Bacteremia and septic thrombophlebitis who was at [facility] who returns back to [facility] due to worsening symptoms of cough, fever and increased difficulty breathing. Patient says that for last few days in facility he was having episodes of fevers, diaphoresis. Says that he had not headaches, blurry vision, chest pain, palpitations, abdominal pain, n/v/d, dysuria, hematuria. Says he has not really been ambulatory since discharge to [facility]. Says he does not use O2 at home at baseline for copd.     In ED: CTA Chest w/o PE and noted left pleural effusion clearing with improvement in RLL consolidation compared to prior studies. Patient given solumedrol load 125, multiple duonebs, abx via picc line for prior infection Vanco/Cefepime. Admit to medicine called when O2 dropped to 87% on room air. Flu positive.  (25 Dec 2023 05:16)    Hematology consulted for thrombocytosis.     Patient seen at bedside.   Labs today notable for: WBC 12.37, Hb 6.9, plt 1009 (decreased from 1179 yesterday)   D-dimer 3137  Influenza A+    CTA chest 12/25/23:  IMPRESSION:  Limited evaluation of predominantly bibasilar subsegmental pulmonary   arteries. Otherwise, negative for pulmonary embolus within the remainder   the pulmonary arteries.  Partial interval clearing of a left pleural effusion.  Almost complete resolution of a probable infectious/inflammatory process   in the right lower lobe with small residual still seen.    Allergies:   No Known Allergies      Medications:   acetaminophen     Tablet .. 650 milliGRAM(s) Oral every 6 hours PRN  albuterol/ipratropium for Nebulization 3 milliLiter(s) Nebulizer every 6 hours  amLODIPine   Tablet 5 milliGRAM(s) Oral daily  aspirin enteric coated 81 milliGRAM(s) Oral daily  benztropine 2 milliGRAM(s) Oral two times a day  calcium carbonate    500 mG (Tums) Chewable 1 Tablet(s) Chew every 6 hours PRN  cefepime  Injectable. 2000 milliGRAM(s) IV Push every 8 hours  cefepime  Injectable.      clonazePAM  Tablet 0.5 milliGRAM(s) Oral daily  divalproex  milliGRAM(s) Oral two times a day  enoxaparin Injectable 40 milliGRAM(s) SubCutaneous every 24 hours  folic acid 1 milliGRAM(s) Oral daily  gabapentin 100 milliGRAM(s) Oral at bedtime  hydrOXYzine hydrochloride 25 milliGRAM(s) Oral two times a day  methocarbamol 1000 milliGRAM(s) Oral every 8 hours  methylPREDNISolone sodium succinate Injectable 60 milliGRAM(s) IV Push every 8 hours  nicotine - 21 mG/24Hr(s) Patch 1 Patch Transdermal daily  oseltamivir 75 milliGRAM(s) Oral two times a day  pantoprazole    Tablet 40 milliGRAM(s) Oral before breakfast  polyethylene glycol 3350 17 Gram(s) Oral at bedtime  QUEtiapine 125 milliGRAM(s) Oral at bedtime  senna 2 Tablet(s) Oral at bedtime  thiamine 100 milliGRAM(s) Oral daily  vancomycin  IVPB. 1250 milliGRAM(s) IV Intermittent every 12 hours      PAST MEDICAL & SURGICAL HISTORY:  High cholesterol  Hiatal hernia  Tardive dyskinesia  Liver failure  Seizures  ETOH abuse  S/P tonsillectomy  History of lumbar spinal fusion 6/2017    FAMILY HISTORY:  No pertinent family history in first degree relatives    Social History:  Hx ETOH abuse    Review of Systems:   12 point review of systems is negative unless indicated above per HPI.     Vital Signs:  Vital Signs Last 24 Hrs  T(C): 38 (25 Dec 2023 11:21), Max: 38 (25 Dec 2023 11:21)  T(F): 100.4 (25 Dec 2023 11:21), Max: 100.4 (25 Dec 2023 11:21)  HR: 100 (25 Dec 2023 11:21) (97 - 117)  BP: 129/76 (25 Dec 2023 11:21) (115/61 - 146/76)  BP(mean): --  RR: 22 (25 Dec 2023 11:21) (18 - 26)  SpO2: 96% (25 Dec 2023 11:21) (91% - 100%)    Parameters below as of 25 Dec 2023 11:21  Patient On (Oxygen Delivery Method): nasal cannula  O2 Flow (L/min): 5      Daily Height in cm: 165.1 (24 Dec 2023 16:15)    Daily     Physical Exam:   GENERAL: NAD, well-developed  HEAD:  Atraumatic, Normocephalic  EYES: EOMI, PERRLA, conjunctiva and sclera clear  NECK: Supple, No JVD  CHEST/LUNG: Clear to auscultation bilaterally; No wheeze  HEART: Regular rate and rhythm; No murmurs, rubs, or gallops  ABDOMEN: Soft, Nontender, Nondistended; Bowel sounds present  EXTREMITIES:  2+ Peripheral Pulses, No clubbing, cyanosis, or edema  NEUROLOGY: non-focal  SKIN: No rashes or lesions    Labs:                         6.9    12.37 )-----------( 1009     ( 25 Dec 2023 10:55 )             22.6       CBC Full  -  ( 25 Dec 2023 10:55 )  WBC Count : 12.37 K/uL  RBC Count : 2.57 M/uL  Hemoglobin : 6.9 g/dL  Hematocrit : 22.6 %  Platelet Count - Automated : 1009 K/uL  Mean Cell Volume : 87.9 fl  Mean Cell Hemoglobin : 26.8 pg  Mean Cell Hemoglobin Concentration : 30.5 gm/dL  Auto Neutrophil # : 10.67 K/uL  Auto Lymphocyte # : 0.69 K/uL  Auto Monocyte # : 0.79 K/uL  Auto Eosinophil # : 0.00 K/uL  Auto Basophil # : 0.06 K/uL  Auto Neutrophil % : 86.2 %  Auto Lymphocyte % : 5.6 %  Auto Monocyte % : 6.4 %  Auto Eosinophil % : 0.0 %  Auto Basophil % : 0.5 %      12-24    143  |  102  |  26.5<H>  ----------------------------<  139<H>  5.1   |  32.0<H>  |  0.71    Ca    9.8      24 Dec 2023 17:16    TPro  8.0  /  Alb  3.3  /  TBili  <0.2<L>  /  DBili  x   /  AST  18  /  ALT  18  /  AlkPhos  127<H>  12-24      LIVER FUNCTIONS - ( 24 Dec 2023 17:16 )  Alb: 3.3 g/dL / Pro: 8.0 g/dL / ALK PHOS: 127 U/L / ALT: 18 U/L / AST: 18 U/L / GGT: x           Micro:        Hematology/Oncology Consultation Note    Consulting physician: Elizabeth Reed MD   Attending physician Hematology & Medical Oncology     Patient name:  NOHEMY BRAN  MRN-[id number]    CC:   Patient is a 58y old  Male who presents with a chief complaint of copd exacerbation/flu positive (25 Dec 2023 05:16)    HPI:  57 yo male, extensive comorbid conditions recently discharged from [facility] after prolonged hospital course in setting of MRSA Bacteremia and septic thrombophlebitis who was at [facility] who returns back to [facility] due to worsening symptoms of cough, fever and increased difficulty breathing. Patient says that for last few days in facility he was having episodes of fevers, diaphoresis. Says that he had not headaches, blurry vision, chest pain, palpitations, abdominal pain, n/v/d, dysuria, hematuria. Says he has not really been ambulatory since discharge to [facility]. Says he does not use O2 at home at baseline for copd.     In ED: CTA Chest w/o PE and noted left pleural effusion clearing with improvement in RLL consolidation compared to prior studies. Patient given solumedrol load 125, multiple duonebs, abx via picc line for prior infection Vanco/Cefepime. Admit to medicine called when O2 dropped to 87% on room air. Flu positive.      Hematology consulted for thrombocytosis and anemia.     Patient seen at bedside today 12/25/23. He remains on nasal cannula.   States he was previously told he was anemic. Has never required a blood transfusion or iron infusion in the past.   Has never seen a hematologist.     Labs today notable for: WBC 12.37, Hb 6.9, plt 1009 (decreased from 1179 yesterday)   D-dimer 3137  Influenza A+    CTA chest 12/25/23:  IMPRESSION:  Limited evaluation of predominantly bibasilar subsegmental pulmonary   arteries. Otherwise, negative for pulmonary embolus within the remainder   the pulmonary arteries.  Partial interval clearing of a left pleural effusion.  Almost complete resolution of a probable infectious/inflammatory process   in the right lower lobe with small residual still seen.    Allergies:   No Known Allergies      Medications:   acetaminophen     Tablet .. 650 milliGRAM(s) Oral every 6 hours PRN  albuterol/ipratropium for Nebulization 3 milliLiter(s) Nebulizer every 6 hours  amLODIPine   Tablet 5 milliGRAM(s) Oral daily  aspirin enteric coated 81 milliGRAM(s) Oral daily  benztropine 2 milliGRAM(s) Oral two times a day  calcium carbonate    500 mG (Tums) Chewable 1 Tablet(s) Chew every 6 hours PRN  cefepime  Injectable. 2000 milliGRAM(s) IV Push every 8 hours  cefepime  Injectable.      clonazePAM  Tablet 0.5 milliGRAM(s) Oral daily  divalproex  milliGRAM(s) Oral two times a day  enoxaparin Injectable 40 milliGRAM(s) SubCutaneous every 24 hours  folic acid 1 milliGRAM(s) Oral daily  gabapentin 100 milliGRAM(s) Oral at bedtime  hydrOXYzine hydrochloride 25 milliGRAM(s) Oral two times a day  methocarbamol 1000 milliGRAM(s) Oral every 8 hours  methylPREDNISolone sodium succinate Injectable 60 milliGRAM(s) IV Push every 8 hours  nicotine - 21 mG/24Hr(s) Patch 1 Patch Transdermal daily  oseltamivir 75 milliGRAM(s) Oral two times a day  pantoprazole    Tablet 40 milliGRAM(s) Oral before breakfast  polyethylene glycol 3350 17 Gram(s) Oral at bedtime  QUEtiapine 125 milliGRAM(s) Oral at bedtime  senna 2 Tablet(s) Oral at bedtime  thiamine 100 milliGRAM(s) Oral daily  vancomycin  IVPB. 1250 milliGRAM(s) IV Intermittent every 12 hours      PAST MEDICAL & SURGICAL HISTORY:  High cholesterol  Hiatal hernia  Tardive dyskinesia  Liver failure  Seizures  ETOH abuse  S/P tonsillectomy  History of lumbar spinal fusion 6/2017    FAMILY HISTORY:  No pertinent family history in first degree relatives    Social History:  Hx ETOH abuse    Review of Systems:   12 point review of systems is negative unless indicated above per HPI.     Vital Signs:  Vital Signs Last 24 Hrs  T(C): 38 (25 Dec 2023 11:21), Max: 38 (25 Dec 2023 11:21)  T(F): 100.4 (25 Dec 2023 11:21), Max: 100.4 (25 Dec 2023 11:21)  HR: 100 (25 Dec 2023 11:21) (97 - 117)  BP: 129/76 (25 Dec 2023 11:21) (115/61 - 146/76)  BP(mean): --  RR: 22 (25 Dec 2023 11:21) (18 - 26)  SpO2: 96% (25 Dec 2023 11:21) (91% - 100%)    Parameters below as of 25 Dec 2023 11:21  Patient On (Oxygen Delivery Method): nasal cannula  O2 Flow (L/min): 5      Daily Height in cm: 165.1 (24 Dec 2023 16:15)    Daily     Physical Exam:   GENERAL: NAD, well-developed  HEAD:  Atraumatic, Normocephalic  EYES: EOMI, PERRLA, conjunctiva and sclera clear  NECK: Supple, No JVD  CHEST/LUNG: Clear to auscultation bilaterally; No wheeze, scar on L chest   HEART: Regular rate and rhythm; No murmurs, rubs, or gallops  ABDOMEN: Soft, Nontender, Nondistended; Bowel sounds present  EXTREMITIES:  2+ Peripheral Pulses, No clubbing, cyanosis, or edema  NEUROLOGY: non-focal  SKIN: No rashes or lesions    Labs:                         6.9    12.37 )-----------( 1009     ( 25 Dec 2023 10:55 )             22.6       CBC Full  -  ( 25 Dec 2023 10:55 )  WBC Count : 12.37 K/uL  RBC Count : 2.57 M/uL  Hemoglobin : 6.9 g/dL  Hematocrit : 22.6 %  Platelet Count - Automated : 1009 K/uL  Mean Cell Volume : 87.9 fl  Mean Cell Hemoglobin : 26.8 pg  Mean Cell Hemoglobin Concentration : 30.5 gm/dL  Auto Neutrophil # : 10.67 K/uL  Auto Lymphocyte # : 0.69 K/uL  Auto Monocyte # : 0.79 K/uL  Auto Eosinophil # : 0.00 K/uL  Auto Basophil # : 0.06 K/uL  Auto Neutrophil % : 86.2 %  Auto Lymphocyte % : 5.6 %  Auto Monocyte % : 6.4 %  Auto Eosinophil % : 0.0 %  Auto Basophil % : 0.5 %      12-24    143  |  102  |  26.5<H>  ----------------------------<  139<H>  5.1   |  32.0<H>  |  0.71    Ca    9.8      24 Dec 2023 17:16    TPro  8.0  /  Alb  3.3  /  TBili  <0.2<L>  /  DBili  x   /  AST  18  /  ALT  18  /  AlkPhos  127<H>  12-24      LIVER FUNCTIONS - ( 24 Dec 2023 17:16 )  Alb: 3.3 g/dL / Pro: 8.0 g/dL / ALK PHOS: 127 U/L / ALT: 18 U/L / AST: 18 U/L / GGT: x           Micro:   Influenza A +      Hematology/Oncology Consultation Note    Consulting physician: Elizabeth Reed MD   Attending physician Hematology & Medical Oncology     Patient name:  NOHEMY BRAN  MRN-[id number]    CC:   Patient is a 58y old  Male who presents with a chief complaint of copd exacerbation/flu positive (25 Dec 2023 05:16)    HPI:  57 yo male, extensive comorbid conditions recently discharged from [facility] after prolonged hospital course in setting of MRSA Bacteremia and septic thrombophlebitis who was at [facility] who returns back to [facility] due to worsening symptoms of cough, fever and increased difficulty breathing. Patient says that for last few days in facility he was having episodes of fevers, diaphoresis. Says that he had not headaches, blurry vision, chest pain, palpitations, abdominal pain, n/v/d, dysuria, hematuria. Says he has not really been ambulatory since discharge to [facility]. Says he does not use O2 at home at baseline for copd.     In ED: CTA Chest w/o PE and noted left pleural effusion clearing with improvement in RLL consolidation compared to prior studies. Patient given solumedrol load 125, multiple duonebs, abx via picc line for prior infection Vanco/Cefepime. Admit to medicine called when O2 dropped to 87% on room air. Flu positive.      Hematology consulted for thrombocytosis and anemia.     Patient seen at bedside today 12/25/23. He remains on nasal cannula.   States he was previously told he was anemic. Has never required a blood transfusion or iron infusion in the past.   Has never seen a hematologist.     Labs today notable for: WBC 12.37, Hb 6.9, plt 1009 (decreased from 1179 yesterday)   D-dimer 3137  Influenza A+    CTA chest 12/25/23:  IMPRESSION:  Limited evaluation of predominantly bibasilar subsegmental pulmonary   arteries. Otherwise, negative for pulmonary embolus within the remainder   the pulmonary arteries.  Partial interval clearing of a left pleural effusion.  Almost complete resolution of a probable infectious/inflammatory process   in the right lower lobe with small residual still seen.    Allergies:   No Known Allergies      Medications:   acetaminophen     Tablet .. 650 milliGRAM(s) Oral every 6 hours PRN  albuterol/ipratropium for Nebulization 3 milliLiter(s) Nebulizer every 6 hours  amLODIPine   Tablet 5 milliGRAM(s) Oral daily  aspirin enteric coated 81 milliGRAM(s) Oral daily  benztropine 2 milliGRAM(s) Oral two times a day  calcium carbonate    500 mG (Tums) Chewable 1 Tablet(s) Chew every 6 hours PRN  cefepime  Injectable. 2000 milliGRAM(s) IV Push every 8 hours  cefepime  Injectable.      clonazePAM  Tablet 0.5 milliGRAM(s) Oral daily  divalproex  milliGRAM(s) Oral two times a day  enoxaparin Injectable 40 milliGRAM(s) SubCutaneous every 24 hours  folic acid 1 milliGRAM(s) Oral daily  gabapentin 100 milliGRAM(s) Oral at bedtime  hydrOXYzine hydrochloride 25 milliGRAM(s) Oral two times a day  methocarbamol 1000 milliGRAM(s) Oral every 8 hours  methylPREDNISolone sodium succinate Injectable 60 milliGRAM(s) IV Push every 8 hours  nicotine - 21 mG/24Hr(s) Patch 1 Patch Transdermal daily  oseltamivir 75 milliGRAM(s) Oral two times a day  pantoprazole    Tablet 40 milliGRAM(s) Oral before breakfast  polyethylene glycol 3350 17 Gram(s) Oral at bedtime  QUEtiapine 125 milliGRAM(s) Oral at bedtime  senna 2 Tablet(s) Oral at bedtime  thiamine 100 milliGRAM(s) Oral daily  vancomycin  IVPB. 1250 milliGRAM(s) IV Intermittent every 12 hours      PAST MEDICAL & SURGICAL HISTORY:  High cholesterol  Hiatal hernia  Tardive dyskinesia  Liver failure  Seizures  ETOH abuse  S/P tonsillectomy  History of lumbar spinal fusion 6/2017    FAMILY HISTORY:  No pertinent family history in first degree relatives    Social History:  Hx ETOH abuse    Review of Systems:   12 point review of systems is negative unless indicated above per HPI.     Vital Signs:  Vital Signs Last 24 Hrs  T(C): 38 (25 Dec 2023 11:21), Max: 38 (25 Dec 2023 11:21)  T(F): 100.4 (25 Dec 2023 11:21), Max: 100.4 (25 Dec 2023 11:21)  HR: 100 (25 Dec 2023 11:21) (97 - 117)  BP: 129/76 (25 Dec 2023 11:21) (115/61 - 146/76)  BP(mean): --  RR: 22 (25 Dec 2023 11:21) (18 - 26)  SpO2: 96% (25 Dec 2023 11:21) (91% - 100%)    Parameters below as of 25 Dec 2023 11:21  Patient On (Oxygen Delivery Method): nasal cannula  O2 Flow (L/min): 5      Daily Height in cm: 165.1 (24 Dec 2023 16:15)    Daily     Physical Exam:   GENERAL: NAD, well-developed  HEAD:  Atraumatic, Normocephalic  EYES: EOMI, PERRLA, conjunctiva and sclera clear  NECK: Supple, No JVD  CHEST/LUNG: Clear to auscultation bilaterally; No wheeze, scar on L chest   HEART: Regular rate and rhythm; No murmurs, rubs, or gallops  ABDOMEN: Soft, Nontender, Nondistended; Bowel sounds present  EXTREMITIES:  2+ Peripheral Pulses, No clubbing, cyanosis, or edema  NEUROLOGY: non-focal  SKIN: No rashes or lesions    Labs:                         6.9    12.37 )-----------( 1009     ( 25 Dec 2023 10:55 )             22.6       CBC Full  -  ( 25 Dec 2023 10:55 )  WBC Count : 12.37 K/uL  RBC Count : 2.57 M/uL  Hemoglobin : 6.9 g/dL  Hematocrit : 22.6 %  Platelet Count - Automated : 1009 K/uL  Mean Cell Volume : 87.9 fl  Mean Cell Hemoglobin : 26.8 pg  Mean Cell Hemoglobin Concentration : 30.5 gm/dL  Auto Neutrophil # : 10.67 K/uL  Auto Lymphocyte # : 0.69 K/uL  Auto Monocyte # : 0.79 K/uL  Auto Eosinophil # : 0.00 K/uL  Auto Basophil # : 0.06 K/uL  Auto Neutrophil % : 86.2 %  Auto Lymphocyte % : 5.6 %  Auto Monocyte % : 6.4 %  Auto Eosinophil % : 0.0 %  Auto Basophil % : 0.5 %      12-24    143  |  102  |  26.5<H>  ----------------------------<  139<H>  5.1   |  32.0<H>  |  0.71    Ca    9.8      24 Dec 2023 17:16    TPro  8.0  /  Alb  3.3  /  TBili  <0.2<L>  /  DBili  x   /  AST  18  /  ALT  18  /  AlkPhos  127<H>  12-24      LIVER FUNCTIONS - ( 24 Dec 2023 17:16 )  Alb: 3.3 g/dL / Pro: 8.0 g/dL / ALK PHOS: 127 U/L / ALT: 18 U/L / AST: 18 U/L / GGT: x           Micro:   Influenza A +

## 2023-12-25 NOTE — H&P ADULT - HISTORY OF PRESENT ILLNESS
57 yo male, extensive comorbid conditions recently discharged from [facility] after prolonged hospital course in setting of MRSA Bacteremia and septic thrombophlebitis who was at [facility] who returns back to [facility] due to worsening symptoms of cough, fever and increased difficulty breathing. Patient says that for last few days in facility he was having episodes of fevers, diaphoresis. Says that he had not headaches, blurry vision, chest pain, palpitations, abdominal pain, n/v/d, dysuria, hematuria. Says he has not really been ambulatory since discharge to [facility]. Says he does not use O2 at home at baseline for copd.     In ED: CTA Chest w/o PE and noted left pleural effusion clearing with improvement in RLL consolidation compared to prior studies. Patient given solumedrol load 125, multiple duonebs, abx via picc line for prior infection Vanco/Cefepime. Admit to medicine called when O2 dropped to 87% on room air. Flu positive.  57 yo male, extensive comorbid conditions recently discharged from [facility] after prolonged hospital course in setting of MRSA Bacteremia and septic thrombophlebitis who was at [facility] who returns back to [facility] due to worsening symptoms of cough, fever and increased difficulty breathing. Patient says that for last few days in facility he was having episodes of fevers, diaphoresis. Says that he had not headaches, blurry vision, chest pain, palpitations, abdominal pain, n/v/d, dysuria, hematuria. Says he has not really been ambulatory since discharge to [facility]. Says he does not use O2 at home at baseline for copd.     In ED: CTA Chest w/o PE and noted left pleural effusion clearing with improvement in RLL consolidation compared to prior studies. Patient given solumedrol load 125, multiple duonebs, abx via picc line for prior infection Vanco/Cefepime. Admit to medicine called when O2 dropped to 87% on room air. Flu positive.

## 2023-12-25 NOTE — H&P ADULT - NSHPPHYSICALEXAM_GEN_ALL_CORE
Vital Signs Last 24 Hrs  T(F): 98.1 (25 Dec 2023 05:37), Max: 99.5 (24 Dec 2023 17:31)  HR: 97 (25 Dec 2023 05:37) (97 - 117)  BP: 115/61 (25 Dec 2023 05:37) (115/61 - 146/76)  RR: 20 (25 Dec 2023 05:37) (18 - 26)  SpO2: 96% (25 Dec 2023 05:37) (96% - 100%)    Physical Exam:  Constitutional: NAD, awake and alert,    Eyes: EOMI, PERRLA  Mouth: Moist oral mucosa, No erythema or exudates noted.   Neck: Soft and supple, No LAD  Respiratory: cta b/l no wheezing no rhonchi  Cardiovascular: +s1/s2 RRR, no murmurs appreciated. no edema b/l le  Gastrointestinal: soft mildly distended non tender bs+  Vascular: 2+ peripheral pulses, no calf tenderness b/l.   Neurological: A/O x 3, no focal deficits, gait not assessed   Musculoskeletal: freely moving all 4 extremities    Skin: pale, warm, dry skin, left chest wall scar healing with noted eschar.     Psych: has insight into current condition. normal affect. mood appropriate. Vital Signs Last 24 Hrs  T(F): 98.1 (25 Dec 2023 05:37), Max: 99.5 (24 Dec 2023 17:31)  HR: 97 (25 Dec 2023 05:37) (97 - 117)  BP: 115/61 (25 Dec 2023 05:37) (115/61 - 146/76)  RR: 20 (25 Dec 2023 05:37) (18 - 26)  SpO2: 96% (25 Dec 2023 05:37) (96% - 100%)    Physical Exam:  Constitutional: NAD, awake and alert,    Eyes: EOMI, PERRLA  Mouth: Moist oral mucosa, No erythema or exudates noted.   Neck: Soft and supple, No LAD  Respiratory: b/l +wheezing +rhonchi  Cardiovascular: +s1/s2 RRR, no murmurs appreciated. no edema b/l le  Gastrointestinal: soft mildly distended non tender bs+  Vascular: 2+ peripheral pulses, no calf tenderness b/l.   Neurological: A/O x 3, no focal deficits, gait not assessed   Musculoskeletal: freely moving all 4 extremities    Skin: pale, warm, dry skin, left chest wall scar healing with noted eschar.     Psych: has insight into current condition. normal affect. mood appropriate.

## 2023-12-25 NOTE — CONSULT NOTE ADULT - ASSESSMENT
Mr. Forde is a 59 yo male, extensive comorbid conditions recently discharged from [facility] after prolonged hospital course in setting of MRSA Bacteremia and septic thrombophlebitis who was at [facility] who returns back to [facility] due to worsening symptoms of cough, fever and increased difficulty breathing.   Found to be positive for influenza A.   Hematology consulted for thrombocytosis and anemia.     #Thrombocytosis- likely reactive in setting of acute infection   Platelet count normal as of mid-December (low likelihood of myeloproliferative disorder)   Platelet count appears to have peaked, starting to downtrend today  Continue ongoing therapy for influenza per primary team  Monitor platelet count   F/U with hematology as outpatient within next 2-4 weeks to ensure return to normal     #Normocytic anemia- Hb 6.9 today   Likely 2/2 acute inflammation  Would send workup to rule out other etiologies- send B12, folate, iron studies, ferritin, LDH, haptoglobin  Tbili is normal so low concern for hemolytic anemia   Transfuse 1U PRBC today for Hb 6.9   Goal Hb >7 or >8 if symptomatic     Hematology/Oncology will continue to follow.     Elizabeth Reed MD  Attending Physician  Hematology and Medical Oncology  Mr. Forde is a 57 yo male, extensive comorbid conditions recently discharged from [facility] after prolonged hospital course in setting of MRSA Bacteremia and septic thrombophlebitis who was at [facility] who returns back to [facility] due to worsening symptoms of cough, fever and increased difficulty breathing.   Found to be positive for influenza A.   Hematology consulted for thrombocytosis and anemia.     #Thrombocytosis- likely reactive in setting of acute infection   Platelet count normal as of mid-December (low likelihood of myeloproliferative disorder)   Platelet count appears to have peaked, starting to downtrend today  Continue ongoing therapy for influenza per primary team  Monitor platelet count   F/U with hematology as outpatient within next 2-4 weeks to ensure return to normal     #Normocytic anemia- Hb 6.9 today   Likely 2/2 acute inflammation  Would send workup to rule out other etiologies- send B12, folate, iron studies, ferritin, LDH, haptoglobin  Tbili is normal so low concern for hemolytic anemia   Transfuse 1U PRBC today for Hb 6.9   Goal Hb >7 or >8 if symptomatic     Hematology/Oncology will continue to follow.     Elizabeth Reed MD  Attending Physician  Hematology and Medical Oncology  Mr. Forde is a 57 yo male, extensive comorbid conditions recently discharged from [facility] after prolonged hospital course in setting of MRSA Bacteremia and septic thrombophlebitis who was at [facility] who returns back to [facility] due to worsening symptoms of cough, fever and increased difficulty breathing.   Found to be positive for influenza A.   Hematology consulted for thrombocytosis and anemia.     #Thrombocytosis- likely reactive in setting of acute infection   Platelet count normal as of mid-December (low likelihood of myeloproliferative disorder)   Platelet count appears to have peaked, starting to downtrend today  Continue ongoing therapy for influenza per primary team  Monitor platelet count   F/U with hematology as outpatient within next 2-4 weeks to ensure return to normal     #Normocytic anemia- Hb 6.9 today- Likely 2/2 acute inflammation in setting of infection   States he was previously told he was anemic. Has never required a blood transfusion or iron infusion in the past.   Has never seen a hematologist.   He is well overdue for a colonoscopy which should be arranged as an outpatient  Would send workup to rule out other etiologies- send B12, folate, iron studies, ferritin, LDH, haptoglobin  Tbili is normal so low concern for hemolytic anemia   Transfuse 1U PRBC today for Hb 6.9. He is agreeable to transfusion at this time.   Goal Hb >7 or >8 if symptomatic     Hematology/Oncology will continue to follow.     Elizabeth Reed MD  Attending Physician  Hematology and Medical Oncology  Mr. Forde is a 57 yo male, extensive comorbid conditions recently discharged from [facility] after prolonged hospital course in setting of MRSA Bacteremia and septic thrombophlebitis who was at [facility] who returns back to [facility] due to worsening symptoms of cough, fever and increased difficulty breathing.   Found to be positive for influenza A.   Hematology consulted for thrombocytosis and anemia.     #Thrombocytosis- likely reactive in setting of acute infection   Platelet count normal as of mid-December (low likelihood of myeloproliferative disorder)   Platelet count appears to have peaked, starting to downtrend today  Continue ongoing therapy for influenza per primary team  Monitor platelet count   F/U with hematology as outpatient within next 2-4 weeks to ensure return to normal     #Normocytic anemia- Hb 6.9 today- Likely 2/2 acute inflammation in setting of infection   States he was previously told he was anemic. Has never required a blood transfusion or iron infusion in the past.   Has never seen a hematologist.   He is well overdue for a colonoscopy which should be arranged as an outpatient  Would send workup to rule out other etiologies- send B12, folate, iron studies, ferritin, LDH, haptoglobin  Tbili is normal so low concern for hemolytic anemia   Transfuse 1U PRBC today for Hb 6.9. He is agreeable to transfusion at this time.   Goal Hb >7 or >8 if symptomatic     Hematology/Oncology will continue to follow.     Elizabeth Reed MD  Attending Physician  Hematology and Medical Oncology

## 2023-12-25 NOTE — H&P ADULT - ASSESSMENT
57 yo male, extensive comorbid conditions recently discharged from [facility] after prolonged hospital course in setting of MRSA Bacteremia and septic thrombophlebitis, hx of COPD, presenting for cough, diaphoresis, hypoxia in setting of testing Flu positive now admitted for COPD exacerbation.     #) COPD Exacerbation    #)      57 yo male, extensive comorbid conditions recently discharged from [facility] after prolonged hospital course in setting of MRSA Bacteremia and septic thrombophlebitis, hx of COPD, presenting for cough, diaphoresis, hypoxia in setting of testing Flu positive now admitted for COPD exacerbation.     #) COPD Exacerbation    #)      59 yo male, extensive comorbid conditions recently discharged from [facility] after prolonged hospital course in setting of MRSA Bacteremia and septic thrombophlebitis, hx of COPD, presenting for cough, diaphoresis, hypoxia in setting of testing Flu positive now admitted for COPD exacerbation.     #) COPD Exacerbation complicated by Hypoxia   Flu positive on RVP  tamiflu started 75mg twice daily   duonebs q6h prn   solumedrol 60mg q8h   currently on 3lpm o2 went hypoxic to 87% on room air, goal for SPO2 88-92%    #) MRSA bacteremia  reviewed ID consult from prior admission  - c/w cefepime 2 g iv q8h  - c/w vancomycin 1250mg iv q12h  - monitor trough and adjust per pharmacy to avoid drug toxicity  - will plan 6 weeks abx  end 1/25/24      #) HTN  c/w norvasc 5mg daily    #) Schizoaffective complicated by tardive dyskinesia   c/w all home meds  depakote, klonopin, cogentin, neurontin, seroquel     Plan of care discussed with patient, RN      57 yo male, extensive comorbid conditions recently discharged from [facility] after prolonged hospital course in setting of MRSA Bacteremia and septic thrombophlebitis, hx of COPD, presenting for cough, diaphoresis, hypoxia in setting of testing Flu positive now admitted for COPD exacerbation.     #) COPD Exacerbation complicated by Hypoxia   Flu positive on RVP  tamiflu started 75mg twice daily   duonebs q6h prn   solumedrol 60mg q8h   currently on 3lpm o2 went hypoxic to 87% on room air, goal for SPO2 88-92%    #) MRSA bacteremia  reviewed ID consult from prior admission  - c/w cefepime 2 g iv q8h  - c/w vancomycin 1250mg iv q12h  - monitor trough and adjust per pharmacy to avoid drug toxicity  - will plan 6 weeks abx  end 1/25/24      #) HTN  c/w norvasc 5mg daily    #) Schizoaffective complicated by tardive dyskinesia   c/w all home meds  depakote, klonopin, cogentin, neurontin, seroquel     Plan of care discussed with patient, RN

## 2023-12-26 LAB
ANION GAP SERPL CALC-SCNC: 9 MMOL/L — SIGNIFICANT CHANGE UP (ref 5–17)
ANION GAP SERPL CALC-SCNC: 9 MMOL/L — SIGNIFICANT CHANGE UP (ref 5–17)
BUN SERPL-MCNC: 26.2 MG/DL — HIGH (ref 8–20)
BUN SERPL-MCNC: 26.2 MG/DL — HIGH (ref 8–20)
CALCIUM SERPL-MCNC: 9.2 MG/DL — SIGNIFICANT CHANGE UP (ref 8.4–10.5)
CALCIUM SERPL-MCNC: 9.2 MG/DL — SIGNIFICANT CHANGE UP (ref 8.4–10.5)
CHLORIDE SERPL-SCNC: 96 MMOL/L — SIGNIFICANT CHANGE UP (ref 96–108)
CHLORIDE SERPL-SCNC: 96 MMOL/L — SIGNIFICANT CHANGE UP (ref 96–108)
CO2 SERPL-SCNC: 33 MMOL/L — HIGH (ref 22–29)
CO2 SERPL-SCNC: 33 MMOL/L — HIGH (ref 22–29)
CREAT SERPL-MCNC: 0.59 MG/DL — SIGNIFICANT CHANGE UP (ref 0.5–1.3)
CREAT SERPL-MCNC: 0.59 MG/DL — SIGNIFICANT CHANGE UP (ref 0.5–1.3)
EGFR: 112 ML/MIN/1.73M2 — SIGNIFICANT CHANGE UP
EGFR: 112 ML/MIN/1.73M2 — SIGNIFICANT CHANGE UP
FERRITIN SERPL-MCNC: 379 NG/ML — SIGNIFICANT CHANGE UP (ref 30–400)
FERRITIN SERPL-MCNC: 379 NG/ML — SIGNIFICANT CHANGE UP (ref 30–400)
FOLATE SERPL-MCNC: >20 NG/ML — SIGNIFICANT CHANGE UP
FOLATE SERPL-MCNC: >20 NG/ML — SIGNIFICANT CHANGE UP
GLUCOSE SERPL-MCNC: 158 MG/DL — HIGH (ref 70–99)
GLUCOSE SERPL-MCNC: 158 MG/DL — HIGH (ref 70–99)
HAPTOGLOB SERPL-MCNC: 491 MG/DL — HIGH (ref 34–200)
HAPTOGLOB SERPL-MCNC: 491 MG/DL — HIGH (ref 34–200)
HCT VFR BLD CALC: 26.4 % — LOW (ref 39–50)
HCT VFR BLD CALC: 26.4 % — LOW (ref 39–50)
HGB BLD-MCNC: 7.8 G/DL — LOW (ref 13–17)
HGB BLD-MCNC: 7.8 G/DL — LOW (ref 13–17)
IRON SATN MFR SERPL: 22 UG/DL — LOW (ref 59–158)
IRON SATN MFR SERPL: 22 UG/DL — LOW (ref 59–158)
IRON SATN MFR SERPL: 7 % — LOW (ref 16–55)
IRON SATN MFR SERPL: 7 % — LOW (ref 16–55)
LDH SERPL L TO P-CCNC: 243 U/L — HIGH (ref 98–192)
LDH SERPL L TO P-CCNC: 243 U/L — HIGH (ref 98–192)
MCHC RBC-ENTMCNC: 26.1 PG — LOW (ref 27–34)
MCHC RBC-ENTMCNC: 26.1 PG — LOW (ref 27–34)
MCHC RBC-ENTMCNC: 29.5 GM/DL — LOW (ref 32–36)
MCHC RBC-ENTMCNC: 29.5 GM/DL — LOW (ref 32–36)
MCV RBC AUTO: 88.3 FL — SIGNIFICANT CHANGE UP (ref 80–100)
MCV RBC AUTO: 88.3 FL — SIGNIFICANT CHANGE UP (ref 80–100)
PLATELET # BLD AUTO: 948 K/UL — HIGH (ref 150–400)
PLATELET # BLD AUTO: 948 K/UL — HIGH (ref 150–400)
POTASSIUM SERPL-MCNC: 5.2 MMOL/L — SIGNIFICANT CHANGE UP (ref 3.5–5.3)
POTASSIUM SERPL-MCNC: 5.2 MMOL/L — SIGNIFICANT CHANGE UP (ref 3.5–5.3)
POTASSIUM SERPL-SCNC: 5.2 MMOL/L — SIGNIFICANT CHANGE UP (ref 3.5–5.3)
POTASSIUM SERPL-SCNC: 5.2 MMOL/L — SIGNIFICANT CHANGE UP (ref 3.5–5.3)
RBC # BLD: 2.99 M/UL — LOW (ref 4.2–5.8)
RBC # BLD: 2.99 M/UL — LOW (ref 4.2–5.8)
RBC # FLD: 17.3 % — HIGH (ref 10.3–14.5)
RBC # FLD: 17.3 % — HIGH (ref 10.3–14.5)
SODIUM SERPL-SCNC: 138 MMOL/L — SIGNIFICANT CHANGE UP (ref 135–145)
SODIUM SERPL-SCNC: 138 MMOL/L — SIGNIFICANT CHANGE UP (ref 135–145)
TIBC SERPL-MCNC: 299 UG/DL — SIGNIFICANT CHANGE UP (ref 220–430)
TIBC SERPL-MCNC: 299 UG/DL — SIGNIFICANT CHANGE UP (ref 220–430)
TRANSFERRIN SERPL-MCNC: 209 MG/DL — SIGNIFICANT CHANGE UP (ref 180–329)
TRANSFERRIN SERPL-MCNC: 209 MG/DL — SIGNIFICANT CHANGE UP (ref 180–329)
VANCOMYCIN TROUGH SERPL-MCNC: 16 UG/ML — SIGNIFICANT CHANGE UP (ref 10–20)
VANCOMYCIN TROUGH SERPL-MCNC: 16 UG/ML — SIGNIFICANT CHANGE UP (ref 10–20)
VIT B12 SERPL-MCNC: 777 PG/ML — SIGNIFICANT CHANGE UP (ref 232–1245)
VIT B12 SERPL-MCNC: 777 PG/ML — SIGNIFICANT CHANGE UP (ref 232–1245)
WBC # BLD: 12.26 K/UL — HIGH (ref 3.8–10.5)
WBC # BLD: 12.26 K/UL — HIGH (ref 3.8–10.5)
WBC # FLD AUTO: 12.26 K/UL — HIGH (ref 3.8–10.5)
WBC # FLD AUTO: 12.26 K/UL — HIGH (ref 3.8–10.5)

## 2023-12-26 PROCEDURE — 71045 X-RAY EXAM CHEST 1 VIEW: CPT | Mod: 26

## 2023-12-26 PROCEDURE — 99233 SBSQ HOSP IP/OBS HIGH 50: CPT

## 2023-12-26 RX ORDER — ALTEPLASE 100 MG
2 KIT INTRAVENOUS ONCE
Refills: 0 | Status: COMPLETED | OUTPATIENT
Start: 2023-12-26 | End: 2023-12-26

## 2023-12-26 RX ORDER — TRAMADOL HYDROCHLORIDE 50 MG/1
50 TABLET ORAL EVERY 8 HOURS
Refills: 0 | Status: DISCONTINUED | OUTPATIENT
Start: 2023-12-26 | End: 2023-12-30

## 2023-12-26 RX ORDER — TRAMADOL HYDROCHLORIDE 50 MG/1
25 TABLET ORAL EVERY 6 HOURS
Refills: 0 | Status: DISCONTINUED | OUTPATIENT
Start: 2023-12-26 | End: 2023-12-30

## 2023-12-26 RX ADMIN — METHOCARBAMOL 1000 MILLIGRAM(S): 500 TABLET, FILM COATED ORAL at 14:18

## 2023-12-26 RX ADMIN — Medication 60 MILLIGRAM(S): at 22:17

## 2023-12-26 RX ADMIN — TRAMADOL HYDROCHLORIDE 50 MILLIGRAM(S): 50 TABLET ORAL at 18:44

## 2023-12-26 RX ADMIN — Medication 3 MILLILITER(S): at 09:01

## 2023-12-26 RX ADMIN — Medication 100 MILLIGRAM(S): at 11:05

## 2023-12-26 RX ADMIN — ENOXAPARIN SODIUM 40 MILLIGRAM(S): 100 INJECTION SUBCUTANEOUS at 05:33

## 2023-12-26 RX ADMIN — Medication 1 PATCH: at 04:25

## 2023-12-26 RX ADMIN — Medication 60 MILLIGRAM(S): at 14:17

## 2023-12-26 RX ADMIN — CEFEPIME 2000 MILLIGRAM(S): 1 INJECTION, POWDER, FOR SOLUTION INTRAMUSCULAR; INTRAVENOUS at 22:17

## 2023-12-26 RX ADMIN — TRAMADOL HYDROCHLORIDE 50 MILLIGRAM(S): 50 TABLET ORAL at 11:05

## 2023-12-26 RX ADMIN — Medication 25 MILLIGRAM(S): at 18:41

## 2023-12-26 RX ADMIN — Medication 0.5 MILLIGRAM(S): at 11:05

## 2023-12-26 RX ADMIN — METHOCARBAMOL 1000 MILLIGRAM(S): 500 TABLET, FILM COATED ORAL at 22:18

## 2023-12-26 RX ADMIN — METHOCARBAMOL 1000 MILLIGRAM(S): 500 TABLET, FILM COATED ORAL at 05:34

## 2023-12-26 RX ADMIN — Medication 1 TABLET(S): at 14:31

## 2023-12-26 RX ADMIN — DIVALPROEX SODIUM 500 MILLIGRAM(S): 500 TABLET, DELAYED RELEASE ORAL at 05:34

## 2023-12-26 RX ADMIN — Medication 1 PATCH: at 11:05

## 2023-12-26 RX ADMIN — Medication 75 MILLIGRAM(S): at 18:41

## 2023-12-26 RX ADMIN — AMLODIPINE BESYLATE 5 MILLIGRAM(S): 2.5 TABLET ORAL at 05:34

## 2023-12-26 RX ADMIN — ALTEPLASE 2 MILLIGRAM(S): KIT at 18:51

## 2023-12-26 RX ADMIN — Medication 2 MILLIGRAM(S): at 05:33

## 2023-12-26 RX ADMIN — Medication 81 MILLIGRAM(S): at 11:05

## 2023-12-26 RX ADMIN — QUETIAPINE FUMARATE 125 MILLIGRAM(S): 200 TABLET, FILM COATED ORAL at 22:17

## 2023-12-26 RX ADMIN — PANTOPRAZOLE SODIUM 40 MILLIGRAM(S): 20 TABLET, DELAYED RELEASE ORAL at 05:34

## 2023-12-26 RX ADMIN — Medication 250 MILLIGRAM(S): at 05:35

## 2023-12-26 RX ADMIN — CEFEPIME 2000 MILLIGRAM(S): 1 INJECTION, POWDER, FOR SOLUTION INTRAMUSCULAR; INTRAVENOUS at 05:33

## 2023-12-26 RX ADMIN — TRAMADOL HYDROCHLORIDE 50 MILLIGRAM(S): 50 TABLET ORAL at 12:00

## 2023-12-26 RX ADMIN — CEFEPIME 2000 MILLIGRAM(S): 1 INJECTION, POWDER, FOR SOLUTION INTRAMUSCULAR; INTRAVENOUS at 14:31

## 2023-12-26 RX ADMIN — DIVALPROEX SODIUM 500 MILLIGRAM(S): 500 TABLET, DELAYED RELEASE ORAL at 18:41

## 2023-12-26 RX ADMIN — Medication 3 MILLILITER(S): at 01:26

## 2023-12-26 RX ADMIN — Medication 2 MILLIGRAM(S): at 18:41

## 2023-12-26 RX ADMIN — Medication 3 MILLILITER(S): at 21:08

## 2023-12-26 RX ADMIN — Medication 75 MILLIGRAM(S): at 05:33

## 2023-12-26 RX ADMIN — Medication 3 MILLILITER(S): at 14:17

## 2023-12-26 RX ADMIN — GABAPENTIN 100 MILLIGRAM(S): 400 CAPSULE ORAL at 22:17

## 2023-12-26 RX ADMIN — Medication 60 MILLIGRAM(S): at 05:33

## 2023-12-26 RX ADMIN — Medication 1 MILLIGRAM(S): at 11:05

## 2023-12-26 RX ADMIN — Medication 250 MILLIGRAM(S): at 18:56

## 2023-12-26 RX ADMIN — Medication 25 MILLIGRAM(S): at 05:34

## 2023-12-26 NOTE — CHART NOTE - NSCHARTNOTEFT_GEN_A_CORE
Cd. to evaluate LUE picc line placed at [facility] this month.  Chest X-Ray performed and distal tip is in appropriate position.  Line flushed with good heme return.  Will order cath aniket since the line is a little sluggish as a result of leaving the valve open with reverse migration of blood into the catheter.   Dressing C/ D /I . May continue to use lue picc line for intravenous therapy. Cd. to evaluate LUE picc line placed at [facility] this month.  Chest X-Ray performed and distal tip is in appropriate position.  Line flushed with good heme return.  Will order cath aniket since the line is a little sluggish as a result of leaving the valve open with reverse migration of blood into the catheter.   Dressing C/ D /I . May continue to use lue picc line for intravenous therapy.

## 2023-12-26 NOTE — CONSULT NOTE ADULT - ASSESSMENT
58M  recently discharged following rib fx  here with flu A and copd exercerbation    med recs:  traMADol 50 milliGRAM(s) Oral every 6 hours PRN sev pain   traMADol 25 milliGRAM(s) Oral every 6 hours PRN mod pain

## 2023-12-26 NOTE — CONSULT NOTE ADULT - SUBJECTIVE AND OBJECTIVE BOX
Patient is a 58y old  Male who presents with a chief complaint of copd exacerbation/flu positive (25 Dec 2023 12:33)      HPI:  57 yo male, extensive comorbid conditions recently discharged from [facility] after prolonged hospital course in setting of MRSA Bacteremia and septic thrombophlebitis who was at [facility] who returns back to [facility] due to worsening symptoms of cough, fever and increased difficulty breathing. Patient says that for last few days in facility he was having episodes of fevers, diaphoresis. Says that he had not headaches, blurry vision, chest pain, palpitations, abdominal pain, n/v/d, dysuria, hematuria. Says he has not really been ambulatory since discharge to [facility]. Says he does not use O2 at home at baseline for copd.     In ED: CTA Chest w/o PE and noted left pleural effusion clearing with improvement in RLL consolidation compared to prior studies. Patient given solumedrol load 125, multiple duonebs, abx via picc line for prior infection Vanco/Cefepime. Admit to medicine called when O2 dropped to 87% on room air. Flu positive.  (25 Dec 2023 05:16)            Analgesic Dosing for past 24 hours reviewed as below:    benztropine   2 milliGRAM(s) Oral (12-26-23 @ 05:33)   2 milliGRAM(s) Oral (12-25-23 @ 17:33)    clonazePAM  Tablet   0.5 milliGRAM(s) Oral (12-26-23 @ 11:05)    divalproex DR   500 milliGRAM(s) Oral (12-26-23 @ 05:34)   500 milliGRAM(s) Oral (12-25-23 @ 17:33)    gabapentin   100 milliGRAM(s) Oral (12-25-23 @ 21:50)    hydrOXYzine hydrochloride   25 milliGRAM(s) Oral (12-26-23 @ 05:34)   25 milliGRAM(s) Oral (12-25-23 @ 17:33)    methocarbamol   1000 milliGRAM(s) Oral (12-26-23 @ 05:34)   1000 milliGRAM(s) Oral (12-25-23 @ 21:50)    QUEtiapine   125 milliGRAM(s) Oral (12-25-23 @ 21:50)    traMADol   25 milliGRAM(s) Oral (12-25-23 @ 21:49)    traMADol   50 milliGRAM(s) Oral (12-26-23 @ 11:05)          T(C): 36.8 (12-26-23 @ 11:17), Max: 37 (12-25-23 @ 18:45)  HR: 78 (12-26-23 @ 11:17) (77 - 94)  BP: 126/82 (12-26-23 @ 11:17) (106/68 - 149/72)  RR: 18 (12-26-23 @ 11:17) (18 - 20)  SpO2: 98% (12-26-23 @ 11:17) (93% - 100%)      12-25-23 @ 07:01  -  12-26-23 @ 07:00  --------------------------------------------------------  IN: 0 mL / OUT: 250 mL / NET: -250 mL        acetaminophen     Tablet .. 650 milliGRAM(s) Oral every 6 hours PRN  albuterol/ipratropium for Nebulization 3 milliLiter(s) Nebulizer every 6 hours  alteplase for catheter clearance 2 milliGRAM(s) Catheter once  amLODIPine   Tablet 5 milliGRAM(s) Oral daily  aspirin enteric coated 81 milliGRAM(s) Oral daily  benztropine 2 milliGRAM(s) Oral two times a day  calcium carbonate    500 mG (Tums) Chewable 1 Tablet(s) Chew every 6 hours PRN  cefepime  Injectable.      cefepime  Injectable. 2000 milliGRAM(s) IV Push every 8 hours  clonazePAM  Tablet 0.5 milliGRAM(s) Oral daily  divalproex  milliGRAM(s) Oral two times a day  enoxaparin Injectable 40 milliGRAM(s) SubCutaneous every 24 hours  folic acid 1 milliGRAM(s) Oral daily  gabapentin 100 milliGRAM(s) Oral at bedtime  hydrOXYzine hydrochloride 25 milliGRAM(s) Oral two times a day  methocarbamol 1000 milliGRAM(s) Oral every 8 hours  methylPREDNISolone sodium succinate Injectable 60 milliGRAM(s) IV Push every 8 hours  nicotine - 21 mG/24Hr(s) Patch 1 Patch Transdermal daily  oseltamivir 75 milliGRAM(s) Oral two times a day  pantoprazole    Tablet 40 milliGRAM(s) Oral before breakfast  polyethylene glycol 3350 17 Gram(s) Oral at bedtime  QUEtiapine 125 milliGRAM(s) Oral at bedtime  senna 2 Tablet(s) Oral at bedtime  thiamine 100 milliGRAM(s) Oral daily  traMADol 50 milliGRAM(s) Oral every 8 hours PRN  traMADol 25 milliGRAM(s) Oral every 6 hours PRN  vancomycin  IVPB. 1250 milliGRAM(s) IV Intermittent every 12 hours                          7.8    12.26 )-----------( 948      ( 26 Dec 2023 07:25 )             26.4     12-26    138  |  96  |  26.2<H>  ----------------------------<  158<H>  5.2   |  33.0<H>  |  0.59    Ca    9.2      26 Dec 2023 07:25    TPro  8.0  /  Alb  3.3  /  TBili  <0.2<L>  /  DBili  x   /  AST  18  /  ALT  18  /  AlkPhos  127<H>  12-24      Urinalysis Basic - ( 26 Dec 2023 07:25 )    Color: x / Appearance: x / SG: x / pH: x  Gluc: 158 mg/dL / Ketone: x  / Bili: x / Urobili: x   Blood: x / Protein: x / Nitrite: x   Leuk Esterase: x / RBC: x / WBC x   Sq Epi: x / Non Sq Epi: x / Bacteria: x        Pain Service   [phone] Patient is a 58y old  Male who presents with a chief complaint of copd exacerbation/flu positive (25 Dec 2023 12:33)      HPI:  59 yo male, extensive comorbid conditions recently discharged from [facility] after prolonged hospital course in setting of MRSA Bacteremia and septic thrombophlebitis who was at [facility] who returns back to [facility] due to worsening symptoms of cough, fever and increased difficulty breathing. Patient says that for last few days in facility he was having episodes of fevers, diaphoresis. Says that he had not headaches, blurry vision, chest pain, palpitations, abdominal pain, n/v/d, dysuria, hematuria. Says he has not really been ambulatory since discharge to [facility]. Says he does not use O2 at home at baseline for copd.     In ED: CTA Chest w/o PE and noted left pleural effusion clearing with improvement in RLL consolidation compared to prior studies. Patient given solumedrol load 125, multiple duonebs, abx via picc line for prior infection Vanco/Cefepime. Admit to medicine called when O2 dropped to 87% on room air. Flu positive.  (25 Dec 2023 05:16)            Analgesic Dosing for past 24 hours reviewed as below:    benztropine   2 milliGRAM(s) Oral (12-26-23 @ 05:33)   2 milliGRAM(s) Oral (12-25-23 @ 17:33)    clonazePAM  Tablet   0.5 milliGRAM(s) Oral (12-26-23 @ 11:05)    divalproex DR   500 milliGRAM(s) Oral (12-26-23 @ 05:34)   500 milliGRAM(s) Oral (12-25-23 @ 17:33)    gabapentin   100 milliGRAM(s) Oral (12-25-23 @ 21:50)    hydrOXYzine hydrochloride   25 milliGRAM(s) Oral (12-26-23 @ 05:34)   25 milliGRAM(s) Oral (12-25-23 @ 17:33)    methocarbamol   1000 milliGRAM(s) Oral (12-26-23 @ 05:34)   1000 milliGRAM(s) Oral (12-25-23 @ 21:50)    QUEtiapine   125 milliGRAM(s) Oral (12-25-23 @ 21:50)    traMADol   25 milliGRAM(s) Oral (12-25-23 @ 21:49)    traMADol   50 milliGRAM(s) Oral (12-26-23 @ 11:05)          T(C): 36.8 (12-26-23 @ 11:17), Max: 37 (12-25-23 @ 18:45)  HR: 78 (12-26-23 @ 11:17) (77 - 94)  BP: 126/82 (12-26-23 @ 11:17) (106/68 - 149/72)  RR: 18 (12-26-23 @ 11:17) (18 - 20)  SpO2: 98% (12-26-23 @ 11:17) (93% - 100%)      12-25-23 @ 07:01  -  12-26-23 @ 07:00  --------------------------------------------------------  IN: 0 mL / OUT: 250 mL / NET: -250 mL        acetaminophen     Tablet .. 650 milliGRAM(s) Oral every 6 hours PRN  albuterol/ipratropium for Nebulization 3 milliLiter(s) Nebulizer every 6 hours  alteplase for catheter clearance 2 milliGRAM(s) Catheter once  amLODIPine   Tablet 5 milliGRAM(s) Oral daily  aspirin enteric coated 81 milliGRAM(s) Oral daily  benztropine 2 milliGRAM(s) Oral two times a day  calcium carbonate    500 mG (Tums) Chewable 1 Tablet(s) Chew every 6 hours PRN  cefepime  Injectable.      cefepime  Injectable. 2000 milliGRAM(s) IV Push every 8 hours  clonazePAM  Tablet 0.5 milliGRAM(s) Oral daily  divalproex  milliGRAM(s) Oral two times a day  enoxaparin Injectable 40 milliGRAM(s) SubCutaneous every 24 hours  folic acid 1 milliGRAM(s) Oral daily  gabapentin 100 milliGRAM(s) Oral at bedtime  hydrOXYzine hydrochloride 25 milliGRAM(s) Oral two times a day  methocarbamol 1000 milliGRAM(s) Oral every 8 hours  methylPREDNISolone sodium succinate Injectable 60 milliGRAM(s) IV Push every 8 hours  nicotine - 21 mG/24Hr(s) Patch 1 Patch Transdermal daily  oseltamivir 75 milliGRAM(s) Oral two times a day  pantoprazole    Tablet 40 milliGRAM(s) Oral before breakfast  polyethylene glycol 3350 17 Gram(s) Oral at bedtime  QUEtiapine 125 milliGRAM(s) Oral at bedtime  senna 2 Tablet(s) Oral at bedtime  thiamine 100 milliGRAM(s) Oral daily  traMADol 50 milliGRAM(s) Oral every 8 hours PRN  traMADol 25 milliGRAM(s) Oral every 6 hours PRN  vancomycin  IVPB. 1250 milliGRAM(s) IV Intermittent every 12 hours                          7.8    12.26 )-----------( 948      ( 26 Dec 2023 07:25 )             26.4     12-26    138  |  96  |  26.2<H>  ----------------------------<  158<H>  5.2   |  33.0<H>  |  0.59    Ca    9.2      26 Dec 2023 07:25    TPro  8.0  /  Alb  3.3  /  TBili  <0.2<L>  /  DBili  x   /  AST  18  /  ALT  18  /  AlkPhos  127<H>  12-24      Urinalysis Basic - ( 26 Dec 2023 07:25 )    Color: x / Appearance: x / SG: x / pH: x  Gluc: 158 mg/dL / Ketone: x  / Bili: x / Urobili: x   Blood: x / Protein: x / Nitrite: x   Leuk Esterase: x / RBC: x / WBC x   Sq Epi: x / Non Sq Epi: x / Bacteria: x        Pain Service   [phone]

## 2023-12-26 NOTE — CHART NOTE - NSCHARTNOTEFT_GEN_A_CORE
Cathflo aspirated back with good blood return.  10cc normal saline flushed without issue.  RN aware.

## 2023-12-26 NOTE — CHART NOTE - NSCHARTNOTESELECT_GEN_ALL_CORE
Event Note
Post-Midnight Follow up Note/Event Note
ACCESS [facility] P                                                   A/Event Note
Event Note

## 2023-12-26 NOTE — CHART NOTE - NSCHARTNOTEFT_GEN_A_CORE
consult received     past hist of right upper ext access related  thrombophlebitis, s/p I/D, negative pressure management  labs reviewed    -- will see patient during AM rounds for likely vac change

## 2023-12-26 NOTE — PROGRESS NOTE ADULT - ASSESSMENT
57 yo male, extensive comorbid conditions recently discharged from [facility] after prolonged hospital course in setting of MRSA Bacteremia and septic thrombophlebitis, hx of COPD, presenting for cough, diaphoresis, hypoxia in setting of testing Flu positive now admitted for COPD exacerbation.     #) COPD Exacerbation complicated by Hypoxia   Flu positive on RVP  tamiflu started 75mg twice daily   duonebs q6h prn   solumedrol 60mg q8h   currently on 3lpm o2 went hypoxic to 87% on room air, goal for SPO2 88-92%    #) MRSA bacteremia  reviewed ID consult from prior admission  - c/w cefepime 2 g iv q8h  - c/w vancomycin 1250mg iv q12h  - monitor trough and adjust per pharmacy to avoid drug toxicity  - will plan 6 weeks abx  end 1/25/24      #) HTN  c/w norvasc 5mg daily    #) Schizoaffective complicated by tardive dyskinesia   c/w all home meds  depakote, klonopin, cogentin, neurontin, seroquel     Plan of care discussed with patient, RN      57 yo male, extensive comorbid conditions recently discharged from [facility] after prolonged hospital course in setting of MRSA Bacteremia and septic thrombophlebitis, hx of COPD, presenting for cough, diaphoresis, hypoxia in setting of testing Flu positive now admitted for COPD exacerbation.     #) COPD Exacerbation complicated by Hypoxia   Flu positive on RVP  tamiflu started 75mg twice daily   duonebs q6h prn   solumedrol 60mg q8h   currently on 3lpm o2 went hypoxic to 87% on room air, goal for SPO2 88-92%    #) MRSA bacteremia  reviewed ID consult from prior admission  - c/w cefepime 2 g iv q8h  - c/w vancomycin 1250mg iv q12h  - monitor trough and adjust per pharmacy to avoid drug toxicity  - will plan 6 weeks abx  end 1/25/24      #) HTN  c/w norvasc 5mg daily    #) Schizoaffective complicated by tardive dyskinesia   c/w all home meds  depakote, klonopin, cogentin, neurontin, seroquel     Plan of care discussed with patient, RN      59 yo male, extensive comorbid conditions recently discharged from [facility] after prolonged hospital course in setting of MRSA Bacteremia and septic thrombophlebitis, hx of COPD, presenting for cough, diaphoresis, hypoxia in setting of testing Flu positive now admitted for COPD exacerbation.       # Acute Respiratory Failure, mixed. Respiratory acidosis resolved,   # COPD exacerbation  # Influenza AH1 infection  RVP (+) for Influenza AH1  CTA chest without any PE; improving lung process.  Hypoxic requiring 4LNC  -   - Supplemental O2, wean as tolerated, target SpO2 88-94%  - Oseltamivir 75mg twice daily   - Methylprednisolone  60mg IV q8h   - Albuterol/Ipratropium nebulizers q6    # Thrombocytosis, reactive, finally peaked, now downtrending. Normal Platelet count 2 weeks ago  # Anemia, normocytic  PRBC x 1 on 12/25/23  Low Iron. Adequate B12, Folate. Elevated Haptoglobin, LDH  - Monitor CBC  - Consider IV iron if no bacteremia  - Transfuse if <7 or symptoms    # Chronic Back Pain  - Tramadol 25mg/50mg PRN for moderate to severe pain  - Methocarbamol 1000mg Q8  - Gabapentin 100mg QHS  - Pain management consulted    # MRSA bacteremia  # Septic Thrombophlebitis  - Cefepime 2 g iv q8h  - Vancomycin 1250mg iv q12h  - monitor trough and adjust per pharmacy to avoid drug toxicity  - 6 weeks duration ending 1/25/24    - Vascular Surgery for wound vac.    # HTN  - [id number] daily    # Schizoaffective complicated by tardive dyskinesia   - Dilvalproex, Clonazepam, Quetiapine and Gabapentin continued  - Benztropine for tardive dyskinesia      VTE Prophylaxis  - LMWH    Disposition: Clinically acute; Likely return to SNF upon stabilization   59 yo male, extensive comorbid conditions recently discharged from [facility] after prolonged hospital course in setting of MRSA Bacteremia and septic thrombophlebitis, hx of COPD, presenting for cough, diaphoresis, hypoxia in setting of testing Flu positive now admitted for COPD exacerbation.       # Acute Respiratory Failure, mixed. Respiratory acidosis resolved,   # COPD exacerbation  # Influenza AH1 infection  RVP (+) for Influenza AH1  CTA chest without any PE; improving lung process.  Hypoxic requiring 4LNC  -   - Supplemental O2, wean as tolerated, target SpO2 88-94%  - Oseltamivir 75mg twice daily   - Methylprednisolone  60mg IV q8h   - Albuterol/Ipratropium nebulizers q6    # Thrombocytosis, reactive, finally peaked, now downtrending. Normal Platelet count 2 weeks ago  # Anemia, normocytic  PRBC x 1 on 12/25/23  Low Iron. Adequate B12, Folate. Elevated Haptoglobin, LDH  - Monitor CBC  - Consider IV iron if no bacteremia  - Transfuse if <7 or symptoms    # Chronic Back Pain  - Tramadol 25mg/50mg PRN for moderate to severe pain  - Methocarbamol 1000mg Q8  - Gabapentin 100mg QHS  - Pain management consulted    # MRSA bacteremia  # Septic Thrombophlebitis  - Cefepime 2 g iv q8h  - Vancomycin 1250mg iv q12h  - monitor trough and adjust per pharmacy to avoid drug toxicity  - 6 weeks duration ending 1/25/24    - Vascular Surgery for wound vac.    # HTN  - [id number] daily    # Schizoaffective complicated by tardive dyskinesia   - Dilvalproex, Clonazepam, Quetiapine and Gabapentin continued  - Benztropine for tardive dyskinesia      VTE Prophylaxis  - LMWH    Disposition: Clinically acute; Likely return to SNF upon stabilization

## 2023-12-26 NOTE — CHART NOTE - NSCHARTNOTEFT_GEN_A_CORE
Vascular Access team assessed PICC bedside and confirmed appropriate functionality and position with CXR.  However, given that blood return is a little sluggish Vascular access team ordered cathflo to be administered. Provider notified by CONY Giordano that cathflo was bedside. Verbal consent for the procedure obtained from the patient bedside. Per protocol Medicine PA reconstituted cathflo in sterile manner and placed cathflo into PICC in a sterile fashion for a dwell time of 30-60minutes at 15:20. Provider advised RN PICC line will be reassessed after this time.     -Vascular access team aware and following patient  -RN to continue to monitor  -RN to call provider with any acute changes / questions /concerns      The above patient and plan was discussed at length with CONY Giordano. Will sign out to Night Medicine provider.

## 2023-12-26 NOTE — CHART NOTE - NSCHARTNOTEFT_GEN_A_CORE
Medicine PA attempted to call Vascular Surgery consult, however, OR RN answered the phone advising the team is presently scrubbed in and will call back to get consult information.  Of note vascular surgery consult is necessary given the patient has a hx of infection/abscess to the right forearm, s/p debridement and placement of wound vac during last admission.  On last admission patient was discharged on 12/20/23 and now presents for COPD exacerbation superimposed with Flu A, however, right forearm wound vac still in place from last admission and patient requires assessment by Vascular Surgery team.

## 2023-12-26 NOTE — PROGRESS NOTE ADULT - SUBJECTIVE AND OBJECTIVE BOX
HOSPITALIST PROGRESS NOTE    NOHEMY BRAN  [id number]  58yMale    Patient is a 58y old  Male who presents with a chief complaint of copd exacerbation/flu positive (26 Dec 2023 13:24)      SUBJECTIVE:   Chart reviewed since last visit.  Patient seen and examined at bedside.      OBJECTIVE:  Vital Signs Last 24 Hrs  T(C): 36.8 (26 Dec 2023 16:15), Max: 37 (25 Dec 2023 18:45)  T(F): 98.2 (26 Dec 2023 16:15), Max: 98.6 (25 Dec 2023 18:45)  HR: 89 (26 Dec 2023 16:15) (77 - 92)  BP: 125/82 (26 Dec 2023 16:15) (106/68 - 149/72)  BP(mean): --  RR: 18 (26 Dec 2023 16:15) (18 - 19)  SpO2: 93% (26 Dec 2023 16:15) (93% - 100%)    Parameters below as of 26 Dec 2023 16:15  Patient On (Oxygen Delivery Method): nasal cannula  O2 Flow (L/min): 4      PHYSICAL EXAMINATION  General:   HEENT:    NECK:    CVS:   RESP:    GI:    :   MSK:    CNS:    INTEG:    PSYCH:      MONITOR:  CAPILLARY BLOOD GLUCOSE            I&O's Summary    25 Dec 2023 07:01  -  26 Dec 2023 07:00  --------------------------------------------------------  IN: 0 mL / OUT: 250 mL / NET: -250 mL                            7.8    12.26 )-----------( 948      ( 26 Dec 2023 07:25 )             26.4       12-26    138  |  96  |  26.2<H>  ----------------------------<  158<H>  5.2   |  33.0<H>  |  0.59    Ca    9.2      26 Dec 2023 07:25          Urinalysis Basic - ( 26 Dec 2023 07:25 )    Color: x / Appearance: x / SG: x / pH: x  Gluc: 158 mg/dL / Ketone: x  / Bili: x / Urobili: x   Blood: x / Protein: x / Nitrite: x   Leuk Esterase: x / RBC: x / WBC x   Sq Epi: x / Non Sq Epi: x / Bacteria: x        Culture:    TTE:    RADIOLOGY        MEDICATIONS  (STANDING):  albuterol/ipratropium for Nebulization 3 milliLiter(s) Nebulizer every 6 hours  alteplase for catheter clearance 2 milliGRAM(s) Catheter once  amLODIPine   Tablet 5 milliGRAM(s) Oral daily  aspirin enteric coated 81 milliGRAM(s) Oral daily  benztropine 2 milliGRAM(s) Oral two times a day  cefepime  Injectable.      cefepime  Injectable. 2000 milliGRAM(s) IV Push every 8 hours  clonazePAM  Tablet 0.5 milliGRAM(s) Oral daily  divalproex  milliGRAM(s) Oral two times a day  enoxaparin Injectable 40 milliGRAM(s) SubCutaneous every 24 hours  folic acid 1 milliGRAM(s) Oral daily  gabapentin 100 milliGRAM(s) Oral at bedtime  hydrOXYzine hydrochloride 25 milliGRAM(s) Oral two times a day  methocarbamol 1000 milliGRAM(s) Oral every 8 hours  methylPREDNISolone sodium succinate Injectable 60 milliGRAM(s) IV Push every 8 hours  nicotine - 21 mG/24Hr(s) Patch 1 Patch Transdermal daily  oseltamivir 75 milliGRAM(s) Oral two times a day  pantoprazole    Tablet 40 milliGRAM(s) Oral before breakfast  polyethylene glycol 3350 17 Gram(s) Oral at bedtime  QUEtiapine 125 milliGRAM(s) Oral at bedtime  senna 2 Tablet(s) Oral at bedtime  thiamine 100 milliGRAM(s) Oral daily  vancomycin  IVPB. 1250 milliGRAM(s) IV Intermittent every 12 hours      MEDICATIONS  (PRN):  acetaminophen     Tablet .. 650 milliGRAM(s) Oral every 6 hours PRN Temp greater or equal to 38C (100.4F), Mild Pain (1 - 3)  calcium carbonate    500 mG (Tums) Chewable 1 Tablet(s) Chew every 6 hours PRN Heartburn  traMADol 50 milliGRAM(s) Oral every 8 hours PRN Severe Pain (7 - 10)  traMADol 25 milliGRAM(s) Oral every 6 hours PRN Moderate Pain (4 - 6)     HOSPITALIST PROGRESS NOTE    NOHEMY BRAN  [id number]  58yMale    Patient is a 58y old  Male who presents with a chief complaint of copd exacerbation/flu positive (26 Dec 2023 13:24)      SUBJECTIVE:   Chart reviewed since last visit.  Patient seen and examined at bedside.      OBJECTIVE:  Vital Signs Last 24 Hrs  T(C): 36.8 (26 Dec 2023 16:15), Max: 37 (25 Dec 2023 18:45)  T(F): 98.2 (26 Dec 2023 16:15), Max: 98.6 (25 Dec 2023 18:45)  HR: 89 (26 Dec 2023 16:15) (77 - 92)  BP: 125/82 (26 Dec 2023 16:15) (106/68 - 149/72)  BP(mean): --  RR: 18 (26 Dec 2023 16:15) (18 - 19)  SpO2: 93% (26 Dec 2023 16:15) (93% - 100%)    Parameters below as of 26 Dec 2023 16:15  Patient On (Oxygen Delivery Method): nasal cannula  O2 Flow (L/min): 4      PHYSICAL EXAMINATION  General:   HEENT:    NECK:    CVS:   RESP:    GI:    :   MSK:    CNS:    INTEG:    PSYCH:      MONITOR:  CAPILLARY BLOOD GLUCOSE            I&O's Summary    25 Dec 2023 07:01  -  26 Dec 2023 07:00  --------------------------------------------------------  IN: 0 mL / OUT: 250 mL / NET: -250 mL                            7.8    12.26 )-----------( 948      ( 26 Dec 2023 07:25 )             26.4       12-26    138  |  96  |  26.2<H>  ----------------------------<  158<H>  5.2   |  33.0<H>  |  0.59    Ca    9.2      26 Dec 2023 07:25          Urinalysis Basic - ( 26 Dec 2023 07:25 )    Color: x / Appearance: x / SG: x / pH: x  Gluc: 158 mg/dL / Ketone: x  / Bili: x / Urobili: x   Blood: x / Protein: x / Nitrite: x   Leuk Esterase: x / RBC: x / WBC x   Sq Epi: x / Non Sq Epi: x / Bacteria: x        Culture:    TTE:    RADIOLOGY        MEDICATIONS  (STANDING):  albuterol/ipratropium for Nebulization 3 milliLiter(s) Nebulizer every 6 hours  alteplase for catheter clearance 2 milliGRAM(s) Catheter once  amLODIPine   Tablet 5 milliGRAM(s) Oral daily  aspirin enteric coated 81 milliGRAM(s) Oral daily  benztropine 2 milliGRAM(s) Oral two times a day  cefepime  Injectable.      cefepime  Injectable. 2000 milliGRAM(s) IV Push every 8 hours  clonazePAM  Tablet 0.5 milliGRAM(s) Oral daily  divalproex  milliGRAM(s) Oral two times a day  enoxaparin Injectable 40 milliGRAM(s) SubCutaneous every 24 hours  folic acid 1 milliGRAM(s) Oral daily  gabapentin 100 milliGRAM(s) Oral at bedtime  hydrOXYzine hydrochloride 25 milliGRAM(s) Oral two times a day  methocarbamol 1000 milliGRAM(s) Oral every 8 hours  methylPREDNISolone sodium succinate Injectable 60 milliGRAM(s) IV Push every 8 hours  nicotine - 21 mG/24Hr(s) Patch 1 Patch Transdermal daily  oseltamivir 75 milliGRAM(s) Oral two times a day  pantoprazole    Tablet 40 milliGRAM(s) Oral before breakfast  polyethylene glycol 3350 17 Gram(s) Oral at bedtime  QUEtiapine 125 milliGRAM(s) Oral at bedtime  senna 2 Tablet(s) Oral at bedtime  thiamine 100 milliGRAM(s) Oral daily  vancomycin  IVPB. 1250 milliGRAM(s) IV Intermittent every 12 hours      MEDICATIONS  (PRN):  acetaminophen     Tablet .. 650 milliGRAM(s) Oral every 6 hours PRN Temp greater or equal to 38C (100.4F), Mild Pain (1 - 3)  calcium carbonate    500 mG (Tums) Chewable 1 Tablet(s) Chew every 6 hours PRN Heartburn  traMADol 50 milliGRAM(s) Oral every 8 hours PRN Severe Pain (7 - 10)  traMADol 25 milliGRAM(s) Oral every 6 hours PRN Moderate Pain (4 - 6)     HOSPITALIST PROGRESS NOTE    NOHEMY BRAN  [id number]  58yMale    Patient is a 58y old  Male who presents with a chief complaint of copd exacerbation/flu positive (26 Dec 2023 13:24)      SUBJECTIVE:   Chart reviewed since last visit.  Patient seen and examined at bedside for respiratory failure, Influenza A infection, COPD exacerbation, thrombocytosis, anemia and MRSA bacteremia.    Breathing is better. Denies any chills.  Complaining of back pain      OBJECTIVE:  Vital Signs Last 24 Hrs  T(C): 36.8 (26 Dec 2023 16:15), Max: 37 (25 Dec 2023 18:45)  T(F): 98.2 (26 Dec 2023 16:15), Max: 98.6 (25 Dec 2023 18:45)  HR: 89 (26 Dec 2023 16:15) (77 - 92)  BP: 125/82 (26 Dec 2023 16:15) (106/68 - 149/72)   RR: 18 (26 Dec 2023 16:15) (18 - 19)  SpO2: 93% (26 Dec 2023 16:15) (93% - 100%)    Parameters below as of 26 Dec 2023 16:15  Patient On (Oxygen Delivery Method): nasal cannula  O2 Flow (L/min): 4      PHYSICAL EXAMINATION  General: Middle aged male lying on stretcher, comfortable  HEENT:  4LNC  NECK:  Supple  CVS: regular rate and rhythm S1 S2  RESP:  No wheezing or crackles  GI:  Soft nondistended nontender BS+  : No suprapubic tenderness  MSK:  FROM. LUE PICC(+) RUE Wound with wound vacuum  CNS:  Aake, alert, oriented, Fluent speech, follows commands  INTEG:  RUE Wound with vac, LUE PICC  PSYCH:  Fair mood    MONITOR:  CAPILLARY BLOOD GLUCOSE            I&O's Summary    25 Dec 2023 07:01  -  26 Dec 2023 07:00  --------------------------------------------------------  IN: 0 mL / OUT: 250 mL / NET: -250 mL                            7.8    12.26 )-----------( 948      ( 26 Dec 2023 07:25 )             26.4       12-26    138  |  96  |  26.2<H>  ----------------------------<  158<H>  5.2   |  33.0<H>  |  0.59    Ca    9.2      26 Dec 2023 07:25          Urinalysis Basic - ( 26 Dec 2023 07:25 )    Color: x / Appearance: x / SG: x / pH: x  Gluc: 158 mg/dL / Ketone: x  / Bili: x / Urobili: x   Blood: x / Protein: x / Nitrite: x   Leuk Esterase: x / RBC: x / WBC x   Sq Epi: x / Non Sq Epi: x / Bacteria: x        Culture:    TTE:    RADIOLOGY        MEDICATIONS  (STANDING):  albuterol/ipratropium for Nebulization 3 milliLiter(s) Nebulizer every 6 hours  alteplase for catheter clearance 2 milliGRAM(s) Catheter once  amLODIPine   Tablet 5 milliGRAM(s) Oral daily  aspirin enteric coated 81 milliGRAM(s) Oral daily  benztropine 2 milliGRAM(s) Oral two times a day  cefepime  Injectable.      cefepime  Injectable. 2000 milliGRAM(s) IV Push every 8 hours  clonazePAM  Tablet 0.5 milliGRAM(s) Oral daily  divalproex  milliGRAM(s) Oral two times a day  enoxaparin Injectable 40 milliGRAM(s) SubCutaneous every 24 hours  folic acid 1 milliGRAM(s) Oral daily  gabapentin 100 milliGRAM(s) Oral at bedtime  hydrOXYzine hydrochloride 25 milliGRAM(s) Oral two times a day  methocarbamol 1000 milliGRAM(s) Oral every 8 hours  methylPREDNISolone sodium succinate Injectable 60 milliGRAM(s) IV Push every 8 hours  nicotine - 21 mG/24Hr(s) Patch 1 Patch Transdermal daily  oseltamivir 75 milliGRAM(s) Oral two times a day  pantoprazole    Tablet 40 milliGRAM(s) Oral before breakfast  polyethylene glycol 3350 17 Gram(s) Oral at bedtime  QUEtiapine 125 milliGRAM(s) Oral at bedtime  senna 2 Tablet(s) Oral at bedtime  thiamine 100 milliGRAM(s) Oral daily  vancomycin  IVPB. 1250 milliGRAM(s) IV Intermittent every 12 hours      MEDICATIONS  (PRN):  acetaminophen     Tablet .. 650 milliGRAM(s) Oral every 6 hours PRN Temp greater or equal to 38C (100.4F), Mild Pain (1 - 3)  calcium carbonate    500 mG (Tums) Chewable 1 Tablet(s) Chew every 6 hours PRN Heartburn  traMADol 50 milliGRAM(s) Oral every 8 hours PRN Severe Pain (7 - 10)  traMADol 25 milliGRAM(s) Oral every 6 hours PRN Moderate Pain (4 - 6)     HOSPITALIST PROGRESS NOTE    NOHEMY BRAN  [id number]  58yMale    Patient is a 58y old  Male who presents with a chief complaint of copd exacerbation/flu positive (26 Dec 2023 13:24)      SUBJECTIVE:   Chart reviewed since last visit.  Patient seen and examined at bedside for respiratory failure, Influenza A infection, COPD exacerbation, thrombocytosis, anemia and MRSA bacteremia.    Breathing is better. Denies any chills.  Complaining of back pain      OBJECTIVE:  Vital Signs Last 24 Hrs  T(C): 36.8 (26 Dec 2023 16:15), Max: 37 (25 Dec 2023 18:45)  T(F): 98.2 (26 Dec 2023 16:15), Max: 98.6 (25 Dec 2023 18:45)  HR: 89 (26 Dec 2023 16:15) (77 - 92)  BP: 125/82 (26 Dec 2023 16:15) (106/68 - 149/72)   RR: 18 (26 Dec 2023 16:15) (18 - 19)  SpO2: 93% (26 Dec 2023 16:15) (93% - 100%)    Parameters below as of 26 Dec 2023 16:15  Patient On (Oxygen Delivery Method): nasal cannula  O2 Flow (L/min): 4      PHYSICAL EXAMINATION  General: Middle aged male lying on stretcher, comfortable  HEENT:  4LNC  NECK:  Supple  CVS: regular rate and rhythm S1 S2  RESP:  No wheezing or crackles  GI:  Soft nondistended nontender BS+  : No suprapubic tenderness  MSK:  FROM. LUE PICC(+) RUE Wound with wound vacuum  CNS:  Aake, alert, oriented, Fluent speech, follows commands  INTEG:  RUE Wound with vac, LUE PICC  PSYCH:  Fair mood    MONITOR:  CAPILLARY BLOOD GLUCOSE            I&O's Summary    25 Dec 2023 07:01  -  26 Dec 2023 07:00  --------------------------------------------------------  IN: 0 mL / OUT: 250 mL / NET: -250 mL                            7.8    12.26 )-----------( 948      ( 26 Dec 2023 07:25 )             26.4       12-26    138  |  96  |  26.2<H>  ----------------------------<  158<H>  5.2   |  33.0<H>  |  0.59    Ca    9.2      26 Dec 2023 07:25          Urinalysis Basic - ( 26 Dec 2023 07:25 )    Color: x / Appearance: x / SG: x / pH: x  Gluc: 158 mg/dL / Ketone: x  / Bili: x / Urobili: x   Blood: x / Protein: x / Nitrite: x   Leuk Esterase: x / RBC: x / WBC x   Sq Epi: x / Non Sq Epi: x / Bacteria: x        Culture:    TTE:    RADIOLOGY        MEDICATIONS  (STANDING):  albuterol/ipratropium for Nebulization 3 milliLiter(s) Nebulizer every 6 hours  alteplase for catheter clearance 2 milliGRAM(s) Catheter once  amLODIPine   Tablet 5 milliGRAM(s) Oral daily  aspirin enteric coated 81 milliGRAM(s) Oral daily  benztropine 2 milliGRAM(s) Oral two times a day  cefepime  Injectable.      cefepime  Injectable. 2000 milliGRAM(s) IV Push every 8 hours  clonazePAM  Tablet 0.5 milliGRAM(s) Oral daily  divalproex  milliGRAM(s) Oral two times a day  enoxaparin Injectable 40 milliGRAM(s) SubCutaneous every 24 hours  folic acid 1 milliGRAM(s) Oral daily  gabapentin 100 milliGRAM(s) Oral at bedtime  hydrOXYzine hydrochloride 25 milliGRAM(s) Oral two times a day  methocarbamol 1000 milliGRAM(s) Oral every 8 hours  methylPREDNISolone sodium succinate Injectable 60 milliGRAM(s) IV Push every 8 hours  nicotine - 21 mG/24Hr(s) Patch 1 Patch Transdermal daily  oseltamivir 75 milliGRAM(s) Oral two times a day  pantoprazole    Tablet 40 milliGRAM(s) Oral before breakfast  polyethylene glycol 3350 17 Gram(s) Oral at bedtime  QUEtiapine 125 milliGRAM(s) Oral at bedtime  senna 2 Tablet(s) Oral at bedtime  thiamine 100 milliGRAM(s) Oral daily  vancomycin  IVPB. 1250 milliGRAM(s) IV Intermittent every 12 hours      MEDICATIONS  (PRN):  acetaminophen     Tablet .. 650 milliGRAM(s) Oral every 6 hours PRN Temp greater or equal to 38C (100.4F), Mild Pain (1 - 3)  calcium carbonate    500 mG (Tums) Chewable 1 Tablet(s) Chew every 6 hours PRN Heartburn  traMADol 50 milliGRAM(s) Oral every 8 hours PRN Severe Pain (7 - 10)  traMADol 25 milliGRAM(s) Oral every 6 hours PRN Moderate Pain (4 - 6)

## 2023-12-27 LAB
ANION GAP SERPL CALC-SCNC: 12 MMOL/L — SIGNIFICANT CHANGE UP (ref 5–17)
ANION GAP SERPL CALC-SCNC: 12 MMOL/L — SIGNIFICANT CHANGE UP (ref 5–17)
BUN SERPL-MCNC: 24.9 MG/DL — HIGH (ref 8–20)
BUN SERPL-MCNC: 24.9 MG/DL — HIGH (ref 8–20)
CALCIUM SERPL-MCNC: 9.1 MG/DL — SIGNIFICANT CHANGE UP (ref 8.4–10.5)
CALCIUM SERPL-MCNC: 9.1 MG/DL — SIGNIFICANT CHANGE UP (ref 8.4–10.5)
CHLORIDE SERPL-SCNC: 95 MMOL/L — LOW (ref 96–108)
CHLORIDE SERPL-SCNC: 95 MMOL/L — LOW (ref 96–108)
CO2 SERPL-SCNC: 32 MMOL/L — HIGH (ref 22–29)
CO2 SERPL-SCNC: 32 MMOL/L — HIGH (ref 22–29)
CREAT SERPL-MCNC: 0.62 MG/DL — SIGNIFICANT CHANGE UP (ref 0.5–1.3)
CREAT SERPL-MCNC: 0.62 MG/DL — SIGNIFICANT CHANGE UP (ref 0.5–1.3)
EGFR: 111 ML/MIN/1.73M2 — SIGNIFICANT CHANGE UP
EGFR: 111 ML/MIN/1.73M2 — SIGNIFICANT CHANGE UP
GLUCOSE SERPL-MCNC: 132 MG/DL — HIGH (ref 70–99)
GLUCOSE SERPL-MCNC: 132 MG/DL — HIGH (ref 70–99)
HCT VFR BLD CALC: 26 % — LOW (ref 39–50)
HCT VFR BLD CALC: 26 % — LOW (ref 39–50)
HGB BLD-MCNC: 8 G/DL — LOW (ref 13–17)
HGB BLD-MCNC: 8 G/DL — LOW (ref 13–17)
MCHC RBC-ENTMCNC: 26.8 PG — LOW (ref 27–34)
MCHC RBC-ENTMCNC: 26.8 PG — LOW (ref 27–34)
MCHC RBC-ENTMCNC: 30.8 GM/DL — LOW (ref 32–36)
MCHC RBC-ENTMCNC: 30.8 GM/DL — LOW (ref 32–36)
MCV RBC AUTO: 87 FL — SIGNIFICANT CHANGE UP (ref 80–100)
MCV RBC AUTO: 87 FL — SIGNIFICANT CHANGE UP (ref 80–100)
PLATELET # BLD AUTO: 908 K/UL — HIGH (ref 150–400)
PLATELET # BLD AUTO: 908 K/UL — HIGH (ref 150–400)
POTASSIUM SERPL-MCNC: 4.7 MMOL/L — SIGNIFICANT CHANGE UP (ref 3.5–5.3)
POTASSIUM SERPL-MCNC: 4.7 MMOL/L — SIGNIFICANT CHANGE UP (ref 3.5–5.3)
POTASSIUM SERPL-SCNC: 4.7 MMOL/L — SIGNIFICANT CHANGE UP (ref 3.5–5.3)
POTASSIUM SERPL-SCNC: 4.7 MMOL/L — SIGNIFICANT CHANGE UP (ref 3.5–5.3)
RBC # BLD: 2.99 M/UL — LOW (ref 4.2–5.8)
RBC # BLD: 2.99 M/UL — LOW (ref 4.2–5.8)
RBC # FLD: 17.1 % — HIGH (ref 10.3–14.5)
RBC # FLD: 17.1 % — HIGH (ref 10.3–14.5)
SODIUM SERPL-SCNC: 139 MMOL/L — SIGNIFICANT CHANGE UP (ref 135–145)
SODIUM SERPL-SCNC: 139 MMOL/L — SIGNIFICANT CHANGE UP (ref 135–145)
WBC # BLD: 10.66 K/UL — HIGH (ref 3.8–10.5)
WBC # BLD: 10.66 K/UL — HIGH (ref 3.8–10.5)
WBC # FLD AUTO: 10.66 K/UL — HIGH (ref 3.8–10.5)
WBC # FLD AUTO: 10.66 K/UL — HIGH (ref 3.8–10.5)

## 2023-12-27 PROCEDURE — 99233 SBSQ HOSP IP/OBS HIGH 50: CPT

## 2023-12-27 RX ORDER — IRON SUCROSE 20 MG/ML
200 INJECTION, SOLUTION INTRAVENOUS EVERY 24 HOURS
Refills: 0 | Status: COMPLETED | OUTPATIENT
Start: 2023-12-27 | End: 2023-12-30

## 2023-12-27 RX ORDER — CHLORHEXIDINE GLUCONATE 213 G/1000ML
1 SOLUTION TOPICAL
Refills: 0 | Status: DISCONTINUED | OUTPATIENT
Start: 2023-12-27 | End: 2023-12-30

## 2023-12-27 RX ADMIN — METHOCARBAMOL 1000 MILLIGRAM(S): 500 TABLET, FILM COATED ORAL at 22:50

## 2023-12-27 RX ADMIN — Medication 3 MILLILITER(S): at 15:41

## 2023-12-27 RX ADMIN — CEFEPIME 2000 MILLIGRAM(S): 1 INJECTION, POWDER, FOR SOLUTION INTRAMUSCULAR; INTRAVENOUS at 16:08

## 2023-12-27 RX ADMIN — SENNA PLUS 2 TABLET(S): 8.6 TABLET ORAL at 22:50

## 2023-12-27 RX ADMIN — Medication 60 MILLIGRAM(S): at 22:51

## 2023-12-27 RX ADMIN — CEFEPIME 2000 MILLIGRAM(S): 1 INJECTION, POWDER, FOR SOLUTION INTRAMUSCULAR; INTRAVENOUS at 22:49

## 2023-12-27 RX ADMIN — Medication 3 MILLILITER(S): at 09:39

## 2023-12-27 RX ADMIN — Medication 1 PATCH: at 18:24

## 2023-12-27 RX ADMIN — PANTOPRAZOLE SODIUM 40 MILLIGRAM(S): 20 TABLET, DELAYED RELEASE ORAL at 07:11

## 2023-12-27 RX ADMIN — TRAMADOL HYDROCHLORIDE 50 MILLIGRAM(S): 50 TABLET ORAL at 16:11

## 2023-12-27 RX ADMIN — Medication 60 MILLIGRAM(S): at 07:06

## 2023-12-27 RX ADMIN — Medication 1 TABLET(S): at 16:11

## 2023-12-27 RX ADMIN — DIVALPROEX SODIUM 500 MILLIGRAM(S): 500 TABLET, DELAYED RELEASE ORAL at 07:06

## 2023-12-27 RX ADMIN — Medication 1 MILLIGRAM(S): at 11:33

## 2023-12-27 RX ADMIN — DIVALPROEX SODIUM 500 MILLIGRAM(S): 500 TABLET, DELAYED RELEASE ORAL at 18:14

## 2023-12-27 RX ADMIN — Medication 60 MILLIGRAM(S): at 16:09

## 2023-12-27 RX ADMIN — Medication 75 MILLIGRAM(S): at 07:07

## 2023-12-27 RX ADMIN — Medication 1 PATCH: at 11:36

## 2023-12-27 RX ADMIN — TRAMADOL HYDROCHLORIDE 25 MILLIGRAM(S): 50 TABLET ORAL at 11:36

## 2023-12-27 RX ADMIN — Medication 1 PATCH: at 11:00

## 2023-12-27 RX ADMIN — Medication 100 MILLIGRAM(S): at 11:33

## 2023-12-27 RX ADMIN — Medication 25 MILLIGRAM(S): at 18:14

## 2023-12-27 RX ADMIN — Medication 2 MILLIGRAM(S): at 07:07

## 2023-12-27 RX ADMIN — Medication 250 MILLIGRAM(S): at 18:21

## 2023-12-27 RX ADMIN — TRAMADOL HYDROCHLORIDE 25 MILLIGRAM(S): 50 TABLET ORAL at 18:23

## 2023-12-27 RX ADMIN — Medication 2 MILLIGRAM(S): at 18:21

## 2023-12-27 RX ADMIN — Medication 81 MILLIGRAM(S): at 11:33

## 2023-12-27 RX ADMIN — ENOXAPARIN SODIUM 40 MILLIGRAM(S): 100 INJECTION SUBCUTANEOUS at 07:05

## 2023-12-27 RX ADMIN — Medication 3 MILLILITER(S): at 20:47

## 2023-12-27 RX ADMIN — METHOCARBAMOL 1000 MILLIGRAM(S): 500 TABLET, FILM COATED ORAL at 07:05

## 2023-12-27 RX ADMIN — Medication 75 MILLIGRAM(S): at 18:14

## 2023-12-27 RX ADMIN — METHOCARBAMOL 1000 MILLIGRAM(S): 500 TABLET, FILM COATED ORAL at 16:09

## 2023-12-27 RX ADMIN — QUETIAPINE FUMARATE 125 MILLIGRAM(S): 200 TABLET, FILM COATED ORAL at 22:49

## 2023-12-27 RX ADMIN — Medication 250 MILLIGRAM(S): at 07:08

## 2023-12-27 RX ADMIN — CHLORHEXIDINE GLUCONATE 1 APPLICATION(S): 213 SOLUTION TOPICAL at 11:41

## 2023-12-27 RX ADMIN — GABAPENTIN 100 MILLIGRAM(S): 400 CAPSULE ORAL at 22:50

## 2023-12-27 RX ADMIN — AMLODIPINE BESYLATE 5 MILLIGRAM(S): 2.5 TABLET ORAL at 07:06

## 2023-12-27 RX ADMIN — Medication 25 MILLIGRAM(S): at 07:06

## 2023-12-27 RX ADMIN — Medication 0.5 MILLIGRAM(S): at 11:33

## 2023-12-27 RX ADMIN — CEFEPIME 2000 MILLIGRAM(S): 1 INJECTION, POWDER, FOR SOLUTION INTRAMUSCULAR; INTRAVENOUS at 07:05

## 2023-12-27 RX ADMIN — Medication 1 PATCH: at 07:23

## 2023-12-27 NOTE — PROGRESS NOTE ADULT - SUBJECTIVE AND OBJECTIVE BOX
NOHEMY BRAN    [id number]    History:   seen and examined  readmitted, work up thus far reveals flu  prior history of right forearm debridement and post procedure negative pressure management  no complaints this am   Denies nausea, vomiting, chest pain, shortness of breath, abdominal pain or fever. No new complaints. No acute motor or sensory changes are reported.    Vital Signs Last 24 Hrs  T(C): 36.4 (27 Dec 2023 05:29), Max: 37.1 (27 Dec 2023 00:36)  T(F): 97.6 (27 Dec 2023 05:29), Max: 98.7 (27 Dec 2023 00:36)  HR: 71 (27 Dec 2023 05:29) (66 - 89)  BP: 159/95 (27 Dec 2023 05:29) (125/82 - 159/95)  BP(mean): --  RR: 18 (27 Dec 2023 00:36) (18 - 18)  SpO2: 96% (27 Dec 2023 05:29) (93% - 100%)    Parameters below as of 27 Dec 2023 05:29  Patient On (Oxygen Delivery Method): nasal cannula  O2 Flow (L/min): 4    I&O's Summary                            8.0    10.66 )-----------( 908      ( 27 Dec 2023 05:23 )             26.0     12-27    139  |  95<L>  |  24.9<H>  ----------------------------<  132<H>  4.7   |  32.0<H>  |  0.62    Ca    9.1      27 Dec 2023 05:23        MEDICATIONS  (STANDING):  albuterol/ipratropium for Nebulization 3 milliLiter(s) Nebulizer every 6 hours  amLODIPine   Tablet 5 milliGRAM(s) Oral daily  aspirin enteric coated 81 milliGRAM(s) Oral daily  benztropine 2 milliGRAM(s) Oral two times a day  cefepime  Injectable.      cefepime  Injectable. 2000 milliGRAM(s) IV Push every 8 hours  clonazePAM  Tablet 0.5 milliGRAM(s) Oral daily  divalproex  milliGRAM(s) Oral two times a day  enoxaparin Injectable 40 milliGRAM(s) SubCutaneous every 24 hours  folic acid 1 milliGRAM(s) Oral daily  gabapentin 100 milliGRAM(s) Oral at bedtime  hydrOXYzine hydrochloride 25 milliGRAM(s) Oral two times a day  methocarbamol 1000 milliGRAM(s) Oral every 8 hours  methylPREDNISolone sodium succinate Injectable 60 milliGRAM(s) IV Push every 8 hours  nicotine - 21 mG/24Hr(s) Patch 1 Patch Transdermal daily  oseltamivir 75 milliGRAM(s) Oral two times a day  pantoprazole    Tablet 40 milliGRAM(s) Oral before breakfast  polyethylene glycol 3350 17 Gram(s) Oral at bedtime  QUEtiapine 125 milliGRAM(s) Oral at bedtime  senna 2 Tablet(s) Oral at bedtime  thiamine 100 milliGRAM(s) Oral daily  vancomycin  IVPB. 1250 milliGRAM(s) IV Intermittent every 12 hours    MEDICATIONS  (PRN):  acetaminophen     Tablet .. 650 milliGRAM(s) Oral every 6 hours PRN Temp greater or equal to 38C (100.4F), Mild Pain (1 - 3)  calcium carbonate    500 mG (Tums) Chewable 1 Tablet(s) Chew every 6 hours PRN Heartburn  traMADol 25 milliGRAM(s) Oral every 6 hours PRN Moderate Pain (4 - 6)  traMADol 50 milliGRAM(s) Oral every 8 hours PRN Severe Pain (7 - 10)      Physical exam:    no distress  alert and oriented   nonlabored breathing  abdomen is soft non tender  right upper ext with wound vac sponge present, but no active negative pressure connection  no surrounding erythema , no pain  + radial       Primary  Assessment:  • stable right upper ext wound  - vac removed, wound is superficialization, clean no drainage      •   Plan:   • discontinue negative pressure management for now.. will have the surgical attending see and evaluate at some point   - will order nursing to provide local wound care in the [facility]       Localwound care :  adaptic/4x4/kerlix/ace  Daily  NOHEMY BRAN    [id number]    History:   seen and examined  readmitted, work up thus far reveals flu  prior history of right forearm debridement and post procedure negative pressure management  no complaints this am   Denies nausea, vomiting, chest pain, shortness of breath, abdominal pain or fever. No new complaints. No acute motor or sensory changes are reported.    Vital Signs Last 24 Hrs  T(C): 36.4 (27 Dec 2023 05:29), Max: 37.1 (27 Dec 2023 00:36)  T(F): 97.6 (27 Dec 2023 05:29), Max: 98.7 (27 Dec 2023 00:36)  HR: 71 (27 Dec 2023 05:29) (66 - 89)  BP: 159/95 (27 Dec 2023 05:29) (125/82 - 159/95)  BP(mean): --  RR: 18 (27 Dec 2023 00:36) (18 - 18)  SpO2: 96% (27 Dec 2023 05:29) (93% - 100%)    Parameters below as of 27 Dec 2023 05:29  Patient On (Oxygen Delivery Method): nasal cannula  O2 Flow (L/min): 4    I&O's Summary                            8.0    10.66 )-----------( 908      ( 27 Dec 2023 05:23 )             26.0     12-27    139  |  95<L>  |  24.9<H>  ----------------------------<  132<H>  4.7   |  32.0<H>  |  0.62    Ca    9.1      27 Dec 2023 05:23        MEDICATIONS  (STANDING):  albuterol/ipratropium for Nebulization 3 milliLiter(s) Nebulizer every 6 hours  amLODIPine   Tablet 5 milliGRAM(s) Oral daily  aspirin enteric coated 81 milliGRAM(s) Oral daily  benztropine 2 milliGRAM(s) Oral two times a day  cefepime  Injectable.      cefepime  Injectable. 2000 milliGRAM(s) IV Push every 8 hours  clonazePAM  Tablet 0.5 milliGRAM(s) Oral daily  divalproex  milliGRAM(s) Oral two times a day  enoxaparin Injectable 40 milliGRAM(s) SubCutaneous every 24 hours  folic acid 1 milliGRAM(s) Oral daily  gabapentin 100 milliGRAM(s) Oral at bedtime  hydrOXYzine hydrochloride 25 milliGRAM(s) Oral two times a day  methocarbamol 1000 milliGRAM(s) Oral every 8 hours  methylPREDNISolone sodium succinate Injectable 60 milliGRAM(s) IV Push every 8 hours  nicotine - 21 mG/24Hr(s) Patch 1 Patch Transdermal daily  oseltamivir 75 milliGRAM(s) Oral two times a day  pantoprazole    Tablet 40 milliGRAM(s) Oral before breakfast  polyethylene glycol 3350 17 Gram(s) Oral at bedtime  QUEtiapine 125 milliGRAM(s) Oral at bedtime  senna 2 Tablet(s) Oral at bedtime  thiamine 100 milliGRAM(s) Oral daily  vancomycin  IVPB. 1250 milliGRAM(s) IV Intermittent every 12 hours    MEDICATIONS  (PRN):  acetaminophen     Tablet .. 650 milliGRAM(s) Oral every 6 hours PRN Temp greater or equal to 38C (100.4F), Mild Pain (1 - 3)  calcium carbonate    500 mG (Tums) Chewable 1 Tablet(s) Chew every 6 hours PRN Heartburn  traMADol 25 milliGRAM(s) Oral every 6 hours PRN Moderate Pain (4 - 6)  traMADol 50 milliGRAM(s) Oral every 8 hours PRN Severe Pain (7 - 10)      Physical exam:    no distress  alert and oriented   nonlabored breathing  abdomen is soft non tender  right upper ext with wound vac sponge present, but no active negative pressure connection  no surrounding erythema , no pain  + radial       Primary  Assessment:  • stable right upper ext wound  - vac removed, wound is superficialization, clean no drainage      •   Plan:   • discontinue negative pressure management for now.. will have the surgical attending see and evaluate at some point   - will order nursing to provide local wound care in the [facility]       Localwound care :  adaptic/4x4/kerlix/ace  Daily

## 2023-12-27 NOTE — PROGRESS NOTE ADULT - SUBJECTIVE AND OBJECTIVE BOX
HOSPITALIST PROGRESS NOTE    NOHEMY BRAN  [id number]  58yMale    Patient is a 58y old  Male who presents with a chief complaint of copd exacerbation/flu positive (27 Dec 2023 12:49)      SUBJECTIVE:   Chart reviewed since last visit.  Patient seen and examined at bedside for influenza, COPD, thrombocytosis, anemia, bacteremia  Feels better  Denies any dyspnea, chest pain, fever or chills  Wants to walk around as well as fo push ups      OBJECTIVE:  Vital Signs Last 24 Hrs  T(C): 36.9 (27 Dec 2023 11:16), Max: 37.1 (27 Dec 2023 00:36)  T(F): 98.4 (27 Dec 2023 11:16), Max: 98.7 (27 Dec 2023 00:36)  HR: 76 (27 Dec 2023 11:16) (66 - 89)  BP: 143/73 (27 Dec 2023 11:16) (125/82 - 159/95)   RR: 18 (27 Dec 2023 11:16) (18 - 18)  SpO2: 99% (27 Dec 2023 11:16) (93% - 100%)    Parameters below as of 27 Dec 2023 11:16  Patient On (Oxygen Delivery Method): nasal cannula  O2 Flow (L/min): 4      PHYSICAL EXAMINATION  General: Middle aged male lying on stretcher, comfortable  HEENT:  4LNC  NECK:  Supple  CVS: regular rate and rhythm S1 S2  RESP:  No wheezing or crackles  GI:  Soft nondistended nontender BS+  : No suprapubic tenderness  MSK:  FROM. LUE PICC(+) RUE Wound    CNS:  Aake, alert, oriented, Fluent speech, follows commands  INTEG:  RUE Wound with vac, LUE PICC  PSYCH:  Fair mood      MONITOR:  CAPILLARY BLOOD GLUCOSE            I&O's Summary                          8.0    10.66 )-----------( 908      ( 27 Dec 2023 05:23 )             26.0       12-27    139  |  95<L>  |  24.9<H>  ----------------------------<  132<H>  4.7   |  32.0<H>  |  0.62    Ca    9.1      27 Dec 2023 05:23          Urinalysis Basic - ( 27 Dec 2023 05:23 )    Color: x / Appearance: x / SG: x / pH: x  Gluc: 132 mg/dL / Ketone: x  / Bili: x / Urobili: x   Blood: x / Protein: x / Nitrite: x   Leuk Esterase: x / RBC: x / WBC x   Sq Epi: x / Non Sq Epi: x / Bacteria: x        Culture:    TTE:    RADIOLOGY        MEDICATIONS  (STANDING):  albuterol/ipratropium for Nebulization 3 milliLiter(s) Nebulizer every 6 hours  amLODIPine   Tablet 5 milliGRAM(s) Oral daily  aspirin enteric coated 81 milliGRAM(s) Oral daily  benztropine 2 milliGRAM(s) Oral two times a day  cefepime  Injectable. 2000 milliGRAM(s) IV Push every 8 hours  cefepime  Injectable.      chlorhexidine 2% Cloths 1 Application(s) Topical <User Schedule>  clonazePAM  Tablet 0.5 milliGRAM(s) Oral daily  divalproex  milliGRAM(s) Oral two times a day  enoxaparin Injectable 40 milliGRAM(s) SubCutaneous every 24 hours  folic acid 1 milliGRAM(s) Oral daily  gabapentin 100 milliGRAM(s) Oral at bedtime  hydrOXYzine hydrochloride 25 milliGRAM(s) Oral two times a day  methocarbamol 1000 milliGRAM(s) Oral every 8 hours  methylPREDNISolone sodium succinate Injectable 60 milliGRAM(s) IV Push every 8 hours  nicotine - 21 mG/24Hr(s) Patch 1 Patch Transdermal daily  oseltamivir 75 milliGRAM(s) Oral two times a day  pantoprazole    Tablet 40 milliGRAM(s) Oral before breakfast  polyethylene glycol 3350 17 Gram(s) Oral at bedtime  QUEtiapine 125 milliGRAM(s) Oral at bedtime  senna 2 Tablet(s) Oral at bedtime  thiamine 100 milliGRAM(s) Oral daily  vancomycin  IVPB. 1250 milliGRAM(s) IV Intermittent every 12 hours      MEDICATIONS  (PRN):  acetaminophen     Tablet .. 650 milliGRAM(s) Oral every 6 hours PRN Temp greater or equal to 38C (100.4F), Mild Pain (1 - 3)  calcium carbonate    500 mG (Tums) Chewable 1 Tablet(s) Chew every 6 hours PRN Heartburn  traMADol 25 milliGRAM(s) Oral every 6 hours PRN Moderate Pain (4 - 6)  traMADol 50 milliGRAM(s) Oral every 8 hours PRN Severe Pain (7 - 10)     HOSPITALIST PROGRESS NOTE    NOHEMY BRAN  [id number]  58yMale    Patient is a 58y old  Male who presents with a chief complaint of copd exacerbation/flu positive (27 Dec 2023 12:49)      SUBJECTIVE:   Chart reviewed since last visit.  Patient seen and examined at bedside for influenza, COPD, thrombocytosis, anemia, bacteremia  Feels better  Denies any dyspnea, chest pain, fever or chills  Wants to walk around as well as fo push ups      OBJECTIVE:  Vital Signs Last 24 Hrs  T(C): 36.9 (27 Dec 2023 11:16), Max: 37.1 (27 Dec 2023 00:36)  T(F): 98.4 (27 Dec 2023 11:16), Max: 98.7 (27 Dec 2023 00:36)  HR: 76 (27 Dec 2023 11:16) (66 - 89)  BP: 143/73 (27 Dec 2023 11:16) (125/82 - 159/95)   RR: 18 (27 Dec 2023 11:16) (18 - 18)  SpO2: 99% (27 Dec 2023 11:16) (93% - 100%)    Parameters below as of 27 Dec 2023 11:16  Patient On (Oxygen Delivery Method): nasal cannula  O2 Flow (L/min): 4      PHYSICAL EXAMINATION  General: Middle aged male lying on stretcher, comfortable  HEENT:  4LNC  NECK:  Supple  CVS: regular rate and rhythm S1 S2  RESP:  No wheezing or crackles  GI:  Soft nondistended nontender BS+  : No suprapubic tenderness  MSK:  FROM. LUE PICC(+) RUE Wound    CNS:  Aake, alert, oriented, Fluent speech, follows commands  INTEG:  RUE Wound with vac, LUE PICC  PSYCH:  Fair mood      MONITOR:  CAPILLARY BLOOD GLUCOSE            I&O's Summary                          8.0    10.66 )-----------( 908      ( 27 Dec 2023 05:23 )             26.0       12-27    139  |  95<L>  |  24.9<H>  ----------------------------<  132<H>  4.7   |  32.0<H>  |  0.62    Ca    9.1      27 Dec 2023 05:23          Urinalysis Basic - ( 27 Dec 2023 05:23 )    Color: x / Appearance: x / SG: x / pH: x  Gluc: 132 mg/dL / Ketone: x  / Bili: x / Urobili: x   Blood: x / Protein: x / Nitrite: x   Leuk Esterase: x / RBC: x / WBC x   Sq Epi: x / Non Sq Epi: x / Bacteria: x        Culture:    TTE:    RADIOLOGY        MEDICATIONS  (STANDING):  albuterol/ipratropium for Nebulization 3 milliLiter(s) Nebulizer every 6 hours  amLODIPine   Tablet 5 milliGRAM(s) Oral daily  aspirin enteric coated 81 milliGRAM(s) Oral daily  benztropine 2 milliGRAM(s) Oral two times a day  cefepime  Injectable. 2000 milliGRAM(s) IV Push every 8 hours  cefepime  Injectable.      chlorhexidine 2% Cloths 1 Application(s) Topical <User Schedule>  clonazePAM  Tablet 0.5 milliGRAM(s) Oral daily  divalproex  milliGRAM(s) Oral two times a day  enoxaparin Injectable 40 milliGRAM(s) SubCutaneous every 24 hours  folic acid 1 milliGRAM(s) Oral daily  gabapentin 100 milliGRAM(s) Oral at bedtime  hydrOXYzine hydrochloride 25 milliGRAM(s) Oral two times a day  methocarbamol 1000 milliGRAM(s) Oral every 8 hours  methylPREDNISolone sodium succinate Injectable 60 milliGRAM(s) IV Push every 8 hours  nicotine - 21 mG/24Hr(s) Patch 1 Patch Transdermal daily  oseltamivir 75 milliGRAM(s) Oral two times a day  pantoprazole    Tablet 40 milliGRAM(s) Oral before breakfast  polyethylene glycol 3350 17 Gram(s) Oral at bedtime  QUEtiapine 125 milliGRAM(s) Oral at bedtime  senna 2 Tablet(s) Oral at bedtime  thiamine 100 milliGRAM(s) Oral daily  vancomycin  IVPB. 1250 milliGRAM(s) IV Intermittent every 12 hours      MEDICATIONS  (PRN):  acetaminophen     Tablet .. 650 milliGRAM(s) Oral every 6 hours PRN Temp greater or equal to 38C (100.4F), Mild Pain (1 - 3)  calcium carbonate    500 mG (Tums) Chewable 1 Tablet(s) Chew every 6 hours PRN Heartburn  traMADol 25 milliGRAM(s) Oral every 6 hours PRN Moderate Pain (4 - 6)  traMADol 50 milliGRAM(s) Oral every 8 hours PRN Severe Pain (7 - 10)

## 2023-12-27 NOTE — PROGRESS NOTE ADULT - SUBJECTIVE AND OBJECTIVE BOX
Heme/Onc Progress note    INTERVAL HPI/OVERNIGHT EVENTS:  Patient S&E at bedside. No o/n events, patient appears frustrated with hosp coarses     VITAL SIGNS:  T(F): 98.4 (12-27-23 @ 11:16)  HR: 76 (12-27-23 @ 11:16)  BP: 143/73 (12-27-23 @ 11:16)  RR: 18 (12-27-23 @ 11:16)  SpO2: 99% (12-27-23 @ 11:16)  Wt(kg): --    PHYSICAL EXAM:    Constitutional: NAD  Eyes: EOMI, sclera non-icteric  Neck: supple, no masses, no JVD  Respiratory: CTA b/l, good air entry b/l  Cardiovascular: RRR, no M/R/G  Gastrointestinal: soft, NTND, no masses palpable, + BS,  Extremities: no c/c/e  Neurological: AAOx3      MEDICATIONS  (STANDING):  albuterol/ipratropium for Nebulization 3 milliLiter(s) Nebulizer every 6 hours  amLODIPine   Tablet 5 milliGRAM(s) Oral daily  aspirin enteric coated 81 milliGRAM(s) Oral daily  benztropine 2 milliGRAM(s) Oral two times a day  cefepime  Injectable.      cefepime  Injectable. 2000 milliGRAM(s) IV Push every 8 hours  chlorhexidine 2% Cloths 1 Application(s) Topical <User Schedule>  clonazePAM  Tablet 0.5 milliGRAM(s) Oral daily  divalproex  milliGRAM(s) Oral two times a day  enoxaparin Injectable 40 milliGRAM(s) SubCutaneous every 24 hours  folic acid 1 milliGRAM(s) Oral daily  gabapentin 100 milliGRAM(s) Oral at bedtime  hydrOXYzine hydrochloride 25 milliGRAM(s) Oral two times a day  methocarbamol 1000 milliGRAM(s) Oral every 8 hours  methylPREDNISolone sodium succinate Injectable 60 milliGRAM(s) IV Push every 8 hours  nicotine - 21 mG/24Hr(s) Patch 1 Patch Transdermal daily  oseltamivir 75 milliGRAM(s) Oral two times a day  pantoprazole    Tablet 40 milliGRAM(s) Oral before breakfast  polyethylene glycol 3350 17 Gram(s) Oral at bedtime  QUEtiapine 125 milliGRAM(s) Oral at bedtime  senna 2 Tablet(s) Oral at bedtime  thiamine 100 milliGRAM(s) Oral daily  vancomycin  IVPB. 1250 milliGRAM(s) IV Intermittent every 12 hours    MEDICATIONS  (PRN):  acetaminophen     Tablet .. 650 milliGRAM(s) Oral every 6 hours PRN Temp greater or equal to 38C (100.4F), Mild Pain (1 - 3)  calcium carbonate    500 mG (Tums) Chewable 1 Tablet(s) Chew every 6 hours PRN Heartburn  traMADol 25 milliGRAM(s) Oral every 6 hours PRN Moderate Pain (4 - 6)  traMADol 50 milliGRAM(s) Oral every 8 hours PRN Severe Pain (7 - 10)      Allergies    No Known Allergies    Intolerances        LABS:                        8.0    10.66 )-----------( 908      ( 27 Dec 2023 05:23 )             26.0     12-27    139  |  95<L>  |  24.9<H>  ----------------------------<  132<H>  4.7   |  32.0<H>  |  0.62    Ca    9.1      27 Dec 2023 05:23        Urinalysis Basic - ( 27 Dec 2023 05:23 )    Color: x / Appearance: x / SG: x / pH: x  Gluc: 132 mg/dL / Ketone: x  / Bili: x / Urobili: x   Blood: x / Protein: x / Nitrite: x   Leuk Esterase: x / RBC: x / WBC x   Sq Epi: x / Non Sq Epi: x / Bacteria: x        RADIOLOGY & ADDITIONAL TESTS:  Studies reviewed.    ASSESSMENT & PLAN:

## 2023-12-27 NOTE — PROGRESS NOTE ADULT - ASSESSMENT
59 yo male, extensive comorbid conditions recently discharged from [facility] after prolonged hospital course in setting of MRSA Bacteremia and septic thrombophlebitis, hx of COPD, presenting for cough, diaphoresis, hypoxia in setting of testing Flu positive now admitted for COPD exacerbation.       # Acute Respiratory Failure, mixed. Respiratory acidosis resolved,   # COPD exacerbation  # Influenza AH1 infection  RVP (+) for Influenza AH1  CTA chest without any PE; improving lung process.  Hypoxic requiring 4LNC  -   - Supplemental O2, wean as tolerated, target SpO2 88-94%  - Oseltamivir 75mg twice daily   - Methylprednisolone  60mg IV q8h; transition to PO prednisone  - Albuterol/Ipratropium nebulizers q6  - Mobilize    # Thrombocytosis, reactive,  Normal Platelet count 2 weeks ago  # Anemia, normocytic  PRBC x 1 on 12/25/23  Low Iron. Adequate B12, Folate. Elevated Haptoglobin, LDH  Stable Hgb, Platelets improving  -  IV iron   - Monitor CBC  - Transfuse if Hgb<7 or symptoms    # Chronic Back Pain  Controlled on current regimen  - Tramadol 25mg/50mg PRN for moderate to severe pain  - Methocarbamol 1000mg Q8  - Gabapentin 100mg QHS  - Pain management follow up    # History of recent MRSA bacteremia  # History of recent Septic Thrombophlebitis  - Cefepime 2g iv q8h  - Vancomycin 1250mg iv q12h  - monitor trough and adjust per pharmacy to avoid drug toxicity  - 6 weeks duration ending 1/25/24    - Vascular Surgery follow up noted- no wound vacuum required. Local wound care    # HTN  - Amlodipine 5mg daily    # Schizoaffective complicated by tardive dyskinesia   - Dilvalproex, Clonazepam, Quetiapine and Gabapentin continued  - Benztropine for tardive dyskinesia      VTE Prophylaxis  - LMWH    Disposition: Clinically acute; Likely return to SNF upon stabilization   59 yo male, extensive comorbid conditions recently discharged from [facility] after prolonged hospital course in setting of MRSA Bacteremia and septic thrombophlebitis, hx of COPD, presenting for cough, diaphoresis, hypoxia in setting of testing Flu positive now admitted for COPD exacerbation.       # Acute Respiratory Failure, mixed. Respiratory acidosis resolved,   # COPD exacerbation  # Influenza AH1 infection  RVP (+) for Influenza AH1  CTA chest without any PE; improving lung process.  Hypoxic requiring 4LNC  -   - Supplemental O2, wean as tolerated, target SpO2 88-94%  - Oseltamivir 75mg twice daily   - Methylprednisolone  60mg IV q8h; transition to PO prednisone  - Albuterol/Ipratropium nebulizers q6  - Mobilize    # Thrombocytosis, reactive,  Normal Platelet count 2 weeks ago  # Anemia, normocytic  PRBC x 1 on 12/25/23  Low Iron. Adequate B12, Folate. Elevated Haptoglobin, LDH  Stable Hgb, Platelets improving  -  IV iron   - Monitor CBC  - Transfuse if Hgb<7 or symptoms    # Chronic Back Pain  Controlled on current regimen  - Tramadol 25mg/50mg PRN for moderate to severe pain  - Methocarbamol 1000mg Q8  - Gabapentin 100mg QHS  - Pain management follow up    # History of recent MRSA bacteremia  # History of recent Septic Thrombophlebitis  - Cefepime 2g iv q8h  - Vancomycin 1250mg iv q12h  - monitor trough and adjust per pharmacy to avoid drug toxicity  - 6 weeks duration ending 1/25/24    - Vascular Surgery follow up noted- no wound vacuum required. Local wound care    # HTN  - Amlodipine 5mg daily    # Schizoaffective complicated by tardive dyskinesia   - Dilvalproex, Clonazepam, Quetiapine and Gabapentin continued  - Benztropine for tardive dyskinesia      VTE Prophylaxis  - LMWH    Disposition: Clinically acute; Likely return to SNF upon stabilization

## 2023-12-27 NOTE — PROGRESS NOTE ADULT - ASSESSMENT
Mr. Forde is a 57 yo male, extensive comorbid conditions recently discharged from [facility] after prolonged hospital course in setting of MRSA Bacteremia and septic thrombophlebitis who was at [facility] who returns back to [facility] due to worsening symptoms of cough, fever and increased difficulty breathing.   Found to be positive for influenza A.   Hematology consulted for thrombocytosis and anemia.     , Folate >20, LDH-243, Hapto-491  Ferritin-379, T-iron-22, TIBC-379, %sat-7    #Thrombocytosis- likely reactive in setting of acute infection   Platelet count normal as of mid-December (low likelihood of myeloproliferative disorder)   Platelet count appears to have peaked, starting to downtrend today  Continue ongoing therapy for influenza per primary team  Monitor platelet count   F/U with hematology as outpatient within next 2-4 weeks to ensure return to normal     #Normocytic anemia- Hb 6.9 today- Likely 2/2 acute inflammation in setting of infection   States he was previously told he was anemic. Has never required a blood transfusion or iron infusion in the past.   Has never seen a hematologist.   He is well overdue for a colonoscopy which should be arranged as an outpatient  Hemolysis (-)  s/p 1U PRBC  for Hb 6.9  can consider iron supplementation (negative bacteremia)  r/o sources of bleeding-consider FOBT  Goal Hb >7 or >8 if symptomatic          Will continue to monitor the patient.  Please call with any questions [phone]  Above reviewed with Attending Dr. Persadu     [facility]  [address] [place] [address]  [phone]  *Note not finalized until signed by Attending Physician  Mr. Forde is a 57 yo male, extensive comorbid conditions recently discharged from [facility] after prolonged hospital course in setting of MRSA Bacteremia and septic thrombophlebitis who was at [facility] who returns back to [facility] due to worsening symptoms of cough, fever and increased difficulty breathing.   Found to be positive for influenza A.   Hematology consulted for thrombocytosis and anemia.     , Folate >20, LDH-243, Hapto-491  Ferritin-379, T-iron-22, TIBC-379, %sat-7    #Thrombocytosis- likely reactive in setting of acute infection   Platelet count normal as of mid-December (low likelihood of myeloproliferative disorder)   Platelet count appears to have peaked, starting to downtrend today  Continue ongoing therapy for influenza per primary team  Monitor platelet count   F/U with hematology as outpatient within next 2-4 weeks to ensure return to normal     #Normocytic anemia- Hb 6.9 today- Likely 2/2 acute inflammation in setting of infection   States he was previously told he was anemic. Has never required a blood transfusion or iron infusion in the past.   Has never seen a hematologist.   He is well overdue for a colonoscopy which should be arranged as an outpatient  Hemolysis (-)  s/p 1U PRBC  for Hb 6.9  can consider iron supplementation (negative bacteremia)  r/o sources of bleeding-consider FOBT  Goal Hb >7 or >8 if symptomatic          Will continue to monitor the patient.  Please call with any questions [phone]  Above reviewed with Attending Dr. Persaud     [facility]  [address] [place] [address]  [phone]  *Note not finalized until signed by Attending Physician

## 2023-12-28 ENCOUNTER — TRANSCRIPTION ENCOUNTER (OUTPATIENT)
Age: 58
End: 2023-12-28

## 2023-12-28 DIAGNOSIS — J44.1 CHRONIC OBSTRUCTIVE PULMONARY DISEASE WITH (ACUTE) EXACERBATION: ICD-10-CM

## 2023-12-28 DIAGNOSIS — D50.9 IRON DEFICIENCY ANEMIA, UNSPECIFIED: ICD-10-CM

## 2023-12-28 DIAGNOSIS — J96.01 ACUTE RESPIRATORY FAILURE WITH HYPOXIA: ICD-10-CM

## 2023-12-28 DIAGNOSIS — J10.1 INFLUENZA DUE TO OTHER IDENTIFIED INFLUENZA VIRUS WITH OTHER RESPIRATORY MANIFESTATIONS: ICD-10-CM

## 2023-12-28 DIAGNOSIS — D75.838 OTHER THROMBOCYTOSIS: ICD-10-CM

## 2023-12-28 DIAGNOSIS — M54.9 DORSALGIA, UNSPECIFIED: ICD-10-CM

## 2023-12-28 PROCEDURE — 99233 SBSQ HOSP IP/OBS HIGH 50: CPT

## 2023-12-28 RX ORDER — HYDRALAZINE HCL 50 MG
25 TABLET ORAL EVERY 6 HOURS
Refills: 0 | Status: DISCONTINUED | OUTPATIENT
Start: 2023-12-28 | End: 2023-12-30

## 2023-12-28 RX ORDER — AMLODIPINE BESYLATE 2.5 MG/1
10 TABLET ORAL DAILY
Refills: 0 | Status: DISCONTINUED | OUTPATIENT
Start: 2023-12-28 | End: 2023-12-30

## 2023-12-28 RX ORDER — TRAMADOL HYDROCHLORIDE 50 MG/1
0.5 TABLET ORAL
Qty: 0 | Refills: 0 | DISCHARGE
Start: 2023-12-28

## 2023-12-28 RX ORDER — TRAMADOL HYDROCHLORIDE 50 MG/1
1 TABLET ORAL
Qty: 0 | Refills: 0 | DISCHARGE
Start: 2023-12-28

## 2023-12-28 RX ADMIN — Medication 25 MILLIGRAM(S): at 05:26

## 2023-12-28 RX ADMIN — ENOXAPARIN SODIUM 40 MILLIGRAM(S): 100 INJECTION SUBCUTANEOUS at 05:27

## 2023-12-28 RX ADMIN — Medication 75 MILLIGRAM(S): at 05:26

## 2023-12-28 RX ADMIN — Medication 2 MILLIGRAM(S): at 05:26

## 2023-12-28 RX ADMIN — Medication 3 MILLILITER(S): at 15:17

## 2023-12-28 RX ADMIN — IRON SUCROSE 110 MILLIGRAM(S): 20 INJECTION, SOLUTION INTRAVENOUS at 00:39

## 2023-12-28 RX ADMIN — DIVALPROEX SODIUM 500 MILLIGRAM(S): 500 TABLET, DELAYED RELEASE ORAL at 05:26

## 2023-12-28 RX ADMIN — Medication 1 PATCH: at 21:43

## 2023-12-28 RX ADMIN — TRAMADOL HYDROCHLORIDE 50 MILLIGRAM(S): 50 TABLET ORAL at 18:42

## 2023-12-28 RX ADMIN — Medication 3 MILLILITER(S): at 08:51

## 2023-12-28 RX ADMIN — Medication 1 MILLIGRAM(S): at 11:16

## 2023-12-28 RX ADMIN — DIVALPROEX SODIUM 500 MILLIGRAM(S): 500 TABLET, DELAYED RELEASE ORAL at 17:33

## 2023-12-28 RX ADMIN — Medication 75 MILLIGRAM(S): at 17:33

## 2023-12-28 RX ADMIN — CEFEPIME 2000 MILLIGRAM(S): 1 INJECTION, POWDER, FOR SOLUTION INTRAMUSCULAR; INTRAVENOUS at 21:41

## 2023-12-28 RX ADMIN — TRAMADOL HYDROCHLORIDE 50 MILLIGRAM(S): 50 TABLET ORAL at 17:42

## 2023-12-28 RX ADMIN — Medication 250 MILLIGRAM(S): at 06:34

## 2023-12-28 RX ADMIN — METHOCARBAMOL 1000 MILLIGRAM(S): 500 TABLET, FILM COATED ORAL at 14:44

## 2023-12-28 RX ADMIN — METHOCARBAMOL 1000 MILLIGRAM(S): 500 TABLET, FILM COATED ORAL at 05:25

## 2023-12-28 RX ADMIN — Medication 0.5 MILLIGRAM(S): at 11:16

## 2023-12-28 RX ADMIN — CEFEPIME 2000 MILLIGRAM(S): 1 INJECTION, POWDER, FOR SOLUTION INTRAMUSCULAR; INTRAVENOUS at 14:43

## 2023-12-28 RX ADMIN — Medication 2 MILLIGRAM(S): at 17:33

## 2023-12-28 RX ADMIN — TRAMADOL HYDROCHLORIDE 25 MILLIGRAM(S): 50 TABLET ORAL at 06:30

## 2023-12-28 RX ADMIN — GABAPENTIN 100 MILLIGRAM(S): 400 CAPSULE ORAL at 21:42

## 2023-12-28 RX ADMIN — TRAMADOL HYDROCHLORIDE 50 MILLIGRAM(S): 50 TABLET ORAL at 00:39

## 2023-12-28 RX ADMIN — TRAMADOL HYDROCHLORIDE 50 MILLIGRAM(S): 50 TABLET ORAL at 01:23

## 2023-12-28 RX ADMIN — CHLORHEXIDINE GLUCONATE 1 APPLICATION(S): 213 SOLUTION TOPICAL at 05:25

## 2023-12-28 RX ADMIN — Medication 1 PATCH: at 07:30

## 2023-12-28 RX ADMIN — AMLODIPINE BESYLATE 5 MILLIGRAM(S): 2.5 TABLET ORAL at 05:51

## 2023-12-28 RX ADMIN — Medication 250 MILLIGRAM(S): at 17:33

## 2023-12-28 RX ADMIN — Medication 100 MILLIGRAM(S): at 11:16

## 2023-12-28 RX ADMIN — SENNA PLUS 2 TABLET(S): 8.6 TABLET ORAL at 21:42

## 2023-12-28 RX ADMIN — QUETIAPINE FUMARATE 125 MILLIGRAM(S): 200 TABLET, FILM COATED ORAL at 21:43

## 2023-12-28 RX ADMIN — TRAMADOL HYDROCHLORIDE 25 MILLIGRAM(S): 50 TABLET ORAL at 05:36

## 2023-12-28 RX ADMIN — Medication 60 MILLIGRAM(S): at 05:26

## 2023-12-28 RX ADMIN — Medication 1 PATCH: at 11:18

## 2023-12-28 RX ADMIN — Medication 81 MILLIGRAM(S): at 11:16

## 2023-12-28 RX ADMIN — CEFEPIME 2000 MILLIGRAM(S): 1 INJECTION, POWDER, FOR SOLUTION INTRAMUSCULAR; INTRAVENOUS at 05:25

## 2023-12-28 RX ADMIN — Medication 50 MILLIGRAM(S): at 11:16

## 2023-12-28 RX ADMIN — Medication 3 MILLILITER(S): at 20:21

## 2023-12-28 RX ADMIN — PANTOPRAZOLE SODIUM 40 MILLIGRAM(S): 20 TABLET, DELAYED RELEASE ORAL at 05:27

## 2023-12-28 RX ADMIN — METHOCARBAMOL 1000 MILLIGRAM(S): 500 TABLET, FILM COATED ORAL at 21:42

## 2023-12-28 RX ADMIN — Medication 25 MILLIGRAM(S): at 17:33

## 2023-12-28 NOTE — PROGRESS NOTE ADULT - ASSESSMENT
57 yo male, extensive comorbid conditions recently discharged from [facility] after prolonged hospital course in setting of MRSA Bacteremia and septic thrombophlebitis, hx of COPD, presenting for cough, diaphoresis, hypoxia in setting of testing Flu positive now admitted for COPD exacerbation.       # Acute Respiratory Failure, mixed. Respiratory acidosis resolved,   # COPD exacerbation  # Influenza AH1 infection  RVP (+) for Influenza AH1  CTA chest without any PE; improving lung process.  Hypoxic requiring 2LNC  -   - Supplemental O2, wean as tolerated, target SpO2 88-94%  - Oseltamivir 75mg twice daily   - Methylprednisolone  60mg IV q8h; transitioned to PO prednisone, short taper  - Albuterol/Ipratropium nebulizers q6  - Mobilize    # Thrombocytosis, reactive,  Normal Platelet count 2 weeks ago  # Anemia, normocytic  PRBC x 1 on 12/25/23  Low Iron. Adequate B12, Folate. Elevated Haptoglobin, LDH  Stable Hgb, Platelets improving  -  IV iron   - Monitor CBC  - Transfuse if Hgb<7 or symptoms    # Chronic Back Pain  Controlled on current regimen  - Tramadol 25mg/50mg PRN for moderate to severe pain  - Methocarbamol 1000mg Q8  - Gabapentin 100mg QHS  - Pain management follow up    # History of recent MRSA bacteremia  # History of recent Septic Thrombophlebitis  - Cefepime 2g iv q8h  - Vancomycin 1250mg iv q12h  - monitor trough and adjust per pharmacy to avoid drug toxicity  - 6 weeks duration ending 1/25/24    - Vascular Surgery follow up noted- no wound vacuum required. Local wound care    # HTN  - Amlodipine 5mg daily    # Schizoaffective complicated by tardive dyskinesia   - Dilvalproex, Clonazepam, Quetiapine and Gabapentin continued  - Benztropine for tardive dyskinesia      VTE Prophylaxis  - LMWH  OOBTC  PT evaluation    Disposition: Improved; may return to SNF.    Discussed with patient and SW    57 yo male, extensive comorbid conditions recently discharged from [facility] after prolonged hospital course in setting of MRSA Bacteremia and septic thrombophlebitis, hx of COPD, presenting for cough, diaphoresis, hypoxia in setting of testing Flu positive now admitted for COPD exacerbation.       # Acute Respiratory Failure, mixed. Respiratory acidosis resolved,   # COPD exacerbation  # Influenza AH1 infection  RVP (+) for Influenza AH1  CTA chest without any PE; improving lung process.  Hypoxic requiring 2LNC  -   - Supplemental O2, wean as tolerated, target SpO2 88-94%  - Oseltamivir 75mg twice daily   - Methylprednisolone  60mg IV q8h; transitioned to PO prednisone, short taper  - Albuterol/Ipratropium nebulizers q6  - Mobilize    # Thrombocytosis, reactive,  Normal Platelet count 2 weeks ago  # Anemia, normocytic  PRBC x 1 on 12/25/23  Low Iron. Adequate B12, Folate. Elevated Haptoglobin, LDH  Stable Hgb, Platelets improving  -  IV iron   - Monitor CBC  - Transfuse if Hgb<7 or symptoms    # Chronic Back Pain  Controlled on current regimen  - Tramadol 25mg/50mg PRN for moderate to severe pain  - Methocarbamol 1000mg Q8  - Gabapentin 100mg QHS  - Pain management follow up    # History of recent MRSA bacteremia  # History of recent Septic Thrombophlebitis  - Cefepime 2g iv q8h  - Vancomycin 1250mg iv q12h  - monitor trough and adjust per pharmacy to avoid drug toxicity  - 6 weeks duration ending 1/25/24    - Vascular Surgery follow up noted- no wound vacuum required. Local wound care    # HTN  - Amlodipine 5mg daily    # Schizoaffective complicated by tardive dyskinesia   - Dilvalproex, Clonazepam, Quetiapine and Gabapentin continued  - Benztropine for tardive dyskinesia      VTE Prophylaxis  - LMWH  OOBTC  PT evaluation    Disposition: Improved; may return to SNF.    Discussed with patient and SW

## 2023-12-28 NOTE — DISCHARGE NOTE PROVIDER - HOSPITAL COURSE
59 yo male, extensive comorbid conditions recently discharged from [facility] after prolonged hospital course in setting of MRSA Bacteremia and septic thrombophlebitis, hx of COPD, presenting for cough, diaphoresis, hypoxia.  RVP (+) for Influenza AH1. CTA chest without any PE; improving lung process.    He was admitted for Acute Respiratory Failure and COPD exacerbation due to Influenza AH1 infection and treated with supplemental O2, IV steroids and Oseltamivir. Gradually weaned off Oxygen and steroid transitioned to oral formulation. He was also noted to have significant thrombocytosis which was reactive and started downtrending. Anemia was noted with iron deficiency and supplemented. His chronic back pain was treated with Tramadol with good effect.  He was continued on IV antibiotics for history of recent MRSA bacteremia and Septic Thrombophlebitis; no bacteremia during current visit. Evaluated by vascular - no longer requiring wound vacuum. Anti-hypertensive up-titrated. Resumed on chronic medications for  Schizoaffective complicated by tardive dyskinesia.   57 yo male, extensive comorbid conditions recently discharged from [facility] after prolonged hospital course in setting of MRSA Bacteremia and septic thrombophlebitis, hx of COPD, presenting for cough, diaphoresis, hypoxia.  RVP (+) for Influenza AH1. CTA chest without any PE; improving lung process.    He was admitted for Acute Respiratory Failure and COPD exacerbation due to Influenza AH1 infection and treated with supplemental O2, IV steroids and Oseltamivir. Gradually weaned off Oxygen and steroid transitioned to oral formulation. He was also noted to have significant thrombocytosis which was reactive and started downtrending. Anemia was noted with iron deficiency and supplemented. His chronic back pain was treated with Tramadol with good effect.  He was continued on IV antibiotics for history of recent MRSA bacteremia and Septic Thrombophlebitis; no bacteremia during current visit. Evaluated by vascular - no longer requiring wound vacuum. Anti-hypertensive up-titrated. Resumed on chronic medications for  Schizoaffective complicated by tardive dyskinesia.

## 2023-12-28 NOTE — PHYSICAL THERAPY INITIAL EVALUATION ADULT - GENERAL OBSERVATIONS, REHAB EVAL
Pt received reclining in bed, bedrails x 4, bed alarm on, Wall O2 2LPM via NC intact, telemonitor intact. Pt agreeable to PT session.

## 2023-12-28 NOTE — PHYSICAL THERAPY INITIAL EVALUATION ADULT - GAIT PATTERN USED, PT EVAL
2-point gait Chief Complaint:  Patient is a 79y old  Female who presents with a chief complaint of Acute renal failure, high anion gap metabolic acidosis (14 Aug 2020 07:10)    Interval Events:   No acute overnight events  No nausea, vomiting, diarrhea     Allergies:  No Known Allergies    Hospital Medications:  aluminum hydroxide/magnesium hydroxide/simethicone Suspension 30 milliLiter(s) Oral every 6 hours PRN  ondansetron Injectable 4 milliGRAM(s) IV Push once  pantoprazole  Injectable 40 milliGRAM(s) IV Push every 12 hours  potassium phosphate / sodium phosphate Tablet (K-PHOS No. 2) 1 Tablet(s) Oral four times a day with meals  sucralfate suspension 1 Gram(s) Oral four times a day    PMHX/PSHX:  Dementia  Hypertension  No significant past surgical history    ROS:   General:  No fevers, chills or night sweats  ENT:  No sore throat or dysphagia  CV:  No pain or palpitations  Resp:  No dyspnea, cough or  wheezing  GI:  No pain, No nausea, No vomiting, No diarrhea, No rectal bleeding, No tarry stools,  Skin:  No rash or edema    PHYSICAL EXAM:   Vital Signs:  Vital Signs Last 24 Hrs  T(C): 36.7 (14 Aug 2020 05:54), Max: 37 (13 Aug 2020 15:15)  T(F): 98 (14 Aug 2020 05:54), Max: 98.6 (13 Aug 2020 15:15)  HR: 67 (14 Aug 2020 05:54) (67 - 96)  BP: 136/67 (14 Aug 2020 05:54) (122/68 - 143/78)  BP(mean): --  RR: 17 (14 Aug 2020 05:54) (17 - 18)  SpO2: 98% (14 Aug 2020 05:54) (98% - 100%)  Daily     Daily     GENERAL:  NAD, Appears stated age  HEENT:  NC/AT,  conjunctivae clear and pink, sclera -anicteric  CHEST:  Normal Effort, Breath sounds clear  HEART:  RRR, S1 + S2, no murmurs  ABDOMEN:  Soft, non-tender, non-distended, BS+  EXTEREMITIES:  no cyanosis  SKIN:  Warm & Dry.  NEURO:  Alert, oriented    LABS:                        8.9    7.60  )-----------( 200      ( 13 Aug 2020 20:00 )             29.3     Mean Cell Volume: 82.1 fL (08-13-20 @ 20:00)    08-13    139  |  106  |  9   ----------------------------<  73  3.6   |  21<L>  |  0.70    Ca    8.1<L>      13 Aug 2020 11:32  Phos  2.0     08-13  Mg     1.7     08-13    TPro  5.0<L>  /  Alb  2.5<L>  /  TBili  0.5  /  DBili  x   /  AST  172<H>  /  ALT  200<H>  /  AlkPhos  226<H>  08-13    LIVER FUNCTIONS - ( 13 Aug 2020 11:32 )  Alb: 2.5 g/dL / Pro: 5.0 g/dL / ALK PHOS: 226 u/L / ALT: 200 u/L / AST: 172 u/L / GGT: x           PTT - ( 13 Aug 2020 01:00 )  PTT:25.4 SEC                            8.9    7.60  )-----------( 200      ( 13 Aug 2020 20:00 )             29.3                         7.2    6.76  )-----------( 175      ( 13 Aug 2020 11:32 )             22.8                         7.4    6.28  )-----------( 178      ( 13 Aug 2020 01:00 )             22.8                         7.7    8.22  )-----------( 151      ( 12 Aug 2020 07:20 )             24.7                         8.4    7.80  )-----------( 147      ( 12 Aug 2020 01:00 )             27.5       Imaging:

## 2023-12-28 NOTE — PHYSICAL THERAPY INITIAL EVALUATION ADULT - PERTINENT HX OF CURRENT PROBLEM, REHAB EVAL
PMH: schizoaffective disorder, and tardive dyskinesia, recent MRSA bacteremia and septic thrombophlebitis.    Dx: COPD exacerbation, Flu A

## 2023-12-28 NOTE — PROGRESS NOTE ADULT - SUBJECTIVE AND OBJECTIVE BOX
HOSPITALIST PROGRESS NOTE    NOHEMY BRAN  [id number]  58yMale    Patient is a 58y old  Male who presents with a chief complaint of copd exacerbation/flu positive (28 Dec 2023 08:18)      SUBJECTIVE:   Chart reviewed since last visit.  Patient seen and examined at bedside for influenza, copd, respiratory failure  Feels better - denies any fever, chills, fever or chills      OBJECTIVE:  Vital Signs Last 24 Hrs  T(C): 36.9 (28 Dec 2023 09:22), Max: 36.9 (28 Dec 2023 04:58)  T(F): 98.5 (28 Dec 2023 09:22), Max: 98.5 (28 Dec 2023 09:22)  HR: 74 (28 Dec 2023 09:22) (67 - 86)  BP: 144/85 (28 Dec 2023 09:22) (118/73 - 164/94)   RR: 18 (28 Dec 2023 09:22) (18 - 189)  SpO2: 92% (28 Dec 2023 09:22) (91% - 100%)    Parameters below as of 28 Dec 2023 09:22  Patient On (Oxygen Delivery Method): nasal cannula  O2 Flow (L/min): 0.2    PHYSICAL EXAMINATION  General: Middle aged male lying in bed, comfortable  HEENT:  2LNC  NECK:  Supple  CVS: regular rate and rhythm S1 S2  RESP:  Fair air entry without any wheezing or crackles  GI:  Soft nondistended nontender BS+  : No suprapubic tenderness  MSK:  FROM. LUE PICC(+) RUE Wound    CNS:  Awake, alert, oriented, Fluent speech, follows commands  INTEG:  RUE V-shaped Wound healing well, LUE PICC  PSYCH:  Fair mood    MONITOR:  CAPILLARY BLOOD GLUCOSE            I&O's Summary    28 Dec 2023 07:01  -  28 Dec 2023 13:00  --------------------------------------------------------  IN: 0 mL / OUT: 600 mL / NET: -600 mL                            8.0    10.66 )-----------( 908      ( 27 Dec 2023 05:23 )             26.0       12-27    139  |  95<L>  |  24.9<H>  ----------------------------<  132<H>  4.7   |  32.0<H>  |  0.62    Ca    9.1      27 Dec 2023 05:23          Urinalysis Basic - ( 27 Dec 2023 05:23 )    Color: x / Appearance: x / SG: x / pH: x  Gluc: 132 mg/dL / Ketone: x  / Bili: x / Urobili: x   Blood: x / Protein: x / Nitrite: x   Leuk Esterase: x / RBC: x / WBC x   Sq Epi: x / Non Sq Epi: x / Bacteria: x        Culture:    TTE:    RADIOLOGY        MEDICATIONS  (STANDING):  albuterol/ipratropium for Nebulization 3 milliLiter(s) Nebulizer every 6 hours  amLODIPine   Tablet 10 milliGRAM(s) Oral daily  aspirin enteric coated 81 milliGRAM(s) Oral daily  benztropine 2 milliGRAM(s) Oral two times a day  cefepime  Injectable. 2000 milliGRAM(s) IV Push every 8 hours  cefepime  Injectable.      chlorhexidine 2% Cloths 1 Application(s) Topical <User Schedule>  clonazePAM  Tablet 0.5 milliGRAM(s) Oral daily  divalproex  milliGRAM(s) Oral two times a day  enoxaparin Injectable 40 milliGRAM(s) SubCutaneous every 24 hours  folic acid 1 milliGRAM(s) Oral daily  gabapentin 100 milliGRAM(s) Oral at bedtime  hydrOXYzine hydrochloride 25 milliGRAM(s) Oral two times a day  iron sucrose IVPB 200 milliGRAM(s) IV Intermittent every 24 hours  methocarbamol 1000 milliGRAM(s) Oral every 8 hours  nicotine - 21 mG/24Hr(s) Patch 1 Patch Transdermal daily  oseltamivir 75 milliGRAM(s) Oral two times a day  pantoprazole    Tablet 40 milliGRAM(s) Oral before breakfast  polyethylene glycol 3350 17 Gram(s) Oral at bedtime  predniSONE   Tablet 50 milliGRAM(s) Oral daily  QUEtiapine 125 milliGRAM(s) Oral at bedtime  senna 2 Tablet(s) Oral at bedtime  thiamine 100 milliGRAM(s) Oral daily  vancomycin  IVPB. 1250 milliGRAM(s) IV Intermittent every 12 hours      MEDICATIONS  (PRN):  acetaminophen     Tablet .. 650 milliGRAM(s) Oral every 6 hours PRN Temp greater or equal to 38C (100.4F), Mild Pain (1 - 3)  calcium carbonate    500 mG (Tums) Chewable 1 Tablet(s) Chew every 6 hours PRN Heartburn  hydrALAZINE 25 milliGRAM(s) Oral every 6 hours PRN Systolic/Diastolic >/160/95  traMADol 25 milliGRAM(s) Oral every 6 hours PRN Moderate Pain (4 - 6)  traMADol 50 milliGRAM(s) Oral every 8 hours PRN Severe Pain (7 - 10)     HOSPITALIST PROGRESS NOTE    NOHEMY BRAN  [id number]  58yMale    Patient is a 58y old  Male who presents with a chief complaint of copd exacerbation/flu positive (28 Dec 2023 08:18)      SUBJECTIVE:   Chart reviewed since last visit.  Patient seen and examined at bedside for influenza, copd, respiratory failure  Feels better - denies any fever, chills, fever or chills      OBJECTIVE:  Vital Signs Last 24 Hrs  T(C): 36.9 (28 Dec 2023 09:22), Max: 36.9 (28 Dec 2023 04:58)  T(F): 98.5 (28 Dec 2023 09:22), Max: 98.5 (28 Dec 2023 09:22)  HR: 74 (28 Dec 2023 09:22) (67 - 86)  BP: 144/85 (28 Dec 2023 09:22) (118/73 - 164/94)   RR: 18 (28 Dec 2023 09:22) (18 - 189)  SpO2: 92% (28 Dec 2023 09:22) (91% - 100%)    Parameters below as of 28 Dec 2023 09:22  Patient On (Oxygen Delivery Method): nasal cannula  O2 Flow (L/min): 0.2    PHYSICAL EXAMINATION  General: Middle aged male lying in bed, comfortable  HEENT:  2LNC  NECK:  Supple  CVS: regular rate and rhythm S1 S2  RESP:  Fair air entry without any wheezing or crackles  GI:  Soft nondistended nontender BS+  : No suprapubic tenderness  MSK:  FROM. LUE PICC(+) RUE Wound    CNS:  Awake, alert, oriented, Fluent speech, follows commands  INTEG:  RUE V-shaped Wound healing well, LUE PICC  PSYCH:  Fair mood    MONITOR:  CAPILLARY BLOOD GLUCOSE            I&O's Summary    28 Dec 2023 07:01  -  28 Dec 2023 13:00  --------------------------------------------------------  IN: 0 mL / OUT: 600 mL / NET: -600 mL                            8.0    10.66 )-----------( 908      ( 27 Dec 2023 05:23 )             26.0       12-27    139  |  95<L>  |  24.9<H>  ----------------------------<  132<H>  4.7   |  32.0<H>  |  0.62    Ca    9.1      27 Dec 2023 05:23          Urinalysis Basic - ( 27 Dec 2023 05:23 )    Color: x / Appearance: x / SG: x / pH: x  Gluc: 132 mg/dL / Ketone: x  / Bili: x / Urobili: x   Blood: x / Protein: x / Nitrite: x   Leuk Esterase: x / RBC: x / WBC x   Sq Epi: x / Non Sq Epi: x / Bacteria: x        Culture:    TTE:    RADIOLOGY        MEDICATIONS  (STANDING):  albuterol/ipratropium for Nebulization 3 milliLiter(s) Nebulizer every 6 hours  amLODIPine   Tablet 10 milliGRAM(s) Oral daily  aspirin enteric coated 81 milliGRAM(s) Oral daily  benztropine 2 milliGRAM(s) Oral two times a day  cefepime  Injectable. 2000 milliGRAM(s) IV Push every 8 hours  cefepime  Injectable.      chlorhexidine 2% Cloths 1 Application(s) Topical <User Schedule>  clonazePAM  Tablet 0.5 milliGRAM(s) Oral daily  divalproex  milliGRAM(s) Oral two times a day  enoxaparin Injectable 40 milliGRAM(s) SubCutaneous every 24 hours  folic acid 1 milliGRAM(s) Oral daily  gabapentin 100 milliGRAM(s) Oral at bedtime  hydrOXYzine hydrochloride 25 milliGRAM(s) Oral two times a day  iron sucrose IVPB 200 milliGRAM(s) IV Intermittent every 24 hours  methocarbamol 1000 milliGRAM(s) Oral every 8 hours  nicotine - 21 mG/24Hr(s) Patch 1 Patch Transdermal daily  oseltamivir 75 milliGRAM(s) Oral two times a day  pantoprazole    Tablet 40 milliGRAM(s) Oral before breakfast  polyethylene glycol 3350 17 Gram(s) Oral at bedtime  predniSONE   Tablet 50 milliGRAM(s) Oral daily  QUEtiapine 125 milliGRAM(s) Oral at bedtime  senna 2 Tablet(s) Oral at bedtime  thiamine 100 milliGRAM(s) Oral daily  vancomycin  IVPB. 1250 milliGRAM(s) IV Intermittent every 12 hours      MEDICATIONS  (PRN):  acetaminophen     Tablet .. 650 milliGRAM(s) Oral every 6 hours PRN Temp greater or equal to 38C (100.4F), Mild Pain (1 - 3)  calcium carbonate    500 mG (Tums) Chewable 1 Tablet(s) Chew every 6 hours PRN Heartburn  hydrALAZINE 25 milliGRAM(s) Oral every 6 hours PRN Systolic/Diastolic >/160/95  traMADol 25 milliGRAM(s) Oral every 6 hours PRN Moderate Pain (4 - 6)  traMADol 50 milliGRAM(s) Oral every 8 hours PRN Severe Pain (7 - 10)

## 2023-12-28 NOTE — DISCHARGE NOTE PROVIDER - ATTENDING DISCHARGE PHYSICAL EXAMINATION:
OBJECTIVE:  Vital Signs Last 24 Hrs  T(C): 36.9 (28 Dec 2023 04:58), Max: 36.9 (27 Dec 2023 11:16)  T(F): 98.4 (28 Dec 2023 04:58), Max: 98.4 (27 Dec 2023 11:16)  HR: 71 (28 Dec 2023 04:58) (67 - 86)  BP: 164/94 (28 Dec 2023 04:58) (118/73 - 164/94)  BP(mean): --  RR: 18 (28 Dec 2023 04:58) (18 - 189)  SpO2: 91% (28 Dec 2023 08:29) (91% - 100%)    Parameters below as of 28 Dec 2023 08:29  Patient On (Oxygen Delivery Method): nasal cannula  O2 Flow (L/min): 2    PHYSICAL EXAMINATION  General: Middle aged male lying on stretcher, comfortable  HEENT:  2LNC  NECK:  Supple  CVS: regular rate and rhythm S1 S2  RESP:  Fair air entry without any wheezing or crackles  GI:  Soft nondistended nontender BS+  : No suprapubic tenderness  MSK:  FROM. LUE PICC(+) RUE Wound    CNS:  Awake, alert, oriented, Fluent speech, follows commands  INTEG:  RUE Wound, LUE PICC  PSYCH:  Fair mood

## 2023-12-28 NOTE — PHYSICAL THERAPY INITIAL EVALUATION ADULT - CRITERIA FOR SKILLED THERAPEUTIC INTERVENTIONS
RADHA/impairments found/functional limitations in following categories/anticipated discharge recommendation

## 2023-12-28 NOTE — DISCHARGE NOTE PROVIDER - NSDCMRMEDTOKEN_GEN_ALL_CORE_FT
acetaminophen 325 mg oral tablet: 3 tab(s) orally every 6 hours  amLODIPine 5 mg oral tablet: 1 tab(s) orally once a day  aspirin 81 mg oral delayed release tablet: 1 tab(s) orally once a day  benztropine 2 mg oral tablet: 1 tab(s) orally 2 times a day  calcium carbonate 500 mg (200 mg elemental calcium) oral tablet, chewable: 1 tab(s) orally every 6 hours As needed Heartburn  cefepime: 2,000 milligram(s) intravenous every 8 hours end date 1/25/2024  clonazePAM 0.5 mg oral tablet: 1 tab(s) orally once a day  divalproex sodium 500 mg oral delayed release tablet: 1 tab(s) orally 2 times a day  folic acid 1 mg oral tablet: 1 tab(s) orally once a day  gabapentin 100 mg oral capsule: 1 cap(s) orally once a day (at bedtime)  hydrOXYzine hydrochloride 25 mg oral tablet: 2 tab(s) orally 2 times a day  ipratropium-albuterol 0.5 mg-2.5 mg/3 mL inhalation solution: 3 milliliter(s) inhaled every 6 hours As needed Shortness of Breath and/or Wheezing  lidocaine 4% topical film: Apply topically to affected area once a day  methocarbamol 500 mg oral tablet: 2 tab(s) orally every 8 hours  nicotine 21 mg/24 hr transdermal film, extended release: 1 patch transdermal once a day  oseltamivir 75 mg oral capsule: 1 cap(s) orally 2 times a day  pantoprazole 40 mg oral delayed release tablet: 1 tab(s) orally once a day (before a meal)  polyethylene glycol 3350 oral powder for reconstitution: 17 gram(s) orally once a day (at bedtime)  predniSONE 50 mg oral tablet: 1 tab(s) orally once a day Decrease by 10mg every 2 days till finish  QUEtiapine 25 mg oral tablet: 5 tab(s) orally once a day (at bedtime)  senna leaf extract oral tablet: 2 tab(s) orally once a day (at bedtime)  traMADol 50 mg oral tablet: 0.5 tab(s) orally every 6 hours As needed Moderate Pain (4 - 6)  traMADol 50 mg oral tablet: 1 tab(s) orally every 8 hours As needed Severe Pain (7 - 10)  vancomycin 1.25 g intravenous injection: 1.25 gram(s) intravenous every 12 hours end date 1/25/2024 **dose should be adjusted base on trough**

## 2023-12-28 NOTE — DISCHARGE NOTE PROVIDER - NSDCCPCAREPLAN_GEN_ALL_CORE_FT
PRINCIPAL DISCHARGE DIAGNOSIS  Diagnosis: Influenza A  Assessment and Plan of Treatment:       SECONDARY DISCHARGE DIAGNOSES  Diagnosis: Acute respiratory failure with hypoxia  Assessment and Plan of Treatment: Wean off Oxygen as tolerated  Nebs as needed    Diagnosis: COPD exacerbation  Assessment and Plan of Treatment: Prednisone taper every 2 days by 10mg  Nebs as needed  Smoking cessation with nicotine replacement    Diagnosis: MRSA bacteremia  Assessment and Plan of Treatment: Resume IV antibiotics  Weekly CBC, CMP  Follow up with ID    Diagnosis: Septic thrombophlebitis  Assessment and Plan of Treatment: Wound care as outline  Follow up with Vascular Surgery    Diagnosis: Reactive thrombocytosis  Assessment and Plan of Treatment: Monitor CBC    Diagnosis: Anemia of chronic disease  Assessment and Plan of Treatment: Monitor CBC    Diagnosis: HTN (hypertension)  Assessment and Plan of Treatment: DIet and medications as prescribed    Diagnosis: Schizoaffective disorder  Assessment and Plan of Treatment: Resume home medications

## 2023-12-28 NOTE — DISCHARGE NOTE PROVIDER - NSDCACTIVITY_GEN_ALL_CORE
Bathing allowed/Sex allowed/Do not drive or operate machinery/Showering allowed/Do not make important decisions/Stairs allowed/Walking - Indoors allowed/No heavy lifting/straining/Walking - Outdoors allowed/Follow Instructions Provided by your Surgical Team

## 2023-12-28 NOTE — PHYSICAL THERAPY INITIAL EVALUATION ADULT - DIAGNOSIS, PT EVAL
Pt demonstrates functional limitations in transfers and ambulation due to impaired cognition, decreased safety awareness and decreased balance.

## 2023-12-28 NOTE — DISCHARGE NOTE PROVIDER - PROVIDER TOKENS
PROVIDER:[TOKEN:[[id number]]],PROVIDER:[TOKEN:[[id number]]] PROVIDER:[TOKEN:[[id number]]],PROVIDER:[TOKEN:[[id number]]]

## 2023-12-28 NOTE — DISCHARGE NOTE PROVIDER - CARE PROVIDER_API CALL
Alva Hui  Infectious Disease  [address] [place] [address]  Phone: [phone]  Fax: [phone]  Follow Up Time:     Lenora Ham  Surgery  [address], [address]  [place] [address]  Phone: [phone]  Fax: [phone]  Follow Up Time:    Alva Hui  Infectious Disease  [address] [place] [address]  Phone: [phone]  Fax: [phone]  Follow Up Time:     Lenora Ham  Surgery  [address] [facility], [address]  [place] [address]  Phone: [phone]  Fax: [phone]  Follow Up Time:

## 2023-12-28 NOTE — PHYSICAL THERAPY INITIAL EVALUATION ADULT - ADDITIONAL COMMENTS
Pt unable to provide prior level of function and social history due to increasing agitation with PTs questions.

## 2023-12-29 LAB
CULTURE RESULTS: SIGNIFICANT CHANGE UP
MRSA PCR RESULT.: SIGNIFICANT CHANGE UP
MRSA PCR RESULT.: SIGNIFICANT CHANGE UP
S AUREUS DNA NOSE QL NAA+PROBE: SIGNIFICANT CHANGE UP
S AUREUS DNA NOSE QL NAA+PROBE: SIGNIFICANT CHANGE UP
SPECIMEN SOURCE: SIGNIFICANT CHANGE UP
VANCOMYCIN TROUGH SERPL-MCNC: 16 UG/ML — SIGNIFICANT CHANGE UP (ref 10–20)
VANCOMYCIN TROUGH SERPL-MCNC: 16 UG/ML — SIGNIFICANT CHANGE UP (ref 10–20)

## 2023-12-29 PROCEDURE — 99232 SBSQ HOSP IP/OBS MODERATE 35: CPT

## 2023-12-29 RX ADMIN — Medication 0.5 MILLIGRAM(S): at 12:20

## 2023-12-29 RX ADMIN — METHOCARBAMOL 1000 MILLIGRAM(S): 500 TABLET, FILM COATED ORAL at 22:46

## 2023-12-29 RX ADMIN — Medication 3 MILLILITER(S): at 03:37

## 2023-12-29 RX ADMIN — Medication 1 PATCH: at 12:18

## 2023-12-29 RX ADMIN — Medication 50 MILLIGRAM(S): at 06:01

## 2023-12-29 RX ADMIN — Medication 3 MILLILITER(S): at 16:00

## 2023-12-29 RX ADMIN — TRAMADOL HYDROCHLORIDE 25 MILLIGRAM(S): 50 TABLET ORAL at 03:10

## 2023-12-29 RX ADMIN — Medication 250 MILLIGRAM(S): at 18:21

## 2023-12-29 RX ADMIN — Medication 3 MILLILITER(S): at 20:43

## 2023-12-29 RX ADMIN — Medication 25 MILLIGRAM(S): at 06:03

## 2023-12-29 RX ADMIN — TRAMADOL HYDROCHLORIDE 50 MILLIGRAM(S): 50 TABLET ORAL at 19:09

## 2023-12-29 RX ADMIN — TRAMADOL HYDROCHLORIDE 25 MILLIGRAM(S): 50 TABLET ORAL at 12:17

## 2023-12-29 RX ADMIN — TRAMADOL HYDROCHLORIDE 25 MILLIGRAM(S): 50 TABLET ORAL at 13:12

## 2023-12-29 RX ADMIN — Medication 2 MILLIGRAM(S): at 18:21

## 2023-12-29 RX ADMIN — CEFEPIME 2000 MILLIGRAM(S): 1 INJECTION, POWDER, FOR SOLUTION INTRAMUSCULAR; INTRAVENOUS at 05:59

## 2023-12-29 RX ADMIN — Medication 2 MILLIGRAM(S): at 06:00

## 2023-12-29 RX ADMIN — METHOCARBAMOL 1000 MILLIGRAM(S): 500 TABLET, FILM COATED ORAL at 06:02

## 2023-12-29 RX ADMIN — TRAMADOL HYDROCHLORIDE 25 MILLIGRAM(S): 50 TABLET ORAL at 06:08

## 2023-12-29 RX ADMIN — DIVALPROEX SODIUM 500 MILLIGRAM(S): 500 TABLET, DELAYED RELEASE ORAL at 18:21

## 2023-12-29 RX ADMIN — IRON SUCROSE 110 MILLIGRAM(S): 20 INJECTION, SOLUTION INTRAVENOUS at 03:09

## 2023-12-29 RX ADMIN — CEFEPIME 2000 MILLIGRAM(S): 1 INJECTION, POWDER, FOR SOLUTION INTRAMUSCULAR; INTRAVENOUS at 14:48

## 2023-12-29 RX ADMIN — QUETIAPINE FUMARATE 125 MILLIGRAM(S): 200 TABLET, FILM COATED ORAL at 22:45

## 2023-12-29 RX ADMIN — AMLODIPINE BESYLATE 10 MILLIGRAM(S): 2.5 TABLET ORAL at 06:04

## 2023-12-29 RX ADMIN — Medication 100 MILLIGRAM(S): at 12:17

## 2023-12-29 RX ADMIN — PANTOPRAZOLE SODIUM 40 MILLIGRAM(S): 20 TABLET, DELAYED RELEASE ORAL at 06:04

## 2023-12-29 RX ADMIN — Medication 1 MILLIGRAM(S): at 12:17

## 2023-12-29 RX ADMIN — TRAMADOL HYDROCHLORIDE 50 MILLIGRAM(S): 50 TABLET ORAL at 18:36

## 2023-12-29 RX ADMIN — Medication 75 MILLIGRAM(S): at 06:02

## 2023-12-29 RX ADMIN — Medication 75 MILLIGRAM(S): at 18:21

## 2023-12-29 RX ADMIN — ENOXAPARIN SODIUM 40 MILLIGRAM(S): 100 INJECTION SUBCUTANEOUS at 06:00

## 2023-12-29 RX ADMIN — Medication 250 MILLIGRAM(S): at 06:48

## 2023-12-29 RX ADMIN — METHOCARBAMOL 1000 MILLIGRAM(S): 500 TABLET, FILM COATED ORAL at 14:48

## 2023-12-29 RX ADMIN — CHLORHEXIDINE GLUCONATE 1 APPLICATION(S): 213 SOLUTION TOPICAL at 06:03

## 2023-12-29 RX ADMIN — DIVALPROEX SODIUM 500 MILLIGRAM(S): 500 TABLET, DELAYED RELEASE ORAL at 06:01

## 2023-12-29 RX ADMIN — Medication 1 PATCH: at 12:17

## 2023-12-29 RX ADMIN — SENNA PLUS 2 TABLET(S): 8.6 TABLET ORAL at 22:45

## 2023-12-29 RX ADMIN — GABAPENTIN 100 MILLIGRAM(S): 400 CAPSULE ORAL at 22:45

## 2023-12-29 RX ADMIN — Medication 25 MILLIGRAM(S): at 18:21

## 2023-12-29 RX ADMIN — CEFEPIME 2000 MILLIGRAM(S): 1 INJECTION, POWDER, FOR SOLUTION INTRAMUSCULAR; INTRAVENOUS at 22:45

## 2023-12-29 RX ADMIN — Medication 81 MILLIGRAM(S): at 12:17

## 2023-12-29 NOTE — PROGRESS NOTE ADULT - ASSESSMENT
57 yo male, extensive comorbid conditions recently discharged from [facility] after prolonged hospital course in setting of MRSA Bacteremia and septic thrombophlebitis, hx of COPD, presenting for cough, diaphoresis, hypoxia in setting of testing Flu positive now admitted for COPD exacerbation.     # Acute Respiratory Failure, mixed. Respiratory acidosis resolved,   # COPD exacerbation  # Influenza AH1 infection  RVP (+) for Influenza AH1  CTA chest without any PE; improving lung process.  Hypoxic requiring 2LNC  -   - Supplemental O2, wean as tolerated, target SpO2 88-94%  - Oseltamivir 75mg twice daily   - Methylprednisolone  60mg IV q8h; transitioned to PO prednisone, short taper  - Albuterol/Ipratropium nebulizers q6  - Mobilize    # Thrombocytosis, reactive,  Normal Platelet count 2 weeks ago  # Anemia, normocytic  PRBC x 1 on 12/25/23  Low Iron. Adequate B12, Folate. Elevated Haptoglobin, LDH  Stable Hgb, Platelets improving  -  IV iron   - Monitor CBC  - Transfuse if Hgb<7 or symptoms    # Chronic Back Pain  Controlled on current regimen  - Tramadol 25mg/50mg PRN for moderate to severe pain  - Methocarbamol 1000mg Q8  - Gabapentin 100mg QHS  - Pain management follow up    # History of recent MRSA bacteremia  # History of recent Septic Thrombophlebitis  - Cefepime 2g iv q8h  - Vancomycin 1250mg iv q12h  - monitor trough and adjust per pharmacy to avoid drug toxicity  - 6 weeks duration ending 1/25/24    - Vascular Surgery follow up noted- no wound vacuum required. Local wound care    # HTN  - Amlodipine 5mg daily    # Schizoaffective complicated by tardive dyskinesia   - Dilvalproex, Clonazepam, Quetiapine and Gabapentin continued  - Benztropine for tardive dyskinesia      VTE Prophylaxis  - LMWH  OOBTC  PT evaluation    Disposition: Pending auth for return to SNF.    Discussed with patient and SW    59 yo male, extensive comorbid conditions recently discharged from [facility] after prolonged hospital course in setting of MRSA Bacteremia and septic thrombophlebitis, hx of COPD, presenting for cough, diaphoresis, hypoxia in setting of testing Flu positive now admitted for COPD exacerbation.     # Acute Respiratory Failure, mixed. Respiratory acidosis resolved,   # COPD exacerbation  # Influenza AH1 infection  RVP (+) for Influenza AH1  CTA chest without any PE; improving lung process.  Hypoxic requiring 2LNC  -   - Supplemental O2, wean as tolerated, target SpO2 88-94%  - Oseltamivir 75mg twice daily   - Methylprednisolone  60mg IV q8h; transitioned to PO prednisone, short taper  - Albuterol/Ipratropium nebulizers q6  - Mobilize    # Thrombocytosis, reactive,  Normal Platelet count 2 weeks ago  # Anemia, normocytic  PRBC x 1 on 12/25/23  Low Iron. Adequate B12, Folate. Elevated Haptoglobin, LDH  Stable Hgb, Platelets improving  -  IV iron   - Monitor CBC  - Transfuse if Hgb<7 or symptoms    # Chronic Back Pain  Controlled on current regimen  - Tramadol 25mg/50mg PRN for moderate to severe pain  - Methocarbamol 1000mg Q8  - Gabapentin 100mg QHS  - Pain management follow up    # History of recent MRSA bacteremia  # History of recent Septic Thrombophlebitis  - Cefepime 2g iv q8h  - Vancomycin 1250mg iv q12h  - monitor trough and adjust per pharmacy to avoid drug toxicity  - 6 weeks duration ending 1/25/24    - Vascular Surgery follow up noted- no wound vacuum required. Local wound care    # HTN  - Amlodipine 5mg daily    # Schizoaffective complicated by tardive dyskinesia   - Dilvalproex, Clonazepam, Quetiapine and Gabapentin continued  - Benztropine for tardive dyskinesia      VTE Prophylaxis  - LMWH  OOBTC  PT evaluation    Disposition: Pending auth for return to SNF.    Discussed with patient and SW

## 2023-12-29 NOTE — PROGRESS NOTE ADULT - SUBJECTIVE AND OBJECTIVE BOX
Al Sarkar M.D.    Patient is a 58y old  Male who presents with a chief complaint of copd exacerbation/flu positive (28 Dec 2023 13:00)      SUBJECTIVE / OVERNIGHT EVENTS: no event overnight.     Patient denies chest pain, SOB, abd pain, N/V, fever, chills, dysuria or any other complaints. All remainder ROS negative.     MEDICATIONS  (STANDING):  albuterol/ipratropium for Nebulization 3 milliLiter(s) Nebulizer every 6 hours  amLODIPine   Tablet 10 milliGRAM(s) Oral daily  aspirin enteric coated 81 milliGRAM(s) Oral daily  benztropine 2 milliGRAM(s) Oral two times a day  cefepime  Injectable.      cefepime  Injectable. 2000 milliGRAM(s) IV Push every 8 hours  chlorhexidine 2% Cloths 1 Application(s) Topical <User Schedule>  clonazePAM  Tablet 0.5 milliGRAM(s) Oral daily  divalproex  milliGRAM(s) Oral two times a day  enoxaparin Injectable 40 milliGRAM(s) SubCutaneous every 24 hours  folic acid 1 milliGRAM(s) Oral daily  gabapentin 100 milliGRAM(s) Oral at bedtime  hydrOXYzine hydrochloride 25 milliGRAM(s) Oral two times a day  iron sucrose IVPB 200 milliGRAM(s) IV Intermittent every 24 hours  methocarbamol 1000 milliGRAM(s) Oral every 8 hours  nicotine - 21 mG/24Hr(s) Patch 1 Patch Transdermal daily  oseltamivir 75 milliGRAM(s) Oral two times a day  pantoprazole    Tablet 40 milliGRAM(s) Oral before breakfast  polyethylene glycol 3350 17 Gram(s) Oral at bedtime  predniSONE   Tablet 50 milliGRAM(s) Oral daily  QUEtiapine 125 milliGRAM(s) Oral at bedtime  senna 2 Tablet(s) Oral at bedtime  thiamine 100 milliGRAM(s) Oral daily  vancomycin  IVPB. 1250 milliGRAM(s) IV Intermittent every 12 hours    MEDICATIONS  (PRN):  acetaminophen     Tablet .. 650 milliGRAM(s) Oral every 6 hours PRN Temp greater or equal to 38C (100.4F), Mild Pain (1 - 3)  calcium carbonate    500 mG (Tums) Chewable 1 Tablet(s) Chew every 6 hours PRN Heartburn  hydrALAZINE 25 milliGRAM(s) Oral every 6 hours PRN Systolic/Diastolic >/160/95  traMADol 25 milliGRAM(s) Oral every 6 hours PRN Moderate Pain (4 - 6)  traMADol 50 milliGRAM(s) Oral every 8 hours PRN Severe Pain (7 - 10)      I&O's Summary    28 Dec 2023 07:01  -  29 Dec 2023 07:00  --------------------------------------------------------  IN: 0 mL / OUT: 600 mL / NET: -600 mL        PHYSICAL EXAM:  Vital Signs Last 24 Hrs  T(C): 36.9 (29 Dec 2023 09:24), Max: 36.9 (29 Dec 2023 09:24)  T(F): 98.5 (29 Dec 2023 09:24), Max: 98.5 (29 Dec 2023 09:24)  HR: 69 (29 Dec 2023 09:24) (66 - 94)  BP: 120/75 (29 Dec 2023 09:24) (120/75 - 153/86)  BP(mean): --  RR: 18 (29 Dec 2023 09:24) (18 - 18)  SpO2: 96% (29 Dec 2023 09:24) (90% - 96%)    Parameters below as of 29 Dec 2023 09:24  Patient On (Oxygen Delivery Method): nasal cannula  O2 Flow (L/min): 0.2    General: Middle aged male lying in bed, comfortable  HEENT:  2LNC  NECK:  Supple  CVS: regular rate and rhythm S1 S2  RESP:  Fair air entry without any wheezing or crackles  GI:  Soft nondistended nontender BS+  : No suprapubic tenderness  MSK:  FROM. LUE PICC(+) RUE Wound    CNS:  Awake, alert, oriented, Fluent speech, follows commands  INTEG:  RUE V-shaped Wound healing well, LUE PICC  PSYCH:  Fair mood    LABS:    CAPILLARY BLOOD GLUCOSE      RADIOLOGY & ADDITIONAL TESTS:  Results Reviewed:   Imaging Personally Reviewed:  Electrocardiogram Personally Reviewed:

## 2023-12-30 ENCOUNTER — TRANSCRIPTION ENCOUNTER (OUTPATIENT)
Age: 58
End: 2023-12-30

## 2023-12-30 VITALS
HEART RATE: 78 BPM | DIASTOLIC BLOOD PRESSURE: 75 MMHG | SYSTOLIC BLOOD PRESSURE: 128 MMHG | TEMPERATURE: 98 F | OXYGEN SATURATION: 93 % | RESPIRATION RATE: 18 BRPM

## 2023-12-30 PROCEDURE — 80048 BASIC METABOLIC PNL TOTAL CA: CPT

## 2023-12-30 PROCEDURE — 80053 COMPREHEN METABOLIC PANEL: CPT

## 2023-12-30 PROCEDURE — 82607 VITAMIN B-12: CPT

## 2023-12-30 PROCEDURE — 71045 X-RAY EXAM CHEST 1 VIEW: CPT

## 2023-12-30 PROCEDURE — 97163 PT EVAL HIGH COMPLEX 45 MIN: CPT

## 2023-12-30 PROCEDURE — 82803 BLOOD GASES ANY COMBINATION: CPT

## 2023-12-30 PROCEDURE — 85014 HEMATOCRIT: CPT

## 2023-12-30 PROCEDURE — 85027 COMPLETE CBC AUTOMATED: CPT

## 2023-12-30 PROCEDURE — 94760 N-INVAS EAR/PLS OXIMETRY 1: CPT

## 2023-12-30 PROCEDURE — 87640 STAPH A DNA AMP PROBE: CPT

## 2023-12-30 PROCEDURE — 83615 LACTATE (LD) (LDH) ENZYME: CPT

## 2023-12-30 PROCEDURE — 71275 CT ANGIOGRAPHY CHEST: CPT | Mod: ME

## 2023-12-30 PROCEDURE — 86901 BLOOD TYPING SEROLOGIC RH(D): CPT

## 2023-12-30 PROCEDURE — 84466 ASSAY OF TRANSFERRIN: CPT

## 2023-12-30 PROCEDURE — 85018 HEMOGLOBIN: CPT

## 2023-12-30 PROCEDURE — G1004: CPT

## 2023-12-30 PROCEDURE — 84132 ASSAY OF SERUM POTASSIUM: CPT

## 2023-12-30 PROCEDURE — 0225U NFCT DS DNA&RNA 21 SARSCOV2: CPT

## 2023-12-30 PROCEDURE — 84484 ASSAY OF TROPONIN QUANT: CPT

## 2023-12-30 PROCEDURE — 83540 ASSAY OF IRON: CPT

## 2023-12-30 PROCEDURE — 82746 ASSAY OF FOLIC ACID SERUM: CPT

## 2023-12-30 PROCEDURE — 85379 FIBRIN DEGRADATION QUANT: CPT

## 2023-12-30 PROCEDURE — 82330 ASSAY OF CALCIUM: CPT

## 2023-12-30 PROCEDURE — 86900 BLOOD TYPING SEROLOGIC ABO: CPT

## 2023-12-30 PROCEDURE — P9016: CPT

## 2023-12-30 PROCEDURE — 86923 COMPATIBILITY TEST ELECTRIC: CPT

## 2023-12-30 PROCEDURE — 36415 COLL VENOUS BLD VENIPUNCTURE: CPT

## 2023-12-30 PROCEDURE — 96374 THER/PROPH/DIAG INJ IV PUSH: CPT

## 2023-12-30 PROCEDURE — 96376 TX/PRO/DX INJ SAME DRUG ADON: CPT

## 2023-12-30 PROCEDURE — 82947 ASSAY GLUCOSE BLOOD QUANT: CPT

## 2023-12-30 PROCEDURE — 83605 ASSAY OF LACTIC ACID: CPT

## 2023-12-30 PROCEDURE — 85025 COMPLETE CBC W/AUTO DIFF WBC: CPT

## 2023-12-30 PROCEDURE — 99291 CRITICAL CARE FIRST HOUR: CPT

## 2023-12-30 PROCEDURE — 87641 MR-STAPH DNA AMP PROBE: CPT

## 2023-12-30 PROCEDURE — 83550 IRON BINDING TEST: CPT

## 2023-12-30 PROCEDURE — 82728 ASSAY OF FERRITIN: CPT

## 2023-12-30 PROCEDURE — 80202 ASSAY OF VANCOMYCIN: CPT

## 2023-12-30 PROCEDURE — 83010 ASSAY OF HAPTOGLOBIN QUANT: CPT

## 2023-12-30 PROCEDURE — 83880 ASSAY OF NATRIURETIC PEPTIDE: CPT

## 2023-12-30 PROCEDURE — 87040 BLOOD CULTURE FOR BACTERIA: CPT

## 2023-12-30 PROCEDURE — 99232 SBSQ HOSP IP/OBS MODERATE 35: CPT

## 2023-12-30 PROCEDURE — 96375 TX/PRO/DX INJ NEW DRUG ADDON: CPT

## 2023-12-30 PROCEDURE — 86850 RBC ANTIBODY SCREEN: CPT

## 2023-12-30 PROCEDURE — 84295 ASSAY OF SERUM SODIUM: CPT

## 2023-12-30 PROCEDURE — 94640 AIRWAY INHALATION TREATMENT: CPT

## 2023-12-30 PROCEDURE — 82435 ASSAY OF BLOOD CHLORIDE: CPT

## 2023-12-30 PROCEDURE — 93005 ELECTROCARDIOGRAM TRACING: CPT

## 2023-12-30 PROCEDURE — 36430 TRANSFUSION BLD/BLD COMPNT: CPT

## 2023-12-30 RX ORDER — INFLUENZA VIRUS VACCINE 15; 15; 15; 15 UG/.5ML; UG/.5ML; UG/.5ML; UG/.5ML
0.5 SUSPENSION INTRAMUSCULAR ONCE
Refills: 0 | Status: COMPLETED | OUTPATIENT
Start: 2023-12-30 | End: 2023-12-30

## 2023-12-30 RX ADMIN — AMLODIPINE BESYLATE 10 MILLIGRAM(S): 2.5 TABLET ORAL at 05:43

## 2023-12-30 RX ADMIN — Medication 1 PATCH: at 08:51

## 2023-12-30 RX ADMIN — Medication 81 MILLIGRAM(S): at 12:11

## 2023-12-30 RX ADMIN — CHLORHEXIDINE GLUCONATE 1 APPLICATION(S): 213 SOLUTION TOPICAL at 05:48

## 2023-12-30 RX ADMIN — Medication 50 MILLIGRAM(S): at 05:43

## 2023-12-30 RX ADMIN — Medication 250 MILLIGRAM(S): at 05:45

## 2023-12-30 RX ADMIN — Medication 75 MILLIGRAM(S): at 08:10

## 2023-12-30 RX ADMIN — Medication 1 PATCH: at 12:11

## 2023-12-30 RX ADMIN — Medication 3 MILLILITER(S): at 09:27

## 2023-12-30 RX ADMIN — Medication 1 PATCH: at 05:40

## 2023-12-30 RX ADMIN — PANTOPRAZOLE SODIUM 40 MILLIGRAM(S): 20 TABLET, DELAYED RELEASE ORAL at 05:46

## 2023-12-30 RX ADMIN — Medication 0.5 MILLIGRAM(S): at 12:24

## 2023-12-30 RX ADMIN — Medication 2 MILLIGRAM(S): at 05:47

## 2023-12-30 RX ADMIN — IRON SUCROSE 110 MILLIGRAM(S): 20 INJECTION, SOLUTION INTRAVENOUS at 00:38

## 2023-12-30 RX ADMIN — ENOXAPARIN SODIUM 40 MILLIGRAM(S): 100 INJECTION SUBCUTANEOUS at 05:42

## 2023-12-30 RX ADMIN — TRAMADOL HYDROCHLORIDE 50 MILLIGRAM(S): 50 TABLET ORAL at 07:01

## 2023-12-30 RX ADMIN — Medication 25 MILLIGRAM(S): at 05:44

## 2023-12-30 RX ADMIN — METHOCARBAMOL 1000 MILLIGRAM(S): 500 TABLET, FILM COATED ORAL at 05:42

## 2023-12-30 RX ADMIN — Medication 100 MILLIGRAM(S): at 12:11

## 2023-12-30 RX ADMIN — Medication 1 PATCH: at 12:09

## 2023-12-30 RX ADMIN — CEFEPIME 2000 MILLIGRAM(S): 1 INJECTION, POWDER, FOR SOLUTION INTRAMUSCULAR; INTRAVENOUS at 05:41

## 2023-12-30 RX ADMIN — TRAMADOL HYDROCHLORIDE 50 MILLIGRAM(S): 50 TABLET ORAL at 08:25

## 2023-12-30 RX ADMIN — DIVALPROEX SODIUM 500 MILLIGRAM(S): 500 TABLET, DELAYED RELEASE ORAL at 05:44

## 2023-12-30 NOTE — PROGRESS NOTE ADULT - REASON FOR ADMISSION
copd exacerbation/flu positive

## 2023-12-30 NOTE — PROGRESS NOTE ADULT - PROVIDER SPECIALTY LIST ADULT
Vascular Surgery
Vascular Surgery
Heme/Onc
Hospitalist

## 2023-12-30 NOTE — PROGRESS NOTE ADULT - SUBJECTIVE AND OBJECTIVE BOX
NOHEMY BRAN Patient is a 58y old  Male who presents with a chief complaint of copd exacerbation/flu positive (29 Dec 2023 14:44)     HPI:  57 yo male, extensive comorbid conditions recently discharged from [facility] after prolonged hospital course in setting of MRSA Bacteremia and septic thrombophlebitis who was at [facility] who returns back to [facility] due to worsening symptoms of cough, fever and increased difficulty breathing. Patient says that for last few days in facility he was having episodes of fevers, diaphoresis. Says that he had not headaches, blurry vision, chest pain, palpitations, abdominal pain, n/v/d, dysuria, hematuria. Says he has not really been ambulatory since discharge to [facility]. Says he does not use O2 at home at baseline for copd.     In ED: CTA Chest w/o PE and noted left pleural effusion clearing with improvement in RLL consolidation compared to prior studies. Patient given solumedrol load 125, multiple duonebs, abx via picc line for prior infection Vanco/Cefepime. Admit to medicine called when O2 dropped to 87% on room air. Flu positive.  (25 Dec 2023 05:16)    The patient was seen and evaluated was opn phone with sister when i entered took a couple mins break   The patient is in no acute distress.  Denied any fever chest pain, palpitations, shortness of breath, abdominal pain, fever, dysuria, cough, edema       I&O's Summary    29 Dec 2023 07:01  -  30 Dec 2023 07:00  --------------------------------------------------------  IN: 0 mL / OUT: 375 mL / NET: -375 mL      Allergies    No Known Allergies    Intolerances      HEALTH ISSUES - PROBLEM Dx:  Type A influenza    COPD exacerbation    Acute respiratory failure with hypoxia    Reactive thrombocytosis    Iron deficiency anemia    Back pain, chronic          PAST MEDICAL & SURGICAL HISTORY:  High cholesterol      Hiatal hernia      Tardive dyskinesia      Liver failure      Seizures      ETOH abuse      S/P tonsillectomy      History of lumbar spinal fusion  6/2017              Vital Signs Last 24 Hrs  T(C): 36.8 (30 Dec 2023 12:34), Max: 37 (30 Dec 2023 04:41)  T(F): 98.2 (30 Dec 2023 12:34), Max: 98.6 (30 Dec 2023 04:41)  HR: 78 (30 Dec 2023 12:34) (64 - 85)  BP: 128/75 (30 Dec 2023 12:34) (128/75 - 156/89)  BP(mean): --  RR: 18 (30 Dec 2023 12:34) (18 - 19)  SpO2: 93% (30 Dec 2023 12:34) (88% - 96%)    Parameters below as of 30 Dec 2023 13:15  Patient On (Oxygen Delivery Method): nasal cannula    T(C): 36.8 (12-30-23 @ 12:34), Max: 37 (12-30-23 @ 04:41)  HR: 78 (12-30-23 @ 12:34) (64 - 85)  BP: 128/75 (12-30-23 @ 12:34) (128/75 - 156/89)  RR: 18 (12-30-23 @ 12:34) (18 - 19)  SpO2: 93% (12-30-23 @ 12:34) (88% - 96%)  Wt(kg): --    PHYSICAL EXAM:    GENERAL: NAD, conversing pleasant   HEAD:  Atraumatic, Normocephalic  EYES: EOMI, conjunctiva and sclera clear  ENMT:  Moist mucous membranes,  NECK: Supple,   NERVOUS SYSTEM:  Alert & Oriented X3,  Moves upper and lower extremities; CNS-II-XII  CHEST/LUNG: Clear to auscultation bilaterally; No rales, rhonchi, wheezing,   HEART: Regular rate and rhythm; No murmurs,   ABDOMEN: Soft, Nontender, Nondistended; Bowel sounds present  EXTREMITIES: arm in dressing   Peripheral Pulses, No  cyanosis, or edema  psychiatry- mood and affect appropriate,    acetaminophen     Tablet .. 650 milliGRAM(s) Oral every 6 hours PRN  albuterol/ipratropium for Nebulization 3 milliLiter(s) Nebulizer every 6 hours  amLODIPine   Tablet 10 milliGRAM(s) Oral daily  aspirin enteric coated 81 milliGRAM(s) Oral daily  benztropine 2 milliGRAM(s) Oral two times a day  calcium carbonate    500 mG (Tums) Chewable 1 Tablet(s) Chew every 6 hours PRN  cefepime  Injectable. 2000 milliGRAM(s) IV Push every 8 hours  cefepime  Injectable.      chlorhexidine 2% Cloths 1 Application(s) Topical <User Schedule>  clonazePAM  Tablet 0.5 milliGRAM(s) Oral daily  divalproex  milliGRAM(s) Oral two times a day  enoxaparin Injectable 40 milliGRAM(s) SubCutaneous every 24 hours  folic acid 1 milliGRAM(s) Oral daily  gabapentin 100 milliGRAM(s) Oral at bedtime  hydrALAZINE 25 milliGRAM(s) Oral every 6 hours PRN  hydrOXYzine hydrochloride 25 milliGRAM(s) Oral two times a day  methocarbamol 1000 milliGRAM(s) Oral every 8 hours  nicotine - 21 mG/24Hr(s) Patch 1 Patch Transdermal daily  pantoprazole    Tablet 40 milliGRAM(s) Oral before breakfast  polyethylene glycol 3350 17 Gram(s) Oral at bedtime  predniSONE   Tablet 50 milliGRAM(s) Oral daily  QUEtiapine 125 milliGRAM(s) Oral at bedtime  senna 2 Tablet(s) Oral at bedtime  thiamine 100 milliGRAM(s) Oral daily  traMADol 25 milliGRAM(s) Oral every 6 hours PRN  traMADol 50 milliGRAM(s) Oral every 8 hours PRN  vancomycin  IVPB. 1250 milliGRAM(s) IV Intermittent every 12 hours      LABS:                      CAPILLARY BLOOD GLUCOSE          RADIOLOGY & ADDITIONAL TESTS:      Consultant notes reviewed    Case discussed with consultant/provider/ family /patient  NOHEMY BRAN Patient is a 58y old  Male who presents with a chief complaint of copd exacerbation/flu positive (29 Dec 2023 14:44)     HPI:  59 yo male, extensive comorbid conditions recently discharged from [facility] after prolonged hospital course in setting of MRSA Bacteremia and septic thrombophlebitis who was at [facility] who returns back to [facility] due to worsening symptoms of cough, fever and increased difficulty breathing. Patient says that for last few days in facility he was having episodes of fevers, diaphoresis. Says that he had not headaches, blurry vision, chest pain, palpitations, abdominal pain, n/v/d, dysuria, hematuria. Says he has not really been ambulatory since discharge to [facility]. Says he does not use O2 at home at baseline for copd.     In ED: CTA Chest w/o PE and noted left pleural effusion clearing with improvement in RLL consolidation compared to prior studies. Patient given solumedrol load 125, multiple duonebs, abx via picc line for prior infection Vanco/Cefepime. Admit to medicine called when O2 dropped to 87% on room air. Flu positive.  (25 Dec 2023 05:16)    The patient was seen and evaluated was opn phone with sister when i entered took a couple mins break   The patient is in no acute distress.  Denied any fever chest pain, palpitations, shortness of breath, abdominal pain, fever, dysuria, cough, edema       I&O's Summary    29 Dec 2023 07:01  -  30 Dec 2023 07:00  --------------------------------------------------------  IN: 0 mL / OUT: 375 mL / NET: -375 mL      Allergies    No Known Allergies    Intolerances      HEALTH ISSUES - PROBLEM Dx:  Type A influenza    COPD exacerbation    Acute respiratory failure with hypoxia    Reactive thrombocytosis    Iron deficiency anemia    Back pain, chronic          PAST MEDICAL & SURGICAL HISTORY:  High cholesterol      Hiatal hernia      Tardive dyskinesia      Liver failure      Seizures      ETOH abuse      S/P tonsillectomy      History of lumbar spinal fusion  6/2017              Vital Signs Last 24 Hrs  T(C): 36.8 (30 Dec 2023 12:34), Max: 37 (30 Dec 2023 04:41)  T(F): 98.2 (30 Dec 2023 12:34), Max: 98.6 (30 Dec 2023 04:41)  HR: 78 (30 Dec 2023 12:34) (64 - 85)  BP: 128/75 (30 Dec 2023 12:34) (128/75 - 156/89)  BP(mean): --  RR: 18 (30 Dec 2023 12:34) (18 - 19)  SpO2: 93% (30 Dec 2023 12:34) (88% - 96%)    Parameters below as of 30 Dec 2023 13:15  Patient On (Oxygen Delivery Method): nasal cannula    T(C): 36.8 (12-30-23 @ 12:34), Max: 37 (12-30-23 @ 04:41)  HR: 78 (12-30-23 @ 12:34) (64 - 85)  BP: 128/75 (12-30-23 @ 12:34) (128/75 - 156/89)  RR: 18 (12-30-23 @ 12:34) (18 - 19)  SpO2: 93% (12-30-23 @ 12:34) (88% - 96%)  Wt(kg): --    PHYSICAL EXAM:    GENERAL: NAD, conversing pleasant   HEAD:  Atraumatic, Normocephalic  EYES: EOMI, conjunctiva and sclera clear  ENMT:  Moist mucous membranes,  NECK: Supple,   NERVOUS SYSTEM:  Alert & Oriented X3,  Moves upper and lower extremities; CNS-II-XII  CHEST/LUNG: Clear to auscultation bilaterally; No rales, rhonchi, wheezing,   HEART: Regular rate and rhythm; No murmurs,   ABDOMEN: Soft, Nontender, Nondistended; Bowel sounds present  EXTREMITIES: arm in dressing   Peripheral Pulses, No  cyanosis, or edema  psychiatry- mood and affect appropriate,    acetaminophen     Tablet .. 650 milliGRAM(s) Oral every 6 hours PRN  albuterol/ipratropium for Nebulization 3 milliLiter(s) Nebulizer every 6 hours  amLODIPine   Tablet 10 milliGRAM(s) Oral daily  aspirin enteric coated 81 milliGRAM(s) Oral daily  benztropine 2 milliGRAM(s) Oral two times a day  calcium carbonate    500 mG (Tums) Chewable 1 Tablet(s) Chew every 6 hours PRN  cefepime  Injectable. 2000 milliGRAM(s) IV Push every 8 hours  cefepime  Injectable.      chlorhexidine 2% Cloths 1 Application(s) Topical <User Schedule>  clonazePAM  Tablet 0.5 milliGRAM(s) Oral daily  divalproex  milliGRAM(s) Oral two times a day  enoxaparin Injectable 40 milliGRAM(s) SubCutaneous every 24 hours  folic acid 1 milliGRAM(s) Oral daily  gabapentin 100 milliGRAM(s) Oral at bedtime  hydrALAZINE 25 milliGRAM(s) Oral every 6 hours PRN  hydrOXYzine hydrochloride 25 milliGRAM(s) Oral two times a day  methocarbamol 1000 milliGRAM(s) Oral every 8 hours  nicotine - 21 mG/24Hr(s) Patch 1 Patch Transdermal daily  pantoprazole    Tablet 40 milliGRAM(s) Oral before breakfast  polyethylene glycol 3350 17 Gram(s) Oral at bedtime  predniSONE   Tablet 50 milliGRAM(s) Oral daily  QUEtiapine 125 milliGRAM(s) Oral at bedtime  senna 2 Tablet(s) Oral at bedtime  thiamine 100 milliGRAM(s) Oral daily  traMADol 25 milliGRAM(s) Oral every 6 hours PRN  traMADol 50 milliGRAM(s) Oral every 8 hours PRN  vancomycin  IVPB. 1250 milliGRAM(s) IV Intermittent every 12 hours      LABS:                      CAPILLARY BLOOD GLUCOSE          RADIOLOGY & ADDITIONAL TESTS:      Consultant notes reviewed    Case discussed with consultant/provider/ family /patient

## 2023-12-30 NOTE — DISCHARGE NOTE NURSING/CASE MANAGEMENT/SOCIAL WORK - NSDCCRNAME_GEN_ALL_CORE_FT
[facility] - [address], [place] [address]  [facility] - [address], [place] [address]

## 2023-12-30 NOTE — DISCHARGE NOTE NURSING/CASE MANAGEMENT/SOCIAL WORK - PATIENT PORTAL LINK FT
You can access the FollowMyHealth Patient Portal offered by [facility] by registering at the following website: [email or web address]. By joining [facility]’s FollowMyHealth portal, you will also be able to view your health information using other applications (apps) compatible with our system. You can access the FollowMyHealth Patient Portal offered by [facility] by registering at the following website: [email or web address]. By joining [facility]’s FollowMyHealth portal, you will also be able to view your health information using other applications (apps) compatible with our system.

## 2023-12-30 NOTE — PATIENT PROFILE ADULT - FUNCTIONAL ASSESSMENT - BASIC MOBILITY 6.
4-calculated by average/Not able to assess (calculate score using [facility] averaging method) 4-calculated by average/Not able to assess (calculate score using [facility] averaging method)

## 2023-12-30 NOTE — DISCHARGE NOTE NURSING/CASE MANAGEMENT/SOCIAL WORK - NSDCVIVACCINE_GEN_ALL_CORE_FT
Tdap; 16-Jun-2022 20:42; Rashmi Duarte (RN); [employer]; [id number] (Exp. Date: 09-Sep-2023); IntraMuscular; Deltoid Left.; 0.5 milliLiter(s); VIS (VIS Published: 09-May-2013, VIS Presented: 16-Jun-2022);   Tdap; 08-Aug-2023 23:31; Rajani Drake (CONY); [employer]; [id number] (Exp. Date: 03-Oct-2025); IntraMuscular; Deltoid Left.; 0.5 milliLiter(s); VIS (VIS Published: 09-May-2013, VIS Presented: 08-Aug-2023);    Tdap; 16-Jun-2022 20:42; Rashmi Duarte (RN); [employer]; [id number] (Exp. Date: 09-Sep-2023); IntraMuscular; Deltoid Left.; 0.5 milliLiter(s); VIS (VIS Published: 09-May-2013, VIS Presented: 16-Jun-2022);   Tdap; 08-Aug-2023 23:31; Rajani Drake (CONY); [employer]; [id number] (Exp. Date: 03-Oct-2025); IntraMuscular; Deltoid Left.; 0.5 milliLiter(s); VIS (VIS Published: 09-May-2013, VIS Presented: 08-Aug-2023);

## 2023-12-30 NOTE — PATIENT PROFILE ADULT - FALL HARM RISK - HARM RISK INTERVENTIONS
Assistance with ambulation/Assistance OOB with selected safe patient handling equipment/Communicate Risk of Fall with Harm to all staff/Reinforce activity limits and safety measures with patient and family/Tailored Fall Risk Interventions/Visual Cue: Yellow wristband and red socks/Bed in lowest position, wheels locked, appropriate side rails in place/Call bell, personal items and telephone in reach/Instruct patient to call for assistance before getting out of bed or chair/Non-slip footwear when patient is out of bed/[place] to call system/Physically safe environment - no spills, clutter or unnecessary equipment/Purposeful Proactive Rounding/Room/bathroom lighting operational, light cord in reach Assistance with ambulation/Assistance OOB with selected safe patient handling equipment/Communicate Risk of Fall with Harm to all staff/Reinforce activity limits and safety measures with patient and family/Tailored Fall Risk Interventions/Visual Cue: Yellow wristband and red socks/Bed in lowest position, wheels locked, appropriate side rails in place/Call bell, personal items and telephone in reach/Instruct patient to call for assistance before getting out of bed or chair/Non-slip footwear when patient is out of bed/[place] to call system/Physically safe environment - no spills, clutter or unnecessary equipment/Purposeful Proactive Rounding/Room/bathroom lighting operational, light cord in reach

## 2023-12-30 NOTE — DISCHARGE NOTE NURSING/CASE MANAGEMENT/SOCIAL WORK - NSDCPEFALRISK_GEN_ALL_CORE
For information on Fall & Injury Prevention, visit: [email or web address] OR  [email or web address] OR  https://www.cdc.gov/steadi/patient.html For information on Fall & Injury Prevention, visit: [email or web address] OR  [email or web address]://[email or web address] OR  https://www.cdc.gov/steadi/patient.html

## 2023-12-30 NOTE — PROGRESS NOTE ADULT - ASSESSMENT
59 yo male, extensive comorbid conditions recently discharged from [facility] after prolonged hospital course in setting of MRSA Bacteremia and septic thrombophlebitis, hx of COPD, presenting for cough, diaphoresis, hypoxia in setting of testing Flu positive now admitted for COPD exacerbation.     # Acute Respiratory Failure, mixed # Influenza AH1 infection. Respiratory acidosis--NOW resolved,   # COPD exacerbation- improved   Supplemental O2, wean as tolerated, target SpO2 88-94%  - Oseltamivir 75mg twice daily   - PO prednisone, short taper  - Albuterol/Ipratropium nebulizers q6  - CTA chest without any PE    # Thrombocytosis, reactive, Normal Platelet count 2 weeks ago  # Anemia, normocytic  PRBC x 1 on 12/25/23  Low Iron. Adequate B12, Folate. Elevated Haptoglobin, LDH  Stable Hgb, Platelets improving  -  IV iron givem  - Monitor CBC  - Transfuse if Hgb<7 or symptoms    # Chronic Back Pain Controlled on current regimen  - Tramadol 25mg/50mg PRN for moderate to severe pain  - Methocarbamol 1000mg Q8  - Gabapentin 100mg QHS  - Pain management follow up    # History of recent MRSA bacteremia # History of recent Septic Thrombophlebitis  - Cefepime 2g iv q8h  - Vancomycin 1250mg iv q12h-- 6 weeks duration ending 1/25/24    - monitor trough and adjust per pharmacy to avoid drug toxicity  - Vascular Surgery follow up noted- no wound vacuum required. Local wound care    # HTN  - Amlodipine 5mg daily    # Schizoaffective complicated by tardive dyskinesia   - Dilvalproex, Clonazepam, Quetiapine and Gabapentin continued  - Benztropine for tardive dyskinesia    VTE Prophylaxis  - LMWH  OOBTC  PT evaluation 57 yo male, extensive comorbid conditions recently discharged from [facility] after prolonged hospital course in setting of MRSA Bacteremia and septic thrombophlebitis, hx of COPD, presenting for cough, diaphoresis, hypoxia in setting of testing Flu positive now admitted for COPD exacerbation.     # Acute Respiratory Failure, mixed # Influenza AH1 infection. Respiratory acidosis--NOW resolved,   # COPD exacerbation- improved   Supplemental O2, wean as tolerated, target SpO2 88-94%  - Oseltamivir 75mg twice daily   - PO prednisone, short taper  - Albuterol/Ipratropium nebulizers q6  - CTA chest without any PE    # Thrombocytosis, reactive, Normal Platelet count 2 weeks ago  # Anemia, normocytic  PRBC x 1 on 12/25/23  Low Iron. Adequate B12, Folate. Elevated Haptoglobin, LDH  Stable Hgb, Platelets improving  -  IV iron givem  - Monitor CBC  - Transfuse if Hgb<7 or symptoms    # Chronic Back Pain Controlled on current regimen  - Tramadol 25mg/50mg PRN for moderate to severe pain  - Methocarbamol 1000mg Q8  - Gabapentin 100mg QHS  - Pain management follow up    # History of recent MRSA bacteremia # History of recent Septic Thrombophlebitis  - Cefepime 2g iv q8h  - Vancomycin 1250mg iv q12h-- 6 weeks duration ending 1/25/24    - monitor trough and adjust per pharmacy to avoid drug toxicity  - Vascular Surgery follow up noted- no wound vacuum required. Local wound care    # HTN  - Amlodipine 5mg daily    # Schizoaffective complicated by tardive dyskinesia   - Dilvalproex, Clonazepam, Quetiapine and Gabapentin continued  - Benztropine for tardive dyskinesia    VTE Prophylaxis  - LMWH  OOBTC  PT evaluation

## 2024-01-01 ENCOUNTER — OUTPATIENT (OUTPATIENT)
Dept: OUTPATIENT SERVICES | Facility: HOSPITAL | Age: 59
LOS: 1 days | Discharge: HOME | End: 2024-01-01

## 2024-01-01 ENCOUNTER — EMERGENCY (EMERGENCY)
Facility: HOSPITAL | Age: 59
LOS: 1 days | Discharge: HOME | End: 2024-01-01
Attending: EMERGENCY MEDICINE
Payer: MEDICARE

## 2024-01-01 VITALS
DIASTOLIC BLOOD PRESSURE: 63 MMHG | HEIGHT: 65 IN | HEART RATE: 73 BPM | TEMPERATURE: 98 F | OXYGEN SATURATION: 95 % | RESPIRATION RATE: 18 BRPM | WEIGHT: 169.98 LBS | SYSTOLIC BLOOD PRESSURE: 130 MMHG

## 2024-01-01 VITALS
OXYGEN SATURATION: 96 % | DIASTOLIC BLOOD PRESSURE: 97 MMHG | SYSTOLIC BLOOD PRESSURE: 148 MMHG | RESPIRATION RATE: 17 BRPM | HEART RATE: 75 BPM

## 2024-01-01 DIAGNOSIS — Z98.1 ARTHRODESIS STATUS: Chronic | ICD-10-CM

## 2024-01-01 DIAGNOSIS — Z98.89 OTHER SPECIFIED POSTPROCEDURAL STATES: Chronic | ICD-10-CM

## 2024-01-01 DIAGNOSIS — D75.839 THROMBOCYTOSIS, UNSPECIFIED: ICD-10-CM

## 2024-01-01 LAB
ALBUMIN SERPL ELPH-MCNC: 3.6 G/DL — SIGNIFICANT CHANGE UP (ref 3.3–5.2)
ALP SERPL-CCNC: 48 U/L — SIGNIFICANT CHANGE UP (ref 40–120)
ALT FLD-CCNC: 7 U/L — SIGNIFICANT CHANGE UP
ANION GAP SERPL CALC-SCNC: 12 MMOL/L — SIGNIFICANT CHANGE UP (ref 5–17)
APTT BLD: 27.5 SEC — SIGNIFICANT CHANGE UP (ref 24.5–35.6)
AST SERPL-CCNC: 13 U/L — SIGNIFICANT CHANGE UP
BASOPHILS # BLD AUTO: 0 K/UL — SIGNIFICANT CHANGE UP (ref 0–0.2)
BASOPHILS # BLD AUTO: 0.1 K/UL — SIGNIFICANT CHANGE UP (ref 0–0.2)
BASOPHILS # BLD AUTO: 0.2 K/UL — SIGNIFICANT CHANGE UP (ref 0–0.2)
BASOPHILS NFR BLD AUTO: 0 % — SIGNIFICANT CHANGE UP (ref 0–2)
BASOPHILS NFR BLD AUTO: 1.3 % — SIGNIFICANT CHANGE UP (ref 0–2)
BASOPHILS NFR BLD AUTO: 1.6 % — SIGNIFICANT CHANGE UP (ref 0–2)
BILIRUB SERPL-MCNC: <0.2 MG/DL — LOW (ref 0.4–2)
BUN SERPL-MCNC: 9.4 MG/DL — SIGNIFICANT CHANGE UP (ref 8–20)
BURR CELLS BLD QL SMEAR: PRESENT — SIGNIFICANT CHANGE UP
CALCIUM SERPL-MCNC: 8.6 MG/DL — SIGNIFICANT CHANGE UP (ref 8.4–10.5)
CHLORIDE SERPL-SCNC: 103 MMOL/L — SIGNIFICANT CHANGE UP (ref 96–108)
CO2 SERPL-SCNC: 26 MMOL/L — SIGNIFICANT CHANGE UP (ref 22–29)
CREAT SERPL-MCNC: 0.76 MG/DL — SIGNIFICANT CHANGE UP (ref 0.5–1.3)
EGFR: 104 ML/MIN/1.73M2 — SIGNIFICANT CHANGE UP
EGFR: 104 ML/MIN/1.73M2 — SIGNIFICANT CHANGE UP
EOSINOPHIL # BLD AUTO: 0.5 K/UL — SIGNIFICANT CHANGE UP (ref 0–0.5)
EOSINOPHIL # BLD AUTO: 0.7 K/UL — HIGH (ref 0–0.5)
EOSINOPHIL # BLD AUTO: 0.85 K/UL — HIGH (ref 0–0.5)
EOSINOPHIL NFR BLD AUTO: 11.4 % — HIGH (ref 0–6)
EOSINOPHIL NFR BLD AUTO: 4.9 % — SIGNIFICANT CHANGE UP (ref 0–6)
EOSINOPHIL NFR BLD AUTO: 6.2 % — HIGH (ref 0–6)
GIANT PLATELETS BLD QL SMEAR: PRESENT — SIGNIFICANT CHANGE UP
GLUCOSE SERPL-MCNC: 88 MG/DL — SIGNIFICANT CHANGE UP (ref 70–99)
HCT VFR BLD CALC: 31.4 % — LOW (ref 39–50)
HCT VFR BLD CALC: 32.7 % — LOW (ref 39–50)
HCT VFR BLD CALC: 43.1 % — SIGNIFICANT CHANGE UP (ref 39–50)
HGB BLD-MCNC: 10 G/DL — LOW (ref 13–17)
HGB BLD-MCNC: 13.3 G/DL — SIGNIFICANT CHANGE UP (ref 13–17)
HGB BLD-MCNC: 9.9 G/DL — LOW (ref 13–17)
INR BLD: 1.06 RATIO — SIGNIFICANT CHANGE UP (ref 0.85–1.16)
LYMPHOCYTES # BLD AUTO: 2.5 K/UL — SIGNIFICANT CHANGE UP (ref 1–3.3)
LYMPHOCYTES # BLD AUTO: 3.3 K/UL — SIGNIFICANT CHANGE UP (ref 1–3.3)
LYMPHOCYTES # BLD AUTO: 30.1 % — SIGNIFICANT CHANGE UP (ref 13–44)
LYMPHOCYTES # BLD AUTO: 31 % — SIGNIFICANT CHANGE UP (ref 13–44)
LYMPHOCYTES # BLD AUTO: 4.27 K/UL — HIGH (ref 1–3.3)
LYMPHOCYTES # BLD AUTO: 41.5 % — SIGNIFICANT CHANGE UP (ref 13–44)
MANUAL SMEAR VERIFICATION: SIGNIFICANT CHANGE UP
MCHC RBC-ENTMCNC: 27.5 PG — SIGNIFICANT CHANGE UP (ref 27–34)
MCHC RBC-ENTMCNC: 28.3 PG — SIGNIFICANT CHANGE UP (ref 27–34)
MCHC RBC-ENTMCNC: 28.4 PG — SIGNIFICANT CHANGE UP (ref 27–34)
MCHC RBC-ENTMCNC: 30.5 G/DL — LOW (ref 32–36)
MCHC RBC-ENTMCNC: 30.8 G/DL — LOW (ref 32–36)
MCHC RBC-ENTMCNC: 31.5 GM/DL — LOW (ref 32–36)
MCV RBC AUTO: 89.3 FL — SIGNIFICANT CHANGE UP (ref 80–100)
MCV RBC AUTO: 90.2 FL — SIGNIFICANT CHANGE UP (ref 80–100)
MCV RBC AUTO: 92.6 FL — SIGNIFICANT CHANGE UP (ref 80–100)
MONOCYTES # BLD AUTO: 0.4 K/UL — SIGNIFICANT CHANGE UP (ref 0–0.9)
MONOCYTES # BLD AUTO: 0.7 K/UL — SIGNIFICANT CHANGE UP (ref 0–0.9)
MONOCYTES # BLD AUTO: 1.71 K/UL — HIGH (ref 0–0.9)
MONOCYTES NFR BLD AUTO: 12.4 % — SIGNIFICANT CHANGE UP (ref 2–14)
MONOCYTES NFR BLD AUTO: 6.8 % — SIGNIFICANT CHANGE UP (ref 2–14)
MONOCYTES NFR BLD AUTO: 6.8 % — SIGNIFICANT CHANGE UP (ref 2–14)
MYELOCYTES NFR BLD: 0.9 % — HIGH (ref 0–0)
NEUTROPHILS # BLD AUTO: 2.4 K/UL — SIGNIFICANT CHANGE UP (ref 1.8–7.4)
NEUTROPHILS # BLD AUTO: 6.1 K/UL — SIGNIFICANT CHANGE UP (ref 1.8–7.4)
NEUTROPHILS # BLD AUTO: 6.69 K/UL — SIGNIFICANT CHANGE UP (ref 1.8–7.4)
NEUTROPHILS NFR BLD AUTO: 38.9 % — LOW (ref 43–77)
NEUTROPHILS NFR BLD AUTO: 48.6 % — SIGNIFICANT CHANGE UP (ref 43–77)
NEUTROPHILS NFR BLD AUTO: 56.6 % — SIGNIFICANT CHANGE UP (ref 43–77)
PLAT MORPH BLD: NORMAL — SIGNIFICANT CHANGE UP
PLATELET # BLD AUTO: 261 K/UL — SIGNIFICANT CHANGE UP (ref 150–400)
PLATELET # BLD AUTO: 450 K/UL — HIGH (ref 150–400)
PLATELET # BLD AUTO: 496 K/UL — HIGH (ref 150–400)
POIKILOCYTOSIS BLD QL AUTO: SLIGHT — SIGNIFICANT CHANGE UP
POLYCHROMASIA BLD QL SMEAR: SLIGHT — SIGNIFICANT CHANGE UP
POTASSIUM SERPL-MCNC: 4.3 MMOL/L — SIGNIFICANT CHANGE UP (ref 3.5–5.3)
POTASSIUM SERPL-SCNC: 4.3 MMOL/L — SIGNIFICANT CHANGE UP (ref 3.5–5.3)
PROT SERPL-MCNC: 6.1 G/DL — LOW (ref 6.6–8.7)
PROTHROM AB SERPL-ACNC: 12 SEC — SIGNIFICANT CHANGE UP (ref 9.9–13.4)
RBC # BLD: 3.48 M/UL — LOW (ref 4.2–5.8)
RBC # BLD: 3.53 M/UL — LOW (ref 4.2–5.8)
RBC # BLD: 4.82 M/UL — SIGNIFICANT CHANGE UP (ref 4.2–5.8)
RBC # FLD: 16.1 % — HIGH (ref 10.3–14.5)
RBC # FLD: 16.4 % — HIGH (ref 10.3–14.5)
RBC # FLD: 16.6 % — HIGH (ref 10.3–14.5)
RBC BLD AUTO: ABNORMAL
SMUDGE CELLS # BLD: PRESENT — SIGNIFICANT CHANGE UP
SODIUM SERPL-SCNC: 141 MMOL/L — SIGNIFICANT CHANGE UP (ref 135–145)
VARIANT LYMPHS # BLD: 0.9 % — SIGNIFICANT CHANGE UP (ref 0–6)
VARIANT LYMPHS NFR BLD MANUAL: 0.9 % — SIGNIFICANT CHANGE UP (ref 0–6)
WBC # BLD: 10.8 K/UL — HIGH (ref 3.8–10.5)
WBC # BLD: 13.77 K/UL — HIGH (ref 3.8–10.5)
WBC # BLD: 6.1 K/UL — SIGNIFICANT CHANGE UP (ref 3.8–10.5)
WBC # FLD AUTO: 10.8 K/UL — HIGH (ref 3.8–10.5)
WBC # FLD AUTO: 13.77 K/UL — HIGH (ref 3.8–10.5)
WBC # FLD AUTO: 6.1 K/UL — SIGNIFICANT CHANGE UP (ref 3.8–10.5)

## 2024-01-01 PROCEDURE — 36415 COLL VENOUS BLD VENIPUNCTURE: CPT

## 2024-01-01 PROCEDURE — 80053 COMPREHEN METABOLIC PANEL: CPT

## 2024-01-01 PROCEDURE — 96374 THER/PROPH/DIAG INJ IV PUSH: CPT

## 2024-01-01 PROCEDURE — L9997: CPT

## 2024-01-01 PROCEDURE — 85610 PROTHROMBIN TIME: CPT

## 2024-01-01 PROCEDURE — 99284 EMERGENCY DEPT VISIT MOD MDM: CPT | Mod: 25

## 2024-01-01 PROCEDURE — 96375 TX/PRO/DX INJ NEW DRUG ADDON: CPT

## 2024-01-01 PROCEDURE — 85025 COMPLETE CBC W/AUTO DIFF WBC: CPT

## 2024-01-01 PROCEDURE — 85730 THROMBOPLASTIN TIME PARTIAL: CPT

## 2024-01-01 PROCEDURE — 99282 EMERGENCY DEPT VISIT SF MDM: CPT

## 2024-01-01 RX ORDER — METHOCARBAMOL 500 MG/1
1000 TABLET, FILM COATED ORAL ONCE
Refills: 0 | Status: COMPLETED | OUTPATIENT
Start: 2024-01-01 | End: 2024-01-01

## 2024-01-01 RX ORDER — ACETAMINOPHEN 500 MG/5ML
1000 LIQUID (ML) ORAL ONCE
Refills: 0 | Status: COMPLETED | OUTPATIENT
Start: 2024-01-01 | End: 2024-01-01

## 2024-01-01 RX ORDER — OXYCODONE HYDROCHLORIDE 30 MG/1
1 TABLET ORAL
Qty: 20 | Refills: 0
Start: 2024-01-01 | End: 2024-01-01

## 2024-01-01 RX ORDER — OXYCODONE HYDROCHLORIDE 30 MG/1
5 TABLET ORAL ONCE
Refills: 0 | Status: DISCONTINUED | OUTPATIENT
Start: 2024-01-01 | End: 2024-01-01

## 2024-01-01 RX ORDER — METHOCARBAMOL 500 MG/1
2 TABLET, FILM COATED ORAL
Qty: 30 | Refills: 0
Start: 2024-01-01 | End: 2024-01-01

## 2024-01-01 RX ADMIN — OXYCODONE HYDROCHLORIDE 5 MILLIGRAM(S): 30 TABLET ORAL at 23:47

## 2024-01-01 RX ADMIN — Medication 400 MILLIGRAM(S): at 21:44

## 2024-01-01 RX ADMIN — METHOCARBAMOL 1000 MILLIGRAM(S): 500 TABLET, FILM COATED ORAL at 23:47

## 2024-01-01 RX ADMIN — Medication 4 MILLIGRAM(S): at 21:43

## 2024-01-17 ENCOUNTER — OUTPATIENT (OUTPATIENT)
Dept: OUTPATIENT SERVICES | Facility: HOSPITAL | Age: 59
LOS: 1 days | Discharge: HOME | End: 2024-01-17

## 2024-01-17 DIAGNOSIS — Z98.89 OTHER SPECIFIED POSTPROCEDURAL STATES: Chronic | ICD-10-CM

## 2024-01-17 DIAGNOSIS — D47.3 ESSENTIAL (HEMORRHAGIC) THROMBOCYTHEMIA: ICD-10-CM

## 2024-01-17 DIAGNOSIS — Z98.1 ARTHRODESIS STATUS: Chronic | ICD-10-CM

## 2024-01-22 ENCOUNTER — APPOINTMENT (OUTPATIENT)
Dept: HEMATOLOGY ONCOLOGY | Facility: CLINIC | Age: 59
End: 2024-01-22
Payer: MEDICARE

## 2024-01-22 ENCOUNTER — RESULT REVIEW (OUTPATIENT)
Age: 59
End: 2024-01-22

## 2024-01-22 VITALS
DIASTOLIC BLOOD PRESSURE: 66 MMHG | SYSTOLIC BLOOD PRESSURE: 99 MMHG | OXYGEN SATURATION: 92 % | TEMPERATURE: 98.2 F | HEART RATE: 91 BPM | HEIGHT: 65 IN | BODY MASS INDEX: 24.33 KG/M2 | WEIGHT: 146.05 LBS

## 2024-01-22 LAB
BASOPHILS # BLD AUTO: 0.1 K/UL — SIGNIFICANT CHANGE UP (ref 0–0.2)
BASOPHILS NFR BLD AUTO: 1 % — SIGNIFICANT CHANGE UP (ref 0–2)
EOSINOPHIL # BLD AUTO: 1.6 K/UL — HIGH (ref 0–0.5)
EOSINOPHIL NFR BLD AUTO: 15.1 % — HIGH (ref 0–6)
HCT VFR BLD CALC: 25.2 % — LOW (ref 39–50)
HGB BLD-MCNC: 8 G/DL — LOW (ref 13–17)
LYMPHOCYTES # BLD AUTO: 1.3 K/UL — SIGNIFICANT CHANGE UP (ref 1–3.3)
LYMPHOCYTES # BLD AUTO: 12.3 % — LOW (ref 13–44)
MCHC RBC-ENTMCNC: 27.3 PG — SIGNIFICANT CHANGE UP (ref 27–34)
MCHC RBC-ENTMCNC: 31.7 G/DL — LOW (ref 32–36)
MCV RBC AUTO: 86.3 FL — SIGNIFICANT CHANGE UP (ref 80–100)
MONOCYTES # BLD AUTO: 0.8 K/UL — SIGNIFICANT CHANGE UP (ref 0–0.9)
MONOCYTES NFR BLD AUTO: 7.9 % — SIGNIFICANT CHANGE UP (ref 2–14)
NEUTROPHILS # BLD AUTO: 6.6 K/UL — SIGNIFICANT CHANGE UP (ref 1.8–7.4)
NEUTROPHILS NFR BLD AUTO: 63.7 % — SIGNIFICANT CHANGE UP (ref 43–77)
PLATELET # BLD AUTO: 538 K/UL — HIGH (ref 150–400)
RBC # BLD: 2.92 M/UL — LOW (ref 4.2–5.8)
RBC # FLD: 18.3 % — HIGH (ref 10.3–14.5)
WBC # BLD: 10.4 K/UL — SIGNIFICANT CHANGE UP (ref 3.8–10.5)
WBC # FLD AUTO: 10.4 K/UL — SIGNIFICANT CHANGE UP (ref 3.8–10.5)

## 2024-01-22 PROCEDURE — 99215 OFFICE O/P EST HI 40 MIN: CPT

## 2024-01-22 RX ORDER — VANCOMYCIN HYDROCHLORIDE 1.25 G/25ML
1.25 INJECTION, POWDER, LYOPHILIZED, FOR SOLUTION INTRAVENOUS
Refills: 0 | Status: ACTIVE | COMMUNITY

## 2024-01-22 RX ORDER — PANTOPRAZOLE SODIUM 40 MG/10ML
INJECTION, POWDER, FOR SOLUTION INTRAVENOUS
Refills: 0 | Status: ACTIVE | COMMUNITY

## 2024-01-22 RX ORDER — GABAPENTIN 100 MG/1
CAPSULE ORAL
Refills: 0 | Status: ACTIVE | COMMUNITY

## 2024-01-22 RX ORDER — CLONAZEPAM 0.5 MG/1
0.5 TABLET ORAL
Refills: 0 | Status: ACTIVE | COMMUNITY

## 2024-01-22 RX ORDER — AMLODIPINE BESYLATE 5 MG/1
5 TABLET ORAL
Refills: 0 | Status: ACTIVE | COMMUNITY

## 2024-01-22 RX ORDER — ASPIRIN 81 MG
81 TABLET, DELAYED RELEASE (ENTERIC COATED) ORAL
Refills: 0 | Status: ACTIVE | COMMUNITY

## 2024-01-22 RX ORDER — DIVALPROEX SODIUM 500 MG/1
500 TABLET, DELAYED RELEASE ORAL
Refills: 0 | Status: ACTIVE | COMMUNITY

## 2024-01-22 RX ORDER — QUETIAPINE FUMARATE 25 MG/1
25 TABLET ORAL
Refills: 0 | Status: ACTIVE | COMMUNITY

## 2024-01-22 RX ORDER — BENZTROPINE MESYLATE 2 MG/1
TABLET ORAL
Refills: 0 | Status: ACTIVE | COMMUNITY

## 2024-01-22 RX ORDER — CEFEPIME 1 G/50ML
2 INJECTION, SOLUTION INTRAVENOUS
Refills: 0 | Status: ACTIVE | COMMUNITY

## 2024-01-22 RX ORDER — HYDROXYZINE HYDROCHLORIDE 25 MG/1
25 TABLET ORAL
Refills: 0 | Status: ACTIVE | COMMUNITY

## 2024-01-22 RX ORDER — TRAMADOL HYDROCHLORIDE 50 MG/1
50 TABLET, COATED ORAL
Refills: 0 | Status: ACTIVE | COMMUNITY

## 2024-01-22 RX ORDER — FOLIC ACID 1 MG/1
1 TABLET ORAL
Refills: 0 | Status: ACTIVE | COMMUNITY

## 2024-01-23 NOTE — PHYSICAL EXAM
[Normal] : normal spine exam without palpable tenderness, no kyphosis or scoliosis [de-identified] : right forearm healed incision.  Left PICC line

## 2024-01-23 NOTE — ASSESSMENT
[FreeTextEntry1] : This is a 59 year old male who had a prolonged admission post mechanical fall, pneumothorax, respiratory failure, MRSA bacteremia, septic thrombphlebitis, readmission for respiratory failure due influenza A/COPD exacerbation.     He was found to have a worsening thrombocytosis, developed after he was found to have postive blood cultures for MRSA.  Prior to 12/12/23 his platelets counts were normal.  Likely reactive but as he had a significant rise, will ensure no underlying myeloproliferative process.  Check his labs for Jak2 mutation, FISH bcr-abl.   His plt count today is 538 which is much improved.   Anemia- Prior to the admission he had a normal Hg.  Likely in the setting of suppression due to acute infection, blood draws/phlebotomy from the extensive hospitalization.  Recommend to check his iron profile, B12/folate levels.  While inpatient he had a mildly low iron sat, normal B12/folate levels, no hemolysis. (, Folate >20, LDH-243, [id number]-663, T-iron-22, TIBC-379, %sat-7) Check his myeloma labs as well.  Begin an oral iron supplement daily along with a bowel regimen.  He should follow up with gastroenterology to ensure no underlying GI blood loss.  His Hg is unchanged from discharge- 8.0 today (12/27- Hg 8.0).  He was transfused 1 unit on 12/25/23.   He will follow up in 4-6 weeks to ensure stability/improvement in his labs and to review results.

## 2024-01-23 NOTE — REASON FOR VISIT
[Initial Consultation] : an initial consultation for [Other: _____] : [unfilled] [FreeTextEntry2] : Thrombocytosis

## 2024-01-23 NOTE — HISTORY OF PRESENT ILLNESS
[de-identified] : This is a 58 year old male who has a history of thrombocytosis during recent hospitalization, here for post hospital follow up visit.  He is currently in [place] rehab, accompanied by a nursing aide for follow up.   He was initially admitted to [facility] from 11/28/23- 12/20/23- He was admitted after a fall down the stair, suffered a left 7th rib fracture with left anterior pneumothorax.  Found to have extensive diffuse pneumomediastinum and increase of the pneumothorax, s/p pigtail catheter along with a rib block on 11/29.  He developed respiratory failure, required intubation on 12/1.  Found to have MRSA bacteremia, cellulitis from his right IV site.  He also was found to have a superficial thrombus of the right cephalic vein, s/p right vein excision on 12/8/vac placement.  CAROL on 12/14 no endocarditis, thoracentesis on 12/15 which was negative.  His CBC on discharge 12/20/23- WBC 18.75 H/H 8.3/26.7 Plt 1059. CBC on initial admission on 11/28/23- WBC 10.74 H/H 13.2/43.0 Plt 445  He was readmitted to [facility] from 12/25-12/30/23 due to acute respiratory failure, COPD exacerbation due to iinfluenza A, treated with steroids, tamiflu.  His CBC on admission 12/24/23- WBC 16.7 H/H 8.0/25.6 Plt 1179 Last CBC on discharge 12/27/23- WBC 10.66 H/H 8.0/26.0 Plt 908  He was transfused 1 unit of PRBC on 12/25 for a Hg of 6.9. Highest plt count was 1179  His plt count began increasing on 12/12, + blood cultures were from 12/10 for MRSA.  He remains on IV antibiotics with IV vancomycin along with cefepime until 1/25/24.   Overall he feels ok, no significant complaints.  Desires to leave the rehab.   Denies any fever/chills, no melena/BRBPR.

## 2024-01-23 NOTE — REVIEW OF SYSTEMS
[Fatigue] : fatigue [Diarrhea: Grade 0] : Diarrhea: Grade 0 [Negative] : Allergic/Immunologic [Fever] : no fever [Chills] : no chills [Night Sweats] : no night sweats [Recent Change In Weight] : ~T no recent weight change

## 2024-01-24 ENCOUNTER — APPOINTMENT (OUTPATIENT)
Dept: INTERNAL MEDICINE | Facility: CLINIC | Age: 59
End: 2024-01-24
Payer: MEDICARE

## 2024-01-24 VITALS
BODY MASS INDEX: 24.99 KG/M2 | DIASTOLIC BLOOD PRESSURE: 71 MMHG | SYSTOLIC BLOOD PRESSURE: 125 MMHG | TEMPERATURE: 97.3 F | HEIGHT: 65 IN | WEIGHT: 150 LBS | OXYGEN SATURATION: 91 % | HEART RATE: 93 BPM

## 2024-01-24 DIAGNOSIS — R78.81 BACTEREMIA: ICD-10-CM

## 2024-01-24 LAB
ALBUMIN SERPL ELPH-MCNC: 3.1 G/DL
ALP BLD-CCNC: 170 U/L
ALT SERPL-CCNC: 35 U/L
ANION GAP SERPL CALC-SCNC: 15 MMOL/L
AST SERPL-CCNC: 32 U/L
BILIRUB SERPL-MCNC: <0.2 MG/DL
BUN SERPL-MCNC: 11 MG/DL
CALCIUM SERPL-MCNC: 8.6 MG/DL
CHLORIDE SERPL-SCNC: 109 MMOL/L
CO2 SERPL-SCNC: 24 MMOL/L
CREAT SERPL-MCNC: 0.75 MG/DL
EGFR: 105 ML/MIN/1.73M2
GLUCOSE SERPL-MCNC: 129 MG/DL
LDH SERPL-CCNC: 207 U/L
POTASSIUM SERPL-SCNC: 3.9 MMOL/L
PROT SERPL-MCNC: 5.8 G/DL
SODIUM SERPL-SCNC: 147 MMOL/L

## 2024-01-24 PROCEDURE — 99214 OFFICE O/P EST MOD 30 MIN: CPT

## 2024-01-30 LAB — T(9;22)(ABL1,BCR)/CONTROL BLD/T: NORMAL

## 2024-01-31 LAB
JAK2 P.V617F BLD/T QL: NORMAL
REFLEX:: NORMAL

## 2024-02-10 NOTE — PHYSICAL EXAM
[General Appearance - In No Acute Distress] : in no acute distress [General Appearance - Alert] : alert [Neck Appearance] : the appearance of the neck was normal [Jugular Venous Distention Increased] : there was no jugular-venous distention [Neck Cervical Mass (___cm)] : no neck mass was observed [Thyroid Diffuse Enlargement] : the thyroid was not enlarged [Heart Rate And Rhythm] : heart rate was normal and rhythm regular [Heart Sounds] : normal S1 and S2 [Heart Sounds Gallop] : no gallops [Murmurs] : no murmurs [Heart Sounds Pericardial Friction Rub] : no pericardial rub [Abdomen Tenderness] : non-tender [Bowel Sounds] : normal bowel sounds [Abdomen Soft] : soft [Abdomen Mass (___ Cm)] : no abdominal mass palpated [] : no hepato-splenomegaly [Costovertebral Angle Tenderness] : no CVA tenderness [FreeTextEntry1] : lue anterior chest wall wound healed, RUE arm wound healed [No Focal Deficits] : no focal deficits

## 2024-02-10 NOTE — HISTORY OF PRESENT ILLNESS
[FreeTextEntry1] : Post hospitalization f/u visit-  57 year old male with PMH of ETOH abuse, HTN, HLD, seizures, tardive dyskinesia hiatal hernia s/p mechanical fall down stairs while intoxicated on 11/29. Pt intubated in ED for airway protection. Found to have left rib fractures 3, 6, 7, 10 and left pneumothorax with extensive subcutaneous emphysema requiring a left sided pigtail on 11/29. Hospital course complicated by acute agitation and acute asthma exacerbation requiring re-intubation on 12/1. Pt extubated 12/2. Transferred to floor 12/6 and noted to be febrile with uptrending WBC, rt arm edema erythema and reported purulent drainage. Found to have MRSA bacteremia, concern for cellulitis , septic thrombophlebitis  - 12/6 and 12/7 BCX + MRSA - s/p I&D of septic thrombophlebitis of right arm >> Incision and drainage of Infected basilic and cephalic thrombophlebitis at antecubital level purulent discharge seen upon incision - Repeat BCX 12/10-+ - BCX 12/11 and 12/12 ngtd - CAROL no vegetations - RUE venous doppler with sup thrombus of right cephalic vein  - Ct chest performed + left loculated effusion - s/p thoracentesis 12/15 for left loculated effusion, culture neg - was on cefepime 2 g iv q8h and vancomycin 1250mg iv q12h -  last vanco trough 13.5 - plan 6 weeks abx  end 1/25/24 with weekly CBc CMP vanco trough to be adjusted by MD at facility     Patient on Iv abx as above here for f/u visit doing well denies any complaints. No cough no fever no diarrhea Labs were not sent from facility patient reports bloodwork is drawn

## 2024-02-10 NOTE — ASSESSMENT
[FreeTextEntry1] : MRSA bacteremia on IV vancomycin Chronic leukocytosis  - pt tolerating iv abx well - forms from [place] filled out and given to patient - no labs sent from facility, should have weekly CBc CMp and vanco trough- last labs requested to be faxed to us - completed iv abx through 1/25/24 as planned then dc picc line - repeat BCX in 2 weeks, can return to ID for bloodwork - f/u hemonc-known chronic leukocytosis  f/u in 2 weeks [Anticipatory Guidance] : anticipatory guidance

## 2024-02-21 ENCOUNTER — RESULT REVIEW (OUTPATIENT)
Age: 59
End: 2024-02-21

## 2024-02-21 ENCOUNTER — APPOINTMENT (OUTPATIENT)
Dept: HEMATOLOGY ONCOLOGY | Facility: CLINIC | Age: 59
End: 2024-02-21
Payer: MEDICARE

## 2024-02-21 VITALS
TEMPERATURE: 97 F | HEART RATE: 93 BPM | OXYGEN SATURATION: 91 % | WEIGHT: 150 LBS | HEIGHT: 65 IN | BODY MASS INDEX: 24.99 KG/M2 | DIASTOLIC BLOOD PRESSURE: 85 MMHG | SYSTOLIC BLOOD PRESSURE: 136 MMHG

## 2024-02-21 LAB
ANISOCYTOSIS BLD QL: SLIGHT — SIGNIFICANT CHANGE UP
BASOPHILS # BLD AUTO: 0.2 K/UL — SIGNIFICANT CHANGE UP (ref 0–0.2)
EOSINOPHIL # BLD AUTO: 0.9 K/UL — HIGH (ref 0–0.5)
EOSINOPHIL NFR BLD AUTO: 5 % — SIGNIFICANT CHANGE UP (ref 0–6)
HCT VFR BLD CALC: 41 % — SIGNIFICANT CHANGE UP (ref 39–50)
HGB BLD-MCNC: 13.2 G/DL — SIGNIFICANT CHANGE UP (ref 13–17)
LYMPHOCYTES # BLD AUTO: 23 % — SIGNIFICANT CHANGE UP (ref 13–44)
LYMPHOCYTES # BLD AUTO: 4.7 K/UL — HIGH (ref 1–3.3)
MACROCYTES BLD QL: SLIGHT — SIGNIFICANT CHANGE UP
MCHC RBC-ENTMCNC: 27.4 PG — SIGNIFICANT CHANGE UP (ref 27–34)
MCHC RBC-ENTMCNC: 32.2 G/DL — SIGNIFICANT CHANGE UP (ref 32–36)
MCV RBC AUTO: 85.1 FL — SIGNIFICANT CHANGE UP (ref 80–100)
MICROCYTES BLD QL: SLIGHT — SIGNIFICANT CHANGE UP
MONOCYTES # BLD AUTO: 1.6 K/UL — HIGH (ref 0–0.9)
MONOCYTES NFR BLD AUTO: 8 % — SIGNIFICANT CHANGE UP (ref 2–14)
NEUTROPHILS # BLD AUTO: 17.2 K/UL — HIGH (ref 1.8–7.4)
NEUTROPHILS NFR BLD AUTO: 62 % — SIGNIFICANT CHANGE UP (ref 43–77)
NEUTS BAND # BLD: 2 % — SIGNIFICANT CHANGE UP (ref 0–8)
PLAT MORPH BLD: NORMAL — SIGNIFICANT CHANGE UP
PLATELET # BLD AUTO: 276 K/UL — SIGNIFICANT CHANGE UP (ref 150–400)
POIKILOCYTOSIS BLD QL AUTO: SLIGHT — SIGNIFICANT CHANGE UP
RBC # BLD: 4.82 M/UL — SIGNIFICANT CHANGE UP (ref 4.2–5.8)
RBC # FLD: 18.8 % — HIGH (ref 10.3–14.5)
RBC BLD AUTO: SIGNIFICANT CHANGE UP
WBC # BLD: 24.3 K/UL — HIGH (ref 3.8–10.5)
WBC # FLD AUTO: 24.3 K/UL — HIGH (ref 3.8–10.5)

## 2024-02-21 PROCEDURE — 99214 OFFICE O/P EST MOD 30 MIN: CPT

## 2024-02-27 NOTE — PHYSICAL EXAM
[Normal] : normal spine exam without palpable tenderness, no kyphosis or scoliosis [de-identified] : depressed BS on left [de-identified] : right forearm healed incision.

## 2024-02-27 NOTE — ASSESSMENT
[FreeTextEntry1] : This is a 59 year old male who had a prolonged admission post mechanical fall, pneumothorax, respiratory failure, MRSA bacteremia, septic thrombphlebitis, readmission for respiratory failure due influenza A/COPD exacerbation.     He was found to have a worsening thrombocytosis, developed after he was found to have postive blood cultures for MRSA.  Prior to 12/12/23 his platelets counts were normal.  Myeloproliferative work up was negative, including a Jak2 mutation, bcr-abl.   His plt count has now normalized- 276 today.   Anemia- Prior to the admission he had a normal Hg.  Likely in the setting of suppression due to acute infection, blood draws/phlebotomy from the extensive hospitalization.  His Hg has now normalized as well- 13.2 today.  He should still follow up with gastroenterology to ensure no underlying GI blood loss.   Leukocytosis- ?new No signs of active infection.  Neutrophil predominant.  Recommended to [facility]- needs blood/urine cultures, respiratory viral swab, CXR, C diff to ensure no underlying infection.  He will follow up up next month to ensure resolution.

## 2024-02-27 NOTE — HISTORY OF PRESENT ILLNESS
[de-identified] : This is a 58 year old male who has a history of thrombocytosis during recent hospitalization, here for post hospital follow up visit.  He is currently in [place] rehab, accompanied by a nursing aide for follow up.   He was initially admitted to [facility] from 11/28/23- 12/20/23- He was admitted after a fall down the stair, suffered a left 7th rib fracture with left anterior pneumothorax.  Found to have extensive diffuse pneumomediastinum and increase of the pneumothorax, s/p pigtail catheter along with a rib block on 11/29.  He developed respiratory failure, required intubation on 12/1.  Found to have MRSA bacteremia, cellulitis from his right IV site.  He also was found to have a superficial thrombus of the right cephalic vein, s/p right vein excision on 12/8/vac placement.  CAROL on 12/14 no endocarditis, thoracentesis on 12/15 which was negative.  His CBC on discharge 12/20/23- WBC 18.75 H/H 8.3/26.7 Plt 1059. CBC on initial admission on 11/28/23- WBC 10.74 H/H 13.2/43.0 Plt 445  He was readmitted to [facility] from 12/25-12/30/23 due to acute respiratory failure, COPD exacerbation due to iinfluenza A, treated with steroids, tamiflu.  His CBC on admission 12/24/23- WBC 16.7 H/H 8.0/25.6 Plt 1179 Last CBC on discharge 12/27/23- WBC 10.66 H/H 8.0/26.0 Plt 908  He was transfused 1 unit of PRBC on 12/25 for a Hg of 6.9. Highest plt count was 1179  His plt count began increasing on 12/12, + blood cultures were from 12/10 for MRSA.  He remains on IV antibiotics with IV vancomycin along with cefepime until 1/25/24.   Overall he feels ok, no significant complaints.  Desires to leave the rehab.   Denies any fever/chills, no melena/BRBPR.    [de-identified] : He is here with his aide, continues to be at [facility].  He denies any fever/chills, no cough.  Occasion diarrhea.  No dysuria.  Some residents with COVID at the rehab. PICC line has since been removed.

## 2024-03-04 NOTE — DIETITIAN INITIAL EVALUATION ADULT - REASON FOR ADMISSION
50.8
3 = A little assistance
43.8
Fracture of one rib, unspecified side, initial encounter for closed fracture

## 2024-03-29 NOTE — OCCUPATIONAL THERAPY INITIAL EVALUATION ADULT - LIGHT TOUCH SENSATION, RUE, REHAB EVAL
within normal limits
stairs assessment/bed mobility training/gait training/strengthening/transfer training

## 2024-05-16 ENCOUNTER — OUTPATIENT (OUTPATIENT)
Dept: OUTPATIENT SERVICES | Facility: HOSPITAL | Age: 59
LOS: 1 days | Discharge: HOME | End: 2024-05-16

## 2024-05-16 DIAGNOSIS — Z98.89 OTHER SPECIFIED POSTPROCEDURAL STATES: Chronic | ICD-10-CM

## 2024-05-16 DIAGNOSIS — Z98.1 ARTHRODESIS STATUS: Chronic | ICD-10-CM

## 2024-05-16 DIAGNOSIS — D75.839 THROMBOCYTOSIS, UNSPECIFIED: ICD-10-CM

## 2024-05-22 ENCOUNTER — RESULT REVIEW (OUTPATIENT)
Age: 59
End: 2024-05-22

## 2024-05-22 ENCOUNTER — APPOINTMENT (OUTPATIENT)
Dept: HEMATOLOGY ONCOLOGY | Facility: CLINIC | Age: 59
End: 2024-05-22
Payer: MEDICARE

## 2024-05-22 VITALS
HEART RATE: 77 BPM | BODY MASS INDEX: 24.96 KG/M2 | RESPIRATION RATE: 17 BRPM | DIASTOLIC BLOOD PRESSURE: 96 MMHG | OXYGEN SATURATION: 95 % | TEMPERATURE: 97.9 F | WEIGHT: 150 LBS | SYSTOLIC BLOOD PRESSURE: 152 MMHG

## 2024-05-22 DIAGNOSIS — D64.9 ANEMIA, UNSPECIFIED: ICD-10-CM

## 2024-05-22 DIAGNOSIS — D72.829 ELEVATED WHITE BLOOD CELL COUNT, UNSPECIFIED: ICD-10-CM

## 2024-05-22 DIAGNOSIS — I10 ESSENTIAL (PRIMARY) HYPERTENSION: ICD-10-CM

## 2024-05-22 DIAGNOSIS — D75.839 THROMBOCYTOSIS, UNSPECIFIED: ICD-10-CM

## 2024-05-22 LAB
BASOPHILS # BLD AUTO: 0.2 K/UL — SIGNIFICANT CHANGE UP (ref 0–0.2)
BASOPHILS NFR BLD AUTO: 2.1 % — HIGH (ref 0–2)
EOSINOPHIL # BLD AUTO: 0.8 K/UL — HIGH (ref 0–0.5)
EOSINOPHIL NFR BLD AUTO: 7.2 % — HIGH (ref 0–6)
HCT VFR BLD CALC: 37.7 % — LOW (ref 39–50)
HGB BLD-MCNC: 12.8 G/DL — LOW (ref 13–17)
LYMPHOCYTES # BLD AUTO: 35.6 % — SIGNIFICANT CHANGE UP (ref 13–44)
LYMPHOCYTES # BLD AUTO: 4 K/UL — HIGH (ref 1–3.3)
MCHC RBC-ENTMCNC: 30.8 PG — SIGNIFICANT CHANGE UP (ref 27–34)
MCHC RBC-ENTMCNC: 33.9 G/DL — SIGNIFICANT CHANGE UP (ref 32–36)
MCV RBC AUTO: 91.1 FL — SIGNIFICANT CHANGE UP (ref 80–100)
MONOCYTES # BLD AUTO: 0.8 K/UL — SIGNIFICANT CHANGE UP (ref 0–0.9)
MONOCYTES NFR BLD AUTO: 6.8 % — SIGNIFICANT CHANGE UP (ref 2–14)
NEUTROPHILS # BLD AUTO: 5.4 K/UL — SIGNIFICANT CHANGE UP (ref 1.8–7.4)
NEUTROPHILS NFR BLD AUTO: 48.4 % — SIGNIFICANT CHANGE UP (ref 43–77)
PLATELET # BLD AUTO: 359 K/UL — SIGNIFICANT CHANGE UP (ref 150–400)
RBC # BLD: 4.14 M/UL — LOW (ref 4.2–5.8)
RBC # FLD: 15.5 % — HIGH (ref 10.3–14.5)
WBC # BLD: 11.3 K/UL — HIGH (ref 3.8–10.5)
WBC # FLD AUTO: 11.3 K/UL — HIGH (ref 3.8–10.5)

## 2024-05-22 PROCEDURE — 99213 OFFICE O/P EST LOW 20 MIN: CPT

## 2024-05-22 NOTE — HISTORY OF PRESENT ILLNESS
[de-identified] : This is a 58 year old male who has a history of thrombocytosis during recent hospitalization, here for post hospital follow up visit.  He is currently in [place] rehab, accompanied by a nursing aide for follow up.   He was initially admitted to [facility] from 11/28/23- 12/20/23- He was admitted after a fall down the stair, suffered a left 7th rib fracture with left anterior pneumothorax.  Found to have extensive diffuse pneumomediastinum and increase of the pneumothorax, s/p pigtail catheter along with a rib block on 11/29.  He developed respiratory failure, required intubation on 12/1.  Found to have MRSA bacteremia, cellulitis from his right IV site.  He also was found to have a superficial thrombus of the right cephalic vein, s/p right vein excision on 12/8/vac placement.  CAROL on 12/14 no endocarditis, thoracentesis on 12/15 which was negative.  His CBC on discharge 12/20/23- WBC 18.75 H/H 8.3/26.7 Plt 1059. CBC on initial admission on 11/28/23- WBC 10.74 H/H 13.2/43.0 Plt 445  He was readmitted to [facility] from 12/25-12/30/23 due to acute respiratory failure, COPD exacerbation due to iinfluenza A, treated with steroids, tamiflu.  His CBC on admission 12/24/23- WBC 16.7 H/H 8.0/25.6 Plt 1179 Last CBC on discharge 12/27/23- WBC 10.66 H/H 8.0/26.0 Plt 908  He was transfused 1 unit of PRBC on 12/25 for a Hg of 6.9. Highest plt count was 1179  His plt count began increasing on 12/12, + blood cultures were from 12/10 for MRSA.  He remains on IV antibiotics with IV vancomycin along with cefepime until 1/25/24.   Overall he feels ok, no significant complaints.  Desires to leave the rehab.   Denies any fever/chills, no melena/BRBPR.    [de-identified] : He has since been discharged from [place] on 4/1, now living in a home in [place].   He is feeling well, was not prescribed the amlodipine. He denies any fever/chills, no cough, no diarrhea, no dysuria.

## 2024-05-22 NOTE — ASSESSMENT
[FreeTextEntry1] : This is a 58 year old male who had a prolonged admission post mechanical fall, pneumothorax, respiratory failure, MRSA bacteremia, septic thrombphlebitis, readmission for respiratory failure due influenza A/COPD exacerbation.     He was found to have a worsening thrombocytosis, developed after he was found to have postive blood cultures for MRSA.  Prior to 12/12/23 his platelets counts were normal.  Myeloproliferative work up was negative, including a Jak2 mutation, bcr-abl.   His plt count has now normalized- 359 today.   Anemia- Prior to the admission he had a normal Hg.  Likely in the setting of suppression due to acute infection, blood draws/phlebotomy from the extensive hospitalization.  His Hg has now improved/normalized as well- 12.8 today.  He should still follow up with gastroenterology to ensure no underlying GI blood loss- again encouraged.  Leukocytosis- resolved. WBC 11.3 today.  Prior MPN work up was negative.   He will follow up in 1 year or earlier if any issues develop.

## 2024-05-22 NOTE — REVIEW OF SYSTEMS
[Diarrhea: Grade 0] : Diarrhea: Grade 0 [Negative] : Allergic/Immunologic [Fever] : no fever [Chills] : no chills [Night Sweats] : no night sweats [Fatigue] : no fatigue [Recent Change In Weight] : ~T no recent weight change

## 2024-05-22 NOTE — PHYSICAL EXAM
[Normal] : normal appearance, no rash, nodules, vesicles, ulcers, erythema [de-identified] : depressed BS on left

## 2024-05-23 PROBLEM — H52.11 MYOPIA ; RIGHT EYE: Status: ACTIVE | Noted: 2024-05-23

## 2024-08-13 ENCOUNTER — APPOINTMENT (OUTPATIENT)
Dept: UROLOGY | Facility: CLINIC | Age: 59
End: 2024-08-13
Payer: MEDICARE

## 2024-08-13 ENCOUNTER — NON-APPOINTMENT (OUTPATIENT)
Age: 59
End: 2024-08-13

## 2024-08-13 VITALS
WEIGHT: 150 LBS | SYSTOLIC BLOOD PRESSURE: 133 MMHG | DIASTOLIC BLOOD PRESSURE: 85 MMHG | TEMPERATURE: 97.7 F | HEART RATE: 99 BPM | HEIGHT: 65 IN | BODY MASS INDEX: 24.99 KG/M2

## 2024-08-13 DIAGNOSIS — N48.6 INDURATION PENIS PLASTICA: ICD-10-CM

## 2024-08-13 PROCEDURE — 99203 OFFICE O/P NEW LOW 30 MIN: CPT

## 2024-08-13 NOTE — HISTORY OF PRESENT ILLNESS
[FreeTextEntry1] : 59yo M presents to discuss peyronie's disease He noticed the curvature few years ago. Denies any discomfort or ED

## 2024-08-13 NOTE — ASSESSMENT
[FreeTextEntry1] : Peyronie's disease: not painful discussed surgical option vs xiaflex - pt is not interested will monitor

## 2024-09-16 ENCOUNTER — APPOINTMENT (OUTPATIENT)
Dept: CT IMAGING | Facility: CLINIC | Age: 59
End: 2024-09-16
Payer: MEDICARE

## 2024-09-16 PROCEDURE — 74177 CT ABD & PELVIS W/CONTRAST: CPT

## 2024-09-20 ENCOUNTER — APPOINTMENT (OUTPATIENT)
Dept: OTOLARYNGOLOGY | Facility: CLINIC | Age: 59
End: 2024-09-20
Payer: MEDICARE

## 2024-09-20 VITALS
HEART RATE: 97 BPM | HEIGHT: 65 IN | SYSTOLIC BLOOD PRESSURE: 153 MMHG | WEIGHT: 180 LBS | BODY MASS INDEX: 29.99 KG/M2 | DIASTOLIC BLOOD PRESSURE: 88 MMHG

## 2024-09-20 DIAGNOSIS — M95.0 ACQUIRED DEFORMITY OF NOSE: ICD-10-CM

## 2024-09-20 DIAGNOSIS — J34.89 OTHER SPECIFIED DISORDERS OF NOSE AND NASAL SINUSES: ICD-10-CM

## 2024-09-20 DIAGNOSIS — J34.2 DEVIATED NASAL SEPTUM: ICD-10-CM

## 2024-09-20 PROCEDURE — 99204 OFFICE O/P NEW MOD 45 MIN: CPT | Mod: 25

## 2024-09-20 PROCEDURE — 31231 NASAL ENDOSCOPY DX: CPT

## 2024-09-20 NOTE — PLAN
[TextEntry] : Since he has had surgery before, has a preexisting perforation with severe collapse, he would benefit by vivaer procedure for his valve and residual turbinate and the edema above the perforation.  We can also try a retention suture.  We will call him once we get insurance approval.

## 2024-09-20 NOTE — HISTORY OF PRESENT ILLNESS
[de-identified] : 58M presenting with DNS and NVC. He is unable to breathe through his nose, has to use breathe right strips to be able to breathe. He has had a septoplasty and turbinate resection by Dr. Junior many years ago but no longer takes insurance. He has been using flonase daily. He was evaluated by Dr. Hensley previously.

## 2024-09-20 NOTE — CONSULT LETTER
[Dear  ___] : Dear  [unfilled], [Consult Letter:] : I had the pleasure of evaluating your patient, [unfilled]. [Please see my note below.] : Please see my note below. [Consult Closing:] : Thank you very much for allowing me to participate in the care of this patient.  If you have any questions, please do not hesitate to contact me. [Sincerely,] : Sincerely, [FreeTextEntry3] : Estevan Sexton MD, [facility]  Otolaryngology  Sinus and Endoscopic Skull Base Surgery  Head and Neck Surgery   [address], [address]  [place] [address]  Tel: [phone]  Fax:[phone]  TERELL Bernal, PA-C Department of Otolaryngology [facility] Otolaryng[facility] at [place]

## 2024-09-20 NOTE — PROCEDURE
[FreeTextEntry6] : Nasal Endoscopy: Procedure: Bilateral nasal endoscopy (CPT 34911)   Indication: Anterior rhinoscopy was inadequate to evaluate pathology.  Left: Large posterior septal perforation, turbinates resected  Middle meatus clear, without masses, polyps, or pus Sphenoethmoidal recess clear, without masses, polyps, or pus  Right:  Large posterior septal perforation, turbinates resected  Middle meatus clear, without masses, polyps, or pus  Sphenoethmoidal recess clear, without masses, polyps, or pus severe dynamic collapse on the right > left

## 2024-09-20 NOTE — PHYSICAL EXAM
[Nasal Endoscopy Performed] : nasal endoscopy was performed, see procedure section for findings [Normal] : mucosa is normal [Midline] : trachea located in midline position [de-identified] : R NVC>L

## 2024-10-08 ENCOUNTER — APPOINTMENT (OUTPATIENT)
Dept: MRI IMAGING | Facility: CLINIC | Age: 59
End: 2024-10-08
Payer: MEDICARE

## 2024-10-08 PROCEDURE — 70551 MRI BRAIN STEM W/O DYE: CPT

## 2024-10-11 ENCOUNTER — INPATIENT (INPATIENT)
Facility: HOSPITAL | Age: 59
LOS: 6 days | Discharge: HOME | DRG: 607 | End: 2024-10-18
Attending: STUDENT IN AN ORGANIZED HEALTH CARE EDUCATION/TRAINING PROGRAM | Admitting: HOSPITALIST
Payer: MEDICARE

## 2024-10-11 VITALS
SYSTOLIC BLOOD PRESSURE: 184 MMHG | RESPIRATION RATE: 18 BRPM | HEIGHT: 65 IN | TEMPERATURE: 98 F | OXYGEN SATURATION: 95 % | DIASTOLIC BLOOD PRESSURE: 88 MMHG | WEIGHT: 179.9 LBS | HEART RATE: 66 BPM

## 2024-10-11 DIAGNOSIS — Z98.1 ARTHRODESIS STATUS: Chronic | ICD-10-CM

## 2024-10-11 DIAGNOSIS — Z98.89 OTHER SPECIFIED POSTPROCEDURAL STATES: Chronic | ICD-10-CM

## 2024-10-11 DIAGNOSIS — R22.0 LOCALIZED SWELLING, MASS AND LUMP, HEAD: ICD-10-CM

## 2024-10-11 LAB
ANION GAP SERPL CALC-SCNC: 10 MMOL/L — SIGNIFICANT CHANGE UP (ref 5–17)
APTT BLD: 25.3 SEC — SIGNIFICANT CHANGE UP (ref 24.5–35.6)
BUN SERPL-MCNC: 15.2 MG/DL — SIGNIFICANT CHANGE UP (ref 8–20)
CALCIUM SERPL-MCNC: 8.9 MG/DL — SIGNIFICANT CHANGE UP (ref 8.4–10.5)
CHLORIDE SERPL-SCNC: 101 MMOL/L — SIGNIFICANT CHANGE UP (ref 96–108)
CO2 SERPL-SCNC: 28 MMOL/L — SIGNIFICANT CHANGE UP (ref 22–29)
CREAT SERPL-MCNC: 0.79 MG/DL — SIGNIFICANT CHANGE UP (ref 0.5–1.3)
EGFR: 102 ML/MIN/1.73M2 — SIGNIFICANT CHANGE UP
GLUCOSE SERPL-MCNC: 92 MG/DL — SIGNIFICANT CHANGE UP (ref 70–99)
HCT VFR BLD CALC: 39.7 % — SIGNIFICANT CHANGE UP (ref 39–50)
HGB BLD-MCNC: 12.9 G/DL — LOW (ref 13–17)
INR BLD: 0.95 RATIO — SIGNIFICANT CHANGE UP (ref 0.85–1.16)
MCHC RBC-ENTMCNC: 28.9 PG — SIGNIFICANT CHANGE UP (ref 27–34)
MCHC RBC-ENTMCNC: 32.5 GM/DL — SIGNIFICANT CHANGE UP (ref 32–36)
MCV RBC AUTO: 89 FL — SIGNIFICANT CHANGE UP (ref 80–100)
PLATELET # BLD AUTO: 284 K/UL — SIGNIFICANT CHANGE UP (ref 150–400)
POTASSIUM SERPL-MCNC: 4.3 MMOL/L — SIGNIFICANT CHANGE UP (ref 3.5–5.3)
POTASSIUM SERPL-SCNC: 4.3 MMOL/L — SIGNIFICANT CHANGE UP (ref 3.5–5.3)
PROTHROM AB SERPL-ACNC: 11 SEC — SIGNIFICANT CHANGE UP (ref 9.9–13.4)
RBC # BLD: 4.46 M/UL — SIGNIFICANT CHANGE UP (ref 4.2–5.8)
RBC # FLD: 15.3 % — HIGH (ref 10.3–14.5)
SODIUM SERPL-SCNC: 139 MMOL/L — SIGNIFICANT CHANGE UP (ref 135–145)
WBC # BLD: 7.77 K/UL — SIGNIFICANT CHANGE UP (ref 3.8–10.5)
WBC # FLD AUTO: 7.77 K/UL — SIGNIFICANT CHANGE UP (ref 3.8–10.5)

## 2024-10-11 PROCEDURE — 99285 EMERGENCY DEPT VISIT HI MDM: CPT

## 2024-10-11 PROCEDURE — 99223 1ST HOSP IP/OBS HIGH 75: CPT

## 2024-10-11 PROCEDURE — 93010 ELECTROCARDIOGRAM REPORT: CPT

## 2024-10-11 PROCEDURE — 71045 X-RAY EXAM CHEST 1 VIEW: CPT | Mod: 26

## 2024-10-11 PROCEDURE — 99222 1ST HOSP IP/OBS MODERATE 55: CPT

## 2024-10-11 RX ORDER — 5-HYDROXYTRYPTOPHAN (5-HTP) 100 MG
3 TABLET,DISINTEGRATING ORAL AT BEDTIME
Refills: 0 | Status: DISCONTINUED | OUTPATIENT
Start: 2024-10-11 | End: 2024-10-18

## 2024-10-11 RX ORDER — CLONAZEPAM 1 MG
0.5 TABLET ORAL DAILY
Refills: 0 | Status: DISCONTINUED | OUTPATIENT
Start: 2024-10-12 | End: 2024-10-18

## 2024-10-11 RX ORDER — SODIUM CHLORIDE 0.9 % (FLUSH) 0.9 %
3 SYRINGE (ML) INJECTION EVERY 8 HOURS
Refills: 0 | Status: DISCONTINUED | OUTPATIENT
Start: 2024-10-11 | End: 2024-10-15

## 2024-10-11 RX ORDER — ACETAMINOPHEN 325 MG
650 TABLET ORAL EVERY 6 HOURS
Refills: 0 | Status: DISCONTINUED | OUTPATIENT
Start: 2024-10-11 | End: 2024-10-18

## 2024-10-11 RX ORDER — AMLODIPINE BESYLATE 5 MG
10 TABLET ORAL DAILY
Refills: 0 | Status: DISCONTINUED | OUTPATIENT
Start: 2024-10-12 | End: 2024-10-18

## 2024-10-11 RX ORDER — MAG HYDROX/ALUMINUM HYD/SIMETH 200-200-20
30 SUSPENSION, ORAL (FINAL DOSE FORM) ORAL EVERY 4 HOURS
Refills: 0 | Status: DISCONTINUED | OUTPATIENT
Start: 2024-10-11 | End: 2024-10-18

## 2024-10-11 RX ORDER — QUETIAPINE FUMARATE 50 MG/1
125 TABLET, FILM COATED ORAL AT BEDTIME
Refills: 0 | Status: DISCONTINUED | OUTPATIENT
Start: 2024-10-11 | End: 2024-10-18

## 2024-10-11 RX ORDER — ASTEMIZOLE 10 MG
500 TABLET ORAL
Refills: 0 | Status: DISCONTINUED | OUTPATIENT
Start: 2024-10-11 | End: 2024-10-18

## 2024-10-11 RX ORDER — PANTOPRAZOLE SODIUM 40 MG/1
40 TABLET, DELAYED RELEASE ORAL
Refills: 0 | Status: DISCONTINUED | OUTPATIENT
Start: 2024-10-11 | End: 2024-10-18

## 2024-10-11 RX ORDER — ONDANSETRON HCL/PF 4 MG/2 ML
4 VIAL (ML) INJECTION EVERY 8 HOURS
Refills: 0 | Status: ACTIVE | OUTPATIENT
Start: 2024-10-11 | End: 2025-09-09

## 2024-10-11 RX ORDER — BENZTROPINE MESYLATE 2 MG
2 TABLET ORAL
Refills: 0 | Status: DISCONTINUED | OUTPATIENT
Start: 2024-10-11 | End: 2024-10-18

## 2024-10-11 NOTE — ED PROVIDER NOTE - CLINICAL SUMMARY MEDICAL DECISION MAKING FREE TEXT BOX
59y M presents as transfer from [facility] for newly diagnosed occipital bone mass seen on MRI. Pt neuro intact. Neurosurgery consulted. 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 Pt neuro intact. Neurosurgery consulted.-- requesting admission to medicine for pre-op medical clearance.

## 2024-10-11 NOTE — ED PROVIDER NOTE - OBJECTIVE STATEMENT
59y M w/ hx HTN, COPD, chronic back pain; presents as transfer from [facility] for occipital mass. Pt reports progressively enlarging painful mass to back of his head over the past few weeks. Was seen at [facility] where noncontrast MRI brain showed soft tissue mass of the midline occipital bone demonstrating measuring 6.2 x 3.1 x 5.6 cm. Pt then transferred for further evaluation by neurosurgery team.

## 2024-10-11 NOTE — ED ADULT NURSE NOTE - CHIEF COMPLAINT QUOTE
pt transfer from [facility] for mass to back of head, pt followed by neuro, c/o shooting pain to back of head. Neuro intact, hypertensive for EMS 5mg labetolol given approx 1hr ago

## 2024-10-11 NOTE — ED PROVIDER NOTE - PHYSICAL EXAMINATION
Constitutional: Awake, alert, in no acute distress  Eyes: no scleral icterus  HENT: tender palpable mass to mid occipital scalp, atraumatic, moist oral mucosa  Neck: supple  CV: RRR, no murmur  Pulm: non-labored respirations, CTAB  Abdomen: soft, non-tender, non-distended  Extremities: no edema, no deformity  Skin: no rash, no jaundice  Neuro: AAOx3, CNs II-XII intact, no facial asymmetry, 5/5 strength and sensation in all extremities, no dysmetria, stable gait

## 2024-10-11 NOTE — CONSULT NOTE ADULT - SUBJECTIVE AND OBJECTIVE BOX
HPI:  59 YOM w/PMHx HLD, COPD, liver failure, ETOH use who [place] [facility] [place] has a lump on the back of his head, seen on previous admission to [facility] from 11/23-12/23, for which he is currently being worked up for by Dr. Giovany Colbert (neurology). Pt reports getting an MRI on 10/08 for which he had yet to receive results, [place] [facility] [place] the [place] now being painful.        PAST MEDICAL & SURGICAL HISTORY:  High cholesterol  Hiatal hernia  Tardive dyskinesia  Liver failure  Seizures  ETOH abuse  S/P tonsillectomy  History of lumbar spinal fusion 6/2017      FAMILY HISTORY:  No pertinent family history in first degree relatives      SOCIAL HISTORY:  Tobacco Use:  EtOH use:   Substance:    Allergies  No Known Allergies    REVIEW OF SYSTEMS  As noted in HPI    HOME MEDICATIONS:  Home Medications:  acetaminophen 325 mg oral tablet: 3 tab(s) orally every 6 hours (25 Dec 2023 05:27)  amLODIPine 5 mg oral tablet: 1 tab(s) orally once a day (25 Dec 2023 05:27)  aspirin 81 mg oral delayed release tablet: 1 tab(s) orally once a day (25 Dec 2023 05:27)  benztropine 2 mg oral tablet: 1 tab(s) orally 2 times a day (25 Dec 2023 05:27)  calcium carbonate 500 mg (200 mg elemental calcium) oral tablet, chewable: 1 tab(s) orally every 6 hours As needed Heartburn (25 Dec 2023 05:27)  cefepime: 2,000 milligram(s) intravenous every 8 hours end date 1/25/2024 (25 Dec 2023 05:27)  clonazePAM 0.5 mg oral tablet: 1 tab(s) orally once a day (25 Dec 2023 05:27)  divalproex sodium 500 mg oral delayed release tablet: 1 tab(s) orally 2 times a day (25 Dec 2023 05:27)  folic acid 1 mg oral tablet: 1 tab(s) orally once a day (25 Dec 2023 05:27)  gabapentin 100 mg oral capsule: 1 cap(s) orally once a day (at bedtime) (25 Dec 2023 05:27)  hydrOXYzine hydrochloride 25 mg oral tablet: 2 tab(s) orally 2 times a day (25 Dec 2023 05:27)  ipratropium-albuterol 0.5 mg-2.5 mg/3 mL inhalation solution: 3 milliliter(s) inhaled every 6 hours As needed Shortness of Breath and/or Wheezing (25 Dec 2023 05:27)  lidocaine 4% topical film: Apply topically to affected area once a day (25 Dec 2023 05:27)  methocarbamol 500 mg oral tablet: 2 tab(s) orally every 8 hours (25 Dec 2023 05:27)  nicotine 21 mg/24 hr transdermal film, extended release: 1 patch transdermal once a day (25 Dec 2023 05:27)  oseltamivir 75 mg oral capsule: 1 cap(s) orally 2 times a day (28 Dec 2023 08:14)  pantoprazole 40 mg oral delayed release tablet: 1 tab(s) orally once a day (before a meal) (25 Dec 2023 05:27)  polyethylene glycol 3350 oral powder for reconstitution: 17 gram(s) orally once a day (at bedtime) (25 Dec 2023 05:27)  predniSONE 50 mg oral tablet: 1 tab(s) orally once a day Decrease by 10mg every 2 days till finish (28 Dec 2023 08:14)  QUEtiapine 25 mg oral tablet: 5 tab(s) orally once a day (at bedtime) (25 Dec 2023 05:27)  senna leaf extract oral tablet: 2 tab(s) orally once a day (at bedtime) (25 Dec 2023 05:27)  traMADol 50 mg oral tablet: 0.5 tab(s) orally every 6 hours As needed Moderate Pain (4 - 6) (28 Dec 2023 08:14)  traMADol 50 mg oral tablet: 1 tab(s) orally every 8 hours As needed Severe Pain (7 - 10) (28 Dec 2023 08:14)  vancomycin 1.25 g intravenous injection: 1.25 gram(s) intravenous every 12 hours end date 1/25/2024 **dose should be adjusted base on trough** (25 Dec 2023 05:27)      MEDICATIONS:  Antibiotics:    Neuro:    Anticoagulation:    OTHER:    IVF:      Vital Signs Last 24 Hrs  T(C): 36.9 (11 Oct 2024 19:37), Max: 36.9 (11 Oct 2024 19:37)  T(F): 98.5 (11 Oct 2024 19:37), Max: 98.5 (11 Oct 2024 19:37)  HR: 66 (11 Oct 2024 19:37) (66 - 66)  BP: 184/88 (11 Oct 2024 19:37) (184/88 - 184/88)  BP(mean): --  RR: 18 (11 Oct 2024 19:37) (18 - 18)  SpO2: 95% (11 Oct 2024 19:37) (95% - 95%)    Parameters below as of 11 Oct 2024 19:37  Patient On (Oxygen Delivery Method): room air      PHYSICAL EXAM:  GENERAL: NAD, well-groomed  HEAD:  Atraumatic, normocephalic  DRAINS:   WOUND: Dressing clean dry intact; well healed  SHUNT: easily compressible and refills  EYES: Conjunctiva and sclera clear; corneal reflex intact  ENMT: No tonsillar erythema, exudates, or enlargement; moist mucous membranes, good dentition, no lesions  NECK: Supple, nontender to palpation  KARYN COMA SCORE: E- V- M- =       E: 4= opens eyes spontaneously 3= to voice 2= to noxious 1= no opening       V: 5= oriented 4= confused 3= inappropriate words 2= incomprehensible sounds 1= nonverbal 1T= intubated       M: 6= follows commands 5= localizes 4= withdraws 3= flexor posturing 2= extensor posturing 1= no movement  MENTAL STATUS: AAO x3; Awake/Comatose; Opens eyes spontaneously/to voice/to light touch/to noxious stimuli; Appropriately conversant without aphasia/Nonverbal; following simple commands/mimicking/not following commands  CRANIAL NERVES: Visual acuity normal for age, visual fields full to confrontation, PERRL. EOMI without nystagmus. Facial sensation intact V1-3 distribution b/l. Face symmetric w/ normal eye closure and smile, tongue midline. Hearing grossly intact. Speech clear. Head turning and shoulder shrug intact.   REFLEXES: PERRL. Corneals intact b/l. Gag intact. Cough intact. Oculocephalic reflex intact (Doll's eye). Negative Castaneda's b/l. Negative clonus b/l  MOTOR: strength 5/5 b/l upper and lower extremities  Uppers     Delt (C5/6)     Bicep (C5/6)     Wrist Extend (C6)     Tricep (C7)     HG (C8/T1)  R                     5/5                 5/5                         5/5                           5/5                   5/5  L                      5/5                 5/5                         5/5                           5/5                   5/5  Lowers      HF(L1/L2)     KE (L3)     DF (L4)     EHL (L5)     PF (S1)      R                     5/5              5/5           5/5           5/5            5/5  L                     5/5               5/5          5/5            5/5            5/5  SENSATION: grossly intact to light touch all extremities  COORDINATION: Gait intact; rapid alternating movements intact b/l upper extremities; no upper extremity dysmetria  MUSCLE STRETCH REFLEXES: DTRs 2+ intact and symmetric  PLANTAR: upgoing/downgoing/mute (Babinski)  CHEST/LUNG: Clear to auscultation bilaterally; no rales, rhonchi, wheezing, or rubs  HEART: +S1/+S2; Regular rate and rhythm; no murmurs, rubs, or gallops  ABDOMEN: Soft, nontender, nondistended; bowel sounds present all four quadrants  EXTREMITIES:  2+ peripheral pulses, no clubbing, cyanosis, or edema  LYMPH: No lymphadenopathy noted  SKIN: Warm, dry; no rashes or lesions      RADIOLOGY & ADDITIONAL STUDIES:  CTH at [facility] on 10/08/2024:  IMPRESSION:  1. Expansile well demarcated soft tissue mass of the midline occipital bone demonstrating T1 isointense and mildly T2 hyperintense signal measuring 6.2 x 3.1 x 5.6 cm (2-12, 5-6). The mass demonstrates osseous destruction. No diffusion restriction. The mass abuts the cerebellum and bilateral occipital lobes. There is loss of flow void of the posterior portion of the superior sagittal sinus which may represent slow flow versus venous sinus thrombosis. Recommend MRI brain with contrast and MRV brain for further evaluation, if there is no contraindication.  2. Additional nonspecific T2 FLAIR hyperintense, T1 isointense lesion of the right frontal calvarium measuring 0.7 x 0.5 cm (3-19).    These findings were discussed with Dr. GIOVANY COLBERT [phone] at 10/9/2024 2:38 PM by Dr. Emmy Henry with read back confirmation.    --- End of Report ---    EMMY HENRY MD; Resident Radiologist  This document has been electronically signed.  CHRISTIN CUNNINGHAM MD; Attending Radiologist  This document has been electronically signed. Oct 9 2024 2:53PM HPI:  59 YOM w/PMHx HTN, COPD, liver failure who presents as a transfer from [facility] [place]. Pt has a lump on the back of his head, seen on previous admission to [facility] from 11/23-12/23, for which he is currently being worked up for by Dr. Giovany Colbert (neurology). Pt reports getting an MRI on 10/08 for which he had yet to receive results, presented to [facility] [place] for the lump now being painful. Pt states he did not know the mass was seen while he was hospitalized last year, but has started growing and becoming noticeable on the back of his head. Pt states when there is pressure on the lump (from leaning on it, pushing on it, etc) he gets pain that radiates from the lump down his neck and his shoulders. He endorses some headaches and some dizziness as well as some flashers/floaters in his vision. He denies blurry/double vision, weakness, balance/gait issues, CP, SOB, N/V/D. He states if possible he would like the mass removed.        PAST MEDICAL & SURGICAL HISTORY:  High cholesterol  Hiatal hernia  Tardive dyskinesia  Liver failure  Seizures  ETOH abuse  S/P tonsillectomy  History of lumbar spinal fusion 6/2017      FAMILY HISTORY:  No pertinent family history in first degree relatives      SOCIAL HISTORY:  Tobacco Use:  EtOH use:   Substance:    Allergies  No Known Allergies    REVIEW OF SYSTEMS  As noted in HPI    HOME MEDICATIONS:  Home Medications:  acetaminophen 325 mg oral tablet: 3 tab(s) orally every 6 hours (25 Dec 2023 05:27)  amLODIPine 5 mg oral tablet: 1 tab(s) orally once a day (25 Dec 2023 05:27)  aspirin 81 mg oral delayed release tablet: 1 tab(s) orally once a day (25 Dec 2023 05:27)  benztropine 2 mg oral tablet: 1 tab(s) orally 2 times a day (25 Dec 2023 05:27)  calcium carbonate 500 mg (200 mg elemental calcium) oral tablet, chewable: 1 tab(s) orally every 6 hours As needed Heartburn (25 Dec 2023 05:27)  cefepime: 2,000 milligram(s) intravenous every 8 hours end date 1/25/2024 (25 Dec 2023 05:27)  clonazePAM 0.5 mg oral tablet: 1 tab(s) orally once a day (25 Dec 2023 05:27)  divalproex sodium 500 mg oral delayed release tablet: 1 tab(s) orally 2 times a day (25 Dec 2023 05:27)  folic acid 1 mg oral tablet: 1 tab(s) orally once a day (25 Dec 2023 05:27)  gabapentin 100 mg oral capsule: 1 cap(s) orally once a day (at bedtime) (25 Dec 2023 05:27)  hydrOXYzine hydrochloride 25 mg oral tablet: 2 tab(s) orally 2 times a day (25 Dec 2023 05:27)  ipratropium-albuterol 0.5 mg-2.5 mg/3 mL inhalation solution: 3 milliliter(s) inhaled every 6 hours As needed Shortness of Breath and/or Wheezing (25 Dec 2023 05:27)  lidocaine 4% topical film: Apply topically to affected area once a day (25 Dec 2023 05:27)  methocarbamol 500 mg oral tablet: 2 tab(s) orally every 8 hours (25 Dec 2023 05:27)  nicotine 21 mg/24 hr transdermal film, extended release: 1 patch transdermal once a day (25 Dec 2023 05:27)  oseltamivir 75 mg oral capsule: 1 cap(s) orally 2 times a day (28 Dec 2023 08:14)  pantoprazole 40 mg oral delayed release tablet: 1 tab(s) orally once a day (before a meal) (25 Dec 2023 05:27)  polyethylene glycol 3350 oral powder for reconstitution: 17 gram(s) orally once a day (at bedtime) (25 Dec 2023 05:27)  predniSONE 50 mg oral tablet: 1 tab(s) orally once a day Decrease by 10mg every 2 days till finish (28 Dec 2023 08:14)  QUEtiapine 25 mg oral tablet: 5 tab(s) orally once a day (at bedtime) (25 Dec 2023 05:27)  senna leaf extract oral tablet: 2 tab(s) orally once a day (at bedtime) (25 Dec 2023 05:27)  traMADol 50 mg oral tablet: 0.5 tab(s) orally every 6 hours As needed Moderate Pain (4 - 6) (28 Dec 2023 08:14)  traMADol 50 mg oral tablet: 1 tab(s) orally every 8 hours As needed Severe Pain (7 - 10) (28 Dec 2023 08:14)  vancomycin 1.25 g intravenous injection: 1.25 gram(s) intravenous every 12 hours end date 1/25/2024 **dose should be adjusted base on trough** (25 Dec 2023 05:27)      MEDICATIONS:  Antibiotics:    Neuro:    Anticoagulation:    OTHER:    IVF:      Vital Signs Last 24 Hrs  T(C): 36.9 (11 Oct 2024 19:37), Max: 36.9 (11 Oct 2024 19:37)  T(F): 98.5 (11 Oct 2024 19:37), Max: 98.5 (11 Oct 2024 19:37)  HR: 66 (11 Oct 2024 19:37) (66 - 66)  BP: 184/88 (11 Oct 2024 19:37) (184/88 - 184/88)  BP(mean): --  RR: 18 (11 Oct 2024 19:37) (18 - 18)  SpO2: 95% (11 Oct 2024 19:37) (95% - 95%)    Parameters below as of 11 Oct 2024 19:37  Patient On (Oxygen Delivery Method): room air      PHYSICAL EXAM:  GENERAL: NAD, well-groomed  HEAD:  Atraumatic, around 3 cm lump visible and palpable on occiput  KARYN COMA SCORE: E- V- M- = 15  MENTAL STATUS: AAO x3; Awake; Opens eyes spontaneously; Appropriately conversant without aphasia; following simple commands  CRANIAL NERVES: PERRL. EOMI without nystagmus. Facial sensation intact V1-3 distribution b/l. Face symmetric w/ normal eye closure and smile, tongue midline. Hearing grossly intact. Speech clear. Head turning and shoulder shrug intact.   MOTOR: strength 5/5 b/l upper and lower extremities  Uppers     Delt (C5/6)     Bicep (C5/6)     Wrist Extend (C6)     Tricep (C7)     HG (C8/T1)  R                     5/5                 5/5                         5/5                           5/5                   5/5  L                      5/5                 5/5                         5/5                           5/5                   5/5  Lowers      HF(L1/L2)     KE (L3)     DF (L4)     EHL (L5)     PF (S1)      R                     5/5              5/5           5/5           5/5            5/5  L                     5/5               5/5          5/5            5/5            5/5  SENSATION: grossly intact to light touch all extremities  CHEST/LUNG: Unlabored breathing on room air  SKIN: Warm, dry; no rashes or lesions    RADIOLOGY & ADDITIONAL STUDIES:  CTH at [facility] on 10/08/2024:  IMPRESSION:  1. Expansile well demarcated soft tissue mass of the midline occipital bone demonstrating T1 isointense and mildly T2 hyperintense signal measuring 6.2 x 3.1 x 5.6 cm (2-12, 5-6). The mass demonstrates osseous destruction. No diffusion restriction. The mass abuts the cerebellum and bilateral occipital lobes. There is loss of flow void of the posterior portion of the superior sagittal sinus which may represent slow flow versus venous sinus thrombosis. Recommend MRI brain with contrast and MRV brain for further evaluation, if there is no contraindication.  2. Additional nonspecific T2 FLAIR hyperintense, T1 isointense lesion of the right frontal calvarium measuring 0.7 x 0.5 cm (3-19).    These findings were discussed with Dr. GIOVANY COLBERT [phone] at 10/9/2024 2:38 PM by Dr. Emmy Henry with read back confirmation.    --- End of Report ---    EMMY HENRY MD; Resident Radiologist  This document has been electronically signed.  CHRISTIN CUNNINGHAM MD; Attending Radiologist  This document has been electronically signed. Oct 9 2024 2:53PM HPI:  59 YOM w/PMHx HTN, COPD, liver failure who presents as a transfer from [facility] [place]. Pt has a lump on the back of his head, seen on previous admission to [facility] from 11/23-12/23, for which he is currently being worked up for by Dr. Giovany Colbert (neurology). Pt reports getting an MRI on 10/08 for which he had yet to receive results, presented to [facility] [place] for the lump now being painful. Pt states he did not know the mass was seen while he was hospitalized last year, but has started growing and becoming noticeable on the back of his head. Pt states when there is pressure on the lump (from leaning on it, pushing on it, etc) he gets pain that radiates from the lump down his neck and his shoulders. He endorses some headaches and some dizziness as well as some flashers/floaters in his vision. He denies blurry/double vision, weakness, balance/gait issues, CP, SOB, N/V/D. He states if possible he would like the mass removed.        PAST MEDICAL & SURGICAL HISTORY:  High cholesterol  Hiatal hernia  Tardive dyskinesia  Liver failure  Seizures  ETOH abuse  S/P tonsillectomy  History of lumbar spinal fusion 6/2017      FAMILY HISTORY:  No pertinent family history in first degree relatives      SOCIAL HISTORY:  Tobacco Use:  EtOH use:   Substance:    Allergies  No Known Allergies    REVIEW OF SYSTEMS  As noted in HPI    HOME MEDICATIONS:  Home Medications:  acetaminophen 325 mg oral tablet: 3 tab(s) orally every 6 hours (25 Dec 2023 05:27)  amLODIPine 5 mg oral tablet: 1 tab(s) orally once a day (25 Dec 2023 05:27)  aspirin 81 mg oral delayed release tablet: 1 tab(s) orally once a day (25 Dec 2023 05:27)  benztropine 2 mg oral tablet: 1 tab(s) orally 2 times a day (25 Dec 2023 05:27)  calcium carbonate 500 mg (200 mg elemental calcium) oral tablet, chewable: 1 tab(s) orally every 6 hours As needed Heartburn (25 Dec 2023 05:27)  cefepime: 2,000 milligram(s) intravenous every 8 hours end date 1/25/2024 (25 Dec 2023 05:27)  clonazePAM 0.5 mg oral tablet: 1 tab(s) orally once a day (25 Dec 2023 05:27)  divalproex sodium 500 mg oral delayed release tablet: 1 tab(s) orally 2 times a day (25 Dec 2023 05:27)  folic acid 1 mg oral tablet: 1 tab(s) orally once a day (25 Dec 2023 05:27)  gabapentin 100 mg oral capsule: 1 cap(s) orally once a day (at bedtime) (25 Dec 2023 05:27)  hydrOXYzine hydrochloride 25 mg oral tablet: 2 tab(s) orally 2 times a day (25 Dec 2023 05:27)  ipratropium-albuterol 0.5 mg-2.5 mg/3 mL inhalation solution: 3 milliliter(s) inhaled every 6 hours As needed Shortness of Breath and/or Wheezing (25 Dec 2023 05:27)  lidocaine 4% topical film: Apply topically to affected area once a day (25 Dec 2023 05:27)  methocarbamol 500 mg oral tablet: 2 tab(s) orally every 8 hours (25 Dec 2023 05:27)  nicotine 21 mg/24 hr transdermal film, extended release: 1 patch transdermal once a day (25 Dec 2023 05:27)  oseltamivir 75 mg oral capsule: 1 cap(s) orally 2 times a day (28 Dec 2023 08:14)  pantoprazole 40 mg oral delayed release tablet: 1 tab(s) orally once a day (before a meal) (25 Dec 2023 05:27)  polyethylene glycol 3350 oral powder for reconstitution: 17 gram(s) orally once a day (at bedtime) (25 Dec 2023 05:27)  predniSONE 50 mg oral tablet: 1 tab(s) orally once a day Decrease by 10mg every 2 days till finish (28 Dec 2023 08:14)  QUEtiapine 25 mg oral tablet: 5 tab(s) orally once a day (at bedtime) (25 Dec 2023 05:27)  senna leaf extract oral tablet: 2 tab(s) orally once a day (at bedtime) (25 Dec 2023 05:27)  traMADol 50 mg oral tablet: 0.5 tab(s) orally every 6 hours As needed Moderate Pain (4 - 6) (28 Dec 2023 08:14)  traMADol 50 mg oral tablet: 1 tab(s) orally every 8 hours As needed Severe Pain (7 - 10) (28 Dec 2023 08:14)  vancomycin 1.25 g intravenous injection: 1.25 gram(s) intravenous every 12 hours end date 1/25/2024 **dose should be adjusted base on trough** (25 Dec 2023 05:27)      MEDICATIONS:  Antibiotics:    Neuro:    Anticoagulation:    OTHER:    IVF:      Vital Signs Last 24 Hrs  T(C): 36.9 (11 Oct 2024 19:37), Max: 36.9 (11 Oct 2024 19:37)  T(F): 98.5 (11 Oct 2024 19:37), Max: 98.5 (11 Oct 2024 19:37)  HR: 66 (11 Oct 2024 19:37) (66 - 66)  BP: 184/88 (11 Oct 2024 19:37) (184/88 - 184/88)  BP(mean): --  RR: 18 (11 Oct 2024 19:37) (18 - 18)  SpO2: 95% (11 Oct 2024 19:37) (95% - 95%)    Parameters below as of 11 Oct 2024 19:37  Patient On (Oxygen Delivery Method): room air      PHYSICAL EXAM:  GENERAL: NAD, well-groomed  HEAD:  Atraumatic, around 3 cm lump visible and palpable on occiput  KARYN COMA SCORE: E- V- M- = 15  MENTAL STATUS: AAO x3; Awake; Opens eyes spontaneously; Appropriately conversant without aphasia; following simple commands  CRANIAL NERVES: PERRL. EOMI without nystagmus. Facial sensation intact V1-3 distribution b/l. Face symmetric w/ normal eye closure and smile, tongue midline. Hearing grossly intact. Speech clear. Head turning and shoulder shrug intact.   MOTOR: strength 5/5 b/l upper and lower extremities  SENSATION: grossly intact to light touch all extremities  CHEST/LUNG: Unlabored breathing on room air  SKIN: Warm, dry; no rashes or lesions    RADIOLOGY & ADDITIONAL STUDIES:  CTH at [facility] on 10/08/2024:  IMPRESSION:  1. Expansile well demarcated soft tissue mass of the midline occipital bone demonstrating T1 isointense and mildly T2 hyperintense signal measuring 6.2 x 3.1 x 5.6 cm (2-12, 5-6). The mass demonstrates osseous destruction. No diffusion restriction. The mass abuts the cerebellum and bilateral occipital lobes. There is loss of flow void of the posterior portion of the superior sagittal sinus which may represent slow flow versus venous sinus thrombosis. Recommend MRI brain with contrast and MRV brain for further evaluation, if there is no contraindication.  2. Additional nonspecific T2 FLAIR hyperintense, T1 isointense lesion of the right frontal calvarium measuring 0.7 x 0.5 cm (3-19).    These findings were discussed with Dr. GIOVANY COLBERT [phone] at 10/9/2024 2:38 PM by Dr. Emmy Henry with read back confirmation.    --- End of Report ---    EMMY HENRY MD; Resident Radiologist  This document has been electronically signed.  CHRISTIN CUNNINGHAM MD; Attending Radiologist  This document has been electronically signed. Oct 9 2024 2:53PM

## 2024-10-11 NOTE — ED ADULT TRIAGE NOTE - CHIEF COMPLAINT QUOTE
[place] [facility] [place] to back of head, pt followed by neuro, c/o shooting pain to back of head. Neuro intact, hypertensive for EMS 5mg labetolol given approx 1hr ago

## 2024-10-11 NOTE — CONSULT NOTE ADULT - ASSESSMENT
59 YOM w/PMHx HLD, COPD, liver failure, ETOH abuse who [place] [facility] [place] of midline occipital bone soft tissue mass that extends into skull.     PLAN:  - Medicine to admit  - CT chest  - MRI brain stereotactic w/wo contrast  - MRV brain  - Labs: CBC, BMP, Coags  - Will require preop medical clearance for possible surgical resection pending workup  - Pt discussed and images reviewed with Dr. Burnett.

## 2024-10-11 NOTE — CONSULT NOTE ADULT - SUPERVISING ATTENDING
Amber Burnett MD I personally reviewed the patient's imaging. History and plan discussed with YESENIA Mckeon, agree with above.

## 2024-10-11 NOTE — ED ADULT NURSE NOTE - OBJECTIVE STATEMENT
Patient AAOx4 [place] [facility] [place], Patient c/o lump to back of head that has been increasing in size and painful.   denies nausea, vomiting, fevers, chest pain, shortness of breath.. respirations unlabored and even on room air. NAD

## 2024-10-12 LAB
ANION GAP SERPL CALC-SCNC: 9 MMOL/L — SIGNIFICANT CHANGE UP (ref 5–17)
BUN SERPL-MCNC: 15.1 MG/DL — SIGNIFICANT CHANGE UP (ref 8–20)
CALCIUM SERPL-MCNC: 8.7 MG/DL — SIGNIFICANT CHANGE UP (ref 8.4–10.5)
CHLORIDE SERPL-SCNC: 105 MMOL/L — SIGNIFICANT CHANGE UP (ref 96–108)
CO2 SERPL-SCNC: 28 MMOL/L — SIGNIFICANT CHANGE UP (ref 22–29)
CREAT SERPL-MCNC: 0.73 MG/DL — SIGNIFICANT CHANGE UP (ref 0.5–1.3)
EGFR: 105 ML/MIN/1.73M2 — SIGNIFICANT CHANGE UP
GLUCOSE SERPL-MCNC: 81 MG/DL — SIGNIFICANT CHANGE UP (ref 70–99)
HCT VFR BLD CALC: 38.7 % — LOW (ref 39–50)
HGB BLD-MCNC: 12.4 G/DL — LOW (ref 13–17)
MAGNESIUM SERPL-MCNC: 2 MG/DL — SIGNIFICANT CHANGE UP (ref 1.6–2.6)
MCHC RBC-ENTMCNC: 28.9 PG — SIGNIFICANT CHANGE UP (ref 27–34)
MCHC RBC-ENTMCNC: 32 GM/DL — SIGNIFICANT CHANGE UP (ref 32–36)
MCV RBC AUTO: 90.2 FL — SIGNIFICANT CHANGE UP (ref 80–100)
MRSA PCR RESULT.: SIGNIFICANT CHANGE UP
PHOSPHATE SERPL-MCNC: 3.8 MG/DL — SIGNIFICANT CHANGE UP (ref 2.4–4.7)
PLATELET # BLD AUTO: 286 K/UL — SIGNIFICANT CHANGE UP (ref 150–400)
POTASSIUM SERPL-MCNC: 4 MMOL/L — SIGNIFICANT CHANGE UP (ref 3.5–5.3)
POTASSIUM SERPL-SCNC: 4 MMOL/L — SIGNIFICANT CHANGE UP (ref 3.5–5.3)
RBC # BLD: 4.29 M/UL — SIGNIFICANT CHANGE UP (ref 4.2–5.8)
RBC # FLD: 15.5 % — HIGH (ref 10.3–14.5)
S AUREUS DNA NOSE QL NAA+PROBE: DETECTED
SODIUM SERPL-SCNC: 142 MMOL/L — SIGNIFICANT CHANGE UP (ref 135–145)
WBC # BLD: 8.03 K/UL — SIGNIFICANT CHANGE UP (ref 3.8–10.5)
WBC # FLD AUTO: 8.03 K/UL — SIGNIFICANT CHANGE UP (ref 3.8–10.5)

## 2024-10-12 PROCEDURE — 99231 SBSQ HOSP IP/OBS SF/LOW 25: CPT

## 2024-10-12 PROCEDURE — 99233 SBSQ HOSP IP/OBS HIGH 50: CPT

## 2024-10-12 PROCEDURE — 70553 MRI BRAIN STEM W/O & W/DYE: CPT | Mod: 26

## 2024-10-12 PROCEDURE — 70545 MR ANGIOGRAPHY HEAD W/DYE: CPT | Mod: 26,59

## 2024-10-12 PROCEDURE — 71260 CT THORAX DX C+: CPT | Mod: 26

## 2024-10-12 RX ORDER — ENOXAPARIN SODIUM 150 MG/ML
40 INJECTION SUBCUTANEOUS EVERY 24 HOURS
Refills: 0 | Status: DISCONTINUED | OUTPATIENT
Start: 2024-10-12 | End: 2024-10-16

## 2024-10-12 RX ADMIN — QUETIAPINE FUMARATE 125 MILLIGRAM(S): 50 TABLET, FILM COATED ORAL at 21:24

## 2024-10-12 RX ADMIN — Medication 3 MILLILITER(S): at 22:30

## 2024-10-12 RX ADMIN — Medication 10 MILLIGRAM(S): at 06:16

## 2024-10-12 RX ADMIN — Medication 2 MILLIGRAM(S): at 00:50

## 2024-10-12 RX ADMIN — PANTOPRAZOLE SODIUM 40 MILLIGRAM(S): 40 TABLET, DELAYED RELEASE ORAL at 06:15

## 2024-10-12 RX ADMIN — QUETIAPINE FUMARATE 125 MILLIGRAM(S): 50 TABLET, FILM COATED ORAL at 00:34

## 2024-10-12 RX ADMIN — Medication 0.5 MILLIGRAM(S): at 12:37

## 2024-10-12 RX ADMIN — Medication 500 MILLIGRAM(S): at 17:44

## 2024-10-12 RX ADMIN — Medication 500 MILLIGRAM(S): at 00:35

## 2024-10-12 RX ADMIN — Medication 2 MILLIGRAM(S): at 17:45

## 2024-10-12 RX ADMIN — Medication 2 MILLIGRAM(S): at 06:15

## 2024-10-12 RX ADMIN — ENOXAPARIN SODIUM 40 MILLIGRAM(S): 150 INJECTION SUBCUTANEOUS at 12:37

## 2024-10-12 RX ADMIN — Medication 3 MILLILITER(S): at 06:34

## 2024-10-12 RX ADMIN — Medication 500 MILLIGRAM(S): at 06:15

## 2024-10-12 RX ADMIN — Medication 3 MILLILITER(S): at 13:20

## 2024-10-12 NOTE — PROGRESS NOTE ADULT - SUBJECTIVE AND OBJECTIVE BOX
NEUROSURGERY PROGRESS NOTE:    59 YOM w/PMHx HTN, COPD, liver failure who presents as a transfer from [facility] [place]. Pt has a lump on the back of his head, seen on previous admission to [facility] from 11/23-12/23, for which he is currently being worked up for by Dr. Giovany Watson (neurology). Pt reports getting an MRI on 10/08 for which he had yet to receive results, presented to [facility] for the lump now being painful. Pt states he did not know the mass was seen while he was hospitalized last year, but has started growing and becoming noticeable on the back of his head. Pt states when there is pressure on the lump (from leaning on it, pushing on it, etc) he gets pain that radiates from the lump down his neck and his shoulders. He endorses some headaches and some dizziness as well as some flashers/floaters in his vision. He denies blurry/double vision, weakness, balance/gait issues, CP, SOB, N/V/D. He states if possible he would like the mass removed.     pt seen and states is at baseline, denied any new or worsening sensorimotor changes  Dr Burnett spoke at length regarding surgical/ IR w/u and resection plan       MEDICATIONS  (STANDING):  amLODIPine   Tablet 10 milliGRAM(s) Oral daily  benztropine 2 milliGRAM(s) Oral two times a day  clonazePAM  Tablet 0.5 milliGRAM(s) Oral daily  divalproex  milliGRAM(s) Oral two times a day  enoxaparin Injectable 40 milliGRAM(s) SubCutaneous every 24 hours  pantoprazole    Tablet 40 milliGRAM(s) Oral before breakfast  QUEtiapine 125 milliGRAM(s) Oral at bedtime  sodium chloride 0.9% lock flush 3 milliLiter(s) IV Push every 8 hours    MEDICATIONS  (PRN):  acetaminophen     Tablet .. 650 milliGRAM(s) Oral every 6 hours PRN Temp greater or equal to 38C (100.4F), Mild Pain (1 - 3)  aluminum hydroxide/magnesium hydroxide/simethicone Suspension 30 milliLiter(s) Oral every 4 hours PRN Dyspepsia  melatonin 3 milliGRAM(s) Oral at bedtime PRN Insomnia  ondansetron Injectable 4 milliGRAM(s) IV Push every 8 hours PRN Nausea and/or Vomiting  oxycodone    5 mG/acetaminophen 325 mG 1 Tablet(s) Oral every 4 hours PRN Moderate Pain (4 - 6)    Allergies    No Known Allergies    Intolerances      Vital Signs Last 24 Hrs  T(C): 36.5 (12 Oct 2024 17:08), Max: 36.9 (11 Oct 2024 19:37)  T(F): 97.7 (12 Oct 2024 17:08), Max: 98.5 (11 Oct 2024 19:37)  HR: 72 (12 Oct 2024 17:08) (61 - 78)  BP: 139/84 (12 Oct 2024 17:08) (132/82 - 184/88)  BP(mean): 97 (12 Oct 2024 02:33) (97 - 97)  RR: 18 (12 Oct 2024 17:08) (18 - 18)  SpO2: 91% (12 Oct 2024 17:08) (91% - 98%)    Parameters below as of 12 Oct 2024 17:08  Patient On (Oxygen Delivery Method): room air            PHYSICAL EXAM:  GENERAL: NAD, well-groomed  HEAD:  Atraumatic, around 3 cm lump visible and palpable on occiput  KARYN COMA SCORE: E- V- M- = 15  MENTAL STATUS: AAO x3; Awake; Opens eyes spontaneously; Appropriately conversant without aphasia; following simple commands  CRANIAL NERVES: PERRL. EOMI without nystagmus. Facial sensation intact V1-3 distribution b/l. Face symmetric w/ normal eye closure and smile, tongue midline. Hearing grossly intact. Speech clear. Head turning and shoulder shrug intact.   MOTOR: strength 5/5 b/l upper and lower extremities  SENSATION: grossly intact to light touch all extremities  CHEST/LUNG: Unlabored breathing on room air  SKIN: Warm, dry; no rashes or lesions         LABS:                        12.4   8.03  )-----------( 286      ( 12 Oct 2024 06:00 )             38.7     10-12    142  |  105  |  15.1  ----------------------------<  81  4.0   |  28.0  |  0.73    Ca    8.7      12 Oct 2024 06:00  Phos  3.8     10-12  Mg     2.0     10-12      PT/INR - ( 11 Oct 2024 22:23 )   PT: 11.0 sec;   INR: 0.95 ratio         PTT - ( 11 Oct 2024 22:23 )  PTT:25.3 sec  Urinalysis Basic - ( 12 Oct 2024 06:00 )    Color: x / Appearance: x / SG: x / pH: x  Gluc: 81 mg/dL / Ketone: x  / Bili: x / Urobili: x   Blood: x / Protein: x / Nitrite: x   Leuk Esterase: x / RBC: x / WBC x   Sq Epi: x / Non Sq Epi: x / Bacteria: x        RADIOLOGY & ADDITIONAL TESTS:  no new NSx images available for review       9/16 CT ab/pel- no acute findings     10/8 MRB: soft tissue mass of the midline occipital bone measuring 6.2 x 3.1 x 5.6 cm. The mass demonstrates osseous destruction    10/12 CT Chest: no evidence of malignancy     MRI Brain: 5.8 x 6.1 x 4.0 cm enhancing extra-axial neoplasm in the Central occipital region and posterior fossa abutting the dura and compressing the torcula and BILATERAL distal transverse sinuses, likely a large meningioma.    MRV:The RIGHT transverse sinus is patent but narrowed distally secondary to the mass. The LEFT transverse sinus is occluded distally by the mass. The torcula is partially occluded. NEUROSURGERY PROGRESS NOTE:    59 YOM w/PMHx HTN, COPD, liver failure who presents as a transfer from [facility]. Pt has a lump on the back of his head, seen on previous admission to [facility] from 11/23-12/23, for which he is currently being worked up for by Dr. Giovany Watson (neurology). Pt reports getting an MRI on 10/08 for which he had yet to receive results, presented to [facility] for the lump now being painful. Pt states he did not know the mass was seen while he was hospitalized last year, but has started growing and becoming noticeable on the back of his head. Pt states when there is pressure on the lump (from leaning on it, pushing on it, etc) he gets pain that radiates from the lump down his neck and his shoulders. He endorses some headaches and some dizziness as well as some flashers/floaters in his vision. He denies blurry/double vision, weakness, balance/gait issues, CP, SOB, N/V/D. He states if possible he would like the mass removed.     pt seen and states is at baseline, denied any new or worsening sensorimotor changes  Dr Burnett spoke at length regarding surgical/ IR w/u and resection plan       MEDICATIONS  (STANDING):  amLODIPine   Tablet 10 milliGRAM(s) Oral daily  benztropine 2 milliGRAM(s) Oral two times a day  clonazePAM  Tablet 0.5 milliGRAM(s) Oral daily  divalproex  milliGRAM(s) Oral two times a day  enoxaparin Injectable 40 milliGRAM(s) SubCutaneous every 24 hours  pantoprazole    Tablet 40 milliGRAM(s) Oral before breakfast  QUEtiapine 125 milliGRAM(s) Oral at bedtime  sodium chloride 0.9% lock flush 3 milliLiter(s) IV Push every 8 hours    MEDICATIONS  (PRN):  acetaminophen     Tablet .. 650 milliGRAM(s) Oral every 6 hours PRN Temp greater or equal to 38C (100.4F), Mild Pain (1 - 3)  aluminum hydroxide/magnesium hydroxide/simethicone Suspension 30 milliLiter(s) Oral every 4 hours PRN Dyspepsia  melatonin 3 milliGRAM(s) Oral at bedtime PRN Insomnia  ondansetron Injectable 4 milliGRAM(s) IV Push every 8 hours PRN Nausea and/or Vomiting  oxycodone    5 mG/acetaminophen 325 mG 1 Tablet(s) Oral every 4 hours PRN Moderate Pain (4 - 6)    Allergies    No Known Allergies    Intolerances      Vital Signs Last 24 Hrs  T(C): 36.5 (12 Oct 2024 17:08), Max: 36.9 (11 Oct 2024 19:37)  T(F): 97.7 (12 Oct 2024 17:08), Max: 98.5 (11 Oct 2024 19:37)  HR: 72 (12 Oct 2024 17:08) (61 - 78)  BP: 139/84 (12 Oct 2024 17:08) (132/82 - 184/88)  BP(mean): 97 (12 Oct 2024 02:33) (97 - 97)  RR: 18 (12 Oct 2024 17:08) (18 - 18)  SpO2: 91% (12 Oct 2024 17:08) (91% - 98%)    Parameters below as of 12 Oct 2024 17:08  Patient On (Oxygen Delivery Method): room air            PHYSICAL EXAM:  GENERAL: NAD, well-groomed  HEAD:  Atraumatic, around 3 cm lump visible and palpable on occiput  KARYN COMA SCORE: E- V- M- = 15  MENTAL STATUS: AAO x3; Awake; Opens eyes spontaneously; Appropriately conversant without aphasia; following simple commands  CRANIAL NERVES: PERRL. EOMI without nystagmus. Facial sensation intact V1-3 distribution b/l. Face symmetric w/ normal eye closure and smile, tongue midline. Hearing grossly intact. Speech clear. Head turning and shoulder shrug intact.   MOTOR: strength 5/5 b/l upper and lower extremities  SENSATION: grossly intact to light touch all extremities  CHEST/LUNG: Unlabored breathing on room air  SKIN: Warm, dry; no rashes or lesions        LABS:                        12.4   8.03  )-----------( 286      ( 12 Oct 2024 06:00 )             38.7     10-12    142  |  105  |  15.1  ----------------------------<  81  4.0   |  28.0  |  0.73    Ca    8.7      12 Oct 2024 06:00  Phos  3.8     10-12  Mg     2.0     10-12      PT/INR - ( 11 Oct 2024 22:23 )   PT: 11.0 sec;   INR: 0.95 ratio    PTT - ( 11 Oct 2024 22:23 )  PTT:25.3 sec      Urinalysis Basic - ( 12 Oct 2024 06:00 )    Color: x / Appearance: x / SG: x / pH: x  Gluc: 81 mg/dL / Ketone: x  / Bili: x / Urobili: x   Blood: x / Protein: x / Nitrite: x   Leuk Esterase: x / RBC: x / WBC x   Sq Epi: x / Non Sq Epi: x / Bacteria: x        RADIOLOGY & ADDITIONAL TESTS:    9/16 CT ab/pel- no acute findings     10/8 MRB: soft tissue mass of the midline occipital bone measuring 6.2 x 3.1 x 5.6 cm. The mass demonstrates osseous destruction    10/12 CT Chest: no evidence of malignancy     10/12 MRI Brain: 5.8 x 6.1 x 4.0 cm enhancing extra-axial neoplasm in the Central occipital region and posterior fossa abutting the dura and compressing the torcula and BILATERAL distal transverse sinuses, likely a large meningioma.    10/12 MRV: The RIGHT transverse sinus is patent but narrowed distally secondary to the mass. The LEFT transverse sinus is occluded distally by the mass. The torcula is partially occluded.

## 2024-10-12 NOTE — CHART NOTE - NSCHARTNOTEFT_GEN_A_CORE
Patient transferred to NSG service as discussed with Dr. Burnett. TTE ordered per request. DVT ppx ordered. Rest of care per NSG service. Recall medicine if needed.

## 2024-10-12 NOTE — PATIENT PROFILE ADULT - FALL HARM RISK - HARM RISK INTERVENTIONS

## 2024-10-12 NOTE — H&P ADULT - ASSESSMENT
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   -CT as above  -F/U o/p MRI and inpatient MRI/MRV  -NeuroSx consult noted  -Neuro checks  -NPO except meds for now  -EKG/CXR as above  -No anginal symptoms  -Labs normal  -RCRI 0  -No further pre-operative testing indicated at this time   -OOB, ambulate, fall risk  -VC boots while in bed, hold chemical DVT-P pending OR plan    Schizoaffective disorder:  -Denies SI/HI  -Cont. Seroquel   -Cont. Klonopin   -Cont. Depakote  -o/p BH f/u as scheduled     TD:  -Cont. Cogentin     HTN:  -Cont. Norvasc    COPD:  -Lungs clear, CXR clear  -PRN Nebs    Discussed with ED staff, Patient    Dispo: Unclear at this time pending remaining imaging for occipital mass and NeuroSx plan. Anticipated LOS > 3 days

## 2024-10-12 NOTE — H&P ADULT - RESPIRATORY AND THORAX
----- Message from Alvina Charles MD sent at 8/8/2023  3:10 PM CDT -----  Please send letter to pt/PCP  that polyp(s) removed during colonoscopy showed no cancer, but are the type of polyp that can turn into cancer (tubular/sessile adenoma). Repeat colonoscopy in 5 years    negative

## 2024-10-12 NOTE — H&P ADULT - TEMPERATURE IN FAHRENHEIT (DEGREES F)
Gaye's sister Rashida called seeking advice [phone], she was present and our contact when Gaye was last seen. Was to have a f/u [facility] but fell and went from [facility] to [facility] group home. Rashida wants to ask how guardianship works, has appt with a  tomorrow but can't really afford to see him. She says Gaye has lots of money but she can't access it to keep paying for her to be in the group home. What is her recourse?    98

## 2024-10-12 NOTE — H&P ADULT - TIME BILLING
Reviewing prior medical record, [facility] course, EMS events, [facility] ED course, reviewing labs/imaging studies, discussing case with ED attending, examining patient, furnishing H&P, orders, doing medication reconciliation, discussing plan of care with Patient/ED staff and answering all their questions.

## 2024-10-12 NOTE — H&P ADULT - HISTORY OF PRESENT ILLNESS
58 y/o male with hx of Schizoaffective disorder, tardive dyskinesia, COPD not on 02, HTN trans to [facility] from [facility] for NeuroSx evaluation of large painful swelling to posterior Cervical area. Patient with a known lesion there being followed by Dr. Watson in [place] for which he had an MRI on 10/8 for evaluate it. He endorsed pain to that area and pain in his shoulders and B/L UE, no current visual changes, motor weakness or gait disturbance. He admits to occasional posterior HA. He was seen. Denies CP, SOB, CP, N/V, fever, or chills. No weight loss or night sweats. CT at [facility] with occipital mass measuring 6.2x3.1x5.6 cm with bony destruction and abutting the cerebellum and b/l occipital lobes and loss of flow of superior sagittal sinus from compression vs thrombosis. Patient seen by NeuroSx and at this time awaiting further imaging studies prior to operative plan. Denies any other complaints at this time.

## 2024-10-12 NOTE — PROGRESS NOTE ADULT - ASSESSMENT
cleared by med for OR/ echo pending  will ask for anesthesia clearance. pre-op check pre-op   ok fro chemical DVT PPX  59 YOM w/PMHx HLD, COPD, liver failure, ETOH abuse who presents as a transfer from [facility] for further work up of midline occipital bone soft tissue mass that extends into skull.     PLAN:  - Pt seen/ evaluated by Dr. Burnett and shannan plan option d/w pt at length   - Medicine cleared for NSx intervention & stable, tx to NSx SVC   - echo pending  - will ask for anesthesia clearance. pre-op check pre-op   -cont w OOB mobilization/ fall precaution and DVT PPX   - med team to follow   - further plan as per Dr Burnett  59 YOM w/PMHx HLD, COPD, liver failure, ETOH abuse who presents as a transfer from [facility] for further work up of midline occipital bone soft tissue mass that extends into skull.     PLAN:  - Pt seen/ evaluated by Dr. Burnett and shannan plan option d/w pt at length   - Medicine cleared for NSx intervention & stable, tx to NSx S[facility]   - echo pending  - will ask for anesthesia clearance. pre-op check pre-op   - cont w OOB mobilization/ fall precaution and DVT PPX   - med team to follow   - further plan as per Dr Burnett

## 2024-10-13 LAB
ALBUMIN SERPL ELPH-MCNC: 3.8 G/DL — SIGNIFICANT CHANGE UP (ref 3.3–5.2)
ALP SERPL-CCNC: 49 U/L — SIGNIFICANT CHANGE UP (ref 40–120)
ALT FLD-CCNC: 8 U/L — SIGNIFICANT CHANGE UP
ANION GAP SERPL CALC-SCNC: 14 MMOL/L — SIGNIFICANT CHANGE UP (ref 5–17)
AST SERPL-CCNC: 17 U/L — SIGNIFICANT CHANGE UP
BILIRUB SERPL-MCNC: 0.3 MG/DL — LOW (ref 0.4–2)
BUN SERPL-MCNC: 25.2 MG/DL — HIGH (ref 8–20)
CALCIUM SERPL-MCNC: 9.1 MG/DL — SIGNIFICANT CHANGE UP (ref 8.4–10.5)
CHLORIDE SERPL-SCNC: 99 MMOL/L — SIGNIFICANT CHANGE UP (ref 96–108)
CO2 SERPL-SCNC: 25 MMOL/L — SIGNIFICANT CHANGE UP (ref 22–29)
CREAT SERPL-MCNC: 0.84 MG/DL — SIGNIFICANT CHANGE UP (ref 0.5–1.3)
EGFR: 100 ML/MIN/1.73M2 — SIGNIFICANT CHANGE UP
GLUCOSE SERPL-MCNC: 71 MG/DL — SIGNIFICANT CHANGE UP (ref 70–99)
HCT VFR BLD CALC: 43.2 % — SIGNIFICANT CHANGE UP (ref 39–50)
HGB BLD-MCNC: 13.4 G/DL — SIGNIFICANT CHANGE UP (ref 13–17)
MAGNESIUM SERPL-MCNC: 2.2 MG/DL — SIGNIFICANT CHANGE UP (ref 1.6–2.6)
MCHC RBC-ENTMCNC: 28.6 PG — SIGNIFICANT CHANGE UP (ref 27–34)
MCHC RBC-ENTMCNC: 31 GM/DL — LOW (ref 32–36)
MCV RBC AUTO: 92.3 FL — SIGNIFICANT CHANGE UP (ref 80–100)
PHOSPHATE SERPL-MCNC: 4.4 MG/DL — SIGNIFICANT CHANGE UP (ref 2.4–4.7)
PLATELET # BLD AUTO: 255 K/UL — SIGNIFICANT CHANGE UP (ref 150–400)
POTASSIUM SERPL-MCNC: 4.6 MMOL/L — SIGNIFICANT CHANGE UP (ref 3.5–5.3)
POTASSIUM SERPL-SCNC: 4.6 MMOL/L — SIGNIFICANT CHANGE UP (ref 3.5–5.3)
PREALB SERPL-MCNC: 31 MG/DL — SIGNIFICANT CHANGE UP (ref 18–38)
PROT SERPL-MCNC: 6.1 G/DL — LOW (ref 6.6–8.7)
RBC # BLD: 4.68 M/UL — SIGNIFICANT CHANGE UP (ref 4.2–5.8)
RBC # FLD: 15.3 % — HIGH (ref 10.3–14.5)
SODIUM SERPL-SCNC: 138 MMOL/L — SIGNIFICANT CHANGE UP (ref 135–145)
WBC # BLD: 8.59 K/UL — SIGNIFICANT CHANGE UP (ref 3.8–10.5)
WBC # FLD AUTO: 8.59 K/UL — SIGNIFICANT CHANGE UP (ref 3.8–10.5)

## 2024-10-13 PROCEDURE — 99231 SBSQ HOSP IP/OBS SF/LOW 25: CPT

## 2024-10-13 PROCEDURE — 93306 TTE W/DOPPLER COMPLETE: CPT | Mod: 26

## 2024-10-13 PROCEDURE — 99232 SBSQ HOSP IP/OBS MODERATE 35: CPT

## 2024-10-13 PROCEDURE — 93010 ELECTROCARDIOGRAM REPORT: CPT

## 2024-10-13 RX ORDER — MUPIROCIN 20 MG/G
1 OINTMENT TOPICAL
Refills: 0 | Status: DISCONTINUED | OUTPATIENT
Start: 2024-10-13 | End: 2024-10-18

## 2024-10-13 RX ADMIN — Medication 2 MILLIGRAM(S): at 05:43

## 2024-10-13 RX ADMIN — Medication 2 MILLIGRAM(S): at 17:27

## 2024-10-13 RX ADMIN — Medication 0.5 MILLIGRAM(S): at 11:23

## 2024-10-13 RX ADMIN — QUETIAPINE FUMARATE 125 MILLIGRAM(S): 50 TABLET, FILM COATED ORAL at 21:14

## 2024-10-13 RX ADMIN — PANTOPRAZOLE SODIUM 40 MILLIGRAM(S): 40 TABLET, DELAYED RELEASE ORAL at 05:43

## 2024-10-13 RX ADMIN — Medication 3 MILLILITER(S): at 21:12

## 2024-10-13 RX ADMIN — Medication 500 MILLIGRAM(S): at 17:27

## 2024-10-13 RX ADMIN — Medication 3 MILLILITER(S): at 05:39

## 2024-10-13 RX ADMIN — Medication 500 MILLIGRAM(S): at 05:43

## 2024-10-13 RX ADMIN — Medication 10 MILLIGRAM(S): at 11:23

## 2024-10-13 RX ADMIN — ENOXAPARIN SODIUM 40 MILLIGRAM(S): 150 INJECTION SUBCUTANEOUS at 11:28

## 2024-10-13 RX ADMIN — Medication 3 MILLILITER(S): at 13:56

## 2024-10-13 NOTE — PROGRESS NOTE ADULT - NUTRITIONAL ASSESSMENT
Diet, DASH/TLC:   Sodium & Cholesterol Restricted (10-12-24 @ 11:55) [Active]    Prealbumin, Serum (10.13.24 @ 07:30)    Prealbumin, Serum: 31 mg/dL

## 2024-10-13 NOTE — PROGRESS NOTE ADULT - ASSESSMENT
59 YOM w/PMHx HLD, COPD, liver failure, ETOH abuse who presents as a transfer from [facility] for further work up of midline occipital bone soft tissue mass that extends into skull.     PLAN:  - Dr Burnett spoke at length regarding surgical/ IR w/u and resection plan yesterday & today, plan for IR tumor embolization on Wednesday 10/16 & OR for resection on Thursday 10/17  - Medicine cleared for NSx intervention & stable, [place] [facility] [place]  - med team to follow for all non-nsx management   - echo pending  - encouraged PO fluids intake   - will ask for anesthesia clearance. pre-op check pre-op on Tuesday   - cont w OOB mobilization/ fall precaution and DVT PPX   - ok to shower w assist as needed/ fall precaution   - SQL for chem DVT PPx and SCDs when in bed   - incentive spirometry while awake   - pre-op PT/OT evals   - further plan as per Dr Burnett

## 2024-10-13 NOTE — PROGRESS NOTE ADULT - SUBJECTIVE AND OBJECTIVE BOX
NEUROSURGERY PROGRESS NOTE:      pt seen and states is at baseline, denied any new or worsening sensorimotor changes    pt c/o + -headache  /10 on the pain scale   + - Nausea /+ - Vomiting  admits denies weakness  admits denies numbness/ tingling  admist denies visual changes  admist denies C/T/LS  Spine pain  + - void  + - BM  + OOB  + bed rest   + - diet  + cruz cath to gravity   + - venodynes b/l when in bed   + - ANETA HMV EVD drain  + - a-line/ TLC  + - tube feeds    MEDICATIONS  (STANDING):  amLODIPine   Tablet 10 milliGRAM(s) Oral daily  benztropine 2 milliGRAM(s) Oral two times a day  clonazePAM  Tablet 0.5 milliGRAM(s) Oral daily  divalproex  milliGRAM(s) Oral two times a day  enoxaparin Injectable 40 milliGRAM(s) SubCutaneous every 24 hours  pantoprazole    Tablet 40 milliGRAM(s) Oral before breakfast  QUEtiapine 125 milliGRAM(s) Oral at bedtime  sodium chloride 0.9% lock flush 3 milliLiter(s) IV Push every 8 hours    MEDICATIONS  (PRN):  acetaminophen     Tablet .. 650 milliGRAM(s) Oral every 6 hours PRN Temp greater or equal to 38C (100.4F), Mild Pain (1 - 3)  aluminum hydroxide/magnesium hydroxide/simethicone Suspension 30 milliLiter(s) Oral every 4 hours PRN Dyspepsia  melatonin 3 milliGRAM(s) Oral at bedtime PRN Insomnia  ondansetron Injectable 4 milliGRAM(s) IV Push every 8 hours PRN Nausea and/or Vomiting  oxycodone    5 mG/acetaminophen 325 mG 1 Tablet(s) Oral every 4 hours PRN Moderate Pain (4 - 6)    Allergies    No Known Allergies    Intolerances      Vital Signs Last 24 Hrs  T(C): 36.6 (13 Oct 2024 12:30), Max: 36.8 (13 Oct 2024 01:19)  T(F): 97.8 (13 Oct 2024 12:30), Max: 98.2 (13 Oct 2024 01:19)  HR: 70 (13 Oct 2024 12:30) (67 - 73)  BP: 138/74 (13 Oct 2024 12:30) (99/62 - 139/84)  BP(mean): --  RR: 18 (13 Oct 2024 12:30) (18 - 18)  SpO2: 92% (13 Oct 2024 12:30) (91% - 94%)    Parameters below as of 13 Oct 2024 12:30  Patient On (Oxygen Delivery Method): room air        PHYSICAL EXAM:    GENERAL: NAD, well-groomed, well-developed, AAOx3 and very cooperative  HEAD:  Atraumatic, Normocephalic, no palpable step-off appreciated on palpation  EYES: b/l EOMI, PERRL, conjunctiva and sclera clear,   NECK: Supple, nontender to palpation  + FROM w no muscular soreness reported, neg B/B/K signs.  TS/LS: nontender to palpation midline or paraspinal muscles b/l, no pinpoint tenderness appreciated or paraspinal mm tenderness elicited on palpation b/l   NERVOUS SYSTEM:  Alert & Oriented X3, speech is clear and fluent, no dysarthria appreciated. Good concentration & very cooperative; Motor Strength 5/5 B/L upper and lower extremities, sensory is at baseline and symmetric b/l; No pronators or ankle clonus appreciated b/l, cerebellar signs grossly intact b/l. CN II-XII grossly intact b/l and symmetric; Spurling's test negative b/l; no russell's b/l appreciated. no ankle clonus appreciated b/l.   EXTREMITIES:  2+ Peripheral Pulses, No clubbing, cyanosis, or edema, Hawkin's test negative b/l.   CHEST/LUNG: Clear to auscultation bilaterally; No rales, rhonchi, wheezing, or rubs  HEART: Regular rate and rhythm; +S1 & +S2  ABDOMEN: Soft, Nontender, Nondistended; Bowel sounds present  LYMPH: No lymphadenopathy noted  SKIN: No rashes or lesions        LABS:                        13.4   8.59  )-----------( 255      ( 13 Oct 2024 07:30 )             43.2     10-13    138  |  99  |  25.2[H]  ----------------------------<  71  4.6   |  25.0  |  0.84    Ca    9.1      13 Oct 2024 07:30  Phos  4.4     10-13  Mg     2.2     10-13    TPro  6.1[L]  /  Alb  3.8  /  TBili  0.3[L]  /  DBili  x   /  AST  17  /  ALT  8   /  AlkPhos  49  10-13    PT/INR - ( 11 Oct 2024 22:23 )   PT: 11.0 sec;   INR: 0.95 ratio         PTT - ( 11 Oct 2024 22:23 )  PTT:25.3 sec  Urinalysis Basic - ( 13 Oct 2024 07:30 )    Color: x / Appearance: x / SG: x / pH: x  Gluc: 71 mg/dL / Ketone: x  / Bili: x / Urobili: x   Blood: x / Protein: x / Nitrite: x   Leuk Esterase: x / RBC: x / WBC x   Sq Epi: x / Non Sq Epi: x / Bacteria: x        RADIOLOGY & ADDITIONAL TESTS:  no new NSx images available for review     9/16 CT ab/pel- no acute findings     10/8 MRB: soft tissue mass of the midline occipital bone ~6.2 x 3.1 x 5.6 cm. The mass w osseous destruction    10/12 CT Chest: no evidence of malignancy     10/12 MRB: 5.8 x 6.1 x 4.0 cm enhancing extra-axial neoplasm in the Central occipital region & posterior fossa abutting the dura & compressing the torcula & BILATERAL distal transverse sinuses, likely a large meningioma.    10/12 MRV: The R transverse sinus is patent but narrowed distally secondary to the mass. The LEFT transverse sinus is occluded distally by the mass. The torcula is partially occluded. NEUROSURGERY PROGRESS NOTE:    59 YOM w/PMHx HTN, COPD, liver failure who presents as a [facility]. Pt has a lump on the back of his head, seen on previous admission to [facility] from 11/23-12/23, for which he is currently being worked up for by Dr. Giovany Watson (neurology). Pt reports getting an MRI on 10/08 for which he had yet to receive results, presented to [facility] for the lump now being painful. Pt states he did not know the mass was seen while he was hospitalized last year, but has started growing and becoming noticeable on the back of his head. Pt states when there is pressure on the lump (from leaning on it, pushing on it, etc) he gets pain that radiates from the lump down his neck and his shoulders. He endorses some headaches and some dizziness as well as some flashers/floaters in his vision. He denies blurry/double vision, weakness, balance/gait issues, CP, SOB, N/V/D. He states if possible he would like the mass removed.     pt seen and states is at baseline w Dr Lex duran morning, pt at his baseline and offers no new/ acute complains   pt denied any new or worsening sensorimotor changes  Dr Burnett spoke at length regarding surgical/ IR w/u and resection plan yesterday, plan for IR tumor embolization on Wednesday 10/16 & OR for resection on Thursday 10/17  -headache   - Nausea  - Vomiting  denies weakness  denies numbness/ tingling  denies visual changes  denies C/T/LS  Spine pain  + void  - BM  + OOB w assist  + diet  + venodynes b/l when in bed       MEDICATIONS  (STANDING):  amLODIPine   Tablet 10 milliGRAM(s) Oral daily  benztropine 2 milliGRAM(s) Oral two times a day  clonazePAM  Tablet 0.5 milliGRAM(s) Oral daily  divalproex  milliGRAM(s) Oral two times a day  enoxaparin Injectable 40 milliGRAM(s) SubCutaneous every 24 hours  pantoprazole    Tablet 40 milliGRAM(s) Oral before breakfast  QUEtiapine 125 milliGRAM(s) Oral at bedtime  sodium chloride 0.9% lock flush 3 milliLiter(s) IV Push every 8 hours    MEDICATIONS  (PRN):  acetaminophen     Tablet .. 650 milliGRAM(s) Oral every 6 hours PRN Temp greater or equal to 38C (100.4F), Mild Pain (1 - 3)  aluminum hydroxide/magnesium hydroxide/simethicone Suspension 30 milliLiter(s) Oral every 4 hours PRN Dyspepsia  melatonin 3 milliGRAM(s) Oral at bedtime PRN Insomnia  ondansetron Injectable 4 milliGRAM(s) IV Push every 8 hours PRN Nausea and/or Vomiting  oxycodone    5 mG/acetaminophen 325 mG 1 Tablet(s) Oral every 4 hours PRN Moderate Pain (4 - 6)    Allergies    No Known Allergies    Intolerances      Vital Signs Last 24 Hrs  T(C): 36.6 (13 Oct 2024 12:30), Max: 36.8 (13 Oct 2024 01:19)  T(F): 97.8 (13 Oct 2024 12:30), Max: 98.2 (13 Oct 2024 01:19)  HR: 70 (13 Oct 2024 12:30) (67 - 73)  BP: 138/74 (13 Oct 2024 12:30) (99/62 - 139/84)  BP(mean): --  RR: 18 (13 Oct 2024 12:30) (18 - 18)  SpO2: 92% (13 Oct 2024 12:30) (91% - 94%)    Parameters below as of 13 Oct 2024 12:30  Patient On (Oxygen Delivery Method): room air        PHYSICAL EXAM:  GENERAL: NAD, well-groomed  HEAD:  Atraumatic, around 3 cm lump visible and palpable on occiput  KARYN COMA SCORE: E- V- M- = 15  MENTAL STATUS: AAO x3; Awake; Opens eyes spontaneously; Appropriately conversant without aphasia; following simple commands  CRANIAL NERVES: PERRL. EOMI without nystagmus. Facial sensation intact V1-3 distribution b/l. Face symmetric w/ normal eye closure and smile, tongue midline. Hearing grossly intact. Speech clear. Head turning and shoulder shrug intact.   MOTOR: strength 5/5 b/l upper and lower extremities  SENSATION: grossly intact to light touch all extremities  CHEST/LUNG: Unlabored breathing on room air  SKIN: Warm, dry; no rashes or lesions appreciated         LABS:                        13.4   8.59  )-----------( 255      ( 13 Oct 2024 07:30 )             43.2     10-13    138  |  99  |  25.2[H]  ----------------------------<  71  4.6   |  25.0  |  0.84    Ca    9.1      13 Oct 2024 07:30  Phos  4.4     10-13  Mg     2.2     10-13    TPro  6.1[L]  /  Alb  3.8  /  TBili  0.3[L]  /  DBili  x   /  AST  17  /  ALT  8   /  AlkPhos  49  10-13    PT/INR - ( 11 Oct 2024 22:23 )   PT: 11.0 sec;   INR: 0.95 ratio    PTT - ( 11 Oct 2024 22:23 )  PTT:25.3 sec      Urinalysis Basic - ( 13 Oct 2024 07:30 )    Color: x / Appearance: x / SG: x / pH: x  Gluc: 71 mg/dL / Ketone: x  / Bili: x / Urobili: x   Blood: x / Protein: x / Nitrite: x   Leuk Esterase: x / RBC: x / WBC x   Sq Epi: x / Non Sq Epi: x / Bacteria: x        RADIOLOGY & ADDITIONAL TESTS:  no new NSx images available for review     9/16 CT ab/pel- no acute findings     10/8 MRB: soft tissue mass of the midline occipital bone ~6.2 x 3.1 x 5.6 cm. The mass w osseous destruction    10/12 CT Chest: no evidence of malignancy     10/12 MRB: 5.8 x 6.1 x 4.0 cm enhancing extra-axial neoplasm in the Central occipital region & posterior fossa abutting the dura & compressing the torcula & BILATERAL distal transverse sinuses, likely a large meningioma.    10/12 MRV: The R transverse sinus is patent but narrowed distally secondary to the mass. The LEFT transverse sinus is occluded distally by the mass. The torcula is partially occluded.

## 2024-10-14 LAB
ALBUMIN SERPL ELPH-MCNC: 4 G/DL — SIGNIFICANT CHANGE UP (ref 3.3–5.2)
ALP SERPL-CCNC: 50 U/L — SIGNIFICANT CHANGE UP (ref 40–120)
ALT FLD-CCNC: 7 U/L — SIGNIFICANT CHANGE UP
ANION GAP SERPL CALC-SCNC: 9 MMOL/L — SIGNIFICANT CHANGE UP (ref 5–17)
APTT BLD: 32.9 SEC — SIGNIFICANT CHANGE UP (ref 24.5–35.6)
AST SERPL-CCNC: 14 U/L — SIGNIFICANT CHANGE UP
BILIRUB SERPL-MCNC: 0.3 MG/DL — LOW (ref 0.4–2)
BLD GP AB SCN SERPL QL: SIGNIFICANT CHANGE UP
BUN SERPL-MCNC: 28 MG/DL — HIGH (ref 8–20)
CALCIUM SERPL-MCNC: 8.5 MG/DL — SIGNIFICANT CHANGE UP (ref 8.4–10.5)
CHLORIDE SERPL-SCNC: 101 MMOL/L — SIGNIFICANT CHANGE UP (ref 96–108)
CO2 SERPL-SCNC: 28 MMOL/L — SIGNIFICANT CHANGE UP (ref 22–29)
CREAT SERPL-MCNC: 0.83 MG/DL — SIGNIFICANT CHANGE UP (ref 0.5–1.3)
EGFR: 101 ML/MIN/1.73M2 — SIGNIFICANT CHANGE UP
GLUCOSE SERPL-MCNC: 80 MG/DL — SIGNIFICANT CHANGE UP (ref 70–99)
HCT VFR BLD CALC: 40.3 % — SIGNIFICANT CHANGE UP (ref 39–50)
HGB BLD-MCNC: 13.3 G/DL — SIGNIFICANT CHANGE UP (ref 13–17)
INR BLD: 0.96 RATIO — SIGNIFICANT CHANGE UP (ref 0.85–1.16)
MAGNESIUM SERPL-MCNC: 2.1 MG/DL — SIGNIFICANT CHANGE UP (ref 1.6–2.6)
MCHC RBC-ENTMCNC: 29.2 PG — SIGNIFICANT CHANGE UP (ref 27–34)
MCHC RBC-ENTMCNC: 33 GM/DL — SIGNIFICANT CHANGE UP (ref 32–36)
MCV RBC AUTO: 88.6 FL — SIGNIFICANT CHANGE UP (ref 80–100)
PHOSPHATE SERPL-MCNC: 3.4 MG/DL — SIGNIFICANT CHANGE UP (ref 2.4–4.7)
PLATELET # BLD AUTO: 256 K/UL — SIGNIFICANT CHANGE UP (ref 150–400)
POTASSIUM SERPL-MCNC: 4.2 MMOL/L — SIGNIFICANT CHANGE UP (ref 3.5–5.3)
POTASSIUM SERPL-SCNC: 4.2 MMOL/L — SIGNIFICANT CHANGE UP (ref 3.5–5.3)
PROT SERPL-MCNC: 6.3 G/DL — LOW (ref 6.6–8.7)
PROTHROM AB SERPL-ACNC: 10.8 SEC — SIGNIFICANT CHANGE UP (ref 9.9–13.4)
RBC # BLD: 4.55 M/UL — SIGNIFICANT CHANGE UP (ref 4.2–5.8)
RBC # FLD: 15 % — HIGH (ref 10.3–14.5)
SODIUM SERPL-SCNC: 138 MMOL/L — SIGNIFICANT CHANGE UP (ref 135–145)
WBC # BLD: 8.03 K/UL — SIGNIFICANT CHANGE UP (ref 3.8–10.5)
WBC # FLD AUTO: 8.03 K/UL — SIGNIFICANT CHANGE UP (ref 3.8–10.5)

## 2024-10-14 PROCEDURE — 99233 SBSQ HOSP IP/OBS HIGH 50: CPT

## 2024-10-14 PROCEDURE — 99232 SBSQ HOSP IP/OBS MODERATE 35: CPT

## 2024-10-14 RX ORDER — ALBUTEROL 90 MCG
2 AEROSOL (GRAM) INHALATION EVERY 6 HOURS
Refills: 0 | Status: DISCONTINUED | OUTPATIENT
Start: 2024-10-14 | End: 2024-10-18

## 2024-10-14 RX ADMIN — PANTOPRAZOLE SODIUM 40 MILLIGRAM(S): 40 TABLET, DELAYED RELEASE ORAL at 05:05

## 2024-10-14 RX ADMIN — Medication 3 MILLILITER(S): at 04:57

## 2024-10-14 RX ADMIN — MUPIROCIN 1 APPLICATION(S): 20 OINTMENT TOPICAL at 05:05

## 2024-10-14 RX ADMIN — Medication 500 MILLIGRAM(S): at 17:24

## 2024-10-14 RX ADMIN — Medication 30 MILLILITER(S): at 18:44

## 2024-10-14 RX ADMIN — QUETIAPINE FUMARATE 125 MILLIGRAM(S): 50 TABLET, FILM COATED ORAL at 21:13

## 2024-10-14 RX ADMIN — Medication 3 MILLILITER(S): at 21:42

## 2024-10-14 RX ADMIN — Medication 10 MILLIGRAM(S): at 05:04

## 2024-10-14 RX ADMIN — MUPIROCIN 1 APPLICATION(S): 20 OINTMENT TOPICAL at 17:25

## 2024-10-14 RX ADMIN — Medication 500 MILLIGRAM(S): at 05:05

## 2024-10-14 RX ADMIN — Medication 2 MILLIGRAM(S): at 05:04

## 2024-10-14 RX ADMIN — Medication 0.5 MILLIGRAM(S): at 11:10

## 2024-10-14 RX ADMIN — Medication 3 MILLILITER(S): at 13:36

## 2024-10-14 RX ADMIN — Medication 2 MILLIGRAM(S): at 17:25

## 2024-10-14 RX ADMIN — Medication 3 MILLIGRAM(S): at 21:13

## 2024-10-14 NOTE — PROGRESS NOTE ADULT - ASSESSMENT
58 y/o male with hx of Schizoaffective disorder, tardive dyskinesia, HTN,  COPD not on 02, HTN trans to [facility] from [facility] for NeuroSx evaluation of large painful swelling to posterior Cervical area. Patient with a known lesion there being followed by Dr. Watson in [place] for which he had an MRI on 10/8 for evaluate it. He endorsed pain to that area and pain in his shoulders and B/L UE, no current visual changes, motor weakness or gait disturbance. He admits to occasional posterior HA. He was seen. Denies CP, SOB, CP, N/V, fever, or chills. No weight loss or night sweats. CT at [facility] with occipital mass measuring 6.2x3.1x5.6 cm with bony destruction and abutting the cerebellum and b/l occipital lobes and loss of flow of superior sagittal sinus from compression vs thrombosis. Patient seen by NeuroSx and at this time awaiting further imaging studies prior to operative plan. Denies any other complaints at this time.     1. Occipital mass ,   -angio scheduled for the am 10/15 for possible embolization  -mri of the brain with fiducials for the am Wednesday 10/16 for intraop mapping  - further mgmt as per primary team     2. HTN - continue Amlodipine with parameters     3. Hx of Seizures - seizure precautions , continue Divalproex 500 mg BID , continue Folic acid     4. Hx of Schizoaffective disorder - continue home meds     5. Small lingular nodules seen on CT  likely postinflammatory.  Will recommend to repeat CT in 6 months     6. Hx of COPD , not on O2 at home , uses Nebulizer PRN ,   will order Albuterol inh PRN for sob/wheezing , may give Duoneb neb x1 1 on call to OR     7. dvt prophylaxis as per primary team     Revised Cardiac Risk Assessment   [ - ]History of ischemic heart disease   [ - ]History of congestive heart failure   [-  ]History of cerebrovascular disease (stroke or transient ischemic attack)   [ - ]History of diabetes requiring preoperative insulin use   [ - ]Chronic kidney disease (creatinine > 2 mg/dL)   [ - ]Undergoing suprainguinal vascular, intraperitoneal, or intrathoracic surgery     0 predictors = 0.4%, 1 predictor = 1.0%, 2 predictors = 2.4%, > 3 predictors = 5.4%    * Overall this patient has a  0.4   % risk of cardiac death, nonfatal myocardial infarction, and nonfatal cardiac arrest perioperatively.     Patient does not have any known history of severe valvular disease and is not in decompensated CHF. No recent MI.   Baseline functional capacity is > 4 METS. EKG , NSR, ECHO with EF of 61% , no significant valvular disease .   Patient is currently hemodynamically stable. Patient is medically optimized for planned procedures .   Will not follow daily , please call if needed .

## 2024-10-14 NOTE — PROGRESS NOTE ADULT - SUBJECTIVE AND OBJECTIVE BOX
INTERVAL HPI/OVERNIGHT EVENTS:  This is a 59ym admitted on 10/11/2024 pt noticed swelling in the back of his head. Pt has pain in back pof the head and radiates down into the shoulder. Pt denies headache, or visual difficulties. Pmhx HTN, COPD, Schizoaffective Disorder.      MEDICATIONS  (STANDING):  amLODIPine   Tablet 10 milliGRAM(s) Oral daily  benztropine 2 milliGRAM(s) Oral two times a day  clonazePAM  Tablet 0.5 milliGRAM(s) Oral daily  divalproex  milliGRAM(s) Oral two times a day  enoxaparin Injectable 40 milliGRAM(s) SubCutaneous every 24 hours  mupirocin 2% Nasal 1 Application(s) Both Nostrils two times a day  pantoprazole    Tablet 40 milliGRAM(s) Oral before breakfast  QUEtiapine 125 milliGRAM(s) Oral at bedtime  sodium chloride 0.9% lock flush 3 milliLiter(s) IV Push every 8 hours    MEDICATIONS  (PRN):  acetaminophen     Tablet .. 650 milliGRAM(s) Oral every 6 hours PRN Temp greater or equal to 38C (100.4F), Mild Pain (1 - 3)  aluminum hydroxide/magnesium hydroxide/simethicone Suspension 30 milliLiter(s) Oral every 4 hours PRN Dyspepsia  melatonin 3 milliGRAM(s) Oral at bedtime PRN Insomnia  ondansetron Injectable 4 milliGRAM(s) IV Push every 8 hours PRN Nausea and/or Vomiting  oxycodone    5 mG/acetaminophen 325 mG 1 Tablet(s) Oral every 4 hours PRN Moderate Pain (4 - 6)      Allergies  No Known Allergies  Intolerances    Vital Signs Last 24 Hrs  T(C): 36.6 (14 Oct 2024 11:14), Max: 36.7 (13 Oct 2024 20:22)  T(F): 97.9 (14 Oct 2024 11:14), Max: 98.1 (13 Oct 2024 20:22)  HR: 68 (14 Oct 2024 11:14) (62 - 73)  BP: 133/84 (14 Oct 2024 11:14) (109/73 - 134/86)  BP(mean): --  RR: 18 (14 Oct 2024 11:14) (18 - 18)  SpO2: 94% (14 Oct 2024 11:14) (93% - 98%)    Parameters below as of 14 Oct 2024 11:14  Patient On (Oxygen Delivery Method): room air     BMI (kg/m2): 29.9 (10-11-24 @ 19:37)      PHYSICAL EXAM  GENERAL: NAD,   HEAD:  Atraumatic, soft palp mass base of occipital region  EYES: EOMI, PERRLA, conjunctiva and sclera clear  ENMT: No tonsillar erythema, exudates, or enlargement; Moist mucous membranes, neg facial  NECK: Supple,   NERVOUS SYSTEM:  Alert & Oriented X3, Good concentration; Motor Strength 5/5 B/L upper and lower extremities; DTRs 2+ intact and symmetric, neg pronators.  CHEST/LUNG: Clear  bilaterally; No rales, rhonchi, wheezing, or rubs  HEART: Regular rate and rhythm; No murmurs, rubs, or gallops  ABDOMEN: Soft, Nontender, Nondistended; Bowel sounds present  EXTREMITIES:  2+ Peripheral Pulses, No edema      LABS:                          13.3   8.03  )-----------( 256      ( 14 Oct 2024 06:08 )             40.3     10-14    138  |  101  |  28.0[H]  ----------------------------<  80  4.2   |  28.0  |  0.83    Ca    8.5      14 Oct 2024 06:08  Phos  3.4     10-14  Mg     2.1     10-14    TPro  6.3[L]  /  Alb  4.0  /  TBili  0.3[L]  /  DBili  x   /  AST  14  /  ALT  7   /  AlkPhos  50  10-14      Urinalysis Basic - ( 14 Oct 2024 06:08 )    Color: x / Appearance: x / SG: x / pH: x  Gluc: 80 mg/dL / Ketone: x  / Bili: x / Urobili: x   Blood: x / Protein: x / Nitrite: x   Leuk Esterase: x / RBC: x / WBC x   Sq Epi: x / Non Sq Epi: x / Bacteria: x      I&O's Detail    13 Oct 2024 07:01  -  14 Oct 2024 07:00  --------------------------------------------------------  IN:  Total IN: 0 mL    OUT:    Blood Loss (mL): 650 mL  Total OUT: 650 mL    Total NET: -650 mL      14 Oct 2024 07:01  -  14 Oct 2024 14:14  --------------------------------------------------------  IN:    Oral Fluid: 210 mL  Total IN: 210 mL    OUT:  Total OUT: 0 mL    Total NET: 210 mL    RADIOLOGY & ADDITIONAL TESTS:  < from: CT Chest w/ IV Cont (10.12.24 @ 12:29) >  IMPRESSION: Small lingular nodules, likely postinflammatory.  --- End of Report ---    MRI brain 10/8/2024  IMPRESSION:  1.  Expansile well demarcated soft tissue mass of the midline occipital bone demonstrating T1 isointense and mildly T2 hyperintense signal measuring 6.2 x 3.1 x 5.6 cm (2-12, 5-6). The mass demonstrates osseous destruction. No diffusion restriction. The mass abuts the cerebellum and bilateral occipital lobes. There is loss of flow void of the posterior portion of the superior sagittal sinus which may represent slow flow versus venous sinus thrombosis. Recommend MRI brain with contrast and MRV brain for further evaluation, if there is no contraindication.  2.  Additional nonspecific T2 FLAIR hyperintense, T1 isointense lesion of the right frontal calvarium measuring 0.7 x 0.5 cm (3-19).    These findings were discussed with Dr. MARGARITA COLBERT [id number] at 10/9/2024 2:38 PM by Dr. Emmy Serrano with read back confirmation.    --- End of Report ---    Mra/mrv brain 10/8  IMPRESSION:    MR brain: 5.8 x 6.1 x 4.0 cm enhancing extra-axial neoplasm in the Central occipital region and posterior fossa abutting the dura and compressing the torcula and BILATERAL distal transverse sinuses, likely a large meningioma.    MRV brain:  The RIGHT transverse sinus is patent but narrowed distally secondary to the mass. The LEFT transverse sinus is occluded distally by the mass. The torcula is partially occluded.    --- End of Report ---

## 2024-10-14 NOTE — PROGRESS NOTE ADULT - ASSESSMENT
This is a 59ym presents with a soft mass notable in the occipital region with pain that radiates into the neck and shoulders.   CT/ MR of the brain: 5.8 x 6.1 x 4.0 cm enhancing extra-axial neoplasm in the Central occipital region.  RIGHT transverse sinus is patent but narrowed distally secondary to the mass.    IMP large occipital mass with involvement of the right transverse sinus   Plan  -vital q 8, neuro q 4  - pt has a hx of mrsa on BACTROBAN.  -blood pressure control- norvasc 10mg qd  -lovenox for dvt ppx  -angio scheduled for the am 10/15 for possible embolization  -mri of the brain with fiducials for the am Wednesday 10/16 for intraop mapping  -medicine request placed for medical clearance  -Dr Vang saw pt this am and recomm as above       This is a 59ym presents with a soft mass notable in the occipital region with pain that radiates into the neck and shoulders.   CT/ MR of the brain: 5.8 x 6.1 x 4.0 cm enhancing extra-axial neoplasm in the Central occipital region.  RIGHT transverse sinus is patent but narrowed distally secondary to the mass.    IMP large occipital mass with involvement of venous sinuses  Plan  -vital q 8, neuro q 4  -pt has a hx of mrsa on BACTROBAN.  -blood pressure control- norvasc 10mg qd  -lovenox for dvt ppx  -angio scheduled for the am 10/15 for possible embolization  -mri of the brain with fiducials for the am Wednesday 10/16 for intraop mapping  -anesthesia request placed for medical clearance  -Dr Vang saw pt this am and recomm as above

## 2024-10-14 NOTE — PROGRESS NOTE ADULT - SUBJECTIVE AND OBJECTIVE BOX
Medicine recall to optimize patient prior surgery . Patient known to our service .      Patient seen and examined . C/O mild neck pain and R shoulder pain , denies n/v , voiding , tolerating diet     CC : neck mass/ pain , R shoulder pain     HPI:  60 y/o male with hx of Schizoaffective disorder, tardive dyskinesia, COPD not on 02, HTN [place] [facility] [place] [facility] [place] of large painful swelling to posterior Cervical area. Patient with a known lesion there being followed by Dr. Watson in [place] for which he had an MRI on 10/8 for evaluate it. He endorsed pain to that area and pain in his shoulders and B/L UE, no current visual changes, motor weakness or gait disturbance. He admits to occasional posterior HA. He was seen. Denies CP, SOB, CP, N/V, fever, or chills. No weight loss or night sweats. [place] [facility] [place]ipital mass measuring 6.2x3.1x5.6 cm with bony destruction and abutting the cerebellum and b/l occipital lobes and loss of flow of superior sagittal sinus from compression vs thrombosis. Patient seen by NeuroSx and at this time awaiting further imaging studies prior to operative plan. Denies any other complaints at this time.       PAST MEDICAL & SURGICAL HISTORY:    COPD       Hiatal hernia      Tardive dyskinesia      Liver failure      Seizures      ETOH abuse      S/P tonsillectomy      History of lumbar spinal fusion  6/2017          MEDICATIONS  (STANDING):  amLODIPine   Tablet 10 milliGRAM(s) Oral daily  benztropine 2 milliGRAM(s) Oral two times a day  clonazePAM  Tablet 0.5 milliGRAM(s) Oral daily  divalproex  milliGRAM(s) Oral two times a day  enoxaparin Injectable 40 milliGRAM(s) SubCutaneous every 24 hours  mupirocin 2% Nasal 1 Application(s) Both Nostrils two times a day  pantoprazole    Tablet 40 milliGRAM(s) Oral before breakfast  QUEtiapine 125 milliGRAM(s) Oral at bedtime  sodium chloride 0.9% lock flush 3 milliLiter(s) IV Push every 8 hours    MEDICATIONS  (PRN):  acetaminophen     Tablet .. 650 milliGRAM(s) Oral every 6 hours PRN Temp greater or equal to 38C (100.4F), Mild Pain (1 - 3)  aluminum hydroxide/magnesium hydroxide/simethicone Suspension 30 milliLiter(s) Oral every 4 hours PRN Dyspepsia  melatonin 3 milliGRAM(s) Oral at bedtime PRN Insomnia  ondansetron Injectable 4 milliGRAM(s) IV Push every 8 hours PRN Nausea and/or Vomiting  oxycodone    5 mG/acetaminophen 325 mG 1 Tablet(s) Oral every 4 hours PRN Moderate Pain (4 - 6)      LABS:                          13.3   8.03  )-----------( 256      ( 14 Oct 2024 06:08 )             40.3     10-14    138  |  101  |  28.0[H]  ----------------------------<  80  4.2   |  28.0  |  0.83    Ca    8.5      14 Oct 2024 06:08  Phos  3.4     10-14  Mg     2.1     10-14    TPro  6.3[L]  /  Alb  4.0  /  TBili  0.3[L]  /  DBili  x   /  AST  14  /  ALT  7   /  AlkPhos  50  10-14  MRSA/MSSA PCR (10.12.24 @ 07:00)    MRSA PCR Result.: NotDetec: NOT DETECTED RESULT: MRSA and MSSA not detected  A result of MRSA and MSSA not detected does not preclude the possibility  of colonization with MRSA or MSSA. Negative results may occur from  improper specimen collection, handling or storage, or because the number  of organisms in the specimen is below the analytical sensitivity of the  test.  INDETERMINATE RESULT: Indeterminate  An indeterminate result may be due to:  1) levels of MRSA DNA and/or MSSA DNA present below the established  definitivepositive detection valuei 2) the presence of amplification  inhibitors in the samplei 3) interfering substances with no MRSA DNA  and/or MSSA DNA present. This result does not preclude the possibility of  infection with MRSA or the presence of MSSA. Please submit an additional  specimen for repeat testing.  DETECTED RESULT: MRSA DNA detected and/or MSSA DNA detected.  A positive test result does not necessarily indicate the presence of  viable organisms and therefore, does not necessarily indicate treatment  eradication failure since DNA may persist.  The results of this test should be interpreted with consideration of all  clinical and laboratory findings. This test determines colonization  status at a given time and is not intended to identify patients with MRSA  infection or the presence of MSSA.   Staph aureus PCR Result: Detected          RADIOLOGY & ADDITIONAL TESTS:    < from: Xray Chest 1 View- PORTABLE-Routine (Xray Chest 1 View- PORTABLE-Routine in AM.) (10.11.24 @ 23:33) >    ACC: [id number] EXAM:  XR CHEST PORTABLE ROUTINE 1V   ORDERED BY: ELVIS BROWNING     PROCEDURE DATE:  10/11/2024      < end of copied text >  < from: Xray Chest 1 View- PORTABLE-Routine (Xray Chest 1 View- PORTABLE-Routine in AM.) (10.11.24 @ 23:33) >  Impression:    No acute pulmonary disease.    --- End of Report ---    < end of copied text >  < from: MR Brain Stereotactic w/wo IV Cont (10.12.24 @ 08:29) >    ACC: [id number] EXAM:  MR VENOGRAM BRAIN IC   ORDERED BY: THEA BATES     ACC: [id number] EXAM:  MR BRAIN WAW IC FOR SRS   ORDERED BY: THEA BATES     PROCEDURE DATE:  10/12/2024      < end of copied text >  < from: MR Brain Stereotactic w/wo IV Cont (10.12.24 @ 08:29) >      IMPRESSION:    MR brain: 5.8 x 6.1 x 4.0 cm enhancing extra-axial neoplasm in the   Central occipital region and posterior fossa abutting the dura and   compressing the torcula and BILATERAL distal transverse sinuses, likely a   large meningioma.    MRV brain:  The RIGHT transverse sinus is patent but narrowed distally   secondary to the mass. The LEFT transverse sinus is occluded distally by   the mass. The torcula is partially occluded.    --- End of Report ---        < from: CT Chest w/ IV Cont (10.12.24 @ 12:29) >    ACC: [id number] EXAM:  CT CHEST IC   ORDERED BY: THEA BATES     PROCEDURE DATE:  10/12/2024      < end of copied text >  < from: CT Chest w/ IV Cont (10.12.24 @ 12:29) >  IMPRESSION: Small lingular nodules, likely postinflammatory.    --- End of Report ---    < end of copied text >    < from: 12 Lead ECG (10.11.24 @ 22:30) >    Ventricular Rate 59 BPM    Atrial Rate 59 BPM    P-R Interval 168 ms    QRS Duration 90 ms    Q-T Interval 384 ms    QTC Calculation(Bazett) 380 ms    P Axis 66 degrees    R Axis 5 degrees    T Axis 17 degrees    Diagnosis Line Sinus bradycardia  Otherwise normal ECG    < end of copied text >      < from: TTE W or WO Ultrasound Enhancing Agent (10.13.24 @ 10:25) >    TRANSTHORACIC ECHOCARDIOGRAM REPORT  ________________________________________________________________________________                                      _______       Pt. Name:       NOHEMY BRAN Study Date:    10/13/2024  MRN: [id number]            YOB: 1965  Accession #:    [id number]             Age:           59 years  Account#:       [id number]            Gender:        M  Heart Rate:                           Height:        65.00 in (165.10 cm)  Rhythm:                              Weight:        180.00 lb (81.65 kg)  Blood Pressure: 99/60 mmHg            BSA/BMI:       1.89 m² / 29.95 kg/m²    < end of copied text >  < from: TTE W or WO Ultrasound Enhancing Agent (10.13.24 @ 10:25) >  CONCLUSIONS:      1. Left ventricular cavity is normal in size. Left ventricular wall thickness is normal. Left ventricular systolic function is normal with an ejection fraction of 61 % by Chavez's method of disks.   2. Normal left ventricular diastolic function, with normal left ventricular filling pressure.   3. Normal right ventricular cavity size and normal right ventricular systolic function.   4. Normal left and right atrial size.   5. Fibrocalcific aortic valve sclerosis without stenosis.   6. No significant valvular disease.   7. No pericardial effusion seen.   8. Compared to the transthoracic echocardiogram performed on 12/13/2023, fluid collection previously visualized posterior to LV has resolved.    < end of copied text >          REVIEW OF SYSTEMS:    Posterior neck / R shoulder pain , all other systems are reviewed and are negative     I&O's Summary    13 Oct 2024 07:01  -  14 Oct 2024 07:00  --------------------------------------------------------  IN: 0 mL / OUT: 650 mL / NET: -650 mL    14 Oct 2024 07:01  -  14 Oct 2024 15:09  --------------------------------------------------------  IN: 210 mL / OUT: 0 mL / NET: 210 mL          Vital Signs Last 24 Hrs  T(C): 36.6 (14 Oct 2024 11:14), Max: 36.7 (13 Oct 2024 20:22)  T(F): 97.9 (14 Oct 2024 11:14), Max: 98.1 (13 Oct 2024 20:22)  HR: 68 (14 Oct 2024 11:14) (62 - 73)  BP: 133/84 (14 Oct 2024 11:14) (109/73 - 134/86)  BP(mean): --  RR: 18 (14 Oct 2024 11:14) (18 - 18)  SpO2: 94% (14 Oct 2024 11:14) (93% - 98%)    Parameters below as of 14 Oct 2024 11:14  Patient On (Oxygen Delivery Method): room air      PHYSICAL EXAM:    GENERAL: NAD, well-groomed, well-developed  HEAD:  Atraumatic, Normocephalic, posterior neck at the base of sculp with palpable mass  EYES: EOMI, PERRLA, conjunctiva and sclera clear  NECK: Supple, No JVD, Normal thyroid  NERVOUS SYSTEM:  Alert & Oriented X3, no focal deficit  CHEST/LUNG: CTA b/l ,  no  rales, rhonchi, wheezing, or rubs  HEART: Regular rate and rhythm; No murmurs, rubs, or gallops  ABDOMEN: Soft, Nontender, Nondistended; Bowel sounds present  EXTREMITIES:  2+ Peripheral Pulses, No clubbing, cyanosis, or edema  LYMPH: No lymphadenopathy noted  SKIN: No rashes or lesions    Home Medications:  benztropine 2 mg oral tablet: 1 tab(s) orally 2 times a day (12 Oct 2024 02:49)  clonazePAM 0.5 mg oral tablet: 1 tab(s) orally once a day (12 Oct 2024 02:49)  divalproex sodium 500 mg oral delayed release tablet: 1 tab(s) orally 2 times a day (12 Oct 2024 02:49)  folic acid 1 mg oral tablet: 1 tab(s) orally once a day (12 Oct 2024 02:49)  Norvasc 10 mg oral tablet: 1 tab(s) orally once a day (12 Oct 2024 02:48)  Protonix 40 mg oral delayed release tablet: 1 tab(s) orally once a day (12 Oct 2024 02:49)  QUEtiapine 25 mg oral tablet: 5 tab(s) orally once a day (at bedtime) (12 Oct 2024 02:49)

## 2024-10-15 LAB
ANION GAP SERPL CALC-SCNC: 11 MMOL/L — SIGNIFICANT CHANGE UP (ref 5–17)
BUN SERPL-MCNC: 26 MG/DL — HIGH (ref 8–20)
CALCIUM SERPL-MCNC: 8.4 MG/DL — SIGNIFICANT CHANGE UP (ref 8.4–10.5)
CHLORIDE SERPL-SCNC: 100 MMOL/L — SIGNIFICANT CHANGE UP (ref 96–108)
CO2 SERPL-SCNC: 28 MMOL/L — SIGNIFICANT CHANGE UP (ref 22–29)
CREAT SERPL-MCNC: 0.84 MG/DL — SIGNIFICANT CHANGE UP (ref 0.5–1.3)
EGFR: 100 ML/MIN/1.73M2 — SIGNIFICANT CHANGE UP
GLUCOSE SERPL-MCNC: 88 MG/DL — SIGNIFICANT CHANGE UP (ref 70–99)
HCT VFR BLD CALC: 36.7 % — LOW (ref 39–50)
HGB BLD-MCNC: 12.1 G/DL — LOW (ref 13–17)
MAGNESIUM SERPL-MCNC: 2.2 MG/DL — SIGNIFICANT CHANGE UP (ref 1.6–2.6)
MCHC RBC-ENTMCNC: 29.2 PG — SIGNIFICANT CHANGE UP (ref 27–34)
MCHC RBC-ENTMCNC: 33 GM/DL — SIGNIFICANT CHANGE UP (ref 32–36)
MCV RBC AUTO: 88.6 FL — SIGNIFICANT CHANGE UP (ref 80–100)
PHOSPHATE SERPL-MCNC: 4.2 MG/DL — SIGNIFICANT CHANGE UP (ref 2.4–4.7)
PLATELET # BLD AUTO: 228 K/UL — SIGNIFICANT CHANGE UP (ref 150–400)
POTASSIUM SERPL-MCNC: 4.3 MMOL/L — SIGNIFICANT CHANGE UP (ref 3.5–5.3)
POTASSIUM SERPL-SCNC: 4.3 MMOL/L — SIGNIFICANT CHANGE UP (ref 3.5–5.3)
RBC # BLD: 4.14 M/UL — LOW (ref 4.2–5.8)
RBC # FLD: 15.1 % — HIGH (ref 10.3–14.5)
SODIUM SERPL-SCNC: 139 MMOL/L — SIGNIFICANT CHANGE UP (ref 135–145)
WBC # BLD: 6.58 K/UL — SIGNIFICANT CHANGE UP (ref 3.8–10.5)
WBC # FLD AUTO: 6.58 K/UL — SIGNIFICANT CHANGE UP (ref 3.8–10.5)

## 2024-10-15 PROCEDURE — 75898 FOLLOW-UP ANGIOGRAPHY: CPT | Mod: 26

## 2024-10-15 PROCEDURE — 36226 PLACE CATH VERTEBRAL ART: CPT | Mod: 50

## 2024-10-15 PROCEDURE — 76380 CAT SCAN FOLLOW-UP STUDY: CPT | Mod: 26

## 2024-10-15 PROCEDURE — 99232 SBSQ HOSP IP/OBS MODERATE 35: CPT

## 2024-10-15 PROCEDURE — 61624 TCAT PERM OCCLS/EMBOLJ CNS: CPT

## 2024-10-15 PROCEDURE — 36224 PLACE CATH CAROTD ART: CPT | Mod: 50

## 2024-10-15 PROCEDURE — 75894 X-RAYS TRANSCATH THERAPY: CPT | Mod: 26

## 2024-10-15 PROCEDURE — 36227 PLACE CATH XTRNL CAROTID: CPT | Mod: 50

## 2024-10-15 PROCEDURE — 99221 1ST HOSP IP/OBS SF/LOW 40: CPT

## 2024-10-15 RX ORDER — OXYCODONE HYDROCHLORIDE 30 MG/1
10 TABLET, FILM COATED, EXTENDED RELEASE ORAL EVERY 6 HOURS
Refills: 0 | Status: DISCONTINUED | OUTPATIENT
Start: 2024-10-15 | End: 2024-10-18

## 2024-10-15 RX ORDER — OXYCODONE HYDROCHLORIDE 30 MG/1
5 TABLET, FILM COATED, EXTENDED RELEASE ORAL EVERY 6 HOURS
Refills: 0 | Status: DISCONTINUED | OUTPATIENT
Start: 2024-10-15 | End: 2024-10-18

## 2024-10-15 RX ORDER — SODIUM CHLORIDE 0.9 % (FLUSH) 0.9 %
1000 SYRINGE (ML) INJECTION
Refills: 0 | Status: DISCONTINUED | OUTPATIENT
Start: 2024-10-15 | End: 2024-10-16

## 2024-10-15 RX ADMIN — Medication 500 MILLIGRAM(S): at 17:17

## 2024-10-15 RX ADMIN — QUETIAPINE FUMARATE 125 MILLIGRAM(S): 50 TABLET, FILM COATED ORAL at 21:02

## 2024-10-15 RX ADMIN — Medication 4 MILLIGRAM(S): at 17:17

## 2024-10-15 RX ADMIN — Medication 10 MILLIGRAM(S): at 05:13

## 2024-10-15 RX ADMIN — OXYCODONE HYDROCHLORIDE 5 MILLIGRAM(S): 30 TABLET, FILM COATED, EXTENDED RELEASE ORAL at 23:00

## 2024-10-15 RX ADMIN — PANTOPRAZOLE SODIUM 40 MILLIGRAM(S): 40 TABLET, DELAYED RELEASE ORAL at 05:13

## 2024-10-15 RX ADMIN — MUPIROCIN 1 APPLICATION(S): 20 OINTMENT TOPICAL at 17:18

## 2024-10-15 RX ADMIN — ENOXAPARIN SODIUM 40 MILLIGRAM(S): 150 INJECTION SUBCUTANEOUS at 21:10

## 2024-10-15 RX ADMIN — Medication 650 MILLIGRAM(S): at 17:44

## 2024-10-15 RX ADMIN — Medication 500 MILLIGRAM(S): at 05:12

## 2024-10-15 RX ADMIN — Medication 3 MILLILITER(S): at 04:49

## 2024-10-15 RX ADMIN — Medication 2 MILLIGRAM(S): at 17:17

## 2024-10-15 RX ADMIN — Medication 3 MILLILITER(S): at 10:33

## 2024-10-15 RX ADMIN — OXYCODONE HYDROCHLORIDE 5 MILLIGRAM(S): 30 TABLET, FILM COATED, EXTENDED RELEASE ORAL at 22:00

## 2024-10-15 RX ADMIN — Medication 650 MILLIGRAM(S): at 17:17

## 2024-10-15 RX ADMIN — Medication 2 MILLIGRAM(S): at 05:12

## 2024-10-15 NOTE — ED PROVIDER NOTE - INPATIENT RECORD SUMMARY
No Patient is a 59 y/o male who presented s/p a fall down stairs. He was found to have a L 7th rib fx with associated small crescentic left anterior pneumothorax. Pt was admitted  to the SICU. Placed on PIC protocol for rib fx. On 11/29 he was noted to have dramatic increase in subcutaneous emphysema. CT chest at that time showed new extensive/diffuse pneumomediastinum and subcutaneous emphysema; increased size of small left pneumothorax. Incision made over L anterior chest with release of large amount of air from soft tissue. CT esophogram was also done showing no evidence of extraluminal contrast leakage from the esophagus; also noted enlarging moderate left pneumothorax, stable pneumomediastinum and slight decrease in subcutaneous air. Patient had pigtail catheter placed to left chest on 11/29. Rib block also performed on 11/29. He was downgraded from SICU to floor on 11/30 AM. On 11/30 PM into early 12/1 AM, pt became agitated requiring sedation and code gray was called. Pt proceeded to become more agitated and short of breath. O2 sats dropped and pt became less responsive. Pt was intubated & returned to SICU. Treated w/ solumedrol and duonebs for reactive airway disease. Was able to be extubated the next day, 12/2. Chest tube removed 12/3. Pt had repeat rib block performed on 12/4. Downgraded back to the floor on 12/5. Labs the next few days notable for increasing WBC count. Blood cx drawn at that time and resulted positive for MRSA. Pt started on vancomycin. Pt noted to have cellulitis & purulence from IV site on RUE. IV removed. RUE duplex performed showing superficial thrombus in the right cephalic vein. Vascular surgery consulted. Performed vein excision on 12/8. Pt tolerated procedure well. Post-op vascular team placed VAC to RUE. Pt still had persistently elevated WBC. TTE showed no vegetation but did note expansion of LV wall with systole for which LV pseudoaneurysm needed to be ruled out. CAROL done on 12/14 also showing no vegetation, no LV PSA visualized. CT chest performed showing moderate L sided pleural effusion. Given persistently elevated WBC, decision made to have IR perform thoracentesis on 12/15 to eval pleural effusion further. Cultures from thoracentesis with no growth. ID continued to follow & adjusted medication regimen - added cefepime. Pt still with elevated WBC but beginning to downtrend. He reports hx of chronic leukocytosis for which he has seen a hematologist before. Started on ASA for thrombocytosis. PICC placed for long term abx as per ID. Pt has remained afebrile and clinically well without evidence for systemic infection. He is tolerating diet, pain controlled on PO medications, OOB ambulating, voiding and having bowel function. Pt is stable for discharge to [facility] with outpatient follow-up as outlined below. Patient is a 57 y/o male who presented s/p a fall down stairs. He was found to have a L 7th rib fx with associated small crescentic left anterior pneumothorax. Pt was admitted  to the SICU. Placed on PIC protocol for rib fx. On 11/29 he was noted to have dramatic increase in subcutaneous emphysema. CT chest at that time showed new extensive/diffuse pneumomediastinum and subcutaneous emphysema; increased size of small left pneumothorax. Incision made over L anterior chest with release of large amount of air from soft tissue. CT esophogram was also done showing no evidence of extraluminal contrast leakage from the esophagus; also noted enlarging moderate left pneumothorax, stable pneumomediastinum and slight decrease in subcutaneous air. Patient had pigtail catheter placed to left chest on 11/29. Rib block also performed on 11/29. He was downgraded from SICU to floor on 11/30 AM. On 11/30 PM into early 12/1 AM, pt became agitated requiring sedation and code gray was called. Pt proceeded to become more agitated and short of breath. O2 sats dropped and pt became less responsive. Pt was intubated & returned to SICU. Treated w/ solumedrol and duonebs for reactive airway disease. Was able to be extubated the next day, 12/2. Chest tube removed 12/3. Pt had repeat rib block performed on 12/4. Downgraded back to the floor on 12/5. Labs the next few days notable for increasing WBC count. Blood cx drawn at that time and resulted positive for MRSA. Pt started on vancomycin. Pt noted to have cellulitis & purulence from IV site on RUE. IV removed. RUE duplex performed showing superficial thrombus in the right cephalic vein. Vascular surgery consulted. Performed vein excision on 12/8. Pt tolerated procedure well. Post-op vascular team placed VAC to RUE. Pt still had persistently elevated WBC. TTE showed no vegetation but did note expansion of LV wall with systole for which LV pseudoaneurysm needed to be ruled out. CAROL done on 12/14 also showing no vegetation, no LV PSA visualized. CT chest performed showing moderate L sided pleural effusion. Given persistently elevated WBC, decision made to have IR perform thoracentesis on 12/15 to eval pleural effusion further. Cultures from thoracentesis with no growth. ID continued to follow & adjusted medication regimen - added cefepime. Pt still with elevated WBC but beginning to downtrend. He reports hx of chronic leukocytosis for which he has seen a hematologist before. Started on ASA for thrombocytosis. PICC placed for long term abx as per ID. Pt has remained afebrile and clinically well without evidence for systemic infection. He is tolerating diet, pain controlled on PO medications, OOB ambulating, voiding and having bowel function. Pt is stable for discharge to [facility] with outpatient follow-up as outlined below.

## 2024-10-15 NOTE — CONSULT NOTE ADULT - ASSESSMENT
58 y/o male with hx of Schizoaffective disorder, tardive dyskinesia, COPD not on 02, HTN [place] [facility] [place] [facility] [place] of an occipital brain mass now s/p cerebral angiogram, direct puncture of occipital tumor with [place], and arterial embolization with 2 coils. Patient is hemodynamically stable and at baseline neurologic status.     Neuro:  - Admit to NSICU for post-operative monitoring.   - Q1 Neuro Checks  - HOB < 30, bedrest x 4 hours post-op   - Depakote 500mg BID   - Quetiapine 125mg QHS   - Decadron 4mg BID   - Percocet 5/325mg Q4 PRN  - Tylenol 650mg Q6 PRN   - Klonopin 0.5mg QD  - Melatonin 3mg QHS   - Benztropine 2mg BID     CV:   - SBP < 160  - Amlodipine 10mg     Respiratory:   - on RA  - incentive spirometry  - Albuterol inhaler     GI:  - Diet: DASH  - Zofran 4mg IVP Q8  - Protonix 40mg  - Maalox Q4 PRN     :  - voiding  - NS @ 75    Heme:  - DVT ppx: SCDs, SQL   - Monitor groin site for pain, ecchymosis, hematoma formation.     ID:   - Mupirocin 2% for + staph aureus PCR     Dispo: Continue to monitor in NSICU.  Plan discussed with Dr. Cohen.    60 y/o male with hx of Schizoaffective disorder, tardive dyskinesia, COPD not on 02, [place] [facility] [place] [facility] [place] of an occipital brain mass now s/p cerebral angiogram, direct puncture of occipital tumor with [place], and arterial embolization with 2 coils. Patient is hemodynamically stable and at baseline neurologic status.     Neuro:  - Admit to NSICU for post-operative monitoring.   - Q1 Neuro Checks  - HOB < 30, bedrest x 2 hours post-op   - Depakote 500mg BID   - Quetiapine 125mg QHS   - Decadron 4mg BID   - Percocet 5/325mg Q4 PRN  - Tylenol 650mg Q6 PRN   - Klonopin 0.5mg QD  - Melatonin 3mg QHS   - Benztropine 2mg BID   - Plan for tumor resection 10/17 with Dr. Burnett (NeuroSx following)  - [facility] w/wo brain lab protocol pending     CV:   - SBP < 160  - Amlodipine 10mg     Respiratory:   - on RA  - incentive spirometry  - Albuterol inhaler     GI:  - Diet: DASH (ADAT after bedside dysphagia screen)   - Zofran 4mg IVP Q8  - Protonix 40mg  - Maalox Q4 PRN     :  - voiding  - NS @ 75    Heme:  - DVT ppx: SCDs, SQL   - Monitor groin site for pain, ecchymosis, hematoma formation  - Hold SQL on 10/16 for preop     ID:   - Mupirocin 2% for + staph aureus PCR     Dispo: Continue to monitor in NSICU.  Plan discussed with Dr. Cohen.

## 2024-10-15 NOTE — PROGRESS NOTE ADULT - ASSESSMENT
Assessment 58 y/o male with hx of Schizoaffective disorder, tardive dyskinesia, COPD not on 02, HTN trans to [facility] from [facility] [place]Sx evaluation of large painful swelling to posterior Cervical area, found to have occipital mass measuring 6.2x3.1x5.6 cm with bony destruction and abutting the cerebellum and b/l occipital lobes and loss of flow of superior sagittal sinus from compression vs thrombosis. Patient is neurologically and hemodynamically stable at this time and is scheduled for occipital mass embolization today.     Plan:  - Q4 neuro checks, Q8 vitals  - Occipital mass embolization today with Dr. Slater  - Patient seen by Medicine, optimized for planned procedures  - NPO for angio  - Bactroban for hx of MRSA  - DVT ppx: SCDs, Lovenox  - Pain control PRN  - Incentive Spirometry  - Normotensive  - Patient is independent with all functional skills as per PT  - Pre-op for OR with Dr. Burnett Thursday 10/17  - [facility] w/wo fiducials on Wednesday 10/16 prior to procedure  - Anesthesia clearance  - Plan discussed with Dr. Burnett

## 2024-10-15 NOTE — CONSULT NOTE ADULT - SUBJECTIVE AND OBJECTIVE BOX
HPI:   58 y/o male with hx of Schizoaffective disorder, tardive dyskinesia, COPD not on 02, HTN trans to [facility] [place] [facility] [place] of large painful swelling to posterior Cervical area. Patient with a known lesion there being followed by Dr. Watson in [place] for which he had an MRI on 10/8 to evaluate it. He endorsed pain to that area and pain in his shoulders and B/L UE, no current visual changes, motor weakness or gait disturbance. He admits to occasional posterior HA. Denies CP, SOB, CP, N/V, fever, or chills. No weight loss or night sweats. [place] [facility] [place] measuring 6.2x3.1x5.6 cm with bony destruction and abutting the cerebellum and b/l occipital lobes and loss of flow of superior sagittal sinus from compression vs thrombosis. Patient seen by NeuroSx and at this time awaiting further imaging studies prior to operative plan. Denies any other complaints at this time. (12 Oct 2024 02:33)    Patient is now admitted to NSICU s/p cerebral angiogram, direct puncture of occipital tumor with [place], and arterial embolization with 2 coils. Access via right groin. Patient tolerated the procedure well, remained hemodynamically stable with estimated 20cc of blood loss.        PAST MEDICAL & SURGICAL HISTORY:  High cholesterol  Hiatal hernia  Tardive dyskinesia  Liver failure  Seizures  ETOH abuse  S/P tonsillectomy  History of lumbar spinal fusion  6/2017    ALLERGIES:   No Known Allergies    HOME MEDICATIONS:  benztropine 2 mg oral tablet: 1 tab(s) orally 2 times a day (12 Oct 2024 02:49)  clonazePAM 0.5 mg oral tablet: 1 tab(s) orally once a day (12 Oct 2024 02:49)  divalproex sodium 500 mg oral delayed release tablet: 1 tab(s) orally 2 times a day (12 Oct 2024 02:49)  folic acid 1 mg oral tablet: 1 tab(s) orally once a day (12 Oct 2024 02:49)  Norvasc 10 mg oral tablet: 1 tab(s) orally once a day (12 Oct 2024 02:48)  Protonix 40 mg oral delayed release tablet: 1 tab(s) orally once a day (12 Oct 2024 02:49)  QUEtiapine 25 mg oral tablet: 5 tab(s) orally once a day (at bedtime) (12 Oct 2024 02:49)    Neuro:  acetaminophen     Tablet .. 650 milliGRAM(s) Oral every 6 hours PRN  benztropine 2 milliGRAM(s) Oral two times a day  clonazePAM  Tablet 0.5 milliGRAM(s) Oral daily  divalproex  milliGRAM(s) Oral two times a day  melatonin 3 milliGRAM(s) Oral at bedtime PRN  ondansetron Injectable 4 milliGRAM(s) IV Push every 8 hours PRN  oxycodone    5 mG/acetaminophen 325 mG 1 Tablet(s) Oral every 4 hours PRN  QUEtiapine 125 milliGRAM(s) Oral at bedtime    Anticoagulation:  enoxaparin Injectable 40 milliGRAM(s) SubCutaneous every 24 hours    OTHER:  albuterol    90 MICROgram(s) HFA Inhaler 2 Puff(s) Inhalation every 6 hours PRN  aluminum hydroxide/magnesium hydroxide/simethicone Suspension 30 milliLiter(s) Oral every 4 hours PRN  amLODIPine   Tablet 10 milliGRAM(s) Oral daily  mupirocin 2% Nasal 1 Application(s) Both Nostrils two times a day  pantoprazole    Tablet 40 milliGRAM(s) Oral before breakfast    IVF:  sodium chloride 0.9% lock flush 3 milliLiter(s) IV Push every 8 hours  sodium chloride 0.9%. 1000 milliLiter(s) IV Continuous <Continuous>    Vital Signs Last 24 Hrs:  T(C): 36.8 (15 Oct 2024 12:10), Max: 36.8 (15 Oct 2024 12:10)  T(F): 98.3 (15 Oct 2024 12:10), Max: 98.3 (15 Oct 2024 12:10)  HR: 79 (15 Oct 2024 15:46) (64 - 81)  BP: 130/73 (15 Oct 2024 15:46) (115/80 - 148/88)  BP(mean): 90 (15 Oct 2024 15:46) (90 - 94)  RR: 16 (15 Oct 2024 15:46) (15 - 19)  SpO2: 100% (15 Oct 2024 15:46) (84% - 100%)    Parameters below as of 15 Oct 2024 15:46  Patient On (Oxygen Delivery Method): nasal cannula  O2 Flow (L/min): 3    PHYSICAL EXAM:  GENERAL: NAD, well-groomed  HEAD:  Atraumatic, normocephalic  WOUND: R groin dressing CDI   EYES: Conjunctiva and sclera clear  ENMT: moist mucous membranes   NECK: trachea midline, no JVD   KARYN COMA SCORE: E-4 V-5 M-6 = 15  MENTAL STATUS: AAO x3; Awake; Opens eyes spontaneously; Appropriately conversant without aphasia; following simple commands  CRANIAL NERVES: PERRL, 2mm. EOMI without nystagmus. Face symmetric w/ normal eye closure and smile, tongue midline. Hearing grossly intact. Speech clear. Head turning intact.   REFLEXES: PERRL.   MOTOR: strength 5/5 b/l upper and lower extremities  SENSATION: grossly intact to light touch all extremities  COORDINATION: no UE drift.   CHEST/LUNG: equal chest rise and expansion, non-labored breathing   HEART: regular rate and rhythm   ABDOMEN: nontender, non distended   EXTREMITIES:  2+ peripheral pulses, no clubbing, cyanosis, or edema  SKIN: Warm, dry; no rashes or lesions    LABS:                        12.1   6.58  )-----------( 228      ( 15 Oct 2024 04:14 )             36.7     10-15    139  |  100  |  26.0[H]  ----------------------------<  88  4.3   |  28.0  |  0.84    Ca    8.4      15 Oct 2024 04:14  Phos  4.2     10-15  Mg     2.2     10-15    TPro  6.3[L]  /  Alb  4.0  /  TBili  0.3[L]  /  DBili  x   /  AST  14  /  ALT  7   /  AlkPhos  50  10-14    PT/INR - ( 14 Oct 2024 16:30 )   PT: 10.8 sec;   INR: 0.96 ratio         PTT - ( 14 Oct 2024 16:30 )  PTT:32.9 sec  Urinalysis Basic - ( 15 Oct 2024 04:14 )    Color: x / Appearance: x / SG: x / pH: x  Gluc: 88 mg/dL / Ketone: x  / Bili: x / Urobili: x   Blood: x / Protein: x / Nitrite: x   Leuk Esterase: x / RBC: x / WBC x   Sq Epi: x / Non Sq Epi: x / Bacteria: x      RADIOLOGY & ADDITIONAL STUDIES:  < from: MR Brain Stereotactic w/wo IV Cont (10.12.24 @ 08:29) >  PROCEDURE DATE:  10/12/2024        INTERPRETATION:  MR brain with and without gadolinium    CLINICAL INFORMATION:    TECHNIQUE:   Sagittal and axial T1-weighted images, axial FLAIR images,   axial T2-weighted images, axial gradient echo images and axial diffusion   weighted images of the brain were obtained. Following 8 cc of Gadavist   administration, 2 cc discarded, isotropic volumetric and fast spin echo   T1-weighted images were obtained; this data was reformatted using image   post processing software in multiple imaging planes.    FINDING:   MR dated 10/08/2024 available for review.    The brain demonstrates the presence of a 5.8 x 6.1 x 4.0 cm enhancing   extra-axial neoplasm in the Central occipital region and posterior fossa   abutting the dura and compressing the torcula and BILATERAL distal   transverse sinuses, likely a large meningioma. No acute cerebral cortical   infarct is found.   No intracranial hemorrhage is recognized.  No mass   effect is found in the brain.    The ventricles, sulci and basal cisterns appear unremarkable.    The vertebral and internal carotid arteries demonstrate expected flow   voids indicating their patency.    The orbits are unremarkable.  The paranasal sinuses are clear.  The nasal   cavity appears intact.  The nasopharynx is symmetric.  The central skull   base and petrous temporal bones are intact.  The calvarium appears   unremarkable.    MR venogram  (gadolinium technique)    CLINICAL INFORMATION: Dural venous sinus thrombosis Intracranial mass.    TECHNIQUE:   Three-dimensional time of flight MR venogram was performed.    Post processing angiographic reconstruction of images was performed. This   data set was reconstructed as maximum intensity pixel images and   displayed in multiple rotations.    FINDINGS:   No prior examinations are available for review.    The superior sagittal sinus is patent in both its anterior and posterior   limbs.  The RIGHT transverse sinus is patent but narrowed distally. The   LEFT transverse sinus is occluded distally. The torcula is partially   occluded. The sigmoid sinuses are patent.  Jugular bulbs and internal   jugular veins remain patent.    IMPRESSION:    MR brain: 5.8 x 6.1 x 4.0 cm enhancing extra-axial neoplasm in the   Central occipital region and posterior fossa abutting the dura and   compressing the torcula and BILATERAL distal transverse sinuses, likely a   large meningioma.    MRV brain:  The RIGHT transverse sinus is patent but narrowed distally   secondary to the mass. The LEFT transverse sinus is occluded distally by   the mass. The torcula is partially occluded.    --- End of Report ---    TAMMIE IZAGUIRRE MD; Attending Radiologist  This document has been electronically signed. Oct 12 2024 10:15AM    < end of copied text >   HPI:   58 y/o male with hx of Schizoaffective disorder, tardive dyskinesia, COPD not on 02, HTN trans to [facility] [place] NeuroSx evaluation of large painful swelling to posterior Cervical area. Patient with a known lesion there being followed by Dr. Watson in [place] for which he had an MRI on 10/8 to evaluate it. He endorsed pain to that area and pain in his shoulders and B/L UE, no current visual changes, motor weakness or gait disturbance. He admits to occasional posterior HA. Denies CP, SOB, N/V, fever, or chills. No weight loss or night sweats. CT at [facility] [place] [place] with occipital mass measuring 6.2x3.1x5.6 cm with bony destruction and abutting the cerebellum and b/l occipital lobes and loss of flow of superior sagittal sinus from compression vs thrombosis. Patient seen by NeuroSx and at this time awaiting further imaging studies prior to operative plan. Denies any other complaints at this time. (12 Oct 2024 02:33)    Patient is now admitted to NSICU s/p cerebral angiogram, direct puncture of occipital tumor with [place], and arterial embolization with 2 coils. Access via right groin. Patient tolerated the procedure well, remained hemodynamically stable with estimated 20cc of blood loss. Input 800cc IVF.     PAST MEDICAL & SURGICAL HISTORY:  High cholesterol  Hiatal hernia  Tardive dyskinesia  Liver failure  Seizures  ETOH abuse  S/P tonsillectomy  History of lumbar spinal fusion  6/2017    ALLERGIES:   No Known Allergies    HOME MEDICATIONS:  benztropine 2 mg oral tablet: 1 tab(s) orally 2 times a day (12 Oct 2024 02:49)  clonazePAM 0.5 mg oral tablet: 1 tab(s) orally once a day (12 Oct 2024 02:49)  divalproex sodium 500 mg oral delayed release tablet: 1 tab(s) orally 2 times a day (12 Oct 2024 02:49)  folic acid 1 mg oral tablet: 1 tab(s) orally once a day (12 Oct 2024 02:49)  Norvasc 10 mg oral tablet: 1 tab(s) orally once a day (12 Oct 2024 02:48)  Protonix 40 mg oral delayed release tablet: 1 tab(s) orally once a day (12 Oct 2024 02:49)  QUEtiapine 25 mg oral tablet: 5 tab(s) orally once a day (at bedtime) (12 Oct 2024 02:49)    Neuro:  acetaminophen     Tablet .. 650 milliGRAM(s) Oral every 6 hours PRN  benztropine 2 milliGRAM(s) Oral two times a day  clonazePAM  Tablet 0.5 milliGRAM(s) Oral daily  divalproex  milliGRAM(s) Oral two times a day  melatonin 3 milliGRAM(s) Oral at bedtime PRN  ondansetron Injectable 4 milliGRAM(s) IV Push every 8 hours PRN  oxycodone    5 mG/acetaminophen 325 mG 1 Tablet(s) Oral every 4 hours PRN  QUEtiapine 125 milliGRAM(s) Oral at bedtime    Anticoagulation:  enoxaparin Injectable 40 milliGRAM(s) SubCutaneous every 24 hours    OTHER:  albuterol    90 MICROgram(s) HFA Inhaler 2 Puff(s) Inhalation every 6 hours PRN  aluminum hydroxide/magnesium hydroxide/simethicone Suspension 30 milliLiter(s) Oral every 4 hours PRN  amLODIPine   Tablet 10 milliGRAM(s) Oral daily  mupirocin 2% Nasal 1 Application(s) Both Nostrils two times a day  pantoprazole    Tablet 40 milliGRAM(s) Oral before breakfast    IVF:  sodium chloride 0.9% lock flush 3 milliLiter(s) IV Push every 8 hours  sodium chloride 0.9%. 1000 milliLiter(s) IV Continuous <Continuous>    Vital Signs Last 24 Hrs:  T(C): 36.8 (15 Oct 2024 12:10), Max: 36.8 (15 Oct 2024 12:10)  T(F): 98.3 (15 Oct 2024 12:10), Max: 98.3 (15 Oct 2024 12:10)  HR: 79 (15 Oct 2024 15:46) (64 - 81)  BP: 130/73 (15 Oct 2024 15:46) (115/80 - 148/88)  BP(mean): 90 (15 Oct 2024 15:46) (90 - 94)  RR: 16 (15 Oct 2024 15:46) (15 - 19)  SpO2: 100% (15 Oct 2024 15:46) (84% - 100%)    Parameters below as of 15 Oct 2024 15:46  Patient On (Oxygen Delivery Method): nasal cannula  O2 Flow (L/min): 3    PHYSICAL EXAM:  GENERAL: NAD, well-groomed  HEAD:  Atraumatic, normocephalic  WOUND: R groin dressing CDI   EYES: Conjunctiva and sclera clear  ENMT: moist mucous membranes   NECK: trachea midline, no JVD   KARYN COMA SCORE: E-4 V-5 M-6 = 15  MENTAL STATUS: AAO x3; Awake; Opens eyes spontaneously; Appropriately conversant without aphasia; following simple commands  CRANIAL NERVES: PERRL, 2mm. EOMI without nystagmus. Face symmetric w/ normal eye closure and smile, tongue midline. Hearing grossly intact. Speech clear. Head turning intact.   REFLEXES: PERRL.   MOTOR: strength 5/5 b/l upper and lower extremities  SENSATION: grossly intact to light touch all extremities  COORDINATION: no UE drift.   CHEST/LUNG: equal chest rise and expansion, non-labored breathing   HEART: regular rate and rhythm   ABDOMEN: nontender, non distended   EXTREMITIES:  2+ peripheral pulses, no clubbing, cyanosis, or edema  SKIN: Warm, dry; no rashes or lesions    LABS:                        12.1   6.58  )-----------( 228      ( 15 Oct 2024 04:14 )             36.7     10-15    139  |  100  |  26.0[H]  ----------------------------<  88  4.3   |  28.0  |  0.84    Ca    8.4      15 Oct 2024 04:14  Phos  4.2     10-15  Mg     2.2     10-15    TPro  6.3[L]  /  Alb  4.0  /  TBili  0.3[L]  /  DBili  x   /  AST  14  /  ALT  7   /  AlkPhos  50  10-14    PT/INR - ( 14 Oct 2024 16:30 )   PT: 10.8 sec;   INR: 0.96 ratio         PTT - ( 14 Oct 2024 16:30 )  PTT:32.9 sec  Urinalysis Basic - ( 15 Oct 2024 04:14 )    Color: x / Appearance: x / SG: x / pH: x  Gluc: 88 mg/dL / Ketone: x  / Bili: x / Urobili: x   Blood: x / Protein: x / Nitrite: x   Leuk Esterase: x / RBC: x / WBC x   Sq Epi: x / Non Sq Epi: x / Bacteria: x      RADIOLOGY & ADDITIONAL STUDIES:  < from: MR Brain Stereotactic w/wo IV Cont (10.12.24 @ 08:29) >  PROCEDURE DATE:  10/12/2024        INTERPRETATION:  MR brain with and without gadolinium    CLINICAL INFORMATION:    TECHNIQUE:   Sagittal and axial T1-weighted images, axial FLAIR images,   axial T2-weighted images, axial gradient echo images and axial diffusion   weighted images of the brain were obtained. Following 8 cc of Gadavist   administration, 2 cc discarded, isotropic volumetric and fast spin echo   T1-weighted images were obtained; this data was reformatted using image   post processing software in multiple imaging planes.    FINDING:   MR dated 10/08/2024 available for review.    The brain demonstrates the presence of a 5.8 x 6.1 x 4.0 cm enhancing   extra-axial neoplasm in the Central occipital region and posterior fossa   abutting the dura and compressing the torcula and BILATERAL distal   transverse sinuses, likely a large meningioma. No acute cerebral cortical   infarct is found.   No intracranial hemorrhage is recognized.  No mass   effect is found in the brain.    The ventricles, sulci and basal cisterns appear unremarkable.    The vertebral and internal carotid arteries demonstrate expected flow   voids indicating their patency.    The orbits are unremarkable.  The paranasal sinuses are clear.  The nasal   cavity appears intact.  The nasopharynx is symmetric.  The central skull   base and petrous temporal bones are intact.  The calvarium appears   unremarkable.    MR venogram  (gadolinium technique)    CLINICAL INFORMATION: Dural venous sinus thrombosis Intracranial mass.    TECHNIQUE:   Three-dimensional time of flight MR venogram was performed.    Post processing angiographic reconstruction of images was performed. This   data set was reconstructed as maximum intensity pixel images and   displayed in multiple rotations.    FINDINGS:   No prior examinations are available for review.    The superior sagittal sinus is patent in both its anterior and posterior   limbs.  The RIGHT transverse sinus is patent but narrowed distally. The   LEFT transverse sinus is occluded distally. The torcula is partially   occluded. The sigmoid sinuses are patent.  Jugular bulbs and internal   jugular veins remain patent.    IMPRESSION:    MR brain: 5.8 x 6.1 x 4.0 cm enhancing extra-axial neoplasm in the   Central occipital region and posterior fossa abutting the dura and   compressing the torcula and BILATERAL distal transverse sinuses, likely a   large meningioma.    MRV brain:  The RIGHT transverse sinus is patent but narrowed distally   secondary to the mass. The LEFT transverse sinus is occluded distally by   the mass. The torcula is partially occluded.    --- End of Report ---    TAMMIE IZAGUIRRE MD; Attending Radiologist  This document has been electronically signed. Oct 12 2024 10:15AM    < end of copied text >

## 2024-10-15 NOTE — PROGRESS NOTE ADULT - SUBJECTIVE AND OBJECTIVE BOX
HPI:  60 y/o male with hx of Schizoaffective disorder, tardive dyskinesia, COPD not on 02, HTN [place] [facility] [place] [facility] [place] of large painful swelling to posterior Cervical area. Patient with a known lesion there being followed by Dr. Watson in [place] for which he had an MRI on 10/8 for evaluate it. He endorsed pain to that area and pain in his shoulders and B/L UE, no current visual changes, motor weakness or gait disturbance. He admits to occasional posterior HA. He was seen. Denies CP, SOB, CP, N/V, fever, or chills. No weight loss or night sweats. [place] [facility] [place]ipital mass measuring 6.2x3.1x5.6 cm with bony destruction and abutting the cerebellum and b/l occipital lobes and loss of flow of superior sagittal sinus from compression vs thrombosis. Patient seen by NeuroSx and at this time awaiting further imaging studies prior to operative plan. Denies any other complaints at this time. (12 Oct 2024 02:33)      INTERVAL HPI/OVERNIGHT EVENTS:  59y Male seen lying comfortably in bed scheduled for angio with Dr. Slater today. Voiding. Patient has swelling and pain in the occipital portion of the head.    Vital Signs Last 24 Hrs  T(C): 36.6 (15 Oct 2024 07:50), Max: 36.7 (14 Oct 2024 16:40)  T(F): 97.9 (15 Oct 2024 07:50), Max: 98.1 (14 Oct 2024 16:40)  HR: 69 (15 Oct 2024 07:50) (64 - 69)  BP: 122/77 (15 Oct 2024 07:50) (122/77 - 148/88)  BP(mean): --  RR: 18 (15 Oct 2024 07:50) (18 - 18)  SpO2: 94% (15 Oct 2024 07:50) (94% - 98%)    Parameters below as of 15 Oct 2024 07:50  Patient On (Oxygen Delivery Method): room air        PHYSICAL EXAM:  GENERAL: NAD, well-groomed  HEAD:  Atraumatic, normocephalic  DRAINS:   WOUND: Dressing clean dry intact  KARYN COMA SCORE: E- V- M- =       E: 4= opens eyes spontaneously 3= to voice 2= to noxious 1= no opening       V: 5= oriented 4= confused 3= inappropriate words 2= incomprehensible sounds 1= nonverbal 1T= intubated       M: 6= follows commands 5= localizes 4= withdraws 3= flexor posturing 2= extensor posturing 1= no movement  MENTAL STATUS: AAO x3; Awake/Comatose; Opens eyes spontaneously/to voice/to light touch/to noxious stimuli; Appropriately conversant without aphasia/Nonverbal; following simple commands/mimicking/not following commands  CRANIAL NERVES: Visual acuity normal for age, visual fields full to confrontation, PERRL. EOMI without nystagmus. Facial sensation intact V1-3 distribution b/l. Face symmetric w/ normal eye closure and smile, tongue midline. Hearing grossly intact. Speech clear. Head turning and shoulder shrug intact.   REFLEXES: PERRL. Corneals intact b/l. Gag intact. Cough intact. Oculocephalic reflex intact (Doll's eye). Negative Castaneda's b/l. Negative clonus b/l  MOTOR: strength 5/5 b/l upper and lower extremities  Uppers     Delt (C5/6)     Bicep (C5/6)     Wrist Extend (C6)     Tricep (C7)     HG (C8/T1)  R                     5/5                 5/5                         5/5                           5/5                   5/5  L                      5/5                 5/5                         5/5                           5/5                   5/5  Lowers      HF(L1/L2)     KE (L3)     DF (L4)     EHL (L5)     PF (S1)      R                     5/5              5/5           5/5           5/5            5/5  L                     5/5               5/5          5/5            5/5            5/5  SENSATION: grossly intact to light touch all extremities  COORDINATION: Gait intact; rapid alternating movements intact; heel to shin intact; no upper extremity dysmetria  CHEST/LUNG: Clear to auscultation bilaterally; no rales, rhonchi, wheezing, or rubs  HEART: +S1/+S2; Regular rate and rhythm; no murmurs, rubs, or gallops  ABDOMEN: Soft, nontender, nondistended; bowel sounds present all four quadrants  EXTREMITIES:  2+ peripheral pulses, no clubbing, cyanosis, or edema  SKIN: Warm, dry; no rashes or lesions    LABS:                        12.1   6.58  )-----------( 228      ( 15 Oct 2024 04:14 )             36.7     10-15    139  |  100  |  26.0[H]  ----------------------------<  88  4.3   |  28.0  |  0.84    Ca    8.4      15 Oct 2024 04:14  Phos  4.2     10-15  Mg     2.2     10-15    TPro  6.3[L]  /  Alb  4.0  /  TBili  0.3[L]  /  DBili  x   /  AST  14  /  ALT  7   /  AlkPhos  50  10-14    PT/INR - ( 14 Oct 2024 16:30 )   PT: 10.8 sec;   INR: 0.96 ratio         PTT - ( 14 Oct 2024 16:30 )  PTT:32.9 sec  Urinalysis Basic - ( 15 Oct 2024 04:14 )    Color: x / Appearance: x / SG: x / pH: x  Gluc: 88 mg/dL / Ketone: x  / Bili: x / Urobili: x   Blood: x / Protein: x / Nitrite: x   Leuk Esterase: x / RBC: x / WBC x   Sq Epi: x / Non Sq Epi: x / Bacteria: x        10-14 @ 07:01  -  10-15 @ 07:00  --------------------------------------------------------  IN: 210 mL / OUT: 0 mL / NET: 210 mL        RADIOLOGY & ADDITIONAL TESTS:   HPI: 58 y/o male with hx of Schizoaffective disorder, tardive dyskinesia, COPD not on 02, [place] [facility] [place] [facility] [place] of large painful swelling to posterior Cervical area. Patient with a known lesion there being followed by Dr. Watson in [place] for which he had an MRI on 10/8 for evaluate it. He endorsed pain to that area and pain in his shoulders and B/L UE, no current visual changes, motor weakness or gait disturbance. He admits to occasional posterior HA. He was seen. Denies CP, SOB, CP, N/V, fever, or chills. No weight loss or night sweats[place] [facility] [place]ital mass measuring 6.2x3.1x5.6 cm with bony destruction and abutting the cerebellum and b/l occipital lobes and loss of flow of superior sagittal sinus from compression vs thrombosis. Patient seen by NeuroSx and at this time awaiting further imaging studies prior to operative plan. Denies any other complaints at this time. (12 Oct 2024 02:33)    INTERVAL HPI/OVERNIGHT EVENTS:  59y Male seen lying comfortably in bed scheduled for occipital mass embolization with Dr. Slater today. Patient reports that his LBM was yesterday but he has been voiding. Patient notes pain in the back of his head that radiates down to both shoulders, causing sleep difficulties overnight. Patient denies vision changes, numbness, tingling, or weakness in B/L upper and lower extremities at this time.      Vital Signs Last 24 Hrs  T(C): 36.6 (15 Oct 2024 07:50), Max: 36.7 (14 Oct 2024 16:40)  T(F): 97.9 (15 Oct 2024 07:50), Max: 98.1 (14 Oct 2024 16:40)  HR: 69 (15 Oct 2024 07:50) (64 - 69)  BP: 122/77 (15 Oct 2024 07:50) (122/77 - 148/88)  BP(mean): --  RR: 18 (15 Oct 2024 07:50) (18 - 18)  SpO2: 94% (15 Oct 2024 07:50) (94% - 98%)    Parameters below as of 15 Oct 2024 07:50  Patient On (Oxygen Delivery Method): room air    PHYSICAL EXAM:  GENERAL: NAD  HEAD:  Atraumatic, palpable occipital mass  KARYN COMA SCORE: 15  MENTAL STATUS: AAO x3; Awake; Opens eyes spontaneously; Appropriately conversant without aphasia; following simple commands  CRANIAL NERVES: PERRL. EOMI without nystagmus. Facial sensation intact V1-3 distribution b/l. Face symmetric w/ normal eye closure and smile, tongue midline. Hearing grossly intact. Speech clear. Shoulder shrug intact.   MOTOR: strength 5/5 b/l upper and lower extremities  SENSATION: grossly intact to light touch all extremities  COORDINATION: No drift present    LABS:                        12.1   6.58  )-----------( 228      ( 15 Oct 2024 04:14 )             36.7     10-15    139  |  100  |  26.0[H]  ----------------------------<  88  4.3   |  28.0  |  0.84    Ca    8.4      15 Oct 2024 04:14  Phos  4.2     10-15  Mg     2.2     10-15    TPro  6.3[L]  /  Alb  4.0  /  TBili  0.3[L]  /  DBili  x   /  AST  14  /  ALT  7   /  AlkPhos  50  10-14    PT/INR - ( 14 Oct 2024 16:30 )   PT: 10.8 sec;   INR: 0.96 ratio         PTT - ( 14 Oct 2024 16:30 )  PTT:32.9 sec  Urinalysis Basic - ( 15 Oct 2024 04:14 )    10-14 @ 07:01  -  10-15 @ 07:00  --------------------------------------------------------  IN: 210 mL / OUT: 0 mL / NET: 210 mL        RADIOLOGY & ADDITIONAL TESTS:    < from: CT Chest w/ IV Cont (10.12.24 @ 12:29) >    ACC: [id number] EXAM:  CT CHEST IC   ORDERED BY: THEA BATES     PROCEDURE DATE:  10/12/2024      INTERPRETATION:  CLINICAL INFORMATION: Meningioma.    COMPARISON: Contrast-enhanced CT of the abdomen and pelvis September 16, 2024, CT pulmonary angiogram December 25, 2023.    CONTRAST/COMPLICATIONS:  IV Contrast: Omnipaque 350  90 cc administered   10 cc discarded  Oral Contrast: NONE  Complications: None reported at time of study completion    PROCEDURE:  CT of the Chest was performed.  Sagittal and coronal reformats were performed.    FINDINGS:    LUNGS AND LARGE AIRWAYS: Patent central airways. Subtle diffuse airway   thickening. Trace paraseptal emphysematous changes at the apices. 3 mm   right lower lobe calcified granuloma. 3 mm, 2 mm, and 4 mm subpleural   nodules in the lingula laterally. Trace linear and rounded atelectasis in   the left lower lobe.  PLEURA: No pleural effusion.  VESSELS: Within normal limits.  HEART: Heart size is normal. No pericardial effusion.  MEDIASTINUM AND KENYON: No lymphadenopathy.  CHEST WALL AND LOWER NECK: Within normal limits.  VISUALIZED UPPER ABDOMEN: Within normal limits.  BONES: Chronic Hill-Sachs deformity of the left humeral head. Healed left   lateral rib fractures. Moderate atrophy and fatty replacement of the   pancreas. Tiny bilateral renal cysts. Scattered colonic diverticula.   Small hiatal hernia.    IMPRESSION: Small lingular nodules, likely postinflammatory.    --- End of Report ---    ANA ADEN MD; Attending Radiologist  This document has been electronically signed. Oct 12 2024 12:51PM    < end of copied text >        < from: MR Venogram Head w/ IV Cont (10.12.24 @ 08:30) >  ACC: [id number] EXAM:  MR VENOGRAM BRAIN IC   ORDERED BY: THEA BATES     ACC: [id number] EXAM:  MR BRAIN WAW IC FOR SRS   ORDERED BY: THEA BATES     PROCEDURE DATE:  10/12/2024          INTERPRETATION:  MR brain with and without gadolinium    CLINICAL INFORMATION:    TECHNIQUE:   Sagittal and axial T1-weighted images, axial FLAIR images,   axial T2-weighted images, axial gradient echo images and axial diffusion   weighted images of the brain were obtained. Following 8 cc of Gadavist   administration, 2 cc discarded, isotropic volumetric and fast spin echo   T1-weighted images were obtained; this data was reformatted using image   post processing software in multiple imaging planes.    FINDING:   MR dated 10/08/2024 available for review.    The brain demonstrates the presence of a 5.8 x 6.1 x 4.0 cm enhancing   extra-axial neoplasm in the Central occipital region and posterior fossa   abutting the dura and compressing the torcula and BILATERAL distal   transverse sinuses, likely a large meningioma. No acute cerebral cortical   infarct is found.   No intracranial hemorrhage is recognized.  No mass   effect is found in the brain.    The ventricles, sulci and basal cisterns appear unremarkable.    The vertebral and internal carotid arteries demonstrate expected flow   voids indicating their patency.    The orbits are unremarkable.  The paranasal sinuses are clear.  The nasal   cavity appears intact.  The nasopharynx is symmetric.  The central skull   base and petrous temporal bones are intact.  The calvarium appears   unremarkable.    MR venogram  (gadolinium technique)    CLINICAL INFORMATION: Dural venous sinus thrombosis Intracranial mass.    TECHNIQUE:   Three-dimensional time of flight MR venogram was performed.    Post processing angiographic reconstruction of images was performed. This   data set was reconstructed as maximum intensity pixel images and   displayed in multiple rotations.    FINDINGS:   No prior examinations are available for review.    The superior sagittal sinus is patent in both its anterior and posterior   limbs.  The RIGHT transverse sinus is patent but narrowed distally. The   LEFT transverse sinus is occluded distally. The torcula is partially   occluded. The sigmoid sinuses are patent.  Jugular bulbs and internal   jugular veins remain patent.    IMPRESSION:    MR brain: 5.8 x 6.1 x 4.0 cm enhancing extra-axial neoplasm in the   Central occipital region and posterior fossa abutting the dura and   compressing the torcula and BILATERAL distal transverse sinuses, likely a   large meningioma.    MRV brain:  The RIGHT transverse sinus is patent but narrowed distally   secondary to the mass. The LEFT transverse sinus is occluded distally by   the mass. The torcula is partially occluded.    --- End of Report ---      TAMMIE IZAGUIRRE MD; Attending Radiologist  This document has been electronically signed. Oct 12 2024 10:15AM    < end of copied text >      < from: Xray Chest 1 View- PORTABLE-Routine (Xray Chest 1 View- PORTABLE-Routine in AM.) (10.11.24 @ 23:33) >    ACC: [id number] EXAM:  XR CHEST PORTABLE ROUTINE 1V   ORDERED BY: ELVIS BROWNING     PROCEDURE DATE:  10/11/2024          INTERPRETATION:  History: Preoperative workup.    Technique: Single frontal view of the chest    Comparison: 12/26/2023    Findings:    Left arm PICC has been removed.    Lungs: Clear    Heart/Mediastinum: Normal    Osseous structures: Chronic Hill-Sachs lesion of left humeral head.   Chronic mildly displaced left-sided rib fractures.    Impression:    No acute pulmonary disease.    --- End of Report ---    NANI CABRERA MD; Attending Radiologist  This document has been electronically signed. Oct 12 2024  8:49AM    < end of copied text >

## 2024-10-15 NOTE — CHART NOTE - NSCHARTNOTEFT_GEN_A_CORE
Neurointerventional Surgery Post Procedure Note    Procedure: Selective Cerebral Angiography     Pre- Procedure Diagnosis: Occipital Mass   Post- Procedure Diagnosis: s/p cerebral angiogram and  direct puncture of occipital tumor with addi     : Dr. Bryon MD  Nurse Practitioner: Sara aB NP   Nurse: CONY CONTRERAS and Luna ARBOLEDA   Anesthesiologist: Dr. Reaves and SHIVAM Greenfield                                          Radiology Tech: Bruce ZHANG and Keri NGUYEN       Sheath:  6 Georgian Sheath    I/Os: estimated blood loss less than 20cc,   fluids cc, Urine output cc, Contrast: Omnipaque 240  127 cc, ABX Ancef 2 gm     Vitals:  /77  HR  70  Spo2  100 %    Preliminary Report:  Under a 6 Georgian short sheath via the right groin under MAC sedation via left vertebral artery, left internal carotid artery, left external carotid artery, right vertebral artery, right internal carotid artery, right external carotid artery, a selective cerebral angiography  was performed and reveals occipital mass s/p cerebral angiogram and direct puncture tumor embolization with addi. ( Official note to follow).    Patient tolerated procedure well.  Patient remains hemodynamically stable, no change in neurological status compared to baseline.  Results were discussed with patient, patient's family and Neurosurgery.  Right groin sheath  was discontinued at 1504 with vascade. Hemostasis was obtained with approximately 6 minutes of manual compression.     No active bleeding, no hematoma, no ecchymosis.   Quick clot and safeguard balloon dressing applied at 1510  Patient transferred to NSICU in stable condition.

## 2024-10-15 NOTE — CHART NOTE - NSCHARTNOTEFT_GEN_A_CORE
Neurointerventional Surgery  Pre-Procedure Note     This is a 59y ____ hand dominant Male    HPI:  58 y/o male with hx of Schizoaffective disorder, tardive dyskinesia, COPD not on 02, HTN trans to [facility] [place] [facility] [place]Sx evaluation of large painful swelling to posterior Cervical area. Patient with a known lesion there being followed by Dr. Watson in [place] for which he had an MRI on 10/8 for evaluate it. He endorsed pain to that area and pain in his shoulders and B/L UE, no current visual changes, motor weakness or gait disturbance. He admits to occasional posterior HA. He was seen. Denies CP, SOB, CP, N/V, fever, or chills. No weight loss or night sweats. CT at [facility] [place] with occipital mass measuring 6.2x3.1x5.6 cm with bony destruction and abutting the cerebellum and b/l occipital lobes and loss of flow of superior sagittal sinus from compression vs thrombosis. Patient seen by NeuroSx and at this time awaiting further imaging studies prior to operative plan. Denies any other complaints at this time. (12 Oct 2024 02:33)    Patient presenting today to Neuro IR for pre-op occipital mass embolization with Dr. Slater.     Allergies: No Known Allergies    PMH/PSH:  PAST MEDICAL & SURGICAL HISTORY:  High cholesterol  Hiatal hernia  Tardive dyskinesia  Liver failure  Seizures  ETOH abuse  S/P tonsillectomy  History of lumbar spinal fusion  6/2017      Social History:   Social History:    FAMILY HISTORY:  No pertinent family history in first degree relatives    Current Medications:   acetaminophen     Tablet .. 650 milliGRAM(s) Oral every 6 hours PRN  albuterol    90 MICROgram(s) HFA Inhaler 2 Puff(s) Inhalation every 6 hours PRN  aluminum hydroxide/magnesium hydroxide/simethicone Suspension 30 milliLiter(s) Oral every 4 hours PRN  amLODIPine   Tablet 10 milliGRAM(s) Oral daily  benztropine 2 milliGRAM(s) Oral two times a day  clonazePAM  Tablet 0.5 milliGRAM(s) Oral daily  divalproex  milliGRAM(s) Oral two times a day  enoxaparin Injectable 40 milliGRAM(s) SubCutaneous every 24 hours  melatonin 3 milliGRAM(s) Oral at bedtime PRN  mupirocin 2% Nasal 1 Application(s) Both Nostrils two times a day  ondansetron Injectable 4 milliGRAM(s) IV Push every 8 hours PRN  oxycodone    5 mG/acetaminophen 325 mG 1 Tablet(s) Oral every 4 hours PRN  pantoprazole    Tablet 40 milliGRAM(s) Oral before breakfast  QUEtiapine 125 milliGRAM(s) Oral at bedtime  sodium chloride 0.9% lock flush 3 milliLiter(s) IV Push every 8 hours      Physical Exam:  Constitutional: NAD, lying in bed  Neuro  * Mental Status:  GCS 15: Awake, alert, oriented to conversation. No aphasia or difficulty speaking. No dysarthria. Able to name objects and their function.  * Cranial Nerves: Cnii-Cnxii grossly intact. PERRL, EOMI, tongue midline, no gaze deviation  * Motor: RUE 5/5, LUE 5/5, RLE 5/5, LLE 5/5, no drift or dysmetria  * Sensory: Sensation intact to light touch  * Reflexes: not assessed   Cardiovascular: Regular rate and rhythm.  Eyes: See neurologic examination with detailed examination of eyes.  ENT: No JVD, Trachea Midline  Respiratory: non labored breathing   Gastrointestinal: Soft, nontender, nondistended.  Genitourinary: [ ] Deleon, [ x ] No Deleon.   Musculoskeletal: No muscle wasting noted, (See neurologic assessment for full muscle strength assessment) No pretibial edema appreciated, no appreciable calf tenderness.  Skin:  no wounds, no redness, no abrasions noted  Hematologic / Lymph / Immunologic: No bleeding from IV sites or wounds, No lymphadenopathy, No Hives or allergic type skin lesions    NIH SS:  DATE:  TIME:  1A: Level of consciousness (0-3): 0  1B: Questions (0-2): 0    1C: Commands (0-2): 0  2: Gaze (0-2): 0  3: Visual fields (0-3): 0  4: Facial palsy (0-3): 0  MOTOR:  5A: Left arm motor drift (0-4): 0  5B: Right arm motor drift (0-4): 0  6A: Left leg motor drift (0-4): 0  6B: Right leg motor drift (0-4): 0  7: Limb ataxia (0-2): 0  SENSORY:  8: Sensation (0-2): 0  SPEECH:  9: Language (0-3): 0  10: Dysarthria (0-2): 0  EXTINCTION:  11: Extinction/inattention (0-2): 0    TOTAL SCORE:     Labs:                         12.1   6.58  )-----------( 228      ( 15 Oct 2024 04:14 )             36.7     10-15    139  |  100  |  26.0[H]  ----------------------------<  88  4.3   |  28.0  |  0.84    Ca    8.4      15 Oct 2024 04:14  Phos  4.2     10-15  Mg     2.2     10-15    TPro  6.3[L]  /  Alb  4.0  /  TBili  0.3[L]  /  DBili  x   /  AST  14  /  ALT  7   /  AlkPhos  50  10-14    PT/INR - ( 14 Oct 2024 16:30 )   PT: 10.8 sec;   INR: 0.96 ratio    PTT - ( 14 Oct 2024 16:30 )  PTT:32.9 sec      Assessment/Plan:   This is a 59y  year old Male  presents with occipital mass. Patient presents to neuro-IR for selective cerebral angiography with embolization.     Procedure, goals, risks, benefits and alternatives  were discussed with patient and (patient's family).  All questions were answered.  Risks include but are not limited to stroke, vessel injury, hemorrhage, and or groin hematoma.  Patient demonstrates understanding  of all risks involved with this procedure and wishes to continue.   Appropriate  consent was obtained from patient and consent is in the patient's chart.

## 2024-10-15 NOTE — CONSULT NOTE ADULT - TIME BILLING
60 y/o male with occipital mass with bony destruction and abutting the cerebellum and b/l occipital lobes.  Now s/p cerebral angiogram, direct puncture of occipital tumor with [place], and arterial embolization with coils 10/15.  PMH of Schizoaffective disorder, tardive dyskinesia, seizures, HTN,  COPD not on 02, HTN.    Plan:  - Neuro Checks  - cont Depakote 500mg BID   - cont Quetiapine, Klonopin and Benztropine  - start steroids Decadron 4mg BID per NSGY; ISS and PPI  - pain control, avoid oversedation  - plan for OR 10/17  - maintain -160, cont Amlodipine   - maintain OSats> 92%, incentive spirometry, PRN Duonebs  - SCDs, SQL fo rVTE ppx    Time spent included review of relevant history, clinical examination, review of data and images, discussion of treatment with the multidisciplinary team and any consultants involved in this patient’s care.

## 2024-10-16 LAB
ANION GAP SERPL CALC-SCNC: 10 MMOL/L — SIGNIFICANT CHANGE UP (ref 5–17)
APTT BLD: 29.4 SEC — SIGNIFICANT CHANGE UP (ref 24.5–35.6)
BUN SERPL-MCNC: 17.5 MG/DL — SIGNIFICANT CHANGE UP (ref 8–20)
CALCIUM SERPL-MCNC: 8 MG/DL — LOW (ref 8.4–10.5)
CHLORIDE SERPL-SCNC: 104 MMOL/L — SIGNIFICANT CHANGE UP (ref 96–108)
CO2 SERPL-SCNC: 24 MMOL/L — SIGNIFICANT CHANGE UP (ref 22–29)
CREAT SERPL-MCNC: 0.78 MG/DL — SIGNIFICANT CHANGE UP (ref 0.5–1.3)
EGFR: 103 ML/MIN/1.73M2 — SIGNIFICANT CHANGE UP
GLUCOSE SERPL-MCNC: 125 MG/DL — HIGH (ref 70–99)
HCT VFR BLD CALC: 35.3 % — LOW (ref 39–50)
HGB BLD-MCNC: 11.3 G/DL — LOW (ref 13–17)
INR BLD: 1.03 RATIO — SIGNIFICANT CHANGE UP (ref 0.85–1.16)
MAGNESIUM SERPL-MCNC: 2.1 MG/DL — SIGNIFICANT CHANGE UP (ref 1.6–2.6)
MCHC RBC-ENTMCNC: 28.7 PG — SIGNIFICANT CHANGE UP (ref 27–34)
MCHC RBC-ENTMCNC: 32 GM/DL — SIGNIFICANT CHANGE UP (ref 32–36)
MCV RBC AUTO: 89.6 FL — SIGNIFICANT CHANGE UP (ref 80–100)
PHOSPHATE SERPL-MCNC: 3.4 MG/DL — SIGNIFICANT CHANGE UP (ref 2.4–4.7)
PLATELET # BLD AUTO: 223 K/UL — SIGNIFICANT CHANGE UP (ref 150–400)
POTASSIUM SERPL-MCNC: 4.6 MMOL/L — SIGNIFICANT CHANGE UP (ref 3.5–5.3)
POTASSIUM SERPL-SCNC: 4.6 MMOL/L — SIGNIFICANT CHANGE UP (ref 3.5–5.3)
PROTHROM AB SERPL-ACNC: 11.6 SEC — SIGNIFICANT CHANGE UP (ref 9.9–13.4)
RBC # BLD: 3.94 M/UL — LOW (ref 4.2–5.8)
RBC # FLD: 15.2 % — HIGH (ref 10.3–14.5)
SODIUM SERPL-SCNC: 138 MMOL/L — SIGNIFICANT CHANGE UP (ref 135–145)
WBC # BLD: 7.14 K/UL — SIGNIFICANT CHANGE UP (ref 3.8–10.5)
WBC # FLD AUTO: 7.14 K/UL — SIGNIFICANT CHANGE UP (ref 3.8–10.5)

## 2024-10-16 PROCEDURE — 99233 SBSQ HOSP IP/OBS HIGH 50: CPT | Mod: 57

## 2024-10-16 PROCEDURE — 70470 CT HEAD/BRAIN W/O & W/DYE: CPT | Mod: 26

## 2024-10-16 PROCEDURE — 99232 SBSQ HOSP IP/OBS MODERATE 35: CPT

## 2024-10-16 RX ORDER — AMLODIPINE BESYLATE 5 MG
1 TABLET ORAL
Refills: 0 | DISCHARGE

## 2024-10-16 RX ORDER — POVIDONE-IODINE 10 %
1 SOLUTION, NON-ORAL TOPICAL ONCE
Refills: 0 | Status: COMPLETED | OUTPATIENT
Start: 2024-10-16 | End: 2024-10-16

## 2024-10-16 RX ORDER — ACETAMINOPHEN 325 MG
1000 TABLET ORAL ONCE
Refills: 0 | Status: COMPLETED | OUTPATIENT
Start: 2024-10-16 | End: 2024-10-16

## 2024-10-16 RX ORDER — SENNOSIDES 8.6 MG
2 TABLET ORAL AT BEDTIME
Refills: 0 | Status: DISCONTINUED | OUTPATIENT
Start: 2024-10-16 | End: 2024-10-18

## 2024-10-16 RX ORDER — PANTOPRAZOLE SODIUM 40 MG/1
1 TABLET, DELAYED RELEASE ORAL
Refills: 0 | DISCHARGE

## 2024-10-16 RX ORDER — QUETIAPINE FUMARATE 50 MG/1
1 TABLET, FILM COATED ORAL
Refills: 0 | DISCHARGE

## 2024-10-16 RX ORDER — POVIDONE-IODINE 10 %
1 SOLUTION, NON-ORAL TOPICAL ONCE
Refills: 0 | Status: COMPLETED | OUTPATIENT
Start: 2024-10-17 | End: 2024-10-17

## 2024-10-16 RX ORDER — HYALURONIDASE (HUMAN RECOMBINANT) 150 [USP'U]/ML
15 INJECTION, SOLUTION SUBCUTANEOUS ONCE
Refills: 0 | Status: COMPLETED | OUTPATIENT
Start: 2024-10-16 | End: 2024-10-16

## 2024-10-16 RX ORDER — HYDRALAZINE HYDROCHLORIDE 100 MG/1
10 TABLET ORAL
Refills: 0 | Status: DISCONTINUED | OUTPATIENT
Start: 2024-10-16 | End: 2024-10-18

## 2024-10-16 RX ORDER — LABETALOL HYDROCHLORIDE 200 MG/1
10 TABLET, FILM COATED ORAL
Refills: 0 | Status: DISCONTINUED | OUTPATIENT
Start: 2024-10-16 | End: 2024-10-18

## 2024-10-16 RX ORDER — CHLORHEXIDINE GLUCONATE ORAL RINSE 1.2 MG/ML
1 SOLUTION DENTAL ONCE
Refills: 0 | Status: COMPLETED | OUTPATIENT
Start: 2024-10-16 | End: 2024-10-16

## 2024-10-16 RX ORDER — CHLORHEXIDINE GLUCONATE ORAL RINSE 1.2 MG/ML
1 SOLUTION DENTAL
Refills: 0 | Status: DISCONTINUED | OUTPATIENT
Start: 2024-10-16 | End: 2024-10-18

## 2024-10-16 RX ORDER — SODIUM CHLORIDE 0.9 % (FLUSH) 0.9 %
1000 SYRINGE (ML) INJECTION
Refills: 0 | Status: DISCONTINUED | OUTPATIENT
Start: 2024-10-17 | End: 2024-10-17

## 2024-10-16 RX ADMIN — Medication 500 MILLIGRAM(S): at 06:29

## 2024-10-16 RX ADMIN — Medication 400 MILLIGRAM(S): at 07:53

## 2024-10-16 RX ADMIN — OXYCODONE HYDROCHLORIDE 10 MILLIGRAM(S): 30 TABLET, FILM COATED, EXTENDED RELEASE ORAL at 11:09

## 2024-10-16 RX ADMIN — CHLORHEXIDINE GLUCONATE ORAL RINSE 1 APPLICATION(S): 1.2 SOLUTION DENTAL at 10:52

## 2024-10-16 RX ADMIN — OXYCODONE HYDROCHLORIDE 10 MILLIGRAM(S): 30 TABLET, FILM COATED, EXTENDED RELEASE ORAL at 03:43

## 2024-10-16 RX ADMIN — OXYCODONE HYDROCHLORIDE 10 MILLIGRAM(S): 30 TABLET, FILM COATED, EXTENDED RELEASE ORAL at 23:34

## 2024-10-16 RX ADMIN — Medication 30 MILLILITER(S): at 15:42

## 2024-10-16 RX ADMIN — Medication 2 TABLET(S): at 22:34

## 2024-10-16 RX ADMIN — OXYCODONE HYDROCHLORIDE 10 MILLIGRAM(S): 30 TABLET, FILM COATED, EXTENDED RELEASE ORAL at 16:55

## 2024-10-16 RX ADMIN — Medication 2 MILLIGRAM(S): at 17:49

## 2024-10-16 RX ADMIN — QUETIAPINE FUMARATE 125 MILLIGRAM(S): 50 TABLET, FILM COATED ORAL at 22:33

## 2024-10-16 RX ADMIN — Medication 4 MILLIGRAM(S): at 17:49

## 2024-10-16 RX ADMIN — CHLORHEXIDINE GLUCONATE ORAL RINSE 1 APPLICATION(S): 1.2 SOLUTION DENTAL at 06:30

## 2024-10-16 RX ADMIN — Medication 3 MILLIGRAM(S): at 22:34

## 2024-10-16 RX ADMIN — Medication 0.5 MILLIGRAM(S): at 12:41

## 2024-10-16 RX ADMIN — MUPIROCIN 1 APPLICATION(S): 20 OINTMENT TOPICAL at 06:26

## 2024-10-16 RX ADMIN — MUPIROCIN 1 APPLICATION(S): 20 OINTMENT TOPICAL at 17:50

## 2024-10-16 RX ADMIN — Medication 30 MILLILITER(S): at 04:14

## 2024-10-16 RX ADMIN — Medication 1000 MILLIGRAM(S): at 08:53

## 2024-10-16 RX ADMIN — OXYCODONE HYDROCHLORIDE 10 MILLIGRAM(S): 30 TABLET, FILM COATED, EXTENDED RELEASE ORAL at 22:34

## 2024-10-16 RX ADMIN — OXYCODONE HYDROCHLORIDE 10 MILLIGRAM(S): 30 TABLET, FILM COATED, EXTENDED RELEASE ORAL at 16:25

## 2024-10-16 RX ADMIN — OXYCODONE HYDROCHLORIDE 10 MILLIGRAM(S): 30 TABLET, FILM COATED, EXTENDED RELEASE ORAL at 04:43

## 2024-10-16 RX ADMIN — PANTOPRAZOLE SODIUM 40 MILLIGRAM(S): 40 TABLET, DELAYED RELEASE ORAL at 06:27

## 2024-10-16 RX ADMIN — Medication 75 MILLILITER(S): at 06:26

## 2024-10-16 RX ADMIN — Medication 2 MILLIGRAM(S): at 06:29

## 2024-10-16 RX ADMIN — Medication 17 GRAM(S): at 10:53

## 2024-10-16 RX ADMIN — Medication 500 MILLIGRAM(S): at 17:49

## 2024-10-16 RX ADMIN — OXYCODONE HYDROCHLORIDE 10 MILLIGRAM(S): 30 TABLET, FILM COATED, EXTENDED RELEASE ORAL at 10:09

## 2024-10-16 RX ADMIN — HYALURONIDASE (HUMAN RECOMBINANT) 15 UNIT(S): 150 INJECTION, SOLUTION SUBCUTANEOUS at 17:53

## 2024-10-16 RX ADMIN — Medication 4 MILLIGRAM(S): at 06:26

## 2024-10-16 RX ADMIN — Medication 10 MILLIGRAM(S): at 06:27

## 2024-10-16 NOTE — PROGRESS NOTE ADULT - ASSESSMENT
58 y/o male with occipital mass with bony destruction and abutting the cerebellum and b/l occipital lobes.  S/p cerebral angiogram, direct puncture of occipital tumor with [place], and arterial embolization with coils 10/15.  PMH of Schizoaffective disorder, tardive dyskinesia, seizures, HTN,  COPD not on 02, HTN.    Plan:  - Neuro Checks  - cont Depakote 500mg BID   - cont Quetiapine, Klonopin and Benztropine  - steroids Decadron 4mg BID per NSGY; cont ISS and PPI  - pain control, avoid oversedation  - plan for OR 10/17  - maintain -160, cont Amlodipine   - maintain OSats> 92%, incentive spirometry, PRN Duonebs  - diet as tolerated, BM regimen, PPI  - maintain -180, ISS  - SCDs for VTE ppx; hold SQL tonight for OR

## 2024-10-16 NOTE — PROGRESS NOTE ADULT - SUBJECTIVE AND OBJECTIVE BOX
HPI:  58 y/o male with hx of Schizoaffective disorder, tardive dyskinesia, COPD not on 02, HTN trans to [facility] from [facility] [place] for NeuroSx evaluation of large painful swelling to posterior Cervical area. Patient with a known lesion there being followed by Dr. Watson in [place] for which he had an MRI on 10/8 for evaluate it. He endorsed pain to that area and pain in his shoulders and B/L UE, no current visual changes, motor weakness or gait disturbance. He admits to occasional posterior HA. He was seen. Denies CP, SOB, CP, N/V, fever, or chills. No weight loss or night sweats. CT at [facility] [place] with occipital mass measuring 6.2x3.1x5.6 cm with bony destruction and abutting the cerebellum and b/l occipital lobes and loss of flow of superior sagittal sinus from compression vs thrombosis. Patient seen by NeuroSx and at this time awaiting further imaging studies prior to operative plan. Denies any other complaints at this time.    (12 Oct 2024 02:33)  Underwent angio embolization of occipital mass 10/15.    O/n events: none reported.      ICU Vital Signs Last 24 Hrs  T(C): 36.7 (16 Oct 2024 12:21), Max: 36.9 (16 Oct 2024 08:33)  T(F): 98.1 (16 Oct 2024 12:21), Max: 98.4 (16 Oct 2024 08:33)  HR: 92 (16 Oct 2024 14:00) (69 - 98)  BP: 129/73 (16 Oct 2024 14:00) (98/64 - 154/92)  BP(mean): 90 (16 Oct 2024 14:00) (70 - 112)  ABP: --  ABP(mean): --  RR: 17 (16 Oct 2024 14:00) (10 - 22)  SpO2: 91% (16 Oct 2024 14:00) (84% - 100%)    O2 Parameters below as of 16 Oct 2024 13:00  Patient On (Oxygen Delivery Method): room air          Exam:  AOx3, face symmetric, comprehension intact, speech clear, PERRLA, EOMI, BERG spont and strongly.  S1S2 present, no murmurs  CTAB  Abd soft, NT, ND  No peripheral swelling  R groin with no signs of active bleeding; peripheral pulse present.

## 2024-10-16 NOTE — PHARMACOTHERAPY INTERVENTION NOTE - COMMENTS
Outpatient Medication Review updated. Confirmed with patient at bedside.     HOME MEDICATIONS:  benztropine 2 mg oral tablet: 1 tab(s) orally 2 times a day (16 Oct 2024 12:06)  clonazePAM 0.5 mg oral tablet: 1 tab(s) orally once a day (16 Oct 2024 12:06)  divalproex sodium 500 mg oral delayed release tablet: 1 tab(s) orally 2 times a day (16 Oct 2024 12:06)  Norvasc 10 mg oral tablet: 1 tab(s) orally once a day (16 Oct 2024 12:06)  Protonix 40 mg oral delayed release tablet: 1 tab(s) orally once a day (16 Oct 2024 12:06)  QUEtiapine 100 mg oral tablet: 1 tab(s) orally once a day (at bedtime) in combination iwth 25 mg for 125 mg total (16 Oct 2024 12:15)  QUEtiapine 25 mg oral tablet: 1 tab(s) orally once a day (at bedtime) in combination with 100 mg for 125 mg total (16 Oct 2024 12:15)

## 2024-10-16 NOTE — PROGRESS NOTE ADULT - SUBJECTIVE AND OBJECTIVE BOX
HPI:  58 y/o male with hx of Schizoaffective disorder, tardive dyskinesia, COPD not on 02, [place]N [place] [facility] [place] [facility] [place] of large painful swelling to posterior Cervical area. Patient with a known lesion there being followed by Dr. Watson in [place] for which he had an MRI on 10/8 for evaluate it. He endorsed pain to that area and pain in his shoulders and B/L UE, no current visual changes, motor weakness or gait disturbance. He admits to occasional posterior HA. He was seen. Denies CP, SOB, CP, N/V, fever, or chills. No weight loss or night sweats. [place] [facility] [place]ipital mass measuring 6.2x3.1x5.6 cm with bony destruction and abutting the cerebellum and b/l occipital lobes and loss of flow of superior sagittal sinus from compression vs thrombosis. Patient seen by NeuroSx and at this time awaiting further imaging studies prior to operative plan. Denies any other complaints at this time.         (12 Oct 2024 02:33)    OVERNIGHT EVENTS:  Given oxycodone for posterior occipital pain. Otherwise neurologically stable, denies weakness of extremities.    Vital Signs Last 24 Hrs  T(C): 36.7 (15 Oct 2024 23:53), Max: 36.8 (15 Oct 2024 12:10)  T(F): 98 (15 Oct 2024 23:53), Max: 98.3 (15 Oct 2024 12:10)  HR: 75 (16 Oct 2024 01:00) (67 - 91)  BP: 111/56 (16 Oct 2024 01:00) (111/56 - 144/72)  BP(mean): 70 (16 Oct 2024 01:00) (70 - 110)  RR: 13 (16 Oct 2024 01:00) (10 - 22)  SpO2: 95% (16 Oct 2024 01:00) (84% - 100%)    Parameters below as of 16 Oct 2024 00:00  Patient On (Oxygen Delivery Method): nasal cannula  O2 Flow (L/min): 3      I&O's Summary    14 Oct 2024 07:01  -  15 Oct 2024 07:00  --------------------------------------------------------  IN: 210 mL / OUT: 0 mL / NET: 210 mL    15 Oct 2024 07:01  -  16 Oct 2024 01:23  --------------------------------------------------------  IN: 1200 mL / OUT: 1075 mL / NET: 125 mL        PHYSICAL EXAM:  General: NAD, pt is comfortably sitting up in bed, on room air  Cardiovascular: RRR, normal S1 and S2   Respiratory: lungs CTAB, no wheezing, rhonchi, or crackles   GI: normoactive BS to auscultation, abd soft, NTND   Neuro: AAOx3, FC, speech fluent and clear  CNII-CXII: PERRL 3mm briskly reactive, EOMI b/l, face symmetric, tongue midline  Motor: BERG x4 spontaneously, 5/5 strength in all extremities throughout b/l  Sensation intact to light touch throughout  Extremities: distal pulses 2+ x4 symmetric  Wound/incision: R groin and occiput puncture sites C/D/I    LABS:                        12.1   6.58  )-----------( 228      ( 15 Oct 2024 04:14 )             36.7     10-15    139  |  100  |  26.0[H]  ----------------------------<  88  4.3   |  28.0  |  0.84    Ca    8.4      15 Oct 2024 04:14  Phos  4.2     10-15  Mg     2.2     10-15    TPro  6.3[L]  /  Alb  4.0  /  TBili  0.3[L]  /  DBili  x   /  AST  14  /  ALT  7   /  AlkPhos  50  10-14    PT/INR - ( 14 Oct 2024 16:30 )   PT: 10.8 sec;   INR: 0.96 ratio         PTT - ( 14 Oct 2024 16:30 )  PTT:32.9 sec  Urinalysis Basic - ( 15 Oct 2024 04:14 )    Color: x / Appearance: x / SG: x / pH: x  Gluc: 88 mg/dL / Ketone: x  / Bili: x / Urobili: x   Blood: x / Protein: x / Nitrite: x   Leuk Esterase: x / RBC: x / WBC x   Sq Epi: x / Non Sq Epi: x / Bacteria: x          CAPILLARY BLOOD GLUCOSE          Drug Levels: [] N/A    CSF Analysis: [] N/A      Allergies    No Known Allergies    Intolerances      MEDICATIONS:  Antibiotics:    Neuro:  acetaminophen     Tablet .. 650 milliGRAM(s) Oral every 6 hours PRN  benztropine 2 milliGRAM(s) Oral two times a day  clonazePAM  Tablet 0.5 milliGRAM(s) Oral daily  divalproex  milliGRAM(s) Oral two times a day  melatonin 3 milliGRAM(s) Oral at bedtime PRN  ondansetron Injectable 4 milliGRAM(s) IV Push every 8 hours PRN  oxyCODONE    IR 5 milliGRAM(s) Oral every 6 hours PRN  oxyCODONE    IR 10 milliGRAM(s) Oral every 6 hours PRN  QUEtiapine 125 milliGRAM(s) Oral at bedtime    Anticoagulation:  enoxaparin Injectable 40 milliGRAM(s) SubCutaneous every 24 hours    OTHER:  albuterol    90 MICROgram(s) HFA Inhaler 2 Puff(s) Inhalation every 6 hours PRN  aluminum hydroxide/magnesium hydroxide/simethicone Suspension 30 milliLiter(s) Oral every 4 hours PRN  amLODIPine   Tablet 10 milliGRAM(s) Oral daily  dexAMETHasone     Tablet 4 milliGRAM(s) Oral two times a day  mupirocin 2% Nasal 1 Application(s) Both Nostrils two times a day  pantoprazole    Tablet 40 milliGRAM(s) Oral before breakfast    IVF:  sodium chloride 0.9%. 1000 milliLiter(s) IV Continuous <Continuous>    CULTURES:    RADIOLOGY & ADDITIONAL TESTS:      ASSESSMENT:   58 y/o male with hx of Schizoaffective disorder, tardive dyskinesia, COPD not on 02, HTN [place] [facility] [place] [facility] [place] of large painful swelling to posterior Cervical area, found to have occipital mass measuring 6.2x3.1x5.6 cm with bony destruction and abutting the cerebellum and b/l occipital lobes and loss of flow of superior sagittal sinus from compression vs thrombosis. Now POD1 s/p cerebral angiogram and direct puncture tumor embolization with addi (10/16/24)    PLAN:  -neuro checks q1h  -continue decadron 4mg bid  -obtain CTH w/wo with fiducials study  -preop for OR tomorrow  -SBP goal 100-160, continue home amlodipine  -incentive spirometer, on room air  -DASH diet, bowel regimen  -DVT ppx: SCDs, hold SQL tonight for OR  Final plan to be discussed with neurosurgeon in AM rounds HPI:  58 y/o male with hx of Schizoaffective disorder, tardive dyskinesia, COPD not on 02, HTN trans to [facility] from [facility] [place] for NeuroSx evaluation of large painful swelling to posterior Cervical area. Patient with a known lesion there being followed by Dr. Watson in [place] for which he had an MRI on 10/8 for evaluate it. He endorsed pain to that area and pain in his shoulders and B/L UE, no current visual changes, motor weakness or gait disturbance. He admits to occasional posterior HA. He was seen. Denies CP, SOB, CP, N/V, fever, or chills. No weight loss or night sweats. CT at [facility] [place] with occipital mass measuring 6.2x3.1x5.6 cm with bony destruction and abutting the cerebellum and b/l occipital lobes and loss of flow of superior sagittal sinus from compression vs thrombosis. Patient seen by NeuroSx and at this time awaiting further imaging studies prior to operative plan. Denies any other complaints at this time.         (12 Oct 2024 02:33)    OVERNIGHT EVENTS:  Given oxycodone for posterior occipital pain. Otherwise neurologically stable, denies weakness of extremities.    Vital Signs Last 24 Hrs  T(C): 36.7 (15 Oct 2024 23:53), Max: 36.8 (15 Oct 2024 12:10)  T(F): 98 (15 Oct 2024 23:53), Max: 98.3 (15 Oct 2024 12:10)  HR: 75 (16 Oct 2024 01:00) (67 - 91)  BP: 111/56 (16 Oct 2024 01:00) (111/56 - 144/72)  BP(mean): 70 (16 Oct 2024 01:00) (70 - 110)  RR: 13 (16 Oct 2024 01:00) (10 - 22)  SpO2: 95% (16 Oct 2024 01:00) (84% - 100%)    Parameters below as of 16 Oct 2024 00:00  Patient On (Oxygen Delivery Method): nasal cannula  O2 Flow (L/min): 3      I&O's Summary    14 Oct 2024 07:01  -  15 Oct 2024 07:00  --------------------------------------------------------  IN: 210 mL / OUT: 0 mL / NET: 210 mL    15 Oct 2024 07:01  -  16 Oct 2024 01:23  --------------------------------------------------------  IN: 1200 mL / OUT: 1075 mL / NET: 125 mL        PHYSICAL EXAM:  General: NAD, pt is comfortably sitting up in bed, on room air  Cardiovascular: RRR, normal S1 and S2   Respiratory: lungs CTAB, no wheezing, rhonchi, or crackles   GI: normoactive BS to auscultation, abd soft, NTND   Neuro: AAOx3, FC, speech fluent and clear  CNII-CXII: PERRL 3mm briskly reactive, EOMI b/l, face symmetric, tongue midline  Motor: BERG x4 spontaneously, 5/5 strength in all extremities throughout b/l  Sensation intact to light touch throughout  Extremities: distal pulses 2+ x4 symmetric  Wound/incision: R groin and occiput puncture sites C/D/I    LABS:                        12.1   6.58  )-----------( 228      ( 15 Oct 2024 04:14 )             36.7     10-15    139  |  100  |  26.0[H]  ----------------------------<  88  4.3   |  28.0  |  0.84    Ca    8.4      15 Oct 2024 04:14  Phos  4.2     10-15  Mg     2.2     10-15    TPro  6.3[L]  /  Alb  4.0  /  TBili  0.3[L]  /  DBili  x   /  AST  14  /  ALT  7   /  AlkPhos  50  10-14    PT/INR - ( 14 Oct 2024 16:30 )   PT: 10.8 sec;   INR: 0.96 ratio         PTT - ( 14 Oct 2024 16:30 )  PTT:32.9 sec  Urinalysis Basic - ( 15 Oct 2024 04:14 )    Color: x / Appearance: x / SG: x / pH: x  Gluc: 88 mg/dL / Ketone: x  / Bili: x / Urobili: x   Blood: x / Protein: x / Nitrite: x   Leuk Esterase: x / RBC: x / WBC x   Sq Epi: x / Non Sq Epi: x / Bacteria: x          CAPILLARY BLOOD GLUCOSE          Drug Levels: [] N/A    CSF Analysis: [] N/A      Allergies    No Known Allergies    Intolerances      MEDICATIONS:  Antibiotics:    Neuro:  acetaminophen     Tablet .. 650 milliGRAM(s) Oral every 6 hours PRN  benztropine 2 milliGRAM(s) Oral two times a day  clonazePAM  Tablet 0.5 milliGRAM(s) Oral daily  divalproex  milliGRAM(s) Oral two times a day  melatonin 3 milliGRAM(s) Oral at bedtime PRN  ondansetron Injectable 4 milliGRAM(s) IV Push every 8 hours PRN  oxyCODONE    IR 5 milliGRAM(s) Oral every 6 hours PRN  oxyCODONE    IR 10 milliGRAM(s) Oral every 6 hours PRN  QUEtiapine 125 milliGRAM(s) Oral at bedtime    Anticoagulation:  enoxaparin Injectable 40 milliGRAM(s) SubCutaneous every 24 hours    OTHER:  albuterol    90 MICROgram(s) HFA Inhaler 2 Puff(s) Inhalation every 6 hours PRN  aluminum hydroxide/magnesium hydroxide/simethicone Suspension 30 milliLiter(s) Oral every 4 hours PRN  amLODIPine   Tablet 10 milliGRAM(s) Oral daily  dexAMETHasone     Tablet 4 milliGRAM(s) Oral two times a day  mupirocin 2% Nasal 1 Application(s) Both Nostrils two times a day  pantoprazole    Tablet 40 milliGRAM(s) Oral before breakfast    IVF:  sodium chloride 0.9%. 1000 milliLiter(s) IV Continuous <Continuous>    CULTURES:    RADIOLOGY & ADDITIONAL TESTS:      ASSESSMENT:   58 y/o male with hx of Schizoaffective disorder, tardive dyskinesia, COPD not on 02, HTN trans to [facility] from [facility] [place] for NeuroSx evaluation of large painful swelling to posterior Cervical area, found to have occipital mass measuring 6.2x3.1x5.6 cm with bony destruction and abutting the cerebellum and b/l occipital lobes and loss of flow of superior sagittal sinus from compression vs thrombosis. Now POD1 s/p cerebral angiogram and direct puncture tumor embolization with addi (10/16/24)    PLAN:  -neuro checks q1h  -continue decadron 4mg bid  -continue home depakote 500mg bid  -obtain CTH w/wo with fiducials study  -preop for OR tomorrow  -SBP goal 100-160, continue home amlodipine  -incentive spirometer, on room air  -DASH diet, bowel regimen  -DVT ppx: SCDs, hold SQL tonight for OR  Final plan to be discussed with neurosurgeon in AM rounds none

## 2024-10-17 ENCOUNTER — APPOINTMENT (OUTPATIENT)
Dept: NEUROSURGERY | Facility: HOSPITAL | Age: 59
End: 2024-10-17

## 2024-10-17 ENCOUNTER — TRANSCRIPTION ENCOUNTER (OUTPATIENT)
Age: 59
End: 2024-10-17

## 2024-10-17 LAB
ANION GAP SERPL CALC-SCNC: 8 MMOL/L — SIGNIFICANT CHANGE UP (ref 5–17)
BUN SERPL-MCNC: 16.8 MG/DL — SIGNIFICANT CHANGE UP (ref 8–20)
CALCIUM SERPL-MCNC: 7.7 MG/DL — LOW (ref 8.4–10.5)
CHLORIDE SERPL-SCNC: 108 MMOL/L — SIGNIFICANT CHANGE UP (ref 96–108)
CO2 SERPL-SCNC: 27 MMOL/L — SIGNIFICANT CHANGE UP (ref 22–29)
CREAT SERPL-MCNC: 0.67 MG/DL — SIGNIFICANT CHANGE UP (ref 0.5–1.3)
EGFR: 108 ML/MIN/1.73M2 — SIGNIFICANT CHANGE UP
GAS PNL BLDA: SIGNIFICANT CHANGE UP
GLUCOSE SERPL-MCNC: 121 MG/DL — HIGH (ref 70–99)
HCT VFR BLD CALC: 31.6 % — LOW (ref 39–50)
HGB BLD-MCNC: 10.2 G/DL — LOW (ref 13–17)
MAGNESIUM SERPL-MCNC: 2.4 MG/DL — SIGNIFICANT CHANGE UP (ref 1.6–2.6)
MCHC RBC-ENTMCNC: 28.9 PG — SIGNIFICANT CHANGE UP (ref 27–34)
MCHC RBC-ENTMCNC: 32.3 GM/DL — SIGNIFICANT CHANGE UP (ref 32–36)
MCV RBC AUTO: 89.5 FL — SIGNIFICANT CHANGE UP (ref 80–100)
PHOSPHATE SERPL-MCNC: 3.1 MG/DL — SIGNIFICANT CHANGE UP (ref 2.4–4.7)
PLATELET # BLD AUTO: 207 K/UL — SIGNIFICANT CHANGE UP (ref 150–400)
POTASSIUM SERPL-MCNC: 4.1 MMOL/L — SIGNIFICANT CHANGE UP (ref 3.5–5.3)
POTASSIUM SERPL-SCNC: 4.1 MMOL/L — SIGNIFICANT CHANGE UP (ref 3.5–5.3)
RBC # BLD: 3.53 M/UL — LOW (ref 4.2–5.8)
RBC # FLD: 15.7 % — HIGH (ref 10.3–14.5)
SODIUM SERPL-SCNC: 143 MMOL/L — SIGNIFICANT CHANGE UP (ref 135–145)
WBC # BLD: 10.25 K/UL — SIGNIFICANT CHANGE UP (ref 3.8–10.5)
WBC # FLD AUTO: 10.25 K/UL — SIGNIFICANT CHANGE UP (ref 3.8–10.5)

## 2024-10-17 PROCEDURE — 62141 CRNOP SKULL DEFECT>5 CM DIAM: CPT | Mod: 80

## 2024-10-17 PROCEDURE — 61782 SCAN PROC CRANIAL EXTRA: CPT | Mod: 80

## 2024-10-17 PROCEDURE — 88341 IMHCHEM/IMCYTCHM EA ADD ANTB: CPT | Mod: 26

## 2024-10-17 PROCEDURE — 70450 CT HEAD/BRAIN W/O DYE: CPT | Mod: 26

## 2024-10-17 PROCEDURE — 61782 SCAN PROC CRANIAL EXTRA: CPT

## 2024-10-17 PROCEDURE — 88364 INSITU HYBRIDIZATION (FISH): CPT | Mod: 26

## 2024-10-17 PROCEDURE — 62141 CRNOP SKULL DEFECT>5 CM DIAM: CPT

## 2024-10-17 PROCEDURE — 88307 TISSUE EXAM BY PATHOLOGIST: CPT | Mod: 26

## 2024-10-17 PROCEDURE — 61500 CRNEC EXC TUM/BONE LES SKULL: CPT | Mod: 80

## 2024-10-17 PROCEDURE — 61500 CRNEC EXC TUM/BONE LES SKULL: CPT

## 2024-10-17 PROCEDURE — 88365 INSITU HYBRIDIZATION (FISH): CPT | Mod: 26

## 2024-10-17 PROCEDURE — 88342 IMHCHEM/IMCYTCHM 1ST ANTB: CPT | Mod: 26

## 2024-10-17 PROCEDURE — 99233 SBSQ HOSP IP/OBS HIGH 50: CPT | Mod: 57

## 2024-10-17 PROCEDURE — 99232 SBSQ HOSP IP/OBS MODERATE 35: CPT

## 2024-10-17 DEVICE — GRAFT DURAL MATRX 1 X 3IN DURGN: Type: IMPLANTABLE DEVICE | Status: FUNCTIONAL

## 2024-10-17 DEVICE — LIGATING CLIPS WECK HORIZON SMALL-WIDE (RED) 24: Type: IMPLANTABLE DEVICE | Status: FUNCTIONAL

## 2024-10-17 DEVICE — SURGICEL 2 X 14": Type: IMPLANTABLE DEVICE | Status: FUNCTIONAL

## 2024-10-17 DEVICE — SCREW UN3 AXS SELF DRILL 1.5X4MM: Type: IMPLANTABLE DEVICE | Status: FUNCTIONAL

## 2024-10-17 DEVICE — KIT CATH RADIAL ARTERY: Type: IMPLANTABLE DEVICE | Status: FUNCTIONAL

## 2024-10-17 DEVICE — TISSEEL 4ML: Type: IMPLANTABLE DEVICE | Status: FUNCTIONAL

## 2024-10-17 DEVICE — TACHOSIL 9.5 X 4.8CM: Type: IMPLANTABLE DEVICE | Status: FUNCTIONAL

## 2024-10-17 DEVICE — KIT A-LINE 1LUM 20G X 12CM SAFE KIT: Type: IMPLANTABLE DEVICE | Status: FUNCTIONAL

## 2024-10-17 DEVICE — SURGIFOAM PAD 8CM X 12.5CM X 10MM (100): Type: IMPLANTABLE DEVICE | Status: FUNCTIONAL

## 2024-10-17 DEVICE — MAYFIELD SKULL PIN ADULT PLASTIC: Type: IMPLANTABLE DEVICE | Status: FUNCTIONAL

## 2024-10-17 DEVICE — FLOSEAL WITH RECOTHROM THROMBIN 10ML: Type: IMPLANTABLE DEVICE | Status: FUNCTIONAL

## 2024-10-17 DEVICE — MESH DYNAMIC 1.7MM 90X90X.6MM: Type: IMPLANTABLE DEVICE | Status: FUNCTIONAL

## 2024-10-17 DEVICE — LIGATING CLIPS WECK HORIZON MEDIUM (BLUE) 24: Type: IMPLANTABLE DEVICE | Status: FUNCTIONAL

## 2024-10-17 RX ORDER — CEFAZOLIN SODIUM 1 G
2000 VIAL (EA) INJECTION ONCE
Refills: 0 | Status: DISCONTINUED | OUTPATIENT
Start: 2024-10-17 | End: 2024-10-17

## 2024-10-17 RX ORDER — HYDROMORPHONE HYDROCHLORIDE 1 MG/ML
30 INJECTION, SOLUTION INTRAMUSCULAR; INTRAVENOUS; SUBCUTANEOUS
Refills: 0 | Status: DISCONTINUED | OUTPATIENT
Start: 2024-10-17 | End: 2024-10-18

## 2024-10-17 RX ORDER — ACETAMINOPHEN 325 MG
650 TABLET ORAL EVERY 6 HOURS
Refills: 0 | Status: DISCONTINUED | OUTPATIENT
Start: 2024-10-17 | End: 2024-10-17

## 2024-10-17 RX ORDER — ONDANSETRON HCL/PF 4 MG/2 ML
4 VIAL (ML) INJECTION EVERY 6 HOURS
Refills: 0 | Status: DISCONTINUED | OUTPATIENT
Start: 2024-10-17 | End: 2024-10-18

## 2024-10-17 RX ORDER — CEFAZOLIN SODIUM 1 G
2000 VIAL (EA) INJECTION EVERY 8 HOURS
Refills: 0 | Status: DISCONTINUED | OUTPATIENT
Start: 2024-10-17 | End: 2024-10-17

## 2024-10-17 RX ORDER — OXYCODONE HYDROCHLORIDE 30 MG/1
5 TABLET, FILM COATED, EXTENDED RELEASE ORAL EVERY 4 HOURS
Refills: 0 | Status: DISCONTINUED | OUTPATIENT
Start: 2024-10-17 | End: 2024-10-17

## 2024-10-17 RX ORDER — SODIUM CHLORIDE 0.9 % (FLUSH) 0.9 %
1000 SYRINGE (ML) INJECTION
Refills: 0 | Status: DISCONTINUED | OUTPATIENT
Start: 2024-10-17 | End: 2024-10-18

## 2024-10-17 RX ORDER — CEFAZOLIN SODIUM 1 G
2000 VIAL (EA) INJECTION EVERY 8 HOURS
Refills: 0 | Status: DISCONTINUED | OUTPATIENT
Start: 2024-10-17 | End: 2024-10-18

## 2024-10-17 RX ORDER — NALOXONE HYDROCHLORIDE 0.4 MG/ML
0.1 INJECTION, SOLUTION INTRAMUSCULAR; INTRAVENOUS; SUBCUTANEOUS
Refills: 0 | Status: DISCONTINUED | OUTPATIENT
Start: 2024-10-17 | End: 2024-10-18

## 2024-10-17 RX ADMIN — Medication 1 APPLICATION(S): at 07:09

## 2024-10-17 RX ADMIN — Medication 2 MILLIGRAM(S): at 17:20

## 2024-10-17 RX ADMIN — Medication 75 MILLILITER(S): at 05:15

## 2024-10-17 RX ADMIN — OXYCODONE HYDROCHLORIDE 10 MILLIGRAM(S): 30 TABLET, FILM COATED, EXTENDED RELEASE ORAL at 05:25

## 2024-10-17 RX ADMIN — Medication 500 MILLIGRAM(S): at 17:20

## 2024-10-17 RX ADMIN — OXYCODONE HYDROCHLORIDE 10 MILLIGRAM(S): 30 TABLET, FILM COATED, EXTENDED RELEASE ORAL at 06:00

## 2024-10-17 RX ADMIN — Medication 10 MILLIGRAM(S): at 05:16

## 2024-10-17 RX ADMIN — Medication 4 MILLIGRAM(S): at 05:17

## 2024-10-17 RX ADMIN — MUPIROCIN 1 APPLICATION(S): 20 OINTMENT TOPICAL at 05:18

## 2024-10-17 RX ADMIN — QUETIAPINE FUMARATE 125 MILLIGRAM(S): 50 TABLET, FILM COATED ORAL at 21:09

## 2024-10-17 RX ADMIN — HYDROMORPHONE HYDROCHLORIDE 30 MILLILITER(S): 1 INJECTION, SOLUTION INTRAMUSCULAR; INTRAVENOUS; SUBCUTANEOUS at 15:28

## 2024-10-17 RX ADMIN — Medication 2 TABLET(S): at 21:11

## 2024-10-17 RX ADMIN — CHLORHEXIDINE GLUCONATE ORAL RINSE 1 APPLICATION(S): 1.2 SOLUTION DENTAL at 05:17

## 2024-10-17 RX ADMIN — Medication 125 MILLILITER(S): at 15:26

## 2024-10-17 RX ADMIN — MUPIROCIN 1 APPLICATION(S): 20 OINTMENT TOPICAL at 17:20

## 2024-10-17 RX ADMIN — Medication 2 MILLIGRAM(S): at 05:17

## 2024-10-17 RX ADMIN — Medication 17 GRAM(S): at 17:20

## 2024-10-17 RX ADMIN — PANTOPRAZOLE SODIUM 40 MILLIGRAM(S): 40 TABLET, DELAYED RELEASE ORAL at 05:16

## 2024-10-17 RX ADMIN — Medication 500 MILLIGRAM(S): at 05:17

## 2024-10-17 RX ADMIN — Medication 2000 MILLIGRAM(S): at 17:31

## 2024-10-17 NOTE — PROGRESS NOTE ADULT - SUBJECTIVE AND OBJECTIVE BOX
HPI:  60 y/o male with hx of Schizoaffective disorder, tardive dyskinesia, COPD not on 02, HTN trans [place] [facility] [place] [facility] [place] of large painful swelling to posterior Cervical area. Patient with a known lesion there being followed by Dr. Watson in [place] for which he had an MRI on 10/8 for evaluate it. He endorsed pain to that area and pain in his shoulders and B/L UE, no current visual changes, motor weakness or gait disturbance. He admits to occasional posterior HA. He was seen. Denies CP, SOB, CP, N/V, fever, or chills. No weight loss or night sweats. [place] [facility] [place]ipital [place] measuring 6.2x3.1x5.6 cm with bony destruction and abutting the cerebellum and b/l occipital lobes and loss of flow of superior sagittal sinus from compression vs thrombosis. Patient seen by NeuroSx and at this time awaiting further imaging studies prior to operative plan. Denies any other complaints at this time.     OVERNIGHT EVENTS:  No overnight events    ICU Vital Signs Last 24 Hrs  T(C): 36.8 (16 Oct 2024 21:51), Max: 36.9 (16 Oct 2024 08:33)  T(F): 98.3 (16 Oct 2024 21:51), Max: 98.4 (16 Oct 2024 08:33)  HR: 70 (17 Oct 2024 01:00) (70 - 99)  BP: 123/72 (17 Oct 2024 01:00) (98/64 - 157/88)  BP(mean): 87 (17 Oct 2024 01:00) (54 - 112)  ABP: --  ABP(mean): --  RR: 12 (17 Oct 2024 01:00) (11 - 23)  SpO2: 96% (17 Oct 2024 01:00) (90% - 98%)    O2 Parameters below as of 17 Oct 2024 00:00  Patient On (Oxygen Delivery Method): nasal cannula  O2 Flow (L/min): 2    I&O's Summary    15 Oct 2024 07:01  -  16 Oct 2024 07:00  --------------------------------------------------------  IN: 1750 mL / OUT: 1475 mL / NET: 275 mL    16 Oct 2024 07:01  -  17 Oct 2024 01:44  --------------------------------------------------------  IN: 1810 mL / OUT: 550 mL / NET: 1260 mL      PHYSICAL EXAM:  General: NAD, pt is comfortably sitting up in bed, on room air  Cardiovascular: RRR, normal S1 and S2   Respiratory: lungs CTAB, no wheezing, rhonchi, or crackles   GI: normoactive BS to auscultation, abd soft, NTND   Neuro: AAOx3, FC, speech fluent and clear  CNII-CXII: PERRL 3mm briskly reactive, EOMI b/l, face symmetric, tongue midline  Motor: BERG x4 spontaneously, 5/5 strength in all extremities throughout b/l  Sensation intact to light touch throughout  Extremities: distal pulses 2+ x4 symmetric  Wound/incision: R groin and occiput puncture sites C/D/I    LABS:                          11.3   7.14  )-----------( 223      ( 16 Oct 2024 03:35 )             35.3   10-16    138  |  104  |  17.5  ----------------------------<  125[H]  4.6   |  24.0  |  0.78    Ca    8.0[L]      16 Oct 2024 03:35  Phos  3.4     10-16  Mg     2.1     10-16    PT/INR - ( 16 Oct 2024 12:20 )   PT: 11.6 sec;   INR: 1.03 ratio         PTT - ( 16 Oct 2024 12:20 )  PTT:29.4 sec             Urinalysis Basic - ( 15 Oct 2024 04:14 )    Color: x / Appearance: x / SG: x / pH: x  Gluc: 88 mg/dL / Ketone: x  / Bili: x / Urobili: x   Blood: x / Protein: x / Nitrite: x   Leuk Esterase: x / RBC: x / WBC x   Sq Epi: x / Non Sq Epi: x / Bacteria: x    Allergies    No Known Allergies    Home Medications:  benztropine 2 mg oral tablet: 1 tab(s) orally 2 times a day (16 Oct 2024 12:06)  clonazePAM 0.5 mg oral tablet: 1 tab(s) orally once a day (16 Oct 2024 12:06)  divalproex sodium 500 mg oral delayed release tablet: 1 tab(s) orally 2 times a day (16 Oct 2024 12:06)  Norvasc 10 mg oral tablet: 1 tab(s) orally once a day (16 Oct 2024 12:06)  Protonix 40 mg oral delayed release tablet: 1 tab(s) orally once a day (16 Oct 2024 12:06)  QUEtiapine 100 mg oral tablet: 1 tab(s) orally once a day (at bedtime) in combination iwth 25 mg for 125 mg total (16 Oct 2024 12:15)  QUEtiapine 25 mg oral tablet: 1 tab(s) orally once a day (at bedtime) in combination with 100 mg for 125 mg total (16 Oct 2024 12:15)    MEDICATIONS  (STANDING):  amLODIPine   Tablet 10 milliGRAM(s) Oral daily  benztropine 2 milliGRAM(s) Oral two times a day  chlorhexidine 2% Cloths 1 Application(s) Topical <User Schedule>  clonazePAM  Tablet 0.5 milliGRAM(s) Oral daily  dexAMETHasone     Tablet 4 milliGRAM(s) Oral two times a day  divalproex  milliGRAM(s) Oral two times a day  mupirocin 2% Nasal 1 Application(s) Both Nostrils two times a day  pantoprazole    Tablet 40 milliGRAM(s) Oral before breakfast  povidone iodine 10% Nasal Swab 1 Application(s) Both Nostrils once  povidone iodine 10% Nasal Swab 1 Application(s) Both Nostrils once  QUEtiapine 125 milliGRAM(s) Oral at bedtime  senna 2 Tablet(s) Oral at bedtime  sodium chloride 0.9%. 1000 milliLiter(s) (75 mL/Hr) IV Continuous <Continuous>    MEDICATIONS  (PRN):  acetaminophen     Tablet .. 650 milliGRAM(s) Oral every 6 hours PRN Temp greater or equal to 38C (100.4F), Mild Pain (1 - 3)  albuterol    90 MICROgram(s) HFA Inhaler 2 Puff(s) Inhalation every 6 hours PRN Shortness of Breath and/or Wheezing  aluminum hydroxide/magnesium hydroxide/simethicone Suspension 30 milliLiter(s) Oral every 4 hours PRN Dyspepsia  hydrALAZINE Injectable 10 milliGRAM(s) IV Push every 2 hours PRN SBP>160  labetalol Injectable 10 milliGRAM(s) IV Push every 2 hours PRN SBP>160  melatonin 3 milliGRAM(s) Oral at bedtime PRN Insomnia  ondansetron Injectable 4 milliGRAM(s) IV Push every 8 hours PRN Nausea and/or Vomiting  oxyCODONE    IR 10 milliGRAM(s) Oral every 6 hours PRN Severe Pain (7 - 10)  oxyCODONE    IR 5 milliGRAM(s) Oral every 6 hours PRN Moderate Pain (4 - 6)      RADIOLOGY & ADDITIONAL TESTS:    < from: MR Venogram Head w/ IV Cont (10.12.24 @ 08:30) >  IMPRESSION:    MR brain: 5.8 x 6.1 x 4.0 cm enhancing extra-axial neoplasm in the   Central occipital region and posterior fossa abutting the dura and   compressing the torcula and BILATERAL distal transverse sinuses, likely a   large meningioma.    MRV brain:  The RIGHT transverse sinus is patent but narrowed distally   secondary to the mass. The LEFT transverse sinus is occluded distally by   the mass. The torcula is partially occluded.    --- End of Report ---            TAMMIE IZAGUIRRE MD; Attending Radiologist  This document has been electronically signed. Oct 12 2024 10:15AM    < end of copied text >

## 2024-10-17 NOTE — PROGRESS NOTE ADULT - NS ATTEND AMEND GEN_ALL_CORE FT
I agree with the above. I personally examined and saw the patient. Neurointact. OR planning this week with preop embolization.
I agree with the above. I personally examined and saw the patient. Neurointact. OR planning this week.
I agree with the above. I personally examined and saw the patient. Neurointact. OR today. We reviewed the details of surgery, risks, benefits, and alternatives at length yesterday with him and his sister Mireya via phone. All questions answered.
I agree with the above. I personally examined and saw the patient. Neurointact. Occipital lesion with likely invasion of sinuses. Discussed treatment options with  Maurisio, he is interested in surgical resection. OR planning this week.

## 2024-10-17 NOTE — BRIEF OPERATIVE NOTE - NSICDXBRIEFPREOP_GEN_ALL_CORE_FT
PRE-OP DIAGNOSIS:  Cerebellar lesion 17-Oct-2024 14:23:39  Ghislaine Mckeon   PRE-OP DIAGNOSIS:  Occipital mass 17-Oct-2024 15:43:03  Ghislaine Mckeon

## 2024-10-17 NOTE — BRIEF OPERATIVE NOTE - NSICDXBRIEFPROCEDURE_GEN_ALL_CORE_FT
PROCEDURES:  Suboccipital craniectomy with biopsy of neoplasm, surgical removal, or both 17-Oct-2024 14:22:51  Ghislaine Mckeon

## 2024-10-17 NOTE — PROGRESS NOTE ADULT - SUBJECTIVE AND OBJECTIVE BOX
POST-OPERATIVE NOTE    Procedure: Suboccipital craniectomy for brain tumor resection with titanium mesh cranioplasty    Diagnosis/Indication: Occipital brain mass    Surgeon: Dr. Burnett    INTERVAL HPI/ACUTE EVENTS:  59M PMH Schizoaffective disorder, tardive dyskinesia, COPD not on 02, HTN admitted with occipital brain mass now s/p suboccipital craniectomy for brain tumor resection with cranioplasty, POD#0. Patient seen lying comfortably in bed. Denies headache, vision changes, weakness, numbness/tingling, or n/v. Pain controlled with medication.    VITALS:  T(C): 36.5 (10-17-24 @ 14:45), Max: 36.8 (10-16-24 @ 16:33)  HR: 95 (10-17-24 @ 15:15) (58 - 103)  BP: 135/80 (10-17-24 @ 15:15) (109/70 - 156/97)  RR: 16 (10-17-24 @ 15:15) (11 - 23)  SpO2: 93% (10-17-24 @ 15:15) (90% - 99%)  Wt(kg): --    PHYSICAL EXAM:  GENERAL: NAD, well-groomed, well-developed  HEAD:  S/p suboccipital crani. Dressing clean, dry, intact. Dried blood noted, not fully saturated.  DRAINS: 1 subgaleal ANETA drain to gravity. Serosanguinous drainage noted.  WOUND: Dressing clean dry intact  KARYN COMA SCORE: E-4 V-5 M-6 = 15  MENTAL STATUS: AAO x3; Awake; Opens eyes spontaneously; Appropriately conversant without aphasia; following commands  CRANIAL NERVES: Visual fields full to confrontation, PERRL. EOMI without nystagmus. Facial sensation intact V1-3 distribution b/l. Face symmetric w/ normal eye closure and smile, tongue midline. Hearing grossly intact. Speech clear.  MOTOR: strength 5/5 b/l upper and lower extremities  SENSATION: grossly intact to light touch all extremities  COORDINATION: No upper extremity dysmetria  CHEST/LUNG: Nonlabored breathing  SKIN: Warm, dry    LABS:                        10.2   10.25 )-----------( 207      ( 17 Oct 2024 04:30 )             31.6     10-17    143  |  108  |  16.8  ----------------------------<  121[H]  4.1   |  27.0  |  0.67    Ca    7.7[L]      17 Oct 2024 04:30  Phos  3.1     10-17  Mg     2.4     10-17    RADIOLOGY/OTHER:  < from: MR Brain Stereotactic w/wo IV Cont (10.12.24 @ 08:29) >  IMPRESSION:    MR brain: 5.8 x 6.1 x 4.0 cm enhancing extra-axial neoplasm in the   Central occipital region and posterior fossa abutting the dura and   compressing the torcula and BILATERAL distal transverse sinuses, likely a   large meningioma.    MRV brain:  The RIGHT transverse sinus is patent but narrowed distally   secondary to the mass. The LEFT transverse sinus is occluded distally by   the mass. The torcula is partially occluded. POST-OPERATIVE NOTE    Procedure: Suboccipital craniectomy for brain tumor resection with titanium mesh cranioplasty    Diagnosis/Indication: Occipital brain mass    Surgeon: Dr. Burnett    INTERVAL HPI/ACUTE EVENTS:  59M PMH Schizoaffective disorder, tardive dyskinesia, COPD not on 02, HTN admitted with occipital brain mass now s/p suboccipital craniectomy for brain tumor resection with cranioplasty, POD#0. Patient seen lying comfortably in bed. Patient c/o mild posterior headache. Denies vision changes, weakness, numbness/tingling, or n/v. Pain controlled with PCA.    VITALS:  T(C): 36.5 (10-17-24 @ 14:45), Max: 36.8 (10-16-24 @ 16:33)  HR: 95 (10-17-24 @ 15:15) (58 - 103)  BP: 135/80 (10-17-24 @ 15:15) (109/70 - 156/97)  RR: 16 (10-17-24 @ 15:15) (11 - 23)  SpO2: 93% (10-17-24 @ 15:15) (90% - 99%)  Wt(kg): --    PHYSICAL EXAM:  GENERAL: NAD, well-groomed, well-developed  HEAD:  S/p suboccipital crani. Dressing clean, dry, intact. Dried blood noted, not fully saturated.  DRAINS: 1 subgaleal ANETA drain to gravity. Serosanguinous drainage noted.  WOUND: Dressing clean dry intact  KARYN COMA SCORE: E-4 V-5 M-6 = 15  MENTAL STATUS: AAO x3; Awake; Opens eyes spontaneously; Appropriately conversant without aphasia; following commands  CRANIAL NERVES: PERRL. EOMI without nystagmus. Facial sensation intact V1-3 distribution b/l. Face symmetric w/ normal eye closure and smile, tongue midline. Hearing grossly intact. Speech clear.  MOTOR: strength 5/5 b/l upper and lower extremities  SENSATION: grossly intact to light touch all extremities  COORDINATION: No upper extremity dysmetria  CHEST/LUNG: Nonlabored breathing  SKIN: Warm, dry    LABS:                        10.2   10.25 )-----------( 207      ( 17 Oct 2024 04:30 )             31.6     10-17    143  |  108  |  16.8  ----------------------------<  121[H]  4.1   |  27.0  |  0.67    Ca    7.7[L]      17 Oct 2024 04:30  Phos  3.1     10-17  Mg     2.4     10-17    RADIOLOGY/OTHER:  < from: MR Brain Stereotactic w/wo IV Cont (10.12.24 @ 08:29) >  IMPRESSION:    MR brain: 5.8 x 6.1 x 4.0 cm enhancing extra-axial neoplasm in the   Central occipital region and posterior fossa abutting the dura and   compressing the torcula and BILATERAL distal transverse sinuses, likely a   large meningioma.    MRV brain:  The RIGHT transverse sinus is patent but narrowed distally   secondary to the mass. The LEFT transverse sinus is occluded distally by   the mass. The torcula is partially occluded.

## 2024-10-17 NOTE — BRIEF OPERATIVE NOTE - FIRST ASSIST LEVEL OF PARTICIPATION
June 8, 2023      To Whom It May Concern:      Sarath Gray was seen in our Emergency Department today, 06/08/23.  I expect his condition to improve over the next 7-10 days.  I am recommending he be off of work from 6/9 through 6/11.  Consider return to work on 6/12 if he is feeling much improved.  He may need further restriction if he is not.  Follow-up clinic visit next week.    Sincerely,        Anastacio Lawler MD         Full Procedure

## 2024-10-17 NOTE — PROGRESS NOTE ADULT - ASSESSMENT
ASSESSMENT:   60 y/o male with hx of Schizoaffective disorder, tardive dyskinesia, COPD not on 02, HTN trans to [facility] [place] [facility] [place]Sx evaluation of large painful swelling to posterior Cervical area, found to have occipital mass measuring 6.2x3.1x5.6 cm with bony destruction and abutting the cerebellum and b/l occipital lobes and loss of flow of superior sagittal sinus from compression vs thrombosis. Now POD1 s/p cerebral angiogram and direct puncture tumor embolization with addi (10/16/24)    PLAN:  -neuro checks q1h  -continue decadron 4mg bid  -continue home depakote 500mg bid  -preop for OR tomorrow  -SBP goal 100-160, continue home amlodipine  -incentive spirometer, on room air  -DASH diet, bowel regimen  -DVT ppx: SCDs, hold SQL tonight for OR  -Final plan to be discussed with neurosurgeon in AM rounds ASSESSMENT:   58 y/o male with hx of Schizoaffective disorder, tardive dyskinesia, COPD not on 02, HTN [place] [facility] [place] [facility] [place] of large painful swelling to posterior Cervical area, found to have occipital mass measuring 6.2x3.1x5.6 cm with bony destruction and abutting the cerebellum and b/l occipital lobes and loss of flow of superior sagittal sinus from compression vs thrombosis. Now POD1 s/p cerebral angiogram and direct puncture tumor embolization with addi (10/16/24)    PLAN:  -neuro checks q1h  -continue decadron 4mg bid  -continue home depakote 500mg bid  -OR today  -SBP goal 100-160, continue home amlodipine  -incentive spirometer, on room air  -NPO/IVF  -DVT ppx: SCDs, hold SQL tonight for OR

## 2024-10-17 NOTE — PROGRESS NOTE ADULT - ASSESSMENT
60 y/o male with occipital mass with bony destruction and abutting the cerebellum and b/l occipital lobes.  Now s/p suboccipital craniectomy for brain tumor resection with titanium mesh cranioplasty 10/17.   S/p cerebral angiogram, direct puncture of occipital tumor with Palomo, and arterial embolization with coils 10/15.  PMH of Schizoaffective disorder, tardive dyskinesia, seizures, HTN,  COPD not on 02, HTN.    Plan:  - Neuro Checks  - drain SG - management per NSGy  - cont Depakote 500mg BID   - cont Quetiapine, Klonopin and Benztropine  - steroids, cont; ISS and PPI  - pain control, avoid oversedation  - maintain -160, cont Amlodipine   - maintain OSats> 92%, incentive spirometry, PRN Duonebs  - diet as tolerated, BM regimen, PPI  - maintain -180, ISS  - SCDs for VTE ppx; hold SQL as fresh postop

## 2024-10-17 NOTE — BRIEF OPERATIVE NOTE - OPERATION/FINDINGS
Suboccipital craniectomy for brain tumor resection with titanium mesh cranioplasty. Neuromonitoring used throughout procedure. Brainlab used throughout. 1 subgaleal ANETA drain to straight suction. Closed with sutures, staples, zerofrom, gauze, and tegaderm.

## 2024-10-17 NOTE — PROGRESS NOTE ADULT - ASSESSMENT
59M PMH Schizoaffective disorder, tardive dyskinesia, COPD not on 02, HTN admitted with occipital brain mass now s/p suboccipital craniectomy for brain tumor resection with cranioplasty, POD#0. Patient seen lying comfortably in bed. Denies headache, vision changes, weakness, numbness/tingling, or n/v. Pain controlled with medication.    Plan:  - Q1 neuro checks  - SBP   - CTH tonight  - STAT CTH for any changes in neuro exam  - MRI & MRV brain w/wo tonight  - 1 subgaleal drain to straight suction (gravity)  - Ancef while drain is in   - HOB at least 30 degrees, off his incision   - PCA for pain control, avoid oversedation  - Tylenol 650 standing, oxy 5/10 PRN  - NS @125cc/hr  - Continue Depakote 500 BID  - Dysphagia screen and then advance diet as tolerated  - TOV in AM, monitor I&Os  - Bowel regimen: senna, miralax  - DVT ppx: SCDs, hold chemoprophylaxis d/t post-op bleeding risk  - Activity: PT, OT  - Further medical management per NSICU  - Discussed case with Dr. Burnett

## 2024-10-17 NOTE — BRIEF OPERATIVE NOTE - COMMENTS
Deleon in, head of bed to remain elevated for concern for CSF leak Deleon in, head of bed to remain elevated for intraop durotomy

## 2024-10-17 NOTE — PROGRESS NOTE ADULT - SUBJECTIVE AND OBJECTIVE BOX
HPI:  60 y/o male with hx of Schizoaffective disorder, tardive dyskinesia, COPD not on 02, HTN trans to [facility] from [facility] [place] for NeuroSx evaluation of large painful swelling to posterior Cervical area. Patient with a known lesion there being followed by Dr. Watson in [place] for which he had an MRI on 10/8 for evaluate it. He endorsed pain to that area and pain in his shoulders and B/L UE, no current visual changes, motor weakness or gait disturbance. He admits to occasional posterior HA. He was seen. Denies CP, SOB, CP, N/V, fever, or chills. No weight loss or night sweats. CT at [facility] with occipital mass measuring 6.2x3.1x5.6 cm with bony destruction and abutting the cerebellum and b/l occipital lobes and loss of flow of superior sagittal sinus from compression vs thrombosis. Patient seen by NeuroSx and at this time awaiting further imaging studies prior to operative plan. Denies any other complaints at this time.    (12 Oct 2024 02:33)  Underwent angio embolization of occipital mass 10/15.    O/n events: none reported.  Underwent suboccipital craniectomy for brain tumor resection with titanium mesh cranioplasty earlier today.    ICU Vital Signs Last 24 Hrs  T(C): 36.5 (17 Oct 2024 14:45), Max: 36.8 (16 Oct 2024 16:33)  T(F): 97.7 (17 Oct 2024 14:45), Max: 98.3 (16 Oct 2024 21:51)  HR: 87 (17 Oct 2024 15:45) (58 - 103)  BP: 135/80 (17 Oct 2024 15:15) (109/70 - 156/97)  BP(mean): 97 (17 Oct 2024 15:15) (54 - 120)  ABP: 142/66 (17 Oct 2024 15:45) (142/66 - 160/75)  ABP(mean): 91 (17 Oct 2024 15:45) (66 - 101)  RR: 12 (17 Oct 2024 15:45) (11 - 23)  SpO2: 91% (17 Oct 2024 15:45) (90% - 99%)    O2 Parameters below as of 17 Oct 2024 15:15  Patient On (Oxygen Delivery Method): room air          Exam:  AOx3, face symmetric, comprehension intact, speech clear, PERRLA, EOMI, BERG spont and strongly.  S1S2 present, no murmurs  CTAB  Abd soft, NT, ND  No peripheral swelling

## 2024-10-18 ENCOUNTER — TRANSCRIPTION ENCOUNTER (OUTPATIENT)
Age: 59
End: 2024-10-18

## 2024-10-18 VITALS
SYSTOLIC BLOOD PRESSURE: 160 MMHG | DIASTOLIC BLOOD PRESSURE: 90 MMHG | HEART RATE: 76 BPM | RESPIRATION RATE: 18 BRPM | OXYGEN SATURATION: 96 %

## 2024-10-18 LAB
ANION GAP SERPL CALC-SCNC: 9 MMOL/L — SIGNIFICANT CHANGE UP (ref 5–17)
BUN SERPL-MCNC: 13.7 MG/DL — SIGNIFICANT CHANGE UP (ref 8–20)
CALCIUM SERPL-MCNC: 7.7 MG/DL — LOW (ref 8.4–10.5)
CHLORIDE SERPL-SCNC: 108 MMOL/L — SIGNIFICANT CHANGE UP (ref 96–108)
CO2 SERPL-SCNC: 26 MMOL/L — SIGNIFICANT CHANGE UP (ref 22–29)
CREAT SERPL-MCNC: 0.58 MG/DL — SIGNIFICANT CHANGE UP (ref 0.5–1.3)
EGFR: 112 ML/MIN/1.73M2 — SIGNIFICANT CHANGE UP
GLUCOSE SERPL-MCNC: 88 MG/DL — SIGNIFICANT CHANGE UP (ref 70–99)
HCT VFR BLD CALC: 27.6 % — LOW (ref 39–50)
HGB BLD-MCNC: 8.8 G/DL — LOW (ref 13–17)
MAGNESIUM SERPL-MCNC: 2.1 MG/DL — SIGNIFICANT CHANGE UP (ref 1.6–2.6)
MCHC RBC-ENTMCNC: 29.5 PG — SIGNIFICANT CHANGE UP (ref 27–34)
MCHC RBC-ENTMCNC: 31.9 GM/DL — LOW (ref 32–36)
MCV RBC AUTO: 92.6 FL — SIGNIFICANT CHANGE UP (ref 80–100)
PHOSPHATE SERPL-MCNC: 2.9 MG/DL — SIGNIFICANT CHANGE UP (ref 2.4–4.7)
PLATELET # BLD AUTO: 184 K/UL — SIGNIFICANT CHANGE UP (ref 150–400)
POTASSIUM SERPL-MCNC: 3.9 MMOL/L — SIGNIFICANT CHANGE UP (ref 3.5–5.3)
POTASSIUM SERPL-SCNC: 3.9 MMOL/L — SIGNIFICANT CHANGE UP (ref 3.5–5.3)
RBC # BLD: 2.98 M/UL — LOW (ref 4.2–5.8)
RBC # FLD: 16 % — HIGH (ref 10.3–14.5)
SODIUM SERPL-SCNC: 142 MMOL/L — SIGNIFICANT CHANGE UP (ref 135–145)
WBC # BLD: 13.03 K/UL — HIGH (ref 3.8–10.5)
WBC # FLD AUTO: 13.03 K/UL — HIGH (ref 3.8–10.5)

## 2024-10-18 PROCEDURE — 99232 SBSQ HOSP IP/OBS MODERATE 35: CPT

## 2024-10-18 PROCEDURE — 70545 MR ANGIOGRAPHY HEAD W/DYE: CPT | Mod: 26,XU

## 2024-10-18 PROCEDURE — 99223 1ST HOSP IP/OBS HIGH 75: CPT | Mod: GC

## 2024-10-18 PROCEDURE — 70553 MRI BRAIN STEM W/O & W/DYE: CPT | Mod: 26

## 2024-10-18 RX ORDER — ACETAMINOPHEN 325 MG
2 TABLET ORAL
Qty: 56 | Refills: 0
Start: 2024-10-18 | End: 2024-10-24

## 2024-10-18 RX ORDER — OXYCODONE HYDROCHLORIDE 30 MG/1
1 TABLET, FILM COATED, EXTENDED RELEASE ORAL
Qty: 28 | Refills: 0
Start: 2024-10-18 | End: 2024-10-24

## 2024-10-18 RX ADMIN — OXYCODONE HYDROCHLORIDE 5 MILLIGRAM(S): 30 TABLET, FILM COATED, EXTENDED RELEASE ORAL at 10:15

## 2024-10-18 RX ADMIN — Medication 2000 MILLIGRAM(S): at 00:26

## 2024-10-18 RX ADMIN — Medication 2 MILLIGRAM(S): at 05:51

## 2024-10-18 RX ADMIN — Medication 17 GRAM(S): at 09:40

## 2024-10-18 RX ADMIN — Medication 10 MILLIGRAM(S): at 05:48

## 2024-10-18 RX ADMIN — CHLORHEXIDINE GLUCONATE ORAL RINSE 1 APPLICATION(S): 1.2 SOLUTION DENTAL at 05:49

## 2024-10-18 RX ADMIN — Medication 0.5 MILLIGRAM(S): at 11:40

## 2024-10-18 RX ADMIN — PANTOPRAZOLE SODIUM 40 MILLIGRAM(S): 40 TABLET, DELAYED RELEASE ORAL at 05:47

## 2024-10-18 RX ADMIN — Medication 20 MILLIEQUIVALENT(S): at 05:47

## 2024-10-18 RX ADMIN — HYDROMORPHONE HYDROCHLORIDE 30 MILLILITER(S): 1 INJECTION, SOLUTION INTRAMUSCULAR; INTRAVENOUS; SUBCUTANEOUS at 09:33

## 2024-10-18 RX ADMIN — OXYCODONE HYDROCHLORIDE 5 MILLIGRAM(S): 30 TABLET, FILM COATED, EXTENDED RELEASE ORAL at 09:39

## 2024-10-18 RX ADMIN — Medication 2000 MILLIGRAM(S): at 09:38

## 2024-10-18 RX ADMIN — Medication 2 PUFF(S): at 13:32

## 2024-10-18 RX ADMIN — Medication 125 MILLILITER(S): at 09:38

## 2024-10-18 RX ADMIN — Medication 500 MILLIGRAM(S): at 05:51

## 2024-10-18 RX ADMIN — MUPIROCIN 1 APPLICATION(S): 20 OINTMENT TOPICAL at 05:48

## 2024-10-18 NOTE — CHART NOTE - NSCHARTNOTEFT_GEN_A_CORE
*** INCOMPLETE    HPI:  This is a 58 y/o male with hx of Schizoaffective disorder, tardive dyskinesia, COPD not on 02, HTN trans to [facility] from [facility] for NeuroSx evaluation of large painful swelling to posterior Cervical area. Patient with a known lesion there being followed by Dr. Watson in [place] for which he had an MRI on 10/8 for evaluate it. He endorsed pain to that area and pain in his shoulders and B/L UE, no current visual changes, motor weakness or gait disturbance. He admits to occasional posterior HA. He was seen. Denies CP, SOB, CP, N/V, fever, or chills. No weight loss or night sweats. CT at [facility] with occipital mass measuring 6.2x3.1x5.6 cm with bony destruction and abutting the cerebellum and b/l occipital lobes and loss of flow of superior sagittal sinus from compression vs thrombosis. Patient seen by NeuroSx and at this time awaiting further imaging studies prior to operative plan. Denies any other complaints at this time.       INTERVAL HPI Events    10/12) Transferred from [facility] to neurosurgery for occipital soft tissue mass w/ bony invasion   10/15) Cerebral angiogram w/ direct puncture of occipital tumor w/ addi and 2 coils w/ Dr. Slater   10/16) IV contrast infiltration in L AC given hyaluronidase subq. CTH w/wo fiducials done.   10/17) suboccipital craniectomy for brain tumor resection with craniplasty  10/18) POD #1 s/p s/p suboccipital craniectomy for brain tumor resection with cranioplasty, seen lying comfortably in bed. Tolerating regular diet. Passing gas. Voiding. Plan to remove 1 subgaleal ANETA drain and downgrade to tele.     Vital Signs Last 24 Hrs  T(C): 37 (18 Oct 2024 08:30), Max: 37 (18 Oct 2024 08:30)  T(F): 98.6 (18 Oct 2024 08:30), Max: 98.6 (18 Oct 2024 08:30)  HR: 82 (18 Oct 2024 08:00) (64 - 103)  BP: 121/74 (18 Oct 2024 08:00) (100/73 - 148/81)  BP(mean): 87 (18 Oct 2024 08:00) (67 - 114)  RR: 17 (18 Oct 2024 08:00) (10 - 23)  SpO2: 94% (18 Oct 2024 08:00) (91% - 100%)    Parameters below as of 18 Oct 2024 08:00  Patient On (Oxygen Delivery Method): room air      PHYSICAL EXAM:  GENERAL: NAD, well-groomed  HEAD:  normocephalic  DRAINS: 1 ANETA subgaleal drain in place to gravity (35cc overnight)   WOUND: Dressing clean dry intact  KARYN COMA SCORE: E- V- M- =       E: 4= opens eyes spontaneously        V: 5= oriented       M: 6= follows commands   MENTAL STATUS: AAO x3; Awake, Opens eyes spontaneously; Appropriately conversant without aphasia;following simple commands  CRANIAL NERVES: Visual acuity normal for age, PERRL.  REFLEXES: PERRL. Corneals intact b/l. Gag intact. Cough intact.   MOTOR: strength 5/5 b/l upper and lower extremities  Uppers     Delt (C5/6)     Bicep (C5/6)     Wrist Extend (C6)     Tricep (C7)     HG (C8/T1)  R                     5/5                 5/5                         5/5                           5/5                   5/5  L                      5/5                 5/5                         5/5                           5/5                   5/5  Lowers      HF(L1/L2)     KE (L3)     DF (L4)     EHL (L5)     PF (S1)      R                     5/5              5/5           5/5           5/5            5/5  L                     5/5               5/5          5/5            5/5            5/5  SENSATION: grossly intact to light touch all extremities  COORDINATION: Gait intact  CHEST/LUNG: BL symmetric chest rise    HEART: +S1/+S2; Regular rate and rhythm  ABDOMEN: Soft, nontender, nondistended  EXTREMITIES:  2+ peripheral pulses  SKIN: Warm, dry    LABS:                        8.8    13.03 )-----------( 184      ( 18 Oct 2024 04:15 )             27.6     10-18    142  |  108  |  13.7  ----------------------------<  88  3.9   |  26.0  |  0.58    Ca    7.7[L]      18 Oct 2024 04:15  Phos  2.9     10-18  Mg     2.1     10-18      PT/INR - ( 16 Oct 2024 12:20 )   PT: 11.6 sec;   INR: 1.03 ratio         PTT - ( 16 Oct 2024 12:20 )  PTT:29.4 sec  Urinalysis Basic - ( 18 Oct 2024 04:15 )    Color: x / Appearance: x / SG: x / pH: x  Gluc: 88 mg/dL / Ketone: x  / Bili: x / Urobili: x   Blood: x / Protein: x / Nitrite: x   Leuk Esterase: x / RBC: x / WBC x   Sq Epi: x / Non Sq Epi: x / Bacteria: x        10-17 @ 07:01  -  10-18 @ 07:00  --------------------------------------------------------  IN: 2440 mL / OUT: 1645 mL / NET: 795 mL    10-18 @ 07:01  -  10-18 @ 09:50  --------------------------------------------------------  IN: 275 mL / OUT: 0 mL / NET: 275 mL    Assessment:   This is a 58 y/o male with hx of Schizoaffective disorder, tardive dyskinesia, COPD not on 02, HTN trans to [facility] from [facility] for NeuroSx evaluation of  occipital mass with bony destruction and abutting the cerebellum and b/l occipital lobes. Pt is POD #1 s/p   suboccipital craniectomy for brain tumor resection with titanium mesh cranioplasty.      Plan:     General: awake and alert    Neuro:   -Neurologically intact   -Q?h neurochecks   -CTH done and stable (10/17)   - MRA and MRV performed this AM   -Remove subgaleal ANETA drain   -PCA pump for pain control   -PRN tylenol 650 mg PO     Cardiovascular:   -SBP <160  -PRN: hydralazine, labetalol     Respiratory:   - on RA   -PRN albuterol     GI:   -Tolerating regular diet   -PRN: zofran, maalox *** INCOMPLETE    HPI:  This is a 58 y/o male with hx of Schizoaffective disorder, tardive dyskinesia, COPD not on 02, HTN trans to [facility] from [facility] for NeuroSx evaluation of large painful swelling to posterior Cervical area. Patient with a known lesion there being followed by Dr. Watson in [place] for which he had an MRI on 10/8 for evaluate it. He endorsed pain to that area and pain in his shoulders and B/L UE, no current visual changes, motor weakness or gait disturbance. He admits to occasional posterior HA. He was seen. Denies CP, SOB, CP, N/V, fever, or chills. No weight loss or night sweats. CT at [facility] with occipital mass measuring 6.2x3.1x5.6 cm with bony destruction and abutting the cerebellum and b/l occipital lobes and loss of flow of superior sagittal sinus from compression vs thrombosis. Patient seen by NeuroSx and at this time awaiting further imaging studies prior to operative plan. Denies any other complaints at this time.       INTERVAL HPI Events    10/12) Transferred from [facility] to neurosurgery for occipital soft tissue mass w/ bony invasion   10/15) Cerebral angiogram w/ direct puncture of occipital tumor w/ addi and 2 coils w/ Dr. Slater   10/16) IV contrast infiltration in L AC given hyaluronidase subq. CTH w/wo fiducials done.   10/17) suboccipital craniectomy for brain tumor resection with craniplasty  10/18) POD #1 s/p s/p suboccipital craniectomy for brain tumor resection with cranioplasty, seen lying comfortably in bed. Tolerating regular diet. Passing gas. Voiding. Plan to remove 1 subgaleal ANETA drain and downgrade to tele.     Vital Signs Last 24 Hrs  T(C): 37 (18 Oct 2024 08:30), Max: 37 (18 Oct 2024 08:30)  T(F): 98.6 (18 Oct 2024 08:30), Max: 98.6 (18 Oct 2024 08:30)  HR: 82 (18 Oct 2024 08:00) (64 - 103)  BP: 121/74 (18 Oct 2024 08:00) (100/73 - 148/81)  BP(mean): 87 (18 Oct 2024 08:00) (67 - 114)  RR: 17 (18 Oct 2024 08:00) (10 - 23)  SpO2: 94% (18 Oct 2024 08:00) (91% - 100%)    Parameters below as of 18 Oct 2024 08:00  Patient On (Oxygen Delivery Method): room air      PHYSICAL EXAM:  GENERAL: NAD, well-groomed  HEAD:  normocephalic  DRAINS: 1 ANETA subgaleal drain in place to gravity (35cc overnight)   WOUND: Dressing clean dry intact  KARYN COMA SCORE: E- V- M- =       E: 4= opens eyes spontaneously        V: 5= oriented       M: 6= follows commands   MENTAL STATUS: AAO x3; Awake, Opens eyes spontaneously; Appropriately conversant without aphasia;following simple commands  CRANIAL NERVES: Visual acuity normal for age, PERRL.  REFLEXES: PERRL. Corneals intact b/l. Gag intact. Cough intact.   MOTOR: strength 5/5 b/l upper and lower extremities  Uppers     Delt (C5/6)     Bicep (C5/6)     Wrist Extend (C6)     Tricep (C7)     HG (C8/T1)  R                     5/5                 5/5                         5/5                           5/5                   5/5  L                      5/5                 5/5                         5/5                           5/5                   5/5  Lowers      HF(L1/L2)     KE (L3)     DF (L4)     EHL (L5)     PF (S1)      R                     5/5              5/5           5/5           5/5            5/5  L                     5/5               5/5          5/5            5/5            5/5  SENSATION: grossly intact to light touch all extremities  COORDINATION: Gait intact  CHEST/LUNG: BL symmetric chest rise    HEART: +S1/+S2; Regular rate and rhythm  ABDOMEN: Soft, nontender, nondistended  EXTREMITIES:  2+ peripheral pulses  SKIN: Warm, dry    LABS:                        8.8    13.03 )-----------( 184      ( 18 Oct 2024 04:15 )             27.6     10-18    142  |  108  |  13.7  ----------------------------<  88  3.9   |  26.0  |  0.58    Ca    7.7[L]      18 Oct 2024 04:15  Phos  2.9     10-18  Mg     2.1     10-18      PT/INR - ( 16 Oct 2024 12:20 )   PT: 11.6 sec;   INR: 1.03 ratio         PTT - ( 16 Oct 2024 12:20 )  PTT:29.4 sec  Urinalysis Basic - ( 18 Oct 2024 04:15 )    Color: x / Appearance: x / SG: x / pH: x  Gluc: 88 mg/dL / Ketone: x  / Bili: x / Urobili: x   Blood: x / Protein: x / Nitrite: x   Leuk Esterase: x / RBC: x / WBC x   Sq Epi: x / Non Sq Epi: x / Bacteria: x        10-17 @ 07:01  -  10-18 @ 07:00  --------------------------------------------------------  IN: 2440 mL / OUT: 1645 mL / NET: 795 mL    10-18 @ 07:01  -  10-18 @ 09:50  --------------------------------------------------------  IN: 275 mL / OUT: 0 mL / NET: 275 mL    Assessment:   This is a 58 y/o male with hx of Schizoaffective disorder, tardive dyskinesia, COPD not on 02, HTN trans to [facility] from [facility] for NeuroSx evaluation of  occipital mass with bony destruction and abutting the cerebellum and b/l occipital lobes. Pt is POD #1 s/p   suboccipital craniectomy for brain tumor resection with titanium mesh cranioplasty.      Plan:     General: awake and alert    Neuro:   -Neurologically intact   -Q?h neurochecks   -CTH done and stable (10/17)   - MRA and MRV performed this AM   -Remove subgaleal ANETA drain   -PCA pump for pain control   -PRN tylenol 650 mg PO for pain    Cardiovascular:   -SBP <160  -PRN: hydralazine, labetalol     Respiratory:   - on RA   -PRN albuterol     GI:   -Tolerating regular diet   -PRN: zofran, maalox *** INCOMPLETE    HPI:  This is a 58 y/o male with hx of Schizoaffective disorder, tardive dyskinesia, COPD not on 02, HTN trans to [facility] from [facility] for NeuroSx evaluation of large painful swelling to posterior Cervical area. Patient with a known lesion there being followed by Dr. Watson in [place] for which he had an MRI on 10/8 for evaluate it. He endorsed pain to that area and pain in his shoulders and B/L UE, no current visual changes, motor weakness or gait disturbance. He admits to occasional posterior HA. He was seen. CT at [facility] with occipital mass measuring 6.2x3.1x5.6 cm with bony destruction and abutting the cerebellum and b/l occipital lobes and loss of flow of superior sagittal sinus from compression vs thrombosis. Denies any other complaints at this time. Denies CP, SOB, CP, N/V, fever, or chills. No weight loss or night sweats      INTERVAL HPI Events    10/12) Transferred from [facility] to neurosurgery for occipital soft tissue mass w/ bony invasion   10/15) Cerebral angiogram w/ direct puncture of occipital tumor w/ addi and 2 coils w/ Dr. Slater   10/16) IV contrast infiltration in L AC given hyaluronidase subq. CTH w/wo fiducials done.   10/17) suboccipital craniectomy for brain tumor resection with cranioplasty performed.   10/18) POD #1 s/p suboccipital craniectomy for brain tumor resection with cranioplasty. Pt seen lying comfortably in bed. Tolerating regular diet. Passing gas. Voiding. Plan to remove 1 subgaleal ANETA drain and downgrade to tele.     Vital Signs Last 24 Hrs  T(C): 37 (18 Oct 2024 08:30), Max: 37 (18 Oct 2024 08:30)  T(F): 98.6 (18 Oct 2024 08:30), Max: 98.6 (18 Oct 2024 08:30)  HR: 82 (18 Oct 2024 08:00) (64 - 103)  BP: 121/74 (18 Oct 2024 08:00) (100/73 - 148/81)  BP(mean): 87 (18 Oct 2024 08:00) (67 - 114)  RR: 17 (18 Oct 2024 08:00) (10 - 23)  SpO2: 94% (18 Oct 2024 08:00) (91% - 100%)    Parameters below as of 18 Oct 2024 08:00  Patient On (Oxygen Delivery Method): room air      PHYSICAL EXAM:  GENERAL: NAD, well-groomed  HEAD:  normocephalic  DRAINS: 1 ANETA subgaleal drain in place to gravity (35cc overnight)   WOUND: Dressing clean dry intact  KARYN COMA SCORE: E- V- M- =       E: 4= opens eyes spontaneously        V: 5= oriented       M: 6= follows commands   MENTAL STATUS: AAO x3; Awake, Opens eyes spontaneously; Appropriately conversant without aphasia;following simple commands  CRANIAL NERVES: Visual acuity normal for age, PERRL.  REFLEXES: PERRL. Corneals intact b/l. Gag intact. Cough intact.   MOTOR: strength 5/5 b/l upper and lower extremities  Uppers     Delt (C5/6)     Bicep (C5/6)     Wrist Extend (C6)     Tricep (C7)     HG (C8/T1)  R                     5/5                 5/5                         5/5                           5/5                   5/5  L                      5/5                 5/5                         5/5                           5/5                   5/5  Lowers      HF(L1/L2)     KE (L3)     DF (L4)     EHL (L5)     PF (S1)      R                     5/5              5/5           5/5           5/5            5/5  L                     5/5               5/5          5/5            5/5            5/5  SENSATION: grossly intact to light touch all extremities  COORDINATION: Gait intact  CHEST/LUNG: BL symmetric chest rise    HEART: +S1/+S2; Regular rate and rhythm  ABDOMEN: Soft, nontender, nondistended  EXTREMITIES:  2+ peripheral pulses  SKIN: Warm, dry    LABS:                        8.8    13.03 )-----------( 184      ( 18 Oct 2024 04:15 )             27.6     10-18    142  |  108  |  13.7  ----------------------------<  88  3.9   |  26.0  |  0.58    Ca    7.7[L]      18 Oct 2024 04:15  Phos  2.9     10-18  Mg     2.1     10-18      PT/INR - ( 16 Oct 2024 12:20 )   PT: 11.6 sec;   INR: 1.03 ratio         PTT - ( 16 Oct 2024 12:20 )  PTT:29.4 sec  Urinalysis Basic - ( 18 Oct 2024 04:15 )    Color: x / Appearance: x / SG: x / pH: x  Gluc: 88 mg/dL / Ketone: x  / Bili: x / Urobili: x   Blood: x / Protein: x / Nitrite: x   Leuk Esterase: x / RBC: x / WBC x   Sq Epi: x / Non Sq Epi: x / Bacteria: x        10-17 @ 07:01  -  10-18 @ 07:00  --------------------------------------------------------  IN: 2440 mL / OUT: 1645 mL / NET: 795 mL    10-18 @ 07:01  -  10-18 @ 09:50  --------------------------------------------------------  IN: 275 mL / OUT: 0 mL / NET: 275 mL    Assessment:   This is a 58 y/o male with hx of Schizoaffective disorder, tardive dyskinesia, COPD not on 02, HTN trans to [facility] from [facility] for NeuroSx evaluation of  occipital mass with bony destruction and abutting the cerebellum and b/l occipital lobes. Pt is POD #1 s/p   suboccipital craniectomy for brain tumor resection with titanium mesh cranioplasty.      Plan:     General: awake and alert    Neuro:   -Neurologically intact   -Q4h neurochecks   -CTH done and stable (10/17)   - MRA and MRV performed this AM. Pending results.   -subgaleal ANETA drain removed on 10/18  -PCA pump for pain control   -PRN tylenol 650 mg PO for pain    Cardiovascular:   -SBP <160  -PRN: hydralazine, labetalol   -DVT ppx: SCDs    Respiratory:   # Hx COPD, maintaining O2 saturation on RA   -PRN albuterol     GI:   -Tolerating regular diet   -PRN: zofran, maalox    :   -TOV today  -monitor Is and Os  -NS @ 125 ml/hr     Activity:  -Increase as tolerated   -PT/OT pending       Plan discussed with Dr. Cohen. Pt ok to downgrade to tele. *** INCOMPLETE    HPI:  This is a 58 y/o male with hx of Schizoaffective disorder, tardive dyskinesia, COPD not on 02, HTN trans to [facility] from [facility] for NeuroSx evaluation of large painful swelling to posterior Cervical area. Patient with a known lesion there being followed by Dr. Watson in [place] for which he had an MRI on 10/8 for evaluate it. He endorsed pain to that area and pain in his shoulders and B/L UE, no current visual changes, motor weakness or gait disturbance. He admits to occasional posterior HA. He was seen. CT at [facility] with occipital mass measuring 6.2x3.1x5.6 cm with bony destruction and abutting the cerebellum and b/l occipital lobes and loss of flow of superior sagittal sinus from compression vs thrombosis. Denies any other complaints at this time. Denies CP, SOB, CP, N/V, fever, or chills. No weight loss or night sweats      INTERVAL HPI Events    10/12) Transferred from [facility] to neurosurgery for occipital soft tissue mass w/ bony invasion   10/15) Cerebral angiogram w/ direct puncture of occipital tumor w/ addi and 2 coils w/ Dr. Slater   10/16) IV contrast infiltration in L AC given hyaluronidase subq. CTH w/wo fiducials done.   10/17) suboccipital craniectomy for brain tumor resection with cranioplasty performed.   10/18) POD #1 s/p suboccipital craniectomy for brain tumor resection with cranioplasty. Pt seen lying comfortably in bed. Tolerating regular diet. Passing gas. Voiding. Plan to remove 1 subgaleal ANETA drain and downgrade to tele.     Vital Signs Last 24 Hrs  T(C): 37 (18 Oct 2024 08:30), Max: 37 (18 Oct 2024 08:30)  T(F): 98.6 (18 Oct 2024 08:30), Max: 98.6 (18 Oct 2024 08:30)  HR: 82 (18 Oct 2024 08:00) (64 - 103)  BP: 121/74 (18 Oct 2024 08:00) (100/73 - 148/81)  BP(mean): 87 (18 Oct 2024 08:00) (67 - 114)  RR: 17 (18 Oct 2024 08:00) (10 - 23)  SpO2: 94% (18 Oct 2024 08:00) (91% - 100%)    Parameters below as of 18 Oct 2024 08:00  Patient On (Oxygen Delivery Method): room air      PHYSICAL EXAM:  GENERAL: NAD, well-groomed  HEAD:  normocephalic  DRAINS: 1 ANETA subgaleal drain in place to gravity (35cc overnight)   WOUND: Dressing clean dry intact  KARYN COMA SCORE: E- V- M- =       E: 4= opens eyes spontaneously        V: 5= oriented       M: 6= follows commands   MENTAL STATUS: AAO x3; Awake, Opens eyes spontaneously; Appropriately conversant without aphasia;following simple commands  CRANIAL NERVES: Visual acuity normal for age, PERRL.  REFLEXES: PERRL. Corneals intact b/l. Gag intact. Cough intact.   MOTOR: strength 5/5 b/l upper and lower extremities  Uppers     Delt (C5/6)     Bicep (C5/6)     Wrist Extend (C6)     Tricep (C7)     HG (C8/T1)  R                     5/5                 5/5                         5/5                           5/5                   5/5  L                      5/5                 5/5                         5/5                           5/5                   5/5  Lowers      HF(L1/L2)     KE (L3)     DF (L4)     EHL (L5)     PF (S1)      R                     5/5              5/5           5/5           5/5            5/5  L                     5/5               5/5          5/5            5/5            5/5  SENSATION: grossly intact to light touch all extremities  COORDINATION: Gait intact  CHEST/LUNG: BL symmetric chest rise    HEART: +S1/+S2; Regular rate and rhythm  ABDOMEN: Soft, nontender, nondistended  EXTREMITIES:  2+ peripheral pulses  SKIN: Warm, dry    LABS:                        8.8    13.03 )-----------( 184      ( 18 Oct 2024 04:15 )             27.6     10-18    142  |  108  |  13.7  ----------------------------<  88  3.9   |  26.0  |  0.58    Ca    7.7[L]      18 Oct 2024 04:15  Phos  2.9     10-18  Mg     2.1     10-18      PT/INR - ( 16 Oct 2024 12:20 )   PT: 11.6 sec;   INR: 1.03 ratio         PTT - ( 16 Oct 2024 12:20 )  PTT:29.4 sec  Urinalysis Basic - ( 18 Oct 2024 04:15 )    Color: x / Appearance: x / SG: x / pH: x  Gluc: 88 mg/dL / Ketone: x  / Bili: x / Urobili: x   Blood: x / Protein: x / Nitrite: x   Leuk Esterase: x / RBC: x / WBC x   Sq Epi: x / Non Sq Epi: x / Bacteria: x        10-17 @ 07:01  -  10-18 @ 07:00  --------------------------------------------------------  IN: 2440 mL / OUT: 1645 mL / NET: 795 mL    10-18 @ 07:01  -  10-18 @ 09:50  --------------------------------------------------------  IN: 275 mL / OUT: 0 mL / NET: 275 mL    Assessment:   This is a 58 y/o male with hx of Schizoaffective disorder, tardive dyskinesia, COPD not on 02, HTN trans to [facility] from [facility] for NeuroSx evaluation of  occipital mass with bony destruction and abutting the cerebellum and b/l occipital lobes. Pt is POD #1 s/p   suboccipital craniectomy for brain tumor resection with titanium mesh cranioplasty.      Plan:     General: awake and alert    Neuro:   -Neurologically intact   -Q4h neurochecks   -CTH done and stable (10/17)   - MRA and MRV performed this AM. Pending results.   -subgaleal ANETA drain removed on 10/18  -Pain control  -PCA pump discontinued today 10/18  -PRN tylenol 650 mg PO for pain, oxy 5/10    Cardiovascular:   -SBP <160  -PRN: hydralazine, labetalol   -DVT ppx: SubQ levonox     Respiratory:   # Hx COPD, maintaining O2 saturation on RA   -PRN albuterol     GI:   -Tolerating regular diet   -PRN: zofran, maalox    :   -TOV today  -monitor Is and Os    Activity:  -Increase as tolerated   -PT/OT pending       Plan discussed with Dr. Cohen. Pt ok to downgrade to tele. HPI:  This is a 58 y/o male with hx of Schizoaffective disorder, tardive dyskinesia, COPD not on 02, HTN trans to [facility] from [facility] for NeuroSx evaluation of large painful swelling to posterior Cervical area. Patient with a known lesion there being followed by Dr. Watson in [place] for which he had an MRI on 10/8 for evaluate it. He endorsed pain to that area and pain in his shoulders and B/L UE, no current visual changes, motor weakness or gait disturbance. He admits to occasional posterior HA. He was seen. CT at [facility] with occipital mass measuring 6.2x3.1x5.6 cm with bony destruction and abutting the cerebellum and b/l occipital lobes and loss of flow of superior sagittal sinus from compression vs thrombosis. Denies any other complaints at this time. Denies CP, SOB, CP, N/V, fever, or chills. No weight loss or night sweats      INTERVAL HPI Events    10/12) Transferred from [facility] to neurosurgery for occipital soft tissue mass w/ bony invasion   10/15) Cerebral angiogram w/ direct puncture of occipital tumor w/ addi and 2 coils w/ Dr. Slater   10/16) IV contrast infiltration in L AC given hyaluronidase subq. CTH w/wo fiducials done.   10/17) suboccipital craniectomy for brain tumor resection with cranioplasty performed.   10/18) POD #1 s/p suboccipital craniectomy for brain tumor resection with cranioplasty. Pt seen lying comfortably in bed. Tolerating regular diet. Passing gas. Voiding. Plan to remove 1 subgaleal ANETA drain and downgrade to tele.     Vital Signs Last 24 Hrs  T(C): 37 (18 Oct 2024 08:30), Max: 37 (18 Oct 2024 08:30)  T(F): 98.6 (18 Oct 2024 08:30), Max: 98.6 (18 Oct 2024 08:30)  HR: 82 (18 Oct 2024 08:00) (64 - 103)  BP: 121/74 (18 Oct 2024 08:00) (100/73 - 148/81)  BP(mean): 87 (18 Oct 2024 08:00) (67 - 114)  RR: 17 (18 Oct 2024 08:00) (10 - 23)  SpO2: 94% (18 Oct 2024 08:00) (91% - 100%)    Parameters below as of 18 Oct 2024 08:00  Patient On (Oxygen Delivery Method): room air      PHYSICAL EXAM:  GENERAL: NAD, well-groomed  HEAD:  normocephalic  DRAINS: 1 ANETA subgaleal drain in place to gravity (35cc overnight)   WOUND: Dressing clean dry intact  KARYN COMA SCORE: E- V- M- =       E: 4= opens eyes spontaneously        V: 5= oriented       M: 6= follows commands   MENTAL STATUS: AAO x3; Awake, Opens eyes spontaneously; Appropriately conversant without aphasia;following simple commands  CRANIAL NERVES: Visual acuity normal for age, PERRL.  REFLEXES: PERRL. Corneals intact b/l. Gag intact. Cough intact.   MOTOR: strength 5/5 b/l upper and lower extremities  Uppers     Delt (C5/6)     Bicep (C5/6)     Wrist Extend (C6)     Tricep (C7)     HG (C8/T1)  R                     5/5                 5/5                         5/5                           5/5                   5/5  L                      5/5                 5/5                         5/5                           5/5                   5/5  Lowers      HF(L1/L2)     KE (L3)     DF (L4)     EHL (L5)     PF (S1)      R                     5/5              5/5           5/5           5/5            5/5  L                     5/5               5/5          5/5            5/5            5/5  SENSATION: grossly intact to light touch all extremities  COORDINATION: Gait intact  CHEST/LUNG: BL symmetric chest rise    HEART: +S1/+S2; Regular rate and rhythm  ABDOMEN: Soft, nontender, nondistended  EXTREMITIES:  2+ peripheral pulses  SKIN: Warm, dry    LABS:                        8.8    13.03 )-----------( 184      ( 18 Oct 2024 04:15 )             27.6     10-18    142  |  108  |  13.7  ----------------------------<  88  3.9   |  26.0  |  0.58    Ca    7.7[L]      18 Oct 2024 04:15  Phos  2.9     10-18  Mg     2.1     10-18      PT/INR - ( 16 Oct 2024 12:20 )   PT: 11.6 sec;   INR: 1.03 ratio         PTT - ( 16 Oct 2024 12:20 )  PTT:29.4 sec  Urinalysis Basic - ( 18 Oct 2024 04:15 )    Color: x / Appearance: x / SG: x / pH: x  Gluc: 88 mg/dL / Ketone: x  / Bili: x / Urobili: x   Blood: x / Protein: x / Nitrite: x   Leuk Esterase: x / RBC: x / WBC x   Sq Epi: x / Non Sq Epi: x / Bacteria: x        10-17 @ 07:01  -  10-18 @ 07:00  --------------------------------------------------------  IN: 2440 mL / OUT: 1645 mL / NET: 795 mL    10-18 @ 07:01  -  10-18 @ 09:50  --------------------------------------------------------  IN: 275 mL / OUT: 0 mL / NET: 275 mL    Assessment:   This is a 58 y/o male with hx of Schizoaffective disorder, tardive dyskinesia, COPD not on 02, HTN trans to [facility] [place]x [place] of  occipital mass with bony destruction and abutting the cerebellum and b/l occipital lobes. Pt is POD #1 s/p   suboccipital craniectomy for brain tumor resection with titanium mesh cranioplasty.      Plan:     General: awake and alert    Neuro:   -Neurologically intact   -Q4h neurochecks   -CTH done and stable (10/17)   - MRA and MRV performed this AM. Pending results.   -subgaleal ANETA drain removed on 10/18  -Pain control  -PCA pump discontinued today 10/18  -PRN tylenol 650 mg PO for pain, oxy 5/10    Cardiovascular:   -SBP <160  -PRN: hydralazine, labetalol   -DVT ppx: SubQ levonox     Respiratory:   # Hx COPD, maintaining O2 saturation on RA   -PRN albuterol     GI:   -Tolerating regular diet   -PRN: zofran, maalox    :   -TOV today  -monitor Is and Os    Activity:  -Increase as tolerated   -PT/OT pending       Plan discussed with Dr. Cohen. Pt ok to downgrade to tele.

## 2024-10-18 NOTE — DISCHARGE NOTE PROVIDER - NSDCCPTREATMENT_GEN_ALL_CORE_FT
PRINCIPAL PROCEDURE  Procedure: Craniotomy with surgical removal of suboccipital mass  Findings and Treatment:

## 2024-10-18 NOTE — CONSULT NOTE ADULT - SUBJECTIVE AND OBJECTIVE BOX
HPI:  59yM was admitted on 10-11 secondary to large occipital mass requiring direct tumor puncture embolization (10/15) and suboccipital craniectomy and titanium mesh cranioplasty (10/17).            Imaging Reviewed Today:    10-11 CXR    Impression:    No acute pulmonary disease.          10-12 CT CHEST IC    IMPRESSION: Small lingular nodules, likely postinflammatory.      10-12 MR Brain Stereotactic w/wo IV Cont   IMPRESSION:    MR brain: 5.8 x 6.1 x 4.0 cm enhancing extra-axial neoplasm in the   Central occipital region and posterior fossa abutting the dura and   compressing the torcula and BILATERAL distal transverse sinuses, likely a   large meningioma.    MRV brain:  The RIGHT transverse sinus is patent but narrowed distally   secondary to the mass. The LEFT transverse sinus is occluded distally by   the mass. The torcula is partially occluded.        10-12 MR Brain    5.8 x 6.1 x 4.0 cm enhancing extra-axial neoplasm in the   Central occipital region and posterior fossa abutting the dura and   compressing the torcula and BILATERAL distal transverse sinuses, likely a   large meningioma.        10-12 MR Venogram Head w/ IV Con    The RIGHT transverse sinus is patent but narrowed distally   secondary to the mass. The LEFT transverse sinus is occluded distally by   the mass. The torcula is partially occluded.        10-17 CT Head   Since the prior examination, the tumor centered at the torcula has been   resected. Embolization glue within the tumor was also removed. An   occipital craniectomy has been performed, with a cranioplasty mesh   repair. There is sealant material beneath the mesh with associated   lucency. There is no vasogenic edema or hemorrhagic venous infarcts.    There is mild swelling and gas in the right temporalis fossa on a   postoperative basis. There is a surgical drain in place over the mesh,   with overlying surgical staples.    Mild generalized cerebral volume loss. No premature white matter disease.    No acute intracranial hemorrhage. No midline shift or herniation. No CT   evidence of acute territorial infarction, although MRI with DWI would be   more sensitive.    The visualized sinuses and mastoids are clear.    Bilateral lens implants. Limited views of the orbits and visualized soft   tissues of the neck, face, scalp, skull base, and calvarium are otherwise   unremarkable.    IMPRESSION:    1.  Resection of the tumor at the torcula, without complications.        ----------------------------------------------------------------------------------------  CHIEF COMPLAINT    Mr Forde states that he didn't sleep well for a myriad of reasons, including a lot of nursing staff, frequent checks on him, lots of "beeping" in the room, but that he does not want any medication for sleep.  He states that he has only 1 CRIS his home where he lives alone, though his land lady and an "upstairs neighbor" both live in the same house.  He states that he has support from his sister, brother, and brother in law, and already was able to get food and pharmaceuticals delivered to his house.  He takes "SCAT" rides since he doesn't drive, or his rides his bicycle to go to appointments.  He lives in [place] near his siblings.  He is not interested in rehabilitation and feels he can and will go home.      ----------------------------------------------------------------------------------------  VITALS  T(C): 37 (10-18-24 @ 08:30), Max: 37 (10-18-24 @ 08:30)  HR: 76 (10-18-24 @ 11:00) (64 - 103)  BP: 160/90 (10-18-24 @ 11:00) (100/73 - 160/90)  RR: 18 (10-18-24 @ 11:00) (10 - 23)  SpO2: 96% (10-18-24 @ 11:00) (91% - 100%)  Wt(kg): --    PAST MEDICAL & SURGICAL HISTORY  High cholesterol    Hiatal hernia    Tardive dyskinesia    Liver failure    Seizures    ETOH abuse    S/P tonsillectomy    History of lumbar spinal fusion          LABS  REVIEWED    CBC Full  -  ( 18 Oct 2024 04:15 )  WBC Count : 13.03 K/uL  RBC Count : 2.98 M/uL  Hemoglobin : 8.8 g/dL  Hematocrit : 27.6 %  Platelet Count - Automated : 184 K/uL  Mean Cell Volume : 92.6 fl  Mean Cell Hemoglobin : 29.5 pg  Mean Cell Hemoglobin Concentration : 31.9 gm/dL  Auto Neutrophil # : x  Auto Lymphocyte # : x  Auto Monocyte # : x  Auto Eosinophil # : x  Auto Basophil # : x  Auto Neutrophil % : x  Auto Lymphocyte % : x  Auto Monocyte % : x  Auto Eosinophil % : x  Auto Basophil % : x    10-18    142  |  108  |  13.7  ----------------------------<  88  3.9   |  26.0  |  0.58    Ca    7.7[L]      18 Oct 2024 04:15  Phos  2.9     10-18  Mg     2.1     10-18      Urinalysis Basic - ( 18 Oct 2024 04:15 )    Color: x / Appearance: x / SG: x / pH: x  Gluc: 88 mg/dL / Ketone: x  / Bili: x / Urobili: x   Blood: x / Protein: x / Nitrite: x   Leuk Esterase: x / RBC: x / WBC x   Sq Epi: x / Non Sq Epi: x / Bacteria: x        ALLERGIES  No Known Allergies      MEDICATIONS   acetaminophen     Tablet .. 650 milliGRAM(s) Oral every 6 hours PRN  albuterol    90 MICROgram(s) HFA Inhaler 2 Puff(s) Inhalation every 6 hours PRN  aluminum hydroxide/magnesium hydroxide/simethicone Suspension 30 milliLiter(s) Oral every 4 hours PRN  amLODIPine   Tablet 10 milliGRAM(s) Oral daily  benztropine 2 milliGRAM(s) Oral two times a day  chlorhexidine 2% Cloths 1 Application(s) Topical <User Schedule>  clonazePAM  Tablet 0.5 milliGRAM(s) Oral daily  divalproex  milliGRAM(s) Oral two times a day  hydrALAZINE Injectable 10 milliGRAM(s) IV Push every 2 hours PRN  labetalol Injectable 10 milliGRAM(s) IV Push every 2 hours PRN  melatonin 3 milliGRAM(s) Oral at bedtime PRN  mupirocin 2% Nasal 1 Application(s) Both Nostrils two times a day  ondansetron Injectable 4 milliGRAM(s) IV Push every 6 hours PRN  oxyCODONE    IR 10 milliGRAM(s) Oral every 6 hours PRN  oxyCODONE    IR 5 milliGRAM(s) Oral every 6 hours PRN  pantoprazole    Tablet 40 milliGRAM(s) Oral before breakfast  polyethylene glycol 3350 17 Gram(s) Oral daily  QUEtiapine 125 milliGRAM(s) Oral at bedtime  senna 2 Tablet(s) Oral at bedtime      ----------------------------------------------------------------------------------------  FUNCTIONAL HISTORY  Lives   As above in "Chief Complaint" section    CURRENT FUNCTIONAL STATUS      PT 10-18    Bed Mobility: Rolling/Turning:     · Level of Jerome	independent    Bed Mobility: Sit to Supine:     · Level of Jerome	independent    Bed Mobility: Supine to Sit:     · Level of Jerome	independent    Transfer: Sit to Stand:     · Level of Jerome	independent    Transfer: Stand to Sit:     · Level of Jerome	independent    Gait Skills:     · Level of Jerome	independent  · Gait Distance	200 feet    Gait Analysis:     · Gait Pattern Used	2-point gait    Stair Negotiation:     · Level of Jerome	independent  · Assistive Device	left rail up  · Stair Pattern	step to step  · Number of Stairs	1          OT 10/18    · Bed Mobility/Transfers	independent  · Bathing	independent  · Upper Body Dressing	independent  · Lower Body Dressing	independent  · Grooming	independent  · Toileting	independent  · Eating	independent  · Home Management Skills	independent  · Additional Comments	Pt has a tub with curtains and grab bars. Pt has a sister and brother in law that are supportive. Pt was independent PTA without an assist device for ADLs/ambulation. Pt owns no DME, does not drive. Family will take him to doctor appointments and to food shop.                  ----------------------------------------------------------------------------------------  PHYSICAL EXAM  Constitutional - NAD, Comfortable  HEENT - Iodine noted on exposed skin of head  Chest - Breathing comfortably  Cardiovascular - Warm and well perfused extremities  Abdomen - Non distended  Extremities - No edema  Neurologic Exam -                    Cognitive - AAO to self, place, date, year, situation     Communication - Fluent, No dysarthria     Cranial Nerves - CN 2-12 groissly intact     FUNCTIONAL MOTOR EXAM - No focal deficits                    LEFT    UE - ShAB 5/5, EF 5/5, EE 5/5, WE 5/5,  5/5                    RIGHT UE - ShAB 5/5, EF 5/5, EE 5/5, WE 5/5,  5/5  Psychiatric - Mood stable, Affect WNL  ----------------------------------------------------------------------------------------  ASSESSMENT/PLAN  59yMale with minimal functional deficits after suboccipital craniectomy for tumor resection.  Suboccipital craniectomy, tumor resection - Cefazolin stopped as drain removed  Seizure prophylaxis - depakote  Pain - tylenol, oxycodone  Schizoaffective disorder - quetiapine, klonopin, benztropine, depakote  Insomnia - melatonin  HTN - amlodipine, hydralazine IV and labetalol IV (recommend discontinuing IV anti hypertensives)  GERD - pantoprazole  Bowel regimen - PEG, senna  DVT prophylaxis - per primary team SCDs  Impaired Mobility/Function/Rehab Recommendations -   Encourage OOB and aggressive mobilization as able.  Encourage AROM and PROM, bed level exercises, and ambulating with family and nursing staff as able to     Disposition - likely appropriate for home disposition pending hospital course.

## 2024-10-18 NOTE — PROCEDURE NOTE - NSINFORMCONSENT_GEN_A_CORE
MRI attempted x 3 after Versed administration. Patient cannot stay still. Notified provider.   
This was an emergent procedure.
Benefits, risks, and possible complications of procedure explained to patient/caregiver who verbalized understanding and gave written consent.
Benefits, risks, and possible complications of procedure explained to patient/caregiver who verbalized understanding and gave written consent.
This was an emergent procedure.
Benefits, risks, and possible complications of procedure explained to patient/caregiver who verbalized understanding and gave written consent.
Benefits, risks, and possible complications of procedure explained to patient/caregiver who verbalized understanding and gave written consent.
Benefits, risks, and possible complications of procedure explained to patient/caregiver who verbalized understanding and gave verbal consent.
Benefits, risks, and possible complications of procedure explained to patient/caregiver who verbalized understanding and gave written consent.

## 2024-10-18 NOTE — PROGRESS NOTE ADULT - ASSESSMENT
60 y/o male with hx of Schizoaffective disorder, tardive dyskinesia, HTN,  COPD not on 02, HTN trans to [facility] from [facility] for NeuroSx evaluation of large painful swelling to posterior Cervical area. Patient with a known lesion there being followed by Dr. Watson in [place] for which he had an MRI on 10/8 for evaluate it. He endorsed pain to that area and pain in his shoulders and B/L UE, no current visual changes, motor weakness or gait disturbance. He admits to occasional posterior HA. He was seen. Denies CP, SOB, CP, N/V, fever, or chills. No weight loss or night sweats. CT at [facility] with occipital mass measuring 6.2x3.1x5.6 cm with bony destruction and abutting the cerebellum and b/l occipital lobes and loss of flow of superior sagittal sinus from compression vs thrombosis s/p cerebral angiogram, direct puncture of occipital tumor with [place], and arterial embolization with coils 10/15 and s/p suboccipital craniectomy for brain tumor resection with cranioplasty on 10/17/2024.     Plan:     1. Occipital mass s/p embolization and S/p cerebral angiogram, direct puncture of occipital tumor with [place], and arterial embolization with coils  and s/p suboccipital craniectomy for brain tumor resection with cranioplasty on 10/17/2024.   - Neuro checks  - serial and further imaging MRI & MRV brain   - subgaleal drain care as per primary team   -antibiotics   -pain meds  -IS   -bowel meds   -IV fluids  - DVT ppx: SCDs, as per primary team   - PT, OT    2. HTN - continue Amlodipine with parameters     3. Hx of Seizures - seizure precautions , continue Divalproex 500 mg BID , continue Folic acid     4. Hx of Schizoaffective disorder - continue home meds     5. Small lingular nodules seen on CT  likely postinflammatory.  Will recommend to repeat CT in 6 months     6. Hx of COPD , not on O2 at home , uses Nebulizer PRN ,     7. dvt prophylaxis as per primary team     Medicine team is going to follow along.

## 2024-10-18 NOTE — OCCUPATIONAL THERAPY INITIAL EVALUATION ADULT - ADDITIONAL COMMENTS
Pt has a tub with curtains and grab bars. Pt has a sister and brother in law that are supportive. Pt was independent PTA without an assist device for ADLs/ambulation. Pt owns no DME, does not drive. Family will take him to doctor appointments and to food shop.

## 2024-10-18 NOTE — OCCUPATIONAL THERAPY INITIAL EVALUATION ADULT - PERTINENT HX OF CURRENT PROBLEM, REHAB EVAL
Pt hx of Schizoaffective disorder, tardive dyskinesia, COPD not on 02, HTN [place] [facility] [place] [facility] [place] of large painful swelling to posterior Cervical area. Pt with a known lesion. [place] [facility] [place]ipital mass with bony destruction and abutting the cerebellum and b/l occipital lobes and loss of flow of superior sagittal sinus from compression vs thrombosis. Underwent angio embolization of occipital mass 10/15.

## 2024-10-18 NOTE — DISCHARGE NOTE PROVIDER - CARE PROVIDER_API CALL
Amber Burnett  Neurosurgery  [address] [place] [id number]  Phone: [phone]  Fax: [phone]  Follow Up Time:

## 2024-10-18 NOTE — DISCHARGE NOTE PROVIDER - NSDCMRMEDTOKEN_GEN_ALL_CORE_FT
acetaminophen 500 mg oral tablet: 2 tab(s) orally every 6 hours  benztropine 2 mg oral tablet: 1 tab(s) orally 2 times a day  clonazePAM 0.5 mg oral tablet: 1 tab(s) orally once a day  divalproex sodium 500 mg oral delayed release tablet: 1 tab(s) orally 2 times a day  methocarbamol 500 mg oral tablet: 2 tab(s) orally every 8 hours as needed for as needed for muscle spasm  Norvasc 10 mg oral tablet: 1 tab(s) orally once a day  Protonix 40 mg oral delayed release tablet: 1 tab(s) orally once a day  QUEtiapine 100 mg oral tablet: 1 tab(s) orally once a day (at bedtime) in combination iwth 25 mg for 125 mg total  QUEtiapine 25 mg oral tablet: 1 tab(s) orally once a day (at bedtime) in combination with 100 mg for 125 mg total   acetaminophen 500 mg oral tablet: 2 tab(s) orally every 6 hours  benztropine 2 mg oral tablet: 1 tab(s) orally 2 times a day  clonazePAM 0.5 mg oral tablet: 1 tab(s) orally once a day  divalproex sodium 500 mg oral delayed release tablet: 1 tab(s) orally 2 times a day  methocarbamol 500 mg oral tablet: 2 tab(s) orally every 8 hours as needed for as needed for muscle spasm  Norvasc 10 mg oral tablet: 1 tab(s) orally once a day  oxyCODONE 5 mg oral tablet: 1 tab(s) orally every 6 hours as needed for  severe pain MDD: 4 tablets  Protonix 40 mg oral delayed release tablet: 1 tab(s) orally once a day  QUEtiapine 100 mg oral tablet: 1 tab(s) orally once a day (at bedtime) in combination iwth 25 mg for 125 mg total  QUEtiapine 25 mg oral tablet: 1 tab(s) orally once a day (at bedtime) in combination with 100 mg for 125 mg total

## 2024-10-18 NOTE — PROGRESS NOTE ADULT - SUBJECTIVE AND OBJECTIVE BOX
HPI:  59M PMH Schizoaffective disorder, tardive dyskinesia, COPD not on 02, HTN admitted with occipital brain mass now s/p suboccipital craniectomy for brain tumor resection with cranioplasty, POD#0. Patient seen lying comfortably in bed. Patient c/o mild posterior headache. Denies vision changes, weakness, numbness/tingling, or n/v. Pain controlled with PCA.    VITALS:  T(C): 36.6 (17 Oct 2024 16:47), Max: 36.8 (16 Oct 2024 21:51)  T(F): 97.9 (17 Oct 2024 16:47), Max: 98.3 (16 Oct 2024 21:51)  HR: 86 (17 Oct 2024 19:00) (58 - 103)  BP: 138/80 (17 Oct 2024 18:45) (109/70 - 156/97)  BP(mean): 98 (17 Oct 2024 18:45) (54 - 120)  ABP: 118/76 (17 Oct 2024 19:00) (118/76 - 160/77)  ABP(mean): 92 (17 Oct 2024 19:00) (66 - 111)  RR: 17 (17 Oct 2024 19:00) (10 - 23)  SpO2: 91% (17 Oct 2024 19:00) (91% - 99%)    O2 Parameters below as of 17 Oct 2024 19:00  Patient On (Oxygen Delivery Method): room air        PHYSICAL EXAM:  GENERAL: NAD  HEAD:  S/p suboccipital crani. Dressing clean, dry, intact. Dried blood noted, not fully saturated.  DRAINS: 1 subgaleal ANETA drain to gravity. Serosanguinous drainage noted.  AOx3, face symmetric, comprehension intact, speech clear, PERRLA, EOMI, BERG spont and strongly.  CHEST/LUNG: Nonlabored breathing  SKIN: Warm, dry    LABS:                                   10.2   10.25 )-----------( 207      ( 17 Oct 2024 04:30 )             31.6   10-17    143  |  108  |  16.8  ----------------------------<  121[H]  4.1   |  27.0  |  0.67    Ca    7.7[L]      17 Oct 2024 04:30  Phos  3.1     10-17  Mg     2.4     10-17        RADIOLOGY/OTHER:  < from: MR Brain Stereotactic w/wo IV Cont (10.12.24 @ 08:29) >  IMPRESSION:    MR brain: 5.8 x 6.1 x 4.0 cm enhancing extra-axial neoplasm in the   Central occipital region and posterior fossa abutting the dura and   compressing the torcula and BILATERAL distal transverse sinuses, likely a   large meningioma.    MRV brain:  The RIGHT transverse sinus is patent but narrowed distally   secondary to the mass. The LEFT transverse sinus is occluded distally by   the mass. The torcula is partially occluded.    < from: CT Head No Cont (10.17.24 @ 17:04) >  IMPRESSION:    1.  Resection of the tumor at the torcula, without complications.        Patient Name: NOHEMY BRAN  MRN: [id number], : [birth year]    --- End of Report ---

## 2024-10-18 NOTE — DISCHARGE NOTE PROVIDER - NSDCFUSCHEDAPPT_GEN_ALL_CORE_FT
Amber Burnett  [facility] Physician [facility]  NEUROSURG [address] [place] [address]  Scheduled Appointment: 11/07/2024

## 2024-10-18 NOTE — CHART NOTE - NSCHARTNOTESELECT_GEN_ALL_CORE
Downgrade/Event Note
Event Note
Neurointerventional Surgery Pre-Procedure Note/Event Note
Nutrition Services
SG drain removal
NeuroInterventional Surgery Groin Check Note/Event Note
Neurointerventional Surgery Post-Procedure Note/Event Note

## 2024-10-18 NOTE — PHYSICAL THERAPY INITIAL EVALUATION ADULT - GENERAL OBSERVATIONS, REHAB EVAL
Pt. greeted standing in room + monitor, IV lock, agreeable to PT
Pt received supine in bed + telemetry/BP + IV + Venous Compression Boots + ANETA drain. Pt c/o 0/10 pain, pt agreeable to PT

## 2024-10-18 NOTE — DISCHARGE NOTE PROVIDER - NSDCFUADDINST_GEN_ALL_CORE_FT
Post-operative care/follow up:   - surgical dressing in place, can be removed 10/19   - can shower 10/20   - please call the office to confirm appointment: [phone]  - Please also follow up with your primary care doctor in 1 week for repeat CBC    Activity:  - fatigue is common after surgery, rest if you feel tired   - no bending, lifting, twisting or heavy lifting   - walking is recommended, ambulate as tolerated  - you may shower when you get home 10/20, keep your incision dry  - no bathing   - no driving within 24 hours of anesthesia or while taking prescription pain medications   - keep hydrated, drink plenty of water     Pain Expectations:  - pain after surgery is expected  - please take pain meds as prescribed     Medications:  - continue all home medications as prescribed  - new pain medication: Tylenol 500mg every 6hours, Oxycodone 5mg every 6 hours as needed, Robaxin 500mg every 8 hours as needed   - pain medications can cause constipation, you should eat a high fiber diet and may take a stool softener while on pain meds   - Avoid taking Advil (ibuprofen), Motrin (naproxen), or Aspirin for pain as they can cause bleeding     Call the office or come to ED if:  - wound has drainage or bleeding, increased redness or pain at incision site, neurological change, fever (>101), chills, night sweats, syncope, nausea/vomiting

## 2024-10-18 NOTE — DISCHARGE NOTE NURSING/CASE MANAGEMENT/SOCIAL WORK - PATIENT PORTAL LINK FT
You can access the FollowMyHealth Patient Portal offered by [facility] by registering at the following website: [email or web address]. By joining [facility]’s FollowMyHealth portal, you will also be able to view your health information using other applications (apps) compatible with our system.

## 2024-10-18 NOTE — CONSULT NOTE ADULT - ATTENDING COMMENTS
Patient seen and examined. Case discussed with Dr. Mancia and agree with recommendations outlined above. I have personally reviewed the imaging and labs listed above.    Rehab/Impaired mobility and function - When medically optimized, based on the patient's diagnosis, current functional status and potential for progress, expect patient to achieve functional goals for DC HOME with OUTPATIENT HOME CARE.     Will continue to follow. Rehab recommendations are dependent on how functional progress changes as well as how patient continues to participate and tolerate therapeutic interventions, which may change. Recommend ongoing mobilization by staff to maintain cardiopulmonary function and prevention of secondary complications related to debility.     Total Time Spent on Encounter (reviewing clinical notes, labs, radiology and medications, reviewing patient history, pre-rounding, physical exam, assessment and discussing rehabilitation recommendations - with consideration of prior level of function, expected level of recovery and return to community living, rounding with team) - 75 minutes

## 2024-10-18 NOTE — PROGRESS NOTE ADULT - ASSESSMENT
58 y/o male with occipital mass with bony destruction and abutting the cerebellum and b/l occipital lobes.  Now s/p suboccipital craniectomy for brain tumor resection with titanium mesh cranioplasty 10/17.   S/p cerebral angiogram, direct puncture of occipital tumor with Palomo, and arterial embolization with coils 10/15.  PMH of Schizoaffective disorder, tardive dyskinesia, seizures, HTN,  COPD not on 02, HTN.  Expected postop anemia.    Plan:  - pt is being d/c'ed home today, cleared by NSGy, outpt f/up; clinically and radiographically stable  - cont home meds  - pain control  - outpt f/up with PCP with labs (CBC) - recommended early next week

## 2024-10-18 NOTE — PROGRESS NOTE ADULT - SUBJECTIVE AND OBJECTIVE BOX
NOHEMY BRAN    [id number]    59y      Male    Patient is a 59y old  Male who presents with a chief complaint of Occipital brain mass (18 Oct 2024 00:13)      INTERVAL HPI/OVERNIGHT EVENTS:    Patient is doing ok, s/p surgery, denies fever, chills, chest pain, sob, dizziness       Vital Signs Last 24 Hrs  T(C): 37 (18 Oct 2024 08:30), Max: 37 (18 Oct 2024 08:30)  T(F): 98.6 (18 Oct 2024 08:30), Max: 98.6 (18 Oct 2024 08:30)  HR: 75 (18 Oct 2024 10:00) (64 - 103)  BP: 123/73 (18 Oct 2024 10:00) (100/73 - 148/81)  BP(mean): 86 (18 Oct 2024 10:00) (67 - 114)  RR: 14 (18 Oct 2024 10:00) (10 - 23)  SpO2: 96% (18 Oct 2024 10:00) (91% - 100%)    Parameters below as of 18 Oct 2024 10:00  Patient On (Oxygen Delivery Method): room air        PHYSICAL EXAM:    GENERAL: Middle age male looking comfortable   HEENT: Posterior cranial surgical wound with drain in place   NECK: soft, Supple, No JVD  CHEST/LUNG: Clear to auscultate bilaterally; No wheezing  HEART: S1S2+, Regular rate and rhythm; No murmurs  ABDOMEN: Soft, Nontender, Nondistended; Bowel sounds present  EXTREMITIES:  1+ Peripheral Pulses, No edema  SKIN: No rashes or lesions  NEURO: AAOX3  PSYCH: normal mood      LABS:                        8.8    13.03 )-----------( 184      ( 18 Oct 2024 04:15 )             27.6     10-18    142  |  108  |  13.7  ----------------------------<  88  3.9   |  26.0  |  0.58    Ca    7.7[L]      18 Oct 2024 04:15  Phos  2.9     10-18  Mg     2.1     10-18      PT/INR - ( 16 Oct 2024 12:20 )   PT: 11.6 sec;   INR: 1.03 ratio         PTT - ( 16 Oct 2024 12:20 )  PTT:29.4 sec      I&O's Summary    17 Oct 2024 07:01  -  18 Oct 2024 07:00  --------------------------------------------------------  IN: 2440 mL / OUT: 1645 mL / NET: 795 mL    18 Oct 2024 07:01  -  18 Oct 2024 10:18  --------------------------------------------------------  IN: 775 mL / OUT: 320 mL / NET: 455 mL        MEDICATIONS  (STANDING):  amLODIPine   Tablet 10 milliGRAM(s) Oral daily  benztropine 2 milliGRAM(s) Oral two times a day  ceFAZolin  Injectable. 2000 milliGRAM(s) IV Push every 8 hours  chlorhexidine 2% Cloths 1 Application(s) Topical <User Schedule>  clonazePAM  Tablet 0.5 milliGRAM(s) Oral daily  divalproex  milliGRAM(s) Oral two times a day  mupirocin 2% Nasal 1 Application(s) Both Nostrils two times a day  pantoprazole    Tablet 40 milliGRAM(s) Oral before breakfast  polyethylene glycol 3350 17 Gram(s) Oral daily  QUEtiapine 125 milliGRAM(s) Oral at bedtime  senna 2 Tablet(s) Oral at bedtime  sodium chloride 0.9%. 1000 milliLiter(s) (125 mL/Hr) IV Continuous <Continuous>    MEDICATIONS  (PRN):  acetaminophen     Tablet .. 650 milliGRAM(s) Oral every 6 hours PRN Temp greater or equal to 38C (100.4F), Mild Pain (1 - 3)  albuterol    90 MICROgram(s) HFA Inhaler 2 Puff(s) Inhalation every 6 hours PRN Shortness of Breath and/or Wheezing  aluminum hydroxide/magnesium hydroxide/simethicone Suspension 30 milliLiter(s) Oral every 4 hours PRN Dyspepsia  hydrALAZINE Injectable 10 milliGRAM(s) IV Push every 2 hours PRN SBP>160  labetalol Injectable 10 milliGRAM(s) IV Push every 2 hours PRN SBP>160  melatonin 3 milliGRAM(s) Oral at bedtime PRN Insomnia  naloxone Injectable 0.1 milliGRAM(s) IV Push every 3 minutes PRN For ANY of the following changes in patient status:  A. RR LESS THAN 10 breaths per minute, B. Oxygen saturation LESS THAN 90%, C. Sedation score of 6  ondansetron Injectable 4 milliGRAM(s) IV Push every 6 hours PRN Nausea  oxyCODONE    IR 10 milliGRAM(s) Oral every 6 hours PRN Severe Pain (7 - 10)  oxyCODONE    IR 5 milliGRAM(s) Oral every 6 hours PRN Moderate Pain (4 - 6)

## 2024-10-18 NOTE — DISCHARGE NOTE NURSING/CASE MANAGEMENT/SOCIAL WORK - FINANCIAL ASSISTANCE
[facility] provides services at a reduced cost to those who are determined to be eligible through [facility]’s financial assistance program. Information regarding [facility]’s financial assistance program can be found by going to [email or web address] or by calling [phone].

## 2024-10-18 NOTE — DISCHARGE NOTE NURSING/CASE MANAGEMENT/SOCIAL WORK - NSDCPEFALRISK_GEN_ALL_CORE
For information on Fall & Injury Prevention, visit: [email or web address] OR  [email or web address] OR  https://www.cdc.gov/steadi/patient.html

## 2024-10-18 NOTE — OCCUPATIONAL THERAPY INITIAL EVALUATION ADULT - DIAGNOSIS, OT EVAL
59 year old Male s/p suboccipital craniectomy for brain tumor resection with titanium mesh cranioplasty on 10/17. Pt evaluated in NSICU.

## 2024-10-18 NOTE — OCCUPATIONAL THERAPY INITIAL EVALUATION ADULT - VISUAL ACUITY
+reading/distance glasses; pt states chronic light sensitivity; no new complaints. Central and peripheral fields intact bilaterally

## 2024-10-18 NOTE — DISCHARGE NOTE NURSING/CASE MANAGEMENT/SOCIAL WORK - NSDCVIVACCINE_GEN_ALL_CORE_FT
Tdap; 16-Jun-2022 20:42; Rashmi Duarte (RN); [employer]; [id number] (Exp. Date: 09-Sep-2023); IntraMuscular; Deltoid Left.; 0.5 milliLiter(s); VIS (VIS Published: 09-May-2013, VIS Presented: 16-Jun-2022);   Tdap; 08-Aug-2023 23:31; Rajani Drake (CONY); [employer]; [id number] (Exp. Date: 03-Oct-2025); IntraMuscular; Deltoid Left.; 0.5 milliLiter(s); VIS (VIS Published: 09-May-2013, VIS Presented: 08-Aug-2023);

## 2024-10-18 NOTE — PHYSICAL THERAPY INITIAL EVALUATION ADULT - ADDITIONAL COMMENTS
Pt lives alone in an apartment. Pt has a sister and brother in law that are supportive. 1 steps to enter without handrails, no steps inside. Pt was independent PTA without an assist device. Pt owns no DME, does not drive.
Pt. reports he lives in a house with 1 CRIS and no stairs inside. Pt. lives in area of house alone with landlord in another room, but does not/is not able to assist. Pt. reports being independent PTA and does not own DME.

## 2024-10-18 NOTE — PROGRESS NOTE ADULT - SUBJECTIVE AND OBJECTIVE BOX
HPI:  60 y/o male with hx of Schizoaffective disorder, tardive dyskinesia, COPD not on 02, HTN trans to [facility] from [facility] for NeuroSx evaluation of large painful swelling to posterior Cervical area. Patient with a known lesion there being followed by Dr. Watson in [place] for which he had an MRI on 10/8 for evaluate it. He endorsed pain to that area and pain in his shoulders and B/L UE, no current visual changes, motor weakness or gait disturbance. He admits to occasional posterior HA. He was seen. Denies CP, SOB, CP, N/V, fever, or chills. No weight loss or night sweats. CT at [facility] with occipital mass measuring 6.2x3.1x5.6 cm with bony destruction and abutting the cerebellum and b/l occipital lobes and loss of flow of superior sagittal sinus from compression vs thrombosis. Patient seen by NeuroSx and at this time awaiting further imaging studies prior to operative plan. Denies any other complaints at this time.    (12 Oct 2024 02:33)  Underwent angio embolization of occipital mass 10/15.  Underwent suboccipital craniectomy for brain tumor resection with titanium mesh cranioplasty 10/17.    O/n events: none reported.  SG Drain removed earlier this am.    ICU Vital Signs Last 24 Hrs  T(C): 37 (18 Oct 2024 08:30), Max: 37 (18 Oct 2024 08:30)  T(F): 98.6 (18 Oct 2024 08:30), Max: 98.6 (18 Oct 2024 08:30)  HR: 76 (18 Oct 2024 11:00) (64 - 100)  BP: 160/90 (18 Oct 2024 11:00) (100/73 - 160/90)  BP(mean): 110 (18 Oct 2024 11:00) (67 - 114)  ABP: 101/70 (17 Oct 2024 21:15) (100/75 - 160/77)  ABP(mean): 97 (17 Oct 2024 21:15) (67 - 111)  RR: 18 (18 Oct 2024 11:00) (10 - 23)  SpO2: 96% (18 Oct 2024 11:00) (91% - 100%)    O2 Parameters below as of 18 Oct 2024 11:00  Patient On (Oxygen Delivery Method): room air        Exam:  AOx3, face symmetric, comprehension intact, speech clear, PERRLA, EOMI, BERG spont and strongly.  S1S2 present, no murmurs  CTAB  Abd soft, NT, ND  No peripheral swelling  Dressing dry.

## 2024-10-18 NOTE — PROGRESS NOTE ADULT - THIS PATIENT HAS THE FOLLOWING CONDITION(S)/DIAGNOSES ON THIS ADMISSION:
Brain Compression / Herniation
None
Occipital brain mass/ narrowing & occlusion of cerebral sinuses/Brain Compression / Herniation
Brain Compression / Herniation
None
brain lesion
None

## 2024-10-18 NOTE — PROGRESS NOTE ADULT - PROVIDER SPECIALTY LIST ADULT
Neurosurgery
Neurosurgery
Hospitalist
Neurosurgery
Hospitalist
Neurosurgery
NSICU

## 2024-10-18 NOTE — PROGRESS NOTE ADULT - TIME BILLING
review of relevant history, clinical examination, review of data and images, discussion of treatment with the multidisciplinary team and any consultants involved in this patient’s care.

## 2024-10-18 NOTE — DISCHARGE NOTE PROVIDER - HOSPITAL COURSE
59M PMHx of schizoaffective disorder, tardive dyskinesia, COPD, HTN transferred from [facility] 10/12 after outpatient MRI brain 10/8 revealed 6.2x3.1x5.6 cm occipital mass. S/p cerebral angiogram 10/15 for direct puncture of occipital tumor with addi. S/p suboccipital craniotomy for brain tumor resection 10/17. Post-operative MRI done 10/18. Subgaleal ANETA removed 10/18. Patient evaluated by PT/OT who recommended home with assist as needed with family.   59M PMHx of schizoaffective disorder, tardive dyskinesia, COPD, HTN transferred from [facility] 10/12 after outpatient MRI brain 10/8 revealed 6.2x3.1x5.6 cm occipital mass. He was cleared by medical team for surgery and given aspirin usage, was scheduled for tumor embolization with surgery 5 days after admission with his aspirin held. S/p cerebral angiogram 10/15 for direct puncture of occipital tumor with addi. S/p suboccipital craniotomy for brain tumor resection 10/17. Post-operative MRI done 10/18. Subgaleal ANETA removed 10/18. Patient evaluated by PT/OT who recommended home with assist as needed with family.

## 2024-10-18 NOTE — OCCUPATIONAL THERAPY INITIAL EVALUATION ADULT - GENERAL OBSERVATIONS, REHAB EVAL
Left pt as received semifowler in bed, NAD, +IV, +Tele//BP, +b/l VCB, +ANETA on RA A&Ox4. Pre/post pain 0/10. Pt agreeable to OT evaluation.

## 2024-10-18 NOTE — CHART NOTE - NSCHARTNOTEFT_GEN_A_CORE
Nutrition Note:   Pt with craniotomy resection and cranioplasty 10/17.   Met with pt who reports good po intake and no weight changes.   Pt independent with ADL's  as per OT.  Pt to be discharged today. Looking to get out of bed to get dressed, family member coming to pick him up. Nursing aware.   Encouraged healthy diet.   RD to remain available as needed.

## 2024-10-18 NOTE — PHYSICAL THERAPY INITIAL EVALUATION ADULT - MD/RN NOTIFIED
Pt dropped cutting board on left foot; first toe next to big toe; parent's brought tip in on ice
yes
yes

## 2024-10-18 NOTE — PHYSICAL THERAPY INITIAL EVALUATION ADULT - PERTINENT HX OF CURRENT PROBLEM, REHAB EVAL
58 y/o male with hx of Schizoaffective disorder, tardive dyskinesia, COPD not on 02, HTN trans to [facility] from [facility] for NeuroSx evaluation of large painful swelling to posterior Cervical area. Patient with a known lesion there being followed by Dr. Watson in [place] for which he had an MRI on 10/8. CT at [facility] with occipital mass measuring 6.2x3.1x5.6 cm with bony destruction and abutting the cerebellum and b/l occipital lobes and loss of flow of superior sagittal sinus from compression vs thrombosis. Underwent angio embolization of occipital mass 10/15. Now s/p suboccipital craniectomy for brain tumor resection with titanium mesh cranioplasty on 10/17. Pt evaluated in NSICU.
60 y/o male with hx of Schizoaffective disorder, tardive dyskinesia, COPD not on 02, HTN trans to [facility] from [facility] for NeuroSx evaluation of large painful swelling to posterior Cervical area. Patient with a known lesion there being followed by Dr. Watson in [place] for which he had an MRI on 10/8 for evaluate it. He endorsed pain to that area and pain in his shoulders and B/L UE, no current visual changes, motor weakness or gait disturbance. He admits to occasional posterior HA. He was seen. Denies CP, SOB, CP, N/V, fever, or chills. No weight loss or night sweats. CT at [facility] with occipital mass measuring 6.2x3.1x5.6 cm with bony destruction and abutting the cerebellum and b/l occipital lobes and loss of flow of superior sagittal sinus from compression vs thrombosis. Patient seen by NeuroSx and at this time awaiting further imaging studies prior to operative plan.

## 2024-10-18 NOTE — DISCHARGE NOTE PROVIDER - NSDCFUADDAPPT_GEN_ALL_CORE_FT
- please follow up with primary care doctor in 1 week for repeat CBC   - please follow up with Dr. Burnett in 2 weeks

## 2024-10-18 NOTE — PHYSICAL THERAPY INITIAL EVALUATION ADULT - NEUROVASCULAR ASSESSMENT LUE
Ultrasound looks fine. If not improving, then I would recommend referral to general surgeon to discuss chronic abdominal pain with unknown etiology.  Please notify her of these results and send a hard copy if not on myChart.   Thanks. LUIS M Bean    
no numbness/no tingling
no numbness

## 2024-10-18 NOTE — PROGRESS NOTE ADULT - REASON FOR ADMISSION
Occipital brain mass
Occipital brain mass & The LEFT transverse sinus is occluded distally by the mass. The torcula is partially occluded.
Occipital brain mass

## 2024-10-18 NOTE — PROGRESS NOTE ADULT - ASSESSMENT
Assesment: 59M PMH Schizoeffective disorder, tardive dyskinesia, COPD not on 02, HTN admitted with occipital brain mass now s/p suboccipital craniectomy for brain tumor resection with cranioplasty, POD#0. Patient seen lying comfortably in bed. Denies headache, vision changes, weakness, numbness/tingling, or n/v. Pain controlled with medication.  POD 1    Plan:  - Q1 neuro checks  - CTH stable  - MRI & MRV brain w/wo in AM  - 1 subgaleal drain to straight suction (gravity)  - Ancef while drain is in   - HOB at least 30 degrees, off his incision   - PCA for pain control, Tylenol 650 standing, oxy 5/10 PRN  - NS @125cc/hr  - Continue Depakote 500 BID  - TOV in AM, monitor I&Os  - DVT ppx: SCDs, hold chemoprophylaxis d/t post-op bleeding risk  - Activity: PT, OT  - DC home in AM pending imaging and discussions w/ Dr Burnett  - Further medical management per NSICU

## 2024-10-18 NOTE — CHART NOTE - NSCHARTNOTEFT_GEN_A_CORE
Patient was positioned flat. Subgaleal drain was clamped taken off suction. Sterile dressing removed with dried blood noted. Incision clean, dry, intact without drainage or dehiscence noted. SG ANETA drain removed slowly with minimal drainage from site. 2 staples placed to close drain site. Patient tolerated procedure well. RN/primary care team aware.

## 2024-10-22 LAB — TM INTERPRETATION: SIGNIFICANT CHANGE UP

## 2024-10-25 DIAGNOSIS — G93.9 DISORDER OF BRAIN, UNSPECIFIED: ICD-10-CM

## 2024-10-25 DIAGNOSIS — Z98.890 OTHER SPECIFIED POSTPROCEDURAL STATES: ICD-10-CM

## 2024-10-25 RX ORDER — CYCLOBENZAPRINE HYDROCHLORIDE 5 MG/1
5 TABLET, FILM COATED ORAL 3 TIMES DAILY
Qty: 42 | Refills: 0 | Status: ACTIVE | COMMUNITY
Start: 2024-10-25 | End: 1900-01-01

## 2024-10-29 LAB — SURGICAL PATHOLOGY STUDY: SIGNIFICANT CHANGE UP

## 2024-10-31 ENCOUNTER — NON-APPOINTMENT (OUTPATIENT)
Age: 59
End: 2024-10-31

## 2024-11-05 ENCOUNTER — NON-APPOINTMENT (OUTPATIENT)
Age: 59
End: 2024-11-05

## 2024-11-06 ENCOUNTER — NON-APPOINTMENT (OUTPATIENT)
Age: 59
End: 2024-11-06

## 2024-11-06 ENCOUNTER — LABORATORY RESULT (OUTPATIENT)
Age: 59
End: 2024-11-06

## 2024-11-06 ENCOUNTER — RESULT REVIEW (OUTPATIENT)
Age: 59
End: 2024-11-06

## 2024-11-06 ENCOUNTER — APPOINTMENT (OUTPATIENT)
Dept: HEMATOLOGY ONCOLOGY | Facility: CLINIC | Age: 59
End: 2024-11-06

## 2024-11-06 VITALS
BODY MASS INDEX: 30.63 KG/M2 | HEART RATE: 86 BPM | WEIGHT: 183.87 LBS | SYSTOLIC BLOOD PRESSURE: 139 MMHG | OXYGEN SATURATION: 90 % | DIASTOLIC BLOOD PRESSURE: 85 MMHG | TEMPERATURE: 98 F | HEIGHT: 65 IN

## 2024-11-06 PROCEDURE — 99215 OFFICE O/P EST HI 40 MIN: CPT

## 2024-11-06 PROCEDURE — 38221 DX BONE MARROW BIOPSIES: CPT | Mod: RT

## 2024-11-07 ENCOUNTER — APPOINTMENT (OUTPATIENT)
Dept: NEUROSURGERY | Facility: CLINIC | Age: 59
End: 2024-11-07
Payer: MEDICARE

## 2024-11-07 VITALS
OXYGEN SATURATION: 94 % | TEMPERATURE: 98.2 F | HEIGHT: 65 IN | SYSTOLIC BLOOD PRESSURE: 152 MMHG | BODY MASS INDEX: 30.49 KG/M2 | WEIGHT: 183 LBS | DIASTOLIC BLOOD PRESSURE: 89 MMHG | HEART RATE: 87 BPM

## 2024-11-07 DIAGNOSIS — G93.9 DISORDER OF BRAIN, UNSPECIFIED: ICD-10-CM

## 2024-11-07 DIAGNOSIS — Z98.890 OTHER SPECIFIED POSTPROCEDURAL STATES: ICD-10-CM

## 2024-11-07 DIAGNOSIS — C90.30 SOLITARY PLASMACYTOMA NOT HAVING ACHIEVED REMISSION: ICD-10-CM

## 2024-11-07 LAB
ALBUMIN SERPL ELPH-MCNC: 3.8 G/DL
ALP BLD-CCNC: 55 U/L
ALT SERPL-CCNC: 5 U/L
ANION GAP SERPL CALC-SCNC: 13 MMOL/L
AST SERPL-CCNC: 10 U/L
BILIRUB SERPL-MCNC: <0.2 MG/DL
BUN SERPL-MCNC: 11 MG/DL
CALCIUM SERPL-MCNC: 9 MG/DL
CHLORIDE SERPL-SCNC: 100 MMOL/L
CO2 SERPL-SCNC: 27 MMOL/L
CREAT SERPL-MCNC: 1.07 MG/DL
DEPRECATED KAPPA LC FREE/LAMBDA SER: 0.04 RATIO
EGFR: 80 ML/MIN/1.73M2
GLUCOSE SERPL-MCNC: 98 MG/DL
IGA SER QL IEP: 56 MG/DL
IGG SER QL IEP: 487 MG/DL
IGM SER QL IEP: 49 MG/DL
KAPPA LC CSF-MCNC: 34.82 MG/DL
KAPPA LC SERPL-MCNC: 1.38 MG/DL
POTASSIUM SERPL-SCNC: 4.7 MMOL/L
PROT SERPL-MCNC: 5.9 G/DL
PROT SERPL-MCNC: 5.9 G/DL
SODIUM SERPL-SCNC: 140 MMOL/L

## 2024-11-07 PROCEDURE — 99024 POSTOP FOLLOW-UP VISIT: CPT

## 2024-11-07 RX ORDER — OXYCODONE 5 MG/1
5 TABLET ORAL
Qty: 30 | Refills: 0 | Status: ACTIVE | COMMUNITY
Start: 2024-11-07 | End: 1900-01-01

## 2024-11-26 PROCEDURE — 85027 COMPLETE CBC AUTOMATED: CPT

## 2024-11-26 PROCEDURE — C1887: CPT

## 2024-11-26 PROCEDURE — 88342 IMHCHEM/IMCYTCHM 1ST ANTB: CPT

## 2024-11-26 PROCEDURE — 61626 TCAT PERM OCCLS/EMBOL NONCNS: CPT

## 2024-11-26 PROCEDURE — 75894 X-RAYS TRANSCATH THERAPY: CPT

## 2024-11-26 PROCEDURE — 84295 ASSAY OF SERUM SODIUM: CPT

## 2024-11-26 PROCEDURE — 84134 ASSAY OF PREALBUMIN: CPT

## 2024-11-26 PROCEDURE — 94640 AIRWAY INHALATION TREATMENT: CPT

## 2024-11-26 PROCEDURE — 36224 PLACE CATH CAROTD ART: CPT

## 2024-11-26 PROCEDURE — C1769: CPT

## 2024-11-26 PROCEDURE — 82330 ASSAY OF CALCIUM: CPT

## 2024-11-26 PROCEDURE — 71045 X-RAY EXAM CHEST 1 VIEW: CPT

## 2024-11-26 PROCEDURE — 82803 BLOOD GASES ANY COMBINATION: CPT

## 2024-11-26 PROCEDURE — 93005 ELECTROCARDIOGRAM TRACING: CPT

## 2024-11-26 PROCEDURE — 36226 PLACE CATH VERTEBRAL ART: CPT

## 2024-11-26 PROCEDURE — 87641 MR-STAPH DNA AMP PROBE: CPT

## 2024-11-26 PROCEDURE — 70553 MRI BRAIN STEM W/O & W/DYE: CPT | Mod: MC

## 2024-11-26 PROCEDURE — 86923 COMPATIBILITY TEST ELECTRIC: CPT

## 2024-11-26 PROCEDURE — 70450 CT HEAD/BRAIN W/O DYE: CPT | Mod: MC

## 2024-11-26 PROCEDURE — 88341 IMHCHEM/IMCYTCHM EA ADD ANTB: CPT

## 2024-11-26 PROCEDURE — 82435 ASSAY OF BLOOD CHLORIDE: CPT

## 2024-11-26 PROCEDURE — 85610 PROTHROMBIN TIME: CPT

## 2024-11-26 PROCEDURE — 71260 CT THORAX DX C+: CPT | Mod: MC

## 2024-11-26 PROCEDURE — 88185 FLOWCYTOMETRY/TC ADD-ON: CPT

## 2024-11-26 PROCEDURE — 36227 PLACE CATH XTRNL CAROTID: CPT

## 2024-11-26 PROCEDURE — C1894: CPT

## 2024-11-26 PROCEDURE — 36415 COLL VENOUS BLD VENIPUNCTURE: CPT

## 2024-11-26 PROCEDURE — C1763: CPT

## 2024-11-26 PROCEDURE — 97163 PT EVAL HIGH COMPLEX 45 MIN: CPT

## 2024-11-26 PROCEDURE — 99285 EMERGENCY DEPT VISIT HI MDM: CPT | Mod: 25

## 2024-11-26 PROCEDURE — C1760: CPT

## 2024-11-26 PROCEDURE — 84132 ASSAY OF SERUM POTASSIUM: CPT

## 2024-11-26 PROCEDURE — 85730 THROMBOPLASTIN TIME PARTIAL: CPT

## 2024-11-26 PROCEDURE — 82947 ASSAY GLUCOSE BLOOD QUANT: CPT

## 2024-11-26 PROCEDURE — 75898 FOLLOW-UP ANGIOGRAPHY: CPT

## 2024-11-26 PROCEDURE — 84100 ASSAY OF PHOSPHORUS: CPT

## 2024-11-26 PROCEDURE — 93306 TTE W/DOPPLER COMPLETE: CPT

## 2024-11-26 PROCEDURE — 88184 FLOWCYTOMETRY/ TC 1 MARKER: CPT

## 2024-11-26 PROCEDURE — 86900 BLOOD TYPING SEROLOGIC ABO: CPT

## 2024-11-26 PROCEDURE — 83605 ASSAY OF LACTIC ACID: CPT

## 2024-11-26 PROCEDURE — 70470 CT HEAD/BRAIN W/O & W/DYE: CPT | Mod: MC

## 2024-11-26 PROCEDURE — C1713: CPT

## 2024-11-26 PROCEDURE — 80048 BASIC METABOLIC PNL TOTAL CA: CPT

## 2024-11-26 PROCEDURE — 87205 SMEAR GRAM STAIN: CPT

## 2024-11-26 PROCEDURE — 88364 INSITU HYBRIDIZATION (FISH): CPT

## 2024-11-26 PROCEDURE — 70545 MR ANGIOGRAPHY HEAD W/DYE: CPT | Mod: MC

## 2024-11-26 PROCEDURE — 85014 HEMATOCRIT: CPT

## 2024-11-26 PROCEDURE — 88307 TISSUE EXAM BY PATHOLOGIST: CPT

## 2024-11-26 PROCEDURE — 86850 RBC ANTIBODY SCREEN: CPT

## 2024-11-26 PROCEDURE — 88365 INSITU HYBRIDIZATION (FISH): CPT

## 2024-11-26 PROCEDURE — 83735 ASSAY OF MAGNESIUM: CPT

## 2024-11-26 PROCEDURE — C1889: CPT

## 2024-11-26 PROCEDURE — C9399: CPT

## 2024-11-26 PROCEDURE — 85018 HEMOGLOBIN: CPT

## 2024-11-26 PROCEDURE — 86901 BLOOD TYPING SEROLOGIC RH(D): CPT

## 2024-11-26 PROCEDURE — 80053 COMPREHEN METABOLIC PANEL: CPT

## 2024-11-26 PROCEDURE — 87640 STAPH A DNA AMP PROBE: CPT

## 2024-12-02 ENCOUNTER — APPOINTMENT (OUTPATIENT)
Dept: HEMATOLOGY ONCOLOGY | Facility: CLINIC | Age: 59
End: 2024-12-02

## 2024-12-03 ENCOUNTER — NON-APPOINTMENT (OUTPATIENT)
Age: 59
End: 2024-12-03

## 2024-12-04 ENCOUNTER — APPOINTMENT (OUTPATIENT)
Dept: NUCLEAR MEDICINE | Facility: CLINIC | Age: 59
End: 2024-12-04

## 2024-12-05 ENCOUNTER — APPOINTMENT (OUTPATIENT)
Dept: NEUROSURGERY | Facility: CLINIC | Age: 59
End: 2024-12-05

## 2024-12-09 ENCOUNTER — NON-APPOINTMENT (OUTPATIENT)
Age: 59
End: 2024-12-09

## 2024-12-17 ENCOUNTER — APPOINTMENT (OUTPATIENT)
Dept: NUCLEAR MEDICINE | Facility: CLINIC | Age: 59
End: 2024-12-17
Payer: MEDICARE

## 2024-12-17 ENCOUNTER — OUTPATIENT (OUTPATIENT)
Dept: OUTPATIENT SERVICES | Facility: HOSPITAL | Age: 59
LOS: 1 days | End: 2024-12-17

## 2024-12-17 DIAGNOSIS — C90.30 SOLITARY PLASMACYTOMA NOT HAVING ACHIEVED REMISSION: ICD-10-CM

## 2024-12-17 DIAGNOSIS — Z98.89 OTHER SPECIFIED POSTPROCEDURAL STATES: Chronic | ICD-10-CM

## 2024-12-17 DIAGNOSIS — Z98.1 ARTHRODESIS STATUS: Chronic | ICD-10-CM

## 2024-12-17 PROCEDURE — 78816 PET IMAGE W/CT FULL BODY: CPT | Mod: 26,PI

## 2024-12-23 ENCOUNTER — APPOINTMENT (OUTPATIENT)
Dept: HEMATOLOGY ONCOLOGY | Facility: CLINIC | Age: 59
End: 2024-12-23
Payer: MEDICARE

## 2024-12-23 ENCOUNTER — RESULT REVIEW (OUTPATIENT)
Age: 59
End: 2024-12-23

## 2024-12-23 VITALS
WEIGHT: 172.51 LBS | TEMPERATURE: 98 F | HEART RATE: 83 BPM | OXYGEN SATURATION: 96 % | DIASTOLIC BLOOD PRESSURE: 105 MMHG | BODY MASS INDEX: 28.74 KG/M2 | SYSTOLIC BLOOD PRESSURE: 156 MMHG | HEIGHT: 65 IN

## 2024-12-23 DIAGNOSIS — C90.30 SOLITARY PLASMACYTOMA NOT HAVING ACHIEVED REMISSION: ICD-10-CM

## 2024-12-23 PROCEDURE — 99215 OFFICE O/P EST HI 40 MIN: CPT

## 2024-12-23 RX ORDER — DEXAMETHASONE 4 MG/1
4 TABLET ORAL
Qty: 20 | Refills: 0 | Status: ACTIVE | COMMUNITY
Start: 2024-12-23 | End: 1900-01-01

## 2024-12-26 ENCOUNTER — NON-APPOINTMENT (OUTPATIENT)
Age: 59
End: 2024-12-26

## 2024-12-26 ENCOUNTER — OUTPATIENT (OUTPATIENT)
Dept: OUTPATIENT SERVICES | Facility: HOSPITAL | Age: 59
LOS: 1 days | End: 2024-12-26
Payer: MEDICARE

## 2024-12-26 DIAGNOSIS — Z98.89 OTHER SPECIFIED POSTPROCEDURAL STATES: Chronic | ICD-10-CM

## 2024-12-27 ENCOUNTER — NON-APPOINTMENT (OUTPATIENT)
Age: 59
End: 2024-12-27

## 2024-12-31 LAB
ALBUMIN MFR SERPL ELPH: 60.9 %
ALBUMIN SERPL ELPH-MCNC: 4.1 G/DL
ALBUMIN SERPL-MCNC: 3.8 G/DL
ALBUMIN/GLOB SERPL: 1.5 RATIO
ALP BLD-CCNC: 82 U/L
ALPHA1 GLOB MFR SERPL ELPH: 5.7 %
ALPHA1 GLOB SERPL ELPH-MCNC: 0.4 G/DL
ALPHA2 GLOB MFR SERPL ELPH: 13.5 %
ALPHA2 GLOB SERPL ELPH-MCNC: 0.9 G/DL
ALT SERPL-CCNC: 9 U/L
ANION GAP SERPL CALC-SCNC: 16 MMOL/L
AST SERPL-CCNC: 7 U/L
B-GLOBULIN MFR SERPL ELPH: 11.6 %
B-GLOBULIN SERPL ELPH-MCNC: 0.7 G/DL
B2 MICROGLOB SERPL-MCNC: 3 MG/L
BILIRUB SERPL-MCNC: <0.2 MG/DL
BUN SERPL-MCNC: 15 MG/DL
CALCIUM SERPL-MCNC: 9.5 MG/DL
CHLORIDE SERPL-SCNC: 101 MMOL/L
CO2 SERPL-SCNC: 20 MMOL/L
CREAT SERPL-MCNC: 0.95 MG/DL
DEPRECATED KAPPA LC FREE/LAMBDA SER: 0.04 RATIO
EGFR: 92 ML/MIN/1.73M2
GAMMA GLOB FLD ELPH-MCNC: 0.5 G/DL
GAMMA GLOB MFR SERPL ELPH: 8.3 %
GLUCOSE SERPL-MCNC: 90 MG/DL
HBV CORE IGG+IGM SER QL: NONREACTIVE
HBV SURFACE AB SER QL: NONREACTIVE
HBV SURFACE AG SER QL: NONREACTIVE
IGA SER QL IEP: 79 MG/DL
IGG SER QL IEP: 639 MG/DL
IGM SER QL IEP: 43 MG/DL
INTERPRETATION SERPL IEP-IMP: NORMAL
KAPPA LC CSF-MCNC: 34.85 MG/DL
KAPPA LC SERPL-MCNC: 1.23 MG/DL
M PROTEIN MFR SERPL ELPH: NORMAL
M PROTEIN SPEC IFE-MCNC: NORMAL
MONOCLON BAND OBS SERPL: NORMAL
POTASSIUM SERPL-SCNC: 4.6 MMOL/L
PROT SERPL-MCNC: 6.3 G/DL
PROT SERPL-MCNC: 6.3 G/DL
PROT SERPL-MCNC: 6.6 G/DL
SODIUM SERPL-SCNC: 137 MMOL/L

## 2025-01-01 ENCOUNTER — OUTPATIENT (OUTPATIENT)
Dept: OUTPATIENT SERVICES | Facility: HOSPITAL | Age: 60
LOS: 1 days | End: 2025-01-01
Payer: MEDICARE

## 2025-01-01 ENCOUNTER — RESULT REVIEW (OUTPATIENT)
Age: 60
End: 2025-01-01

## 2025-01-01 ENCOUNTER — APPOINTMENT (OUTPATIENT)
Dept: INFUSION THERAPY | Facility: CANCER CENTER | Age: 60
End: 2025-01-01

## 2025-01-01 ENCOUNTER — NON-APPOINTMENT (OUTPATIENT)
Age: 60
End: 2025-01-01

## 2025-01-01 DIAGNOSIS — Z51.11 ENCOUNTER FOR ANTINEOPLASTIC CHEMOTHERAPY: ICD-10-CM

## 2025-01-01 DIAGNOSIS — Z98.1 ARTHRODESIS STATUS: Chronic | ICD-10-CM

## 2025-01-01 DIAGNOSIS — C90.00 MULTIPLE MYELOMA NOT HAVING ACHIEVED REMISSION: ICD-10-CM

## 2025-01-01 DIAGNOSIS — R21 RASH AND OTHER NONSPECIFIC SKIN ERUPTION: ICD-10-CM

## 2025-01-01 DIAGNOSIS — Z98.89 OTHER SPECIFIED POSTPROCEDURAL STATES: Chronic | ICD-10-CM

## 2025-01-01 LAB
% ALBUMIN: 56.6 % — SIGNIFICANT CHANGE UP
% ALPHA 1: 6.9 % — SIGNIFICANT CHANGE UP
% ALPHA 2: 15.2 % — SIGNIFICANT CHANGE UP
% BETA: 12.7 % — SIGNIFICANT CHANGE UP
% GAMMA: 8.6 % — SIGNIFICANT CHANGE UP
% M SPIKE: 2.5 % — SIGNIFICANT CHANGE UP
ALBUMIN SERPL ELPH-MCNC: 3.5 G/DL — LOW (ref 3.6–5.5)
ALBUMIN SERPL ELPH-MCNC: 3.8 G/DL — SIGNIFICANT CHANGE UP (ref 3.3–5)
ALBUMIN SERPL ELPH-MCNC: 3.8 G/DL — SIGNIFICANT CHANGE UP (ref 3.3–5)
ALBUMIN SERPL ELPH-MCNC: 4.2 G/DL — SIGNIFICANT CHANGE UP (ref 3.3–5)
ALBUMIN/GLOB SERPL ELPH: 1.3 RATIO — SIGNIFICANT CHANGE UP
ALP SERPL-CCNC: 75 U/L — SIGNIFICANT CHANGE UP (ref 40–120)
ALP SERPL-CCNC: 83 U/L — SIGNIFICANT CHANGE UP (ref 40–120)
ALP SERPL-CCNC: 96 U/L — SIGNIFICANT CHANGE UP (ref 40–120)
ALPHA1 GLOB SERPL ELPH-MCNC: 0.4 G/DL — SIGNIFICANT CHANGE UP (ref 0.1–0.4)
ALPHA2 GLOB SERPL ELPH-MCNC: 0.9 G/DL — SIGNIFICANT CHANGE UP (ref 0.5–1)
ALT FLD-CCNC: 13 U/L — SIGNIFICANT CHANGE UP (ref 10–45)
ALT FLD-CCNC: 16 U/L — SIGNIFICANT CHANGE UP (ref 10–45)
ALT FLD-CCNC: 18 U/L — SIGNIFICANT CHANGE UP (ref 10–45)
ANION GAP SERPL CALC-SCNC: 10 MMOL/L — SIGNIFICANT CHANGE UP (ref 5–17)
ANION GAP SERPL CALC-SCNC: 14 MMOL/L — SIGNIFICANT CHANGE UP (ref 5–17)
ANION GAP SERPL CALC-SCNC: 15 MMOL/L — SIGNIFICANT CHANGE UP (ref 5–17)
ANISOCYTOSIS BLD QL: SIGNIFICANT CHANGE UP
AST SERPL-CCNC: 10 U/L — SIGNIFICANT CHANGE UP (ref 10–40)
AST SERPL-CCNC: 16 U/L — SIGNIFICANT CHANGE UP (ref 10–40)
AST SERPL-CCNC: 8 U/L — LOW (ref 10–40)
B-GLOBULIN SERPL ELPH-MCNC: 0.8 G/DL — SIGNIFICANT CHANGE UP (ref 0.5–1)
BASOPHILS # BLD AUTO: 0 K/UL — SIGNIFICANT CHANGE UP (ref 0–0.2)
BASOPHILS # BLD AUTO: 0.03 K/UL — SIGNIFICANT CHANGE UP (ref 0–0.2)
BASOPHILS # BLD AUTO: 0.13 K/UL — SIGNIFICANT CHANGE UP (ref 0–0.2)
BASOPHILS NFR BLD AUTO: 0 % — SIGNIFICANT CHANGE UP (ref 0–2)
BASOPHILS NFR BLD AUTO: 0.1 % — SIGNIFICANT CHANGE UP (ref 0–2)
BASOPHILS NFR BLD AUTO: 1 % — SIGNIFICANT CHANGE UP (ref 0–2)
BILIRUB SERPL-MCNC: 0.2 MG/DL — SIGNIFICANT CHANGE UP (ref 0.2–1.2)
BILIRUB SERPL-MCNC: 0.2 MG/DL — SIGNIFICANT CHANGE UP (ref 0.2–1.2)
BILIRUB SERPL-MCNC: 0.3 MG/DL — SIGNIFICANT CHANGE UP (ref 0.2–1.2)
BUN SERPL-MCNC: 12 MG/DL — SIGNIFICANT CHANGE UP (ref 7–23)
BUN SERPL-MCNC: 15 MG/DL — SIGNIFICANT CHANGE UP (ref 7–23)
BUN SERPL-MCNC: 9 MG/DL — SIGNIFICANT CHANGE UP (ref 7–23)
CALCIUM SERPL-MCNC: 8.3 MG/DL — LOW (ref 8.4–10.5)
CALCIUM SERPL-MCNC: 9 MG/DL — SIGNIFICANT CHANGE UP (ref 8.4–10.5)
CALCIUM SERPL-MCNC: 9.1 MG/DL — SIGNIFICANT CHANGE UP (ref 8.4–10.5)
CHLORIDE SERPL-SCNC: 102 MMOL/L — SIGNIFICANT CHANGE UP (ref 96–108)
CHLORIDE SERPL-SCNC: 99 MMOL/L — SIGNIFICANT CHANGE UP (ref 96–108)
CHLORIDE SERPL-SCNC: 99 MMOL/L — SIGNIFICANT CHANGE UP (ref 96–108)
CO2 SERPL-SCNC: 22 MMOL/L — SIGNIFICANT CHANGE UP (ref 22–31)
CO2 SERPL-SCNC: 25 MMOL/L — SIGNIFICANT CHANGE UP (ref 22–31)
CO2 SERPL-SCNC: 26 MMOL/L — SIGNIFICANT CHANGE UP (ref 22–31)
CREAT SERPL-MCNC: 0.62 MG/DL — SIGNIFICANT CHANGE UP (ref 0.5–1.3)
CREAT SERPL-MCNC: 0.69 MG/DL — SIGNIFICANT CHANGE UP (ref 0.5–1.3)
CREAT SERPL-MCNC: 0.75 MG/DL — SIGNIFICANT CHANGE UP (ref 0.5–1.3)
EGFR: 104 ML/MIN/1.73M2 — SIGNIFICANT CHANGE UP
EGFR: 104 ML/MIN/1.73M2 — SIGNIFICANT CHANGE UP
EGFR: 107 ML/MIN/1.73M2 — SIGNIFICANT CHANGE UP
EGFR: 107 ML/MIN/1.73M2 — SIGNIFICANT CHANGE UP
EGFR: 110 ML/MIN/1.73M2 — SIGNIFICANT CHANGE UP
EGFR: 110 ML/MIN/1.73M2 — SIGNIFICANT CHANGE UP
ELLIPTOCYTES BLD QL SMEAR: SLIGHT — SIGNIFICANT CHANGE UP
EOSINOPHIL # BLD AUTO: 0 K/UL — SIGNIFICANT CHANGE UP (ref 0–0.5)
EOSINOPHIL # BLD AUTO: 0.04 K/UL — SIGNIFICANT CHANGE UP (ref 0–0.5)
EOSINOPHIL # BLD AUTO: 0.15 K/UL — SIGNIFICANT CHANGE UP (ref 0–0.5)
EOSINOPHIL NFR BLD AUTO: 0 % — SIGNIFICANT CHANGE UP (ref 0–6)
EOSINOPHIL NFR BLD AUTO: 0.2 % — SIGNIFICANT CHANGE UP (ref 0–6)
EOSINOPHIL NFR BLD AUTO: 1.1 % — SIGNIFICANT CHANGE UP (ref 0–6)
GAMMA GLOBULIN: 0.5 G/DL — LOW (ref 0.6–1.6)
GLUCOSE SERPL-MCNC: 123 MG/DL — HIGH (ref 70–99)
GLUCOSE SERPL-MCNC: 138 MG/DL — HIGH (ref 70–99)
GLUCOSE SERPL-MCNC: 155 MG/DL — HIGH (ref 70–99)
HCT VFR BLD CALC: 32.5 % — LOW (ref 39–50)
HCT VFR BLD CALC: 35.1 % — LOW (ref 39–50)
HCT VFR BLD CALC: 36.1 % — LOW (ref 39–50)
HGB BLD-MCNC: 10.4 G/DL — LOW (ref 13–17)
HGB BLD-MCNC: 11 G/DL — LOW (ref 13–17)
HGB BLD-MCNC: 11.3 G/DL — LOW (ref 13–17)
HYPOCHROMIA BLD QL: SLIGHT — SIGNIFICANT CHANGE UP
HYPOCHROMIA BLD QL: SLIGHT — SIGNIFICANT CHANGE UP
IGA FLD-MCNC: 90 MG/DL — SIGNIFICANT CHANGE UP (ref 84–499)
IGG FLD-MCNC: 643 MG/DL — SIGNIFICANT CHANGE UP (ref 610–1660)
IGM SERPL-MCNC: 39 MG/DL — SIGNIFICANT CHANGE UP (ref 35–242)
IMM GRANULOCYTES NFR BLD AUTO: 0.4 % — SIGNIFICANT CHANGE UP (ref 0–0.9)
IMM GRANULOCYTES NFR BLD AUTO: 1 % — HIGH (ref 0–0.9)
INTERPRETATION SERPL IFE-IMP: SIGNIFICANT CHANGE UP
KAPPA LC SER QL IFE: 1.2 MG/DL — SIGNIFICANT CHANGE UP (ref 0.33–1.94)
KAPPA/LAMBDA FREE LIGHT CHAIN RATIO, SERUM: 0.38 RATIO — SIGNIFICANT CHANGE UP (ref 0.26–1.65)
LAMBDA LC SER QL IFE: 3.18 MG/DL — HIGH (ref 0.57–2.63)
LG PLATELETS BLD QL AUTO: SIGNIFICANT CHANGE UP
LG PLATELETS BLD QL AUTO: SIGNIFICANT CHANGE UP
LYMPHOCYTES # BLD AUTO: 0.58 K/UL — LOW (ref 1–3.3)
LYMPHOCYTES # BLD AUTO: 0.8 K/UL — LOW (ref 1–3.3)
LYMPHOCYTES # BLD AUTO: 0.84 K/UL — LOW (ref 1–3.3)
LYMPHOCYTES # BLD AUTO: 10 % — LOW (ref 13–44)
LYMPHOCYTES # BLD AUTO: 2.7 % — LOW (ref 13–44)
LYMPHOCYTES # BLD AUTO: 6.3 % — LOW (ref 13–44)
M-SPIKE: 0.2 G/DL — HIGH (ref 0–0)
MACROCYTES BLD QL: SIGNIFICANT CHANGE UP
MCHC RBC-ENTMCNC: 26.5 PG — LOW (ref 27–34)
MCHC RBC-ENTMCNC: 26.8 PG — LOW (ref 27–34)
MCHC RBC-ENTMCNC: 27.4 PG — SIGNIFICANT CHANGE UP (ref 27–34)
MCHC RBC-ENTMCNC: 31.3 G/DL — LOW (ref 32–36)
MCHC RBC-ENTMCNC: 31.3 G/DL — LOW (ref 32–36)
MCHC RBC-ENTMCNC: 32 G/DL — SIGNIFICANT CHANGE UP (ref 32–36)
MCV RBC AUTO: 84.6 FL — SIGNIFICANT CHANGE UP (ref 80–100)
MCV RBC AUTO: 85.5 FL — SIGNIFICANT CHANGE UP (ref 80–100)
MCV RBC AUTO: 85.8 FL — SIGNIFICANT CHANGE UP (ref 80–100)
METAMYELOCYTES # FLD: 2 % — HIGH (ref 0–0)
METAMYELOCYTES NFR BLD: 2 % — HIGH (ref 0–0)
MICROCYTES BLD QL: SLIGHT — SIGNIFICANT CHANGE UP
MONOCYTES # BLD AUTO: 0 K/UL — SIGNIFICANT CHANGE UP (ref 0–0.9)
MONOCYTES # BLD AUTO: 0.28 K/UL — SIGNIFICANT CHANGE UP (ref 0–0.9)
MONOCYTES # BLD AUTO: 0.41 K/UL — SIGNIFICANT CHANGE UP (ref 0–0.9)
MONOCYTES NFR BLD AUTO: 0 % — LOW (ref 2–14)
MONOCYTES NFR BLD AUTO: 1.3 % — LOW (ref 2–14)
MONOCYTES NFR BLD AUTO: 3.1 % — SIGNIFICANT CHANGE UP (ref 2–14)
NEUTROPHILS # BLD AUTO: 11.73 K/UL — HIGH (ref 1.8–7.4)
NEUTROPHILS # BLD AUTO: 20.45 K/UL — HIGH (ref 1.8–7.4)
NEUTROPHILS # BLD AUTO: 6.58 K/UL — SIGNIFICANT CHANGE UP (ref 1.8–7.4)
NEUTROPHILS NFR BLD AUTO: 80 % — HIGH (ref 43–77)
NEUTROPHILS NFR BLD AUTO: 87.5 % — HIGH (ref 43–77)
NEUTROPHILS NFR BLD AUTO: 95.3 % — HIGH (ref 43–77)
NEUTS BAND # BLD: 2 % — SIGNIFICANT CHANGE UP (ref 0–8)
NEUTS BAND NFR BLD: 2 % — SIGNIFICANT CHANGE UP (ref 0–8)
NEUTS VAC BLD QL SMEAR: SLIGHT — SIGNIFICANT CHANGE UP
NRBC # BLD: 0 /100 WBCS — SIGNIFICANT CHANGE UP (ref 0–0)
NRBC BLD AUTO-RTO: 0 /100 WBCS — SIGNIFICANT CHANGE UP (ref 0–0)
NRBC BLD AUTO-RTO: 0 /100 WBCS — SIGNIFICANT CHANGE UP (ref 0–0)
NRBC BLD AUTO-RTO: SIGNIFICANT CHANGE UP /100 WBCS (ref 0–0)
NRBC BLD-RTO: 0 /100 WBCS — SIGNIFICANT CHANGE UP (ref 0–0)
PLAT MORPH BLD: NORMAL — SIGNIFICANT CHANGE UP
PLAT MORPH BLD: NORMAL — SIGNIFICANT CHANGE UP
PLATELET # BLD AUTO: 430 K/UL — HIGH (ref 150–400)
PLATELET # BLD AUTO: 480 K/UL — HIGH (ref 150–400)
PLATELET # BLD AUTO: 483 K/UL — HIGH (ref 150–400)
POIKILOCYTOSIS BLD QL AUTO: SLIGHT — SIGNIFICANT CHANGE UP
POIKILOCYTOSIS BLD QL AUTO: SLIGHT — SIGNIFICANT CHANGE UP
POLYCHROMASIA BLD QL SMEAR: SIGNIFICANT CHANGE UP
POLYCHROMASIA BLD QL SMEAR: SLIGHT — SIGNIFICANT CHANGE UP
POTASSIUM SERPL-MCNC: 4.1 MMOL/L — SIGNIFICANT CHANGE UP (ref 3.5–5.3)
POTASSIUM SERPL-MCNC: 4.8 MMOL/L — SIGNIFICANT CHANGE UP (ref 3.5–5.3)
POTASSIUM SERPL-MCNC: 4.9 MMOL/L — SIGNIFICANT CHANGE UP (ref 3.5–5.3)
POTASSIUM SERPL-SCNC: 4.1 MMOL/L — SIGNIFICANT CHANGE UP (ref 3.5–5.3)
POTASSIUM SERPL-SCNC: 4.8 MMOL/L — SIGNIFICANT CHANGE UP (ref 3.5–5.3)
POTASSIUM SERPL-SCNC: 4.9 MMOL/L — SIGNIFICANT CHANGE UP (ref 3.5–5.3)
PROT PATTERN SERPL ELPH-IMP: SIGNIFICANT CHANGE UP
PROT SERPL-MCNC: 6.1 G/DL — SIGNIFICANT CHANGE UP (ref 6–8.3)
PROT SERPL-MCNC: 6.4 G/DL — SIGNIFICANT CHANGE UP (ref 6–8.3)
PROT SERPL-MCNC: 6.6 G/DL — SIGNIFICANT CHANGE UP (ref 6–8.3)
RBC # BLD: 3.79 M/UL — LOW (ref 4.2–5.8)
RBC # BLD: 4.15 M/UL — LOW (ref 4.2–5.8)
RBC # BLD: 4.22 M/UL — SIGNIFICANT CHANGE UP (ref 4.2–5.8)
RBC # FLD: 20.5 % — HIGH (ref 10.3–14.5)
RBC # FLD: 20.9 % — HIGH (ref 10.3–14.5)
RBC # FLD: 21.2 % — HIGH (ref 10.3–14.5)
RBC BLD AUTO: SIGNIFICANT CHANGE UP
RBC BLD AUTO: SIGNIFICANT CHANGE UP
SCHISTOCYTES BLD QL AUTO: SLIGHT — SIGNIFICANT CHANGE UP
SODIUM SERPL-SCNC: 136 MMOL/L — SIGNIFICANT CHANGE UP (ref 135–145)
SODIUM SERPL-SCNC: 138 MMOL/L — SIGNIFICANT CHANGE UP (ref 135–145)
SODIUM SERPL-SCNC: 139 MMOL/L — SIGNIFICANT CHANGE UP (ref 135–145)
VARIANT LYMPHS # BLD: 6 % — SIGNIFICANT CHANGE UP (ref 0–6)
VARIANT LYMPHS NFR BLD MANUAL: 6 % — SIGNIFICANT CHANGE UP (ref 0–6)
WBC # BLD: 13.4 K/UL — HIGH (ref 3.8–10.5)
WBC # BLD: 21.47 K/UL — HIGH (ref 3.8–10.5)
WBC # BLD: 8.03 K/UL — SIGNIFICANT CHANGE UP (ref 3.8–10.5)
WBC # FLD AUTO: 13.4 K/UL — HIGH (ref 3.8–10.5)
WBC # FLD AUTO: 21.47 K/UL — HIGH (ref 3.8–10.5)
WBC # FLD AUTO: 8.03 K/UL — SIGNIFICANT CHANGE UP (ref 3.8–10.5)

## 2025-01-01 PROCEDURE — 84165 PROTEIN E-PHORESIS SERUM: CPT

## 2025-01-01 PROCEDURE — 84155 ASSAY OF PROTEIN SERUM: CPT

## 2025-01-01 PROCEDURE — 82784 ASSAY IGA/IGD/IGG/IGM EACH: CPT

## 2025-01-01 PROCEDURE — 96401 CHEMO ANTI-NEOPL SQ/IM: CPT

## 2025-01-01 PROCEDURE — 80053 COMPREHEN METABOLIC PANEL: CPT

## 2025-01-01 PROCEDURE — 85027 COMPLETE CBC AUTOMATED: CPT

## 2025-01-01 PROCEDURE — 83521 IG LIGHT CHAINS FREE EACH: CPT

## 2025-01-01 PROCEDURE — 86334 IMMUNOFIX E-PHORESIS SERUM: CPT

## 2025-01-01 RX ORDER — CLOBETASOL PROPIONATE CREAM USP, 0.05% 0.5 MG/G
0.05 CREAM TOPICAL TWICE DAILY
Qty: 30 | Refills: 2 | Status: ACTIVE | COMMUNITY
Start: 2025-01-01 | End: 1900-01-01

## 2025-01-03 ENCOUNTER — APPOINTMENT (OUTPATIENT)
Dept: HEMATOLOGY ONCOLOGY | Facility: CLINIC | Age: 60
End: 2025-01-03

## 2025-01-03 VITALS
HEART RATE: 108 BPM | DIASTOLIC BLOOD PRESSURE: 89 MMHG | HEIGHT: 63.19 IN | TEMPERATURE: 98.4 F | WEIGHT: 167.8 LBS | OXYGEN SATURATION: 96 % | BODY MASS INDEX: 29.36 KG/M2 | SYSTOLIC BLOOD PRESSURE: 138 MMHG

## 2025-01-03 RX ORDER — ONDANSETRON 8 MG/1
8 TABLET ORAL EVERY 8 HOURS
Qty: 30 | Refills: 2 | Status: ACTIVE | COMMUNITY
Start: 2025-01-03 | End: 1900-01-01

## 2025-01-03 RX ORDER — PROCHLORPERAZINE MALEATE 10 MG/1
10 TABLET ORAL
Qty: 30 | Refills: 2 | Status: ACTIVE | COMMUNITY
Start: 2025-01-03 | End: 1900-01-01

## 2025-01-03 RX ORDER — DEXAMETHASONE 4 MG/1
4 TABLET ORAL
Qty: 20 | Refills: 6 | Status: ACTIVE | COMMUNITY
Start: 2025-01-03 | End: 1900-01-01

## 2025-01-03 RX ORDER — VALACYCLOVIR 500 MG/1
500 TABLET, FILM COATED ORAL
Qty: 60 | Refills: 4 | Status: ACTIVE | COMMUNITY
Start: 2025-01-03 | End: 1900-01-01

## 2025-01-04 ENCOUNTER — APPOINTMENT (OUTPATIENT)
Dept: AFTER HOURS CARE | Facility: EMERGENCY ROOM | Age: 60
End: 2025-01-04
Payer: MEDICARE

## 2025-01-04 PROCEDURE — 99215 OFFICE O/P EST HI 40 MIN: CPT

## 2025-01-06 ENCOUNTER — RESULT REVIEW (OUTPATIENT)
Age: 60
End: 2025-01-06

## 2025-01-06 ENCOUNTER — APPOINTMENT (OUTPATIENT)
Dept: INFUSION THERAPY | Facility: CANCER CENTER | Age: 60
End: 2025-01-06

## 2025-01-06 LAB
ALBUMIN SERPL ELPH-MCNC: 3.8 G/DL — SIGNIFICANT CHANGE UP (ref 3.3–5)
ALP SERPL-CCNC: 81 U/L — SIGNIFICANT CHANGE UP (ref 40–120)
ALT FLD-CCNC: 10 U/L — SIGNIFICANT CHANGE UP (ref 10–45)
ANION GAP SERPL CALC-SCNC: 13 MMOL/L — SIGNIFICANT CHANGE UP (ref 5–17)
AST SERPL-CCNC: 6 U/L — LOW (ref 10–40)
BASOPHILS # BLD AUTO: 0.07 K/UL — SIGNIFICANT CHANGE UP (ref 0–0.2)
BASOPHILS NFR BLD AUTO: 0.8 % — SIGNIFICANT CHANGE UP (ref 0–2)
BILIRUB SERPL-MCNC: <0.2 MG/DL — SIGNIFICANT CHANGE UP (ref 0.2–1.2)
BUN SERPL-MCNC: 16 MG/DL — SIGNIFICANT CHANGE UP (ref 7–23)
CALCIUM SERPL-MCNC: 9.1 MG/DL — SIGNIFICANT CHANGE UP (ref 8.4–10.5)
CHLORIDE SERPL-SCNC: 100 MMOL/L — SIGNIFICANT CHANGE UP (ref 96–108)
CO2 SERPL-SCNC: 24 MMOL/L — SIGNIFICANT CHANGE UP (ref 22–31)
CREAT SERPL-MCNC: 0.84 MG/DL — SIGNIFICANT CHANGE UP (ref 0.5–1.3)
EGFR: 100 ML/MIN/1.73M2 — SIGNIFICANT CHANGE UP
EOSINOPHIL # BLD AUTO: 0.24 K/UL — SIGNIFICANT CHANGE UP (ref 0–0.5)
EOSINOPHIL NFR BLD AUTO: 2.6 % — SIGNIFICANT CHANGE UP (ref 0–6)
GLUCOSE SERPL-MCNC: 98 MG/DL — SIGNIFICANT CHANGE UP (ref 70–99)
HCT VFR BLD CALC: 39.5 % — SIGNIFICANT CHANGE UP (ref 39–50)
HGB BLD-MCNC: 12.5 G/DL — LOW (ref 13–17)
IMM GRANULOCYTES NFR BLD AUTO: 1.4 % — HIGH (ref 0–0.9)
LYMPHOCYTES # BLD AUTO: 3.29 K/UL — SIGNIFICANT CHANGE UP (ref 1–3.3)
LYMPHOCYTES # BLD AUTO: 35.7 % — SIGNIFICANT CHANGE UP (ref 13–44)
MCHC RBC-ENTMCNC: 27.1 PG — SIGNIFICANT CHANGE UP (ref 27–34)
MCHC RBC-ENTMCNC: 31.6 G/DL — LOW (ref 32–36)
MCV RBC AUTO: 85.5 FL — SIGNIFICANT CHANGE UP (ref 80–100)
MONOCYTES # BLD AUTO: 0.77 K/UL — SIGNIFICANT CHANGE UP (ref 0–0.9)
MONOCYTES NFR BLD AUTO: 8.4 % — SIGNIFICANT CHANGE UP (ref 2–14)
NEUTROPHILS # BLD AUTO: 4.71 K/UL — SIGNIFICANT CHANGE UP (ref 1.8–7.4)
NEUTROPHILS NFR BLD AUTO: 51.1 % — SIGNIFICANT CHANGE UP (ref 43–77)
NRBC # BLD: 0 /100 WBCS — SIGNIFICANT CHANGE UP (ref 0–0)
NRBC BLD-RTO: 0 /100 WBCS — SIGNIFICANT CHANGE UP (ref 0–0)
PLATELET # BLD AUTO: 446 K/UL — HIGH (ref 150–400)
POTASSIUM SERPL-MCNC: 4.2 MMOL/L — SIGNIFICANT CHANGE UP (ref 3.5–5.3)
POTASSIUM SERPL-SCNC: 4.2 MMOL/L — SIGNIFICANT CHANGE UP (ref 3.5–5.3)
PROT SERPL-MCNC: 6.2 G/DL — SIGNIFICANT CHANGE UP (ref 6–8.3)
PROT SERPL-MCNC: 6.2 G/DL — SIGNIFICANT CHANGE UP (ref 6–8.3)
RBC # BLD: 4.62 M/UL — SIGNIFICANT CHANGE UP (ref 4.2–5.8)
RBC # FLD: 16.6 % — HIGH (ref 10.3–14.5)
SODIUM SERPL-SCNC: 137 MMOL/L — SIGNIFICANT CHANGE UP (ref 135–145)
WBC # BLD: 9.21 K/UL — SIGNIFICANT CHANGE UP (ref 3.8–10.5)
WBC # FLD AUTO: 9.21 K/UL — SIGNIFICANT CHANGE UP (ref 3.8–10.5)

## 2025-01-07 DIAGNOSIS — Z51.11 ENCOUNTER FOR ANTINEOPLASTIC CHEMOTHERAPY: ICD-10-CM

## 2025-01-07 DIAGNOSIS — C90.00 MULTIPLE MYELOMA NOT HAVING ACHIEVED REMISSION: ICD-10-CM

## 2025-01-07 LAB
HBV CORE AB SER-ACNC: SIGNIFICANT CHANGE UP
HBV SURFACE AB SER-ACNC: ABNORMAL
HBV SURFACE AG SER-ACNC: SIGNIFICANT CHANGE UP
HCV AB S/CO SERPL IA: 0.07 S/CO — SIGNIFICANT CHANGE UP (ref 0–0.99)
HCV AB SERPL-IMP: SIGNIFICANT CHANGE UP
IGA FLD-MCNC: 44 MG/DL — LOW (ref 84–499)
IGG FLD-MCNC: 520 MG/DL — LOW (ref 610–1660)
IGM SERPL-MCNC: 56 MG/DL — SIGNIFICANT CHANGE UP (ref 35–242)
KAPPA LC SER QL IFE: 1.16 MG/DL — SIGNIFICANT CHANGE UP (ref 0.33–1.94)
KAPPA/LAMBDA FREE LIGHT CHAIN RATIO, SERUM: 0.04 RATIO — LOW (ref 0.26–1.65)
LAMBDA LC SER QL IFE: 32.82 MG/DL — HIGH (ref 0.57–2.63)

## 2025-01-08 ENCOUNTER — APPOINTMENT (OUTPATIENT)
Dept: NEUROSURGERY | Facility: CLINIC | Age: 60
End: 2025-01-08

## 2025-01-08 ENCOUNTER — APPOINTMENT (OUTPATIENT)
Dept: PHYSICAL MEDICINE AND REHAB | Facility: CLINIC | Age: 60
End: 2025-01-08

## 2025-01-09 ENCOUNTER — APPOINTMENT (OUTPATIENT)
Dept: MRI IMAGING | Facility: CLINIC | Age: 60
End: 2025-01-09

## 2025-01-10 ENCOUNTER — APPOINTMENT (OUTPATIENT)
Dept: HEMATOLOGY ONCOLOGY | Facility: CLINIC | Age: 60
End: 2025-01-10
Payer: MEDICARE

## 2025-01-10 VITALS
SYSTOLIC BLOOD PRESSURE: 142 MMHG | OXYGEN SATURATION: 92 % | WEIGHT: 167 LBS | BODY MASS INDEX: 29.41 KG/M2 | TEMPERATURE: 98.3 F | HEART RATE: 99 BPM | DIASTOLIC BLOOD PRESSURE: 94 MMHG

## 2025-01-10 DIAGNOSIS — C90.00 MULTIPLE MYELOMA NOT HAVING ACHIEVED REMISSION: ICD-10-CM

## 2025-01-10 LAB
% ALBUMIN: 60.2 % — SIGNIFICANT CHANGE UP
% ALPHA 1: 6 % — SIGNIFICANT CHANGE UP
% ALPHA 2: 14.3 % — SIGNIFICANT CHANGE UP
% BETA: 12.3 % — SIGNIFICANT CHANGE UP
% GAMMA: 7.2 % — SIGNIFICANT CHANGE UP
% M SPIKE: SIGNIFICANT CHANGE UP
ALBUMIN SERPL ELPH-MCNC: 3.7 G/DL — SIGNIFICANT CHANGE UP (ref 3.6–5.5)
ALBUMIN/GLOB SERPL ELPH: 1.5 RATIO — SIGNIFICANT CHANGE UP
ALPHA1 GLOB SERPL ELPH-MCNC: 0.4 G/DL — SIGNIFICANT CHANGE UP (ref 0.1–0.4)
ALPHA2 GLOB SERPL ELPH-MCNC: 0.9 G/DL — SIGNIFICANT CHANGE UP (ref 0.5–1)
B-GLOBULIN SERPL ELPH-MCNC: 0.8 G/DL — SIGNIFICANT CHANGE UP (ref 0.5–1)
GAMMA GLOBULIN: 0.4 G/DL — LOW (ref 0.6–1.6)
INTERPRETATION SERPL IFE-IMP: SIGNIFICANT CHANGE UP
M-SPIKE: SIGNIFICANT CHANGE UP (ref 0–0)
PROT PATTERN SERPL ELPH-IMP: SIGNIFICANT CHANGE UP
PROT SERPL-MCNC: 6.2 G/DL — SIGNIFICANT CHANGE UP (ref 6–8.3)

## 2025-01-10 PROCEDURE — G2211 COMPLEX E/M VISIT ADD ON: CPT

## 2025-01-10 PROCEDURE — 99215 OFFICE O/P EST HI 40 MIN: CPT

## 2025-01-13 ENCOUNTER — RESULT REVIEW (OUTPATIENT)
Age: 60
End: 2025-01-13

## 2025-01-13 ENCOUNTER — APPOINTMENT (OUTPATIENT)
Dept: INFUSION THERAPY | Facility: CANCER CENTER | Age: 60
End: 2025-01-13

## 2025-01-13 DIAGNOSIS — J44.9 CHRONIC OBSTRUCTIVE PULMONARY DISEASE, UNSPECIFIED: ICD-10-CM

## 2025-01-13 LAB
ALBUMIN SERPL ELPH-MCNC: 3.4 G/DL — SIGNIFICANT CHANGE UP (ref 3.3–5)
ALP SERPL-CCNC: 104 U/L — SIGNIFICANT CHANGE UP (ref 40–120)
ALT FLD-CCNC: 10 U/L — SIGNIFICANT CHANGE UP (ref 10–45)
ANION GAP SERPL CALC-SCNC: 15 MMOL/L — SIGNIFICANT CHANGE UP (ref 5–17)
AST SERPL-CCNC: 8 U/L — LOW (ref 10–40)
BASOPHILS # BLD AUTO: 0.03 K/UL — SIGNIFICANT CHANGE UP (ref 0–0.2)
BASOPHILS NFR BLD AUTO: 0.3 % — SIGNIFICANT CHANGE UP (ref 0–2)
BILIRUB SERPL-MCNC: 0.4 MG/DL — SIGNIFICANT CHANGE UP (ref 0.2–1.2)
BUN SERPL-MCNC: 25 MG/DL — HIGH (ref 7–23)
CALCIUM SERPL-MCNC: 8.7 MG/DL — SIGNIFICANT CHANGE UP (ref 8.4–10.5)
CHLORIDE SERPL-SCNC: 96 MMOL/L — SIGNIFICANT CHANGE UP (ref 96–108)
CO2 SERPL-SCNC: 25 MMOL/L — SIGNIFICANT CHANGE UP (ref 22–31)
CREAT SERPL-MCNC: 1.39 MG/DL — HIGH (ref 0.5–1.3)
EGFR: 58 ML/MIN/1.73M2 — LOW
EOSINOPHIL # BLD AUTO: 0.06 K/UL — SIGNIFICANT CHANGE UP (ref 0–0.5)
EOSINOPHIL NFR BLD AUTO: 0.7 % — SIGNIFICANT CHANGE UP (ref 0–6)
GLUCOSE SERPL-MCNC: 137 MG/DL — HIGH (ref 70–99)
HCT VFR BLD CALC: 33.5 % — LOW (ref 39–50)
HGB BLD-MCNC: 10.9 G/DL — LOW (ref 13–17)
IMM GRANULOCYTES NFR BLD AUTO: 0.2 % — SIGNIFICANT CHANGE UP (ref 0–0.9)
LYMPHOCYTES # BLD AUTO: 0.54 K/UL — LOW (ref 1–3.3)
LYMPHOCYTES # BLD AUTO: 6.1 % — LOW (ref 13–44)
MCHC RBC-ENTMCNC: 27.3 PG — SIGNIFICANT CHANGE UP (ref 27–34)
MCHC RBC-ENTMCNC: 32.5 G/DL — SIGNIFICANT CHANGE UP (ref 32–36)
MCV RBC AUTO: 83.8 FL — SIGNIFICANT CHANGE UP (ref 80–100)
MONOCYTES # BLD AUTO: 0.52 K/UL — SIGNIFICANT CHANGE UP (ref 0–0.9)
MONOCYTES NFR BLD AUTO: 5.9 % — SIGNIFICANT CHANGE UP (ref 2–14)
NEUTROPHILS # BLD AUTO: 7.66 K/UL — HIGH (ref 1.8–7.4)
NEUTROPHILS NFR BLD AUTO: 86.8 % — HIGH (ref 43–77)
NRBC # BLD: 0 /100 WBCS — SIGNIFICANT CHANGE UP (ref 0–0)
NRBC BLD-RTO: 0 /100 WBCS — SIGNIFICANT CHANGE UP (ref 0–0)
PLATELET # BLD AUTO: 302 K/UL — SIGNIFICANT CHANGE UP (ref 150–400)
POTASSIUM SERPL-MCNC: 4.2 MMOL/L — SIGNIFICANT CHANGE UP (ref 3.5–5.3)
POTASSIUM SERPL-SCNC: 4.2 MMOL/L — SIGNIFICANT CHANGE UP (ref 3.5–5.3)
PROT SERPL-MCNC: 5.7 G/DL — LOW (ref 6–8.3)
RBC # BLD: 4 M/UL — LOW (ref 4.2–5.8)
RBC # FLD: 16.6 % — HIGH (ref 10.3–14.5)
SODIUM SERPL-SCNC: 135 MMOL/L — SIGNIFICANT CHANGE UP (ref 135–145)
WBC # BLD: 8.83 K/UL — SIGNIFICANT CHANGE UP (ref 3.8–10.5)
WBC # FLD AUTO: 8.83 K/UL — SIGNIFICANT CHANGE UP (ref 3.8–10.5)

## 2025-01-16 PROBLEM — D49.6 NEOPLASM OF UNSPECIFIED BEHAVIOR OF BRAIN: Chronic | Status: ACTIVE | Noted: 2025-01-06

## 2025-01-16 PROBLEM — F25.9 SCHIZOAFFECTIVE DISORDER, UNSPECIFIED: Chronic | Status: ACTIVE | Noted: 2025-01-06

## 2025-01-17 ENCOUNTER — NON-APPOINTMENT (OUTPATIENT)
Age: 60
End: 2025-01-17

## 2025-01-21 ENCOUNTER — RESULT REVIEW (OUTPATIENT)
Age: 60
End: 2025-01-21

## 2025-01-21 ENCOUNTER — APPOINTMENT (OUTPATIENT)
Dept: INFUSION THERAPY | Facility: CANCER CENTER | Age: 60
End: 2025-01-21

## 2025-01-21 LAB
BASOPHILS # BLD AUTO: 0.03 K/UL — SIGNIFICANT CHANGE UP (ref 0–0.2)
BASOPHILS NFR BLD AUTO: 0.2 % — SIGNIFICANT CHANGE UP (ref 0–2)
EOSINOPHIL # BLD AUTO: 0 K/UL — SIGNIFICANT CHANGE UP (ref 0–0.5)
EOSINOPHIL NFR BLD AUTO: 0 % — SIGNIFICANT CHANGE UP (ref 0–6)
HCT VFR BLD CALC: 33.9 % — LOW (ref 39–50)
HGB BLD-MCNC: 10.8 G/DL — LOW (ref 13–17)
IMM GRANULOCYTES NFR BLD AUTO: 0.9 % — SIGNIFICANT CHANGE UP (ref 0–0.9)
LYMPHOCYTES # BLD AUTO: 1.81 K/UL — SIGNIFICANT CHANGE UP (ref 1–3.3)
LYMPHOCYTES # BLD AUTO: 13.2 % — SIGNIFICANT CHANGE UP (ref 13–44)
MCHC RBC-ENTMCNC: 26.5 PG — LOW (ref 27–34)
MCHC RBC-ENTMCNC: 31.9 G/DL — LOW (ref 32–36)
MCV RBC AUTO: 83.1 FL — SIGNIFICANT CHANGE UP (ref 80–100)
MONOCYTES # BLD AUTO: 0.88 K/UL — SIGNIFICANT CHANGE UP (ref 0–0.9)
MONOCYTES NFR BLD AUTO: 6.4 % — SIGNIFICANT CHANGE UP (ref 2–14)
NEUTROPHILS # BLD AUTO: 10.91 K/UL — HIGH (ref 1.8–7.4)
NEUTROPHILS NFR BLD AUTO: 79.3 % — HIGH (ref 43–77)
NRBC # BLD: 0 /100 WBCS — SIGNIFICANT CHANGE UP (ref 0–0)
NRBC BLD-RTO: 0 /100 WBCS — SIGNIFICANT CHANGE UP (ref 0–0)
PLATELET # BLD AUTO: 708 K/UL — HIGH (ref 150–400)
RBC # BLD: 4.08 M/UL — LOW (ref 4.2–5.8)
RBC # FLD: 17.5 % — HIGH (ref 10.3–14.5)
WBC # BLD: 13.76 K/UL — HIGH (ref 3.8–10.5)
WBC # FLD AUTO: 13.76 K/UL — HIGH (ref 3.8–10.5)

## 2025-01-22 LAB
ALBUMIN SERPL ELPH-MCNC: 3.3 G/DL — SIGNIFICANT CHANGE UP (ref 3.3–5)
ALP SERPL-CCNC: 128 U/L — HIGH (ref 40–120)
ALT FLD-CCNC: 28 U/L — SIGNIFICANT CHANGE UP (ref 10–45)
ANION GAP SERPL CALC-SCNC: 17 MMOL/L — SIGNIFICANT CHANGE UP (ref 5–17)
AST SERPL-CCNC: 18 U/L — SIGNIFICANT CHANGE UP (ref 10–40)
BILIRUB SERPL-MCNC: <0.2 MG/DL — SIGNIFICANT CHANGE UP (ref 0.2–1.2)
BUN SERPL-MCNC: 19 MG/DL — SIGNIFICANT CHANGE UP (ref 7–23)
CALCIUM SERPL-MCNC: 9.3 MG/DL — SIGNIFICANT CHANGE UP (ref 8.4–10.5)
CHLORIDE SERPL-SCNC: 100 MMOL/L — SIGNIFICANT CHANGE UP (ref 96–108)
CO2 SERPL-SCNC: 24 MMOL/L — SIGNIFICANT CHANGE UP (ref 22–31)
CREAT SERPL-MCNC: 1 MG/DL — SIGNIFICANT CHANGE UP (ref 0.5–1.3)
EGFR: 87 ML/MIN/1.73M2 — SIGNIFICANT CHANGE UP
GLUCOSE SERPL-MCNC: 148 MG/DL — HIGH (ref 70–99)
POTASSIUM SERPL-MCNC: 4.2 MMOL/L — SIGNIFICANT CHANGE UP (ref 3.5–5.3)
POTASSIUM SERPL-SCNC: 4.2 MMOL/L — SIGNIFICANT CHANGE UP (ref 3.5–5.3)
PROT SERPL-MCNC: 6.3 G/DL — SIGNIFICANT CHANGE UP (ref 6–8.3)
SODIUM SERPL-SCNC: 141 MMOL/L — SIGNIFICANT CHANGE UP (ref 135–145)

## 2025-01-28 ENCOUNTER — RESULT REVIEW (OUTPATIENT)
Age: 60
End: 2025-01-28

## 2025-01-28 ENCOUNTER — APPOINTMENT (OUTPATIENT)
Dept: INFUSION THERAPY | Facility: CANCER CENTER | Age: 60
End: 2025-01-28

## 2025-01-28 LAB
ALBUMIN SERPL ELPH-MCNC: 3.8 G/DL — SIGNIFICANT CHANGE UP (ref 3.3–5)
ALP SERPL-CCNC: 110 U/L — SIGNIFICANT CHANGE UP (ref 40–120)
ALT FLD-CCNC: 13 U/L — SIGNIFICANT CHANGE UP (ref 10–45)
ANION GAP SERPL CALC-SCNC: 16 MMOL/L — SIGNIFICANT CHANGE UP (ref 5–17)
AST SERPL-CCNC: 8 U/L — LOW (ref 10–40)
BASOPHILS # BLD AUTO: 0.02 K/UL — SIGNIFICANT CHANGE UP (ref 0–0.2)
BASOPHILS NFR BLD AUTO: 0.2 % — SIGNIFICANT CHANGE UP (ref 0–2)
BILIRUB SERPL-MCNC: <0.2 MG/DL — SIGNIFICANT CHANGE UP (ref 0.2–1.2)
BUN SERPL-MCNC: 15 MG/DL — SIGNIFICANT CHANGE UP (ref 7–23)
CALCIUM SERPL-MCNC: 10 MG/DL — SIGNIFICANT CHANGE UP (ref 8.4–10.5)
CHLORIDE SERPL-SCNC: 96 MMOL/L — SIGNIFICANT CHANGE UP (ref 96–108)
CO2 SERPL-SCNC: 22 MMOL/L — SIGNIFICANT CHANGE UP (ref 22–31)
CREAT SERPL-MCNC: 0.96 MG/DL — SIGNIFICANT CHANGE UP (ref 0.5–1.3)
EGFR: 91 ML/MIN/1.73M2 — SIGNIFICANT CHANGE UP
EOSINOPHIL # BLD AUTO: 0.01 K/UL — SIGNIFICANT CHANGE UP (ref 0–0.5)
EOSINOPHIL NFR BLD AUTO: 0.1 % — SIGNIFICANT CHANGE UP (ref 0–6)
GLUCOSE SERPL-MCNC: 179 MG/DL — HIGH (ref 70–99)
HCT VFR BLD CALC: 35.6 % — LOW (ref 39–50)
HGB BLD-MCNC: 11.7 G/DL — LOW (ref 13–17)
IMM GRANULOCYTES NFR BLD AUTO: 1.6 % — HIGH (ref 0–0.9)
LYMPHOCYTES # BLD AUTO: 1.68 K/UL — SIGNIFICANT CHANGE UP (ref 1–3.3)
LYMPHOCYTES # BLD AUTO: 15.9 % — SIGNIFICANT CHANGE UP (ref 13–44)
MCHC RBC-ENTMCNC: 26.9 PG — LOW (ref 27–34)
MCHC RBC-ENTMCNC: 32.9 G/DL — SIGNIFICANT CHANGE UP (ref 32–36)
MCV RBC AUTO: 81.8 FL — SIGNIFICANT CHANGE UP (ref 80–100)
MONOCYTES # BLD AUTO: 0.68 K/UL — SIGNIFICANT CHANGE UP (ref 0–0.9)
MONOCYTES NFR BLD AUTO: 6.4 % — SIGNIFICANT CHANGE UP (ref 2–14)
NEUTROPHILS # BLD AUTO: 8.02 K/UL — HIGH (ref 1.8–7.4)
NEUTROPHILS NFR BLD AUTO: 75.8 % — SIGNIFICANT CHANGE UP (ref 43–77)
NRBC # BLD: 0 /100 WBCS — SIGNIFICANT CHANGE UP (ref 0–0)
NRBC BLD-RTO: 0 /100 WBCS — SIGNIFICANT CHANGE UP (ref 0–0)
PLATELET # BLD AUTO: 770 K/UL — HIGH (ref 150–400)
POTASSIUM SERPL-MCNC: 5 MMOL/L — SIGNIFICANT CHANGE UP (ref 3.5–5.3)
POTASSIUM SERPL-SCNC: 5 MMOL/L — SIGNIFICANT CHANGE UP (ref 3.5–5.3)
PROT SERPL-MCNC: 6.8 G/DL — SIGNIFICANT CHANGE UP (ref 6–8.3)
RBC # BLD: 4.35 M/UL — SIGNIFICANT CHANGE UP (ref 4.2–5.8)
RBC # FLD: 17.1 % — HIGH (ref 10.3–14.5)
SODIUM SERPL-SCNC: 134 MMOL/L — LOW (ref 135–145)
WBC # BLD: 10.58 K/UL — HIGH (ref 3.8–10.5)
WBC # FLD AUTO: 10.58 K/UL — HIGH (ref 3.8–10.5)

## 2025-01-29 PROBLEM — I10 ESSENTIAL (PRIMARY) HYPERTENSION: Chronic | Status: ACTIVE | Noted: 2025-01-06

## 2025-01-29 RX ORDER — LENALIDOMIDE 25 MG/1
25 CAPSULE ORAL DAILY
Qty: 21 | Refills: 0 | Status: ACTIVE | COMMUNITY
Start: 2025-01-29 | End: 1900-01-01

## 2025-01-31 PROBLEM — M25.561 RIGHT KNEE PAIN: Status: ACTIVE | Noted: 2025-01-31

## 2025-02-03 ENCOUNTER — RESULT REVIEW (OUTPATIENT)
Age: 60
End: 2025-02-03

## 2025-02-03 ENCOUNTER — APPOINTMENT (OUTPATIENT)
Dept: INFUSION THERAPY | Facility: CANCER CENTER | Age: 60
End: 2025-02-03

## 2025-02-03 ENCOUNTER — APPOINTMENT (OUTPATIENT)
Dept: PHYSICAL MEDICINE AND REHAB | Facility: CLINIC | Age: 60
End: 2025-02-03

## 2025-02-03 LAB
BASOPHILS # BLD AUTO: 0.06 K/UL — SIGNIFICANT CHANGE UP (ref 0–0.2)
BASOPHILS NFR BLD AUTO: 0.6 % — SIGNIFICANT CHANGE UP (ref 0–2)
EOSINOPHIL # BLD AUTO: 0.06 K/UL — SIGNIFICANT CHANGE UP (ref 0–0.5)
EOSINOPHIL NFR BLD AUTO: 0.6 % — SIGNIFICANT CHANGE UP (ref 0–6)
HCT VFR BLD CALC: 36.1 % — LOW (ref 39–50)
HGB BLD-MCNC: 11.4 G/DL — LOW (ref 13–17)
IMM GRANULOCYTES NFR BLD AUTO: 1.5 % — HIGH (ref 0–0.9)
LYMPHOCYTES # BLD AUTO: 2.56 K/UL — SIGNIFICANT CHANGE UP (ref 1–3.3)
LYMPHOCYTES # BLD AUTO: 26 % — SIGNIFICANT CHANGE UP (ref 13–44)
MCHC RBC-ENTMCNC: 26.3 PG — LOW (ref 27–34)
MCHC RBC-ENTMCNC: 31.6 G/DL — LOW (ref 32–36)
MCV RBC AUTO: 83.2 FL — SIGNIFICANT CHANGE UP (ref 80–100)
MONOCYTES # BLD AUTO: 0.75 K/UL — SIGNIFICANT CHANGE UP (ref 0–0.9)
MONOCYTES NFR BLD AUTO: 7.6 % — SIGNIFICANT CHANGE UP (ref 2–14)
NEUTROPHILS # BLD AUTO: 6.28 K/UL — SIGNIFICANT CHANGE UP (ref 1.8–7.4)
NEUTROPHILS NFR BLD AUTO: 63.7 % — SIGNIFICANT CHANGE UP (ref 43–77)
NRBC # BLD: 0 /100 WBCS — SIGNIFICANT CHANGE UP (ref 0–0)
NRBC BLD-RTO: 0 /100 WBCS — SIGNIFICANT CHANGE UP (ref 0–0)
PLATELET # BLD AUTO: 658 K/UL — HIGH (ref 150–400)
RBC # BLD: 4.34 M/UL — SIGNIFICANT CHANGE UP (ref 4.2–5.8)
RBC # FLD: 18.1 % — HIGH (ref 10.3–14.5)
WBC # BLD: 9.86 K/UL — SIGNIFICANT CHANGE UP (ref 3.8–10.5)
WBC # FLD AUTO: 9.86 K/UL — SIGNIFICANT CHANGE UP (ref 3.8–10.5)

## 2025-02-04 ENCOUNTER — APPOINTMENT (OUTPATIENT)
Dept: PHYSICAL MEDICINE AND REHAB | Facility: CLINIC | Age: 60
End: 2025-02-04

## 2025-02-04 ENCOUNTER — APPOINTMENT (OUTPATIENT)
Dept: RADIOLOGY | Facility: CLINIC | Age: 60
End: 2025-02-04
Payer: MEDICARE

## 2025-02-04 LAB
ALBUMIN SERPL ELPH-MCNC: 3.7 G/DL — SIGNIFICANT CHANGE UP (ref 3.3–5)
ALP SERPL-CCNC: 94 U/L — SIGNIFICANT CHANGE UP (ref 40–120)
ALT FLD-CCNC: 18 U/L — SIGNIFICANT CHANGE UP (ref 10–45)
ANION GAP SERPL CALC-SCNC: 12 MMOL/L — SIGNIFICANT CHANGE UP (ref 5–17)
AST SERPL-CCNC: 13 U/L — SIGNIFICANT CHANGE UP (ref 10–40)
BILIRUB SERPL-MCNC: <0.2 MG/DL — SIGNIFICANT CHANGE UP (ref 0.2–1.2)
BUN SERPL-MCNC: 11 MG/DL — SIGNIFICANT CHANGE UP (ref 7–23)
CALCIUM SERPL-MCNC: 9 MG/DL — SIGNIFICANT CHANGE UP (ref 8.4–10.5)
CHLORIDE SERPL-SCNC: 97 MMOL/L — SIGNIFICANT CHANGE UP (ref 96–108)
CO2 SERPL-SCNC: 25 MMOL/L — SIGNIFICANT CHANGE UP (ref 22–31)
CREAT SERPL-MCNC: 0.76 MG/DL — SIGNIFICANT CHANGE UP (ref 0.5–1.3)
EGFR: 104 ML/MIN/1.73M2 — SIGNIFICANT CHANGE UP
GLUCOSE SERPL-MCNC: 140 MG/DL — HIGH (ref 70–99)
IGA FLD-MCNC: 74 MG/DL — LOW (ref 84–499)
IGG FLD-MCNC: 670 MG/DL — SIGNIFICANT CHANGE UP (ref 610–1660)
IGM SERPL-MCNC: 42 MG/DL — SIGNIFICANT CHANGE UP (ref 35–242)
KAPPA LC SER QL IFE: 1 MG/DL — SIGNIFICANT CHANGE UP (ref 0.33–1.94)
KAPPA/LAMBDA FREE LIGHT CHAIN RATIO, SERUM: 0.18 RATIO — LOW (ref 0.26–1.65)
LAMBDA LC SER QL IFE: 5.48 MG/DL — HIGH (ref 0.57–2.63)
POTASSIUM SERPL-MCNC: 4.1 MMOL/L — SIGNIFICANT CHANGE UP (ref 3.5–5.3)
POTASSIUM SERPL-SCNC: 4.1 MMOL/L — SIGNIFICANT CHANGE UP (ref 3.5–5.3)
PROT SERPL-MCNC: 6.6 G/DL — SIGNIFICANT CHANGE UP (ref 6–8.3)
PROT SERPL-MCNC: 6.6 G/DL — SIGNIFICANT CHANGE UP (ref 6–8.3)
SODIUM SERPL-SCNC: 134 MMOL/L — LOW (ref 135–145)

## 2025-02-04 PROCEDURE — 73562 X-RAY EXAM OF KNEE 3: CPT | Mod: RT

## 2025-02-06 ENCOUNTER — APPOINTMENT (OUTPATIENT)
Dept: NEUROSURGERY | Facility: CLINIC | Age: 60
End: 2025-02-06
Payer: MEDICARE

## 2025-02-06 ENCOUNTER — NON-APPOINTMENT (OUTPATIENT)
Age: 60
End: 2025-02-06

## 2025-02-06 VITALS
OXYGEN SATURATION: 94 % | TEMPERATURE: 98 F | DIASTOLIC BLOOD PRESSURE: 89 MMHG | BODY MASS INDEX: 29.05 KG/M2 | SYSTOLIC BLOOD PRESSURE: 136 MMHG | HEART RATE: 88 BPM | WEIGHT: 165 LBS

## 2025-02-06 DIAGNOSIS — G93.9 DISORDER OF BRAIN, UNSPECIFIED: ICD-10-CM

## 2025-02-06 DIAGNOSIS — Z98.890 OTHER SPECIFIED POSTPROCEDURAL STATES: ICD-10-CM

## 2025-02-06 DIAGNOSIS — M54.9 DORSALGIA, UNSPECIFIED: ICD-10-CM

## 2025-02-06 DIAGNOSIS — C90.30 SOLITARY PLASMACYTOMA NOT HAVING ACHIEVED REMISSION: ICD-10-CM

## 2025-02-06 LAB
% ALBUMIN: 52.1 % — SIGNIFICANT CHANGE UP
% ALPHA 1: 8.2 % — SIGNIFICANT CHANGE UP
% ALPHA 2: 18.4 % — SIGNIFICANT CHANGE UP
% BETA: 13 % — SIGNIFICANT CHANGE UP
% GAMMA: 8.3 % — SIGNIFICANT CHANGE UP
% M SPIKE: 1.7 % — SIGNIFICANT CHANGE UP
ALBUMIN SERPL ELPH-MCNC: 3.4 G/DL — LOW (ref 3.6–5.5)
ALBUMIN/GLOB SERPL ELPH: 1.1 RATIO — SIGNIFICANT CHANGE UP
ALPHA1 GLOB SERPL ELPH-MCNC: 0.5 G/DL — HIGH (ref 0.1–0.4)
ALPHA2 GLOB SERPL ELPH-MCNC: 1.2 G/DL — HIGH (ref 0.5–1)
B-GLOBULIN SERPL ELPH-MCNC: 0.9 G/DL — SIGNIFICANT CHANGE UP (ref 0.5–1)
GAMMA GLOBULIN: 0.5 G/DL — LOW (ref 0.6–1.6)
INTERPRETATION SERPL IFE-IMP: SIGNIFICANT CHANGE UP
M-SPIKE: 0.1 G/DL — HIGH (ref 0–0)
PROT PATTERN SERPL ELPH-IMP: SIGNIFICANT CHANGE UP
PROT SERPL-MCNC: 6.6 G/DL — SIGNIFICANT CHANGE UP (ref 6–8.3)

## 2025-02-06 PROCEDURE — 99212 OFFICE O/P EST SF 10 MIN: CPT

## 2025-02-07 ENCOUNTER — APPOINTMENT (OUTPATIENT)
Dept: PHYSICAL MEDICINE AND REHAB | Facility: CLINIC | Age: 60
End: 2025-02-07
Payer: MEDICARE

## 2025-02-07 ENCOUNTER — TRANSCRIPTION ENCOUNTER (OUTPATIENT)
Age: 60
End: 2025-02-07

## 2025-02-07 VITALS
WEIGHT: 165 LBS | SYSTOLIC BLOOD PRESSURE: 138 MMHG | BODY MASS INDEX: 27.49 KG/M2 | DIASTOLIC BLOOD PRESSURE: 87 MMHG | HEIGHT: 65 IN | RESPIRATION RATE: 14 BRPM | HEART RATE: 88 BPM

## 2025-02-07 DIAGNOSIS — C90.00 MULTIPLE MYELOMA NOT HAVING ACHIEVED REMISSION: ICD-10-CM

## 2025-02-07 DIAGNOSIS — M25.561 PAIN IN RIGHT KNEE: ICD-10-CM

## 2025-02-07 DIAGNOSIS — Z78.9 OTHER SPECIFIED HEALTH STATUS: ICD-10-CM

## 2025-02-07 DIAGNOSIS — M54.16 RADICULOPATHY, LUMBAR REGION: ICD-10-CM

## 2025-02-07 DIAGNOSIS — Z86.79 PERSONAL HISTORY OF OTHER DISEASES OF THE CIRCULATORY SYSTEM: ICD-10-CM

## 2025-02-07 PROCEDURE — G2211 COMPLEX E/M VISIT ADD ON: CPT

## 2025-02-07 PROCEDURE — 99214 OFFICE O/P EST MOD 30 MIN: CPT

## 2025-02-10 ENCOUNTER — APPOINTMENT (OUTPATIENT)
Dept: INFUSION THERAPY | Facility: CANCER CENTER | Age: 60
End: 2025-02-10

## 2025-02-10 ENCOUNTER — APPOINTMENT (OUTPATIENT)
Dept: HEMATOLOGY ONCOLOGY | Facility: CLINIC | Age: 60
End: 2025-02-10

## 2025-02-11 ENCOUNTER — APPOINTMENT (OUTPATIENT)
Dept: AFTER HOURS CARE | Facility: EMERGENCY ROOM | Age: 60
End: 2025-02-11
Payer: MEDICARE

## 2025-02-11 PROCEDURE — 99214 OFFICE O/P EST MOD 30 MIN: CPT | Mod: 2W

## 2025-02-13 DIAGNOSIS — G89.3 NEOPLASM RELATED PAIN (ACUTE) (CHRONIC): ICD-10-CM

## 2025-02-13 RX ORDER — MORPHINE SULFATE 15 MG/1
15 TABLET, FILM COATED, EXTENDED RELEASE ORAL
Qty: 30 | Refills: 0 | Status: ACTIVE | COMMUNITY
Start: 2025-02-13 | End: 1900-01-01

## 2025-02-17 ENCOUNTER — APPOINTMENT (OUTPATIENT)
Dept: MRI IMAGING | Facility: CLINIC | Age: 60
End: 2025-02-17
Payer: MEDICARE

## 2025-02-17 PROCEDURE — 70553 MRI BRAIN STEM W/O & W/DYE: CPT

## 2025-02-17 PROCEDURE — A9585: CPT

## 2025-02-18 ENCOUNTER — RESULT REVIEW (OUTPATIENT)
Age: 60
End: 2025-02-18

## 2025-02-18 ENCOUNTER — APPOINTMENT (OUTPATIENT)
Dept: INFUSION THERAPY | Facility: CANCER CENTER | Age: 60
End: 2025-02-18

## 2025-02-18 DIAGNOSIS — R21 RASH AND OTHER NONSPECIFIC SKIN ERUPTION: ICD-10-CM

## 2025-02-18 LAB
BASOPHILS # BLD AUTO: 0.03 K/UL — SIGNIFICANT CHANGE UP (ref 0–0.2)
BASOPHILS NFR BLD AUTO: 0.3 % — SIGNIFICANT CHANGE UP (ref 0–2)
EOSINOPHIL # BLD AUTO: 0 K/UL — SIGNIFICANT CHANGE UP (ref 0–0.5)
EOSINOPHIL NFR BLD AUTO: 0 % — SIGNIFICANT CHANGE UP (ref 0–6)
HCT VFR BLD CALC: 36.9 % — LOW (ref 39–50)
HGB BLD-MCNC: 11.5 G/DL — LOW (ref 13–17)
IMM GRANULOCYTES NFR BLD AUTO: 0.6 % — SIGNIFICANT CHANGE UP (ref 0–0.9)
LYMPHOCYTES # BLD AUTO: 2 K/UL — SIGNIFICANT CHANGE UP (ref 1–3.3)
LYMPHOCYTES # BLD AUTO: 20.4 % — SIGNIFICANT CHANGE UP (ref 13–44)
MCHC RBC-ENTMCNC: 26 PG — LOW (ref 27–34)
MCHC RBC-ENTMCNC: 31.2 G/DL — LOW (ref 32–36)
MCV RBC AUTO: 83.3 FL — SIGNIFICANT CHANGE UP (ref 80–100)
MONOCYTES # BLD AUTO: 0.68 K/UL — SIGNIFICANT CHANGE UP (ref 0–0.9)
MONOCYTES NFR BLD AUTO: 6.9 % — SIGNIFICANT CHANGE UP (ref 2–14)
NEUTROPHILS # BLD AUTO: 7.04 K/UL — SIGNIFICANT CHANGE UP (ref 1.8–7.4)
NEUTROPHILS NFR BLD AUTO: 71.8 % — SIGNIFICANT CHANGE UP (ref 43–77)
NRBC BLD AUTO-RTO: 0 /100 WBCS — SIGNIFICANT CHANGE UP (ref 0–0)
PLATELET # BLD AUTO: 509 K/UL — HIGH (ref 150–400)
RBC # BLD: 4.43 M/UL — SIGNIFICANT CHANGE UP (ref 4.2–5.8)
RBC # FLD: 19 % — HIGH (ref 10.3–14.5)
WBC # BLD: 9.81 K/UL — SIGNIFICANT CHANGE UP (ref 3.8–10.5)
WBC # FLD AUTO: 9.81 K/UL — SIGNIFICANT CHANGE UP (ref 3.8–10.5)

## 2025-02-18 RX ORDER — BETAMETHASONE DIPROPIONATE 0.5 MG/G
0.05 CREAM TOPICAL TWICE DAILY
Qty: 1 | Refills: 2 | Status: ACTIVE | COMMUNITY
Start: 2025-02-18 | End: 1900-01-01

## 2025-02-19 LAB
ALBUMIN SERPL ELPH-MCNC: 4.1 G/DL — SIGNIFICANT CHANGE UP (ref 3.3–5)
ALP SERPL-CCNC: 92 U/L — SIGNIFICANT CHANGE UP (ref 40–120)
ALT FLD-CCNC: 14 U/L — SIGNIFICANT CHANGE UP (ref 10–45)
ANION GAP SERPL CALC-SCNC: 13 MMOL/L — SIGNIFICANT CHANGE UP (ref 5–17)
AST SERPL-CCNC: 9 U/L — LOW (ref 10–40)
BILIRUB SERPL-MCNC: 0.2 MG/DL — SIGNIFICANT CHANGE UP (ref 0.2–1.2)
BUN SERPL-MCNC: 12 MG/DL — SIGNIFICANT CHANGE UP (ref 7–23)
CALCIUM SERPL-MCNC: 9.7 MG/DL — SIGNIFICANT CHANGE UP (ref 8.4–10.5)
CHLORIDE SERPL-SCNC: 97 MMOL/L — SIGNIFICANT CHANGE UP (ref 96–108)
CO2 SERPL-SCNC: 26 MMOL/L — SIGNIFICANT CHANGE UP (ref 22–31)
CREAT SERPL-MCNC: 0.65 MG/DL — SIGNIFICANT CHANGE UP (ref 0.5–1.3)
EGFR: 109 ML/MIN/1.73M2 — SIGNIFICANT CHANGE UP
GLUCOSE SERPL-MCNC: 144 MG/DL — HIGH (ref 70–99)
POTASSIUM SERPL-MCNC: 5.6 MMOL/L — HIGH (ref 3.5–5.3)
POTASSIUM SERPL-SCNC: 5.6 MMOL/L — HIGH (ref 3.5–5.3)
PROT SERPL-MCNC: 6.9 G/DL — SIGNIFICANT CHANGE UP (ref 6–8.3)
SODIUM SERPL-SCNC: 136 MMOL/L — SIGNIFICANT CHANGE UP (ref 135–145)

## 2025-02-20 ENCOUNTER — APPOINTMENT (OUTPATIENT)
Dept: MRI IMAGING | Facility: CLINIC | Age: 60
End: 2025-02-20
Payer: MEDICARE

## 2025-02-20 DIAGNOSIS — Z98.890 OTHER SPECIFIED POSTPROCEDURAL STATES: ICD-10-CM

## 2025-02-20 DIAGNOSIS — C90.30 SOLITARY PLASMACYTOMA NOT HAVING ACHIEVED REMISSION: ICD-10-CM

## 2025-02-20 PROCEDURE — A9585: CPT

## 2025-02-20 PROCEDURE — 73721 MRI JNT OF LWR EXTRE W/O DYE: CPT | Mod: RT

## 2025-02-20 PROCEDURE — 72158 MRI LUMBAR SPINE W/O & W/DYE: CPT

## 2025-02-24 ENCOUNTER — RESULT REVIEW (OUTPATIENT)
Age: 60
End: 2025-02-24

## 2025-02-24 ENCOUNTER — APPOINTMENT (OUTPATIENT)
Dept: INFUSION THERAPY | Facility: CANCER CENTER | Age: 60
End: 2025-02-24

## 2025-02-24 LAB
BASOPHILS # BLD AUTO: 0.08 K/UL — SIGNIFICANT CHANGE UP (ref 0–0.2)
BASOPHILS NFR BLD AUTO: 0.6 % — SIGNIFICANT CHANGE UP (ref 0–2)
EOSINOPHIL # BLD AUTO: 0.08 K/UL — SIGNIFICANT CHANGE UP (ref 0–0.5)
EOSINOPHIL NFR BLD AUTO: 0.6 % — SIGNIFICANT CHANGE UP (ref 0–6)
HCT VFR BLD CALC: 39.4 % — SIGNIFICANT CHANGE UP (ref 39–50)
HGB BLD-MCNC: 12.8 G/DL — LOW (ref 13–17)
IMM GRANULOCYTES NFR BLD AUTO: 4.9 % — HIGH (ref 0–0.9)
LYMPHOCYTES # BLD AUTO: 1.17 K/UL — SIGNIFICANT CHANGE UP (ref 1–3.3)
LYMPHOCYTES # BLD AUTO: 9.3 % — LOW (ref 13–44)
MCHC RBC-ENTMCNC: 27.2 PG — SIGNIFICANT CHANGE UP (ref 27–34)
MCHC RBC-ENTMCNC: 32.5 G/DL — SIGNIFICANT CHANGE UP (ref 32–36)
MCV RBC AUTO: 83.7 FL — SIGNIFICANT CHANGE UP (ref 80–100)
MONOCYTES # BLD AUTO: 0.22 K/UL — SIGNIFICANT CHANGE UP (ref 0–0.9)
MONOCYTES NFR BLD AUTO: 1.7 % — LOW (ref 2–14)
NEUTROPHILS # BLD AUTO: 10.45 K/UL — HIGH (ref 1.8–7.4)
NEUTROPHILS NFR BLD AUTO: 82.9 % — HIGH (ref 43–77)
NRBC BLD AUTO-RTO: 0 /100 WBCS — SIGNIFICANT CHANGE UP (ref 0–0)
PLATELET # BLD AUTO: 658 K/UL — HIGH (ref 150–400)
RBC # BLD: 4.71 M/UL — SIGNIFICANT CHANGE UP (ref 4.2–5.8)
RBC # FLD: 20.8 % — HIGH (ref 10.3–14.5)
WBC # BLD: 12.62 K/UL — HIGH (ref 3.8–10.5)
WBC # FLD AUTO: 12.62 K/UL — HIGH (ref 3.8–10.5)

## 2025-02-24 PROCEDURE — 85027 COMPLETE CBC AUTOMATED: CPT

## 2025-02-24 PROCEDURE — 96365 THER/PROPH/DIAG IV INF INIT: CPT

## 2025-02-24 PROCEDURE — 80053 COMPREHEN METABOLIC PANEL: CPT

## 2025-02-24 PROCEDURE — 84155 ASSAY OF PROTEIN SERUM: CPT

## 2025-02-24 PROCEDURE — 96372 THER/PROPH/DIAG INJ SC/IM: CPT

## 2025-02-24 PROCEDURE — 36415 COLL VENOUS BLD VENIPUNCTURE: CPT

## 2025-02-24 PROCEDURE — 86803 HEPATITIS C AB TEST: CPT

## 2025-02-24 PROCEDURE — 86706 HEP B SURFACE ANTIBODY: CPT

## 2025-02-24 PROCEDURE — 96401 CHEMO ANTI-NEOPL SQ/IM: CPT

## 2025-02-24 PROCEDURE — 84165 PROTEIN E-PHORESIS SERUM: CPT

## 2025-02-24 PROCEDURE — 86334 IMMUNOFIX E-PHORESIS SERUM: CPT

## 2025-02-24 PROCEDURE — 83521 IG LIGHT CHAINS FREE EACH: CPT

## 2025-02-24 PROCEDURE — 86704 HEP B CORE ANTIBODY TOTAL: CPT

## 2025-02-24 PROCEDURE — 87340 HEPATITIS B SURFACE AG IA: CPT

## 2025-02-24 PROCEDURE — 82784 ASSAY IGA/IGD/IGG/IGM EACH: CPT

## 2025-02-25 LAB
ALBUMIN SERPL ELPH-MCNC: 4.1 G/DL — SIGNIFICANT CHANGE UP (ref 3.3–5)
ALP SERPL-CCNC: 114 U/L — SIGNIFICANT CHANGE UP (ref 40–120)
ALT FLD-CCNC: 20 U/L — SIGNIFICANT CHANGE UP (ref 10–45)
ANION GAP SERPL CALC-SCNC: 13 MMOL/L — SIGNIFICANT CHANGE UP (ref 5–17)
AST SERPL-CCNC: 18 U/L — SIGNIFICANT CHANGE UP (ref 10–40)
BILIRUB SERPL-MCNC: 0.2 MG/DL — SIGNIFICANT CHANGE UP (ref 0.2–1.2)
BUN SERPL-MCNC: 11 MG/DL — SIGNIFICANT CHANGE UP (ref 7–23)
CALCIUM SERPL-MCNC: 9.5 MG/DL — SIGNIFICANT CHANGE UP (ref 8.4–10.5)
CHLORIDE SERPL-SCNC: 97 MMOL/L — SIGNIFICANT CHANGE UP (ref 96–108)
CO2 SERPL-SCNC: 25 MMOL/L — SIGNIFICANT CHANGE UP (ref 22–31)
CREAT SERPL-MCNC: 0.76 MG/DL — SIGNIFICANT CHANGE UP (ref 0.5–1.3)
EGFR: 104 ML/MIN/1.73M2 — SIGNIFICANT CHANGE UP
GLUCOSE SERPL-MCNC: 153 MG/DL — HIGH (ref 70–99)
POTASSIUM SERPL-MCNC: 4.8 MMOL/L — SIGNIFICANT CHANGE UP (ref 3.5–5.3)
POTASSIUM SERPL-SCNC: 4.8 MMOL/L — SIGNIFICANT CHANGE UP (ref 3.5–5.3)
PROT SERPL-MCNC: 6.8 G/DL — SIGNIFICANT CHANGE UP (ref 6–8.3)
SODIUM SERPL-SCNC: 134 MMOL/L — LOW (ref 135–145)

## 2025-02-26 ENCOUNTER — APPOINTMENT (OUTPATIENT)
Dept: NEUROSURGERY | Facility: CLINIC | Age: 60
End: 2025-02-26

## 2025-02-27 ENCOUNTER — APPOINTMENT (OUTPATIENT)
Dept: PHYSICAL MEDICINE AND REHAB | Facility: CLINIC | Age: 60
End: 2025-02-27
Payer: MEDICARE

## 2025-02-27 VITALS
RESPIRATION RATE: 15 BRPM | HEART RATE: 90 BPM | WEIGHT: 165 LBS | HEIGHT: 65 IN | DIASTOLIC BLOOD PRESSURE: 80 MMHG | BODY MASS INDEX: 27.49 KG/M2 | SYSTOLIC BLOOD PRESSURE: 131 MMHG

## 2025-02-27 DIAGNOSIS — M54.16 RADICULOPATHY, LUMBAR REGION: ICD-10-CM

## 2025-02-27 PROCEDURE — G2211 COMPLEX E/M VISIT ADD ON: CPT

## 2025-02-27 PROCEDURE — 99214 OFFICE O/P EST MOD 30 MIN: CPT

## 2025-02-27 RX ORDER — GABAPENTIN 300 MG/1
300 CAPSULE ORAL
Qty: 60 | Refills: 1 | Status: ACTIVE | COMMUNITY
Start: 2025-02-27 | End: 1900-01-01

## 2025-03-03 ENCOUNTER — RESULT REVIEW (OUTPATIENT)
Age: 60
End: 2025-03-03

## 2025-03-03 ENCOUNTER — APPOINTMENT (OUTPATIENT)
Dept: HEMATOLOGY ONCOLOGY | Facility: CLINIC | Age: 60
End: 2025-03-03
Payer: MEDICARE

## 2025-03-03 ENCOUNTER — APPOINTMENT (OUTPATIENT)
Dept: INFUSION THERAPY | Facility: CANCER CENTER | Age: 60
End: 2025-03-03

## 2025-03-03 VITALS
TEMPERATURE: 97.5 F | HEART RATE: 90 BPM | BODY MASS INDEX: 27.76 KG/M2 | WEIGHT: 166.6 LBS | DIASTOLIC BLOOD PRESSURE: 78 MMHG | HEIGHT: 65 IN | OXYGEN SATURATION: 88 % | SYSTOLIC BLOOD PRESSURE: 135 MMHG

## 2025-03-03 DIAGNOSIS — C90.00 MULTIPLE MYELOMA NOT HAVING ACHIEVED REMISSION: ICD-10-CM

## 2025-03-03 PROCEDURE — 99214 OFFICE O/P EST MOD 30 MIN: CPT

## 2025-03-03 PROCEDURE — G2211 COMPLEX E/M VISIT ADD ON: CPT

## 2025-03-10 ENCOUNTER — RESULT REVIEW (OUTPATIENT)
Age: 60
End: 2025-03-10

## 2025-03-10 ENCOUNTER — APPOINTMENT (OUTPATIENT)
Dept: INFUSION THERAPY | Facility: CANCER CENTER | Age: 60
End: 2025-03-10

## 2025-03-14 ENCOUNTER — APPOINTMENT (OUTPATIENT)
Dept: MRI IMAGING | Facility: CLINIC | Age: 60
End: 2025-03-14

## 2025-03-14 ENCOUNTER — APPOINTMENT (OUTPATIENT)
Dept: MRI IMAGING | Facility: CLINIC | Age: 60
End: 2025-03-14
Payer: MEDICARE

## 2025-03-14 PROCEDURE — 72156 MRI NECK SPINE W/O & W/DYE: CPT

## 2025-03-14 PROCEDURE — 72157 MRI CHEST SPINE W/O & W/DYE: CPT

## 2025-03-25 ENCOUNTER — APPOINTMENT (OUTPATIENT)
Dept: NEUROSURGERY | Facility: CLINIC | Age: 60
End: 2025-03-25

## 2025-04-07 ENCOUNTER — APPOINTMENT (OUTPATIENT)
Dept: INFUSION THERAPY | Facility: CANCER CENTER | Age: 60
End: 2025-04-07

## 2025-04-09 DIAGNOSIS — D64.9 ANEMIA, UNSPECIFIED: ICD-10-CM

## 2025-04-10 ENCOUNTER — APPOINTMENT (OUTPATIENT)
Dept: PHYSICAL MEDICINE AND REHAB | Facility: CLINIC | Age: 60
End: 2025-04-10

## 2025-04-14 ENCOUNTER — APPOINTMENT (OUTPATIENT)
Dept: INFUSION THERAPY | Facility: CANCER CENTER | Age: 60
End: 2025-04-14

## 2025-04-14 ENCOUNTER — APPOINTMENT (OUTPATIENT)
Dept: HEMATOLOGY ONCOLOGY | Facility: CLINIC | Age: 60
End: 2025-04-14

## 2025-04-21 ENCOUNTER — APPOINTMENT (OUTPATIENT)
Dept: INFUSION THERAPY | Facility: CANCER CENTER | Age: 60
End: 2025-04-21

## 2025-04-29 NOTE — ED ADULT NURSE NOTE - PAIN: ALLEVIATING FACTORS
Intubation    Date/Time: 4/29/2025 1:19 PM    Performed by: Aneta Rowan CRNA  Authorized by: Osmar Chow MD    Intubation:     Induction:  Intravenous    Intubated:  Postinduction    Mask Ventilation:  Easy mask    Attempts:  1    Attempted By:  CRNA    Method of Intubation:  Direct    Blade:  Corral 2    Laryngeal View Grade: Grade I - full view of cords      Difficult Airway Encountered?: No      Complications:  None    Airway Device:  Oral endotracheal tube    Airway Device Size:  7.5    Style/Cuff Inflation:  Cuffed    Inflation Amount (mL):  6    Tube secured:  21    Secured at:  The lips    Placement Verified By:  Capnometry    Complicating Factors:  None    Findings Post-Intubation:  BS equal bilateral and atraumatic/condition of teeth unchanged       sees pain managment

## 2025-05-06 NOTE — ED PROVIDER NOTE - CHIEF COMPLAINT
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     SUBJECTIVE                                                                                                               2-3/10 tightness (left axilla/chest wall)   Functional Change: Intermittent tightness with reaching overhead into cabinents     OBJECTIVE                                                                                                                                    Treatment      Hot Pack  - Location: axillary restrictions  - Position: supine    Results: tissue softening  Reaction: no adverse reaction to treatment      Manual Therapy   Supine snow kaycee chest wall stretches   STM/MFR continued throughout chest wall/axillary restrictions, lateral trunk restrictions   Sidelying scapular mobs facilitating retraction/depression  Trunk elongation stretches    Therapeutic Activity  Posturing  Cues for chest wall stretches/wall slides  Will advance as approp    Skilled input: verbal instruction/cues    Writer verbally educated and received verbal consent for hand placement, positioning of patient, and techniques to be performed today from patient for hand placement and palpation for techniques as described above and how they are pertinent to the patient's plan of care.      ASSESSMENT                                                                                                              Pt progressing toward goals  Continue per POC  Education:   - Results of above outlined education: Verbalizes understanding and Needs reinforcement    PLAN                                                                                                                           Suggestions for next session as indicated: Progress per plan of care  Assess tightness  Manual therapy  Home program- continue to advance as  approp  Modalities/heat       Therapy procedure time and total treatment time can be found documented on the Time Entry flowsheet   The patient is a 59y Male complaining of

## 2025-06-02 NOTE — ED PROVIDER NOTE - MUSCULOSKELETAL [+], MLM
Able to ambulate without difficulty. No complaints of numbness or tingling. Discharged to home in stable condition.  Pre-procedure pain   7/10  Post procedure Pain   3/10.   BACK PAIN

## (undated) DEVICE — SUT MONOCRYL 4-0 27" PS-2 UNDYED

## (undated) DEVICE — CATH IV INTROCAN SAFETY 16G X 1.25" (GRAY) PUR

## (undated) DEVICE — SYR CONTROL LUER LOK 10CC

## (undated) DEVICE — SOL IRR POUR NS 0.9% 1000ML

## (undated) DEVICE — WARMING BLANKET LOWER ADULT

## (undated) DEVICE — ELCTR SUBDERMAL NDL CLASSIC 1.5M X 59" (6 COLOR)

## (undated) DEVICE — DRAPE TOWEL BLUE 17" X 24"

## (undated) DEVICE — STAPLER SKIN PROXIMATE

## (undated) DEVICE — SOL IRR POUR H2O 1000ML

## (undated) DEVICE — ELCTR MONOPOLAR STIMULATOR PROBE FLUSH-TIP

## (undated) DEVICE — GLV 6.5 PROTEXIS (WHITE)

## (undated) DEVICE — SUT PROLENE 6-0 30" BV-1

## (undated) DEVICE — VESSEL LOOP MINI-BLUE 0.075" X 16"

## (undated) DEVICE — DRAPE XL SHEET 77X98"

## (undated) DEVICE — PREP BETADINE KIT

## (undated) DEVICE — TUBING CONNECTING 6MM 20FT

## (undated) DEVICE — COVER ULTRASOUND PROBE T-SHAPED INTRAOP SM

## (undated) DEVICE — DRSG DERMABOND 0.7ML

## (undated) DEVICE — ELCTR GROUNDING PAD ADULT COVIDIEN

## (undated) DEVICE — DRSG KERLIX ROLL 4.5"

## (undated) DEVICE — SUT VICRYL 3-0 18" SH (POP-OFF)

## (undated) DEVICE — POSITIONER FOAM EGG CRATE ULNAR 2PCS (PINK)

## (undated) DEVICE — PACK VASCULAR

## (undated) DEVICE — DRAPE CRANIOTOMY W POUCH 100X104"

## (undated) DEVICE — SYR LUER LOK 20CC

## (undated) DEVICE — MIDAS REX MR8 MATCH HEAD FLUTED LG BORE 3MM X 14CM

## (undated) DEVICE — STRYKER SONOPET IQ TIP 12CM STANDARD

## (undated) DEVICE — DRSG XEROFORM 1 X 8"

## (undated) DEVICE — CLIPPER BLADE NEURO (BLUE)

## (undated) DEVICE — SUT VICRYL 3-0 27" SH UNDYED

## (undated) DEVICE — SUT VICRYL PLUS 0 18" CT-1 UNDYED (POP-OFF)

## (undated) DEVICE — SPONGE SURGICAL STRIP 1" X 6"

## (undated) DEVICE — ELCTR PEDICLE SCREW PROBE 3MM BALL 1.8MM X 100MM

## (undated) DEVICE — STRYKER SONOPET IQ TUBING SET

## (undated) DEVICE — LONE STAR RETRACTOR RING 12MM BLUNT DISP

## (undated) DEVICE — SUT SILK 2-0 30" TIES

## (undated) DEVICE — CATH IV INTROCAN SAFETY 14G X 1.25" (ORANGE) FEP

## (undated) DEVICE — VENODYNE/SCD SLEEVE CALF MEDIUM

## (undated) DEVICE — PREP DURAPREP 26CC

## (undated) DEVICE — Device

## (undated) DEVICE — TAPE SILK 3"

## (undated) DEVICE — SPONGE SURGICAL STRIP 1/2 X 6"

## (undated) DEVICE — PACK NEURO

## (undated) DEVICE — DRSG 4 X 8

## (undated) DEVICE — DRAPE ISOLATION BAG 20X20"

## (undated) DEVICE — SUT SILK 3-0 30" TIES

## (undated) DEVICE — SUT SILK 4-0 30" TIES

## (undated) DEVICE — DRAPE HAND 77" X 146"

## (undated) DEVICE — SPONGE SURGICAL STRIP 1/4 X 6"

## (undated) DEVICE — MIDAS REX MR8 TAPERED SM BORE 2.3MM X 7CM FOOTED

## (undated) DEVICE — DRAPE MICROSCOPE ZEISS OPMI VISIONGUARD 154 X 52"

## (undated) DEVICE — GOWN XXL

## (undated) DEVICE — DRSG TELFA 3 X 8

## (undated) DEVICE — ELCTR SUBDERMAL NDL 27G X 1/2" WITH TWISTED PAIR

## (undated) DEVICE — DRAPE INSTRUMENT POUCH 6.75" X 11"

## (undated) DEVICE — GLV 6.5 PROTEXIS (BLUE)

## (undated) DEVICE — GLV 6 PROTEXIS (WHITE)

## (undated) DEVICE — NDL HYPO REGULAR BEVEL 25G X 1.5" (BLUE)

## (undated) DEVICE — SUT NUROLON 4-0 8-18" RB-1 (POP-OFF)

## (undated) DEVICE — TUBING BIPOLAR IRRIGATOR AND CORD SET

## (undated) DEVICE — TUBING FOR SMOKE EVACUATOR (PURPLE END)

## (undated) DEVICE — DRAIN JACKSON PRATT 3 SPRING RESERVOIR W 7FR PVC DRAIN

## (undated) DEVICE — MARKING PEN W RULER